# Patient Record
Sex: FEMALE | Race: WHITE | Employment: UNEMPLOYED | URBAN - METROPOLITAN AREA
[De-identification: names, ages, dates, MRNs, and addresses within clinical notes are randomized per-mention and may not be internally consistent; named-entity substitution may affect disease eponyms.]

---

## 2017-01-06 ENCOUNTER — OFFICE VISIT (OUTPATIENT)
Dept: FAMILY MEDICINE CLINIC | Age: 67
End: 2017-01-06

## 2017-01-06 VITALS
OXYGEN SATURATION: 97 % | SYSTOLIC BLOOD PRESSURE: 129 MMHG | HEART RATE: 88 BPM | RESPIRATION RATE: 16 BRPM | DIASTOLIC BLOOD PRESSURE: 84 MMHG | WEIGHT: 225 LBS | HEIGHT: 67 IN | TEMPERATURE: 99 F | BODY MASS INDEX: 35.31 KG/M2

## 2017-01-06 DIAGNOSIS — G44.219 EPISODIC TENSION-TYPE HEADACHE, NOT INTRACTABLE: Primary | ICD-10-CM

## 2017-01-06 NOTE — PROGRESS NOTES
1. Have you been to the ER, urgent care clinic since your last visit? Hospitalized since your last visit? No    2. Have you seen or consulted any other health care providers outside of the [address] [place] [address] since your last visit? Include any pap smears or colon screening.  No

## 2017-01-06 NOTE — PROGRESS NOTES
Subjective:     HPI:  Mariangel Han is a 77 y.o. female who presents to the office for follow up on headaches. Headaches: Patient was able to get Fioricet to take for headaches on 12/22/16. She reports relief of headaches with taking Fioricet. She reports taking the Fioricet at 10 pm works to decreased headaches and allows her to be functional the following day. Patient reports when she was recently in [place] her headaches were decreased during her trip; severe headaches returned after patient returned to [place]. She attributes allergies to her roommates dog and cats. Patient reports her roommate/landlord has a dog that sheds and two cats whose litter box is not changed frequently. She also has a room in the attic with areas of old, open insulation. Patient reports her headaches have started waking her from sleep. She uses cold packs on her head/face with some relief of headaches. Patient reports she has begun seeing objects fly by in her peripheral vision. She states she never saw a psychiatrist.     Current Outpatient Prescriptions   Medication Sig Dispense Refill    butalbital-acetaminophen-caffeine (FIORICET, ESGIC) -40 mg per tablet Take 1 Tab by mouth every four (4) hours as needed for Pain. Max Daily Amount: 6 Tabs. 30 Tab 0    cetirizine (ZYRTEC) 10 mg tablet TAKE 1 TABLET BY MOUTH EVERY DAY. GENERIC FOR ZYRTEC 90 Tab 2    metFORMIN (GLUCOPHAGE) 500 mg tablet TAKE 1 TABLET BY MOUTH DAILY WITH DINNER. GENERIC FOR GLUCOPHAGE 90 Tab 2    fluticasone (FLONASE) 50 mcg/actuation nasal spray INSTILL 1 SPRAY IN EACH NOSTRIL TWICE DAILY 3 Bottle 2    B.infantis-B.ani-B.long-B.bifi (PROBIOTIC 4X) 10-15 mg TbEC Take  by mouth daily.  docusate sodium (COLACE) 50 mg capsule Take 100 mg by mouth daily.       DULoxetine (CYMBALTA) 30 mg capsule TAKE 1 CAPSULE BY MOUTH TWICE DAILY 60 Cap 5    levothyroxine (SYNTHROID) 25 mcg tablet TAKE 1 TABLET BY MOUTH ONCE DAILY BEFORE BREAKFAST 90 Tab 1    meloxicam (MOBIC) 15 mg tablet Take 1 Tab by mouth daily. Indications: Pt informs she takes 1/2 dose in AM 1/2 dose in PM 30 Tab 5    atorvastatin (LIPITOR) 20 mg tablet TAKE 1 TABLET BY MOUTH ONCE DAILY 90 Tab 3    FLUoxetine (PROZAC) 10 mg capsule TAKE 1 CAPSULE BY MOUTH ONCE DAILY. GENERICFOR PROZAC. 90 Cap 3    traMADol (ULTRAM) 50 mg tablet Take 2 tabs by mouth twice daily prn pain (Patient taking differently: Take 50 mg by mouth four (4) times daily. Take 2 tabs by mouth twice daily prn pain) 120 Tab 2    cyclobenzaprine (FLEXERIL) 10 mg tablet TAKE 1 TABLET BY MOUTH TWICE DAILY AS NEEDED FOR MUSCLE SPASMS 60 Tab 0    ranitidine (ZANTAC) 150 mg tablet 150mg QAM and 75mg QPM      aspirin delayed-release 81 mg tablet Take 81 mg by mouth daily.  clobetasol (TEMOVATE) 0.05 % external solution APPLY TO SCALP AFTER SHAMPOO DAILY 50 mL 3    Cholecalciferol, Vitamin D3, (VITAMIN D3) 1,000 unit cap Take 1,000 Units by mouth daily.  glucose blood VI test strips (ACCU-CHEK MARCE PLUS TEST STRP) strip Please use one strip to test blood sugars once a day. 100 strip 20    Lancets (ACCU-CHEK SOFTCLIX LANCETS) misc Please use one lancet to test blood sugars twice a day. 1 Package 20    BLOOD-GLUCOSE METER (ACCU-CHEK MARCE MONITORING) by Does Not Apply route.  triamcinolone acetonide (KENALOG) 0.1 % topical cream Apply  to affected area two (2) times a day.  use thin layer          Allergies   Allergen Reactions    Latex, Natural Rubber Hives    Adhesive Rash     Some bandaids    Ciprofloxacin Rash    Codeine Nausea and Vomiting    Demerol [Meperidine] Nausea and Vomiting    Diclofenac Shortness of Breath and Palpitations    Erythromycin Rash    Levaquin [Levofloxacin] Rash    Loratadine Swelling    Morphine Nausea and Vomiting    Penicillin G Hives and Rash     Does fine with Keflex    Sulfa (Sulfonamide Antibiotics) Itching    Vicodin [Hydrocodone-Acetaminophen] Other (comments) Metallic taste in mouth       Past Medical History   Diagnosis Date    Aortic stenosis      Mean gradient  MG 34 (06/15), 50 (01/16)    Asthma     Chronic pain      back pain    Diabetes ([address]     Diverticulitis     Dry eyes, bilateral     Fibromyalgia     H/O aortic valve replacement 03/16     21 mm bovine pericardial bioprosthesis and epiaortic scanning      Hypertension     Hypothyroidism     Lumbar post-laminectomy syndrome     Menopause     Obesity     Osteoarthritis     Psychotic disorder     Sciatica     Skin cancer 2013     right forearm. Past Surgical History   Procedure Laterality Date    Colonoscopy      Hx lumbar laminectomy  2002    Hx lumbar fusion  2004     L3-L4-L5    Hx heent  1990's     sinus surgery     Hx cataract removal Right 1995     lens  replaced.  Pr colsc flx w/rmvl of tumor polyp lesion snare tq  07/09/2014 repeat in 3 years     Dr. Jeremy Parmar Hx mohs procedure Left 200    Pr chest surgery procedure unlisted  03/2016     Open Heart Surgery    Hx orthopaedic       Rotator cuff Bilateral    Vascular surgery procedure unlist       Aortic valve replacement       Family History   Problem Relation Age of Onset    Hypertension Father     Heart Disease Father     Alcohol abuse Father        Social History     Social History    Marital status:      Spouse name: N/A    Number of children: N/A    Years of education: N/A     Occupational History    Not on file. Social History Main Topics    Smoking status: Former Smoker     Years: 45.00     Types: Cigarettes     Quit date: 7/1/2011    Smokeless tobacco: Never Used    Alcohol use No    Drug use: No    Sexual activity: No     Other Topics Concern    Not on file     Social History Narrative       REVIEW OF SYSTEM:  Review of Systems   Constitutional: Negative for chills and fever. Eyes: Negative for blurred vision. Respiratory: Positive for cough.  Negative for shortness of breath. Cardiovascular: Negative for chest pain, palpitations and leg swelling. Gastrointestinal: Negative for constipation, diarrhea, nausea and vomiting. Musculoskeletal: Positive for back pain (chronic). Neurological: Positive for headaches. Objective:     Visit Vitals    /84 (BP 1 Location: Right arm, BP Patient Position: Sitting)    Pulse 88    Temp 99 °F (37.2 °C) (Oral)    Resp 16    Ht 5' 7\" (1.702 m)    Wt 225 lb (102.1 kg)    SpO2 97%    BMI 35.24 kg/m2       PHYSICAL EXAM:  Physical Exam   Constitutional: She is oriented to person, place, and time and well-developed, well-nourished, and in no distress. HENT:   Right Ear: Tympanic membrane, external ear and ear canal normal.   Left Ear: Tympanic membrane, external ear and ear canal normal.   Nose: Nose normal.   Mouth/Throat: Oropharynx is clear and moist.   Eyes: Pupils are equal, round, and reactive to light. Neck: Normal range of motion. Neck supple. No thyromegaly present. Cardiovascular: Normal rate, regular rhythm, normal heart sounds and intact distal pulses. No murmur heard. Pulmonary/Chest: Effort normal and breath sounds normal. She has no wheezes. Neurological: She is alert and oriented to person, place, and time. Reflex Scores:       Bicep reflexes are 2+ on the right side and 2+ on the left side. Patellar reflexes are 2+ on the right side and 2+ on the left side. Skin: Skin is warm and dry. Vitals reviewed. Assessment/Plan:       ICD-10-CM ICD-9-CM    1. Episodic tension-type headache, not intractable G44.219 339.11       Continue with Fioricet as needed. Patient advised she may need to change environment to reduce headaches. Patient given opportunity to ask questions. Questions answered. Patient understands plan of care. Follow-up Disposition:  Return in about 2 weeks (around 1/20/2017).         Written by Yas Ugalde, as dictated by Veronica Sloan DO.

## 2017-01-06 NOTE — MR AVS SNAPSHOT
Visit Information Date & Time Provider Department Dept. Phone Encounter #  
 1/6/2017  2:40 PM Daryn Christianson, [address] [facility] [phone] [id number] Follow-up Instructions Return in about 2 weeks (around 1/20/2017). Your Appointments 2/9/2017 10:50 AM  
Follow Up with Gabriel Haq MD  
[facility] ([facility]) Appt Note: 3 month back fu  no copay; PT R/S FROM 01/05/17  
 [address] [facility] [place] [address]  
[phone]  
  
   
 [address] [facility] [place] [address] Upcoming Health Maintenance Date Due  
 FOOT EXAM Q1 11/6/2016 Pneumococcal 65+ Low/Medium Risk (2 of 2 - PPSV23) 12/7/2016 MEDICARE YEARLY EXAM 12/7/2016 EYE EXAM RETINAL OR DILATED Q1 1/19/2017 HEMOGLOBIN A1C Q6M 3/2/2017 COLONOSCOPY 7/9/2017 MICROALBUMIN Q1 9/2/2017 LIPID PANEL Q1 9/2/2017 GLAUCOMA SCREENING Q2Y 1/19/2018 BREAST CANCER SCRN MAMMOGRAM 9/6/2018 DTaP/Tdap/Td series (2 - Td) 12/15/2025 Allergies as of 1/6/2017  Review Complete On: 1/6/2017 By: Maynor Garza LPN Severity Noted Reaction Type Reactions Latex, Natural Rubber  07/14/2016    Hives Adhesive  03/18/2016    Rash Some bandaids Ciprofloxacin  01/28/2014    Rash Codeine  01/28/2014    Nausea and Vomiting Demerol [Meperidine]  01/28/2014    Nausea and Vomiting Diclofenac  08/06/2015    Shortness of Breath, Palpitations Erythromycin  01/28/2014    Rash Levaquin [Levofloxacin]  01/28/2014    Rash Loratadine  01/28/2014    Swelling Morphine  01/28/2014    Nausea and Vomiting Penicillin G  01/28/2014    Hives, Rash Does fine with Keflex Sulfa (Sulfonamide Antibiotics)  01/28/2014    Itching Vicodin [Hydrocodone-acetaminophen]  02/12/2015    Other (comments) Metallic taste in mouth Current Immunizations  Reviewed on 10/3/2016 Name Date Influenza High Dose Vaccine PF 10/3/2016 Influenza Vaccine 10/10/2014 Influenza Vaccine (Quad) PF 10/28/2015 Pneumococcal Conjugate (PCV-13) 12/7/2015 Tdap 12/15/2015 Zoster Vaccine, Live 6/2/2014 Not reviewed this visit Vitals BP Pulse Temp Resp Height(growth percentile) Weight(growth percentile) 129/84 (BP 1 Location: Right arm, BP Patient Position: Sitting) 88 99 °F (37.2 °C) (Oral) 16 5' 7\" (1.702 m) 225 lb (102.1 kg) SpO2 BMI OB Status Smoking Status 97% 35.24 kg/m2 Menopause Former Smoker BMI and BSA Data Body Mass Index Body Surface Area  
 35.24 kg/m 2 2.2 m 2 Preferred Pharmacy Pharmacy Name Phone [facility] [address] [place] [address] Shasha [place]jolantas Your Updated Medication List  
  
   
This list is accurate as of: 1/6/17  3:50 PM.  Always use your most recent med list.  
  
  
  
  
 Edda Hilton  
by Does Not Apply route. aspirin delayed-release 81 mg tablet Take 81 mg by mouth daily. atorvastatin 20 mg tablet Commonly known as:  LIPITOR  
TAKE 1 TABLET BY MOUTH ONCE DAILY  
  
 butalbital-acetaminophen-caffeine -40 mg per tablet Commonly known as:  Carmell Bruce Take 1 Tab by mouth every four (4) hours as needed for Pain. Max Daily Amount: 6 Tabs. cetirizine 10 mg tablet Commonly known as:  ZYRTEC  
TAKE 1 TABLET BY MOUTH EVERY DAY. GENERIC FOR ZYRTEC  
  
 clobetasol 0.05 % external solution Commonly known as:  TEMOVATE  
APPLY TO SCALP AFTER SHAMPOO DAILY  
  
 cyclobenzaprine 10 mg tablet Commonly known as:  FLEXERIL  
TAKE 1 TABLET BY MOUTH TWICE DAILY AS NEEDED FOR MUSCLE SPASMS  
  
 docusate sodium 50 mg capsule Commonly known as:  Tameka Dilling Take 100 mg by mouth daily. DULoxetine 30 mg capsule Commonly known as:  CYMBALTA TAKE 1 CAPSULE BY MOUTH TWICE DAILY FLUoxetine 10 mg capsule Commonly known as:  PROzac TAKE 1 CAPSULE BY MOUTH ONCE DAILY. Chyrel Distance. fluticasone 50 mcg/actuation nasal spray Commonly known as:  Nidhi [place]a INSTILL 1 SPRAY IN EACH NOSTRIL TWICE DAILY  
  
 glucose blood VI test strips strip Commonly known as:  ACCU-CHEK MARCE PLUS TEST STRP Please use one strip to test blood sugars once a day. Lancets Misc Commonly known as:  ACCU-CHEK SOFTCLIX LANCETS Please use one lancet to test blood sugars twice a day. levothyroxine 25 mcg tablet Commonly known as:  SYNTHROID  
TAKE 1 TABLET BY MOUTH ONCE DAILY BEFORE BREAKFAST  
  
 meloxicam 15 mg tablet Commonly known as:  MOBIC Take 1 Tab by mouth daily. Indications: Pt informs she takes 1/2 dose in AM 1/2 dose in PM  
  
 metFORMIN 500 mg tablet Commonly known as:  GLUCOPHAGE  
TAKE 1 TABLET BY MOUTH DAILY WITH DINNER. GENERIC FOR GLUCOPHAGE  
  
 PROBIOTIC 4X 10-15 mg Tbec Generic drug:  B.infantis-B.ani-B.long-B.bifi Take  by mouth daily. raNITIdine 150 mg tablet Commonly known as:  ZANTAC  
150mg QAM and 75mg QPM  
  
 traMADol 50 mg tablet Commonly known as:  ULTRAM  
Take 2 tabs by mouth twice daily prn pain  
  
 triamcinolone acetonide 0.1 % topical cream  
Commonly known as:  KENALOG Apply  to affected area two (2) times a day. use thin layer VITAMIN D3 1,000 unit Cap Generic drug:  cholecalciferol Take 1,000 Units by mouth daily. Follow-up Instructions Return in about 2 weeks (around 1/20/2017). Introducing [facility]! Dear Cherry Parmar: Thank you for requesting a [employer] account. Our records indicate that you already have an active [employer] account. You can access your account anytime at [email or web address] Did you know that you can access your hospital and ER discharge instructions at any time in [employer]? You can also review all of your test results from your hospital stay or ER visit. Additional Information If you have questions, please visit the Frequently Asked Questions section of the [email or web address] website at [email or web address] Remember, [employer] is NOT to be used for urgent needs. For medical emergencies, dial 911. Now available from your iPhone and Android! Please provide this summary of care documentation to your next provider. Your primary care clinician is listed as Loy Guest. If you have any questions after today's visit, please call [phone].

## 2017-01-20 ENCOUNTER — OFFICE VISIT (OUTPATIENT)
Dept: FAMILY MEDICINE CLINIC | Age: 67
End: 2017-01-20

## 2017-01-20 VITALS
WEIGHT: 229 LBS | HEIGHT: 67 IN | BODY MASS INDEX: 35.94 KG/M2 | DIASTOLIC BLOOD PRESSURE: 77 MMHG | TEMPERATURE: 98.1 F | OXYGEN SATURATION: 97 % | HEART RATE: 95 BPM | SYSTOLIC BLOOD PRESSURE: 130 MMHG | RESPIRATION RATE: 16 BRPM

## 2017-01-20 DIAGNOSIS — R51.9 NEW ONSET HEADACHE: ICD-10-CM

## 2017-01-20 DIAGNOSIS — R30.9 PAIN PASSING URINE: Primary | ICD-10-CM

## 2017-01-20 DIAGNOSIS — G44.221 CHRONIC TENSION-TYPE HEADACHE, INTRACTABLE: ICD-10-CM

## 2017-01-20 DIAGNOSIS — N30.00 ACUTE CYSTITIS WITHOUT HEMATURIA: ICD-10-CM

## 2017-01-20 DIAGNOSIS — F33.9 RECURRENT MAJOR DEPRESSIVE DISORDER, REMISSION STATUS UNSPECIFIED (HCC): ICD-10-CM

## 2017-01-20 LAB
BILIRUB UR QL STRIP: NEGATIVE
GLUCOSE UR-MCNC: NEGATIVE MG/DL
KETONES P FAST UR STRIP-MCNC: NEGATIVE MG/DL
PH UR STRIP: 5.5 [PH] (ref 4.6–8)
PROT UR QL STRIP: NEGATIVE MG/DL
SP GR UR STRIP: 1.02 (ref 1–1.03)
UA UROBILINOGEN AMB POC: NORMAL (ref 0.2–1)
URINALYSIS CLARITY POC: CLEAR
URINALYSIS COLOR POC: YELLOW
URINE BLOOD POC: NEGATIVE
URINE LEUKOCYTES POC: NORMAL
URINE NITRITES POC: NEGATIVE

## 2017-01-20 RX ORDER — CEPHALEXIN 500 MG/1
500 CAPSULE ORAL 2 TIMES DAILY
Qty: 14 CAP | Refills: 0 | Status: SHIPPED | OUTPATIENT
Start: 2017-01-20 | End: 2017-01-27

## 2017-01-20 RX ORDER — BUTALBITAL, ACETAMINOPHEN AND CAFFEINE 50; 325; 40 MG/1; MG/1; MG/1
1 TABLET ORAL
Qty: 30 TAB | Refills: 0 | Status: SHIPPED | OUTPATIENT
Start: 2017-01-20 | End: 2019-10-08 | Stop reason: CLARIF

## 2017-01-20 RX ORDER — CEFTRIAXONE 1 G/1
1 INJECTION, POWDER, FOR SOLUTION INTRAMUSCULAR; INTRAVENOUS ONCE
Qty: 1 VIAL | Refills: 0
Start: 2017-01-20 | End: 2017-01-20

## 2017-01-20 NOTE — PROGRESS NOTES
Subjective:     HPI:  Mando Flower is a 77 y.o. female who presents to the office complaining of possible UTI and depression. Possible UTI: Patient reports burning with urination, urethral spasms, and urgency x 3 days. Patient reports she feels urgency when she needs to urinate but is only able to \"dribble\". She reports burning of her urethra and external to urethra with urination. She denies vaginal odor and discharge. Denies new sexual contacts. Patient states she can take Keflex and Rocephin. Results for orders placed or performed in visit on 01/20/17   AMB POC URINALYSIS DIP STICK AUTO W/O MICRO     Status: None   Result Value Ref Range Status    Color (UA POC) Yellow  Final    Clarity (UA POC) Clear  Final    Glucose (UA POC) Negative Negative Final    Bilirubin (UA POC) Negative Negative Final    Ketones (UA POC) Negative Negative Final    Specific gravity (UA POC) 1.025 1.001 - 1.035 Final    Blood (UA POC) Negative Negative Final    pH (UA POC) 5.5 4.6 - 8.0 Final    Protein (UA POC) Negative Negative mg/dL Final    Urobilinogen (UA POC) 0.2 mg/dL 0.2 - 1 Final    Nitrites (UA POC) Negative Negative Final    Leukocyte esterase (UA POC) 1+ Negative Final     Depression: Patient has started cardiac rehab. At her first visit she filled out a depression screen form that showed she is severely depressed. Patient reports she is depressed but did not see Psychiatrist because she thought it would resolve on its own. Patient states she is frequently depressed. She is agreeable to seeing a Psychiatrist at this time. [place] Lani Headaches: Patient reports her headaches are improved with medication changes. No medication side effects noted. Current Outpatient Prescriptions   Medication Sig Dispense Refill    cefTRIAXone (ROCEPHIN) 1 gram injection 1 g by IntraMUSCular route once for 1 dose. 1 Vial 0    cephALEXin (KEFLEX) 500 mg capsule Take 1 Cap by mouth two (2) times a day for 7 days.  14 Cap 0    butalbital-acetaminophen-caffeine (FIORICET, ESGIC) -40 mg per tablet Take 1 Tab by mouth every four (4) hours as needed for Pain. Max Daily Amount: 6 Tabs. 30 Tab 0    cetirizine (ZYRTEC) 10 mg tablet TAKE 1 TABLET BY MOUTH EVERY DAY. GENERIC FOR ZYRTEC 90 Tab 2    metFORMIN (GLUCOPHAGE) 500 mg tablet TAKE 1 TABLET BY MOUTH DAILY WITH DINNER. GENERIC FOR GLUCOPHAGE 90 Tab 2    fluticasone (FLONASE) 50 mcg/actuation nasal spray INSTILL 1 SPRAY IN EACH NOSTRIL TWICE DAILY 3 Bottle 2    B.infantis-B.ani-B.long-B.bifi (PROBIOTIC 4X) 10-15 mg TbEC Take  by mouth daily.  docusate sodium (COLACE) 50 mg capsule Take 100 mg by mouth daily.  DULoxetine (CYMBALTA) 30 mg capsule TAKE 1 CAPSULE BY MOUTH TWICE DAILY 60 Cap 5    levothyroxine (SYNTHROID) 25 mcg tablet TAKE 1 TABLET BY MOUTH ONCE DAILY BEFORE BREAKFAST 90 Tab 1    meloxicam (MOBIC) 15 mg tablet Take 1 Tab by mouth daily. Indications: Pt informs she takes 1/2 dose in AM 1/2 dose in PM 30 Tab 5    atorvastatin (LIPITOR) 20 mg tablet TAKE 1 TABLET BY MOUTH ONCE DAILY 90 Tab 3    FLUoxetine (PROZAC) 10 mg capsule TAKE 1 CAPSULE BY MOUTH ONCE DAILY. GENERICFOR PROZAC. 90 Cap 3    traMADol (ULTRAM) 50 mg tablet Take 2 tabs by mouth twice daily prn pain (Patient taking differently: Take 50 mg by mouth four (4) times daily. Take 2 tabs by mouth twice daily prn pain) 120 Tab 2    cyclobenzaprine (FLEXERIL) 10 mg tablet TAKE 1 TABLET BY MOUTH TWICE DAILY AS NEEDED FOR MUSCLE SPASMS 60 Tab 0    ranitidine (ZANTAC) 150 mg tablet 150mg QAM and 75mg QPM      aspirin delayed-release 81 mg tablet Take 81 mg by mouth daily.  clobetasol (TEMOVATE) 0.05 % external solution APPLY TO SCALP AFTER SHAMPOO DAILY 50 mL 3    Cholecalciferol, Vitamin D3, (VITAMIN D3) 1,000 unit cap Take 1,000 Units by mouth daily.  glucose blood VI test strips (ACCU-CHEK MARCE PLUS TEST STRP) strip Please use one strip to test blood sugars once a day.  100 strip 20    Lancets (ACCU-CHEK SOFTCLIX LANCETS) misc Please use one lancet to test blood sugars twice a day. 1 Package 20    BLOOD-GLUCOSE METER (ACCU-CHEK MARCE MONITORING) by Does Not Apply route.  triamcinolone acetonide (KENALOG) 0.1 % topical cream Apply  to affected area two (2) times a day. use thin layer          Allergies   Allergen Reactions    Latex, Natural Rubber Hives    Adhesive Rash     Some bandaids    Ciprofloxacin Rash    Codeine Nausea and Vomiting    Demerol [Meperidine] Nausea and Vomiting    Diclofenac Shortness of Breath and Palpitations    Erythromycin Rash    Levaquin [Levofloxacin] Rash    Loratadine Swelling    Morphine Nausea and Vomiting    Penicillin G Hives and Rash     Does fine with Keflex    Sulfa (Sulfonamide Antibiotics) Itching    Vicodin [Hydrocodone-Acetaminophen] Other (comments)     Metallic taste in mouth       Past Medical History   Diagnosis Date    Aortic stenosis      Mean gradient  MG 34 (06/15), 50 (01/16)    Asthma     Chronic pain      back pain    Diabetes (HCC)     Diverticulitis     Dry eyes, bilateral     Fibromyalgia     H/O aortic valve replacement 03/16     21 mm bovine pericardial bioprosthesis and epiaortic scanning      Hypertension     Hypothyroidism     Lumbar post-laminectomy syndrome     Menopause     Obesity     Osteoarthritis     Psychotic disorder     Sciatica     Skin cancer 2013     right forearm. Past Surgical History   Procedure Laterality Date    Colonoscopy      Hx lumbar laminectomy  2002    Hx lumbar fusion  2004     L3-L4-L5    Hx heent  1990's     sinus surgery     Hx cataract removal Right 1995     lens  replaced.      Pr colsc flx w/rmvl of tumor polyp lesion snare tq  07/09/2014 repeat in 3 years     Dr. Mark Webb Hx mohs procedure Left 1990    Pr chest surgery procedure unlisted  03/2016     Open Heart Surgery    Hx orthopaedic       Rotator cuff Bilateral    Vascular surgery procedure unlist       Aortic valve replacement       Family History   Problem Relation Age of Onset    Hypertension Father     Heart Disease Father     Alcohol abuse Father        Social History     Social History    Marital status:      Spouse name: N/A    Number of children: N/A    Years of education: N/A     Occupational History    Not on file. Social History Main Topics    Smoking status: Former Smoker     Years: 45.00     Types: Cigarettes     Quit date: 7/1/2011    Smokeless tobacco: Never Used    Alcohol use No    Drug use: No    Sexual activity: No     Other Topics Concern    Not on file     Social History Narrative       REVIEW OF SYSTEM:  Review of Systems   Constitutional: Negative for chills and fever. Eyes: Negative for blurred vision. Respiratory: Negative for shortness of breath. Cardiovascular: Negative for chest pain, palpitations and leg swelling. Gastrointestinal: Negative for constipation, diarrhea, nausea and vomiting. Genitourinary: Positive for dysuria and urgency. Musculoskeletal: Positive for back pain (chronic) and joint pain. Neurological: Positive for headaches. Psychiatric/Behavioral: Positive for depression. Negative for suicidal ideas. Objective:     Visit Vitals    /77 (BP 1 Location: Right arm, BP Patient Position: Sitting)    Pulse 95    Temp 98.1 °F (36.7 °C) (Oral)    Resp 16    Ht 5' 7\" (1.702 m)    Wt 229 lb (103.9 kg)    SpO2 97%    BMI 35.87 kg/m2       PHYSICAL EXAM:  Physical Exam   Constitutional: She is oriented to person, place, and time and well-developed, well-nourished, and in no distress. HENT:   Right Ear: Tympanic membrane, external ear and ear canal normal.   Left Ear: Tympanic membrane, external ear and ear canal normal.   Nose: Nose normal.   Mouth/Throat: Oropharynx is clear and moist.   Eyes: Pupils are equal, round, and reactive to light. Neck: Normal range of motion. Neck supple. No thyromegaly present. Cardiovascular: Normal rate, regular rhythm, normal heart sounds and intact distal pulses. No murmur heard. Pulmonary/Chest: Effort normal and breath sounds normal. She has no wheezes. Abdominal: There is tenderness in the suprapubic area. There is no CVA tenderness. Neurological: She is alert and oriented to person, place, and time. Skin: Skin is warm and dry. Vitals reviewed. Assessment/Plan:       ICD-10-CM ICD-9-CM    1. Pain passing urine R30.9 788.1 AMB POC URINALYSIS DIP STICK AUTO W/O MICRO   2. Acute cystitis without hematuria N30.00 595.0 cefTRIAXone (ROCEPHIN) 1 gram injection      CEFTRIAXONE SODIUM INJECTION  MG      NH THER/PROPH/DIAG INJECTION, SUBCUT/IM      cephALEXin (KEFLEX) 500 mg capsule   3. Recurrent major depressive disorder, remission status unspecified ([facility] F33.9 296.30 REFERRAL TO BEHAVIORAL HEALTH   4. Chronic tension-type headache, intractable G44.221 339.12 butalbital-acetaminophen-caffeine (FIORICET, ESGIC) -40 mg per tablet   5. New onset headache R51 784.0 butalbital-acetaminophen-caffeine (FIORICET, ESGIC) -40 mg per tablet     Patient given opportunity to ask questions. Questions answered. Patient understands plan of care. Follow-up Disposition:  Return if symptoms worsen or fail to improve.         Written by Yas Ugalde, as dictated by Veronica Sloan DO.

## 2017-01-20 NOTE — MR AVS SNAPSHOT
Visit Information Date & Time Provider Department Dept. Phone Encounter #  
 1/20/2017  3:20 PM Jake RitterArchana 6 [phone] [id number] Your Appointments 2/9/2017 10:50 AM  
Follow Up with Aryan Shrestha MD  
[facility] ([facility]) Appt Note: 3 month back fu  no copay; PT R/S FROM 01/05/17  
 [address] [facility] [address]  
[phone]  
  
   
 [address] [facility] [facility] [address] Upcoming Health Maintenance Date Due  
 FOOT EXAM Q1 11/6/2016 Pneumococcal 65+ Low/Medium Risk (2 of 2 - PPSV23) 12/7/2016 MEDICARE YEARLY EXAM 12/7/2016 EYE EXAM RETINAL OR DILATED Q1 1/19/2017 COLONOSCOPY 7/9/2017 HEMOGLOBIN A1C Q6M 3/2/2017 MICROALBUMIN Q1 9/2/2017 LIPID PANEL Q1 9/2/2017 GLAUCOMA SCREENING Q2Y 1/19/2018 BREAST CANCER SCRN MAMMOGRAM 9/6/2018 DTaP/Tdap/Td series (2 - Td) 12/15/2025 Allergies as of 1/20/2017  Review Complete On: 1/20/2017 By: Jake Ritter DO Severity Noted Reaction Type Reactions Latex, Natural Rubber  07/14/2016    Hives Adhesive  03/18/2016    Rash Some bandaids Ciprofloxacin  01/28/2014    Rash Codeine  01/28/2014    Nausea and Vomiting Demerol [Meperidine]  01/28/2014    Nausea and Vomiting Diclofenac  08/06/2015    Shortness of Breath, Palpitations Erythromycin  01/28/2014    Rash Levaquin [Levofloxacin]  01/28/2014    Rash Loratadine  01/28/2014    Swelling Morphine  01/28/2014    Nausea and Vomiting Penicillin G  01/28/2014    Hives, Rash Does fine with Keflex Sulfa (Sulfonamide Antibiotics)  01/28/2014    Itching Vicodin [Hydrocodone-acetaminophen]  02/12/2015    Other (comments) Metallic taste in mouth Current Immunizations  Reviewed on 10/3/2016 Name Date Influenza High Dose Vaccine PF 10/3/2016 Influenza Vaccine 10/10/2014 Influenza Vaccine (Quad) PF 10/28/2015 Pneumococcal Conjugate (PCV-13) 12/7/2015 Tdap 12/15/2015 Zoster Vaccine, Live 6/2/2014 Not reviewed this visit You Were Diagnosed With   
  
 Codes Comments Pain passing urine    -  Primary ICD-10-CM: R30.9 ICD-9-CM: 323. 1 Acute cystitis without hematuria     ICD-10-CM: N30.00 ICD-9-CM: 595.0 Recurrent major depressive disorder, remission status unspecified ([facility]     ICD-10-CM: F33.9 ICD-9-CM: 296.30 Chronic tension-type headache, intractable     ICD-10-CM: U11.705 ICD-9-CM: 339.12 New onset headache     ICD-10-CM: R51 ICD-9-CM: 878. 0 Vitals BP Pulse Temp Resp Height(growth percentile) Weight(growth percentile) 130/77 (BP 1 Location: Right arm, BP Patient Position: Sitting) 95 98.1 °F (36.7 °C) (Oral) 16 5' 7\" (1.702 m) 229 lb (103.9 kg) SpO2 BMI OB Status Smoking Status 97% 35.87 kg/m2 Menopause Former Smoker Vitals History BMI and BSA Data Body Mass Index Body Surface Area  
 35.87 kg/m 2 2.22 m 2 Preferred Pharmacy Pharmacy Name Phone [facility] [address] [place] [address] Shasha Culps Your Updated Medication List  
  
   
This list is accurate as of: 1/20/17  4:09 PM.  Always use your most recent med list.  
  
  
  
  
 Timothy Perez  
by Does Not Apply route. aspirin delayed-release 81 mg tablet Take 81 mg by mouth daily. atorvastatin 20 mg tablet Commonly known as:  LIPITOR  
TAKE 1 TABLET BY MOUTH ONCE DAILY  
  
 butalbital-acetaminophen-caffeine -40 mg per tablet Commonly known as:  Michael Gift Take 1 Tab by mouth every four (4) hours as needed for Pain. Max Daily Amount: 6 Tabs. cefTRIAXone 1 gram injection Commonly known as:  ROCEPHIN  
1 g by IntraMUSCular route once for 1 dose. cephALEXin 500 mg capsule Commonly known as:  Alexei Reeder Take 1 Cap by mouth two (2) times a day for 7 days. cetirizine 10 mg tablet Commonly known as:  ZYRTEC  
TAKE 1 TABLET BY MOUTH EVERY DAY. GENERIC FOR ZYRTEC  
  
 clobetasol 0.05 % external solution Commonly known as:  TEMOVATE  
APPLY TO SCALP AFTER SHAMPOO DAILY  
  
 cyclobenzaprine 10 mg tablet Commonly known as:  FLEXERIL  
TAKE 1 TABLET BY MOUTH TWICE DAILY AS NEEDED FOR MUSCLE SPASMS  
  
 docusate sodium 50 mg capsule Commonly known as:  Tameka Dilling Take 100 mg by mouth daily. DULoxetine 30 mg capsule Commonly known as:  CYMBALTA TAKE 1 CAPSULE BY MOUTH TWICE DAILY FLUoxetine 10 mg capsule Commonly known as:  PROzac TAKE 1 CAPSULE BY MOUTH ONCE DAILY. Virginie Tolentino. fluticasone 50 mcg/actuation nasal spray Commonly known as:  Wendi Fryer INSTILL 1 SPRAY IN EACH NOSTRIL TWICE DAILY  
  
 glucose blood VI test strips strip Commonly known as:  ACCU-CHEK MARCE PLUS TEST STRP Please use one strip to test blood sugars once a day. Lancets Misc Commonly known as:  ACCU-CHEK SOFTCLIX LANCETS Please use one lancet to test blood sugars twice a day. levothyroxine 25 mcg tablet Commonly known as:  SYNTHROID  
TAKE 1 TABLET BY MOUTH ONCE DAILY BEFORE BREAKFAST  
  
 meloxicam 15 mg tablet Commonly known as:  MOBIC Take 1 Tab by mouth daily. Indications: Pt informs she takes 1/2 dose in AM 1/2 dose in PM  
  
 metFORMIN 500 mg tablet Commonly known as:  GLUCOPHAGE  
TAKE 1 TABLET BY MOUTH DAILY WITH DINNER. GENERIC FOR GLUCOPHAGE  
  
 PROBIOTIC 4X 10-15 mg Tbec Generic drug:  B.infantis-B.ani-B.long-B.bifi Take  by mouth daily. raNITIdine 150 mg tablet Commonly known as:  ZANTAC  
150mg QAM and 75mg QPM  
  
 traMADol 50 mg tablet Commonly known as:  ULTRAM  
Take 2 tabs by mouth twice daily prn pain  
  
 triamcinolone acetonide 0.1 % topical cream  
Commonly known as:  KENALOG Apply  to affected area two (2) times a day. use thin layer VITAMIN D3 1,000 unit Cap Generic drug:  cholecalciferol Take 1,000 Units by mouth daily. Prescriptions Printed Refills  
 butalbital-acetaminophen-caffeine (FIORICET, ESGIC) -40 mg per tablet 0 Sig: Take 1 Tab by mouth every four (4) hours as needed for Pain. Max Daily Amount: 6 Tabs. Class: Print Route: Oral  
  
Prescriptions Sent to Pharmacy Refills  
 cephALEXin (KEFLEX) 500 mg capsule 0 Sig: Take 1 Cap by mouth two (2) times a day for 7 days. Class: Normal  
 Pharmacy: [facility] [address] [place] [address] #: [phone] Route: Oral  
  
We Performed the Following AMB POC URINALYSIS DIP STICK AUTO W/O MICRO [98796 CPT(R)] CEFTRIAXONE SODIUM INJECTION  MG [ [facility]] Comments:  
 Mix per protocol GA THER/PROPH/DIAG INJECTION, SUBCUT/IM [id number] CPT(R)] REFERRAL TO BEHAVIORAL HEALTH [REF8 Custom] Comments:  
 Please evaluate patient for depression, OCD, PTSD, anxiety Referral Information Referral ID Referred By Referred To  
  
 [id number] Gilbert GRANT Not Available Visits Status Start Date End Date 1 New Request 1/20/17 1/20/18 If your referral has a status of pending review or denied, additional information will be sent to support the outcome of this decision. [facility]! Dear Merlin Shelter: Thank you for requesting a Work [employer] account. Our records indicate that you already have an active [employer] account. You can access your account anytime at [email or web address] Did you know that you can access your hospital and ER discharge instructions at any time in Work For Pie? You can also review all of your test results from your hospital stay or ER visit. Additional Information If you have questions, please visit the Frequently Asked Questions section of the [employer] website at [email or web address] Five-Thirty. [email or web address]/[email or web address] Remember, [employer] is NOT to be used for urgent needs. For medical emergencies, dial 911. Now available from your iPhone and Android! Please provide this summary of care documentation to your next provider. Your primary care clinician is listed as Elaine Solis. If you have any questions after today's visit, please call [phone].

## 2017-01-20 NOTE — PROGRESS NOTES
HISTORY OF PRESENT ILLNESS  Philip Lopez is a 77 y.o. female. HPI    ROS    Physical Exam    ASSESSMENT and PLAN  1. Have you been to the ER, urgent care clinic since your last visit? Hospitalized since your last visit? No       2. Have you seen or consulted any other health care providers outside of the [address] [place] [address] since your last visit? Include any pap smears or colon screening.  No

## 2017-01-23 ENCOUNTER — TELEPHONE (OUTPATIENT)
Dept: FAMILY MEDICINE CLINIC | Age: 67
End: 2017-01-23

## 2017-01-31 ENCOUNTER — TELEPHONE (OUTPATIENT)
Dept: FAMILY MEDICINE CLINIC | Age: 67
End: 2017-01-31

## 2017-02-09 ENCOUNTER — OFFICE VISIT (OUTPATIENT)
Dept: ORTHOPEDIC SURGERY | Age: 67
End: 2017-02-09

## 2017-02-09 VITALS
SYSTOLIC BLOOD PRESSURE: 126 MMHG | BODY MASS INDEX: 35.63 KG/M2 | WEIGHT: 227 LBS | HEIGHT: 67 IN | DIASTOLIC BLOOD PRESSURE: 86 MMHG | HEART RATE: 90 BPM

## 2017-02-09 DIAGNOSIS — M54.16 RADICULOPATHY, LUMBAR REGION: ICD-10-CM

## 2017-02-09 DIAGNOSIS — M96.1 POSTLAMINECTOMY SYNDROME: Primary | ICD-10-CM

## 2017-02-09 RX ORDER — TRAMADOL HYDROCHLORIDE 50 MG/1
TABLET ORAL
Qty: 120 TAB | Refills: 2 | Status: SHIPPED | OUTPATIENT
Start: 2017-02-09 | End: 2017-04-24 | Stop reason: SDUPTHER

## 2017-02-09 NOTE — PROGRESS NOTES
[facility]  [address]  [address], [address]   Phone: [phone]  Fax: [phone]        PROGRESS NOTE      HISTORY OF PRESENT ILLNESS:  The patient is a 77 y.o. female and was seen today for follow up of low back pain into the left lower extremity extending in roughly an L4 distribution. Patient also endorses numbness in the LLE. She had a diagnosis of lumbar postlaminectomy syndrome with a previous history of L3 through S1 fusion. The patient had an additional diagnosis of fibromyalgia. Note dated 6/29/16 indicating patient was seen and underwent pig aortic valve replacement by Dr. Aditi Baum on 3/18/16. Of note, subsequently fell postsurgery and broke both ankles; she is no longer using left walking boot since 12/2016. She has been restarted on Cymbalta by her psychiatrist. She is currently taking 60 mg twice per day. Upon review of our records, it was noted she previously failed TOPAMAX, NEURONTIN, Nyoka Cotton, and CYMBALTA. Patient was switched from Ultram ER back to Ultram immediate release. She takes Ultram 2 tablets BID. CT scan per report revealed L3 through L5 laminectomies with L3 through S1 dorsal hardware fusion. There was no evidence for fractures. There was advanced degenerative disc disease at L2-L3 at the junctional level with marked central canal stenosis. At her last clinic appointment, she continued to have low back pain and now had complaints of numbness in her left lower extremity. She was not particularly interested in surgical intervention or blocks at that time. I refilled her Ultram.     The patient returns today with pain location and distribution remain unchanged. She rates pain 4/10, a slight decrease relative to her last visit (5/10). Patient began cardiac rehab in 1/2017. Despite the increase in activity, her low back and LLE pain are holding steady. She reports pending right shoulder surgery to be performed by Dr. David Echeverria.  Patient continues with Ultram 1 tab QID.  reviewed. Past Medical History   Diagnosis Date    Aortic stenosis      Mean gradient  MG 34 (06/15), 50 (01/16)    Asthma     Chronic pain      back pain    Diabetes ([address]     Diverticulitis     Dry eyes, bilateral     Fibromyalgia     H/O aortic valve replacement 03/16     21 mm bovine pericardial bioprosthesis and epiaortic scanning      Hypertension     Hypothyroidism     Lumbar post-laminectomy syndrome     Menopause     Obesity     Osteoarthritis     Psychotic disorder     Sciatica     Skin cancer 2013     right forearm. Social History     Social History    Marital status:      Spouse name: N/A    Number of children: N/A    Years of education: N/A     Occupational History    Not on file. Social History Main Topics    Smoking status: Former Smoker     Years: 45.00     Types: Cigarettes     Quit date: 7/1/2011    Smokeless tobacco: Never Used    Alcohol use No    Drug use: No    Sexual activity: No     Other Topics Concern    Not on file     Social History Narrative       Current Outpatient Prescriptions   Medication Sig Dispense Refill    traMADol (ULTRAM) 50 mg tablet TAKE 1 PO QID PRN PAIN  Indications: NEUROPATHIC PAIN, Pain 120 Tab 2    butalbital-acetaminophen-caffeine (FIORICET, ESGIC) -40 mg per tablet Take 1 Tab by mouth every four (4) hours as needed for Pain. Max Daily Amount: 6 Tabs. 30 Tab 0    cetirizine (ZYRTEC) 10 mg tablet TAKE 1 TABLET BY MOUTH EVERY DAY. GENERIC FOR ZYRTEC 90 Tab 2    metFORMIN (GLUCOPHAGE) 500 mg tablet TAKE 1 TABLET BY MOUTH DAILY WITH DINNER. GENERIC FOR GLUCOPHAGE 90 Tab 2    fluticasone (FLONASE) 50 mcg/actuation nasal spray INSTILL 1 SPRAY IN EACH NOSTRIL TWICE DAILY 3 Bottle 2    B.infantis-B.ani-B.long-B.bifi (PROBIOTIC 4X) 10-15 mg TbEC Take  by mouth daily.  docusate sodium (COLACE) 50 mg capsule Take 100 mg by mouth daily.       DULoxetine (CYMBALTA) 30 mg capsule TAKE 1 CAPSULE BY MOUTH TWICE DAILY 60 Cap 5    levothyroxine (SYNTHROID) 25 mcg tablet TAKE 1 TABLET BY MOUTH ONCE DAILY BEFORE BREAKFAST 90 Tab 1    meloxicam (MOBIC) 15 mg tablet Take 1 Tab by mouth daily. Indications: Pt informs she takes 1/2 dose in AM 1/2 dose in PM 30 Tab 5    atorvastatin (LIPITOR) 20 mg tablet TAKE 1 TABLET BY MOUTH ONCE DAILY 90 Tab 3    traMADol (ULTRAM) 50 mg tablet Take 2 tabs by mouth twice daily prn pain (Patient taking differently: Take 50 mg by mouth four (4) times daily. Take 2 tabs by mouth twice daily prn pain) 120 Tab 2    cyclobenzaprine (FLEXERIL) 10 mg tablet TAKE 1 TABLET BY MOUTH TWICE DAILY AS NEEDED FOR MUSCLE SPASMS 60 Tab 0    ranitidine (ZANTAC) 150 mg tablet 150mg QAM and 75mg QPM      aspirin delayed-release 81 mg tablet Take 81 mg by mouth daily.  clobetasol (TEMOVATE) 0.05 % external solution APPLY TO SCALP AFTER SHAMPOO DAILY 50 mL 3    Cholecalciferol, Vitamin D3, (VITAMIN D3) 1,000 unit cap Take 1,000 Units by mouth daily.  glucose blood VI test strips (ACCU-CHEK MARCE PLUS TEST STRP) strip Please use one strip to test blood sugars once a day. 100 strip 20    Lancets (ACCU-CHEK SOFTCLIX LANCETS) misc Please use one lancet to test blood sugars twice a day. 1 Package 20    BLOOD-GLUCOSE METER (ACCU-CHEK MARCE MONITORING) by Does Not Apply route.  triamcinolone acetonide (KENALOG) 0.1 % topical cream Apply  to affected area two (2) times a day. use thin layer      FLUoxetine (PROZAC) 10 mg capsule TAKE 1 CAPSULE BY MOUTH ONCE DAILY.  GENERICFOR PROZAC. 90 Cap 3       Allergies   Allergen Reactions    Latex, Natural Rubber Hives    Adhesive Rash     Some bandaids    Ciprofloxacin Rash    Codeine Nausea and Vomiting    Demerol [Meperidine] Nausea and Vomiting    Diclofenac Shortness of Breath and Palpitations    Erythromycin Rash    Levaquin [Levofloxacin] Rash    Loratadine Swelling    Morphine Nausea and Vomiting    Penicillin G Hives and Rash     Does fine with Keflex    Sulfa (Sulfonamide Antibiotics) Itching    Vicodin [Hydrocodone-Acetaminophen] Other (comments)     Metallic taste in mouth        Ambulates with a single point cane. PHYSICAL EXAMINATION    Visit Vitals    /86    Pulse 90    Ht 5' 7\" (1.702 m)    Wt 227 lb (103 kg)    BMI 35.55 kg/m2       CONSTITUTIONAL: NAD, A&O x 3  SENSATION: Decreased sensation to light touch at L4/L5 of the LLE. Sensation to light touch otherwise intact. RANGE OF MOTION: The patient has full passive range of motion in all four extremities. MOTOR:  Straight Leg Raise: Negative, bilateral               Hip Flex Knee Ext Knee Flex Ankle DF GTE Ankle PF Tone   Right +4/5 +4/5 +4/5 +4/5 +4/5 +4/5 +4/5   Left +4/5 +4/5 +4/5 +4/5 +4/5 +4/5 +4/5       ASSESSMENT   Jerryl Siemens was seen today for follow-up. Diagnoses and all orders for this visit:    Postlaminectomy syndrome  -     traMADol (ULTRAM) 50 mg tablet; TAKE 1 PO QID PRN PAIN  Indications: NEUROPATHIC PAIN, Pain    Radiculopathy, lumbar region  -     traMADol (ULTRAM) 50 mg tablet; TAKE 1 PO QID PRN PAIN  Indications: NEUROPATHIC PAIN, Pain          IMPRESSION AND PLAN:  Patient wished to continue her current treatment. She was provided with refills of Ultram 50 mg. I will see the patient back in 3 month's time or earlier if needed. Written by Christine Veliz, as dictated by Pamella Bosch MD  I examined the patient, reviewed and agree with the note.

## 2017-03-02 DIAGNOSIS — M15.9 OSTEOARTHRITIS OF MULTIPLE JOINTS, UNSPECIFIED OSTEOARTHRITIS TYPE: ICD-10-CM

## 2017-03-07 RX ORDER — MELOXICAM 15 MG/1
TABLET ORAL
Qty: 30 TAB | Refills: 3 | Status: SHIPPED | OUTPATIENT
Start: 2017-03-07 | End: 2017-04-27

## 2017-03-21 ENCOUNTER — OFFICE VISIT (OUTPATIENT)
Dept: CARDIOLOGY CLINIC | Age: 67
End: 2017-03-21

## 2017-03-21 ENCOUNTER — HOSPITAL ENCOUNTER (OUTPATIENT)
Dept: LAB | Age: 67
Discharge: HOME | End: 2017-03-21
Payer: MEDICARE

## 2017-03-21 ENCOUNTER — HOSPITAL ENCOUNTER (OUTPATIENT)
Dept: OTHER | Age: 67
Discharge: HOME | End: 2017-03-21
Payer: MEDICARE

## 2017-03-21 VITALS
BODY MASS INDEX: 34.4 KG/M2 | SYSTOLIC BLOOD PRESSURE: 118 MMHG | HEART RATE: 97 BPM | WEIGHT: 227 LBS | HEIGHT: 68 IN | DIASTOLIC BLOOD PRESSURE: 77 MMHG | OXYGEN SATURATION: 98 %

## 2017-03-21 DIAGNOSIS — Z96.611 S/P REVERSE TOTAL SHOULDER ARTHROPLASTY, RIGHT: ICD-10-CM

## 2017-03-21 DIAGNOSIS — I35.0 AORTIC VALVE STENOSIS, UNSPECIFIED ETIOLOGY: Primary | ICD-10-CM

## 2017-03-21 DIAGNOSIS — E66.01 MORBID OBESITY, UNSPECIFIED OBESITY TYPE (HCC): ICD-10-CM

## 2017-03-21 DIAGNOSIS — I10 ESSENTIAL HYPERTENSION WITH GOAL BLOOD PRESSURE LESS THAN 140/90: ICD-10-CM

## 2017-03-21 LAB
APPEARANCE UR: ABNORMAL
BACTERIA URNS QL MICRO: NEGATIVE /HPF
BASOPHILS # BLD AUTO: 0.1 K/UL (ref 0–0.06)
BASOPHILS # BLD: 1 % (ref 0–2)
BILIRUB UR QL: NEGATIVE
CAOX CRY URNS QL MICRO: ABNORMAL
COLOR UR: ABNORMAL
DIFFERENTIAL METHOD BLD: ABNORMAL
EOSINOPHIL # BLD: 0.1 K/UL (ref 0–0.4)
EOSINOPHIL NFR BLD: 2 % (ref 0–5)
EPITH CASTS URNS QL MICRO: ABNORMAL /LPF (ref 0–5)
ERYTHROCYTE [DISTWIDTH] IN BLOOD BY AUTOMATED COUNT: 14.4 % (ref 11.6–14.5)
GLUCOSE UR STRIP.AUTO-MCNC: NEGATIVE MG/DL
HCT VFR BLD AUTO: 42.7 % (ref 35–45)
HGB BLD-MCNC: 13.6 G/DL (ref 12–16)
HGB UR QL STRIP: NEGATIVE
KETONES UR QL STRIP.AUTO: ABNORMAL MG/DL
LEUKOCYTE ESTERASE UR QL STRIP.AUTO: ABNORMAL
LYMPHOCYTES # BLD AUTO: 27 % (ref 21–52)
LYMPHOCYTES # BLD: 1.7 K/UL (ref 0.9–3.6)
MCH RBC QN AUTO: 29.3 PG (ref 24–34)
MCHC RBC AUTO-ENTMCNC: 31.9 G/DL (ref 31–37)
MCV RBC AUTO: 92 FL (ref 74–97)
MONOCYTES # BLD: 0.8 K/UL (ref 0.05–1.2)
MONOCYTES NFR BLD AUTO: 13 % (ref 3–10)
NEUTS SEG # BLD: 3.6 K/UL (ref 1.8–8)
NEUTS SEG NFR BLD AUTO: 57 % (ref 40–73)
NITRITE UR QL STRIP.AUTO: NEGATIVE
PH UR STRIP: 5.5 [PH] (ref 5–8)
PLATELET # BLD AUTO: 239 K/UL (ref 135–420)
PMV BLD AUTO: 11.5 FL (ref 9.2–11.8)
PROT UR STRIP-MCNC: ABNORMAL MG/DL
RBC # BLD AUTO: 4.64 M/UL (ref 4.2–5.3)
RBC #/AREA URNS HPF: 0 /HPF (ref 0–5)
SP GR UR REFRACTOMETRY: 1.03 (ref 1–1.03)
UROBILINOGEN UR QL STRIP.AUTO: 1 EU/DL (ref 0.2–1)
WBC # BLD AUTO: 6.2 K/UL (ref 4.6–13.2)
WBC URNS QL MICRO: ABNORMAL /HPF (ref 0–4)

## 2017-03-21 PROCEDURE — 81001 URINALYSIS AUTO W/SCOPE: CPT | Performed by: ORTHOPAEDIC SURGERY

## 2017-03-21 PROCEDURE — 85025 COMPLETE CBC W/AUTO DIFF WBC: CPT | Performed by: ORTHOPAEDIC SURGERY

## 2017-03-21 PROCEDURE — 93005 ELECTROCARDIOGRAM TRACING: CPT

## 2017-03-21 PROCEDURE — 36415 COLL VENOUS BLD VENIPUNCTURE: CPT | Performed by: ORTHOPAEDIC SURGERY

## 2017-03-21 RX ORDER — ZOLPIDEM TARTRATE 5 MG/1
TABLET ORAL
Refills: 0 | COMMUNITY
Start: 2017-03-11 | End: 2017-08-07 | Stop reason: ALTCHOICE

## 2017-03-21 NOTE — MR AVS SNAPSHOT
Visit Information Date & Time Provider Department Dept. Phone Encounter #  
 3/21/2017  1:00 PM Joaquín Mccoy MD [address] [place] [address] Specialist at [facility] [phone] [id number] Follow-up Instructions Return in about 6 months (around 9/21/2017). Your Appointments 3/27/2017  2:40 PM  
ROUTINE CARE with Darío Garrett DO [address] [facility] [address] [place] [address]) Appt Note: Surgical Clearance for rt total shoulder 3/29/17 [facility] - [phone] Riya  
 [address] [facility] [address] [facility]  
  
   
 [address] [place] [address]  
  
    
 5/4/2017 10:30 AM  
Follow Up with Helga Ibrahim MD  
[facility] ([address] [place] [address]) Appt Note: 3 MO FU BACK  
 [address] [place] [address]  
[phone]  
  
   
 [address] [facility] [address] [place] [address]  
  
    
 9/21/2017  1:00 PM  
Follow Up with Darryle Dung, MD  
Cardio Specialist at [facility] [place] [address]) Appt Note: 6 m f/u  
 [employer] [address] Upcoming Health Maintenance Date Due  
 FOOT EXAM Q1 11/6/2016 Pneumococcal 65+ Low/Medium Risk (2 of 2 - PPSV23) 12/7/2016 MEDICARE YEARLY EXAM 12/7/2016 EYE EXAM RETINAL OR DILATED Q1 1/19/2017 HEMOGLOBIN A1C Q6M 3/2/2017 COLONOSCOPY 7/9/2017 MICROALBUMIN Q1 9/2/2017 LIPID PANEL Q1 9/2/2017 GLAUCOMA SCREENING Q2Y 1/19/2018 BREAST CANCER SCRN MAMMOGRAM 9/6/2018 DTaP/Tdap/Td series (2 - Td) 12/15/2025 Allergies as of 3/21/2017  Review Complete On: 3/21/2017 By: Stephani Kulkarni LPN Severity Noted Reaction Type Reactions Latex, Natural Rubber  07/14/2016    Hives Adhesive  03/18/2016    Rash Some bandaids Ciprofloxacin  01/28/2014    Rash Codeine  01/28/2014    Nausea and Vomiting Demerol [Meperidine]  01/28/2014    Nausea and Vomiting Diclofenac  08/06/2015    Shortness of Breath, Palpitations Erythromycin  01/28/2014    Rash Levaquin [Levofloxacin]  01/28/2014    Rash Loratadine  01/28/2014    Swelling Morphine  01/28/2014    Nausea and Vomiting Penicillin G  01/28/2014    Hives, Rash Does fine with Keflex Sulfa (Sulfonamide Antibiotics)  01/28/2014    Itching Vicodin [Hydrocodone-acetaminophen]  02/12/2015    Other (comments) Metallic taste in mouth Current Immunizations  Reviewed on 10/3/2016 Name Date Influenza High Dose Vaccine PF 10/3/2016 Influenza Vaccine 10/10/2014 Influenza Vaccine (Quad) PF 10/28/2015 Pneumococcal Conjugate (PCV-13) 12/7/2015 Tdap 12/15/2015 Zoster Vaccine, Live 6/2/2014 Not reviewed this visit Vitals BP Pulse Height(growth percentile) Weight(growth percentile) SpO2 BMI  
 118/77 97 5' 8\" (1.727 m) 227 lb (103 kg) 98% 34.52 kg/m2 OB Status Smoking Status Menopause Former Smoker BMI and BSA Data Body Mass Index Body Surface Area 34.52 kg/m 2 2.22 m 2 Preferred Pharmacy Pharmacy Name Phone [facility] [address] [place] [address] Shasha Culps Your Updated Medication List  
  
   
This list is accurate as of: 3/21/17  1:26 PM.  Always use your most recent med list.  
  
  
  
  
 Justin Sutherland  
by Does Not Apply route. aspirin delayed-release 81 mg tablet Take 81 mg by mouth daily. atorvastatin 20 mg tablet Commonly known as:  LIPITOR  
TAKE 1 TABLET BY MOUTH ONCE DAILY  
  
 butalbital-acetaminophen-caffeine -40 mg per tablet Commonly known as:  Olevia Selene Take 1 Tab by mouth every four (4) hours as needed for Pain. Max Daily Amount: 6 Tabs. cetirizine 10 mg tablet Commonly known as:  ZYRTEC  
 TAKE 1 TABLET BY MOUTH EVERY DAY. GENERIC FOR ZYRTEC  
  
 clobetasol 0.05 % external solution Commonly known as:  TEMOVATE  
APPLY TO SCALP AFTER SHAMPOO DAILY  
  
 cyclobenzaprine 10 mg tablet Commonly known as:  FLEXERIL  
TAKE 1 TABLET BY MOUTH TWICE DAILY AS NEEDED FOR MUSCLE SPASMS  
  
 docusate sodium 50 mg capsule Commonly known as:  Zachary Jonh Take 100 mg by mouth daily. DULoxetine 30 mg capsule Commonly known as:  CYMBALTA TAKE 1 CAPSULE BY MOUTH TWICE DAILY FLUoxetine 10 mg capsule Commonly known as:  PROzac TAKE 1 CAPSULE BY MOUTH ONCE DAILY. Odette Perera. fluticasone 50 mcg/actuation nasal spray Commonly known as:  Imagene Poot INSTILL 1 SPRAY IN EACH NOSTRIL TWICE DAILY  
  
 glucose blood VI test strips strip Commonly known as:  ACCU-CHEK MARCE PLUS TEST STRP Please use one strip to test blood sugars once a day. Lancets Misc Commonly known as:  ACCU-CHEK SOFTCLIX LANCETS Please use one lancet to test blood sugars twice a day. levothyroxine 25 mcg tablet Commonly known as:  SYNTHROID  
TAKE 1 TABLET BY MOUTH ONCE DAILY BEFORE BREAKFAST  
  
 meloxicam 15 mg tablet Commonly known as:  MOBIC  
TAKE 1 TABLET BY MOUTH DAILY AS DIRECTED  
  
 metFORMIN 500 mg tablet Commonly known as:  GLUCOPHAGE  
TAKE 1 TABLET BY MOUTH DAILY WITH DINNER. GENERIC FOR GLUCOPHAGE  
  
 PROBIOTIC 4X 10-15 mg Tbec Generic drug:  B.infantis-B.ani-B.long-B.bifi Take  by mouth daily. raNITIdine 150 mg tablet Commonly known as:  ZANTAC  
150mg QAM and 75mg QPM  
  
 traMADol 50 mg tablet Commonly known as:  ULTRAM  
TAKE 1 PO QID PRN PAIN  Indications: NEUROPATHIC PAIN, Pain  
  
 triamcinolone acetonide 0.1 % topical cream  
Commonly known as:  KENALOG Apply  to affected area two (2) times a day. use thin layer VITAMIN D3 1,000 unit Cap Generic drug:  cholecalciferol Take 1,000 Units by mouth daily. zolpidem 5 mg tablet Commonly known as:  AMBIEN  
TK 1 T PO  QHS PRF SLEEP Follow-up Instructions Return in about 6 months (around 9/21/2017). Introducing [facility]! Dear Jessee Lizama: Thank you for requesting a Purveyour account. Our records indicate that you already have an active Purveyour account. You can access your account anytime at [email or web address] Did you know that you can access your hospital and ER discharge instructions at any time in Purveyour? You can also review all of your test results from your hospital stay or ER visit. Additional Information If you have questions, please visit the Frequently Asked Questions section of the Pur[employer] website at [email or web address] Remember, Pur[employer]our is NOT to be used for urgent needs. For medical emergencies, dial 911. Now available from your iPhone and Android! Please provide this summary of care documentation to your next provider. Your primary care clinician is listed as Wing Hinojosa. If you have any questions after today's visit, please call [phone].

## 2017-03-21 NOTE — PROGRESS NOTES
[facility]    Ms. Edith Nelson is a 77 y.o. female with history of Aortic stenosis s/p Aortic valve replacement (03/16), fibromyalgia, diabetes, obesity, hypertension, hypothyroidism and COPD. Ms. Edith Nelson is here today for follow up appointment. Ms. Edith Nelson has been having worsening of her right shoulder pain. She is being considered for right shoulder orthopedic surgery. She is here to have a cardiology evaluation prior to surgery. Ms. Edith Nelson recently underwent left foot surgery under general anesthesia and she tolerated that procedure well without any problems. She denies any symptoms to suggest angina or heart failure. She denies any chest pain or chest tightness. She uses 1-2 pillows at night. She denies any lower extremity swelling. She does not appear to have any cardiac symptoms at this time. Past Medical History:   Diagnosis Date    Aortic stenosis     Mean gradient  MG 34 (06/15), 50 (01/16)    Asthma     Chronic pain     back pain    Diabetes ([address]     Diverticulitis     Dry eyes, bilateral     Fibromyalgia     H/O aortic valve replacement 03/16    21 mm bovine pericardial bioprosthesis and epiaortic scanning      Hypertension     Hypothyroidism     Lumbar post-laminectomy syndrome     Menopause     Obesity     Osteoarthritis     Psychotic disorder     Sciatica     Skin cancer 2013    right forearm. Past Surgical History:   Procedure Laterality Date    CHEST SURGERY PROCEDURE UNLISTED  03/2016    Open Heart Surgery    COLONOSCOPY      HX CATARACT REMOVAL Right 1995    lens  replaced.      HX HEENT  1990's    sinus surgery     HX LUMBAR FUSION  2004    L3-L4-L5    HX LUMBAR LAMINECTOMY  2002    HX MOHS PROCEDURES Left 1990    HX ORTHOPAEDIC      Rotator cuff Bilateral    TN COLSC FLX W/RMVL OF TUMOR POLYP LESION SNARE TQ  07/09/2014 repeat in 3 years    Dr. En Padilla UNLIST      Aortic valve replacement       Current Outpatient Prescriptions   Medication Sig    zolpidem (AMBIEN) 5 mg tablet TK 1 T PO  QHS PRF SLEEP    meloxicam (MOBIC) 15 mg tablet TAKE 1 TABLET BY MOUTH DAILY AS DIRECTED    traMADol (ULTRAM) 50 mg tablet TAKE 1 PO QID PRN PAIN  Indications: NEUROPATHIC PAIN, Pain    butalbital-acetaminophen-caffeine (FIORICET, ESGIC) -40 mg per tablet Take 1 Tab by mouth every four (4) hours as needed for Pain. Max Daily Amount: 6 Tabs.  cetirizine (ZYRTEC) 10 mg tablet TAKE 1 TABLET BY MOUTH EVERY DAY. GENERIC FOR ZYRTEC    metFORMIN (GLUCOPHAGE) 500 mg tablet TAKE 1 TABLET BY MOUTH DAILY WITH DINNER. GENERIC FOR GLUCOPHAGE    fluticasone (FLONASE) 50 mcg/actuation nasal spray INSTILL 1 SPRAY IN EACH NOSTRIL TWICE DAILY    B.infantis-B.ani-B.long-B.bifi (PROBIOTIC 4X) 10-15 mg TbEC Take  by mouth daily.  docusate sodium (COLACE) 50 mg capsule Take 100 mg by mouth daily.  DULoxetine (CYMBALTA) 30 mg capsule TAKE 1 CAPSULE BY MOUTH TWICE DAILY    levothyroxine (SYNTHROID) 25 mcg tablet TAKE 1 TABLET BY MOUTH ONCE DAILY BEFORE BREAKFAST    atorvastatin (LIPITOR) 20 mg tablet TAKE 1 TABLET BY MOUTH ONCE DAILY    FLUoxetine (PROZAC) 10 mg capsule TAKE 1 CAPSULE BY MOUTH ONCE DAILY. Adriana Mancia.  cyclobenzaprine (FLEXERIL) 10 mg tablet TAKE 1 TABLET BY MOUTH TWICE DAILY AS NEEDED FOR MUSCLE SPASMS    ranitidine (ZANTAC) 150 mg tablet 150mg QAM and 75mg QPM    aspirin delayed-release 81 mg tablet Take 81 mg by mouth daily.  clobetasol (TEMOVATE) 0.05 % external solution APPLY TO SCALP AFTER SHAMPOO DAILY    Cholecalciferol, Vitamin D3, (VITAMIN D3) 1,000 unit cap Take 1,000 Units by mouth daily.  glucose blood VI test strips (ACCU-CHEK MARCE PLUS TEST STRP) strip Please use one strip to test blood sugars once a day.  Lancets (ACCU-CHEK SOFTCLIX LANCETS) misc Please use one lancet to test blood sugars twice a day.     BLOOD-GLUCOSE METER (ACCU-CHEK MARCE MONITORING) by Does Not Apply route.  triamcinolone acetonide (KENALOG) 0.1 % topical cream Apply  to affected area two (2) times a day. use thin layer     No current facility-administered medications for this visit. Allergies and Sensitivities:  Allergies   Allergen Reactions    Latex, Natural Rubber Hives    Adhesive Rash     Some bandaids    Ciprofloxacin Rash    Codeine Nausea and Vomiting    Demerol [Meperidine] Nausea and Vomiting    Diclofenac Shortness of Breath and Palpitations    Erythromycin Rash    Levaquin [Levofloxacin] Rash    Loratadine Swelling    Morphine Nausea and Vomiting    Penicillin G Hives and Rash     Does fine with Keflex    Sulfa (Sulfonamide Antibiotics) Itching    Vicodin [Hydrocodone-Acetaminophen] Other (comments)     Metallic taste in mouth       Family History:  Family History   Problem Relation Age of Onset    Hypertension Father     Heart Disease Father     Alcohol abuse Father        Social History:  Social History   Substance Use Topics    Smoking status: Former Smoker     Years: 45.00     Types: Cigarettes     Quit date: 7/1/2011    Smokeless tobacco: Never Used    Alcohol use No     She  reports that she quit smoking about 5 years ago. Her smoking use included Cigarettes. She quit after 45.00 years of use. She has never used smokeless tobacco.  She  reports that she does not drink alcohol. Review of Systems:  Cardiac symptoms as noted above in HPI. All others negative. Denies skin rash,  photophobia, neck pain, hemoptysis, chronic cough, nausea, vomiting, hematuria, burning micturition, BRBPR, chronic headaches.     Physical Exam:  BP Readings from Last 3 Encounters:   03/21/17 118/77   02/09/17 126/86   01/20/17 130/77         Pulse Readings from Last 3 Encounters:   03/21/17 97   02/09/17 90   01/20/17 95          Wt Readings from Last 3 Encounters:   03/21/17 227 lb (103 kg)   02/09/17 227 lb (103 kg)   01/20/17 229 lb (103.9 kg)       Constitutional: Oriented to person, place, and time. HENT: Head: Normocephalic and atraumatic. Neck: No JVD present. Cardiovascular: Regular rhythm. No gallop or rubs appreciated. No significant murmur  Lung: Breath sounds normal. No respiratory distress. No ronchi or rales appreciated  Abdominal: No tenderness. No rebound and no guarding. Musculoskeletal: No LE swelling    Review of Data  LABS:   Lab Results   Component Value Date/Time    Sodium 144 10/14/2016 10:59 AM    Potassium 5.5 10/14/2016 10:59 AM    Chloride 109 10/14/2016 10:59 AM    CO2 28 10/14/2016 10:59 AM    Glucose 90 10/14/2016 10:59 AM    BUN 15 10/14/2016 10:59 AM    Creatinine 0.85 10/14/2016 10:59 AM     Lipids Latest Ref Rng & Units 9/2/2016 2/13/2015 2/25/2014   Chol, Total 100 - 199 mg/dL 141 113 119   HDL >39 mg/dL 77 57 62   LDL 0 - 99 mg/dL 49 38 43   Trig 0 - 149 mg/dL 77 90 70   Chol/HDL Ratio 0 - 5.0   - 2.0 -     Lab Results   Component Value Date/Time    ALT (SGPT) 27 10/14/2016 10:59 AM     Lab Results   Component Value Date/Time    Hemoglobin A1c 6.5 09/02/2016 03:55 AM     EKG  (04/16) Sinus rhythm at 96 bpm    STRESS TEST (2012)   No EKG or scintigraphic evidence of ischemia or infarction. Normal LVEF    ECHO (01/16)  SUMMARY:  Left ventricle: Systolic function was normal. Ejection fraction was estimated in the range of 55 % to 60 %. There were no regional wall motion  abnormalities. Doppler parameters were consistent with abnormal left ventricular relaxation (grade 1 diastolic dysfunction). Right ventricle: Systolic function was normal.  Aortic valve: The valve was trileaflet. Leaflets exhibited calcification and moderate- severely reduced cuspal separation. There was severe aortic  stenosis. There was no significant regurgitation. Valve peak gradient was 85 mmHg. Valve mean gradient was 50 mmHg. ELIS (02/16)  Left ventricle: Size was normal. Systolic function was normal.  Right ventricle:  The size was normal.  Left atrium: Size was normal. No thrombus was identified. There was no  spontaneous echo contrast (\"smoke\"). Left atrial appendage: No thrombus was identified. There was no  spontaneous echo contrast (\"smoke\") in the appendage. Right atrium: Size was normal.  Mitral valve: There was mild to moderate multi jet regurgitation. No obvious mass, vegetation or thrombus noted. Aortic valve: Aortic valve assessment was performed using 2D  transesophageal as well as transthoracic doppler data. The valve was  trileaflet. Leaflets were thickened, calcific and stenotic. A mean  gradient across the valve was measured at 49mmHg with a calculated valve  area of 0.70 - 0.75 cm based on an LVOT diameter of 2.1 cm, an LVOT VTI of  21 cm, and an AV VTI of 101 cm. There was no regurgitation. Tricuspid valve: There was no regurgitation. No obvious mass, vegetation or thrombus noted. HOLTER (05/16)  Interpretation:  1. Rhythm is sinus. 2. PACs (1.3% burden). 3. Rare PVCs. CARDIAC CATH: (01/16)  PRESSURE HEMODYNAMICS: Systemic aortic pressure was 131/68 mmHg. Left ventricular pressure was 160/2/12 mmHg. Based on the data, peak-to-peak gradient between LV and aortic was approximately 30 mmHg. Mean RA  7 mmHg,   RV  32/1/8 mmHg,  PA  27/9/16 mmHg,   PCWP 10 mm hg  Cardiac output by Ellis calculation was 4.3 L/min and cardiac index was 2.02 liters L/min/meter squared. Mixed venous saturation was 65%, PA saturation was 65%, and the radial artery saturation was 90%. No evidence of intracardiac shunting. CORONARY ANGIOGRAM  1. LM: No significant obstructive disease. 2. LAD: 10-20% luminal irregularities, otherwise normal. Three diagonal branches angiographically normal.  3. LCX: 20-30% luminal irregularities, otherwise no obstructive disease. 4. OM1: Large bifurcating vessel without any obstructive disease. 5. RCA: Proximal 20%, mid discrete 40%, otherwise angiographically normal. Dominant vessel.     IMPRESSION & PLAN:  Ms. Gilbert Aguirre is a 77 y.o. female with Aortic stenosis, COPD, obesity, diabetes, fibromyalgia. Aortic stenosis:  Ms. Gilbert Aguirre had a bioprosthetic [facility]. Aspirin will be continued lifelong. Diabetes:  Goal hemoglobin A1c less than 7 is recommended from a cardiovascular standpoint. Last hemoglobin A1c was 6.5. Continue same management per Dr. Ian Taylor. Hypertension:  BP at home always less than 212 mm hg systolic. BP usually 100-110 resting before starting rehab program. ontinue to observe. BP today 118/78 mm Hg. Use to be on BB in past but stopped because of low BP    Fibromyalgia and COPD:    She has chronic pain syndrome from her fibromyalgia. Ms. Gilbert Aguirre is supposed to undergo right rotator cuff surgery with replacement under general anesthesia. She tolerated left foot surgery under [place] recently weill without problem. Currently she does not have any symptoms to suggest unstable coronary syndrome or decompensated heart failure. There is no evidence of fluid overload on exam.  She does not have any significant aortic systolic murmur to suggest significant aortic valve stenosis. She will still be at low to intermediate risk for any cardiac surgery. No further cardiac workup is necessary. Importance of diet and exercise was discussed. Importance of diet and exercise was discussed with patient. This plan was discussed with patient who is in agreement. Thank you for allowing me to participate in patient care. Please feel free to call me if you have any question or concern.

## 2017-03-21 NOTE — PROGRESS NOTES
1. Have you been to the ER, urgent care clinic since your last visit? Hospitalized since your last visit? No    2. Have you seen or consulted any other health care providers outside of the [address] [place] [address] since your last visit? Include any pap smears or colon screening.  No

## 2017-03-22 LAB
ATRIAL RATE: 82 BPM
CALCULATED P AXIS, ECG09: 62 DEGREES
CALCULATED R AXIS, ECG10: 47 DEGREES
CALCULATED T AXIS, ECG11: 84 DEGREES
DIAGNOSIS, 93000: NORMAL
P-R INTERVAL, ECG05: 148 MS
Q-T INTERVAL, ECG07: 368 MS
QRS DURATION, ECG06: 74 MS
QTC CALCULATION (BEZET), ECG08: 429 MS
VENTRICULAR RATE, ECG03: 82 BPM

## 2017-03-27 ENCOUNTER — HOSPITAL ENCOUNTER (OUTPATIENT)
Dept: LAB | Age: 67
Discharge: HOME | End: 2017-03-27

## 2017-03-27 ENCOUNTER — OFFICE VISIT (OUTPATIENT)
Dept: FAMILY MEDICINE CLINIC | Age: 67
End: 2017-03-27

## 2017-03-27 ENCOUNTER — TELEPHONE (OUTPATIENT)
Dept: ORTHOPEDIC SURGERY | Age: 67
End: 2017-03-27

## 2017-03-27 VITALS
BODY MASS INDEX: 34.25 KG/M2 | TEMPERATURE: 97.3 F | RESPIRATION RATE: 16 BRPM | WEIGHT: 226 LBS | OXYGEN SATURATION: 96 % | DIASTOLIC BLOOD PRESSURE: 85 MMHG | HEART RATE: 86 BPM | SYSTOLIC BLOOD PRESSURE: 131 MMHG | HEIGHT: 68 IN

## 2017-03-27 DIAGNOSIS — M25.511 CHRONIC RIGHT SHOULDER PAIN: ICD-10-CM

## 2017-03-27 DIAGNOSIS — E11.9 TYPE 2 DIABETES MELLITUS WITHOUT COMPLICATION, WITHOUT LONG-TERM CURRENT USE OF INSULIN (HCC): ICD-10-CM

## 2017-03-27 DIAGNOSIS — M79.7 FIBROMYALGIA: ICD-10-CM

## 2017-03-27 DIAGNOSIS — E78.5 HYPERLIPIDEMIA LDL GOAL <100: ICD-10-CM

## 2017-03-27 DIAGNOSIS — E03.8 OTHER SPECIFIED HYPOTHYROIDISM: ICD-10-CM

## 2017-03-27 DIAGNOSIS — G89.29 CHRONIC RIGHT SHOULDER PAIN: ICD-10-CM

## 2017-03-27 DIAGNOSIS — E03.4 HYPOTHYROIDISM DUE TO ACQUIRED ATROPHY OF THYROID: ICD-10-CM

## 2017-03-27 DIAGNOSIS — Z01.818 PRE-OP EXAM: Primary | ICD-10-CM

## 2017-03-27 PROCEDURE — 99001 SPECIMEN HANDLING PT-LAB: CPT | Performed by: FAMILY MEDICINE

## 2017-03-27 RX ORDER — DULOXETIN HYDROCHLORIDE 30 MG/1
CAPSULE, DELAYED RELEASE ORAL
Qty: 180 CAP | Refills: 1 | Status: SHIPPED | OUTPATIENT
Start: 2017-03-27 | End: 2017-09-25 | Stop reason: SDUPTHER

## 2017-03-27 RX ORDER — ATORVASTATIN CALCIUM 20 MG/1
TABLET, FILM COATED ORAL
Qty: 90 TAB | Refills: 3 | Status: SHIPPED | OUTPATIENT
Start: 2017-03-27 | End: 2017-08-07 | Stop reason: SDUPTHER

## 2017-03-27 RX ORDER — DULOXETIN HYDROCHLORIDE 30 MG/1
CAPSULE, DELAYED RELEASE ORAL
Qty: 60 CAP | Refills: 5 | Status: SHIPPED | OUTPATIENT
Start: 2017-03-27 | End: 2017-03-27 | Stop reason: SDUPTHER

## 2017-03-27 RX ORDER — METFORMIN HYDROCHLORIDE 500 MG/1
TABLET ORAL
Qty: 90 TAB | Refills: 2 | Status: SHIPPED | OUTPATIENT
Start: 2017-03-27 | End: 2017-08-07 | Stop reason: SDUPTHER

## 2017-03-27 RX ORDER — LEVOTHYROXINE SODIUM 25 UG/1
TABLET ORAL
Qty: 90 TAB | Refills: 1 | Status: SHIPPED | OUTPATIENT
Start: 2017-03-27 | End: 2017-10-30 | Stop reason: SDUPTHER

## 2017-03-27 NOTE — PROGRESS NOTES
Subjective:     HPI:  Philip Lopez is a 77 y.o. female who presents to the office for follow up on joint pain, hypothyroidism, diabetes, hyperlipidemia, and fibromyalgia, complaining of right-sided jaw pain. Jaw Pain: Patient reports hearing a pop in her jaw while yawning about 1 week ago. She states she experienced pain in her right jaw and heard a noise in her right ear with yawning and chewing. Patient reports that the pain has now resolved. Joint Pain: Patient has stopped taking Meloxicam prior to surgery. She reports a noticeable return of pain in her shoulder, neck, and back without Meloxicam.     Hypothyroidism: Patient is taking medication as prescribed; no medication side effects noted. Diabetes Mellitus:  female has diabetes mellitus, and  hypertension and hyperlipidemia. Diabetic ROS - medication compliance: compliant all of the time, diabetic diet compliance: compliant all of the time, home glucose monitoring: is performed regularly, fasting values range , further diabetic ROS: no polyuria or polydipsia, no chest pain, dyspnea or TIA's, no unusual visual symptoms, no hypoglycemia, no medication side effects noted. Lab review: orders written for new lab studies as appropriate; see orders. Lab Results   Component Value Date/Time    Hemoglobin A1c 6.5 09/02/2016 03:55 AM     Hyperlipidemia: Patient is taking medication as prescribed; no medication side effects noted. Fibromyalgia: Patient is taking medication as prescribed; no medication side effects noted. Current Outpatient Prescriptions   Medication Sig Dispense Refill    metFORMIN (GLUCOPHAGE) 500 mg tablet TAKE 1 TABLET BY MOUTH DAILY WITH DINNER.  GENERIC FOR GLUCOPHAGE 90 Tab 2    DULoxetine (CYMBALTA) 30 mg capsule TAKE 1 CAPSULE BY MOUTH TWICE DAILY 60 Cap 5    levothyroxine (SYNTHROID) 25 mcg tablet TAKE 1 TABLET BY MOUTH ONCE DAILY BEFORE BREAKFAST 90 Tab 1    atorvastatin (LIPITOR) 20 mg tablet TAKE 1 TABLET BY MOUTH ONCE DAILY 90 Tab 3    zolpidem (AMBIEN) 5 mg tablet TK 1 T PO  QHS PRF SLEEP  0    meloxicam (MOBIC) 15 mg tablet TAKE 1 TABLET BY MOUTH DAILY AS DIRECTED 30 Tab 3    traMADol (ULTRAM) 50 mg tablet TAKE 1 PO QID PRN PAIN  Indications: NEUROPATHIC PAIN, Pain 120 Tab 2    butalbital-acetaminophen-caffeine (FIORICET, ESGIC) -40 mg per tablet Take 1 Tab by mouth every four (4) hours as needed for Pain. Max Daily Amount: 6 Tabs. 30 Tab 0    cetirizine (ZYRTEC) 10 mg tablet TAKE 1 TABLET BY MOUTH EVERY DAY. GENERIC FOR ZYRTEC 90 Tab 2    fluticasone (FLONASE) 50 mcg/actuation nasal spray INSTILL 1 SPRAY IN EACH NOSTRIL TWICE DAILY 3 Bottle 2    B.infantis-B.ani-B.long-B.bifi (PROBIOTIC 4X) 10-15 mg TbEC Take  by mouth daily.  docusate sodium (COLACE) 50 mg capsule Take 100 mg by mouth daily.  FLUoxetine (PROZAC) 10 mg capsule TAKE 1 CAPSULE BY MOUTH ONCE DAILY. GENERICFOR PROZAC. 90 Cap 3    cyclobenzaprine (FLEXERIL) 10 mg tablet TAKE 1 TABLET BY MOUTH TWICE DAILY AS NEEDED FOR MUSCLE SPASMS 60 Tab 0    ranitidine (ZANTAC) 150 mg tablet 150mg QAM and 75mg QPM      aspirin delayed-release 81 mg tablet Take 81 mg by mouth daily.  clobetasol (TEMOVATE) 0.05 % external solution APPLY TO SCALP AFTER SHAMPOO DAILY 50 mL 3    Cholecalciferol, Vitamin D3, (VITAMIN D3) 1,000 unit cap Take 1,000 Units by mouth daily.  glucose blood VI test strips (ACCU-CHEK MARCE PLUS TEST STRP) strip Please use one strip to test blood sugars once a day. 100 strip 20    Lancets (ACCU-CHEK SOFTCLIX LANCETS) misc Please use one lancet to test blood sugars twice a day. 1 Package 20    BLOOD-GLUCOSE METER (ACCU-CHEK MARCE MONITORING) by Does Not Apply route.  triamcinolone acetonide (KENALOG) 0.1 % topical cream Apply  to affected area two (2) times a day.  use thin layer          Allergies   Allergen Reactions    Latex, Natural Rubber Hives    Adhesive Rash     Some bandaids    Ciprofloxacin Rash    Codeine Nausea and Vomiting    Demerol [Meperidine] Nausea and Vomiting    Diclofenac Shortness of Breath and Palpitations    Erythromycin Rash    Levaquin [Levofloxacin] Rash    Loratadine Swelling    Morphine Nausea and Vomiting    Penicillin G Hives and Rash     Does fine with Keflex    Sulfa (Sulfonamide Antibiotics) Itching    Vicodin [Hydrocodone-Acetaminophen] Other (comments)     Metallic taste in mouth       Past Medical History:   Diagnosis Date    Aortic stenosis     Mean gradient  MG 34 (06/15), 50 (01/16)    Asthma     Chronic pain     back pain    Diabetes (HCC)     Diverticulitis     Dry eyes, bilateral     Fibromyalgia     H/O aortic valve replacement 03/16    21 mm bovine pericardial bioprosthesis and epiaortic scanning      Hypertension     Hypothyroidism     Lumbar post-laminectomy syndrome     Menopause     Obesity     Osteoarthritis     Psychotic disorder     Sciatica     Skin cancer 2013    right forearm. Past Surgical History:   Procedure Laterality Date    CHEST SURGERY PROCEDURE UNLISTED  03/2016    Open Heart Surgery    COLONOSCOPY      HX CATARACT REMOVAL Right 1995    lens  replaced.  HX HEENT  1990's    sinus surgery     HX LUMBAR FUSION  2004    L3-L4-L5    HX LUMBAR LAMINECTOMY  2002    HX MOHS PROCEDURES Left 1990    HX ORTHOPAEDIC      Rotator cuff Bilateral    [place] COLSC FLX W/RMVL OF TUMOR POLYP LESION SNARE TQ  07/09/2014 repeat in 3 years    Dr. Kiser Nicola      Aortic valve replacement       Family History   Problem Relation Age of Onset    Hypertension Father     Heart Disease Father     Alcohol abuse Father        Social History     Social History    Marital status:      Spouse name: N/A    Number of children: N/A    Years of education: N/A     Occupational History    Not on file.      Social History Main Topics    Smoking status: Former Smoker     Years: 45.00     Types: Cigarettes     Quit date: 7/1/2011    Smokeless tobacco: Never Used    Alcohol use No    Drug use: No    Sexual activity: No     Other Topics Concern    Not on file     Social History Narrative       REVIEW OF SYSTEM:  Review of Systems   Constitutional: Negative for chills and fever. Eyes: Negative for blurred vision. Respiratory: Negative for shortness of breath. Cardiovascular: Negative for chest pain, palpitations and leg swelling. Gastrointestinal: Negative for constipation, diarrhea, nausea and vomiting. Musculoskeletal: Positive for back pain, joint pain and neck pain. Neurological: Negative for headaches. Objective:     Visit Vitals    /85 (BP 1 Location: Left arm, BP Patient Position: Sitting)    Pulse 86    Temp 97.3 °F (36.3 °C) (Oral)    Resp 16    Ht 5' 8\" (1.727 m)    Wt 226 lb (102.5 kg)    SpO2 96%    BMI 34.36 kg/m2       PHYSICAL EXAM:  Physical Exam   Constitutional: She is oriented to person, place, and time and well-developed, well-nourished, and in no distress. HENT:   Right Ear: Tympanic membrane, external ear and ear canal normal.   Left Ear: Tympanic membrane, external ear and ear canal normal.   Nose: Nose normal.   Mouth/Throat: Oropharynx is clear and moist.   Eyes: Pupils are equal, round, and reactive to light. Neck: Normal range of motion. Neck supple. No thyromegaly present. Cardiovascular: Normal rate, regular rhythm, normal heart sounds and intact distal pulses. No murmur heard. Pulmonary/Chest: Effort normal and breath sounds normal. She has no wheezes. Musculoskeletal:        Right shoulder: She exhibits decreased range of motion, tenderness, pain and decreased strength. Neurological: She is alert and oriented to person, place, and time. Skin: Skin is warm and dry. Vitals reviewed. Assessment/Plan:       ICD-10-CM ICD-9-CM    1. Pre-op exam Z01.818 V72.84 HEMOGLOBIN A1C WITH EAG      TSH 3RD GENERATION   2.  Chronic right shoulder pain M25.511 719.41     G89.29 338.29    3. Type 2 diabetes mellitus without complication, without long-term current use of insulin (HCC) E11.9 250.00 HEMOGLOBIN A1C WITH EAG   4. Hypothyroidism due to acquired atrophy of thyroid E03.4 244.8 TSH 3RD GENERATION     246.8    5. Fibromyalgia M79.7 729.1 DULoxetine (CYMBALTA) 30 mg capsule   6. Other specified hypothyroidism E03.8 244.8 levothyroxine (SYNTHROID) 25 mcg tablet   7. Hyperlipidemia LDL goal <100 E78.5 272.4 atorvastatin (LIPITOR) 20 mg tablet     Patient will have non-fasting labs drawn in office today. Patient given opportunity to ask questions. Questions answered. Patient understands plan of care. Follow-up Disposition:  Return in about 6 weeks (around 5/8/2017).         Written by Kelsea Castillo, as dictated by Fadumo Serrano DO.

## 2017-03-27 NOTE — TELEPHONE ENCOUNTER
Prescription refilled. Patient reported during office visit that insurance would only cover 30 days. Resent prescription with 90 day supply.

## 2017-03-27 NOTE — PROGRESS NOTES
HISTORY OF PRESENT ILLNESS    Linda Walter is a 77 y.o.  female presents today for surgical clearance for right reverse shoulder replacement. Last seen in the office : 1/20/17  States there have been no changes since last visit. EKG: normal EKG, normal sinus rhythm. Performed on 3/21/17. XR CHEST PA LAT (3/21/17)  IMPRESSION:  No acute cardiopulmonary disease. Aortic valve prosthesis. Lab Results   Component Value Date/Time    WBC 6.2 03/21/2017 02:15 PM    Hemoglobin (POC) 8.2 03/18/2016 05:07 PM    HGB 13.6 03/21/2017 02:15 PM    Hematocrit (POC) 24 03/18/2016 05:07 PM    HCT 42.7 03/21/2017 02:15 PM    PLATELET 958 23/75/5456 02:15 PM    MCV 92.0 03/21/2017 02:15 PM     Results for orders placed or performed in visit on 01/20/17   AMB POC URINALYSIS DIP STICK AUTO W/O MICRO     Status: None   Result Value Ref Range Status    Color (UA POC) Yellow  Final    Clarity (UA POC) Clear  Final    Glucose (UA POC) Negative Negative Final    Bilirubin (UA POC) Negative Negative Final    Ketones (UA POC) Negative Negative Final    Specific gravity (UA POC) 1.025 1.001 - 1.035 Final    Blood (UA POC) Negative Negative Final    pH (UA POC) 5.5 4.6 - 8.0 Final    Protein (UA POC) Negative Negative mg/dL Final    Urobilinogen (UA POC) 0.2 mg/dL 0.2 - 1 Final    Nitrites (UA POC) Negative Negative Final    Leukocyte esterase (UA POC) 1+ Negative Final       Current Outpatient Prescriptions   Medication Sig Dispense Refill    zolpidem (AMBIEN) 5 mg tablet TK 1 T PO  QHS PRF SLEEP  0    meloxicam (MOBIC) 15 mg tablet TAKE 1 TABLET BY MOUTH DAILY AS DIRECTED 30 Tab 3    traMADol (ULTRAM) 50 mg tablet TAKE 1 PO QID PRN PAIN  Indications: NEUROPATHIC PAIN, Pain 120 Tab 2    butalbital-acetaminophen-caffeine (FIORICET, ESGIC) -40 mg per tablet Take 1 Tab by mouth every four (4) hours as needed for Pain. Max Daily Amount: 6 Tabs. 30 Tab 0    cetirizine (ZYRTEC) 10 mg tablet TAKE 1 TABLET BY MOUTH EVERY DAY. GENERIC FOR ZYRTEC 90 Tab 2    metFORMIN (GLUCOPHAGE) 500 mg tablet TAKE 1 TABLET BY MOUTH DAILY WITH DINNER. GENERIC FOR GLUCOPHAGE 90 Tab 2    fluticasone (FLONASE) 50 mcg/actuation nasal spray INSTILL 1 SPRAY IN EACH NOSTRIL TWICE DAILY 3 Bottle 2    B.infantis-B.ani-B.long-B.bifi (PROBIOTIC 4X) 10-15 mg TbEC Take  by mouth daily.  docusate sodium (COLACE) 50 mg capsule Take 100 mg by mouth daily.  DULoxetine (CYMBALTA) 30 mg capsule TAKE 1 CAPSULE BY MOUTH TWICE DAILY 60 Cap 5    levothyroxine (SYNTHROID) 25 mcg tablet TAKE 1 TABLET BY MOUTH ONCE DAILY BEFORE BREAKFAST 90 Tab 1    atorvastatin (LIPITOR) 20 mg tablet TAKE 1 TABLET BY MOUTH ONCE DAILY 90 Tab 3    FLUoxetine (PROZAC) 10 mg capsule TAKE 1 CAPSULE BY MOUTH ONCE DAILY. GENERICFOR PROZAC. 90 Cap 3    cyclobenzaprine (FLEXERIL) 10 mg tablet TAKE 1 TABLET BY MOUTH TWICE DAILY AS NEEDED FOR MUSCLE SPASMS 60 Tab 0    ranitidine (ZANTAC) 150 mg tablet 150mg QAM and 75mg QPM      aspirin delayed-release 81 mg tablet Take 81 mg by mouth daily.  clobetasol (TEMOVATE) 0.05 % external solution APPLY TO SCALP AFTER SHAMPOO DAILY 50 mL 3    Cholecalciferol, Vitamin D3, (VITAMIN D3) 1,000 unit cap Take 1,000 Units by mouth daily.  glucose blood VI test strips (ACCU-CHEK MARCE PLUS TEST STRP) strip Please use one strip to test blood sugars once a day. 100 strip 20    Lancets (ACCU-CHEK SOFTCLIX LANCETS) misc Please use one lancet to test blood sugars twice a day. 1 Package 20    BLOOD-GLUCOSE METER (ACCU-CHEK MRACE MONITORING) by Does Not Apply route.  triamcinolone acetonide (KENALOG) 0.1 % topical cream Apply  to affected area two (2) times a day.  use thin layer          Allergies   Allergen Reactions    Latex, Natural Rubber Hives    Adhesive Rash     Some bandaids    Ciprofloxacin Rash    Codeine Nausea and Vomiting    Demerol [Meperidine] Nausea and Vomiting    Diclofenac Shortness of Breath and Palpitations    Erythromycin Rash    Levaquin [Levofloxacin] Rash    Loratadine Swelling    Morphine Nausea and Vomiting    Penicillin G Hives and Rash     Does fine with Keflex    Sulfa (Sulfonamide Antibiotics) Itching    Vicodin [Hydrocodone-Acetaminophen] Other (comments)     Metallic taste in mouth       Past Medical History:   Diagnosis Date    Aortic stenosis     Mean gradient  MG 34 (06/15), 50 (01/16)    Asthma     Chronic pain     back pain    Diabetes ([address]     Diverticulitis     Dry eyes, bilateral     Fibromyalgia     H/O aortic valve replacement 03/16    21 mm bovine pericardial bioprosthesis and epiaortic scanning      Hypertension     Hypothyroidism     Lumbar post-laminectomy syndrome     Menopause     Obesity     Osteoarthritis     Psychotic disorder     Sciatica     Skin cancer 2013    right forearm. Past Surgical History:   Procedure Laterality Date    CHEST SURGERY PROCEDURE UNLISTED  03/2016    Open Heart Surgery    COLONOSCOPY      HX CATARACT REMOVAL Right 1995    lens  replaced.  HX HEENT  1990's    sinus surgery     HX LUMBAR FUSION  2004    L3-L4-L5    HX LUMBAR LAMINECTOMY  2002    HX MOHS PROCEDURES Left 1990    HX ORTHOPAEDIC      Rotator cuff Bilateral    MO COLSC FLX W/RMVL OF TUMOR POLYP LESION SNARE TQ  07/09/2014 repeat in 3 years    Dr. Caroline Woodard      Aortic valve replacement       Family History   Problem Relation Age of Onset    Hypertension Father     Heart Disease Father     Alcohol abuse Father        Social History     Social History    Marital status:      Spouse name: N/A    Number of children: N/A    Years of education: N/A     Occupational History    Not on file.      Social History Main Topics    Smoking status: Former Smoker     Years: 45.00     Types: Cigarettes     Quit date: 7/1/2011    Smokeless tobacco: Never Used    Alcohol use No    Drug use: No    Sexual activity: No     Other Topics Concern    Not on file     Social History Narrative       Review of Systems   Constitutional: Negative for chills and fever. Eyes: Negative for blurred vision. Respiratory: Negative for shortness of breath. Cardiovascular: Negative for chest pain, palpitations and leg swelling. Gastrointestinal: Negative for constipation, diarrhea, nausea and vomiting. Musculoskeletal: Positive for back pain, joint pain (right shoulder pain) and neck pain. Neurological: Negative for headaches. Visit Vitals    /85 (BP 1 Location: Left arm, BP Patient Position: Sitting)    Pulse 86    Temp 97.3 °F (36.3 °C) (Oral)    Resp 16    Ht 5' 8\" (1.727 m)    Wt 226 lb (102.5 kg)    SpO2 96%    BMI 34.36 kg/m2       Physical Exam   Constitutional: She is oriented to person, place, and time and well-developed, well-nourished, and in no distress. HENT:   Right Ear: Tympanic membrane, external ear and ear canal normal.   Left Ear: Tympanic membrane, external ear and ear canal normal.   Nose: Nose normal.   Mouth/Throat: Oropharynx is clear and moist.   Eyes: Pupils are equal, round, and reactive to light. Neck: Normal range of motion. Neck supple. No thyromegaly present. Cardiovascular: Normal rate, regular rhythm, normal heart sounds and intact distal pulses. No murmur heard. Pulmonary/Chest: Effort normal and breath sounds normal. She has no wheezes. Abdominal: Soft. Bowel sounds are normal. There is no tenderness. Musculoskeletal:        Right shoulder: She exhibits decreased range of motion (abduction to 90 degrees), tenderness, pain and decreased strength. Neurological: She is alert and oriented to person, place, and time. Skin: Skin is warm and dry. Vitals reviewed. Lab Results   Component Value Date/Time    Hemoglobin A1c 6.4 03/27/2017 03:32 AM     Lab Results   Component Value Date/Time    TSH 2.290 03/27/2017 03:32 AM         ASSESSMENT and PLAN    ICD-10-CM ICD-9-CM    1. Pre-op exam Z01.818 V72.84    2. Chronic right shoulder pain M25.511 719.41     G89.29 338.29    3. Type 2 diabetes mellitus without complication, without long-term current use of insulin (HCC) E11.9 250.00        Patient is low  surgical risk for the planned procedure: right reverse shoulder replacement.         Written by Anastasiia Bhakta, as dictated by Rm Vinson DO.

## 2017-03-27 NOTE — PROGRESS NOTES
1. Have you been to the ER, urgent care clinic since your last visit? Hospitalized since your last visit? No    2. Have you seen or consulted any other health care providers outside of the [address] [place] [address] since your last visit? Include any pap smears or colon screening.  No

## 2017-03-27 NOTE — PATIENT INSTRUCTIONS
Shoulder Replacement: Before Your Surgery  What is shoulder replacement surgery? In shoulder replacement surgery, a doctor removes the end of the upper arm bone. Often he or she also takes out the end of the shoulder bone. The ends are replaced with plastic or metal pieces. To do this surgery, the doctor makes a cut about 6 inches long on your shoulder. This cut is called an incision. The incision leaves a scar that usually fades with time. You will probably stay in the hospital for 2 or 3 days after your surgery. Your rehabilitation program (rehab) starts when you are in the hospital. You will do this rehab for about 3 months or longer. It takes at least 6 months to return to full activity. But if you can avoid certain arm movements like lifting, you may be able to go back to work in as soon as 2 to 3 weeks. After surgery and rehab, you probably will have much less pain than before. And you should be able to return to your usual activities. But your doctor may advise you not to do activities that put stress on that shoulder, such as weight lifting or tennis. In the future, make sure to let all health professionals know about your artificial shoulder. You may have to take antibiotics before you have dental work or a medical procedure. This helps reduce the chance that your new shoulder will get infected. Follow-up care is a key part of your treatment and safety. Be sure to make and go to all appointments, and call your doctor if you are having problems. It's also a good idea to know your test results and keep a list of the medicines you take. What happens before surgery? Surgery can be stressful. This information will help you understand what you can expect. And it will help you safely prepare for your surgery. Preparing for surgery  · Understand exactly what surgery is planned, along with the risks, benefits, and other options.   · Tell your doctors ALL the medicines, vitamins, supplements, and herbal remedies you take. Some of these can increase the risk of bleeding or interact with anesthesia. · If you take blood thinners, such as warfarin (Coumadin), clopidogrel (Plavix), or aspirin, be sure to talk to your doctor. He or she will tell you if you should stop taking these medicines before your surgery. Make sure that you understand exactly what your doctor wants you to do. · Your doctor will tell you which medicines to take or stop before your surgery. You may need to stop taking certain medicines a week or more before surgery. So talk to your doctor as soon as you can. · If you have an advance directive, let your doctor know. It may include a living will and a durable power of  for health care. Bring a copy to the hospital. If you don't have one, you may want to prepare one. It lets your doctor and loved ones know your health care wishes. Doctors advise that everyone prepare these papers before any type of surgery or procedure. What happens on the day of surgery? · Follow the instructions exactly about when to stop eating and drinking. If you don't, your surgery may be canceled. If your doctor told you to take your medicines on the day of surgery, take them with only a sip of water. · Take a bath or shower before you come in for your surgery. Do not apply lotions, perfumes, deodorants, or nail polish. · Do not shave the surgical site yourself. · Take off all jewelry and piercings. And take out contact lenses, if you wear them. At the hospital or surgery center  · Bring a picture ID. · The area for surgery is often marked to make sure there are no errors. · You will be kept comfortable and safe by your anesthesia provider. The anesthesia may make you sleep. Or it may just numb the area being worked on. Going home  · Be sure you have someone to drive you home. Anesthesia and pain medicine make it unsafe for you to drive.   · You will be given more specific instructions about recovering from your surgery. They will cover things like diet, wound care, follow-up care, driving, and getting back to your normal routine. · Get extra help at home. This is most important if you live alone or care for another person. · A physical therapist will show you how to use a pulley device. This will help you lift your arm. It will also keep your shoulder flexible. You won't be allowed to use your shoulder muscles. So you will need to use the pulley to move your shoulder. · You will wear a sling at night. It's also a good idea to put a small stack of folded sheets or towels under your upper arm when you are in bed. This can keep your arm from dropping too far back. Your arm should be next to your body or in front of it for several weeks. Try to do this at all times, even when you sleep. · Don't lift anything with the affected arm for 6 weeks. When should you call your doctor? · You have questions or concerns. · You don't understand how to prepare for your surgery. · You become ill before the surgery (such as fever, flu, or a cold). · You need to reschedule or have changed your mind about having the surgery. Where can you learn more? Go to http://gaby-francisco.info/. Enter U930 in the search box to learn more about \"Shoulder Replacement: Before Your Surgery. \"  Current as of: May 23, 2016  Content Version: 11.1  © 8208-7723 [employer]. Care instructions adapted under license by [employer] (which disclaims liability or warranty for this information). If you have questions about a medical condition or this instruction, always ask your healthcare professional. [address] any warranty or liability for your use of this information.

## 2017-03-27 NOTE — MR AVS SNAPSHOT
Visit Information Date & Time Provider Department Dept. Phone Encounter #  
 3/27/2017  2:40 PM Anna Moody, [address] [facility] [phone] [id number] Follow-up Instructions Return in about 6 weeks (around 5/8/2017). Your Appointments 5/4/2017 10:30 AM  
Follow Up with Marta Slaughter MD  
[facility] ([facility]) Appt Note: 3 MO FU BACK  
 [address] [facility] [address]  
[phone]  
  
   
 [address] [facility],[address] [facility]  
  
    
 9/21/2017  1:00 PM  
Follow Up with Mars Landis MD  
Cardio Specialist at [facility] [address] [facility]) Appt Note: 6 m f/u  
 [facility] [address] [facility] [address] [address] Upcoming Health Maintenance Date Due  
 FOOT EXAM Q1 11/6/2016 Pneumococcal 65+ Low/Medium Risk (2 of 2 - PPSV23) 12/7/2016 MEDICARE YEARLY EXAM 12/7/2016 EYE EXAM RETINAL OR DILATED Q1 1/19/2017 HEMOGLOBIN A1C Q6M 3/2/2017 COLONOSCOPY 7/9/2017 MICROALBUMIN Q1 9/2/2017 LIPID PANEL Q1 9/2/2017 GLAUCOMA SCREENING Q2Y 1/19/2018 BREAST CANCER SCRN MAMMOGRAM 9/6/2018 DTaP/Tdap/Td series (2 - Td) 12/15/2025 Allergies as of 3/27/2017  Review Complete On: 3/27/2017 By: Anna Moody,  Severity Noted Reaction Type Reactions Latex, Natural Rubber  07/14/2016    Hives Adhesive  03/18/2016    Rash Some bandaids Ciprofloxacin  01/28/2014    Rash Codeine  01/28/2014    Nausea and Vomiting Demerol [Meperidine]  01/28/2014    Nausea and Vomiting Diclofenac  08/06/2015    Shortness of Breath, Palpitations Erythromycin  01/28/2014    Rash Levaquin [Levofloxacin]  01/28/2014    Rash Loratadine  01/28/2014    Swelling Morphine  01/28/2014    Nausea and Vomiting Penicillin G  01/28/2014    Hives, Rash Does fine with Keflex Sulfa (Sulfonamide Antibiotics)  01/28/2014    Itching Vicodin [Hydrocodone-acetaminophen]  02/12/2015    Other (comments) Metallic taste in mouth Current Immunizations  Reviewed on 10/3/2016 Name Date Influenza High Dose Vaccine PF 10/3/2016 Influenza Vaccine 10/10/2014 Influenza Vaccine (Quad) PF 10/28/2015 Pneumococcal Conjugate (PCV-13) 12/7/2015 Tdap 12/15/2015 Zoster Vaccine, Live 6/2/2014 Not reviewed this visit You Were Diagnosed With   
  
 Codes Comments Pre-op exam    -  Primary ICD-10-CM: L60.842 ICD-9-CM: V72.84 Chronic right shoulder pain     ICD-10-CM: M25.511, G89.29 ICD-9-CM: 719.41, 338.29 Type 2 diabetes mellitus without complication, without long-term current use of insulin (HCC)     ICD-10-CM: E11.9 ICD-9-CM: 250.00 Hypothyroidism due to acquired atrophy of thyroid     ICD-10-CM: E03.4 ICD-9-CM: 244.8, 246.8 Fibromyalgia     ICD-10-CM: M79.7 ICD-9-CM: 729.1 Other specified hypothyroidism     ICD-10-CM: E03.8 ICD-9-CM: 244.8 Hyperlipidemia LDL goal <100     ICD-10-CM: E78.5 ICD-9-CM: 272.4 Vitals BP Pulse Temp Resp Height(growth percentile) Weight(growth percentile) 131/85 (BP 1 Location: Left arm, BP Patient Position: Sitting) 86 97.3 °F (36.3 °C) (Oral) 16 5' 8\" (1.727 m) 226 lb (102.5 kg) SpO2 BMI OB Status Smoking Status 96% 34.36 kg/m2 Menopause Former Smoker BMI and BSA Data Body Mass Index Body Surface Area  
 34.36 kg/m 2 2.22 m 2 Preferred Pharmacy Pharmacy Name Phone [facility] [address] [place] [address] Shasha Bhakta Your Updated Medication List  
  
   
This list is accurate as of: 3/27/17  3:23 PM.  Always use your most recent med list.  
  
  
  
  
 Brooke Youssef  
by Does Not Apply route. aspirin delayed-release 81 mg tablet Take 81 mg by mouth daily. atorvastatin 20 mg tablet Commonly known as:  LIPITOR  
TAKE 1 TABLET BY MOUTH ONCE DAILY  
  
 butalbital-acetaminophen-caffeine -40 mg per tablet Commonly known as:  Ivan Lasso Take 1 Tab by mouth every four (4) hours as needed for Pain. Max Daily Amount: 6 Tabs. cetirizine 10 mg tablet Commonly known as:  ZYRTEC  
TAKE 1 TABLET BY MOUTH EVERY DAY. GENERIC FOR ZYRTEC  
  
 clobetasol 0.05 % external solution Commonly known as:  TEMOVATE  
APPLY TO SCALP AFTER SHAMPOO DAILY  
  
 cyclobenzaprine 10 mg tablet Commonly known as:  FLEXERIL  
TAKE 1 TABLET BY MOUTH TWICE DAILY AS NEEDED FOR MUSCLE SPASMS  
  
 docusate sodium 50 mg capsule Commonly known as:  Orest Farm Take 100 mg by mouth daily. DULoxetine 30 mg capsule Commonly known as:  CYMBALTA TAKE 1 CAPSULE BY MOUTH TWICE DAILY FLUoxetine 10 mg capsule Commonly known as:  PROzac TAKE 1 CAPSULE BY MOUTH ONCE DAILY. Trinna Crystal. fluticasone 50 mcg/actuation nasal spray Commonly known as:  Maddison Serene INSTILL 1 SPRAY IN EACH NOSTRIL TWICE DAILY  
  
 glucose blood VI test strips strip Commonly known as:  ACCU-CHEK MARCE PLUS TEST STRP Please use one strip to test blood sugars once a day. Lancets Misc Commonly known as:  ACCU-CHEK SOFTCLIX LANCETS Please use one lancet to test blood sugars twice a day. levothyroxine 25 mcg tablet Commonly known as:  SYNTHROID  
TAKE 1 TABLET BY MOUTH ONCE DAILY BEFORE BREAKFAST  
  
 meloxicam 15 mg tablet Commonly known as:  MOBIC  
TAKE 1 TABLET BY MOUTH DAILY AS DIRECTED  
  
 metFORMIN 500 mg tablet Commonly known as:  GLUCOPHAGE  
TAKE 1 TABLET BY MOUTH DAILY WITH DINNER. GENERIC FOR GLUCOPHAGE  
  
 PROBIOTIC 4X 10-15 mg Tbec Generic drug:  B.infantis-B.ani-B.long-B.bifi Take  by mouth daily. raNITIdine 150 mg tablet Commonly known as:  ZANTAC  
150mg QAM and 75mg QPM  
  
 traMADol 50 mg tablet Commonly known as:  ULTRAM  
TAKE 1 PO QID PRN PAIN  Indications: NEUROPATHIC PAIN, Pain  
  
 triamcinolone acetonide 0.1 % topical cream  
Commonly known as:  KENALOG Apply  to affected area two (2) times a day. use thin layer VITAMIN D3 1,000 unit Cap Generic drug:  cholecalciferol Take 1,000 Units by mouth daily. zolpidem 5 mg tablet Commonly known as:  AMBIEN  
TK 1 T PO  QHS PRF SLEEP Prescriptions Sent to Pharmacy Refills  
 metFORMIN (GLUCOPHAGE) 500 mg tablet 2 Sig: TAKE 1 TABLET BY MOUTH DAILY WITH DINNER. GENERIC FOR GLUCOPHAGE Class: Normal  
 Pharmacy: [facility] [address] [place] [address] Ph #: [phone] DULoxetine (CYMBALTA) 30 mg capsule 5 Sig: TAKE 1 CAPSULE BY MOUTH TWICE DAILY Class: Normal  
 Pharmacy: [facility] [address] [place] [address] Ph #: [phone]  
 levothyroxine (SYNTHROID) 25 mcg tablet 1 Sig: TAKE 1 TABLET BY MOUTH ONCE DAILY BEFORE BREAKFAST Class: Normal  
 Pharmacy: [facility] [address] [place] [address] Ph #: [phone]  
 atorvastatin (LIPITOR) 20 mg tablet 3 Sig: TAKE 1 TABLET BY MOUTH ONCE DAILY Class: Normal  
 Pharmacy: [facility] [address] [place] [address] Ph #: [phone] Follow-up Instructions Return in about 6 weeks (around 5/8/2017). To-Do List   
 03/27/2017 Lab:  HEMOGLOBIN A1C WITH EAG   
  
 03/27/2017 Lab:  TSH 3RD GENERATION   
  
 04/05/2017 11:00 AM  
  Appointment with Fani Hassan at [facility] ([phone]) Patient Instructions Shoulder Replacement: Before Your Surgery What is shoulder replacement surgery?  
 
In shoulder replacement surgery, a doctor removes the end of the upper arm bone. Often he or she also takes out the end of the shoulder bone. The ends are replaced with plastic or metal pieces. To do this surgery, the doctor makes a cut about 6 inches long on your shoulder. This cut is called an incision. The incision leaves a scar that usually fades with time. You will probably stay in the hospital for 2 or 3 days after your surgery. Your rehabilitation program (rehab) starts when you are in the hospital. You will do this rehab for about 3 months or longer. It takes at least 6 months to return to full activity. But if you can avoid certain arm movements like lifting, you may be able to go back to work in as soon as 2 to 3 weeks. After surgery and rehab, you probably will have much less pain than before. And you should be able to return to your usual activities. But your doctor may advise you not to do activities that put stress on that shoulder, such as weight lifting or tennis. In the future, make sure to let all health professionals know about your artificial shoulder. You may have to take antibiotics before you have dental work or a medical procedure. This helps reduce the chance that your new shoulder will get infected. Follow-up care is a key part of your treatment and safety. Be sure to make and go to all appointments, and call your doctor if you are having problems. It's also a good idea to know your test results and keep a list of the medicines you take. What happens before surgery? Surgery can be stressful. This information will help you understand what you can expect. And it will help you safely prepare for your surgery. Preparing for surgery · Understand exactly what surgery is planned, along with the risks, benefits, and other options. · Tell your doctors ALL the medicines, vitamins, supplements, and herbal remedies you take. Some of these can increase the risk of bleeding or interact with anesthesia. · If you take blood thinners, such as warfarin (Coumadin), clopidogrel (Plavix), or aspirin, be sure to talk to your doctor. He or she will tell you if you should stop taking these medicines before your surgery. Make sure that you understand exactly what your doctor wants you to do. · Your doctor will tell you which medicines to take or stop before your surgery. You may need to stop taking certain medicines a week or more before surgery. So talk to your doctor as soon as you can. · If you have an advance directive, let your doctor know. It may include a living will and a durable power of  for health care. Bring a copy to the hospital. If you don't have one, you may want to prepare one. It lets your doctor and loved ones know your health care wishes. Doctors advise that everyone prepare these papers before any type of surgery or procedure. What happens on the day of surgery? · Follow the instructions exactly about when to stop eating and drinking. If you don't, your surgery may be canceled. If your doctor told you to take your medicines on the day of surgery, take them with only a sip of water. · Take a bath or shower before you come in for your surgery. Do not apply lotions, perfumes, deodorants, or nail polish. · Do not shave the surgical site yourself. · Take off all jewelry and piercings. And take out contact lenses, if you wear them. At the hospital or surgery center · Bring a picture ID. · The area for surgery is often marked to make sure there are no errors. · You will be kept comfortable and safe by your anesthesia provider. The anesthesia may make you sleep. Or it may just numb the area being worked on. Going home · Be sure you have someone to drive you home. Anesthesia and pain medicine make it unsafe for you to drive. · You will be given more specific instructions about recovering from your surgery.  They will cover things like diet, wound care, follow-up care, driving, and getting back to your normal routine. · Get extra help at home. This is most important if you live alone or care for another person. · A physical therapist will show you how to use a pulley device. This will help you lift your arm. It will also keep your shoulder flexible. You won't be allowed to use your shoulder muscles. So you will need to use the pulley to move your shoulder. · You will wear a sling at night. It's also a good idea to put a small stack of folded sheets or towels under your upper arm when you are in bed. This can keep your arm from dropping too far back. Your arm should be next to your body or in front of it for several weeks. Try to do this at all times, even when you sleep. · Don't lift anything with the affected arm for 6 weeks. When should you call your doctor? · You have questions or concerns. · You don't understand how to prepare for your surgery. · You become ill before the surgery (such as fever, flu, or a cold). · You need to reschedule or have changed your mind about having the surgery. Where can you learn more? Go to http://gaby-francisco.info/. Enter F489 in the search box to learn more about \"Shoulder Replacement: Before Your Surgery. \" Current as of: May 23, 2016 Content Version: 11.1 © 1526-4741 [employer]. Care instructions adapted under license by [employer] (which disclaims liability or warranty for this information). If you have questions about a medical condition or this instruction, always ask your healthcare professional. [place] [address] any warranty or liability for your use of this information. Introducing [facility]! Dear Jes Mohr: Thank you for requesting a Hello Agent account. Our records indicate that you already have an active Hello Agent account. You can access your account anytime at [email or web address]. [email or web address] Did you know that you can access your hospital and ER discharge instructions at any time in Shopetti? You can also review all of your test results from your hospital stay or ER visit. Additional Information If you have questions, please visit the Frequently Asked Questions section of the Shopetti website at [email or web address] Remember, Shopetti is NOT to be used for urgent needs. For medical emergencies, dial 911. Now available from your iPhone and Android! Please provide this summary of care documentation to your next provider. Your primary care clinician is listed as Kamala Anguiano. If you have any questions after today's visit, please call [phone].

## 2017-03-27 NOTE — TELEPHONE ENCOUNTER
Pt called states she's shoulder sx 3/29/17 and going back on the oxycodone. .. Pt just reporting her narcotic meds.   If necessary pt can be reached at,Pt Q#[phone]

## 2017-03-28 LAB
EST. AVERAGE GLUCOSE BLD GHB EST-MCNC: 137 MG/DL
HBA1C MFR BLD: 6.4 % (ref 4.8–5.6)
TSH SERPL DL<=0.005 MIU/L-ACNC: 2.29 UIU/ML (ref 0.45–4.5)

## 2017-04-24 DIAGNOSIS — M96.1 POSTLAMINECTOMY SYNDROME: ICD-10-CM

## 2017-04-24 DIAGNOSIS — M54.16 RADICULOPATHY, LUMBAR REGION: ICD-10-CM

## 2017-04-24 RX ORDER — TRAMADOL HYDROCHLORIDE 50 MG/1
TABLET ORAL
Qty: 120 TAB | Refills: 0 | Status: SHIPPED | OUTPATIENT
Start: 2017-04-24 | End: 2017-05-04 | Stop reason: SDUPTHER

## 2017-04-27 ENCOUNTER — TELEPHONE (OUTPATIENT)
Dept: CARDIOLOGY CLINIC | Age: 67
End: 2017-04-27

## 2017-04-27 ENCOUNTER — HOSPITAL ENCOUNTER (EMERGENCY)
Age: 67
Discharge: HOME | End: 2017-04-27
Attending: EMERGENCY MEDICINE
Payer: MEDICARE

## 2017-04-27 ENCOUNTER — CLINICAL SUPPORT (OUTPATIENT)
Dept: CARDIOLOGY CLINIC | Age: 67
End: 2017-04-27

## 2017-04-27 ENCOUNTER — APPOINTMENT (OUTPATIENT)
Dept: CT IMAGING | Age: 67
End: 2017-04-27
Attending: EMERGENCY MEDICINE
Payer: MEDICARE

## 2017-04-27 ENCOUNTER — APPOINTMENT (OUTPATIENT)
Dept: GENERAL RADIOLOGY | Age: 67
End: 2017-04-27
Attending: EMERGENCY MEDICINE
Payer: MEDICARE

## 2017-04-27 VITALS
SYSTOLIC BLOOD PRESSURE: 132 MMHG | HEIGHT: 68 IN | BODY MASS INDEX: 34.25 KG/M2 | DIASTOLIC BLOOD PRESSURE: 96 MMHG | HEART RATE: 107 BPM | WEIGHT: 226 LBS | OXYGEN SATURATION: 98 %

## 2017-04-27 VITALS
WEIGHT: 223 LBS | OXYGEN SATURATION: 100 % | RESPIRATION RATE: 16 BRPM | HEART RATE: 82 BPM | DIASTOLIC BLOOD PRESSURE: 63 MMHG | BODY MASS INDEX: 33.8 KG/M2 | TEMPERATURE: 98 F | HEIGHT: 68 IN | SYSTOLIC BLOOD PRESSURE: 112 MMHG

## 2017-04-27 DIAGNOSIS — R00.2 PALPITATIONS: Primary | ICD-10-CM

## 2017-04-27 DIAGNOSIS — R06.02 SOB (SHORTNESS OF BREATH): ICD-10-CM

## 2017-04-27 DIAGNOSIS — R07.1 CHEST PAIN VARYING WITH BREATHING: ICD-10-CM

## 2017-04-27 DIAGNOSIS — R06.09 DYSPNEA ON EXERTION: Primary | ICD-10-CM

## 2017-04-27 LAB
ALBUMIN SERPL BCP-MCNC: 3.6 G/DL (ref 3.4–5)
ALBUMIN/GLOB SERPL: 0.9 {RATIO} (ref 0.8–1.7)
ALP SERPL-CCNC: 123 U/L (ref 45–117)
ALT SERPL-CCNC: 25 U/L (ref 13–56)
ANION GAP BLD CALC-SCNC: 12 MMOL/L (ref 3–18)
APTT PPP: 26.6 SEC (ref 23–36.4)
AST SERPL W P-5'-P-CCNC: 19 U/L (ref 15–37)
BASOPHILS # BLD AUTO: 0 K/UL (ref 0–0.06)
BASOPHILS # BLD: 1 % (ref 0–2)
BILIRUB SERPL-MCNC: 0.6 MG/DL (ref 0.2–1)
BNP SERPL-MCNC: 56 PG/ML (ref 0–900)
BUN SERPL-MCNC: 10 MG/DL (ref 7–18)
BUN/CREAT SERPL: 10 (ref 12–20)
CALCIUM SERPL-MCNC: 9.5 MG/DL (ref 8.5–10.1)
CHLORIDE SERPL-SCNC: 107 MMOL/L (ref 100–108)
CK MB CFR SERPL CALC: NORMAL % (ref 0–4)
CK MB SERPL-MCNC: <1 NG/ML (ref 5–25)
CK SERPL-CCNC: 60 U/L (ref 26–192)
CO2 SERPL-SCNC: 21 MMOL/L (ref 21–32)
CREAT SERPL-MCNC: 1.03 MG/DL (ref 0.6–1.3)
D DIMER PPP FEU-MCNC: 2.45 UG/ML(FEU)
DIFFERENTIAL METHOD BLD: ABNORMAL
EOSINOPHIL # BLD: 0.1 K/UL (ref 0–0.4)
EOSINOPHIL NFR BLD: 2 % (ref 0–5)
ERYTHROCYTE [DISTWIDTH] IN BLOOD BY AUTOMATED COUNT: 14.5 % (ref 11.6–14.5)
GLOBULIN SER CALC-MCNC: 3.8 G/DL (ref 2–4)
GLUCOSE BLD STRIP.AUTO-MCNC: 97 MG/DL (ref 70–110)
GLUCOSE SERPL-MCNC: 94 MG/DL (ref 74–99)
HCT VFR BLD AUTO: 36.3 % (ref 35–45)
HGB BLD-MCNC: 11.7 G/DL (ref 12–16)
INR PPP: 1 (ref 0.8–1.2)
LYMPHOCYTES # BLD AUTO: 35 % (ref 21–52)
LYMPHOCYTES # BLD: 1.9 K/UL (ref 0.9–3.6)
MCH RBC QN AUTO: 28.8 PG (ref 24–34)
MCHC RBC AUTO-ENTMCNC: 32.2 G/DL (ref 31–37)
MCV RBC AUTO: 89.4 FL (ref 74–97)
MONOCYTES # BLD: 0.5 K/UL (ref 0.05–1.2)
MONOCYTES NFR BLD AUTO: 8 % (ref 3–10)
NEUTS SEG # BLD: 3 K/UL (ref 1.8–8)
NEUTS SEG NFR BLD AUTO: 54 % (ref 40–73)
PLATELET # BLD AUTO: 323 K/UL (ref 135–420)
PMV BLD AUTO: 10.8 FL (ref 9.2–11.8)
POTASSIUM SERPL-SCNC: 3.7 MMOL/L (ref 3.5–5.5)
PROT SERPL-MCNC: 7.4 G/DL (ref 6.4–8.2)
PROTHROMBIN TIME: 12.8 SEC (ref 11.5–15.2)
RBC # BLD AUTO: 4.06 M/UL (ref 4.2–5.3)
SODIUM SERPL-SCNC: 140 MMOL/L (ref 136–145)
TROPONIN I BLD-MCNC: <0.04 NG/ML (ref 0–0.08)
TROPONIN I SERPL-MCNC: 0.02 NG/ML (ref 0–0.04)
WBC # BLD AUTO: 5.6 K/UL (ref 4.6–13.2)

## 2017-04-27 PROCEDURE — 85610 PROTHROMBIN TIME: CPT | Performed by: EMERGENCY MEDICINE

## 2017-04-27 PROCEDURE — 82962 GLUCOSE BLOOD TEST: CPT

## 2017-04-27 PROCEDURE — 71010 XR CHEST PORT: CPT

## 2017-04-27 PROCEDURE — 99285 EMERGENCY DEPT VISIT HI MDM: CPT

## 2017-04-27 PROCEDURE — 84484 ASSAY OF TROPONIN QUANT: CPT | Performed by: EMERGENCY MEDICINE

## 2017-04-27 PROCEDURE — 74011000258 HC RX REV CODE- 258: Performed by: EMERGENCY MEDICINE

## 2017-04-27 PROCEDURE — 82550 ASSAY OF CK (CPK): CPT | Performed by: EMERGENCY MEDICINE

## 2017-04-27 PROCEDURE — 85379 FIBRIN DEGRADATION QUANT: CPT | Performed by: EMERGENCY MEDICINE

## 2017-04-27 PROCEDURE — 74011636320 HC RX REV CODE- 636/320: Performed by: EMERGENCY MEDICINE

## 2017-04-27 PROCEDURE — 93005 ELECTROCARDIOGRAM TRACING: CPT

## 2017-04-27 PROCEDURE — 71275 CT ANGIOGRAPHY CHEST: CPT

## 2017-04-27 PROCEDURE — 85025 COMPLETE CBC W/AUTO DIFF WBC: CPT | Performed by: EMERGENCY MEDICINE

## 2017-04-27 PROCEDURE — 80053 COMPREHEN METABOLIC PANEL: CPT | Performed by: EMERGENCY MEDICINE

## 2017-04-27 PROCEDURE — 85730 THROMBOPLASTIN TIME PARTIAL: CPT | Performed by: EMERGENCY MEDICINE

## 2017-04-27 PROCEDURE — 83880 ASSAY OF NATRIURETIC PEPTIDE: CPT | Performed by: EMERGENCY MEDICINE

## 2017-04-27 RX ORDER — SODIUM CHLORIDE 9 MG/ML
100 INJECTION, SOLUTION INTRAVENOUS ONCE
Status: COMPLETED | OUTPATIENT
Start: 2017-04-27 | End: 2017-04-27

## 2017-04-27 RX ADMIN — SODIUM CHLORIDE 100 ML: 900 INJECTION, SOLUTION INTRAVENOUS at 17:56

## 2017-04-27 RX ADMIN — IOPAMIDOL 65 ML: 755 INJECTION, SOLUTION INTRAVENOUS at 17:55

## 2017-04-27 NOTE — ED PROVIDER NOTES
HPI Comments: 3:58 PM Shauna Walls is a 79 y.o. female with noted PMHx who presents to the ED c/o SOB that began 1 week ago. Pt had an aortic valve replacement in March 2016. Pt states that 4-5 days ago pt started feeling SOB while walking. Pt talked to Dr. Tavo Borja (cardiologist) and was evaluated and sent to the ED for a stat chest x ray, she started feeling numbness and tingling and was sent to the ED. Pt states while in the waiting room, she started feeling. Pt also c/o CP with exertion. Pt states her chest has not fully healed from her operation a year ago. Pt is not complaining of any other symptoms currently. Pt was taken off her blood thinning medication before surgery and then restarted only Aspirin afterwards. There are no other concerns at this time. Patient is a 79 y.o. female presenting with shortness of breath. Shortness of Breath   Associated symptoms include chest pain. Pertinent negatives include no fever, no headaches, no rhinorrhea, no ear pain, no neck pain, no cough, no vomiting, no abdominal pain, no rash and no leg swelling. Past Medical History:   Diagnosis Date    Aortic stenosis     Mean gradient  MG 34 (06/15), 50 (01/16)    Asthma     Chronic pain     back pain    Diabetes ([address]     Diverticulitis     Dry eyes, bilateral     Fibromyalgia     H/O aortic valve replacement 03/16    21 mm bovine pericardial bioprosthesis and epiaortic scanning      Hypertension     Hypothyroidism     Lumbar post-laminectomy syndrome     Menopause     Obesity     Osteoarthritis     Psychotic disorder     Sciatica     Skin cancer 2013    right forearm. Past Surgical History:   Procedure Laterality Date    CHEST SURGERY PROCEDURE UNLISTED  03/2016    Open Heart Surgery    COLONOSCOPY      HX CATARACT REMOVAL Right 1995    lens  replaced.      HX HEENT  1990's    sinus surgery     HX LUMBAR FUSION  2004    L3-L4-L5    HX LUMBAR LAMINECTOMY  2002    HX MOHS PROCEDURES Left 1990    HX ORTHOPAEDIC      Rotator cuff Bilateral    TN COLSC FLX W/RMVL OF TUMOR POLYP LESION SNARE TQ  07/09/2014 repeat in 3 years    Dr. Sai Garcia      Aortic valve replacement         Family History:   Problem Relation Age of Onset    Hypertension Father     Heart Disease Father     Alcohol abuse Father        Social History     Social History    Marital status:      Spouse name: N/A    Number of children: N/A    Years of education: N/A     Occupational History    Not on file. Social History Main Topics    Smoking status: Former Smoker     Years: 45.00     Types: Cigarettes     Quit date: 7/1/2011    Smokeless tobacco: Never Used    Alcohol use No    Drug use: No    Sexual activity: No     Other Topics Concern    Not on file     Social History Narrative         ALLERGIES: Latex, natural rubber; Adhesive; Ciprofloxacin; Codeine; Demerol [meperidine]; Diclofenac; Erythromycin; Levaquin [levofloxacin]; Loratadine; Morphine; Penicillin g; Sulfa (sulfonamide antibiotics); and Vicodin [hydrocodone-acetaminophen]    Review of Systems   Constitutional: Negative for diaphoresis and fever. HENT: Negative for ear pain, rhinorrhea and trouble swallowing. Eyes: Negative for visual disturbance. Respiratory: Positive for shortness of breath. Negative for cough. Cardiovascular: Positive for chest pain. Negative for leg swelling. Gastrointestinal: Negative for abdominal pain, blood in stool, diarrhea, nausea and vomiting. Genitourinary: Negative for difficulty urinating, flank pain and hematuria. Musculoskeletal: Negative for back pain and neck pain. Skin: Negative for rash. Neurological: Negative for dizziness, weakness, numbness and headaches. Hematological: Negative. Psychiatric/Behavioral: Negative. All other systems reviewed and are negative.       Vitals:    04/27/17 1630 04/27/17 1700 04/27/17 1800 04/27/17 1830   BP: 100/80 116/72 112/63   Pulse: 84 81  82   Resp: 19 22  16   Temp:       SpO2: 100% 100%  100%   Weight:       Height:                Physical Exam   Constitutional: She is oriented to person, place, and time. She appears well-developed and well-nourished. No distress. HENT:   Head: Normocephalic and atraumatic. Mouth/Throat: Oropharynx is clear and moist.   Eyes: Conjunctivae and EOM are normal. Pupils are equal, round, and reactive to light. No scleral icterus. Neck: Normal range of motion. Neck supple. Cardiovascular: Normal rate, regular rhythm and normal heart sounds. No murmur heard. Pulmonary/Chest: Effort normal and breath sounds normal. No respiratory distress. Median sternotomy scar  Becomes mildly dyspneic with minimal movement. Abdominal: Soft. Bowel sounds are normal. She exhibits no distension. There is no tenderness. Musculoskeletal: She exhibits no edema. Lymphadenopathy:     She has no cervical adenopathy. Neurological: She is alert and oriented to person, place, and time. Coordination normal.   Skin: Skin is warm and dry. No rash noted. Psychiatric: She has a normal mood and affect. Her behavior is normal.   Nursing note and vitals reviewed.        MDM  Number of Diagnoses or Management Options  Dyspnea on exertion:   Diagnosis management comments: H/o aortic valve repair and recent R shoulder replacement with increased dyspnea and dyspnea on exertion     Ekg: nsr rate 90 no acute st changes    cxr neg in ED     Noted elevated d dimer will cta chest        Amount and/or Complexity of Data Reviewed  Clinical lab tests: ordered and reviewed  Tests in the radiology section of CPT®: ordered and reviewed  Independent visualization of images, tracings, or specimens: yes    Risk of Complications, Morbidity, and/or Mortality  Presenting problems: high  Diagnostic procedures: moderate  Management options: moderate      ED Course       Procedures    Vitals:  Patient Vitals for the past 12 hrs:   Temp Pulse Resp BP SpO2   04/27/17 1830 - 82 16 112/63 100 %   04/27/17 1800 - - - 116/72 -   04/27/17 1700 - 81 22 - 100 %   04/27/17 1630 - 84 19 100/80 100 %   04/27/17 1600 - 92 20 123/72 100 %   04/27/17 1530 - 91 25 111/70 100 %   04/27/17 1524 98 °F (36.7 °C) 92 20 124/76 100 %       Medications ordered:   Medications   iopamidol (ISOVUE-370) 76 % injection 75 mL (65 mL IntraVENous Given 4/27/17 1755)   0.9% sodium chloride infusion 100 mL (0 mL IntraVENous IV Completed 4/27/17 1800)         Lab findings:  Recent Results (from the past 12 hour(s))   CBC WITH AUTOMATED DIFF    Collection Time: 04/27/17  3:20 PM   Result Value Ref Range    WBC 5.6 4.6 - 13.2 K/uL    RBC 4.06 (L) 4.20 - 5.30 M/uL    HGB 11.7 (L) 12.0 - 16.0 g/dL    HCT 36.3 35.0 - 45.0 %    MCV 89.4 74.0 - 97.0 FL    MCH 28.8 24.0 - 34.0 PG    MCHC 32.2 31.0 - 37.0 g/dL    RDW 14.5 11.6 - 14.5 %    PLATELET 965 323 - 259 K/uL    MPV 10.8 9.2 - 11.8 FL    NEUTROPHILS 54 40 - 73 %    LYMPHOCYTES 35 21 - 52 %    MONOCYTES 8 3 - 10 %    EOSINOPHILS 2 0 - 5 %    BASOPHILS 1 0 - 2 %    ABS. NEUTROPHILS 3.0 1.8 - 8.0 K/UL    ABS. LYMPHOCYTES 1.9 0.9 - 3.6 K/UL    ABS. MONOCYTES 0.5 0.05 - 1.2 K/UL    ABS. EOSINOPHILS 0.1 0.0 - 0.4 K/UL    ABS. BASOPHILS 0.0 0.0 - 0.06 K/UL    DF AUTOMATED     METABOLIC PANEL, COMPREHENSIVE    Collection Time: 04/27/17  3:20 PM   Result Value Ref Range    Sodium 140 136 - 145 mmol/L    Potassium 3.7 3.5 - 5.5 mmol/L    Chloride 107 100 - 108 mmol/L    CO2 21 21 - 32 mmol/L    Anion gap 12 3.0 - 18 mmol/L    Glucose 94 74 - 99 mg/dL    BUN 10 7.0 - 18 MG/DL    Creatinine 1.03 0.6 - 1.3 MG/DL    BUN/Creatinine ratio 10 (L) 12 - 20      GFR est AA >60 >60 ml/min/1.73m2    GFR est non-AA 53 (L) >60 ml/min/1.73m2    Calcium 9.5 8.5 - 10.1 MG/DL    Bilirubin, total 0.6 0.2 - 1.0 MG/DL    ALT (SGPT) 25 13 - 56 U/L    AST (SGOT) 19 15 - 37 U/L    Alk.  phosphatase 123 (H) 45 - 117 U/L    Protein, total 7.4 6.4 - 8.2 g/dL Albumin 3.6 3.4 - 5.0 g/dL    Globulin 3.8 2.0 - 4.0 g/dL    A-G Ratio 0.9 0.8 - 1.7     CARDIAC PANEL,(CK, CKMB & TROPONIN)    Collection Time: 04/27/17  3:20 PM   Result Value Ref Range    CK 60 26 - 192 U/L    CK - MB <1.0 <3.6 ng/ml    CK-MB Index Cannot be calulated 0.0 - 4.0 %    Troponin-I, Qt. 0.02 0.0 - 0.045 NG/ML   D DIMER    Collection Time: 04/27/17  3:20 PM   Result Value Ref Range    D DIMER 2.45 (H) <0.46 ug/ml(FEU)   PROTHROMBIN TIME + INR    Collection Time: 04/27/17  3:20 PM   Result Value Ref Range    Prothrombin time 12.8 11.5 - 15.2 sec    INR 1.0 0.8 - 1.2     PTT    Collection Time: 04/27/17  3:20 PM   Result Value Ref Range    aPTT 26.6 23.0 - 36.4 SEC   PRO-BNP    Collection Time: 04/27/17  3:20 PM   Result Value Ref Range    NT pro-BNP 56 0 - 900 PG/ML   EKG, 12 LEAD, INITIAL    Collection Time: 04/27/17  3:20 PM   Result Value Ref Range    Ventricular Rate 90 BPM    Atrial Rate 90 BPM    P-R Interval 138 ms    QRS Duration 74 ms    Q-T Interval 362 ms    QTC Calculation (Bezet) 442 ms    Calculated P Axis 51 degrees    Calculated R Axis 33 degrees    Calculated T Axis 69 degrees    Diagnosis       Normal sinus rhythm  Nonspecific T wave abnormality  Abnormal ECG  When compared with ECG of 21-MAR-2017 14:52,  Nonspecific T wave abnormality, worse in Lateral leads     GLUCOSE, POC    Collection Time: 04/27/17  4:31 PM   Result Value Ref Range    Glucose (POC) 97 70 - 110 mg/dL       EKG interpretation by ED Physician:      X-Ray, CT or other radiology findings or impressions:  CTA CHEST W OR W WO CONT   Final Result   IMPRESSION:      No CT evidence of pulmonary thromboembolism. No evidence of an acute cardiopulmonary process seen.             XR CHEST PORT    (Results Pending)       Progress notes, Consult notes or additional Procedure notes:   5:00 PM Consult:  Discussed care with Dr. Refugio Collins (Cardiologist) Standard discussion; including history of patients chief complaint, available diagnostic results, and treatment course. Dr. Marjorie London states that if the CTA Chest is negative he will do cardiac echo as an outpatient. Reevaluation of patient:       Disposition:  Diagnosis:   1. Dyspnea on exertion        Disposition: home     Follow-up Information     Follow up With Details Comments Contact Info    [facility] EMERGENCY DEPT  As needed, If symptoms worsen [address] [address]    Kvng Tam DO Schedule an appointment as soon as possible for a visit for ED follow up  [id number] [facility]  [address] [facility] [address] Kami BALLESTEROS MD Schedule an appointment as soon as possible for a visit for ED follow up appointment  [address] [facility] [address]  [address]  [facility]  [address] [place] [address] [address]  [phone]             Patient's Medications   Start Taking    No medications on file   Continue Taking    ASPIRIN DELAYED-RELEASE 81 MG TABLET    Take 81 mg by mouth daily. ATORVASTATIN (LIPITOR) 20 MG TABLET    TAKE 1 TABLET BY MOUTH ONCE DAILY    B.INFANTIS-B. ANI-B. LONG-B.BIFI (PROBIOTIC 4X) 10-15 MG TBEC    Take  by mouth daily. BLOOD-GLUCOSE METER (ACCU-CHEK MARCE MONITORING)    by Does Not Apply route. BUTALBITAL-ACETAMINOPHEN-CAFFEINE (FIORICET, ESGIC) -40 MG PER TABLET    Take 1 Tab by mouth every four (4) hours as needed for Pain. Max Daily Amount: 6 Tabs. CETIRIZINE (ZYRTEC) 10 MG TABLET    TAKE 1 TABLET BY MOUTH EVERY DAY. GENERIC FOR ZYRTEC    CHOLECALCIFEROL, VITAMIN D3, (VITAMIN D3) 1,000 UNIT CAP    Take 1,000 Units by mouth daily. CLOBETASOL (TEMOVATE) 0.05 % EXTERNAL SOLUTION    APPLY TO SCALP AFTER SHAMPOO DAILY    CYCLOBENZAPRINE (FLEXERIL) 10 MG TABLET    TAKE 1 TABLET BY MOUTH TWICE DAILY AS NEEDED FOR MUSCLE SPASMS    DOCUSATE SODIUM (COLACE) 50 MG CAPSULE    Take 100 mg by mouth daily.     DULOXETINE (CYMBALTA) 30 MG CAPSULE    TAKE 1 CAPSULE BY MOUTH TWICE DAILY    FLUOXETINE (PROZAC) 10 MG CAPSULE    TAKE 1 CAPSULE BY MOUTH ONCE DAILY. Angie Hilton. FLUTICASONE (FLONASE) 50 MCG/ACTUATION NASAL SPRAY    INSTILL 1 SPRAY IN EACH NOSTRIL TWICE DAILY    GLUCOSE BLOOD VI TEST STRIPS (ACCU-CHEK MARCE PLUS TEST STRP) STRIP    Please use one strip to test blood sugars once a day. LANCETS (ACCU-CHEK SOFTCLIX LANCETS) MISC    Please use one lancet to test blood sugars twice a day. LEVOTHYROXINE (SYNTHROID) 25 MCG TABLET    TAKE 1 TABLET BY MOUTH ONCE DAILY BEFORE BREAKFAST    METFORMIN (GLUCOPHAGE) 500 MG TABLET    TAKE 1 TABLET BY MOUTH DAILY WITH DINNER. GENERIC FOR GLUCOPHAGE    RANITIDINE (ZANTAC) 150 MG TABLET    150mg QAM and 75mg QPM    TRAMADOL (ULTRAM) 50 MG TABLET    TAKE 1 TABLET BY MOUTH FOUR TIMES DAILY AS NEEDED FOR PAIN    TRIAMCINOLONE ACETONIDE (KENALOG) 0.1 % TOPICAL CREAM    Apply  to affected area two (2) times a day. use thin layer    ZOLPIDEM (AMBIEN) 5 MG TABLET    TK 1 T PO  QHS PRF SLEEP   These Medications have changed    No medications on file   Stop Taking    MELOXICAM (MOBIC) 15 MG TABLET    TAKE 1 TABLET BY MOUTH DAILY AS DIRECTED       SCRIBE ATTESTATION STATEMENT  Documented by: Leena Brown scribing for, and in the presence of, Mili King MD 4:01 PM     Signed by: Lesvia Dang. 04/27/17, 4:01 PM.      PROVIDER ATTESTATION STATEMENT  I personally performed the services described in the documentation, reviewed the documentation, as recorded by the scribe in my presence, and it accurately and completely records my words and actions.   Mili King MD

## 2017-04-27 NOTE — TELEPHONE ENCOUNTER
Pt was sent down to hospital for chest xray from office. Registration called office to advise pt is having SOB and feeling worse. They will transport her to ER at this time.

## 2017-04-27 NOTE — DISCHARGE INSTRUCTIONS

## 2017-04-27 NOTE — PROGRESS NOTES
Jorge Jasso is a 79 y.o. female that is here for a blood pressure check. Her current medications are listed below. Current Outpatient Prescriptions   Medication Sig    traMADol (ULTRAM) 50 mg tablet TAKE 1 TABLET BY MOUTH FOUR TIMES DAILY AS NEEDED FOR PAIN    metFORMIN (GLUCOPHAGE) 500 mg tablet TAKE 1 TABLET BY MOUTH DAILY WITH DINNER. GENERIC FOR GLUCOPHAGE    levothyroxine (SYNTHROID) 25 mcg tablet TAKE 1 TABLET BY MOUTH ONCE DAILY BEFORE BREAKFAST    atorvastatin (LIPITOR) 20 mg tablet TAKE 1 TABLET BY MOUTH ONCE DAILY    DULoxetine (CYMBALTA) 30 mg capsule TAKE 1 CAPSULE BY MOUTH TWICE DAILY    zolpidem (AMBIEN) 5 mg tablet TK 1 T PO  QHS PRF SLEEP    meloxicam (MOBIC) 15 mg tablet TAKE 1 TABLET BY MOUTH DAILY AS DIRECTED    butalbital-acetaminophen-caffeine (FIORICET, ESGIC) -40 mg per tablet Take 1 Tab by mouth every four (4) hours as needed for Pain. Max Daily Amount: 6 Tabs.  cetirizine (ZYRTEC) 10 mg tablet TAKE 1 TABLET BY MOUTH EVERY DAY. GENERIC FOR ZYRTEC    fluticasone (FLONASE) 50 mcg/actuation nasal spray INSTILL 1 SPRAY IN EACH NOSTRIL TWICE DAILY    B.infantis-B.ani-B.long-B.bifi (PROBIOTIC 4X) 10-15 mg TbEC Take  by mouth daily.  docusate sodium (COLACE) 50 mg capsule Take 100 mg by mouth daily.  FLUoxetine (PROZAC) 10 mg capsule TAKE 1 CAPSULE BY MOUTH ONCE DAILY. Joseph Borrero.  cyclobenzaprine (FLEXERIL) 10 mg tablet TAKE 1 TABLET BY MOUTH TWICE DAILY AS NEEDED FOR MUSCLE SPASMS    ranitidine (ZANTAC) 150 mg tablet 150mg QAM and 75mg QPM    aspirin delayed-release 81 mg tablet Take 81 mg by mouth daily.  clobetasol (TEMOVATE) 0.05 % external solution APPLY TO SCALP AFTER SHAMPOO DAILY    Cholecalciferol, Vitamin D3, (VITAMIN D3) 1,000 unit cap Take 1,000 Units by mouth daily.  glucose blood VI test strips (ACCU-CHEK MARCE PLUS TEST STRP) strip Please use one strip to test blood sugars once a day.     Lancets (ACCU-CHEK SOFTCLIX LANCETS) misc Please use one lancet to test blood sugars twice a day.  BLOOD-GLUCOSE METER (ACCU-CHEK MARCE MONITORING) by Does Not Apply route.  triamcinolone acetonide (KENALOG) 0.1 % topical cream Apply  to affected area two (2) times a day. use thin layer     No current facility-administered medications for this visit. Her   Visit Vitals    BP (!) 132/96    Pulse (!) 107    Ht 5' 8\" (1.727 m)    Wt 226 lb (102.5 kg)    SpO2 98%    BMI 34.36 kg/m2             She is here because she is complaining of severe shortness of breath and heart pounding. She had shoulder surgery about one month ago and had been fine until recently. She had an EKG done here today. It was reviewed by Dr. Tomas Diego. Dr. Tomas Diego says EKG ok and BP ok, she needs to have STAT CXR. Patient verbalized understanding and is going to have it done now.

## 2017-04-27 NOTE — ED TRIAGE NOTES
Pt was at Dr. Geovanna Yung office for a scheduled appointment, pt was very SOB and sent to ER for further evaluation

## 2017-04-28 ENCOUNTER — TELEPHONE (OUTPATIENT)
Dept: CARDIOLOGY CLINIC | Age: 67
End: 2017-04-28

## 2017-04-28 LAB
ATRIAL RATE: 90 BPM
CALCULATED P AXIS, ECG09: 51 DEGREES
CALCULATED R AXIS, ECG10: 33 DEGREES
CALCULATED T AXIS, ECG11: 69 DEGREES
DIAGNOSIS, 93000: NORMAL
P-R INTERVAL, ECG05: 138 MS
Q-T INTERVAL, ECG07: 362 MS
QRS DURATION, ECG06: 74 MS
QTC CALCULATION (BEZET), ECG08: 442 MS
VENTRICULAR RATE, ECG03: 90 BPM

## 2017-04-28 NOTE — TELEPHONE ENCOUNTER
Pt called office to make follow up appointment after ER visit. She has follow up scheduled for May 18, 2017 and wants to know if follow up needs to be sooner? Advised will ask Dr. Rupali Davis and call back next week.

## 2017-05-02 ENCOUNTER — TELEPHONE (OUTPATIENT)
Dept: CARDIOLOGY CLINIC | Age: 67
End: 2017-05-02

## 2017-05-02 DIAGNOSIS — I35.0 NONRHEUMATIC AORTIC VALVE STENOSIS: Primary | ICD-10-CM

## 2017-05-02 DIAGNOSIS — R06.00 DYSPNEA, UNSPECIFIED TYPE: ICD-10-CM

## 2017-05-02 NOTE — TELEPHONE ENCOUNTER
Patient was in ER and reviewed with Dr. Tesha Joe, she does not need to move up May 18, 2017 appt but she needs to have an echo done prior.      Verbal order and read back per Ivonne Wolf MD  Echo - CAD, dyspnea

## 2017-05-04 ENCOUNTER — OFFICE VISIT (OUTPATIENT)
Dept: ORTHOPEDIC SURGERY | Age: 67
End: 2017-05-04

## 2017-05-04 VITALS
DIASTOLIC BLOOD PRESSURE: 82 MMHG | HEIGHT: 68 IN | SYSTOLIC BLOOD PRESSURE: 102 MMHG | WEIGHT: 224 LBS | BODY MASS INDEX: 33.95 KG/M2 | HEART RATE: 77 BPM

## 2017-05-04 DIAGNOSIS — M54.16 RADICULOPATHY, LUMBAR REGION: ICD-10-CM

## 2017-05-04 DIAGNOSIS — M96.1 POSTLAMINECTOMY SYNDROME: ICD-10-CM

## 2017-05-04 DIAGNOSIS — M48.061 LUMBAR SPINAL STENOSIS: Primary | ICD-10-CM

## 2017-05-04 RX ORDER — MELOXICAM 15 MG/1
TABLET ORAL
COMMUNITY
Start: 2017-05-02 | End: 2017-08-02 | Stop reason: SDUPTHER

## 2017-05-04 RX ORDER — TRAMADOL HYDROCHLORIDE 50 MG/1
TABLET ORAL
Qty: 120 TAB | Refills: 2 | Status: SHIPPED | OUTPATIENT
Start: 2017-05-04 | End: 2017-11-09 | Stop reason: SDUPTHER

## 2017-05-04 RX ORDER — BUPROPION HYDROCHLORIDE 150 MG/1
TABLET, EXTENDED RELEASE ORAL
Refills: 0 | COMMUNITY
Start: 2017-02-28 | End: 2017-08-07 | Stop reason: ALTCHOICE

## 2017-05-04 RX ORDER — TRAMADOL HYDROCHLORIDE AND ACETAMINOPHEN 37.5; 325 MG/1; MG/1
TABLET ORAL
Refills: 0 | COMMUNITY
Start: 2017-04-20 | End: 2017-05-08 | Stop reason: ALTCHOICE

## 2017-05-04 RX ORDER — OXYCODONE AND ACETAMINOPHEN 5; 325 MG/1; MG/1
TABLET ORAL
Refills: 0 | COMMUNITY
Start: 2017-04-12 | End: 2017-05-08 | Stop reason: ALTCHOICE

## 2017-05-04 NOTE — PROGRESS NOTES
[facility]  [address], [place]   Phone: [phone]  Fax: [phone]        PROGRESS NOTE      HISTORY OF PRESENT ILLNESS:  The patient is a 79 y.o. female and was seen today for follow up of low back pain into the LLE extending in roughly an L4 distribution. Pt also endorses numbness in the LLE. She had a diagnosis of lumbar postlaminectomy syndrome with a previous history of L3 through S1 fusion in 2002 and 2004 by a physician in [place]. The patient had an additional diagnosis of fibromyalgia. Note dated 6/29/16 indicating patient was seen and underwent pig aortic valve replacement by Dr. Anam Oneil on 3/18/16. Pt began cardiac rehab in 1/2017. Upon review of our records, it was noted she previously failed TOPAMAX, NEURONTIN, and LYRICA. Pt was switched from Ultram ER back to Ultram immediate release. Pt continues with Ultram 1 tab QID. Lumbar spine CT w/o contrast dated 7/20/14  per report revealed L3 through L5 laminectomies with L3 through S1 dorsal hardware fusion. There was no evidence for fractures. There was advanced degenerative disc disease at L2-L3 at the junctional level with marked central canal stenosis. At her last clinic appointment, patient wished to continue her current treatment. She was provided with refills of Ultram 50 mg. The patient returns today with increased low back pain into the LLE extending in roughly an L4 distribution. She rates pain 6/10, elevated since her last visit (4/10). Pt underwent right shoulder surgery on 3/29/17 by Dr. Liliana Vigil. She states she has been restricted to take her Meloxicam since her right shoulder surgery, which has caused her increase in low back pain. Pt continues to take Ultram 1 tab QID. She is currently taking Cymbalta 30 mg BID as prescribed by her PCP. Pt states her SOB has progressed and will be seeing her cardiologist, Dr. Anam Oneil on 5/18/17.   reviewed and revealed she has received Edward Joshi postoperatively for her right shoulder. Past Medical History:   Diagnosis Date    Aortic stenosis     Mean gradient  MG 34 (06/15), 50 (01/16)    Asthma     Chronic pain     back pain    Diabetes ([address]     Diverticulitis     Dry eyes, bilateral     Fibromyalgia     H/O aortic valve replacement 03/16    21 mm bovine pericardial bioprosthesis and epiaortic scanning      Hypertension     Hypothyroidism     Lumbar post-laminectomy syndrome     Menopause     Obesity     Osteoarthritis     Psychotic disorder     Sciatica     Skin cancer 2013    right forearm. Social History     Social History    Marital status:      Spouse name: N/A    Number of children: N/A    Years of education: N/A     Occupational History    Not on file. Social History Main Topics    Smoking status: Former Smoker     Years: 45.00     Types: Cigarettes     Quit date: 7/1/2011    Smokeless tobacco: Never Used    Alcohol use No    Drug use: No    Sexual activity: No     Other Topics Concern    Not on file     Social History Narrative       Current Outpatient Prescriptions   Medication Sig Dispense Refill    traMADol (ULTRAM) 50 mg tablet TAKE 1 PO Q 4 TIMES A DAY PRN PAIN  Indications: NEUROPATHIC PAIN, Pain 120 Tab 2    metFORMIN (GLUCOPHAGE) 500 mg tablet TAKE 1 TABLET BY MOUTH DAILY WITH DINNER. GENERIC FOR GLUCOPHAGE 90 Tab 2    levothyroxine (SYNTHROID) 25 mcg tablet TAKE 1 TABLET BY MOUTH ONCE DAILY BEFORE BREAKFAST 90 Tab 1    atorvastatin (LIPITOR) 20 mg tablet TAKE 1 TABLET BY MOUTH ONCE DAILY 90 Tab 3    DULoxetine (CYMBALTA) 30 mg capsule TAKE 1 CAPSULE BY MOUTH TWICE DAILY 180 Cap 1    zolpidem (AMBIEN) 5 mg tablet TK 1 T PO  QHS PRF SLEEP  0    cetirizine (ZYRTEC) 10 mg tablet TAKE 1 TABLET BY MOUTH EVERY DAY.  GENERIC FOR ZYRTEC 90 Tab 2    fluticasone (FLONASE) 50 mcg/actuation nasal spray INSTILL 1 SPRAY IN EACH NOSTRIL TWICE DAILY 3 Bottle 2    B.infantis-B.ani-B.long-B.bifi (PROBIOTIC 4X) 10-15 mg TbEC Take  by mouth daily.  docusate sodium (COLACE) 50 mg capsule Take 100 mg by mouth daily.  FLUoxetine (PROZAC) 10 mg capsule TAKE 1 CAPSULE BY MOUTH ONCE DAILY. GENERICFOR PROZAC. 90 Cap 3    cyclobenzaprine (FLEXERIL) 10 mg tablet TAKE 1 TABLET BY MOUTH TWICE DAILY AS NEEDED FOR MUSCLE SPASMS 60 Tab 0    ranitidine (ZANTAC) 150 mg tablet 150mg QAM and 75mg QPM      aspirin delayed-release 81 mg tablet Take 81 mg by mouth daily.  clobetasol (TEMOVATE) 0.05 % external solution APPLY TO SCALP AFTER SHAMPOO DAILY 50 mL 3    Cholecalciferol, Vitamin D3, (VITAMIN D3) 1,000 unit cap Take 1,000 Units by mouth daily.  glucose blood VI test strips (ACCU-CHEK MARCE PLUS TEST STRP) strip Please use one strip to test blood sugars once a day. 100 strip 20    Lancets (ACCU-CHEK SOFTCLIX LANCETS) misc Please use one lancet to test blood sugars twice a day. 1 Package 20    BLOOD-GLUCOSE METER (ACCU-CHEK MARCE MONITORING) by Does Not Apply route.  triamcinolone acetonide (KENALOG) 0.1 % topical cream Apply  to affected area two (2) times a day. use thin layer      traMADol-acetaminophen (ULTRACET) 37.5-325 mg per tablet TAKE 1 TABLET PO Q 6 HOURS PRN FOR PAIN  0    oxyCODONE-acetaminophen (PERCOCET) 5-325 mg per tablet TK 1 TO 2 TS PO Q 6 TO 8 H PRN P  0    meloxicam (MOBIC) 15 mg tablet       buPROPion SR (WELLBUTRIN SR) 150 mg SR tablet TK 1 T PO QAM  0    butalbital-acetaminophen-caffeine (FIORICET, ESGIC) -40 mg per tablet Take 1 Tab by mouth every four (4) hours as needed for Pain. Max Daily Amount: 6 Tabs.  30 Tab 0       Allergies   Allergen Reactions    Latex, Natural Rubber Hives    Adhesive Rash     Some bandaids    Ciprofloxacin Rash    Codeine Nausea and Vomiting    Demerol [Meperidine] Nausea and Vomiting    Diclofenac Shortness of Breath and Palpitations    Erythromycin Rash    Levaquin [Levofloxacin] Rash    Loratadine Swelling    Morphine Nausea and Vomiting    Penicillin G Hives and Rash     Does fine with Keflex    Sulfa (Sulfonamide Antibiotics) Itching    Vicodin [Hydrocodone-Acetaminophen] Other (comments)     Metallic taste in mouth        Ambulates with a single point cane. PHYSICAL EXAMINATION    Visit Vitals    /82    Pulse 77    Ht 5' 8\" (1.727 m)    Wt 224 lb (101.6 kg)    BMI 34.06 kg/m2       CONSTITUTIONAL: NAD, A&O x 3  SENSATION: Decreased sensation to light touch at L4 of the LLE. Sensation to light touch otherwise intact. RANGE OF MOTION: The patient has full passive range of motion in all four extremities. MOTOR:  Straight Leg Raise: Negative, bilateral               Hip Flex Knee Ext Knee Flex Ankle DF GTE Ankle PF Tone   Right +4/5 +4/5 +4/5 +4/5 +4/5 +4/5 +4/5   Left +4/5 +4/5 +4/5 +4/5 +4/5 +4/5 +4/5       ASSESSMENT   Sonia Dimas was seen today for back pain. Diagnoses and all orders for this visit:    Lumbar spinal stenosis  -     traMADol (ULTRAM) 50 mg tablet; TAKE 1 PO Q 4 TIMES A DAY PRN PAIN  Indications: NEUROPATHIC PAIN, Pain    Postlaminectomy syndrome  -     traMADol (ULTRAM) 50 mg tablet; TAKE 1 PO Q 4 TIMES A DAY PRN PAIN  Indications: NEUROPATHIC PAIN, Pain    Radiculopathy, lumbar region  -     traMADol (ULTRAM) 50 mg tablet; TAKE 1 PO Q 4 TIMES A DAY PRN PAIN  Indications: NEUROPATHIC PAIN, Pain          IMPRESSION AND PLAN:  Her treatment options are limited due to her cardiac status. She will be following up with her cardiologist on 5/18/17. I offered to try her on Gabitril which she wishes to defer. Patient wished to continue her current treatment regimen and was given refills of her Ultram. I will see the patient back in 1 month's time or earlier if needed. Written by Arthjustine , as dictated by Tomy Hanson MD  I examined the patient, reviewed and agree with the note.

## 2017-05-08 ENCOUNTER — OFFICE VISIT (OUTPATIENT)
Dept: FAMILY MEDICINE CLINIC | Age: 67
End: 2017-05-08

## 2017-05-08 VITALS
RESPIRATION RATE: 21 BRPM | HEART RATE: 93 BPM | DIASTOLIC BLOOD PRESSURE: 73 MMHG | WEIGHT: 225.4 LBS | OXYGEN SATURATION: 100 % | HEIGHT: 68 IN | BODY MASS INDEX: 34.16 KG/M2 | SYSTOLIC BLOOD PRESSURE: 107 MMHG | TEMPERATURE: 96.8 F

## 2017-05-08 DIAGNOSIS — G89.29 CHRONIC RIGHT SHOULDER PAIN: Primary | ICD-10-CM

## 2017-05-08 DIAGNOSIS — R06.02 SHORTNESS OF BREATH: ICD-10-CM

## 2017-05-08 DIAGNOSIS — M25.511 CHRONIC RIGHT SHOULDER PAIN: Primary | ICD-10-CM

## 2017-05-08 NOTE — PROGRESS NOTES
Penny Qiu is a 79 y.o. female who presents today for/with c/o for surgical follow up. Patient c/o SOB    1. Have you been to the ER, urgent care clinic since your last visit? Hospitalized since your last visit? YES    2. Have you seen or consulted any other health care providers outside of the [facility] since your last visit? Include any pap smears or colon screening.  YES [facility]    Health Maintenance reviewed -Yes Patient will decide after consulting with PCP     Health Maintenance Due   Topic Date Due    FOOT EXAM Q1  11/06/2016    Pneumococcal 65+ Low/Medium Risk (2 of 2 - PPSV23) 12/07/2016    MEDICARE YEARLY EXAM  12/07/2016    EYE EXAM RETINAL OR DILATED Q1  01/19/2017    COLONOSCOPY  07/09/2017

## 2017-05-08 NOTE — PROGRESS NOTES
Subjective:     HPI:  Rasta Montero is a 79 y.o. female who presents to the office for follow up on surgery and ED visit, c/o shortness of breath. Shortness of Breath: Patient reports her shortness of breath has returned and worsened since shoulder surgery. She was seen in [address] [place] [address] ED on 4/27/17 for shortness of breath. She reports mid-chest pain with deep inspiration. Patient is having increased shortness of breath on exertion. She is short of breath when seated at rest. Patient is scheduled for an ECHO on 5/2/17. EKG and lab results on 4/27/17 were normal.      XR CHEST (4/27/17)   IMPRESSION:   No acute cardiopulmonary disease. CT CHEST (4/27/17)   IMPRESSION:   No CT evidence of pulmonary thromboembolism. No evidence of an acute cardiopulmonary process seen. Shoulder Surgery: Patient had right reverse shoulder replacement surgery since last office visit. She is scheduled to follow up with surgeon on Wednesday, 5/10/17. Current Outpatient Prescriptions   Medication Sig Dispense Refill    meloxicam (MOBIC) 15 mg tablet       traMADol (ULTRAM) 50 mg tablet TAKE 1 PO Q 4 TIMES A DAY PRN PAIN  Indications: NEUROPATHIC PAIN, Pain 120 Tab 2    metFORMIN (GLUCOPHAGE) 500 mg tablet TAKE 1 TABLET BY MOUTH DAILY WITH DINNER. GENERIC FOR GLUCOPHAGE 90 Tab 2    levothyroxine (SYNTHROID) 25 mcg tablet TAKE 1 TABLET BY MOUTH ONCE DAILY BEFORE BREAKFAST 90 Tab 1    atorvastatin (LIPITOR) 20 mg tablet TAKE 1 TABLET BY MOUTH ONCE DAILY 90 Tab 3    DULoxetine (CYMBALTA) 30 mg capsule TAKE 1 CAPSULE BY MOUTH TWICE DAILY 180 Cap 1    zolpidem (AMBIEN) 5 mg tablet TK 1 T PO  QHS PRF SLEEP  0    butalbital-acetaminophen-caffeine (FIORICET, ESGIC) -40 mg per tablet Take 1 Tab by mouth every four (4) hours as needed for Pain. Max Daily Amount: 6 Tabs. 30 Tab 0    cetirizine (ZYRTEC) 10 mg tablet TAKE 1 TABLET BY MOUTH EVERY DAY.  GENERIC FOR ZYRTEC 90 Tab 2    fluticasone (FLONASE) 50 mcg/actuation nasal spray INSTILL 1 SPRAY IN EACH NOSTRIL TWICE DAILY 3 Bottle 2    B.infantis-B.ani-B.long-B.bifi (PROBIOTIC 4X) 10-15 mg TbEC Take  by mouth daily.  docusate sodium (COLACE) 50 mg capsule Take 100 mg by mouth daily.  FLUoxetine (PROZAC) 10 mg capsule TAKE 1 CAPSULE BY MOUTH ONCE DAILY. GENERICFOR PROZAC. 90 Cap 3    cyclobenzaprine (FLEXERIL) 10 mg tablet TAKE 1 TABLET BY MOUTH TWICE DAILY AS NEEDED FOR MUSCLE SPASMS 60 Tab 0    aspirin delayed-release 81 mg tablet Take 81 mg by mouth daily.  clobetasol (TEMOVATE) 0.05 % external solution APPLY TO SCALP AFTER SHAMPOO DAILY 50 mL 3    Cholecalciferol, Vitamin D3, (VITAMIN D3) 1,000 unit cap Take 1,000 Units by mouth daily.  glucose blood VI test strips (ACCU-CHEK MARCE PLUS TEST STRP) strip Please use one strip to test blood sugars once a day. 100 strip 20    Lancets (ACCU-CHEK SOFTCLIX LANCETS) misc Please use one lancet to test blood sugars twice a day. 1 Package 20    BLOOD-GLUCOSE METER (ACCU-CHEK MARCE MONITORING) by Does Not Apply route.  triamcinolone acetonide (KENALOG) 0.1 % topical cream Apply  to affected area two (2) times a day.  use thin layer      buPROPion SR (WELLBUTRIN SR) 150 mg SR tablet TK 1 T PO QAM  0    ranitidine (ZANTAC) 150 mg tablet 150mg QAM and 75mg QPM          Allergies   Allergen Reactions    Latex, Natural Rubber Hives    Adhesive Rash     Some bandaids    Ciprofloxacin Rash    Codeine Nausea and Vomiting    Demerol [Meperidine] Nausea and Vomiting    Diclofenac Shortness of Breath and Palpitations    Erythromycin Rash    Levaquin [Levofloxacin] Rash    Loratadine Swelling    Morphine Nausea and Vomiting    Penicillin G Hives and Rash     Does fine with Keflex    Sulfa (Sulfonamide Antibiotics) Itching    Vicodin [Hydrocodone-Acetaminophen] Other (comments)     Metallic taste in mouth       Past Medical History:   Diagnosis Date    Aortic stenosis     Mean gradient  MG 34 (06/15), 50 (01/16)    Asthma     Chronic pain     back pain    Diabetes ([address]     Diverticulitis     Dry eyes, bilateral     Fibromyalgia     H/O aortic valve replacement 03/16    21 mm bovine pericardial bioprosthesis and epiaortic scanning      Hypertension     Hypothyroidism     Lumbar post-laminectomy syndrome     Menopause     Obesity     Osteoarthritis     Psychotic disorder     Sciatica     Skin cancer 2013    right forearm. Past Surgical History:   Procedure Laterality Date    CHEST SURGERY PROCEDURE UNLISTED  03/2016    Open Heart Surgery    COLONOSCOPY      HX CATARACT REMOVAL Right 1995    lens  replaced.  HX HEENT  1990's    sinus surgery     HX LUMBAR FUSION  2004    L3-L4-L5    HX LUMBAR LAMINECTOMY  2002    HX MOHS PROCEDURES Left 1990    HX ORTHOPAEDIC      Rotator cuff Bilateral    HX SHOULDER REPLACEMENT Right 03/29/2017    MT COLSC FLX W/RMVL OF TUMOR POLYP LESION SNARE TQ  07/09/2014 repeat in 3 years    Dr. Марина Mitchell      Aortic valve replacement       Family History   Problem Relation Age of Onset    Hypertension Father     Heart Disease Father     Alcohol abuse Father        Social History     Social History    Marital status:      Spouse name: N/A    Number of children: N/A    Years of education: N/A     Occupational History    Not on file. Social History Main Topics    Smoking status: Former Smoker     Years: 45.00     Types: Cigarettes     Quit date: 7/1/2011    Smokeless tobacco: Never Used    Alcohol use No    Drug use: No    Sexual activity: No     Other Topics Concern    Not on file     Social History Narrative       REVIEW OF SYSTEM:  Review of Systems   Constitutional: Negative for chills and fever. Eyes: Negative for blurred vision. Respiratory: Positive for shortness of breath. Cardiovascular: Positive for chest pain. Negative for palpitations and leg swelling.    Gastrointestinal: Negative for constipation, diarrhea, nausea and vomiting. Musculoskeletal: Positive for back pain and joint pain. Neurological: Negative for headaches. Psychiatric/Behavioral: Negative for suicidal ideas. Objective:     Visit Vitals    /73 (BP 1 Location: Left arm, BP Patient Position: Sitting)    Pulse 93    Temp 96.8 °F (36 °C) (Oral)    Resp 21    Ht 5' 8\" (1.727 m)    Wt 225 lb 6.4 oz (102.2 kg)    LMP  (LMP Unknown)    SpO2 100%    BMI 34.27 kg/m2       PHYSICAL EXAM:  Physical Exam   Constitutional: She is oriented to person, place, and time and well-developed, well-nourished, and in no distress. HENT:   Right Ear: Tympanic membrane, external ear and ear canal normal.   Left Ear: Tympanic membrane, external ear and ear canal normal.   Nose: Nose normal.   Mouth/Throat: Oropharynx is clear and moist.   Eyes: Pupils are equal, round, and reactive to light. Neck: Normal range of motion. Neck supple. No thyromegaly present. Cardiovascular: Normal rate, regular rhythm, normal heart sounds and intact distal pulses. No murmur heard. Pulmonary/Chest: Effort normal and breath sounds normal. She has no wheezes. Patient unable to speak in complete sentences without stopping to take a breath. Neurological: She is alert and oriented to person, place, and time. Skin: Skin is warm and dry. Vitals reviewed. Assessment/Plan:       ICD-10-CM ICD-9-CM    1. Chronic right shoulder pain M25.511 719.41     G89.29 338.29    2. Shortness of breath R06.02 786.05      Will refer patient to podiatry for nail care. Patient unable to cut toenails because of physical limitations. Patient given opportunity to ask questions. Questions answered. Patient understands plan of care. Follow-up Disposition:  Return in about 3 months (around 8/8/2017).         Written by Carissa Quinteros, as dictated by Karley Mcmanus DO.    I, Dr. Karley Mcmanus, confirm that all documentation is accurate.

## 2017-05-08 NOTE — MR AVS SNAPSHOT
Visit Information Date & Time Provider Department Dept. Phone Encounter #  
 5/8/2017  2:00 PM Thomas Hubbard, [address] [id number] Follow-up Instructions Return in about 3 months (around 8/8/2017). Your Appointments 5/18/2017 11:15 AM  
Follow Up with Joaquín Jimenez MD  
Cardio Specialist at [facility] Appt Note: follow up ED visit SOB [facility] [address] 8/10/2017 10:50 AM  
Follow Up with Tanvir Brito MD  
[facility] ([facility]) Appt Note: fu for lower back [address] [facility] [address]  
[phone]  
  
   
 [address] [facility],[address] [place] [address]  
  
    
 9/21/2017  1:00 PM  
Follow Up with Jose Eduardo Clemons MD  
Cardio Specialist at [facility] Appt Note: 6 m f/u  
 [facility] [address] [id number]  
[phone] Upcoming Health Maintenance Date Due  
 FOOT EXAM Q1 11/6/2016 Pneumococcal 65+ Low/Medium Risk (2 of 2 - PPSV23) 12/7/2016 MEDICARE YEARLY EXAM 12/7/2016 EYE EXAM RETINAL OR DILATED Q1 1/19/2017 COLONOSCOPY 7/9/2017 INFLUENZA AGE 9 TO ADULT 8/1/2017 MICROALBUMIN Q1 9/2/2017 LIPID PANEL Q1 9/2/2017 HEMOGLOBIN A1C Q6M 9/27/2017 GLAUCOMA SCREENING Q2Y 1/19/2018 BREAST CANCER SCRN MAMMOGRAM 9/6/2018 DTaP/Tdap/Td series (2 - Td) 12/15/2025 Allergies as of 5/8/2017  Review Complete On: 5/8/2017 By: Thomas Hubbard, DO Severity Noted Reaction Type Reactions Latex, Natural Rubber  07/14/2016    Hives Adhesive  03/18/2016    Rash Some bandaids Ciprofloxacin  01/28/2014    Rash Codeine  01/28/2014    Nausea and Vomiting Demerol [Meperidine]  01/28/2014    Nausea and Vomiting Diclofenac  08/06/2015    Shortness of Breath, Palpitations Erythromycin  01/28/2014    Rash Levaquin [Levofloxacin]  01/28/2014    Rash Loratadine  01/28/2014    Swelling Morphine  01/28/2014    Nausea and Vomiting Penicillin G  01/28/2014    Hives, Rash Does fine with Keflex Sulfa (Sulfonamide Antibiotics)  01/28/2014    Itching Vicodin [Hydrocodone-acetaminophen]  02/12/2015    Other (comments) Metallic taste in mouth Current Immunizations  Reviewed on 10/3/2016 Name Date Influenza High Dose Vaccine PF 10/3/2016 Influenza Vaccine 10/10/2014 Influenza Vaccine (Quad) PF 10/28/2015 Pneumococcal Conjugate (PCV-13) 12/7/2015 Tdap 12/15/2015 Zoster Vaccine, Live 6/2/2014 Not reviewed this visit You Were Diagnosed With   
  
 Codes Comments Chronic right shoulder pain    -  Primary ICD-10-CM: M25.511, G89.29 ICD-9-CM: 719.41, 338.29 Shortness of breath     ICD-10-CM: R06.02 
ICD-9-CM: 786.05 Vitals BP Pulse Temp Resp Height(growth percentile) Weight(growth percentile) 107/73 (BP 1 Location: Left arm, BP Patient Position: Sitting) 93 96.8 °F (36 °C) (Oral) 21 5' 8\" (1.727 m) 225 lb 6.4 oz (102.2 kg) LMP SpO2 BMI OB Status Smoking Status (LMP Unknown) 100% 34.27 kg/m2 Menopause Former Smoker BMI and BSA Data Body Mass Index Body Surface Area  
 34.27 kg/m 2 2.21 m 2 Preferred Pharmacy Pharmacy Name Phone [facility] [address] [place] [address] Gloriahalina Jaselvin Sunis Your Updated Medication List  
  
   
This list is accurate as of: 5/8/17  2:55 PM.  Always use your most recent med list.  
  
  
  
  
 Dmitri Rubio  
by Does Not Apply route. aspirin delayed-release 81 mg tablet Take 81 mg by mouth daily. atorvastatin 20 mg tablet Commonly known as:  LIPITOR  
TAKE 1 TABLET BY MOUTH ONCE DAILY  
  
 buPROPion  mg SR tablet Commonly known as:  WELLBUTRIN SR  
TK 1 T PO QAM  
  
 butalbital-acetaminophen-caffeine -40 mg per tablet Commonly known as:  Lisandro Castellano Take 1 Tab by mouth every four (4) hours as needed for Pain. Max Daily Amount: 6 Tabs. cetirizine 10 mg tablet Commonly known as:  ZYRTEC  
TAKE 1 TABLET BY MOUTH EVERY DAY. GENERIC FOR ZYRTEC  
  
 clobetasol 0.05 % external solution Commonly known as:  TEMOVATE  
APPLY TO SCALP AFTER SHAMPOO DAILY  
  
 cyclobenzaprine 10 mg tablet Commonly known as:  FLEXERIL  
TAKE 1 TABLET BY MOUTH TWICE DAILY AS NEEDED FOR MUSCLE SPASMS  
  
 docusate sodium 50 mg capsule Commonly known as:  [place] Given Take 100 mg by mouth daily. DULoxetine 30 mg capsule Commonly known as:  CYMBALTA TAKE 1 CAPSULE BY MOUTH TWICE DAILY FLUoxetine 10 mg capsule Commonly known as:  PROzac TAKE 1 CAPSULE BY MOUTH ONCE DAILY. Canda Roselle. fluticasone 50 mcg/actuation nasal spray Commonly known as:  Rosemary Costa INSTILL 1 SPRAY IN EACH NOSTRIL TWICE DAILY  
  
 glucose blood VI test strips strip Commonly known as:  ACCU-CHEK MARCE PLUS TEST STRP Please use one strip to test blood sugars once a day. Lancets Misc Commonly known as:  ACCU-CHEK SOFTCLIX LANCETS Please use one lancet to test blood sugars twice a day. levothyroxine 25 mcg tablet Commonly known as:  SYNTHROID  
TAKE 1 TABLET BY MOUTH ONCE DAILY BEFORE BREAKFAST  
  
 meloxicam 15 mg tablet Commonly known as:  MOBIC  
  
 metFORMIN 500 mg tablet Commonly known as:  GLUCOPHAGE  
TAKE 1 TABLET BY MOUTH DAILY WITH DINNER. GENERIC FOR GLUCOPHAGE  
  
 PROBIOTIC 4X 10-15 mg Tbec Generic drug:  B.infantis-B.ani-B.long-B.bifi Take  by mouth daily. raNITIdine 150 mg tablet Commonly known as:  ZANTAC  
150mg QAM and 75mg QPM  
  
 traMADol 50 mg tablet Commonly known as:  ULTRAM  
TAKE 1 PO Q 4 TIMES A DAY PRN PAIN  Indications: NEUROPATHIC PAIN, Pain triamcinolone acetonide 0.1 % topical cream  
Commonly known as:  KENALOG Apply  to affected area two (2) times a day. use thin layer VITAMIN D3 1,000 unit Cap Generic drug:  cholecalciferol Take 1,000 Units by mouth daily. zolpidem 5 mg tablet Commonly known as:  AMBIEN  
TK 1 T PO  QHS PRF SLEEP Follow-up Instructions Return in about 3 months (around 8/8/2017). To-Do List   
 05/15/2017 8:30 AM  
  Appointment with [facility] [address] [facility] CV SERV at [facility] NON-INVASIVE [address] [facility] ([phone]) Patient needs to bring a current list of all medications  No preparation is required for this study  This study requires patient to bring a written physicians order or the MD office may fax the order to Central Scheduling at [phone]. This exam is performed in the [facility] [place] [address] at [facility] in the echo department. Please have the patient arrive 15 minutes prior to their schedule appointment time and report to Patient Registration at the main entrance of the hospital.     AGE LIMIT for this procedure at [facility] is 25years old. All patients under 25years of age should be referred to [facility]. Introducing [facility]! Dear Rosalind Swanson: Thank you for requesting a [employer] account. Our records indicate that you already have an active [employer] account. You can access your account anytime at [email or web address] Did you know that you can access your hospital and ER discharge instructions at any time in [employer]? You can also review all of your test results from your hospital stay or ER visit. Additional Information If you have questions, please visit the Frequently Asked Questions section of the [employer] website at [email or web address] Remember, [employer] is NOT to be used for urgent needs. For medical emergencies, dial 911. Now available from your iPhone and Android! Please provide this summary of care documentation to your next provider. Your primary care clinician is listed as Emani Peter. If you have any questions after today's visit, please call [phone].

## 2017-05-12 RX ORDER — LEVOTHYROXINE SODIUM 25 UG/1
TABLET ORAL
Qty: 90 TAB | Refills: 0 | Status: SHIPPED | OUTPATIENT
Start: 2017-05-12 | End: 2017-05-18 | Stop reason: SDUPTHER

## 2017-05-18 ENCOUNTER — OFFICE VISIT (OUTPATIENT)
Dept: CARDIOLOGY CLINIC | Age: 67
End: 2017-05-18

## 2017-05-18 VITALS
WEIGHT: 227 LBS | OXYGEN SATURATION: 98 % | HEART RATE: 97 BPM | SYSTOLIC BLOOD PRESSURE: 123 MMHG | DIASTOLIC BLOOD PRESSURE: 73 MMHG | HEIGHT: 68 IN | BODY MASS INDEX: 34.4 KG/M2

## 2017-05-18 DIAGNOSIS — I35.0 NONRHEUMATIC AORTIC VALVE STENOSIS: Primary | ICD-10-CM

## 2017-05-18 DIAGNOSIS — R06.00 DYSPNEA, UNSPECIFIED TYPE: ICD-10-CM

## 2017-05-18 NOTE — PROGRESS NOTES
Cardiovascular Specialists    Ms. Silke Wu is a 79 y.o. female with history of Aortic stenosis s/p Aortic valve replacement (03/16), fibromyalgia, diabetes, obesity, hypertension, hypothyroidism and COPD. Ms. Silke Wu is here today for follow up appointment. Since last visit unfortunately she's started to have episodes of moderate dyspnea with minimal exertion. She says she has one flight of stairs at home and after climbing one flight of stairs she cannot go more because of shortness of breath. She does not have any PND. She does not have any lower extremity swelling. She also had a recent ER visit where CT scan was performed and she did not have any PE. She denies any chest pain or chest tightness. Past Medical History:   Diagnosis Date    Aortic stenosis     Mean gradient  MG 34 (06/15), 50 (01/16)    Asthma     Chronic pain     back pain    Diabetes ([address]     Diverticulitis     Dry eyes, bilateral     Fibromyalgia     H/O aortic valve replacement 03/16    21 mm bovine pericardial bioprosthesis and epiaortic scanning      Hypertension     Hypothyroidism     Lumbar post-laminectomy syndrome     Menopause     Obesity     Osteoarthritis     Psychotic disorder     Sciatica     Skin cancer 2013    right forearm. Past Surgical History:   Procedure Laterality Date    CHEST SURGERY PROCEDURE UNLISTED  03/2016    Open Heart Surgery    COLONOSCOPY      HX CATARACT REMOVAL Right 1995    lens  replaced.      HX HEENT  1990's    sinus surgery     HX LUMBAR FUSION  2004    L3-L4-L5    HX LUMBAR LAMINECTOMY  2002    HX MOHS PROCEDURES Left 1990    HX ORTHOPAEDIC      Rotator cuff Bilateral    HX SHOULDER REPLACEMENT Right 03/29/2017    [place] COLSC FLX W/RMVL OF TUMOR POLYP LESION SNARE TQ  07/09/2014 repeat in 3 years    Dr. Mesfin Wells      Aortic valve replacement       Current Outpatient Prescriptions   Medication Sig    meloxicam (MOBIC) 15 mg tablet     buPROPion SR (WELLBUTRIN SR) 150 mg SR tablet TK 1 T PO QAM    traMADol (ULTRAM) 50 mg tablet TAKE 1 PO Q 4 TIMES A DAY PRN PAIN  Indications: NEUROPATHIC PAIN, Pain    metFORMIN (GLUCOPHAGE) 500 mg tablet TAKE 1 TABLET BY MOUTH DAILY WITH DINNER. GENERIC FOR GLUCOPHAGE    levothyroxine (SYNTHROID) 25 mcg tablet TAKE 1 TABLET BY MOUTH ONCE DAILY BEFORE BREAKFAST    atorvastatin (LIPITOR) 20 mg tablet TAKE 1 TABLET BY MOUTH ONCE DAILY    DULoxetine (CYMBALTA) 30 mg capsule TAKE 1 CAPSULE BY MOUTH TWICE DAILY    zolpidem (AMBIEN) 5 mg tablet TK 1 T PO  QHS PRF SLEEP    cetirizine (ZYRTEC) 10 mg tablet TAKE 1 TABLET BY MOUTH EVERY DAY. GENERIC FOR ZYRTEC    fluticasone (FLONASE) 50 mcg/actuation nasal spray INSTILL 1 SPRAY IN EACH NOSTRIL TWICE DAILY    B.infantis-B.ani-B.long-B.bifi (PROBIOTIC 4X) 10-15 mg TbEC Take  by mouth daily.  docusate sodium (COLACE) 50 mg capsule Take 100 mg by mouth daily.  FLUoxetine (PROZAC) 10 mg capsule TAKE 1 CAPSULE BY MOUTH ONCE DAILY. Elinda Crespo.  cyclobenzaprine (FLEXERIL) 10 mg tablet TAKE 1 TABLET BY MOUTH TWICE DAILY AS NEEDED FOR MUSCLE SPASMS    ranitidine (ZANTAC) 150 mg tablet 150mg QAM and 75mg QPM    aspirin delayed-release 81 mg tablet Take 81 mg by mouth daily.  clobetasol (TEMOVATE) 0.05 % external solution APPLY TO SCALP AFTER SHAMPOO DAILY    Cholecalciferol, Vitamin D3, (VITAMIN D3) 1,000 unit cap Take 1,000 Units by mouth daily.  glucose blood VI test strips (ACCU-CHEK MARCE PLUS TEST STRP) strip Please use one strip to test blood sugars once a day.  Lancets (ACCU-CHEK SOFTCLIX LANCETS) misc Please use one lancet to test blood sugars twice a day.  BLOOD-GLUCOSE METER (ACCU-CHEK MARCE MONITORING) by Does Not Apply route.  triamcinolone acetonide (KENALOG) 0.1 % topical cream Apply  to affected area two (2) times a day.  use thin layer    butalbital-acetaminophen-caffeine (FIORICET, ESGIC) -40 mg per tablet Take 1 Tab by mouth every four (4) hours as needed for Pain. Max Daily Amount: 6 Tabs. No current facility-administered medications for this visit. Allergies and Sensitivities:  Allergies   Allergen Reactions    Latex, Natural Rubber Hives    Adhesive Rash     Some bandaids    Ciprofloxacin Rash    Codeine Nausea and Vomiting    Demerol [Meperidine] Nausea and Vomiting    Diclofenac Shortness of Breath and Palpitations    Erythromycin Rash    Levaquin [Levofloxacin] Rash    Loratadine Swelling    Morphine Nausea and Vomiting    Penicillin G Hives and Rash     Does fine with Keflex    Sulfa (Sulfonamide Antibiotics) Itching    Vicodin [Hydrocodone-Acetaminophen] Other (comments)     Metallic taste in mouth       Family History:  Family History   Problem Relation Age of Onset    Hypertension Father     Heart Disease Father     Alcohol abuse Father        Social History:  Social History   Substance Use Topics    Smoking status: Former Smoker     Years: 45.00     Types: Cigarettes     Quit date: 7/1/2011    Smokeless tobacco: Never Used    Alcohol use No     She  reports that she quit smoking about 5 years ago. Her smoking use included Cigarettes. She quit after 45.00 years of use. She has never used smokeless tobacco.  She  reports that she does not drink alcohol. Review of Systems:  Cardiac symptoms as noted above in HPI. All others negative. Denies skin rash,  photophobia, neck pain, hemoptysis, chronic cough, nausea, vomiting, hematuria, burning micturition, BRBPR, chronic headaches.     Physical Exam:  BP Readings from Last 3 Encounters:   05/18/17 123/73   05/08/17 107/73   05/04/17 102/82         Pulse Readings from Last 3 Encounters:   05/18/17 97   05/08/17 93   05/04/17 77          Wt Readings from Last 3 Encounters:   05/18/17 227 lb (103 kg)   05/08/17 225 lb 6.4 oz (102.2 kg)   05/04/17 224 lb (101.6 kg)       Constitutional: Oriented to person, place, and time. HENT: Head: Normocephalic and atraumatic. Neck: No JVD present. Cardiovascular: Regular rhythm. No gallop or rubs appreciated. No significant murmur  Lung: Breath sounds normal. No respiratory distress. No ronchi or rales appreciated  Abdominal: No tenderness. No rebound and no guarding. Musculoskeletal: No LE swelling    Review of Data  LABS:   Lab Results   Component Value Date/Time    Sodium 140 04/27/2017 03:20 PM    Potassium 3.7 04/27/2017 03:20 PM    Chloride 107 04/27/2017 03:20 PM    CO2 21 04/27/2017 03:20 PM    Glucose 94 04/27/2017 03:20 PM    BUN 10 04/27/2017 03:20 PM    Creatinine 1.03 04/27/2017 03:20 PM     Lipids Latest Ref Rng & Units 9/2/2016 2/13/2015 2/25/2014   Chol, Total 100 - 199 mg/dL 141 113 119   HDL >39 mg/dL 77 57 62   LDL 0 - 99 mg/dL 49 38 43   Trig 0 - 149 mg/dL 77 90 70   Chol/HDL Ratio 0 - 5.0   - 2.0 -     Lab Results   Component Value Date/Time    ALT (SGPT) 25 04/27/2017 03:20 PM     Lab Results   Component Value Date/Time    Hemoglobin A1c 6.4 03/27/2017 03:32 AM     EKG  (04/16) Sinus rhythm at 96 bpm    STRESS TEST (2012)   No EKG or scintigraphic evidence of ischemia or infarction. Normal LVEF    ECHO (01/16)  SUMMARY:  Left ventricle: Systolic function was normal. Ejection fraction was estimated in the range of 55 % to 60 %. There were no regional wall motion  abnormalities. Doppler parameters were consistent with abnormal left ventricular relaxation (grade 1 diastolic dysfunction). Right ventricle: Systolic function was normal.  Aortic valve: The valve was trileaflet. Leaflets exhibited calcification and moderate- severely reduced cuspal separation. There was severe aortic  stenosis. There was no significant regurgitation. Valve peak gradient was 85 mmHg. Valve mean gradient was 50 mmHg. ELIS (02/16)  Left ventricle: Size was normal. Systolic function was normal.  Right ventricle:  The size was normal.  Left atrium: Size was normal. No thrombus was identified. There was no  spontaneous echo contrast (\"smoke\"). Left atrial appendage: No thrombus was identified. There was no  spontaneous echo contrast (\"smoke\") in the appendage. Right atrium: Size was normal.  Mitral valve: There was mild to moderate multi jet regurgitation. No obvious mass, vegetation or thrombus noted. Aortic valve: Aortic valve assessment was performed using 2D  transesophageal as well as transthoracic doppler data. The valve was  trileaflet. Leaflets were thickened, calcific and stenotic. A mean  gradient across the valve was measured at 49mmHg with a calculated valve  area of 0.70 - 0.75 cm based on an LVOT diameter of 2.1 cm, an LVOT VTI of  21 cm, and an AV VTI of 101 cm. There was no regurgitation. Tricuspid valve: There was no regurgitation. No obvious mass, vegetation or thrombus noted. HOLTER (05/16)  Interpretation:  1. Rhythm is sinus. 2. PACs (1.3% burden). 3. Rare PVCs. CARDIAC CATH: (01/16)  PRESSURE HEMODYNAMICS: Systemic aortic pressure was 131/68 mmHg. Left ventricular pressure was 160/2/12 mmHg. Based on the data, peak-to-peak gradient between LV and aortic was approximately 30 mmHg. Mean RA  7 mmHg,   RV  32/1/8 mmHg,  PA  27/9/16 mmHg,   PCWP 10 mm hg  Cardiac output by Ellis calculation was 4.3 L/min and cardiac index was 2.02 liters L/min/meter squared. Mixed venous saturation was 65%, PA saturation was 65%, and the radial artery saturation was 90%. No evidence of intracardiac shunting. CORONARY ANGIOGRAM  1. LM: No significant obstructive disease. 2. LAD: 10-20% luminal irregularities, otherwise normal. Three diagonal branches angiographically normal.  3. LCX: 20-30% luminal irregularities, otherwise no obstructive disease. 4. OM1: Large bifurcating vessel without any obstructive disease. 5. RCA: Proximal 20%, mid discrete 40%, otherwise angiographically normal. Dominant vessel.     IMPRESSION & PLAN:  Ms. Temo Suh is a 79 y.o. female with Aortic stenosis, COPD, obesity, diabetes, fibromyalgia. Aortic stenosis:  Ms. Temo Suh had [facility]rost[facility]. Aspirin will be continued lifelong. Diabetes:  Goal hemoglobin A1c less than 7 is recommended from a cardiovascular standpoint. Last hemoglobin A1c was 6.4. Continue same management per Dr. Nadine Ho. Hypertension:  BP at home always less than 740 mm hg systolic. BP usually 100-110 resting before starting rehab program. ontinue to observe. BP today 118/78 mm Hg. Use to be on BB in past but stopped because of low BP    Fibromyalgia and COPD:    She has chronic pain syndrome from her fibromyalgia. Dyspnea: Recent onset. No obvious fluid overload on exam. BP stable. NO LE edema. ECHO to evaluate function of prosthetic aortic valve. Recent CT without any PE or any pulmonary process. .      Importance of diet and exercise was discussed. Importance of diet and exercise was discussed with patient. This plan was discussed with patient who is in agreement. Thank you for allowing me to participate in patient care. Please feel free to call me if you have any question or concern.

## 2017-05-18 NOTE — MR AVS SNAPSHOT
Visit Information Date & Time Provider Department Dept. Phone Encounter #  
 5/18/2017 11:15 AM Joaquín Sanchez MD Cardio Specialist at [facility] [phone] [id number] Follow-up Instructions Return in about 4 weeks (around 6/15/2017). Your Appointments 6/15/2017  1:45 PM  
Follow Up with Joaquín Sanchez MD  
Cardio Specialist at [facility] Appt Note: 4 wk f/u after testing [facility] [address]  
  
    
 8/7/2017  2:00 PM  
[place] DO [address] [address] Appt Note: Return in about 3 months (around 8/8/2017). [address] [facility] [address] [place] [address]  
  
   
 [address] [facility] 8/10/2017 10:50 AM  
Follow Up with Gloria Parks MD  
[facility] ([facility]) Appt Note: fu for lower back [address] [facility] [address]  
[phone]  
  
   
 [address] [facility],[address] [place]  
  
    
 9/21/2017  1:00 PM  
Follow Up with Umu De La Cruz MD  
Cardio Specialist at [facility]) Appt Note: 6 m f/u  
 [facility] [address] [address]  
[phone] Upcoming Health Maintenance Date Due  
 FOOT EXAM Q1 11/6/2016 Pneumococcal 65+ Low/Medium Risk (2 of 2 - PPSV23) 12/7/2016 MEDICARE YEARLY EXAM 12/7/2016 EYE EXAM RETINAL OR DILATED Q1 1/19/2017 COLONOSCOPY 7/9/2017 INFLUENZA AGE 9 TO ADULT 8/1/2017 MICROALBUMIN Q1 9/2/2017 LIPID PANEL Q1 9/2/2017 HEMOGLOBIN A1C Q6M 9/27/2017 GLAUCOMA SCREENING Q2Y 1/19/2018 BREAST CANCER SCRN MAMMOGRAM 9/6/2018 DTaP/Tdap/Td series (2 - Td) 12/15/2025 Allergies as of 5/18/2017  Review Complete On: 5/18/2017 By: [address]ks[address] Mock Severity Noted Reaction Type Reactions Latex, Natural Rubber  07/14/2016    Hives Adhesive  03/18/2016    Rash Some bandaids Ciprofloxacin  01/28/2014    Rash Codeine  01/28/2014    Nausea and Vomiting Demerol [Meperidine]  01/28/2014    Nausea and Vomiting Diclofenac  08/06/2015    Shortness of Breath, Palpitations Erythromycin  01/28/2014    Rash Levaquin [Levofloxacin]  01/28/2014    Rash Loratadine  01/28/2014    Swelling Morphine  01/28/2014    Nausea and Vomiting Penicillin G  01/28/2014    Hives, Rash Does fine with Keflex Sulfa (Sulfonamide Antibiotics)  01/28/2014    Itching Vicodin [Hydrocodone-acetaminophen]  02/12/2015    Other (comments) Metallic taste in mouth Current Immunizations  Reviewed on 10/3/2016 Name Date Influenza High Dose Vaccine PF 10/3/2016 Influenza Vaccine 10/10/2014 Influenza Vaccine (Quad) PF 10/28/2015 Pneumococcal Conjugate (PCV-13) 12/7/2015 Tdap 12/15/2015 Zoster Vaccine, Live 6/2/2014 Not reviewed this visit You Were Diagnosed With   
  
 Codes Comments Nonrheumatic aortic valve stenosis    -  Primary ICD-10-CM: I35.0 ICD-9-CM: 424.1 Dyspnea, unspecified type     ICD-10-CM: R06.00 
ICD-9-CM: 786.09 Vitals BP Pulse Height(growth percentile) Weight(growth percentile) LMP SpO2  
 123/73 97 5' 8\" (1.727 m) 227 lb (103 kg) (LMP Unknown) 98% BMI OB Status Smoking Status 34.52 kg/m2 Menopause Former Smoker BMI and BSA Data Body Mass Index Body Surface Area 34.52 kg/m 2 2.22 m 2 Preferred Pharmacy Pharmacy Name Phone [facility] [address] [place] [address] Gloriahalina Leeann Bhakta Your Updated Medication List  
  
   
This list is accurate as of: 5/18/17 11:50 AM.  Always use your most recent med list.  
  
  
  
  
 Maryclare Mcburney  
by Does Not Apply route. aspirin delayed-release 81 mg tablet Take 81 mg by mouth daily. atorvastatin 20 mg tablet Commonly known as:  LIPITOR  
TAKE 1 TABLET BY MOUTH ONCE DAILY  
  
 buPROPion  mg SR tablet Commonly known as:  WELLBUTRIN SR  
TK 1 T PO QAM  
  
 butalbital-acetaminophen-caffeine -40 mg per tablet Commonly known as:  Yudi Pena Take 1 Tab by mouth every four (4) hours as needed for Pain. Max Daily Amount: 6 Tabs. cetirizine 10 mg tablet Commonly known as:  ZYRTEC  
TAKE 1 TABLET BY MOUTH EVERY DAY. GENERIC FOR ZYRTEC  
  
 clobetasol 0.05 % external solution Commonly known as:  TEMOVATE  
APPLY TO SCALP AFTER SHAMPOO DAILY  
  
 cyclobenzaprine 10 mg tablet Commonly known as:  FLEXERIL  
TAKE 1 TABLET BY MOUTH TWICE DAILY AS NEEDED FOR MUSCLE SPASMS  
  
 docusate sodium 50 mg capsule Commonly known as:  Cammy Oak Take 100 mg by mouth daily. DULoxetine 30 mg capsule Commonly known as:  CYMBALTA TAKE 1 CAPSULE BY MOUTH TWICE DAILY FLUoxetine 10 mg capsule Commonly known as:  PROzac TAKE 1 CAPSULE BY MOUTH ONCE DAILY. Lupillo Herbert. fluticasone 50 mcg/actuation nasal spray Commonly known as:  Molly Mad INSTILL 1 SPRAY IN EACH NOSTRIL TWICE DAILY  
  
 glucose blood VI test strips strip Commonly known as:  ACCU-CHEK MARCE PLUS TEST STRP Please use one strip to test blood sugars once a day. Lancets Misc Commonly known as:  ACCU-CHEK SOFTCLIX LANCETS Please use one lancet to test blood sugars twice a day. levothyroxine 25 mcg tablet Commonly known as:  SYNTHROID  
TAKE 1 TABLET BY MOUTH ONCE DAILY BEFORE BREAKFAST  
  
 meloxicam 15 mg tablet Commonly known as:  MOBIC  
  
 metFORMIN 500 mg tablet Commonly known as:  GLUCOPHAGE  
TAKE 1 TABLET BY MOUTH DAILY WITH DINNER. GENERIC FOR GLUCOPHAGE  
  
 PROBIOTIC 4X 10-15 mg Tbec Generic drug:  B.infantis-B.ani-B.long-B.bifi Take  by mouth daily. raNITIdine 150 mg tablet Commonly known as:  ZANTAC  
150mg QAM and 75mg QPM  
  
 traMADol 50 mg tablet Commonly known as:  ULTRAM  
TAKE 1 PO Q 4 TIMES A DAY PRN PAIN  Indications: NEUROPATHIC PAIN, Pain  
  
 triamcinolone acetonide 0.1 % topical cream  
Commonly known as:  KENALOG Apply  to affected area two (2) times a day. use thin layer VITAMIN D3 1,000 unit Cap Generic drug:  cholecalciferol Take 1,000 Units by mouth daily. zolpidem 5 mg tablet Commonly known as:  AMBIEN  
TK 1 T PO  QHS PRF SLEEP Follow-up Instructions Return in about 4 weeks (around 6/15/2017). To-Do List   
 05/18/2017 ECHO:  2D ECHO COMPLETE ADULT (TTE) W OR WO CONTR   
  
 05/24/2017 2:00 PM  
  Appointment with Celia Givens at [facility] ([phone]) Introducing [facility]! Dear Bernie Vanegas: Thank you for requesting a [employer] account. Our records indicate that you already have an active [employer] account. You can access your account anytime at [email or web address]. [email or web address] Did you know that you can access your hospital and ER discharge instructions at any time in [employer]? You can also review all of your test results from your hospital stay or ER visit. Additional Information If you have questions, please visit the Frequently Asked Questions section of the [employer] website at [email or web address]/. Remember, [employer] is NOT to be used for urgent needs. For medical emergencies, dial 911. Now available from your iPhone and Android! Please provide this summary of care documentation to your next provider. Your primary care clinician is listed as Zaire Jha. If you have any questions after today's visit, please call [phone].

## 2017-05-24 ENCOUNTER — HOSPITAL ENCOUNTER (OUTPATIENT)
Dept: PHYSICAL THERAPY | Age: 67
Discharge: HOME | End: 2017-05-24
Payer: MEDICARE

## 2017-05-24 PROCEDURE — 97110 THERAPEUTIC EXERCISES: CPT

## 2017-05-24 PROCEDURE — G8985 CARRY GOAL STATUS: HCPCS

## 2017-05-24 PROCEDURE — 97530 THERAPEUTIC ACTIVITIES: CPT

## 2017-05-24 PROCEDURE — G8984 CARRY CURRENT STATUS: HCPCS

## 2017-05-24 PROCEDURE — 97161 PT EVAL LOW COMPLEX 20 MIN: CPT

## 2017-05-24 NOTE — PROGRESS NOTES
[facility] PHYSICAL THERAPY  [address] [place] [address] Arturo Lazo, [address] - Phone: [phone]  Fax: [phone] / [facility]  Patient Name: Jorge Jasso : 1950   Medical   Diagnosis: Right shoulder pain [M25.511] Treatment Diagnosis: S/P R Reverse TSA    Onset Date: 3/29/2017 AGE: 79 y.o. Referral Source: Fang Mendenhall MD [facility]): 2017   Prior Hospitalization: See medical history Provider #: [id number]   Prior Level of Function: Chronic R shoulder pain with impaired mobility   Comorbidities: SOB, Diabetes type II, COPD, R shoulder surgery x 3, L RTC repair, Fibromyalgia, Aortic valve replacement , Depression, BMI   Medications: Verified on Patient Summary List   The Plan of Care and following information is based on the information from the initial evaluation.   =======================================================================================  Assessment / key information:  Patient is a 79 y.o. female who presents with chief complaint of R shoulder pain s/p reverse R TSA 3/29/17. Patient demonstrates R shoulder AROM/PROM deficits, dec R shoulder strength and impaired overall use of RUE. Patient's FOTO functional score is 58% today indicating 42% disability. Patient would benefit from skilled PT services to address these issues and improve R shoulder ROM and strength per protocol.   Thank you for this referral       Objective Data:    R shoulder ROM       AROM         PROM    Left Right   Left Right   Flexion 156 105 Flexion 172 140   Extension 65 58 Extension NT NT   Scaption/ 80 Scaption/ 112   ER @ 0 Degrees 75 47 ER @ 0 Degrees 85 55   ER @ 90 Degrees 66 38 ER @ 90 Degrees 76 42   IR @ 90 Degrees 85 19 IR @ 90 Degrees 95 25   IR HBB T8 L5         Functional ER [facility]            Strength:  [] Unable to assess at this time     L (1-5) R (1-5) Pain   Shoulder shrug 5 5 [] Yes [] No   Abduction 5 4 [] Yes [] No   External Rotators 3 5 [x] Yes [] No   Internal Rotators 3 5 [x] Yes [] No   Elbow Flexion 4 5 [x] Yes [] No   Elbow Extension 4 5 [] Yes [] No      ======================================================================================  Eval Complexity: History: HIGH Complexity :3+ comorbidities / personal factors will impact the outcome/ POC Exam:MEDIUM Complexity : 3 Standardized tests and measures addressing body structure, function, activity limitation and / or participation in recreation  Presentation: LOW Complexity : Stable, uncomplicated  Clinical Decision Making:MEDIUM Complexity : FOTO score of 26-74Overall Complexity:LOW   Problem List: pain affecting function, decrease ROM, decrease strength, edema affecting function, decrease ADL/ functional abilitiies, decrease activity tolerance, decrease flexibility/ joint mobility and decrease transfer abilities   Treatment Plan may include any combination of the following: Therapeutic exercise, Therapeutic activities, Neuromuscular re-education, Physical agent/modality, Gait/balance training, Manual therapy, Patient education, Self Care training, Functional mobility training and Home safety training  Patient / Family readiness to learn indicated by: asking questions, trying to perform skills and interest  Persons(s) to be included in education: patient (P)  Barriers to Learning/Limitations: None  Measures taken:    Patient Goal (s): \"strengthen muscles and ROM\"   Patient self reported health status: fair  Rehabilitation Potential: good   Short Term Goals: To be accomplished in  2-3  Weeks:  1. Patient will be compliant with HEP in order to facilitate progression of therapeutic exercise and improve mobility  2. Patient will improve R shoulder flexion PROM to >/= 150 degrees in order to improve ease with OH duties  3.  Patient will improve R shoulder abduction PROM >/= 125 degrees in order to improve ease with [place] duties  4. Patient will improve R shoulder flexion AROM >/= 115 degrees in order to improve ability to perform self care duties and ADLs. 5. Patient will improve R shoulder ER PROM >/= 40 degress at 45 deg GH abduction in order to improve donning/doffing     Long Term Goals: To be accomplished in  4-6  Weeks:  1. Patient will be independent with HEP in order to demonstrate ability to perform therapeutic exercises and continue progressing functional mobility upon D/C  2. Patient will improve R shoulder flexion/scaption AROM >/= 130 degrees without pain in order to improve ADLs and self care duties  3. Patient will demonstrate R shoulder ER AROM >/= 40 degrees in order to improve ease with donning/doffing clothing  3. Patient will improve FOTO score to 65/100 to demonstrate a meaningful improvement in functional mobility and increased quality of life    Frequency / Duration:   Patient to be seen  2  times per week for 4-6  weeks:  Patient / Caregiver education and instruction: self care, activity modification and exercises  G-Codes (GP): Carry [id number] Current  CK= 40-59%    Goal  CK= 40-59%. The severity rating is based on the FOTO Score     Therapist Signature: Von Chamberlain DPT Date: 1/27/5751   Certification Period: 5/24/17 - 8/23/17 Time: 1:08 PM   ===========================================================================================  I certify that the above Physical Therapy Services are being furnished while the patient is under my care. I agree with the treatment plan and certify that this therapy is necessary. Physician Signature:        Date:       Time:     Please sign and return to [employer] or you may fax the signed copy to [phone]. Thank you.

## 2017-05-24 NOTE — PROGRESS NOTES
PHYSICAL THERAPY - DAILY TREATMENT NOTE    Patient Name: Lorene Rizzo        Date: 2017  : 1950   YES Patient  Verified  Visit #:   1     Insurance: Payor: BLUE CROSS MEDICARE / [facility] / Product Type: Managed Care Medicare /      In time: 2:10 Out time: 3:05   Total Treatment Time: 55 min     Medicare Time Tracking (below)   Total Timed Codes (min):  23 1:1 Treatment Time:  23     TREATMENT AREA =  R shoulder pain    SUBJECTIVE    Pain Level (on 0 to 10 scale):  0-4  / 10   Medication Changes/New allergies or changes in medical history, any new surgeries or procedures? NO    If yes, update Summary List   Subjective Functional Status/Changes:  []  No changes reported     Patient's main complaint: R shoulder pain s/p R TSA on 3/29/17. Patient was cleared from sling on 17    Chief limitations:   Reaching, lifting, carrying    Occupation: Retired    Goals: Get ROM back        OBJECTIVE    Physical Therapy Evaluation - Shoulder    A. Cervical Spine  Dermatomes WNL [x]   C/S ROM WNL [x]    B. Observation:    Posture: [] Poor    [x] Fair    [] Good    Describe: Atrophy of muscle tissue? [] Yes  [x] No  Location:    Visible Defects?  [] Yes  [x] No (ecchymosis, edema, inflammation, deformities)     ROM:  [] Unable to assess at this time     Painful Arc: [] Yes  [] No   Approximate location in ROM where pain begins:                                            AROM                                                              PROM   Left Right  Left Right   Flexion 156 105 Flexion 172 140   Extension 65 58 Extension NT NT   Scaption/ 80 Scaption/ 112   ER @ 0 Degrees 75 47 ER @ 0 Degrees 85 55   ER @ 90 Degrees 66 38 ER @ 90 Degrees 76 42   IR @ 90 Degrees 85 19 IR @ 90 Degrees 95 25   IR HBB T8 L5      Functional ER WFL WFL        Strength:   [] Unable to assess at this time L (1-5) R (1-5) Pain   Shoulder shrug 5 5 [] Yes   [] No   Abduction 5 4 [] Yes   [] No   External Rotators 3 5 [x] Yes   [] No   Internal Rotators 3 5 [x] Yes   [] No   Elbow Flexion 4 5 [x] Yes   [] No   Elbow Extension 4 5 [] Yes   [] No     Scapulohumoral Control / Rhythm:  Able to eccentrically lower with good control? Left: [x] Yes   [] No     Right: [] Yes   [x] No     Other Tests / Comments:     Modalities Rationale:   min [] Estim, type/location:                                      []  att     []  unatt     []  w/US     []  w/ice    []  w/heat    min []  Mechanical Traction: type/lbs                   []  pro   []  sup   []  int   []  cont    []  before manual    []  after manual    min []  Ultrasound, settings/location:      min []  Iontophoresis w/ dexamethasone, location:                                               []  take home patch       []  in clinic   10 min [x]  Ice     []  Heat    location/position: To R shoulder supine BLE on wedge     min []  Vasopneumatic Device, press/temp:     min []  Other:    [x] Skin assessment post-treatment (if applicable):    [x]  intact    []  redness- no adverse reaction     []redness - adverse reaction:      15 (15) min Therapeutic Exercise:  [x]  See flow sheet   Rationale:      increase ROM, increase strength, improve coordination and increase proprioception to improve the patients ability to perform OH duties with inc ease and efficiency     8 (8) min Therapeutic Activity: FOTO   Rationale: To assess current functional limitations and review POC    throughout min Patient Education:  YES  Reviewed HEP   []  Progressed/Changed HEP based on: Other Objective/Functional Measures:    See objective above     Post Treatment Pain Level (on 0 to 10) scale:   0  / 10     ASSESSMENT    Assessment/Changes in Function:     Patient History: High (Asthma, COPD, SOB, R shoulder surgery x 3, Arthritis)  Examination (low 1-2, mod 3+, high 4+):  Moderate per objective above  Clinical Presentation (low stable or uncomplicated, mod evolving or changing, high unstable or unpredictable): Low stable  Clinical Decision Making (low , mod 26-74, high 1-25): FOTO Moderate     []  See Progress Note/Recertification   Patient will continue to benefit from skilled PT services to modify and progress therapeutic interventions, address functional mobility deficits, address ROM deficits, address strength deficits, analyze and address soft tissue restrictions, analyze and cue movement patterns, analyze and modify body mechanics/ergonomics, assess and modify postural abnormalities and instruct in home and community integration to attain remaining goals. to attain remaining goals.    Progress toward goals / Updated goals:    See POC     PLAN    [x]  Upgrade activities as tolerated YES Continue plan of care   []  Discharge due to :    []  Other:      Therapist: Doris Cavanaugh DPT    Date: 5/24/2017 Time: 1:08 PM

## 2017-06-01 ENCOUNTER — APPOINTMENT (OUTPATIENT)
Dept: PHYSICAL THERAPY | Age: 67
End: 2017-06-01
Payer: MEDICARE

## 2017-06-02 ENCOUNTER — HOSPITAL ENCOUNTER (OUTPATIENT)
Dept: NON INVASIVE DIAGNOSTICS | Age: 67
Discharge: HOME | End: 2017-06-02
Attending: INTERNAL MEDICINE
Payer: MEDICARE

## 2017-06-02 DIAGNOSIS — R06.00 DYSPNEA, UNSPECIFIED TYPE: ICD-10-CM

## 2017-06-02 DIAGNOSIS — I35.0 NONRHEUMATIC AORTIC VALVE STENOSIS: ICD-10-CM

## 2017-06-02 PROCEDURE — 93306 TTE W/DOPPLER COMPLETE: CPT

## 2017-06-06 ENCOUNTER — HOSPITAL ENCOUNTER (OUTPATIENT)
Dept: PHYSICAL THERAPY | Age: 67
Discharge: HOME | End: 2017-06-06
Payer: MEDICARE

## 2017-06-06 PROCEDURE — 97140 MANUAL THERAPY 1/> REGIONS: CPT

## 2017-06-06 PROCEDURE — 97110 THERAPEUTIC EXERCISES: CPT

## 2017-06-06 NOTE — PROGRESS NOTES
PHYSICAL THERAPY - DAILY TREATMENT NOTE    Patient Name: Christos Hathaway        Date: 2017  : 1950   YES Patient  Verified  Visit #:   2   of     Insurance: Payor: BLUE CROSS MEDICARE / Plan: [facility] / Product Type: Managed Care Medicare /      In time: 1:50 Out time: 2:57   Total Treatment Time: 67     Medicare Time Tracking (below)   Total Timed Codes (min):  57 1:1 Treatment Time:  45     TREATMENT AREA =  Right shoulder pain [M25.511]    SUBJECTIVE    Pain Level (on 0 to 10 scale):  4  / 10   Medication Changes/New allergies or changes in medical history, any new surgeries or procedures? NO     If yes, update Summary List   Subjective Functional Status/Changes:  []  No changes reported     \"I got my echo done and go to the cardiologist next week. My shoulder has been pretty good. \"          OBJECTIVE    Modalities Rationale:  palliative      min [] Estim, type/location:                                      []  att     []  unatt     []  w/US     []  w/ice    []  w/heat    min []  Mechanical Traction: type/lbs                   []  pro   []  sup   []  int   []  cont    []  before manual    []  after manual    min []  Ultrasound, settings/location:      min []  Iontophoresis w/ dexamethasone, location:                                               []  take home patch       []  in clinic   10 min [x]  Ice     []  Heat    location/position: Fowlers with LE wedge    min []  Vasopneumatic Device, press/temp:     min []  Other:    [x] Skin assessment post-treatment (if applicable):   [x]  intact    []  redness- no adverse reaction     []redness - adverse reaction:      37 min Therapeutic Exercise:  [x]  See flow sheet   Rationale:      increase ROM, increase strength and improve coordination to improve the patients ability to perform ADL's and OH reach with decreased pain and improved ease     8 min Manual Therapy: PROM R shoulder in all planes, STM to UT/LS/deltoid Rationale:      decrease pain, increase ROM, increase tissue extensibility and decrease trigger points to improve patient's ability to perform ADL's and OH reach with decreased pain and improved ease     min Patient Education:  YES  Reviewed HEP   []  Progressed/Changed HEP based on:   Patient reports compliance     Other Objective/Functional Measures:    Min cueing to avoid shoulder hike  Performed several ex's in sitting due to LBP and SOB     Post Treatment Pain Level (on 0 to 10) scale:   1.5  / 10     ASSESSMENT    Assessment/Changes in Function:     Patient with good tolerance to first full therapy session     []  See Progress Note/Recertification   Patient will continue to benefit from skilled PT services to modify and progress therapeutic interventions, address functional mobility deficits, address ROM deficits, address strength deficits, analyze and address soft tissue restrictions, analyze and cue movement patterns, analyze and modify body mechanics/ergonomics and assess and modify postural abnormalities to attain remaining goals. Progress toward goals / Updated goals: · Short Term Goals: To be accomplished in 2-3 Weeks:  1. Patient will be compliant with HEP in order to facilitate progression of therapeutic exercise and improve mobility  2. Patient will improve R shoulder flexion PROM to >/= 150 degrees in order to improve ease with [place] duties  3. Patient will improve R shoulder abduction PROM >/= 125 degrees in order to improve ease with [place] duties  4. Patient will improve R shoulder flexion AROM >/= 115 degrees in order to improve ability to perform self care duties and ADLs.   5. Patient will improve R shoulder ER PROM >/= 40 degress at 45 deg GH abduction in order to improve donning/doffing     PLAN    [x]  Upgrade activities as tolerated YES Continue plan of care   []  Discharge due to :    []  Other:      Therapist: Federico Goodpasture, PT    Date: 6/6/2017 Time: 1:59 PM     Future Appointments  Date Time Provider Ada Aaliyah   6/6/2017 2:00 PM Miguel Angel Milesor, [facility]   6/9/2017 3:00 PM [facility]   6/12/2017 4:30 PM Rafkendell Bachelor, [facility]   6/14/2017 4:30 PM Rafkendell Bachelor, PT [facility]   6/15/2017 1:45 PM Joaquín Lagos MD [address] [place] [address]   6/20/2017 2:00 PM Miguel Angel Boleselor, [facility]   6/22/2017 1:30 PM [facility]   6/27/2017 2:30 PM [facility]   6/29/2017 1:30 PM [facility]   8/7/2017 2:00 PM Pablo Garza DO [facility]   8/10/2017 10:50 AM Kalpesh Lozada  [address]   9/21/2017 1:00 PM Joaquín Lagos MD [address] [place] [address]

## 2017-06-09 ENCOUNTER — HOSPITAL ENCOUNTER (OUTPATIENT)
Dept: PHYSICAL THERAPY | Age: 67
Discharge: HOME | End: 2017-06-09
Payer: MEDICARE

## 2017-06-09 PROCEDURE — 97110 THERAPEUTIC EXERCISES: CPT

## 2017-06-09 PROCEDURE — 97140 MANUAL THERAPY 1/> REGIONS: CPT

## 2017-06-09 NOTE — PROGRESS NOTES
PHYSICAL THERAPY - DAILY TREATMENT NOTE      Patient Name: Shashi Taylor        Date: 2017  : 1950   YES Patient  Verified  Visit #:   3   of     Insurance: Payor: BLUE CROSS MEDICARE / [facility] Product Type: Managed Care Medicare /      In time: 1:50 Out time: 2:35   Total Treatment Time: 45     Medicare Time Tracking (below)   Total Timed Codes (min):  35 1:1 Treatment Time:  25     TREATMENT AREA =  Right shoulder pain [M25.511]    SUBJECTIVE    Pain Level (on 0 to 10 scale):  0  / 10   Medication Changes/New allergies or changes in medical history, any new surgeries or procedures? NO    If yes, update Summary List   Subjective Functional Status/Changes:  []  No changes reported     \"I think what gave me a head start was doing that exercise at the beginning. \"        OBJECTIVE    Modalities Rationale:        min [] Estim, type/location:                                      []  att     []  unatt     []  w/US     []  w/ice    []  w/heat    min []  Mechanical Traction: type/lbs                   []  pro   []  sup   []  int   []  cont    []  before manual    []  after manual    min []  Ultrasound, settings/location:      min []  Iontophoresis w/ dexamethasone, location:                                               []  take home patch       []  in clinic   10 min [x]  Ice     []  Heat    location/position: To R shoulder supine BLEs on wedge    min []  Vasopneumatic Device, press/temp:     min []  Other:    [x] Skin assessment post-treatment (if applicable):    [x]  intact    []  redness- no adverse reaction     []redness - adverse reaction:      27 (17) min Therapeutic Exercise:  [x]  See flow sheet   Rationale:      increase ROM, increase strength, improve coordination and increase proprioception to improve the patients ability to perform ADLs and overhead activities with increased ease      8 (8) min Manual Therapy: PROM all planes, Grade 2 GH joint mobs, STM to deltoid/UT/LS   Rationale:      decrease pain, increase ROM, increase tissue extensibility and increase postural awareness to improve patient's ability to perform ADLs and overhead activities with increased ease     1 min Patient Education:  YES  Reviewed HEP   []  Progressed/Changed HEP based on: Other Objective/Functional Measures:    R shoulder flexion PROM 150 degrees with no capsular end feel  Tolerated therex progression including AAROM and gentle strengthening very well today     Post Treatment Pain Level (on 0 to 10) scale:   0  / 10     ASSESSMENT    Assessment/Changes in Function:     PROM improving since IE.     []  See Progress Note/Recertification   Patient will continue to benefit from skilled PT services to modify and progress therapeutic interventions, address functional mobility deficits, address ROM deficits, address strength deficits, analyze and address soft tissue restrictions, analyze and cue movement patterns, analyze and modify body mechanics/ergonomics, assess and modify postural abnormalities, address imbalance/dizziness and instruct in home and community integration to attain remaining goals. Progress toward goals / Updated goals: · Short Term Goals: To be accomplished in 2-3 Weeks:  1. Patient will be compliant with HEP in order to facilitate progression of therapeutic exercise and improve mobility. Demo compliance   2. Patient will improve R shoulder flexion PROM to >/= 150 degrees in order to improve ease with OH duties. MET  3. Patient will improve R shoulder abduction PROM >/= 125 degrees in order to improve ease with OH duties. 4. Patient will improve R shoulder flexion AROM >/= 115 degrees in order to improve ability to perform self care duties and ADLs.   5. Patient will improve R shoulder ER PROM >/= 40 degress at 45 deg GH abduction in order to improve donning/doffing       PLAN    [x]  Upgrade activities as tolerated YES Continue plan of care   [] Discharge due to :    []  Other:      Therapist: Mathieu Young DPT    Date: 6/9/2017 Time: 2:09 PM     Future Appointments  Date Time Provider Ada Fischer   6/12/2017 4:30 PM Federico Goodpasture, [facility]   6/14/2017 4:30 PM Federico Goodpasture, [facility]   6/15/2017 1:45 PM Joaquín Kelly MD [address] [place] [address]   6/20/2017 2:00 PM Federico Goodpasture, [facility]   6/22/2017 1:30 PM [facility]   6/27/2017 2:30 PM [facility]   6/29/2017 1:30 PM [facility]   8/7/2017 2:00 PM Pablo Candelario DO [facility]   8/10/2017 10:50 AM Alverto Mendoza  [address]   9/21/2017 1:00 PM Joaquín Kelly MD [address] [place] [address]

## 2017-06-12 ENCOUNTER — HOSPITAL ENCOUNTER (OUTPATIENT)
Dept: PHYSICAL THERAPY | Age: 67
Discharge: HOME | End: 2017-06-12
Payer: MEDICARE

## 2017-06-12 PROCEDURE — 97110 THERAPEUTIC EXERCISES: CPT

## 2017-06-12 NOTE — PROGRESS NOTES
PHYSICAL THERAPY - DAILY TREATMENT NOTE    Patient Name: Daryl Pretty        Date: 2017  : 1950   YES Patient  Verified  Visit #:   4     Insurance: Payor: BLUE CROSS MEDICARE / [facility] / Product Type: Managed Care Medicare /      In time: 4:30 Out time: 4:42   Total Treatment Time: 12     Medicare Time Tracking (below)   Total Timed Codes (min):  12 1:1 Treatment Time:  8     TREATMENT AREA =  Right shoulder pain [M25.511]    SUBJECTIVE    Pain Level (on 0 to 10 scale):  3  / 10   Medication Changes/New allergies or changes in medical history, any new surgeries or procedures? NO     If yes, update Summary List   Subjective Functional Status/Changes:  []  No changes reported     \"I was a little dizzy earlier, I think from the heat. I had some water and a butterscotch candy in the car. I get my echo results Thursday. This is the first time I could get my arm this high since . \"          OBJECTIVE      12 (8) min Therapeutic Exercise:  [x]  See flow sheet   Rationale:      increase ROM, increase strength and improve coordination to improve the patients ability to perform ADL\"s and amb with decreased pain and improved ease      min Patient Education:  YES  Reviewed HEP   []  Progressed/Changed HEP based on:   Patient reports compliance     Other Objective/Functional Measures:    Patient performed UBE, pulleys, and wall wash. During wall wash, patient reported reoccurrence of dizziness, and demonstrated pallor. Vitals 108/86, 101 bpm, 99% O2 Sats. Held remaining exercises this visit, given hard candy and water and allowed to rest. Patient reports feeling better.      Post Treatment Pain Level (on 0 to 10) scale:   NR  / 10     ASSESSMENT    Assessment/Changes in Function:     Held exercise this visit     []  See Progress Note/Recertification   Patient will continue to benefit from skilled PT services to modify and progress therapeutic interventions, address functional mobility deficits, address ROM deficits, address strength deficits, analyze and address soft tissue restrictions, analyze and cue movement patterns, analyze and modify body mechanics/ergonomics and assess and modify postural abnormalities to attain remaining goals.    Progress toward goals / Updated goals:    No progress this session, see above     PLAN    [x]  Upgrade activities as tolerated YES Continue plan of care   []  Discharge due to :    []  Other:      Therapist: Shazia Mata PT    Date: 6/12/2017 Time: 4:21 PM     Future Appointments  Date Time Provider Ada Aaliyah   6/12/2017 4:30 PM Shazia Mata PT [address]   6/14/2017 4:30 PM Shaiza Mata [address]   6/15/2017 1:45 PM Joaquín Navarrete MD [address] [place] [address]   6/20/2017 2:00 PM Shazia Mata [address]   6/22/2017 1:30 PM [address]   6/27/2017 2:30 PM [address]   6/29/2017 1:30 PM [address]   8/7/2017 2:00 PM Pablo Marcos DO [facility]   8/10/2017 10:50 AM Rai Gómez MD [facility]   9/21/2017 1:00 PM Joaquín Navarrete MD [address] [place] [address]

## 2017-06-14 ENCOUNTER — HOSPITAL ENCOUNTER (OUTPATIENT)
Dept: PHYSICAL THERAPY | Age: 67
End: 2017-06-14
Payer: MEDICARE

## 2017-06-15 ENCOUNTER — OFFICE VISIT (OUTPATIENT)
Dept: CARDIOLOGY CLINIC | Age: 67
End: 2017-06-15

## 2017-06-15 VITALS
HEART RATE: 96 BPM | DIASTOLIC BLOOD PRESSURE: 76 MMHG | WEIGHT: 224 LBS | OXYGEN SATURATION: 99 % | BODY MASS INDEX: 33.95 KG/M2 | SYSTOLIC BLOOD PRESSURE: 110 MMHG | HEIGHT: 68 IN

## 2017-06-15 DIAGNOSIS — I35.0 NONRHEUMATIC AORTIC VALVE STENOSIS: ICD-10-CM

## 2017-06-15 DIAGNOSIS — E66.01 MORBID OBESITY, UNSPECIFIED OBESITY TYPE (HCC): ICD-10-CM

## 2017-06-15 DIAGNOSIS — R06.00 DYSPNEA, UNSPECIFIED TYPE: Primary | ICD-10-CM

## 2017-06-15 RX ORDER — FUROSEMIDE 20 MG/1
TABLET ORAL
Qty: 20 TAB | Refills: 6 | Status: SHIPPED | OUTPATIENT
Start: 2017-06-15 | End: 2017-06-15 | Stop reason: SDUPTHER

## 2017-06-15 NOTE — PROGRESS NOTES
[facility]    Ms. Lucy Madrigal is a 79 y.o. female with history of Aortic stenosis s/p Aortic valve replacement (03/16), fibromyalgia, diabetes, obesity, hypertension, hypothyroidism and COPD. Ms. Lucy Madrigal is here today for a follow up appointment. She said she continues to have shortness of breath. She got an echocardiogram done. This shortness of breath is mostly exertional.  She denies any PND or lower extremity swelling. She denies any chest pain or chest tightness. Past Medical History:   Diagnosis Date    Aortic stenosis     S/P AVR in 2016    Asthma     Chronic pain     back pain    Diabetes ([facility] [place] [address]     Diverticulitis     Fibromyalgia     H/O aortic valve replacement 03/16    21 mm bovine pericardial bioprosthesis and epiaortic scanning      Hypertension     Hypothyroidism     Lumbar post-laminectomy syndrome     Menopause     Obesity     Osteoarthritis     Psychotic disorder     Sciatica     Skin cancer 2013    right forearm. Past Surgical History:   Procedure Laterality Date    CHEST SURGERY PROCEDURE UNLISTED  03/2016    Open Heart Surgery    COLONOSCOPY      HX CATARACT REMOVAL Right 1995    lens  replaced.      HX HEENT  1990's    sinus surgery     HX LUMBAR FUSION  2004    L3-L4-L5    HX LUMBAR LAMINECTOMY  2002    HX MOHS PROCEDURES Left 1990    HX ORTHOPAEDIC      Rotator cuff Bilateral    HX SHOULDER REPLACEMENT Right 03/29/2017    NE COLSC FLX W/RMVL OF TUMOR POLYP LESION SNARE TQ  07/09/2014 repeat in 3 years    Dr. Re Reeves      Aortic valve replacement       Current Outpatient Prescriptions   Medication Sig    meloxicam (MOBIC) 15 mg tablet     buPROPion SR (WELLBUTRIN SR) 150 mg SR tablet TK 1 T PO QAM    traMADol (ULTRAM) 50 mg tablet TAKE 1 PO Q 4 TIMES A DAY PRN PAIN  Indications: NEUROPATHIC PAIN, Pain    metFORMIN (GLUCOPHAGE) 500 mg tablet TAKE 1 TABLET BY MOUTH DAILY WITH DINNER. GENERIC FOR GLUCOPHAGE    levothyroxine (SYNTHROID) 25 mcg tablet TAKE 1 TABLET BY MOUTH ONCE DAILY BEFORE BREAKFAST    atorvastatin (LIPITOR) 20 mg tablet TAKE 1 TABLET BY MOUTH ONCE DAILY    DULoxetine (CYMBALTA) 30 mg capsule TAKE 1 CAPSULE BY MOUTH TWICE DAILY    zolpidem (AMBIEN) 5 mg tablet TK 1 T PO  QHS PRF SLEEP    butalbital-acetaminophen-caffeine (FIORICET, ESGIC) -40 mg per tablet Take 1 Tab by mouth every four (4) hours as needed for Pain. Max Daily Amount: 6 Tabs.  cetirizine (ZYRTEC) 10 mg tablet TAKE 1 TABLET BY MOUTH EVERY DAY. GENERIC FOR ZYRTEC    fluticasone (FLONASE) 50 mcg/actuation nasal spray INSTILL 1 SPRAY IN EACH NOSTRIL TWICE DAILY    B.infantis-B.ani-B.long-B.bifi (PROBIOTIC 4X) 10-15 mg TbEC Take  by mouth daily.  docusate sodium (COLACE) 50 mg capsule Take 100 mg by mouth daily.  FLUoxetine (PROZAC) 10 mg capsule TAKE 1 CAPSULE BY MOUTH ONCE DAILY. Rupinder Coleman.  cyclobenzaprine (FLEXERIL) 10 mg tablet TAKE 1 TABLET BY MOUTH TWICE DAILY AS NEEDED FOR MUSCLE SPASMS    ranitidine (ZANTAC) 150 mg tablet 150mg QAM and 75mg QPM    aspirin delayed-release 81 mg tablet Take 81 mg by mouth daily.  clobetasol (TEMOVATE) 0.05 % external solution APPLY TO SCALP AFTER SHAMPOO DAILY    Cholecalciferol, Vitamin D3, (VITAMIN D3) 1,000 unit cap Take 1,000 Units by mouth daily.  glucose blood VI test strips (ACCU-CHEK MARCE PLUS TEST STRP) strip Please use one strip to test blood sugars once a day.  Lancets (ACCU-CHEK SOFTCLIX LANCETS) misc Please use one lancet to test blood sugars twice a day.  BLOOD-GLUCOSE METER (ACCU-CHEK MARCE MONITORING) by Does Not Apply route.  triamcinolone acetonide (KENALOG) 0.1 % topical cream Apply  to affected area two (2) times a day. use thin layer     No current facility-administered medications for this visit.         Allergies and Sensitivities:  Allergies   Allergen Reactions    Latex, Natural Rubber Hives    Adhesive Rash     Some bandaids    Ciprofloxacin Rash    Codeine Nausea and Vomiting    Demerol [Meperidine] Nausea and Vomiting    Diclofenac Shortness of Breath and Palpitations    Erythromycin Rash    Levaquin [Levofloxacin] Rash    Loratadine Swelling    Morphine Nausea and Vomiting    Penicillin G Hives and Rash     Does fine with Keflex    Sulfa (Sulfonamide Antibiotics) Itching    Vicodin [Hydrocodone-Acetaminophen] Other (comments)     Metallic taste in mouth       Family History:  Family History   Problem Relation Age of Onset    Hypertension Father     Heart Disease Father     Alcohol abuse Father        Social History:  Social History   Substance Use Topics    Smoking status: Former Smoker     Years: 45.00     Types: Cigarettes     Quit date: 7/1/2011    Smokeless tobacco: Never Used    Alcohol use No     She  reports that she quit smoking about 5 years ago. Her smoking use included Cigarettes. She quit after 45.00 years of use. She has never used smokeless tobacco.  She  reports that she does not drink alcohol. Review of Systems:  Cardiac symptoms as noted above in HPI. All others negative. Denies skin rash,  photophobia, neck pain, hemoptysis, chronic cough, nausea, vomiting, hematuria, burning micturition, BRBPR, chronic headaches. Physical Exam:  BP Readings from Last 3 Encounters:   06/15/17 110/76   05/18/17 123/73   05/08/17 107/73         Pulse Readings from Last 3 Encounters:   06/15/17 96   05/18/17 97   05/08/17 93          Wt Readings from Last 3 Encounters:   06/15/17 224 lb (101.6 kg)   05/18/17 227 lb (103 kg)   05/08/17 225 lb 6.4 oz (102.2 kg)       Constitutional: Oriented to person, place, and time. HENT: Head: Normocephalic and atraumatic. Neck: No JVD present. Cardiovascular: Regular rhythm. No gallop or rubs appreciated. No significant murmur  Lung: Breath sounds normal. No respiratory distress.  No ronchi or rales appreciated  Abdominal: No tenderness. No rebound and no guarding. Musculoskeletal: No LE swelling    Review of Data  LABS:   Lab Results   Component Value Date/Time    Sodium 140 04/27/2017 03:20 PM    Potassium 3.7 04/27/2017 03:20 PM    Chloride 107 04/27/2017 03:20 PM    CO2 21 04/27/2017 03:20 PM    Glucose 94 04/27/2017 03:20 PM    BUN 10 04/27/2017 03:20 PM    Creatinine 1.03 04/27/2017 03:20 PM     Lipids Latest Ref Rng & Units 9/2/2016 2/13/2015 2/25/2014   Chol, Total 100 - 199 mg/dL 141 113 119   HDL >39 mg/dL 77 57 62   LDL 0 - 99 mg/dL 49 38 43   Trig 0 - 149 mg/dL 77 90 70   Chol/HDL Ratio 0 - 5.0   - 2.0 -     Lab Results   Component Value Date/Time    ALT (SGPT) 25 04/27/2017 03:20 PM     Lab Results   Component Value Date/Time    Hemoglobin A1c 6.4 03/27/2017 03:32 AM     EKG  (04/16) Sinus rhythm at 96 bpm    STRESS TEST (2012)   No EKG or scintigraphic evidence of ischemia or infarction. Normal LVEF    ECHO (01/16)  SUMMARY:  Left ventricle: Systolic function was normal. Ejection fraction was estimated in the range of 55 % to 60 %. There were no regional wall motion  abnormalities. Doppler parameters were consistent with abnormal left ventricular relaxation (grade 1 diastolic dysfunction). Right ventricle: Systolic function was normal.  Aortic valve: The valve was trileaflet. Leaflets exhibited calcification and moderate- severely reduced cuspal separation. There was severe aortic  stenosis. There was no significant regurgitation. Valve peak gradient was 85 mmHg. Valve mean gradient was 50 mmHg. ELIS (02/16)  Left ventricle: Size was normal. Systolic function was normal.  Right ventricle: The size was normal.  Left atrium: Size was normal. No thrombus was identified. There was no  spontaneous echo contrast (\"smoke\"). Left atrial appendage: No thrombus was identified. There was no  spontaneous echo contrast (\"smoke\") in the appendage. Right atrium: Size was normal.  Mitral valve:  There was mild to moderate multi jet regurgitation. No obvious mass, vegetation or thrombus noted. Aortic valve: Aortic valve assessment was performed using 2D  transesophageal as well as transthoracic doppler data. The valve was  trileaflet. Leaflets were thickened, calcific and stenotic. A mean  gradient across the valve was measured at 49mmHg with a calculated valve  area of 0.70 - 0.75 cm based on an LVOT diameter of 2.1 cm, an LVOT VTI of  21 cm, and an AV VTI of 101 cm. There was no regurgitation. Tricuspid valve: There was no regurgitation. No obvious mass, vegetation or thrombus noted. HOLTER (05/16)  Interpretation:  1. Rhythm is sinus. 2. PACs (1.3% burden). 3. Rare PVCs. ECHO (06/17)  SUMMARY:  Left ventricle: Systolic function was mildly reduced. Ejection fraction  was estimated in the range of 45 % to 50 %. There was mild diffuse  hypokinesis. Wall thickness was mildly increased. There was mild  concentric hypertrophy. Left ventricular diastolic function parameters  were normal for the patient's age. Right ventricle: Systolic function was normal.    Mitral valve: There was mild regurgitation. Aortic valve: A bioprosthesis was present. Not well visualized. Peak velocity 2.2 m/second  Peak gradient ~20 mm hg  Mean gradient ~ 10 mm hg. There was no significant regurgitation. Tricuspid valve: There was mild regurgitation. CARDIAC CATH: (01/16)  PRESSURE HEMODYNAMICS: Systemic aortic pressure was 131/68 mmHg. Left ventricular pressure was 160/2/12 mmHg. Based on the data, peak-to-peak gradient between LV and aortic was approximately 30 mmHg. Mean RA  7 mmHg,   RV  32/1/8 mmHg,  PA  27/9/16 mmHg,   PCWP 10 mm hg  Cardiac output by Ellis calculation was 4.3 L/min and cardiac index was 2.02 liters L/min/meter squared. Mixed venous saturation was 65%, PA saturation was 65%, and the radial artery saturation was 90%. No evidence of intracardiac shunting. CORONARY ANGIOGRAM  1.  LM: No significant obstructive disease. 2. LAD: 10-20% luminal irregularities, otherwise normal. Three diagonal branches angiographically normal.  3. LCX: 20-30% luminal irregularities, otherwise no obstructive disease. 4. OM1: Large bifurcating vessel without any obstructive disease. 5. RCA: Proximal 20%, mid discrete 40%, otherwise angiographically normal. Dominant vessel. IMPRESSION & PLAN:  Ms. Temo Suh is a 79 y.o. female with Aortic stenosis, COPD, obesity, diabetes, fibromyalgia. Aortic stenosis:    Ms. Temo Suh [facility]. Aspirin will be continued lifelong. ECHO in 06/17 with mean gradient across valve ~ 10 mm Hg. Physiologic for prosthetic valve. Diabetes:  Goal hemoglobin A1c less than 7 is recommended from a cardiovascular standpoint. Last hemoglobin A1c was 6.4. Continue same management per Dr. Nadine Ho. Hypertension:  BP today 110/76 mm Hg. Not on any meds. Continue same    Fibromyalgia and COPD:    She has chronic pain syndrome from her fibromyalgia. Dyspnea:   Recent onset. No obvious fluid overload on exam. BP stable. NO LE edema. ECHO 06/17 with EF ~ 50%  Mean gradient across valve 10 mm Hg, physiologic for prosthetic valve. Recent CT without any PE or any pulmonary process. Advised patient to follow up with Lisa Winslow. Try lasix 20 mg every other day as needed. Empirically. Call us back with response. Side effect discussed. Importance of diet and exercise was discussed. Importance of diet and exercise was discussed with patient. This plan was discussed with patient who is in agreement. Thank you for allowing me to participate in patient care. Please feel free to call me if you have any question or concern.

## 2017-06-15 NOTE — MR AVS SNAPSHOT
Visit Information Date & Time Provider Department Dept. Phone Encounter #  
 6/15/2017  1:45 PM Joaquín Bass MD [address] [place] [address] Specialist at [facility]/\[facility]\"" [phone] [id number] Your Appointments 8/7/2017  2:00 PM  
Follow Up with Mena Rome DO [address] [facility]) Appt Note: Return in about 3 months (around 8/8/2017). [address] [facility] 83 [address] [place]  
  
   
 [address] [facility] 8/10/2017 10:50 AM  
Follow Up with Tonya Soliz MD  
[facility] ([facility]) Appt Note: fu for lower back [address] [place] [id number]  
[phone]  
  
   
 [address] [facility],[address] [facility]  
  
    
 9/21/2017  1:00 PM  
Follow Up with Laure Lee MD  
Cardio Specialist at [facility]) Appt Note: 6 m f/u  
 [address] [facility] 83 [address]  
  
   
 [address] Upcoming Health Maintenance Date Due  
 FOOT EXAM Q1 11/6/2016 Pneumococcal 65+ Low/Medium Risk (2 of 2 - PPSV23) 12/7/2016 MEDICARE YEARLY EXAM 12/7/2016 EYE EXAM RETINAL OR DILATED Q1 1/19/2017 COLONOSCOPY 7/9/2017 INFLUENZA AGE 9 TO ADULT 8/1/2017 MICROALBUMIN Q1 9/2/2017 LIPID PANEL Q1 9/2/2017 HEMOGLOBIN A1C Q6M 9/27/2017 GLAUCOMA SCREENING Q2Y 1/19/2018 BREAST CANCER SCRN MAMMOGRAM 9/6/2018 DTaP/Tdap/Td series (2 - Td) 12/15/2025 Allergies as of 6/15/2017  Review Complete On: 6/15/2017 By: Pop Isaacs RN Severity Noted Reaction Type Reactions Latex, Natural Rubber  07/14/2016    Hives Adhesive  03/18/2016    Rash Some bandaids Ciprofloxacin  01/28/2014    Rash Codeine  01/28/2014    Nausea and Vomiting Demerol [Meperidine]  01/28/2014    Nausea and Vomiting Diclofenac  08/06/2015    Shortness of Breath, Palpitations Erythromycin  01/28/2014    Rash Levaquin [Levofloxacin]  01/28/2014    Rash Loratadine  01/28/2014    Swelling Morphine  01/28/2014    Nausea and Vomiting Penicillin G  01/28/2014    Hives, Rash Does fine with Keflex Sulfa (Sulfonamide Antibiotics)  01/28/2014    Itching Vicodin [Hydrocodone-acetaminophen]  02/12/2015    Other (comments) Metallic taste in mouth Current Immunizations  Reviewed on 10/3/2016 Name Date Influenza High Dose Vaccine PF 10/3/2016 Influenza Vaccine 10/10/2014 Influenza Vaccine (Quad) PF 10/28/2015 Pneumococcal Conjugate (PCV-13) 12/7/2015 Tdap 12/15/2015 Zoster Vaccine, Live 6/2/2014 Not reviewed this visit Vitals BP Pulse Height(growth percentile) Weight(growth percentile) SpO2 BMI  
 110/76 96 5' 8\" (1.727 m) 224 lb (101.6 kg) 99% 34.06 kg/m2 OB Status Smoking Status Menopause Former Smoker BMI and BSA Data Body Mass Index Body Surface Area 34.06 kg/m 2 2.21 m 2 Preferred Pharmacy Pharmacy Name Phone [facility] [address] [place] [address] Gloriahalina Leeann Bhakta Your Updated Medication List  
  
   
This list is accurate as of: 6/15/17  2:02 PM.  Always use your most recent med list.  
  
  
  
  
 Vida Alonzo  
by Does Not Apply route. aspirin delayed-release 81 mg tablet Take 81 mg by mouth daily. atorvastatin 20 mg tablet Commonly known as:  LIPITOR  
TAKE 1 TABLET BY MOUTH ONCE DAILY  
  
 buPROPion  mg SR tablet Commonly known as:  WELLBUTRIN SR  
TK 1 T PO QAM  
  
 butalbital-acetaminophen-caffeine -40 mg per tablet Commonly known as:  Aaron Drummer Take 1 Tab by mouth every four (4) hours as needed for Pain. Max Daily Amount: 6 Tabs. cetirizine 10 mg tablet Commonly known as:  ZYRTEC  
 TAKE 1 TABLET BY MOUTH EVERY DAY. GENERIC FOR ZYRTEC  
  
 clobetasol 0.05 % external solution Commonly known as:  TEMOVATE  
APPLY TO SCALP AFTER SHAMPOO DAILY  
  
 cyclobenzaprine 10 mg tablet Commonly known as:  FLEXERIL  
TAKE 1 TABLET BY MOUTH TWICE DAILY AS NEEDED FOR MUSCLE SPASMS  
  
 docusate sodium 50 mg capsule Commonly known as:  Major Rivers Take 100 mg by mouth daily. DULoxetine 30 mg capsule Commonly known as:  CYMBALTA TAKE 1 CAPSULE BY MOUTH TWICE DAILY FLUoxetine 10 mg capsule Commonly known as:  PROzac TAKE 1 CAPSULE BY MOUTH ONCE DAILY. [place]. fluticasone 50 mcg/actuation nasal spray Commonly known as:  Creasie Davey INSTILL 1 SPRAY IN EACH NOSTRIL TWICE DAILY  
  
 glucose blood VI test strips strip Commonly known as:  ACCU-CHEK MARCE PLUS TEST STRP Please use one strip to test blood sugars once a day. Lancets Misc Commonly known as:  ACCU-CHEK SOFTCLIX LANCETS Please use one lancet to test blood sugars twice a day. levothyroxine 25 mcg tablet Commonly known as:  SYNTHROID  
TAKE 1 TABLET BY MOUTH ONCE DAILY BEFORE BREAKFAST  
  
 meloxicam 15 mg tablet Commonly known as:  MOBIC  
  
 metFORMIN 500 mg tablet Commonly known as:  GLUCOPHAGE  
TAKE 1 TABLET BY MOUTH DAILY WITH DINNER. GENERIC FOR GLUCOPHAGE  
  
 PROBIOTIC 4X 10-15 mg Tbec Generic drug:  B.infantis-B.ani-B.long-B.bifi Take  by mouth daily. raNITIdine 150 mg tablet Commonly known as:  ZANTAC  
150mg QAM and 75mg QPM  
  
 traMADol 50 mg tablet Commonly known as:  ULTRAM  
TAKE 1 PO Q 4 TIMES A DAY PRN PAIN  Indications: NEUROPATHIC PAIN, Pain  
  
 triamcinolone acetonide 0.1 % topical cream  
Commonly known as:  KENALOG Apply  to affected area two (2) times a day. use thin layer VITAMIN D3 1,000 unit Cap Generic drug:  cholecalciferol Take 1,000 Units by mouth daily. zolpidem 5 mg tablet Commonly known as:  AMBIEN  
 TK 1 T PO  [facility] PRF SLEEP To-Do List   
 06/20/2017 2:00 PM  
  Appointment with Lorin Cooper PT at [facility] PT [address] IM ([phone])  
  
 06/22/2017 1:30 PM  
  Appointment with Artemus Kocher at [facility] PT [address] IM ([phone])  
  
 06/27/2017 2:30 PM  
  Appointment with Artemus Kocher at [facility] PT [address] IM ([phone])  
  
 06/29/2017 1:30 PM  
  Appointment with Artemus Kocher at [facility] IM ([phone]) [facility] SERVICES! Dear Sanam Jolly: Thank you for requesting a [employer] account. Our records indicate that you already have an active [employer] account. You can access your account anytime at [email or web address] Did you know that you can access your hospital and ER discharge instructions at any time in [employer]? You can also review all of your test results from your hospital stay or ER visit. Additional Information If you have questions, please visit the Frequently Asked Questions section of the [employer] website at [email or web address] Remember, Nov[employer] is NOT to be used for urgent needs. For medical emergencies, dial 911. Now available from your iPhone and Android! Please provide this summary of care documentation to your next provider. Your primary care clinician is listed as Moses Solomon. If you have any questions after today's visit, please call [phone].

## 2017-06-16 RX ORDER — FUROSEMIDE 20 MG/1
TABLET ORAL
Qty: 45 TAB | Refills: 6 | Status: SHIPPED | OUTPATIENT
Start: 2017-06-16 | End: 2017-08-07 | Stop reason: ALTCHOICE

## 2017-06-20 ENCOUNTER — HOSPITAL ENCOUNTER (OUTPATIENT)
Dept: PHYSICAL THERAPY | Age: 67
Discharge: HOME | End: 2017-06-20
Payer: MEDICARE

## 2017-06-20 PROCEDURE — 97110 THERAPEUTIC EXERCISES: CPT

## 2017-06-20 NOTE — PROGRESS NOTES
PHYSICAL THERAPY - DAILY TREATMENT NOTE    Patient Name: Austin Bragg        Date: 2017  : 1950   YES Patient  Verified  Visit #:   5     Insurance: Payor: BLUE CROSS MEDICARE / Plan: [facility] / Product Type: Managed Care Medicare /      In time: 1:50 Out time: 2:42   Total Treatment Time: 52     Medicare Time Tracking (below)   Total Timed Codes (min):  42 1:1 Treatment Time:  38     TREATMENT AREA =  Right shoulder pain [M25.511]    SUBJECTIVE    Pain Level (on 0 to 10 scale):  4  / 10   Medication Changes/New allergies or changes in medical history, any new surgeries or procedures? YES     If yes, update Summary List   Subjective Functional Status/Changes:  []  No changes reported     \"I went to the cardiologist Thursday. My echo was fine, but my ejection fraction is below 50. The doctor doesn't know what is going on; he put me on lasix 20 mg every other day. He also told me to do what I can but rest when I need to. I am pretty sure I had a virus last week because I had diarrhea for 2 days after my appt. \"          OBJECTIVE    Modalities Rationale:  palliative      min [] Estim, type/location:                                      []  att     []  unatt     []  w/US     []  w/ice    []  w/heat    min []  Mechanical Traction: type/lbs                   []  pro   []  sup   []  int   []  cont    []  before manual    []  after manual    min []  Ultrasound, settings/location:      min []  Iontophoresis w/ dexamethasone, location:                                               []  take home patch       []  in clinic   10 min [x]  Ice     []  Heat    location/position: Supine with LE wedge    min []  Vasopneumatic Device, press/temp:     min []  Other:    [x] Skin assessment post-treatment (if applicable):   [x]  intact    []  redness- no adverse reaction     []redness - adverse reaction:      42 (38) min Therapeutic Exercise:  [x]  See flow sheet   Rationale: increase ROM, increase strength and improve coordination to improve the patients ability to perform ADL\"s and OH reach with decreased pain and improved ease      min Patient Education:  YES  Reviewed HEP   []  Progressed/Changed HEP based on:   Patient reports compliance     Other Objective/Functional Measures:    Patient with min cueing for form with ex's  Progressed strengthening ex's per flowsheet with good tolerance  Updated AROM as follows: flex 138, abd 132, ER 40, IR HBB to L5     Post Treatment Pain Level (on 0 to 10) scale:   4  / 10     ASSESSMENT    Assessment/Changes in Function:     Patient is progressing very well with ROM, plan to progress strengthening ex's as tolerated     []  See Progress Note/Recertification   Patient will continue to benefit from skilled PT services to modify and progress therapeutic interventions, address functional mobility deficits, address ROM deficits, address strength deficits, analyze and address soft tissue restrictions, analyze and cue movement patterns, analyze and modify body mechanics/ergonomics and assess and modify postural abnormalities to attain remaining goals. Progress toward goals / Updated goals: · Short Term Goals: To be accomplished in 2-3 Weeks:  1. Patient will be compliant with HEP in order to facilitate progression of therapeutic exercise and improve mobility. Demo compliance   2. Patient will improve R shoulder flexion PROM to >/= 150 degrees in order to improve ease with OH duties. MET  3. Patient will improve R shoulder abduction PROM >/= 125 degrees in order to improve ease with OH duties. AROM 132 deg, MET  4. Patient will improve R shoulder flexion AROM >/= 115 degrees in order to improve ability to perform self care duties and ADLs. 138 deg, MET  5. Patient will improve R shoulder ER PROM >/= 40 degress at 45 deg GH abduction in order to improve donning/doffing AROM 40 deg, MET    · Long Term Goals: To be accomplished in  4-6  Weeks:  1. Patient will be independent with HEP in order to demonstrate ability to perform therapeutic exercises and continue progressing functional mobility upon D/C  2. Patient will improve R shoulder flexion/scaption AROM >/= 130 degrees without pain in order to improve ADLs and self care duties abd 132 deg, MET  3. Patient will demonstrate R shoulder ER AROM >/= 40 degrees in order to improve ease with donning/doffing clothing AROM 40 deg, MET  3.  Patient will improve FOTO score to 65/100 to demonstrate a meaningful improvement in functional mobility and increased quality of life     PLAN    [x]  Upgrade activities as tolerated YES Continue plan of care   []  Discharge due to :    []  Other:      Therapist: Shahla Clark PT    Date: 6/20/2017 Time: 2:48 PM     Future Appointments  Date Time Provider Ada Fischer   6/22/2017 1:30 PM [facility]   6/27/2017 2:30 PM [facility]   6/29/2017 1:30 PM [facility]   7/5/2017 1:30 PM Shahla Clark, [facility]   7/7/2017 2:00 PM Shahla Clark, [facility]   7/12/2017 1:30 PM Shahla Clark, [facility]   7/14/2017 2:00 PM Shahla Clark, [facility]   7/19/2017 1:30 PM Shahla Clark, [facility]   7/21/2017 1:00 PM Shahla Clark, PT [facility]   7/26/2017 1:30 PM Shahla Clark, [facility]   7/28/2017 1:30 PM Shahla Clark, [facility]   8/7/2017 2:00 PM Pablo Garza DO [facility] KASH SCHED   8/10/2017 10:50 AM Greta Borja  [address]   9/21/2017 1:00 PM Juli Marin MD [address] [place] [address]

## 2017-06-22 ENCOUNTER — HOSPITAL ENCOUNTER (OUTPATIENT)
Dept: PHYSICAL THERAPY | Age: 67
Discharge: HOME | End: 2017-06-22
Payer: MEDICARE

## 2017-06-22 PROCEDURE — G8984 CARRY CURRENT STATUS: HCPCS

## 2017-06-22 PROCEDURE — 97530 THERAPEUTIC ACTIVITIES: CPT

## 2017-06-22 PROCEDURE — 97110 THERAPEUTIC EXERCISES: CPT

## 2017-06-22 PROCEDURE — G8985 CARRY GOAL STATUS: HCPCS

## 2017-06-22 NOTE — PROGRESS NOTES
[facility] PHYSICAL THERAPY  [address] [place] [address] Attila Lazo, [address] - Phone: [phone]  Fax: [phone]  PROGRESS NOTE  Patient Name: Chloé Moran : 1950   Treatment/Medical Diagnosis: Right shoulder pain [M25.511]   Referral Source: Shelly Medley MD     Date of Initial Visit: 17 Attended Visits: 6 Missed Visits: 2     SUMMARY OF TREATMENT  Patient's POC has consisted of active warm-up on UBE, C/S, elbow and wrist AROM, pendulums, progressed to AAROM/AROM R shoulder, scapulothoracic and RTC strengthening, manual therapy PRN to address soft tissue restrictions and PROM, icing for pain/edema reduction. Key Functional Changes/Progress  Patient demonstrates significant improvement in R shoulder A/P ROM since initial evaluation. Pt reports decreased reliance on pain medication and reports pain level 0-4/10 described as \"muscle soreness. \" Pt is compliant thus far with HEP and reports minimal difficulty at this time with ADLs and self care duties. See below for objective measures    Shoulder ROM                                                        AROM                                                       PROM                           Right    Right   Flexion 125 Flexion 160   Extension 57 Extension NT   Scaption/ Scaption/   ER @ 0 Degrees 52 ER @ 0 Degrees 70   ER @ 90 Degrees 58 ER @ 90 Degrees 62   IR @ 90 Degrees 60 IR @ 90 Degrees 65   IR HBB L2         Functional ER WFL             Goal/Measure of Progress Goal Met? 1. Patient will be compliant with HEP in order to facilitate progression of therapeutic exercise and improve mobility  2. Patient will improve R shoulder flexion PROM to >/= 150 degrees in order to improve ease with OH duties  3. Patient will improve R shoulder abduction PROM >/= 125 degrees in order to improve ease with OH duties  4.  Patient will improve R shoulder flexion AROM >/= 115 degrees in order to improve ability to perform self care duties and ADLs. 5. Patient will improve R shoulder ER PROM >/= 40 degress at 45 deg GH abduction in order to improve donning/doffing               All goals exceeded     New Goals to be achieved in __4__  weeks:  1. Patient will be compliant with HEP in order to facilitate progression of therapeutic exercise and improve mobility. 2. Patient will improve R shoulder flexion AROM >/= 140 degrees in order to improve ease with overhead activities   3. Patient will improve R shoulder flexion/abduction strength to >/= 4/5 in order to improve efficiency with ADLs and self care duties  4. Patient will improve FOTO functional score to >/= 65% to demonstrate meaningful improvement in functional activity tolerance. Frequency / Duration:   Patient to be seen  2  times per week for 4  weeks:    G-Codes: Carry  Current  CK= 40-59%    Goal  CJ= 20-39%. The severity rating is based on the FOTO Score     RECOMMENDATIONS  Continue and progress functional therex/therapeutic activity as able, utilizing manual therapy and modalities prn. Progress patient towards independent HEP to facilitate self-management of symptoms and progress gains after PT. If you have any questions/comments please contact us directly at [phone]. Thank you for allowing us to assist in the care of your patient. Therapist Signature: Maurice Diaz DPT Date: 8/50/4328   Certification Period: 5/24/17 - 8/23/17     Reporting Period 5/24/17 - 6/22/17   Time: 1:51 PM   NOTE TO PHYSICIAN:  PLEASE COMPLETE THE ORDERS BELOW AND FAX TO   [facility] Physical Therapy: [phone].   If you are unable to process this request in 24 hours please contact our office: [phone].    ___ I have read the above report and request that my patient continue as recommended.   ___ I have read the above report and request that my patient continue therapy with the following changes/special instructions:_________________________________________________________   ___ I have read the above report and request that my patient be discharged from therapy.      Physician Signature:        Date:       Time:

## 2017-06-22 NOTE — PROGRESS NOTES
PHYSICAL THERAPY - DAILY TREATMENT NOTE      Patient Name: Lucinda Baer        Date: 2017  : 1950   YES Patient  Verified  Visit #:   6   of     Insurance: Payor: BLUE CROSS MEDICARE / [facility] / Product Type: Managed Care Medicare /      In time: 1:30 Out time: 2:25   Total Treatment Time: 55 min     Medicare Time Tracking (below)   Total Timed Codes (min):  45 1:1 Treatment Time:  38     TREATMENT AREA =  Right shoulder pain [M25.511]    SUBJECTIVE    Pain Level (on 0 to 10 scale):  4  / 10   Medication Changes/New allergies or changes in medical history, any new surgeries or procedures? NO    If yes, update Summary List   Subjective Functional Status/Changes:  []  No changes reported     \"It's right here that hurts in the muscle (pointing to R biceps). \"        OBJECTIVE    Modalities Rationale:        min [] Estim, type/location:                                      []  att     []  unatt     []  w/US     []  w/ice    []  w/heat    min []  Mechanical Traction: type/lbs                   []  pro   []  sup   []  int   []  cont    []  before manual    []  after manual    min []  Ultrasound, settings/location:      min []  Iontophoresis w/ dexamethasone, location:                                               []  take home patch       []  in clinic   10 min [x]  Ice     []  Heat    location/position: Supine to R shoulder w/ BLEs on wedge    min []  Vasopneumatic Device, press/temp:     min []  Other:    [x] Skin assessment post-treatment (if applicable):    [x]  intact    []  redness- no adverse reaction     []redness - adverse reaction:      37 (30) min Therapeutic Exercise:  [x]  See flow sheet   Rationale:      increase ROM, increase strength, improve coordination, improve balance and increase proprioception to improve the patients ability to perform ADLs and overhead activities with increased ease      8 (8) min Therapeutic Activity: FOTO, Re-assessment   Rationale: To assess current functional limitations and review POC    1 min Patient Education:  YES  Reviewed HEP   []  Progressed/Changed HEP based on: Other Objective/Functional Measures:    See PN     Post Treatment Pain Level (on 0 to 10) scale:   3  / 10     ASSESSMENT    Assessment/Changes in Function:     See PN     []  See Progress Note/Recertification   Patient will continue to benefit from skilled PT services to modify and progress therapeutic interventions, address functional mobility deficits, address ROM deficits, address strength deficits, analyze and address soft tissue restrictions, analyze and cue movement patterns, analyze and modify body mechanics/ergonomics, assess and modify postural abnormalities and instruct in home and community integration to attain remaining goals.      Progress toward goals / Updated goals:    See PN     PLAN    [x]  Upgrade activities as tolerated YES Continue plan of care   []  Discharge due to :    []  Other:      Therapist: Cabrera Garrido DPT    Date: 6/22/2017 Time: 1:48 PM     Future Appointments  Date Time Provider Ada Fischer   6/27/2017 2:30 PM [facility]   6/29/2017 1:30 PM [facility]   7/5/2017 1:30 PM Shahla Eduardo, [facility]   7/7/2017 2:00 PM Shahla Eduardo, [facility]   7/12/2017 1:30 PM Shahla [facility], [facility]   7/14/2017 2:00 PM Shahla Eduardo, [facility]   7/19/2017 1:30 PM Shahla Eduardo, [facility]   7/21/2017 1:00 PM Shahla Eduardo, [facility]   7/26/2017 1:30 PM Shahla Eduardo, [facility]   7/28/2017 1:30 PM Shahla Eduardo, [facility]   8/7/2017 2:00 PM [facility]   8/10/2017 10:50 AM Greta Borja  [address]   9/21/2017 1:00 PM Joaquín Hinton MD [address] [place] [address]

## 2017-06-27 ENCOUNTER — HOSPITAL ENCOUNTER (OUTPATIENT)
Dept: PHYSICAL THERAPY | Age: 67
End: 2017-06-27
Payer: MEDICARE

## 2017-06-29 ENCOUNTER — HOSPITAL ENCOUNTER (OUTPATIENT)
Dept: PHYSICAL THERAPY | Age: 67
Discharge: HOME | End: 2017-06-29
Payer: MEDICARE

## 2017-06-29 PROCEDURE — 97110 THERAPEUTIC EXERCISES: CPT

## 2017-06-29 NOTE — PROGRESS NOTES
PHYSICAL THERAPY - DAILY TREATMENT NOTE      Patient Name: Denver Maus        Date: 2017  : 1950   YES Patient  Verified  Visit #:   7   of  15  Insurance: Payor: BLUE CROSS MEDICARE / Plan: [place] [facility] / Product Type: Managed Care Medicare /      In time: 1:25 Out time: 2:20   Total Treatment Time: 55 min     Medicare Time Tracking (below)   Total Timed Codes (min):  45 1:1 Treatment Time:  45     TREATMENT AREA =  Right shoulder pain [M25.511]    SUBJECTIVE    Pain Level (on 0 to 10 scale):  0  / 10   Medication Changes/New allergies or changes in medical history, any new surgeries or procedures? NO    If yes, update Summary List   Subjective Functional Status/Changes:  []  No changes reported     \" I was folding clothes and stuff last night. I'm going up and down stairs to my bedroom now. My knees and hips are sore. \"        OBJECTIVE    Modalities Rationale:        min [] Estim, type/location:                                      []  att     []  unatt     []  w/US     []  w/ice    []  w/heat    min []  Mechanical Traction: type/lbs                   []  pro   []  sup   []  int   []  cont    []  before manual    []  after manual    min []  Ultrasound, settings/location:      min []  Iontophoresis w/ dexamethasone, location:                                               []  take home patch       []  in clinic   10 min [x]  Ice     []  Heat    location/position: R shoulder supine BLEs on wedge    min []  Vasopneumatic Device, press/temp:     min []  Other:    [x] Skin assessment post-treatment (if applicable):    [x]  intact    []  redness- no adverse reaction     []redness - adverse reaction:      45 (45) min Therapeutic Exercise:  [x]  See flow sheet   Rationale:      increase ROM, increase strength, improve coordination, improve balance and increase proprioception to improve the patients ability to perform ADLs and overhead activities with increased ease 1 min Patient Education:  YES  Reviewed HEP   []  Progressed/Changed HEP based on: Added scap stab #1 and #2      Other Objective/Functional Measures: Added pulleys abduction  Added scap stab #1 and #2 seated in chair. Inc ant shoulder pain reported with scap stab #2. Able to finish in modified ROM. Progressed reps of scap clocks    Progressed to concentric TB ER    Pt required several seated rest/water breaks secondary to inc fatigue  Progressed to 1# shoulder 3 way. Decreased excursion with scaption     Post Treatment Pain Level (on 0 to 10) scale:   3  / 10     ASSESSMENT    Assessment/Changes in Function:     Pt tolerated inc reps/resistance of scapulothoracic and RTC exercises today. []  See Progress Note/Recertification   Patient will continue to benefit from skilled PT services to modify and progress therapeutic interventions, address functional mobility deficits, address ROM deficits, address strength deficits, analyze and address soft tissue restrictions, analyze and cue movement patterns, analyze and modify body mechanics/ergonomics, assess and modify postural abnormalities, address imbalance/dizziness and instruct in home and community integration to attain remaining goals. Progress toward goals / Updated goals:    1. Patient will be compliant with HEP in order to facilitate progression of therapeutic exercise and improve mobility. Demo compliance  2. Patient will improve R shoulder flexion AROM >/= 140 degrees in order to improve ease with overhead activities. Progressed reps of scap clocks, flexion/abduction pulleys, finger ladder  3. Patient will improve R shoulder flexion/abduction strength to >/= 4/5 in order to improve efficiency with ADLs and self care duties scap clocks, added scap stab #1,2  4. Patient will improve FOTO functional score to >/= 65% to demonstrate meaningful improvement in functional activity tolerance.        PLAN    [x]  Upgrade activities as tolerated YES Continue plan of care   []  Discharge due to :    []  Other:      Therapist: Toño Albright DPT    Date: 6/29/2017 Time: 8:57 AM     Future Appointments  Date Time Provider Ada Fischer   6[facility]29/2017 1:30 [facility]   7/5/2017 1:30 PM Sherren Karthikeyan, PT [facility]   7/7/2017 2:00 PM Sherren Karthikeyan, PT [facility]   7/12/2017 1:30 PM Sherren Karthikeyan, [facility]   7/14/2017 2:00 PM Sherren Karthikeyan, [facility]   7/19/2017 1:30 PM Sherren Karthikeyan, PT [facility]   7/21/2017 1:00 PM Sherren Karthikeyan, [facility]   7/26/2017 1:30 PM Sherren Karthikeyan, PT [facility]   7/28/2017 1:30 PM Sherren Karthikeyan, [facility]   8/7/2017 2:00 PM Pablo Garza DO [facility] KASH SCHED   8/10/2017 10:50 AM Wes Toure  [facility]   9/21/2017 1:00 PM Joaquín Dodson MD [address] [place] [address]

## 2017-07-05 ENCOUNTER — HOSPITAL ENCOUNTER (OUTPATIENT)
Dept: PHYSICAL THERAPY | Age: 67
End: 2017-07-05
Payer: MEDICARE

## 2017-07-07 ENCOUNTER — APPOINTMENT (OUTPATIENT)
Dept: PHYSICAL THERAPY | Age: 67
End: 2017-07-07
Payer: MEDICARE

## 2017-07-12 ENCOUNTER — HOSPITAL ENCOUNTER (OUTPATIENT)
Dept: PHYSICAL THERAPY | Age: 67
Discharge: HOME | End: 2017-07-12
Payer: MEDICARE

## 2017-07-12 PROCEDURE — 97110 THERAPEUTIC EXERCISES: CPT

## 2017-07-12 NOTE — PROGRESS NOTES
PHYSICAL THERAPY - DAILY TREATMENT NOTE    Patient Name: Isabelle Goldmann        Date: 2017  : 1950   YES Patient  Verified  Visit #:     Insurance: Payor: BLUE CROSS MEDICARE / Plan: [place] DUAL [facility] / Product Type: Managed Care Medicare /      In time: 1:25 Out time: 2:35   Total Treatment Time: 70     Medicare Time Tracking (below)   Total Timed Codes (min):  60 1:1 Treatment Time:  40     TREATMENT AREA =  Right shoulder pain [M25.511]    SUBJECTIVE    Pain Level (on 0 to 10 scale):  3  / 10   Medication Changes/New allergies or changes in medical history, any new surgeries or procedures? NO     If yes, update Summary List   Subjective Functional Status/Changes:  []  No changes reported     \"I go to the pulmonologist on Friday. This heat has been kicking my butt. My shoulder feels ok today. I may have been overdoing my exercises with 2# at home. \"          OBJECTIVE    Modalities Rationale:  palliative      min [] Estim, type/location:                                      []  att     []  unatt     []  w/US     []  w/ice    []  w/heat    min []  Mechanical Traction: type/lbs                   []  pro   []  sup   []  int   []  cont    []  before manual    []  after manual    min []  Ultrasound, settings/location:      min []  Iontophoresis w/ dexamethasone, location:                                               []  take home patch       []  in clinic   10 min [x]  Ice     []  Heat    location/position: Supine with LE wedge to R shoulder    min []  Vasopneumatic Device, press/temp:     min []  Other:    [x] Skin assessment post-treatment (if applicable):   [x]  intact    []  redness- no adverse reaction     []redness - adverse reaction:      60 (40) min Therapeutic Exercise:  [x]  See flow sheet   Rationale:      increase ROM, increase strength and improve coordination to improve the patients ability to perform ADL's and amb with decreased pain and improved ease min Patient Education:  YES  Reviewed HEP   []  Progressed/Changed HEP based on:   Patient reports compliance     Other Objective/Functional Measures:    Required several rest breaks due to feeling overheated  Performed several ex's in sitting due to LE pain  Fair tolerance to therex     Post Treatment Pain Level (on 0 to 10) scale:   4  / 10     ASSESSMENT    Assessment/Changes in Function:     Fair tolerance to therex, required several rest breaks due to comorbidities     []  See Progress Note/Recertification   Patient will continue to benefit from skilled PT services to modify and progress therapeutic interventions, address functional mobility deficits, address ROM deficits, address strength deficits, analyze and address soft tissue restrictions, analyze and cue movement patterns, analyze and modify body mechanics/ergonomics and assess and modify postural abnormalities to attain remaining goals. Progress toward goals / Updated goals:    1. Patient will be compliant with HEP in order to facilitate progression of therapeutic exercise and improve mobility. Demo compliance  2. Patient will improve R shoulder flexion AROM >/= 140 degrees in order to improve ease with overhead activities. Progressed reps of scap clocks, flexion/abduction pulleys, finger ladder  3. Patient will improve R shoulder flexion/abduction strength to >/= 4/5 in order to improve efficiency with ADLs and self care duties scap clocks, added scap stab #1,2  4. Patient will improve FOTO functional score to >/= 65% to demonstrate meaningful improvement in functional activity tolerance.      PLAN    [x]  Upgrade activities as tolerated YES Continue plan of care   []  Discharge due to :    []  Other:      Therapist: Dulce Barrow PT    Date: 7/12/2017 Time: 1:27 PM     Future Appointments  Date Time Provider Ada Fischer   7/12/2017 1:30 PM Dulce Barrow PT [facility]   7/14/2017 2:00 PM uDlce Barrow PT [facility]   7/19/2017 1:30 PM Kb Roca PT [facility]   7/21/2017 1:00 PM Kb Roca PT [facility]   7/26/2017 1:30 PM Kb Roca PT [facility]   7/28/2017 11:30 AM Akira Bogdanernesto [facility]   8/7/2017 2:00  Th [facility], [facility]   8/10/2017 10:50 AM Katie Ellis  [address]   9/21/2017 1:00 PM Joaquín Tang MD [address] [place] [address]

## 2017-07-14 ENCOUNTER — HOSPITAL ENCOUNTER (OUTPATIENT)
Dept: PHYSICAL THERAPY | Age: 67
Discharge: HOME | End: 2017-07-14
Payer: MEDICARE

## 2017-07-14 PROCEDURE — 97110 THERAPEUTIC EXERCISES: CPT

## 2017-07-14 PROCEDURE — G8986 CARRY D/C STATUS: HCPCS

## 2017-07-14 PROCEDURE — G8985 CARRY GOAL STATUS: HCPCS

## 2017-07-14 NOTE — PROGRESS NOTES
[facility] PHYSICAL THERAPY  [address] [place] [address] [address], [place], [address] - Phone: [phone]  Fax: [phone] SUMMARY FOR PHYSICAL THERAPY          Patient Name: Milad Red : 1950   Treatment/Medical Diagnosis: Right shoulder pain [M25.511]   Onset Date: 3/29/2017    Referral Source: Linus Villalobos MD [facility]): 17   Prior Hospitalization: See Medical History Provider #: [id number]   Prior Level of Function: Chronic R shoulder pain with impaired mobility   Comorbidities: SOB, Diabetes type II, COPD, R shoulder surgery x 3, L RTC repair, Fibromyalgia, Aortic valve replacement 2016, Depression, BMI   Medications: Verified on Patient Summary List   Visits from [facility]: 9 Missed Visits: 4 due to illness     Goal/Measure of Progress Goal Met? 1. Patient will be compliant with HEP in order to facilitate progression of therapeutic exercise and improve mobility. Status at last Eval: Compliant with HEP Current Status: I with HEP yes   2. Patient will improve R shoulder flexion AROM >/= 140 degrees in order to improve ease with overhead activities    Status at last Eval: 125 deg Current Status: 141 deg yes   3. Patient will improve R shoulder flexion/abduction strength to >/= 4/5 in order to improve efficiency with ADLs and self care duties   Status at last Eval: 4/5 Current Status: 4+/5 yes   4. Patient will improve FOTO functional score to >/= 65% to demonstrate meaningful improvement in functional activity tolerance. Status at last Eval: 64 Current Status: 69 yes     Key Functional Changes/Progress: Patient has made good progress with therapy, demonstrating improved R shoulder AROM and strength as follows: flex 141/4+, abd 109/4+, ER 30/4, IR 4-. Patient c/o muscle soreness, and has been somewhat limited due to comorbidities, and difficulty breathing due to heat. FOTO score 69 indicating 31% limitation in functional ability.     G-Codes (GP): Carry  [id number] Goal  CK= 40-59%   D/C  CJ= 20-39%. The severity rating is based on the FOTO Score    Assessments/Recommendations: Discontinue therapy. Progressing towards or have reached established goals. If you have any questions/comments please contact us directly at [phone]. Thank you for allowing us to assist in the care of your patient. Therapist Signature: Yordan Lanier PT Date: 7/14/2017    Reporting Period: 5/24/17-7/14/17 Time: 2:11 PM     ===========================================================================================  NOTE TO PHYSICIAN:  PLEASE COMPLETE THE ORDERS BELOW AND FAX TO   [facility]: [phone]. If you are unable to process this request in 24 hours please contact our office: [phone].    ___ I have read the above report and request that my patient continue as recommended.   ___ I have read the above report and request that my patient continue therapy with the following changes/special instructions: ________________________________________________   ___ I have read the above report and request that my patient be discharged from therapy.      Physician Signature:        Date:       Time:

## 2017-07-14 NOTE — PROGRESS NOTES
PHYSICAL THERAPY - DAILY TREATMENT NOTE    Patient Name: Lennox Jain        Date: 2017  : 1950   YES Patient  Verified  Visit #:     Insurance: Payor: BLUE CROSS MEDICARE / Plan: [facility] / Product Type: Managed Care Medicare /      In time: 1:57 Out time: 2:13   Total Treatment Time: 16     Medicare Time Tracking (below)   Total Timed Codes (min):  16 1:1 Treatment Time:  16     TREATMENT AREA =  Right shoulder pain [M25.511]    SUBJECTIVE    Pain Level (on 0 to 10 scale):  0  / 10   Medication Changes/New allergies or changes in medical history, any new surgeries or procedures? NO     If yes, update Summary List   Subjective Functional Status/Changes:  []  No changes reported     \"I am so sore from Wed. It is hard to breathe because of how hot it is. \"          OBJECTIVE    16 min Therapeutic Exercise:  [x]  See flow sheet   Rationale:      increase ROM, increase strength and improve coordination to improve the patients ability to perform ADL's and OH reach with decreased pain and improved ease      min Patient Education:  YES  Reviewed HEP   []  Progressed/Changed HEP based on:   Patient reports compliance     Other Objective/Functional Measures:    See DC note    Patient presented out of breath due to heat, offered water and seated in chair. Reassessment performed in sitting     Post Treatment Pain Level (on 0 to 10) scale:   NR  / 10     ASSESSMENT    Assessment/Changes in Function:     See DC note     []  See Progress Note/Recertification   Patient will continue to benefit from skilled PT services to NA to attain remaining goals.    Progress toward goals / Updated goals:    See DC note     PLAN    []  Upgrade activities as tolerated NO Continue plan of care   [x]  Discharge due to : Goals met, program complete   []  Other:      Therapist: Juliette Gitelman, PT    Date: 2017 Time: 1:59 PM     Future Appointments  Date Time Provider Department [place]   7/14/2017 2:00 PM Zedanielle Dire, PT [facility]   7/19/2017 1:30 PM Zettie Dire, [facility]   7/21/2017 1:00 PM Zedanielle Dire, [facility]   7/26/2017 1:30 PM Zedanielle Dire, [facility]   7/28/2017 11:30 AM [facility]   8/7/2017 2:00  [address] [place] [address] KASH SCHED   8/10/2017 10:50 AM Sourav Hardy  [address]   9/21/2017 1:00 PM Joaquín Cartwright MD [address] [place] [address]

## 2017-07-19 ENCOUNTER — APPOINTMENT (OUTPATIENT)
Dept: PHYSICAL THERAPY | Age: 67
End: 2017-07-19
Payer: MEDICARE

## 2017-07-21 ENCOUNTER — APPOINTMENT (OUTPATIENT)
Dept: PHYSICAL THERAPY | Age: 67
End: 2017-07-21
Payer: MEDICARE

## 2017-07-26 ENCOUNTER — APPOINTMENT (OUTPATIENT)
Dept: PHYSICAL THERAPY | Age: 67
End: 2017-07-26
Payer: MEDICARE

## 2017-07-28 ENCOUNTER — APPOINTMENT (OUTPATIENT)
Dept: PHYSICAL THERAPY | Age: 67
End: 2017-07-28
Payer: MEDICARE

## 2017-07-31 DIAGNOSIS — E78.5 HYPERLIPIDEMIA LDL GOAL <100: ICD-10-CM

## 2017-08-01 DIAGNOSIS — M15.9 OSTEOARTHRITIS OF MULTIPLE JOINTS, UNSPECIFIED OSTEOARTHRITIS TYPE: ICD-10-CM

## 2017-08-02 RX ORDER — ATORVASTATIN CALCIUM 20 MG/1
TABLET, FILM COATED ORAL
Qty: 90 TAB | Refills: 0 | Status: SHIPPED | OUTPATIENT
Start: 2017-08-02 | End: 2017-11-07 | Stop reason: SDUPTHER

## 2017-08-02 RX ORDER — METFORMIN HYDROCHLORIDE 500 MG/1
TABLET ORAL
Qty: 90 TAB | Refills: 0 | Status: SHIPPED | OUTPATIENT
Start: 2017-08-02 | End: 2018-04-30 | Stop reason: SDUPTHER

## 2017-08-02 RX ORDER — MELOXICAM 15 MG/1
TABLET ORAL
Qty: 30 TAB | Refills: 0 | OUTPATIENT
Start: 2017-08-02

## 2017-08-02 RX ORDER — MELOXICAM 15 MG/1
15 TABLET ORAL DAILY
Qty: 30 TAB | Refills: 3 | Status: SHIPPED | OUTPATIENT
Start: 2017-08-02 | End: 2017-08-03 | Stop reason: SDUPTHER

## 2017-08-03 ENCOUNTER — HOSPITAL ENCOUNTER (OUTPATIENT)
Dept: LAB | Age: 67
Discharge: HOME | End: 2017-08-03

## 2017-08-03 ENCOUNTER — OFFICE VISIT (OUTPATIENT)
Dept: SURGERY | Age: 67
End: 2017-08-03

## 2017-08-03 VITALS
RESPIRATION RATE: 20 BRPM | BODY MASS INDEX: 34.56 KG/M2 | WEIGHT: 228 LBS | HEIGHT: 68 IN | SYSTOLIC BLOOD PRESSURE: 109 MMHG | DIASTOLIC BLOOD PRESSURE: 64 MMHG | TEMPERATURE: 97.1 F | OXYGEN SATURATION: 98 % | HEART RATE: 83 BPM

## 2017-08-03 DIAGNOSIS — Z12.11 ENCOUNTER FOR SCREENING COLONOSCOPY: Primary | ICD-10-CM

## 2017-08-03 PROCEDURE — 99001 SPECIMEN HANDLING PT-LAB: CPT | Performed by: INTERNAL MEDICINE

## 2017-08-03 RX ORDER — POLYETHYLENE GLYCOL 3350, SODIUM SULFATE ANHYDROUS, SODIUM BICARBONATE, SODIUM CHLORIDE, POTASSIUM CHLORIDE 236; 22.74; 6.74; 5.86; 2.97 G/4L; G/4L; G/4L; G/4L; G/4L
POWDER, FOR SOLUTION ORAL
Qty: 1 BOTTLE | Refills: 0 | Status: SHIPPED | OUTPATIENT
Start: 2017-08-03 | End: 2017-08-07 | Stop reason: ALTCHOICE

## 2017-08-03 RX ORDER — BUDESONIDE AND FORMOTEROL FUMARATE DIHYDRATE 160; 4.5 UG/1; UG/1
2 AEROSOL RESPIRATORY (INHALATION) 2 TIMES DAILY
COMMUNITY
End: 2017-10-05 | Stop reason: ALTCHOICE

## 2017-08-03 NOTE — PATIENT INSTRUCTIONS
If you have any questions or concerns about today's appointment, the verbal and/or written instructions you were given for follow up care, please call our office at [phone].     [facility] - [address]    [phone] office  [phone]

## 2017-08-03 NOTE — Clinical Note
Hi Dr. Amie Roche,  This patient states that she needs antibiotics before every procedure (due to a new Aortic Valve and a recent right shoulder replacement). She would like to take Keflex. Thanks!

## 2017-08-03 NOTE — MR AVS SNAPSHOT
Visit Information Date & Time Provider Department Dept. Phone Encounter #  
 8/3/2017 10:00 AM DEENA Acevedo. [id number] Follow-up Instructions Routing History Your Appointments 8/7/2017  2:00 PM  
Follow Up with Sherrell Virgie  [address] [facility] Appt Note: Return in about 3 months (around 8/8/2017). [address] [place]  
  
   
 [address] [facility] [address] 8/10/2017 10:50 AM  
Follow Up with Sarah Rader MD  
[facility] Appt Note: fu for lower back [address] [facility] [address]  
[phone]  
  
   
 [address] [facility],[address] [facility]  
  
    
 9/21/2017  1:00 PM  
Follow Up with Joesph Rubi MD  
Card[facility] Specialist at [facility] Appt Note: 6 m f/u  
 [address]  
  
   
 [address] Upcoming Health Maintenance Date Due  
 FOOT EXAM Q1 11/6/2016 Pneumococcal 65+ Low/Medium Risk (2 of 2 - PPSV23) 12/7/2016 MEDICARE YEARLY EXAM 12/7/2016 EYE EXAM RETINAL OR DILATED Q1 1/19/2017 COLONOSCOPY 7/9/2017 INFLUENZA AGE 9 TO ADULT 8/1/2017 MICROALBUMIN Q1 9/2/2017 LIPID PANEL Q1 9/2/2017 HEMOGLOBIN A1C Q6M 9/27/2017 GLAUCOMA SCREENING Q2Y 1/19/2018 BREAST CANCER SCRN MAMMOGRAM 9/6/2018 DTaP/Tdap/Td series (2 - Td) 12/15/2025 Allergies as of 8/3/2017  Review Complete On: 8/3/2017 By: Bernardo Mitchell LPN Severity Noted Reaction Type Reactions Latex, Natural Rubber  07/14/2016    Hives Adhesive  03/18/2016    Rash Some bandaids Ciprofloxacin  01/28/2014    Rash Codeine  01/28/2014    Nausea and Vomiting Demerol [Meperidine]  01/28/2014    Nausea and Vomiting Diclofenac  08/06/2015    Shortness of Breath, Palpitations Erythromycin  01/28/2014    Rash Levaquin [Levofloxacin]  01/28/2014    Rash Loratadine  01/28/2014    Swelling Morphine  01/28/2014    Nausea and Vomiting Penicillin G  01/28/2014    Hives, Rash Does fine with Keflex Sulfa (Sulfonamide Antibiotics)  01/28/2014    Itching Vicodin [Hydrocodone-acetaminophen]  02/12/2015    Other (comments) Metallic taste in mouth Current Immunizations  Reviewed on 10/3/2016 Name Date Influenza High Dose Vaccine PF 10/3/2016 Influenza Vaccine 10/10/2014 Influenza Vaccine (Quad) PF 10/28/2015 Pneumococcal Conjugate (PCV-13) 12/7/2015 Tdap 12/15/2015 Zoster Vaccine, Live 6/2/2014 Not reviewed this visit You Were Diagnosed With   
  
 Codes Comments Encounter for screening colonoscopy    -  Primary ICD-10-CM: Z12.11 ICD-9-CM: V76.51 Vitals BP Pulse Temp Resp Height(growth percentile) Weight(growth percentile) 109/64 83 97.1 °F (36.2 °C) (Oral) 20 5' 8\" (1.727 m) 228 lb (103.4 kg) SpO2 BMI OB Status Smoking Status 98% 34.67 kg/m2 Menopause Former Smoker Vitals History BMI and BSA Data Body Mass Index Body Surface Area  
 34.67 kg/m 2 2.23 m 2 Preferred Pharmacy Pharmacy Name Phone [facility] [address] [place] [address] Shasha Bhakta Your Updated Medication List  
  
   
This list is accurate as of: 8/3/17 10:50 AM.  Always use your most recent med list.  
  
  
  
  
 ACCU-CHEK MARCE MONITORING  
by Does Not Apply route. aspirin delayed-release 81 mg tablet Take 81 mg by mouth daily. * atorvastatin 20 mg tablet Commonly known as:  LIPITOR  
TAKE 1 TABLET BY MOUTH ONCE DAILY  
  
 * atorvastatin 20 mg tablet Commonly known as:  LIPITOR TAKE 1 TABLET BY MOUTH ONCE EVERY DAY. GENERIC FOR LIPITOR. buPROPion  mg SR tablet Commonly known as:  WELLBUTRIN SR  
TK 1 T PO QAM  
  
 butalbital-acetaminophen-caffeine -40 mg per tablet Commonly known as:  Lawanda Shea Take 1 Tab by mouth every four (4) hours as needed for Pain. Max Daily Amount: 6 Tabs. cetirizine 10 mg tablet Commonly known as:  ZYRTEC  
TAKE 1 TABLET BY MOUTH EVERY DAY. GENERIC FOR ZYRTEC  
  
 clobetasol 0.05 % external solution Commonly known as:  TEMOVATE  
APPLY TO SCALP AFTER SHAMPOO DAILY  
  
 cyclobenzaprine 10 mg tablet Commonly known as:  FLEXERIL  
TAKE 1 TABLET BY MOUTH TWICE DAILY AS NEEDED FOR MUSCLE SPASMS  
  
 docusate sodium 50 mg capsule Commonly known as:  Lylia Sella Take 100 mg by mouth daily. DULoxetine 30 mg capsule Commonly known as:  CYMBALTA TAKE 1 CAPSULE BY MOUTH TWICE DAILY FLUoxetine 10 mg capsule Commonly known as:  PROzac TAKE 1 CAPSULE BY MOUTH ONCE DAILY. Arslan Chairez. fluticasone 50 mcg/actuation nasal spray Commonly known as:  Wenda Maze INSTILL 1 SPRAY IN EACH NOSTRIL TWICE DAILY  
  
 furosemide 20 mg tablet Commonly known as:  LASIX TAKE 1 TABLET BY MOUTH EVERY OTHER DAY AS NEEDED  
  
 glucose blood VI test strips strip Commonly known as:  ACCU-CHEK MARCE PLUS TEST STRP Please use one strip to test blood sugars once a day. Lancets Misc Commonly known as:  ACCU-CHEK SOFTCLIX LANCETS Please use one lancet to test blood sugars twice a day. levothyroxine 25 mcg tablet Commonly known as:  SYNTHROID  
TAKE 1 TABLET BY MOUTH ONCE DAILY BEFORE BREAKFAST  
  
 meloxicam 15 mg tablet Commonly known as:  MOBIC Take 1 Tab by mouth daily. * metFORMIN 500 mg tablet Commonly known as:  GLUCOPHAGE  
TAKE 1 TABLET BY MOUTH DAILY WITH DINNER. GENERIC FOR GLUCOPHAGE  
  
 * metFORMIN 500 mg tablet Commonly known as:  GLUCOPHAGE  
 TAKE 1 TABLET BY MOUTH DAILY WITH DINNER. GENERIC FOR GLUCOPHAGE  
  
 PEG 3350-Electrolytes 236-22.74-6.74 -5.86 gram suspension Commonly known as:  GO-LYTELY Take as directed for bowel prep. Indications: BOWEL EVACUATION  
  
 PROBIOTIC 4X 10-15 mg Tbec Generic drug:  B.infantis-B.ani-B.long-B.bifi Take  by mouth daily. raNITIdine 150 mg tablet Commonly known as:  ZANTAC  
150mg QAM and 75mg QPM  
  
 SPIRIVA WITH HANDIHALER 18 mcg inhalation capsule Generic drug:  tiotropium Take 1 Cap by inhalation daily. SYMBICORT 160-4.5 mcg/actuation HFA inhaler Generic drug:  budesonide-formoterol Take 2 Puffs by inhalation two (2) times a day. traMADol 50 mg tablet Commonly known as:  ULTRAM  
TAKE 1 PO Q 4 TIMES A DAY PRN PAIN  Indications: NEUROPATHIC PAIN, Pain  
  
 triamcinolone acetonide 0.1 % topical cream  
Commonly known as:  KENALOG Apply  to affected area two (2) times a day. use thin layer VITAMIN D3 1,000 unit Cap Generic drug:  cholecalciferol Take 1,000 Units by mouth daily. zolpidem 5 mg tablet Commonly known as:  AMBIEN  
TK 1 T PO  QHS PRF SLEEP * Notice: This list has 4 medication(s) that are the same as other medications prescribed for you. Read the directions carefully, and ask your doctor or other care provider to review them with you. Prescriptions Sent to Pharmacy Refills PEG 3350-Electrolytes (GO-LYTELY) 236-22.74-6.74 -5.86 gram suspension 0 Sig: Take as directed for bowel prep. Indications: BOWEL EVACUATION Class: Normal  
 [facility] [address] [place] [address] Ph #: [phone] Patient Instructions If you have any questions or concerns about today's appointment, the verbal and/or written instructions you were given for follow up care, please call our office at [phone]. [facility] [place] [address] [facility], [address] 
 
[phone] office [phone] Introducing \[facility]\"" & HEALTH SERVICES! [facility]: Thank you for requesting a [employer] account. Our records indicate that you already have an active [employer] account. You can access your account anytime at [email or web address] Did you know that you can access your hospital and ER discharge instructions at any time in [employer]? You can also review all of your test results from your hospital stay or ER visit. Additional Information If you have questions, please visit the Frequently Asked Questions section of the [employer] website at [email or web address] Remember, [employer] is NOT to be used for urgent needs. For medical emergencies, dial 911. Now available from your iPhone and Android! Please provide this summary of care documentation to your next provider. Your primary care clinician is listed as Tasia Blanks. If you have any questions after today's visit, please call [phone].

## 2017-08-03 NOTE — PROGRESS NOTES
Colon Screen    Patient: Sulphur Cover MRN: [id number]  SSN: [id number]    YOB: 1950  Age: 79 y.o. Sex: female        Subjective:   Sulphur Cover presents for colon screening. PCP is Mary Kate Gallegos DO. Patient reports occaional rectal pain or bleeding if she has a hard stool. Abdominal surgeries as described below, specifically none. Patient has a bowel movement 3-4 per week. She has had a hx of constipation, but is using probiotics with success. She denies recent weight loss. Patient has a known family history of colon cancer in her paternal grandmother. Last colonoscopy was 3 years ago by Dr. Meghan Moe, who wanted to repeat the colonoscopy in 3 years. Allergies   Allergen Reactions    Latex, Natural Rubber Hives    Adhesive Rash     Some bandaids    Ciprofloxacin Rash    Codeine Nausea and Vomiting    Demerol [Meperidine] Nausea and Vomiting    Diclofenac Shortness of Breath and Palpitations    Erythromycin Rash    Levaquin [Levofloxacin] Rash    Loratadine Swelling    Morphine Nausea and Vomiting    Penicillin G Hives and Rash     Does fine with Keflex    Sulfa (Sulfonamide Antibiotics) Itching    Vicodin [Hydrocodone-Acetaminophen] Other (comments)     Metallic taste in mouth       Past Medical History:   Diagnosis Date    Aortic stenosis     S/P AVR in 2016    Asthma     Chronic pain     back pain    Diabetes (HCC)     Diverticulitis     Fibromyalgia     H/O aortic valve replacement 03/16    21 mm bovine pericardial bioprosthesis and epiaortic scanning      Hypertension     Hypothyroidism     Lumbar post-laminectomy syndrome     Menopause     Obesity     Osteoarthritis     Psychotic disorder     Sciatica     Skin cancer 2013    right forearm. Past Surgical History:   Procedure Laterality Date    CHEST SURGERY PROCEDURE UNLISTED  03/2016    Open Heart Surgery    COLONOSCOPY      HX CATARACT REMOVAL Right 1995    lens  replaced.      HX HEENT 1990's    sinus surgery     HX LUMBAR FUSION  2004    L3-L4-L5    HX LUMBAR LAMINECTOMY  2002    HX MOHS PROCEDURES Left 1990    HX ORTHOPAEDIC      Rotator cuff Bilateral    HX SHOULDER REPLACEMENT Right 03/29/2017    CT COLSC FLX W/RMVL OF TUMOR POLYP LESION SNARE TQ  07/09/2014 repeat in 3 years    Dr. Peraza Felt      Aortic valve replacement      Family History   Problem Relation Age of Onset    Hypertension Father     Heart Disease Father     Alcohol abuse Father      Social History   Substance Use Topics    Smoking status: Former Smoker     Years: 45.00     Types: Cigarettes     Quit date: 7/1/2011    Smokeless tobacco: Never Used    Alcohol use No      Prior to Admission medications    Medication Sig Start Date End Date Taking? Authorizing Provider   budesonide-formoterol (SYMBICORT) 160-4.5 mcg/actuation HFA inhaler Take 2 Puffs by inhalation two (2) times a day. Yes Historical Provider   tiotropium (SPIRIVA WITH HANDIHALER) 18 mcg inhalation capsule Take 1 Cap by inhalation daily. Yes Historical Provider   traMADol (ULTRAM) 50 mg tablet TAKE 1 PO Q 4 TIMES A DAY PRN PAIN  Indications: NEUROPATHIC PAIN, Pain 5/4/17  Yes Wes Toure MD   metFORMIN (GLUCOPHAGE) 500 mg tablet TAKE 1 TABLET BY MOUTH DAILY WITH DINNER. GENERIC FOR GLUCOPHAGE 3/27/17  Yes Pablo Garza DO   levothyroxine (SYNTHROID) 25 mcg tablet TAKE 1 TABLET BY MOUTH ONCE DAILY BEFORE BREAKFAST 3/27/17  Yes Pablo Garza DO   atorvastatin (LIPITOR) 20 mg tablet TAKE 1 TABLET BY MOUTH ONCE DAILY 3/27/17  Yes Pablo Garza DO   DULoxetine (CYMBALTA) 30 mg capsule TAKE 1 CAPSULE BY MOUTH TWICE DAILY 3/27/17  Yes Pablo Garza DO   cetirizine (ZYRTEC) 10 mg tablet TAKE 1 TABLET BY MOUTH EVERY DAY. GENERIC FOR ZYRTEC 11/2/16  Yes Pablo Garza, DO   B.infantis-B.ani-B.long-B.bifi (PROBIOTIC 4X) 10-15 mg TbEC Take  by mouth daily.    Yes Historical Provider   cyclobenzaprine (FLEXERIL) 10 mg tablet TAKE 1 TABLET BY MOUTH TWICE DAILY AS NEEDED FOR MUSCLE SPASMS 5/6/16  Yes Pablo Garza DO   ranitidine (ZANTAC) 150 mg tablet 150mg QAM and 75mg QPM   Yes Historical Provider   aspirin delayed-release 81 mg tablet Take 81 mg by mouth daily. Yes Historical Provider   clobetasol (TEMOVATE) 0.05 % external solution APPLY TO SCALP AFTER SHAMPOO DAILY 10/7/15  Yes Pablo Garza DO   Cholecalciferol, Vitamin D3, (VITAMIN D3) 1,000 unit cap Take 1,000 Units by mouth daily. Yes Historical Provider   metFORMIN (GLUCOPHAGE) 500 mg tablet TAKE 1 TABLET BY MOUTH DAILY WITH DINNER. GENERIC FOR GLUCOPHAGE 8/2/17   Pablo Garza DO   atorvastatin (LIPITOR) 20 mg tablet TAKE 1 TABLET BY MOUTH ONCE EVERY DAY. GENERIC FOR LIPITOR. 8/2/17   Pablo Garza DO   meloxicam (MOBIC) 15 mg tablet Take 1 Tab by mouth daily. 8/2/17   Pablo Garza DO   furosemide (LASIX) 20 mg tablet TAKE 1 TABLET BY MOUTH EVERY OTHER DAY AS NEEDED 6/16/17   Luis Miguel Cohen MD   buPROPion [facility] SR) 150 mg SR tablet TK 1 T PO QAM 2/28/17   Historical Provider   zolpidem (AMBIEN) 5 mg tablet TK 1 T PO  QHS PRF SLEEP 3/11/17   Historical Provider   butalbital-acetaminophen-caffeine (FIORICET, ESGIC) -40 mg per tablet Take 1 Tab by mouth every four (4) hours as needed for Pain. Max Daily Amount: 6 Tabs. 1/20/17   Pablo Garza DO   fluticasone (FLONASE) 50 mcg/actuation nasal spray INSTILL 1 SPRAY IN EACH NOSTRIL TWICE DAILY 10/26/16   Pablo Garza DO   docusate sodium (COLACE) 50 mg capsule Take 100 mg by mouth daily. Historical Provider   FLUoxetine (PROZAC) 10 mg capsule TAKE 1 CAPSULE BY MOUTH ONCE DAILY. GENERICFOR PROZAC. 9/2/16   Pablo Garza DO   glucose blood VI test strips (ACCU-CHEK MARCE PLUS TEST STRP) strip Please use one strip to test blood sugars once a day.  1/12/15   Pablo Garza, DO   Lancets (ACCU-CHEK SOFTCLIX LANCETS) misc Please use one lancet to test blood sugars twice a day. 9/4/14   Pablo Garza, DO   BLOOD-GLUCOSE METER (ACCU-CHEK MARCE MONITORING) by Does Not Apply route. Historical Provider   triamcinolone acetonide (KENALOG) 0.1 % topical cream Apply  to affected area two (2) times a day. use thin layer    Historical Provider          Review of Systems:  Gastrointestinal: negative      Risks colonoscopy described- colon injury, missed lesion, anesthesia problems, bleeding. Colonoscopy preparation reviewed in detail with patient and a copy of the instructions was provided to the patient.        Jona Lucero LPN  August 3, 4434  10:23 AM

## 2017-08-07 ENCOUNTER — OFFICE VISIT (OUTPATIENT)
Dept: FAMILY MEDICINE CLINIC | Age: 67
End: 2017-08-07

## 2017-08-07 VITALS
DIASTOLIC BLOOD PRESSURE: 69 MMHG | HEIGHT: 68 IN | TEMPERATURE: 96 F | BODY MASS INDEX: 34.56 KG/M2 | WEIGHT: 228 LBS | RESPIRATION RATE: 16 BRPM | HEART RATE: 90 BPM | SYSTOLIC BLOOD PRESSURE: 112 MMHG | OXYGEN SATURATION: 99 %

## 2017-08-07 DIAGNOSIS — E11.69 TYPE 2 DIABETES MELLITUS WITH OTHER SPECIFIED COMPLICATION, WITHOUT LONG-TERM CURRENT USE OF INSULIN (HCC): Primary | ICD-10-CM

## 2017-08-07 NOTE — MR AVS SNAPSHOT
Visit Information Date & Time Provider Department Dept. Phone Encounter #  
 8/7/2017  2:00 PM Clover Crawford[address] [id number] Follow-up Instructions Return in about 3 months (around 11/7/2017) for medicare wellness physical. .  
  
Your Appointments 8/10/2017 10:50 AM  
Follow Up with Wes Toure MD  
[facility] ([address]) Appt Note: fu for lower back [address] [facility] [address]  
[phone]  
  
   
 [address] [facility],[address] [place]  
  
    
 9/21/2017  1:00 PM  
Follow Up with Jacqueline Olivas MD  
[facility]ist at [facility]/[address]) Appt Note: 6 m f/u  
 [facility] [address] Upcoming Health Maintenance Date Due  
 FOOT EXAM Q1 11/6/2016 Pneumococcal 65+ Low/Medium Risk (2 of 2 - PPSV23) 12/7/2016 MEDICARE YEARLY EXAM 12/7/2016 EYE EXAM RETINAL OR DILATED Q1 1/19/2017 COLONOSCOPY 7/9/2017 INFLUENZA AGE 9 TO ADULT 8/1/2017 MICROALBUMIN Q1 9/2/2017 LIPID PANEL Q1 9/2/2017 HEMOGLOBIN A1C Q6M 9/27/2017 GLAUCOMA SCREENING Q2Y 1/19/2018 BREAST CANCER SCRN MAMMOGRAM 9/6/2018 DTaP/Tdap/Td series (2 - Td) 12/15/2025 Allergies as of 8/7/2017  Review Complete On: 8/7/2017 By: Ursula Posada LPN Severity Noted Reaction Type Reactions Latex, Natural Rubber  07/14/2016    Hives Adhesive  03/18/2016    Rash Some bandaids Ciprofloxacin  01/28/2014    Rash Codeine  01/28/2014    Nausea and Vomiting Demerol [Meperidine]  01/28/2014    Nausea and Vomiting Diclofenac  08/06/2015    Shortness of Breath, Palpitations Erythromycin  01/28/2014    Rash Levaquin [Levofloxacin]  01/28/2014    Rash Loratadine  01/28/2014    Swelling Morphine  01/28/2014    Nausea and Vomiting Penicillin G  01/28/2014    Hives, Rash Does fine with Keflex Sulfa (Sulfonamide Antibiotics)  01/28/2014    Itching Vicodin [Hydrocodone-acetaminophen]  02/12/2015    Other (comments) Metallic taste in mouth Current Immunizations  Reviewed on 8/7/2017 Name Date Influenza High Dose Vaccine PF 10/3/2016 Influenza Vaccine 10/10/2014 Influenza Vaccine (Quad) PF 10/28/2015 Pneumococcal Conjugate (PCV-13) 12/7/2015 Tdap 12/15/2015 Zoster Vaccine, Live 6/2/2014 Reviewed by Ariela Galarza DO on 8/7/2017 at  3:06 PM  
You Were Diagnosed With   
  
 Codes Comments Type 2 diabetes mellitus with other specified complication, without long-term current use of insulin (HCC)    -  Primary ICD-10-CM: E11.69 ICD-9-CM: 250.80 Vitals BP Pulse Temp Resp Height(growth percentile) Weight(growth percentile) 112/69 (BP 1 Location: Right arm, BP Patient Position: Sitting) 90 96 °F (35.6 °C) (Oral) 16 5' 8\" (1.727 m) 228 lb (103.4 kg) SpO2 BMI OB Status Smoking Status 99% 34.67 kg/m2 Menopause Former Smoker BMI and BSA Data Body Mass Index Body Surface Area  
 34.67 kg/m 2 2.23 m 2 Preferred Pharmacy Pharmacy Name Phone [facility] [address] [place] [address] Shasha Bhakta Your Updated Medication List  
  
   
This list is accurate as of: 8/7/17  3:07 PM.  Always use your most recent med list.  
  
  
  
  
 Elise Mcfadden  
by Does Not Apply route. aspirin delayed-release 81 mg tablet Take 81 mg by mouth daily. atorvastatin 20 mg tablet Commonly known as:  LIPITOR  
TAKE 1 TABLET BY MOUTH ONCE EVERY DAY. GENERIC FOR LIPITOR. butalbital-acetaminophen-caffeine -40 mg per tablet Commonly known as:  Jarrett Mo Take 1 Tab by mouth every four (4) hours as needed for Pain.  Max Daily Amount: 6 Tabs. cetirizine 10 mg tablet Commonly known as:  ZYRTEC  
TAKE 1 TABLET BY MOUTH EVERY DAY. GENERIC FOR ZYRTEC  
  
 clobetasol 0.05 % external solution Commonly known as:  TEMOVATE  
APPLY TO SCALP AFTER SHAMPOO DAILY  
  
 cyclobenzaprine 10 mg tablet Commonly known as:  FLEXERIL  
TAKE 1 TABLET BY MOUTH TWICE DAILY AS NEEDED FOR MUSCLE SPASMS DULoxetine 30 mg capsule Commonly known as:  CYMBALTA TAKE 1 CAPSULE BY MOUTH TWICE DAILY FLUoxetine 10 mg capsule Commonly known as:  PROzac TAKE 1 CAPSULE BY MOUTH ONCE DAILY. GENERICFOR PROZAC.  
  
 glucose blood VI test strips strip Commonly known as:  ACCU-CHEK MARCE PLUS TEST STRP Please use one strip to test blood sugars once a day. Lancets Misc Commonly known as:  ACCU-CHEK SOFTCLIX LANCETS Please use one lancet to test blood sugars twice a day. levothyroxine 25 mcg tablet Commonly known as:  SYNTHROID  
TAKE 1 TABLET BY MOUTH ONCE DAILY BEFORE BREAKFAST  
  
 meloxicam 15 mg tablet Commonly known as:  MOBIC Take 1 Tab by mouth daily. metFORMIN 500 mg tablet Commonly known as:  GLUCOPHAGE  
TAKE 1 TABLET BY MOUTH DAILY WITH DINNER. GENERIC FOR GLUCOPHAGE  
  
 PROBIOTIC 4X 10-15 mg Tbec Generic drug:  B.infantis-B.ani-B.long-B.bifi Take  by mouth daily. raNITIdine 150 mg tablet Commonly known as:  ZANTAC  
150mg QAM and 75mg QPM  
  
 SPIRIVA WITH HANDIHALER 18 mcg inhalation capsule Generic drug:  tiotropium Take 1 Cap by inhalation daily. SYMBICORT 160-4.5 mcg/actuation HFA inhaler Generic drug:  budesonide-formoterol Take 2 Puffs by inhalation two (2) times a day. traMADol 50 mg tablet Commonly known as:  ULTRAM  
TAKE 1 PO Q 4 TIMES A DAY PRN PAIN  Indications: NEUROPATHIC PAIN, Pain  
  
 triamcinolone acetonide 0.1 % topical cream  
Commonly known as:  KENALOG Apply  to affected area two (2) times a day. use thin layer VITAMIN D3 1,000 unit Cap Generic drug:  cholecalciferol Take 1,000 Units by mouth daily. We Performed the Following  DIABETES FOOT EXAM [7 Custom] Follow-up Instructions Return in about 3 months (around 11/7/2017) for medicare wellness physical. .  
  
  
Introducing [facility]! Dear Kwan Bell: Thank you for requesting a [employer] account. Our records indicate that you already have an active [employer] account. You can access your account anytime at [email or web address] Did you know that you can access your hospital and ER discharge instructions at any time in [employer]? You can also review all of your test results from your hospital stay or ER visit. Additional Information If you have questions, please visit the Frequently Asked Questions section of the [employer] website at [email or web address] Remember, [employer] is NOT to be used for urgent needs. For medical emergencies, dial 911. Now available from your iPhone and Android! Please provide this summary of care documentation to your next provider. Your primary care clinician is listed as Asad Chen. If you have any questions after today's visit, please call [phone].

## 2017-08-07 NOTE — PROGRESS NOTES
Subjective:     HPI:  Shiraz Blake is a 79 y.o. female who presents to the office c/o shortness of breath, and to follow up on diabetes and shoulder replacement surgery. Shortness of breath: Pt reports that she is still having shortness of breath with exertion. Pt is scheduled for an ECHO with stress. Pt is following up with her pulmonologist next week. Pt c/o hoarseness from using her inhaler. Diabetes Mellitus:  female has diabetes mellitus. Diabetic ROS - medication compliance: compliant all of the time, diabetic diet compliance: compliant most of the time, home glucose monitoring: is performed regularly, minimum reading is 90, maximum reading is 143, and average reading is  Below 130, further diabetic ROS: no polyuria or polydipsia, no chest pain, dyspnea or TIA's, no numbness, tingling or pain in extremities, no medication side effects noted. Lab review: labs are reviewed, up to date and normal.     Shoulder Surgery: Patient had right reverse shoulder replacement surgery on 03/29/2017. Pt finished physical therapy 1 and half week ago. Pt reports that she is experiencing burning sensation in bicep with excessive use but overall recovering well from surgery. Of note,   Patient is taking Tramadol for back pain. Patient is taking Cymbalta twice a day.      Current Outpatient Prescriptions   Medication Sig Dispense Refill    budesonide-formoterol (SYMBICORT) 160-4.5 mcg/actuation HFA inhaler Take 2 Puffs by inhalation two (2) times a day.  tiotropium (SPIRIVA WITH HANDIHALER) 18 mcg inhalation capsule Take 1 Cap by inhalation daily.  PEG 3350-Electrolytes (GO-LYTELY) 236-22.74-6.74 -5.86 gram suspension Take as directed for bowel prep. Indications: BOWEL EVACUATION 1 Bottle 0    metFORMIN (GLUCOPHAGE) 500 mg tablet TAKE 1 TABLET BY MOUTH DAILY WITH DINNER. GENERIC FOR GLUCOPHAGE 90 Tab 0    atorvastatin (LIPITOR) 20 mg tablet TAKE 1 TABLET BY MOUTH ONCE EVERY DAY.  GENERIC FOR LIPITOR. 90 Tab 0    meloxicam (MOBIC) 15 mg tablet Take 1 Tab by mouth daily. 30 Tab 3    furosemide (LASIX) 20 mg tablet TAKE 1 TABLET BY MOUTH EVERY OTHER DAY AS NEEDED 45 Tab 6    buPROPion SR (WELLBUTRIN SR) 150 mg SR tablet TK 1 T PO QAM  0    traMADol (ULTRAM) 50 mg tablet TAKE 1 PO Q 4 TIMES A DAY PRN PAIN  Indications: NEUROPATHIC PAIN, Pain 120 Tab 2    metFORMIN (GLUCOPHAGE) 500 mg tablet TAKE 1 TABLET BY MOUTH DAILY WITH DINNER. GENERIC FOR GLUCOPHAGE 90 Tab 2    levothyroxine (SYNTHROID) 25 mcg tablet TAKE 1 TABLET BY MOUTH ONCE DAILY BEFORE BREAKFAST 90 Tab 1    atorvastatin (LIPITOR) 20 mg tablet TAKE 1 TABLET BY MOUTH ONCE DAILY 90 Tab 3    DULoxetine (CYMBALTA) 30 mg capsule TAKE 1 CAPSULE BY MOUTH TWICE DAILY 180 Cap 1    zolpidem (AMBIEN) 5 mg tablet TK 1 T PO  QHS PRF SLEEP  0    butalbital-acetaminophen-caffeine (FIORICET, ESGIC) -40 mg per tablet Take 1 Tab by mouth every four (4) hours as needed for Pain. Max Daily Amount: 6 Tabs. 30 Tab 0    cetirizine (ZYRTEC) 10 mg tablet TAKE 1 TABLET BY MOUTH EVERY DAY. GENERIC FOR ZYRTEC 90 Tab 2    fluticasone (FLONASE) 50 mcg/actuation nasal spray INSTILL 1 SPRAY IN EACH NOSTRIL TWICE DAILY 3 Bottle 2    B.infantis-B.ani-B.long-B.bifi (PROBIOTIC 4X) 10-15 mg TbEC Take  by mouth daily.  docusate sodium (COLACE) 50 mg capsule Take 100 mg by mouth daily.  FLUoxetine (PROZAC) 10 mg capsule TAKE 1 CAPSULE BY MOUTH ONCE DAILY. GENERICFOR PROZAC. 90 Cap 3    cyclobenzaprine (FLEXERIL) 10 mg tablet TAKE 1 TABLET BY MOUTH TWICE DAILY AS NEEDED FOR MUSCLE SPASMS 60 Tab 0    ranitidine (ZANTAC) 150 mg tablet 150mg QAM and 75mg QPM      aspirin delayed-release 81 mg tablet Take 81 mg by mouth daily.  clobetasol (TEMOVATE) 0.05 % external solution APPLY TO SCALP AFTER SHAMPOO DAILY 50 mL 3    Cholecalciferol, Vitamin D3, (VITAMIN D3) 1,000 unit cap Take 1,000 Units by mouth daily.       glucose blood VI test strips (ACCU-CHEK MARCE PLUS TEST STRP) strip Please use one strip to test blood sugars once a day. 100 strip 20    Lancets (ACCU-CHEK SOFTCLIX LANCETS) misc Please use one lancet to test blood sugars twice a day. 1 Package 20    BLOOD-GLUCOSE METER (ACCU-CHEK MARCE MONITORING) by Does Not Apply route.  triamcinolone acetonide (KENALOG) 0.1 % topical cream Apply  to affected area two (2) times a day. use thin layer          Allergies   Allergen Reactions    Latex, Natural Rubber Hives    Adhesive Rash     Some bandaids    Ciprofloxacin Rash    Codeine Nausea and Vomiting    Demerol [Meperidine] Nausea and Vomiting    Diclofenac Shortness of Breath and Palpitations    Erythromycin Rash    Levaquin [Levofloxacin] Rash    Loratadine Swelling    Morphine Nausea and Vomiting    Penicillin G Hives and Rash     Does fine with Keflex    Sulfa (Sulfonamide Antibiotics) Itching    Vicodin [Hydrocodone-Acetaminophen] Other (comments)     Metallic taste in mouth       Past Medical History:   Diagnosis Date    Aortic stenosis     S/P AVR in 2016    Asthma     Chronic pain     back pain    Diabetes ([facility] [address]     Diverticulitis     Fibromyalgia     H/O aortic valve replacement 03/16    21 mm bovine pericardial bioprosthesis and epiaortic scanning      Hypertension     Hypothyroidism     Lumbar post-laminectomy syndrome     Menopause     Obesity     Osteoarthritis     Psychotic disorder     Sciatica     Skin cancer 2013    right forearm. Past Surgical History:   Procedure Laterality Date    CHEST SURGERY PROCEDURE UNLISTED  03/2016    Open Heart Surgery    COLONOSCOPY      HX CATARACT REMOVAL Right 1995    lens  replaced.      HX HEENT  1990's    sinus surgery     HX LUMBAR FUSION  2004    L3-L4-L5    HX LUMBAR LAMINECTOMY  2002    HX MOHS PROCEDURES Left 1990    HX ORTHOPAEDIC      Rotator cuff Bilateral    HX SHOULDER REPLACEMENT Right 03/29/2017    SC COLSC FLX W/RMVL OF TUMOR POLYP LESION SNARE TQ 07/09/2014 repeat in 3 years    Dr. Clayton Roque      Aortic valve replacement       Family History   Problem Relation Age of Onset    Hypertension Father     Heart Disease Father     Alcohol abuse Father        Social History     Social History    Marital status:      Spouse name: N/A    Number of children: N/A    Years of education: N/A     Occupational History    Not on file. Social History Main Topics    Smoking status: Former Smoker     Years: 45.00     Types: Cigarettes     Quit date: 7/1/2011    Smokeless tobacco: Never Used    Alcohol use No    Drug use: No    Sexual activity: No     Other Topics Concern    Not on file     Social History Narrative       REVIEW OF SYSTEM:  Review of Systems   Constitutional: Negative for chills and fever. Eyes: Negative for blurred vision. Respiratory: Positive for shortness of breath. Cardiovascular: Negative for chest pain, palpitations and leg swelling. Gastrointestinal: Negative for constipation, diarrhea, nausea and vomiting. Musculoskeletal: Positive for back pain. Negative for joint pain. Neurological: Negative for headaches. Objective:     Visit Vitals    /69 (BP 1 Location: Right arm, BP Patient Position: Sitting)    Pulse 90    Temp 96 °F (35.6 °C) (Oral)    Resp 16    Ht 5' 8\" (1.727 m)    Wt 228 lb (103.4 kg)    SpO2 99%    BMI 34.67 kg/m2       PHYSICAL EXAM:  Physical Exam   Constitutional: She is oriented to person, place, and time and well-developed, well-nourished, and in no distress. HENT:   Right Ear: Tympanic membrane, external ear and ear canal normal.   Left Ear: Tympanic membrane, external ear and ear canal normal.   Ears:    Nose: Nose normal.   Mouth/Throat: Oropharynx is clear and moist.   Eyes: Pupils are equal, round, and reactive to light. Neck: Normal range of motion. Neck supple. No thyromegaly present.    Cardiovascular: Normal rate, regular rhythm and intact distal pulses. Murmur heard. Pulmonary/Chest: Effort normal and breath sounds normal. She has no wheezes. Neurological: She is alert and oriented to person, place, and time. GCS score is 15. Skin: Skin is warm and dry. Vitals reviewed. Exam: feet: warm, good capillary refill, nail exam normal nails without lesions, no trophic changes or ulcerative lesions. Callus on left great toe. Normal monofilament exam and normal sensory exam.    Assessment/Plan:       ICD-10-CM ICD-9-CM    1. Type 2 diabetes mellitus with other specified complication, without long-term current use of insulin ([facility]) E11.69 250.80  DIABETES FOOT EXAM     Patient given opportunity to ask questions. Questions answered. Patient understands plan of care. Follow-up Disposition:  Return in about 3 months (around 11/7/2017) for medicare wellness physical. .    Written by Paul Villarreal, as dictated by DO. ISAIAS Gagnon, Dr. Garrett Naylor, confirm that all documentation is accurate.

## 2017-08-08 RX ORDER — MELOXICAM 15 MG/1
TABLET ORAL
Qty: 90 TAB | Refills: 3 | Status: SHIPPED | OUTPATIENT
Start: 2017-08-08 | End: 2017-11-07 | Stop reason: SDUPTHER

## 2017-08-10 ENCOUNTER — OFFICE VISIT (OUTPATIENT)
Dept: ORTHOPEDIC SURGERY | Age: 67
End: 2017-08-10

## 2017-08-10 VITALS
RESPIRATION RATE: 14 BRPM | WEIGHT: 225 LBS | OXYGEN SATURATION: 98 % | BODY MASS INDEX: 34.1 KG/M2 | DIASTOLIC BLOOD PRESSURE: 71 MMHG | HEART RATE: 89 BPM | SYSTOLIC BLOOD PRESSURE: 121 MMHG | HEIGHT: 68 IN | TEMPERATURE: 97.9 F

## 2017-08-10 DIAGNOSIS — M96.1 LUMBAR POSTLAMINECTOMY SYNDROME: ICD-10-CM

## 2017-08-10 DIAGNOSIS — M54.16 RADICULOPATHY, LUMBAR REGION: Primary | ICD-10-CM

## 2017-08-10 DIAGNOSIS — M48.061 LUMBAR SPINAL STENOSIS: ICD-10-CM

## 2017-08-10 RX ORDER — TRAMADOL HYDROCHLORIDE 50 MG/1
TABLET ORAL
Qty: 120 TAB | Refills: 2 | Status: SHIPPED | OUTPATIENT
Start: 2017-08-30 | End: 2017-10-05 | Stop reason: SDUPTHER

## 2017-08-10 NOTE — PROGRESS NOTES
[facility]  [address], [place]   Phone: [phone]  Fax: [phone]        PROGRESS NOTE      HISTORY OF PRESENT ILLNESS:  The patient is a 79 y.o. female and was seen today for follow up of low back pain into the BLE extending in roughly an L4 distribution in the LLE and in an L5 distribution to the foot in the RLE. Pt also endorses numbness in the LLE. She had a diagnosis of lumbar postlaminectomy syndrome with a previous history of L3 through S1 fusion in 2002 and 2004 by a physician in [place]. The patient had an additional diagnosis of fibromyalgia. Note dated 6/29/16 indicating patient was seen and underwent porcine aortic valve replacement by Dr. Charlie Nguyen on 3/18/16. Pt began cardiac rehab in 1/2017. Pt underwent right shoulder surgery on 3/29/17 by Dr. Rob Kidd. She states she has been restricted to take her Meloxicam since her right shoulder surgery, which has caused her increase in low back pain. Upon review of our records, it was noted she previously failed TOPAMAX, NEURONTIN, and LYRICA. Pt was switched from Ultram ER back to Ultram immediate release. Pt continues with Ultram 1 tab QID. Lumbar spine CT w/o contrast dated 7/20/14  per report revealed L3 through L5 laminectomies with L3 through S1 dorsal hardware fusion. There was no evidence for fractures. There was advanced degenerative disc disease at L2-L3 at the junctional level with marked central canal stenosis. At her last clinic appointment, her treatment options were limited due to her cardiac status. She would be following up with her cardiologist on 5/18/17. I offered to try her on Gabitril which she wished to defer. Patient wished to continue her current treatment regimen and was given refills of her Ultram.     The patient returns today with pain location and distribution remain unchanged. She rates pain 4/10, a slight decrease since her last visit (6/10).  Note from Dr. Andreina Buchanan Kenneth Anderson, cardiologist dated 5/18/17 indicating patient was seen with h/o aortic stenosis, s/p aortic valve replacement 3/16 and was seen with moderate dysphemia with minimal exertion. CT revealed no evidence of pulmonary embolism, no obvious fluid overload. They were ordering further workup to look for functioning bioprosthetic aortic valve. Per patient, a cause for her SOB has still not been found. She has been scheduled to see a pulmonologist. Pt is compliant with Cymbalta 30 mg per day as prescribed by her PCP and Ultram 1 po QID as prescribed by me.  reviewed. Past Medical History:   Diagnosis Date    Aortic stenosis     S/P AVR in 2016    Asthma     Chronic pain     back pain    Diabetes ([facility] [address]     Diverticulitis     Fibromyalgia     H/O aortic valve replacement 03/16    21 mm bovine pericardial bioprosthesis and epiaortic scanning      Hypertension     Hypothyroidism     Lumbar post-laminectomy syndrome     Menopause     Obesity     Osteoarthritis     Psychotic disorder     Sciatica     Skin cancer 2013    right forearm. Social History     Social History    Marital status:      Spouse name: N/A    Number of children: N/A    Years of education: N/A     Occupational History    Not on file. Social History Main Topics    Smoking status: Former Smoker     Years: 45.00     Types: Cigarettes     Quit date: 7/1/2011    Smokeless tobacco: Never Used    Alcohol use No    Drug use: No    Sexual activity: No     Other Topics Concern    Not on file     Social History Narrative       Current Outpatient Prescriptions   Medication Sig Dispense Refill    [START ON 8/30/2017] traMADol (ULTRAM) 50 mg tablet TID-QID prn pain 120 Tab 2    meloxicam (MOBIC) 15 mg tablet TAKE 1 TABLET BY MOUTH DAILY 90 Tab 3    budesonide-formoterol (SYMBICORT) 160-4.5 mcg/actuation HFA inhaler Take 2 Puffs by inhalation two (2) times a day.       tiotropium (SPIRIVA WITH HANDIHALER) 18 mcg inhalation capsule Take 1 Cap by inhalation daily.  metFORMIN (GLUCOPHAGE) 500 mg tablet TAKE 1 TABLET BY MOUTH DAILY WITH DINNER. GENERIC FOR GLUCOPHAGE 90 Tab 0    atorvastatin (LIPITOR) 20 mg tablet TAKE 1 TABLET BY MOUTH ONCE EVERY DAY. GENERIC FOR LIPITOR. 90 Tab 0    traMADol (ULTRAM) 50 mg tablet TAKE 1 PO Q 4 TIMES A DAY PRN PAIN  Indications: NEUROPATHIC PAIN, Pain 120 Tab 2    levothyroxine (SYNTHROID) 25 mcg tablet TAKE 1 TABLET BY MOUTH ONCE DAILY BEFORE BREAKFAST 90 Tab 1    DULoxetine (CYMBALTA) 30 mg capsule TAKE 1 CAPSULE BY MOUTH TWICE DAILY 180 Cap 1    butalbital-acetaminophen-caffeine (FIORICET, ESGIC) -40 mg per tablet Take 1 Tab by mouth every four (4) hours as needed for Pain. Max Daily Amount: 6 Tabs. 30 Tab 0    cetirizine (ZYRTEC) 10 mg tablet TAKE 1 TABLET BY MOUTH EVERY DAY. GENERIC FOR ZYRTEC 90 Tab 2    B.infantis-B.ani-B.long-B.bifi (PROBIOTIC 4X) 10-15 mg TbEC Take  by mouth daily.  FLUoxetine (PROZAC) 10 mg capsule TAKE 1 CAPSULE BY MOUTH ONCE DAILY. GENERICFOR PROZAC. 90 Cap 3    cyclobenzaprine (FLEXERIL) 10 mg tablet TAKE 1 TABLET BY MOUTH TWICE DAILY AS NEEDED FOR MUSCLE SPASMS 60 Tab 0    ranitidine (ZANTAC) 150 mg tablet 150mg QAM and 75mg QPM      aspirin delayed-release 81 mg tablet Take 81 mg by mouth daily.  clobetasol (TEMOVATE) 0.05 % external solution APPLY TO SCALP AFTER SHAMPOO DAILY 50 mL 3    Cholecalciferol, Vitamin D3, (VITAMIN D3) 1,000 unit cap Take 1,000 Units by mouth daily.  glucose blood VI test strips (ACCU-CHEK MARCE PLUS TEST STRP) strip Please use one strip to test blood sugars once a day. 100 strip 20    Lancets (ACCU-CHEK SOFTCLIX LANCETS) misc Please use one lancet to test blood sugars twice a day. 1 Package 20    BLOOD-GLUCOSE METER (ACCU-CHEK MARCE MONITORING) by Does Not Apply route.  triamcinolone acetonide (KENALOG) 0.1 % topical cream Apply  to affected area two (2) times a day.  use thin layer Allergies   Allergen Reactions    Latex, Natural Rubber Hives    Adhesive Rash     Some bandaids    Ciprofloxacin Rash    Codeine Nausea and Vomiting    Demerol [Meperidine] Nausea and Vomiting    Diclofenac Shortness of Breath and Palpitations    Erythromycin Rash    Levaquin [Levofloxacin] Rash    Loratadine Swelling    Morphine Nausea and Vomiting    Penicillin G Hives and Rash     Does fine with Keflex    Sulfa (Sulfonamide Antibiotics) Itching    Vicodin [Hydrocodone-Acetaminophen] Other (comments)     Metallic taste in mouth        Ambulates with a single point cane. PHYSICAL EXAMINATION    Visit Vitals    /71 (BP 1 Location: Left arm, BP Patient Position: Sitting)    Pulse 89    Temp 97.9 °F (36.6 °C) (Oral)    Resp 14    Ht 5' 8\" (1.727 m)    Wt 225 lb (102.1 kg)    SpO2 98%    BMI 34.21 kg/m2       CONSTITUTIONAL: NAD, A&O x 3  SENSATION: Decreased sensation to light touch at L4 of the LLE. Sensation to light touch otherwise intact. RANGE OF MOTION: The patient has full passive range of motion in all four extremities. MOTOR:  Straight Leg Raise: Negative, bilateral               Hip Flex Knee Ext Knee Flex Ankle DF GTE Ankle PF Tone   Right +4/5 +4/5 +4/5 +4/5 +4/5 +4/5 +4/5   Left +4/5 +4/5 +4/5 +4/5 +4/5 +4/5 +4/5       ASSESSMENT   Diagnoses and all orders for this visit:    1. Radiculopathy, lumbar region    2. Lumbar spinal stenosis    3. Lumbar postlaminectomy syndrome    Other orders  -     traMADol (ULTRAM) 50 mg tablet; TID-QID prn pain          IMPRESSION AND PLAN:  Patient wished to continue her current treatment. She was provided with refills of Ultram 50 mg  I will see the patient back in 3 month's time or earlier if needed. Written by Lion Long, as dictated by Kathryn Nguyễn MD  I examined the patient, reviewed and agree with the note.

## 2017-08-29 ENCOUNTER — TELEPHONE (OUTPATIENT)
Dept: SURGERY | Age: 67
End: 2017-08-29

## 2017-08-29 NOTE — TELEPHONE ENCOUNTER
Unique Del Rosario with Dr Arslan Rodriges office called today inquiring if patient needed antibiotic prophylaxis prior to her colonoscopy. I spoke with Dr Radha Galvez in the office today. Per Dr Radha Galvez, he states that he would like her to have antibiotics to be on the safe side. Per Dr Radha Galvez, he would let us know the dose before her procedure. I called Unique Del Rosario back to confirm date of procedure, and I am awaiting a call back. Per Tati Louis, it is supposed to be September sometime.       Verbal order and read back per Quincy Ashford MD

## 2017-08-29 NOTE — TELEPHONE ENCOUNTER
Spoke with Dr. Brian Sidhu staff regarding patient's request for pre colonoscopy antibiotics. Patient states that she needs premedication prior to any procedure. Returned uMna Banks from Dr. Brian Sidhu office who states that Dr. Fadumo Osman will order antibiotics for her. They are aware of patient's numerous allergies. Called patient to notify her that Dr. Fadumo Osman and staff are working to place appropriate orders for her and that she should be receiving a notification from her pharmacy once medication is ready for her. Instructed patient to take antibiotic as directed. Patient verbalized understanding.

## 2017-09-08 NOTE — TELEPHONE ENCOUNTER
Patient called the office again to ask about antibiotics prior to procedure next Friday. Patient states that the ONLY medication that she can take is Keflex, and she states that her dentist gives her 500mg tablets totaling 2000mg one hour prior to any procedures. Patient states that she is seeing one of the pulmonologist with Dr Sherryle Balint group and they say that they think patient may have interstitial lung disease and they are doing a CT Scan on 9/16/17. She states that they also told her she is anemic. Advised her that I would forward a message to Dr Christine Reagan for review and further instructions. I advised patient that I would call her back by Tuesday. Patient verbalized understanding.

## 2017-09-11 NOTE — TELEPHONE ENCOUNTER
Patient called me back. She states that she has never taken Clindamycin before so she is unsure of allergy. Made Dr Sangeetha Dejesus aware and sent to him for signature.         Verbal order and read back per Mercy Nguyen MD

## 2017-09-12 RX ORDER — CLINDAMYCIN HYDROCHLORIDE 300 MG/1
CAPSULE ORAL
Qty: 2 CAP | Refills: 1 | Status: SHIPPED | OUTPATIENT
Start: 2017-09-12 | End: 2017-11-07 | Stop reason: ALTCHOICE

## 2017-09-13 ENCOUNTER — HOSPITAL ENCOUNTER (OUTPATIENT)
Dept: LAB | Age: 67
Discharge: HOME | End: 2017-09-13
Payer: MEDICARE

## 2017-09-13 LAB
ANION GAP SERPL CALC-SCNC: 8 MMOL/L (ref 3–18)
BASOPHILS # BLD: 0.1 K/UL (ref 0–0.06)
BASOPHILS NFR BLD: 1 % (ref 0–2)
BUN SERPL-MCNC: 12 MG/DL (ref 7–18)
BUN/CREAT SERPL: 13 (ref 12–20)
CALCIUM SERPL-MCNC: 9.1 MG/DL (ref 8.5–10.1)
CHLORIDE SERPL-SCNC: 105 MMOL/L (ref 100–108)
CO2 SERPL-SCNC: 27 MMOL/L (ref 21–32)
CREAT SERPL-MCNC: 0.94 MG/DL (ref 0.6–1.3)
DIFFERENTIAL METHOD BLD: ABNORMAL
EOSINOPHIL # BLD: 0.3 K/UL (ref 0–0.4)
EOSINOPHIL NFR BLD: 5 % (ref 0–5)
ERYTHROCYTE [DISTWIDTH] IN BLOOD BY AUTOMATED COUNT: 16.3 % (ref 11.6–14.5)
GLUCOSE SERPL-MCNC: 116 MG/DL (ref 74–99)
HCT VFR BLD AUTO: 38 % (ref 35–45)
HGB BLD-MCNC: 11.7 G/DL (ref 12–16)
LYMPHOCYTES # BLD: 1.7 K/UL (ref 0.9–3.6)
LYMPHOCYTES NFR BLD: 32 % (ref 21–52)
MCH RBC QN AUTO: 27.4 PG (ref 24–34)
MCHC RBC AUTO-ENTMCNC: 30.8 G/DL (ref 31–37)
MCV RBC AUTO: 89 FL (ref 74–97)
MONOCYTES # BLD: 0.4 K/UL (ref 0.05–1.2)
MONOCYTES NFR BLD: 8 % (ref 3–10)
NEUTS SEG # BLD: 2.9 K/UL (ref 1.8–8)
NEUTS SEG NFR BLD: 54 % (ref 40–73)
PLATELET # BLD AUTO: 249 K/UL (ref 135–420)
PMV BLD AUTO: 11.4 FL (ref 9.2–11.8)
POTASSIUM SERPL-SCNC: 4.2 MMOL/L (ref 3.5–5.5)
RBC # BLD AUTO: 4.27 M/UL (ref 4.2–5.3)
SODIUM SERPL-SCNC: 140 MMOL/L (ref 136–145)
WBC # BLD AUTO: 5.3 K/UL (ref 4.6–13.2)

## 2017-09-13 PROCEDURE — 80048 BASIC METABOLIC PNL TOTAL CA: CPT | Performed by: INTERNAL MEDICINE

## 2017-09-13 PROCEDURE — 36415 COLL VENOUS BLD VENIPUNCTURE: CPT | Performed by: INTERNAL MEDICINE

## 2017-09-13 PROCEDURE — 85025 COMPLETE CBC W/AUTO DIFF WBC: CPT | Performed by: INTERNAL MEDICINE

## 2017-09-15 ENCOUNTER — HOSPITAL ENCOUNTER (OUTPATIENT)
Age: 67
Setting detail: OUTPATIENT SURGERY
Discharge: HOME | End: 2017-09-15
Attending: COLON & RECTAL SURGERY | Admitting: COLON & RECTAL SURGERY
Payer: MEDICARE

## 2017-09-15 VITALS
TEMPERATURE: 97.1 F | DIASTOLIC BLOOD PRESSURE: 69 MMHG | SYSTOLIC BLOOD PRESSURE: 127 MMHG | BODY MASS INDEX: 34.71 KG/M2 | HEIGHT: 68 IN | WEIGHT: 229 LBS | OXYGEN SATURATION: 98 % | HEART RATE: 69 BPM | RESPIRATION RATE: 16 BRPM

## 2017-09-15 LAB — GLUCOSE BLD STRIP.AUTO-MCNC: 111 MG/DL (ref 70–110)

## 2017-09-15 PROCEDURE — 76040000007: Performed by: COLON & RECTAL SURGERY

## 2017-09-15 PROCEDURE — 82962 GLUCOSE BLOOD TEST: CPT

## 2017-09-15 PROCEDURE — 74011250636 HC RX REV CODE- 250/636: Performed by: COLON & RECTAL SURGERY

## 2017-09-15 PROCEDURE — G0500 MOD SEDAT ENDO SERVICE >5YRS: HCPCS | Performed by: COLON & RECTAL SURGERY

## 2017-09-15 PROCEDURE — 77030013991 HC SNR POLYP ENDOSC BSC -A: Performed by: COLON & RECTAL SURGERY

## 2017-09-15 PROCEDURE — 77030031670 HC DEV INFL ENDOTEK BIG60 MRTM -B: Performed by: COLON & RECTAL SURGERY

## 2017-09-15 PROCEDURE — 88305 TISSUE EXAM BY PATHOLOGIST: CPT | Performed by: COLON & RECTAL SURGERY

## 2017-09-15 PROCEDURE — 77030011640 HC PAD GRND REM COVD -A: Performed by: COLON & RECTAL SURGERY

## 2017-09-15 RX ORDER — DEXTROMETHORPHAN/PSEUDOEPHED 2.5-7.5/.8
1.2 DROPS ORAL
Status: DISCONTINUED | OUTPATIENT
Start: 2017-09-15 | End: 2017-09-15 | Stop reason: HOSPADM

## 2017-09-15 RX ORDER — SODIUM CHLORIDE 9 MG/ML
50 INJECTION, SOLUTION INTRAVENOUS CONTINUOUS
Status: DISCONTINUED | OUTPATIENT
Start: 2017-09-15 | End: 2017-09-15 | Stop reason: HOSPADM

## 2017-09-15 RX ORDER — NALOXONE HYDROCHLORIDE 0.4 MG/ML
0.4 INJECTION, SOLUTION INTRAMUSCULAR; INTRAVENOUS; SUBCUTANEOUS
Status: DISCONTINUED | OUTPATIENT
Start: 2017-09-15 | End: 2017-09-15 | Stop reason: HOSPADM

## 2017-09-15 RX ORDER — SODIUM CHLORIDE 0.9 % (FLUSH) 0.9 %
5-10 SYRINGE (ML) INJECTION AS NEEDED
Status: DISCONTINUED | OUTPATIENT
Start: 2017-09-15 | End: 2017-09-15 | Stop reason: HOSPADM

## 2017-09-15 RX ORDER — MIDAZOLAM HYDROCHLORIDE 1 MG/ML
.25-1 INJECTION, SOLUTION INTRAMUSCULAR; INTRAVENOUS
Status: DISCONTINUED | OUTPATIENT
Start: 2017-09-15 | End: 2017-09-15 | Stop reason: HOSPADM

## 2017-09-15 RX ORDER — ATROPINE SULFATE 0.1 MG/ML
0.5 INJECTION INTRAVENOUS
Status: DISCONTINUED | OUTPATIENT
Start: 2017-09-15 | End: 2017-09-15 | Stop reason: HOSPADM

## 2017-09-15 RX ORDER — FENTANYL CITRATE 50 UG/ML
25-200 INJECTION, SOLUTION INTRAMUSCULAR; INTRAVENOUS
Status: DISCONTINUED | OUTPATIENT
Start: 2017-09-15 | End: 2017-09-15 | Stop reason: HOSPADM

## 2017-09-15 RX ORDER — EPINEPHRINE 0.1 MG/ML
1 INJECTION INTRACARDIAC; INTRAVENOUS
Status: DISCONTINUED | OUTPATIENT
Start: 2017-09-15 | End: 2017-09-15 | Stop reason: HOSPADM

## 2017-09-15 RX ORDER — FLUMAZENIL 0.1 MG/ML
0.2 INJECTION INTRAVENOUS
Status: DISCONTINUED | OUTPATIENT
Start: 2017-09-15 | End: 2017-09-15 | Stop reason: HOSPADM

## 2017-09-15 RX ORDER — METRONIDAZOLE 500 MG/1
1 TABLET ORAL DAILY
Refills: 0 | COMMUNITY
Start: 2017-09-08 | End: 2017-11-07 | Stop reason: ALTCHOICE

## 2017-09-15 RX ORDER — ALBUTEROL SULFATE 90 UG/1
2 AEROSOL, METERED RESPIRATORY (INHALATION)
Refills: 0 | COMMUNITY
Start: 2017-09-05

## 2017-09-15 NOTE — H&P
[facility]  [address] [address], [address]        Colon and Rectal Surgery History and Physical    Subjective:      Nanette Farias is a 79 y.o. female who presents for colonoscopy screening. PCP is Armando Ford DO. Patient reports occaional rectal pain or bleeding if she has a hard stool. Abdominal surgeries as described below, specifically none. Patient has a bowel movement 3-4 per week. She has had a hx of constipation, but is using probiotics with success. She denies recent weight loss. Patient has a known family history of colon cancer in her paternal grandmother.      Last colonoscopy was 3 years ago by Dr. Louie Díaz, who wanted to repeat the colonoscopy in 3 years.         Patient Active Problem List    Diagnosis Date Noted    Lumbar postlaminectomy syndrome 08/10/2017    Lumbar spinal stenosis 05/04/2017    Radiculopathy, lumbar region 02/09/2017    Ankle impingement syndrome 10/25/2016    Long term (current) use of anticoagulants 03/24/2016    AS (aortic stenosis) 03/18/2016    Postlaminectomy syndrome 01/28/2016    Lumbar neuritis 01/28/2016    Hyperlipidemia LDL goal <100 09/02/2015    Aortic stenosis 06/09/2015    Tachycardia 01/28/2014    Diabetes ([address] 01/28/2014    Pulmonary hypertension ([address] 01/28/2014    Osteoarthritis 01/28/2014    Fibromyalgia 01/28/2014    PTSD (post-traumatic stress disorder) 01/28/2014    OCD (obsessive compulsive disorder) 01/28/2014    Panic disorder with agoraphobia 01/28/2014    Recurrent major depression ([address] 01/28/2014     Past Medical History:   Diagnosis Date    Ankle fracture     Aortic stenosis     S/P AVR in 2016    Asthma     Chronic pain     back pain    Diabetes ([address]     Diverticulitis     Fibromyalgia     H/O aortic valve replacement 03/16    21 mm bovine pericardial bioprosthesis and epiaortic scanning      Hypertension     Hypothyroidism     Lumbar post-laminectomy syndrome     Menopause     Obesity     Osteoarthritis     Psychotic disorder     Sciatica     Skin cancer 2013    right forearm. Past Surgical History:   Procedure Laterality Date    CHEST SURGERY PROCEDURE UNLISTED  03/2016    Open Heart Surgery    COLONOSCOPY      HX CATARACT REMOVAL Right 1995    lens  replaced.  HX HEENT  1990's    sinus surgery     HX LUMBAR FUSION  2004    L3-L4-L5    HX LUMBAR LAMINECTOMY  2002    HX MOHS PROCEDURES Left 1990    HX ORTHOPAEDIC      Rotator cuff Bilateral    HX SHOULDER REPLACEMENT Right 03/29/2017    reverse.  OK COLSC FLX W/RMVL OF TUMOR POLYP LESION SNARE TQ  07/09/2014 repeat in 3 years    Dr. Zulma Jimenez      Aortic valve replacement      Social History   Substance Use Topics    Smoking status: Former Smoker     Years: 45.00     Types: Cigarettes     Quit date: 7/1/2011    Smokeless tobacco: Never Used    Alcohol use No      Family History   Problem Relation Age of Onset    Hypertension Father     Heart Disease Father     Alcohol abuse Father       Prior to Admission medications    Medication Sig Start Date End Date Taking? Authorizing Provider   meloxicam (MOBIC) 15 mg tablet TAKE 1 TABLET BY MOUTH DAILY 8/8/17  Yes Pablo Garza DO   budesonide-formoterol (SYMBICORT) 160-4.5 mcg/actuation HFA inhaler Take 2 Puffs by inhalation two (2) times a day. Yes Historical Provider   metFORMIN (GLUCOPHAGE) 500 mg tablet TAKE 1 TABLET BY MOUTH DAILY WITH DINNER. GENERIC FOR GLUCOPHAGE 8/2/17  Yes Pablo Garza DO   atorvastatin (LIPITOR) 20 mg tablet TAKE 1 TABLET BY MOUTH ONCE EVERY DAY.  GENERIC FOR LIPITOR. 8/2/17  Yes Pablo Garza DO   traMADol (ULTRAM) 50 mg tablet TAKE 1 PO Q 4 TIMES A DAY PRN PAIN  Indications: NEUROPATHIC PAIN, Pain 5/4/17  Yes Kylie Stallings MD   levothyroxine (SYNTHROID) 25 mcg tablet TAKE 1 TABLET BY MOUTH ONCE DAILY BEFORE BREAKFAST 3/27/17  Yes Pablo Garza DO   DULoxetine (CYMBALTA) 30 mg capsule TAKE 1 CAPSULE BY MOUTH TWICE DAILY 3/27/17  Yes Pablo Garza DO   cetirizine (ZYRTEC) 10 mg tablet TAKE 1 TABLET BY MOUTH EVERY DAY. GENERIC FOR ZYRTEC 11/2/16  Yes Pablo Garza DO   B.infantis-B.ani-B.long-B.bifi (PROBIOTIC 4X) 10-15 mg TbEC Take  by mouth daily. Yes Historical Provider   FLUoxetine (PROZAC) 10 mg capsule TAKE 1 CAPSULE BY MOUTH ONCE DAILY. GENERICFOR PROZAC. 9/2/16  Yes Pablo Garza DO   cyclobenzaprine (FLEXERIL) 10 mg tablet TAKE 1 TABLET BY MOUTH TWICE DAILY AS NEEDED FOR MUSCLE SPASMS 5/6/16  Yes Pablo Garza DO   ranitidine (ZANTAC) 150 mg tablet 150mg QAM and 75mg QPM   Yes Historical Provider   aspirin delayed-release 81 mg tablet Take 81 mg by mouth daily. Yes Historical Provider   Cholecalciferol, Vitamin D3, (VITAMIN D3) 1,000 unit cap Take 1,000 Units by mouth daily. Yes Historical Provider   VENTOLIN HFA 90 mcg/actuation inhaler 2 Puffs by Aerosolization route every four (4) hours as needed. 9/5/17   Historical Provider   metroNIDAZOLE (FLAGYL) 500 mg tablet Take 1 Tab by mouth daily. 9/8/17   Historical Provider   clindamycin (CLEOCIN) 300 mg capsule Take 600mg by mouth one hour prior to procedure. 9/12/17   Elijah Snyder MD   traMADol Lachelle Sharp) 50 mg tablet TID-QID prn pain 8/30/17   Jen Dixon MD   butalbital-acetaminophen-caffeine (FIORICET, ESGIC) -40 mg per tablet Take 1 Tab by mouth every four (4) hours as needed for Pain. Max Daily Amount: 6 Tabs. 1/20/17   Pablo Garza DO   clobetasol (TEMOVATE) 0.05 % external solution APPLY TO SCALP AFTER SHAMPOO DAILY 10/7/15   Pablo Garza DO   glucose blood VI test strips (ACCU-CHEK MARCE PLUS TEST STRP) strip Please use one strip to test blood sugars once a day. 1/12/15   Pablo R Garza, DO   Lancets (ACCU-CHEK SOFTCLIX LANCETS) misc Please use one lancet to test blood sugars twice a day.  9/4/14   Pablo Garza, DO   BLOOD-GLUCOSE METER (ACCU-CHEK MARCE MONITORING) by Does Not Apply route. Historical Provider   triamcinolone acetonide (KENALOG) 0.1 % topical cream Apply  to affected area two (2) times a day. use thin layer    Historical Provider     Current Facility-Administered Medications   Medication Dose Route Frequency    naloxone (NARCAN) injection 0.4 mg  0.4 mg IntraVENous Multiple    flumazenil (ROMAZICON) 0.1 mg/mL injection 0.2 mg  0.2 mg IntraVENous Multiple    simethicone (MYLICON) 40RI/7.8AO oral drops 80 mg  1.2 mL Oral Multiple    atropine injection 0.5 mg  0.5 mg IntraVENous ONCE PRN    EPINEPHrine (ADRENALIN) 0.1 mg/mL syringe 1 mg  1 mg Endoscopically ONCE PRN    0.9% sodium chloride infusion  50 mL/hr IntraVENous CONTINUOUS    sodium chloride (NS) flush 5-10 mL  5-10 mL IntraVENous PRN    midazolam (VERSED) injection 0.25-10 mg  0.25-10 mg IntraVENous Multiple    fentaNYL citrate (PF) injection  mcg   mcg IntraVENous Multiple     Allergies   Allergen Reactions    Latex, Natural Rubber Hives    Adhesive Rash     Some bandaids    Ciprofloxacin Rash    Codeine Nausea and Vomiting    Demerol [Meperidine] Nausea and Vomiting    Diclofenac Shortness of Breath and Palpitations    Erythromycin Rash    Levaquin [Levofloxacin] Rash    Loratadine Swelling    Morphine Nausea and Vomiting    Penicillin G Hives and Rash     Does fine with Keflex    Sulfa (Sulfonamide Antibiotics) Itching    Vicodin [Hydrocodone-Acetaminophen] Other (comments)     Metallic taste in mouth            Objective:     Visit Vitals    /82    Pulse 71    Temp 97.1 °F (36.2 °C)    Resp 18    Ht 5' 8\" (1.727 m)    Wt 103.9 kg (229 lb)    SpO2 99%    BMI 34.82 kg/m2        Physical Exam:   GENERAL: alert, cooperative, no distress, appears stated age  LUNG: clear to auscultation bilaterally  HEART: regular rate and rhythm, S1, S2 normal, no murmur, click, rub or gallop  ABDOMEN: soft, non-tender.  Bowel sounds normal. No masses,  no organomegaly       Assessment:     Lizy Tovar is a 79 y.o. female who requires colonoscopy for   Family history of coloretal cancer (screening only). Plan:     1. I recommend proceeding with colonoscopy. The patient was in full agreement and was eager to proceed. 2. I discussed the details of the colonoscopy procedure. The risks of colonoscopy were discussed including colon injury/perforation, anesthesia issues, bleeding, and the possibility of incomplete examination. The patient was willing to accept these risks and proceed with the examination. All questions were answered to the patient's satisfaction.          Beatris Negron MD, FACS, FASCRS  Colon and Rectal Surgery  [facility]  Office [phone]  Fax     [phone]  9/15/2017  12:13 PM

## 2017-09-15 NOTE — PROCEDURES
Colonoscopy Procedure Note      Danya Bryson  1950  [id number]                Date of Procedure: 9/15/2017    Indications:    Family history of coloretal cancer (screening only), Personal history of colon polyps (screening only)     Preoperative diagnosis: colon caner screening  z12.11      Postoperative diagnosis: diverticulosis, polyp    Title of Procedure:  Colonoscopy with polypectomy    :  Yuko Castro MD    Assistant(s): Endoscopy Technician-1: Shemar Osorio  Endoscopy RN-1: Jesus Maddox RN    Referring Source:  Ayleenenrikejaziel Jewel Garza DO    Sedation:  Fentanyl 100 mcg IV,  Versed 8 mg IV      ASA Class: ASA 3 - Severe systemic disease but compensated        Procedure Details:    Prior to the procedure, a history and physical were performed. The patients medications, allergies and sensitivities were reviewed and all questions were answered. After informed consent was obtained for the procedure, with all risks and benefits of procedure explained. The patient was taken to the endoscopy suite and placed in the left lateral decubitus position. Patient identification and proposed procedure were verified prior to the procedure by the nurse and I. Following the  satisfactory administration of sedation,  the anus was inspected and appeared within normal limits. Digital rectal examination revealed Normal sphincter tone and squeeze pressure. Palpation revealed No Masses. Next the Olympus video colonoscope was introduced through the anus and advanced to cecum, which was identified by the ileocecal valve and appendiceal orifice, terminal ileum. The quality of preparation was fair. The terminal ileum was visualized. The colonoscope was then slowly withdrawn and the mucosa carefully examined for any abnormalities. Cecal withdrawl time was greater than six minutes. The patient tolerated the procedure well.       Findings:   Rectum: normal  Sigmoid: moderate diverticulosis; Descending Colon: moderate diverticulosis;  Transverse Colon:  Sessile polyp(s), the largest 4 mm in size;  Mild diverticulosis  Ascending Colon: mild diverticulosis; Cecum: normal  Terminal Ileum: normal      Interventions:  1 complete polypectomy were performed using hot snare  and the polyps were  retrieved    Specimen Removed:   ID Type Source Tests Collected by Time Destination   1 : (hot snare) polyp  Preservative Colon, Transverse  Justo Salcedo MD 9/15/2017 1251 Pathology        Complications: None. EBL:  None. Impression:    Diverticulosis, polyp    Recommendations: -Await pathology. Resume normal medication(s). Discharge Disposition:  Home in the company of a  when able to ambulate.         Justo Salcedo MD, FACS, FASCRS  Colon and Rectal Surgery  [facility]  Office [phone]  Fax     [phone]  9/15/2017  1:13 PM       [facility]  [facility] [address]

## 2017-09-15 NOTE — IP AVS SNAPSHOT
Sera Victor 
 
 
 [address] 
[phone] Patient: Dameon Baker MRN: [id number] [id number] You are allergic to the following Allergen Reactions Latex, Natural Rubber Hives Adhesive Rash Some bandaids Ciprofloxacin Rash Codeine Nausea and Vomiting Demerol (Meperidine) Nausea and Vomiting Diclofenac Shortness of Breath Palpitations Erythromycin Rash Levaquin (Levofloxacin) Rash Loratadine Swelling Morphine Nausea and Vomiting Penicillin G Hives Rash Does fine with Keflex Sulfa (Sulfonamide Antibiotics) Itching Vicodin (Hydrocodone-Acetaminophen) Other (comments) Metallic taste in mouth Recent Documentation Height Weight BMI OB Status Smoking Status 1.727 m 103.9 kg 34.82 kg/m2 Menopause Former Smoker Emergency Contacts Name Discharge Info Relation Home Work Mobile Alonzo Dodge  Son [22]   [phone] [address] CAREGIVER [3] Friend [5]   [phone] About your hospitalization You were admitted on:  September 15, 2017 [facility] You were discharged on:  September 15, 2017 Unit phone number:  [phone] Why you were hospitalized Your primary diagnosis was:  Not on File Providers Seen During Your Hospitalizations Provider Role Specialty Primary office phone Dylon Powell MD Attending Provider Colon and Rectal Surgery [phone] Your Primary Care Physician (PCP) Primary Care Physician Office Phone Office Fax Kta Lopez [phone] Follow-up Information Follow up With Details Comments Contact Info Shae Wayne DO   [address] [place] [address] [id number] [place] [address] 83 [phone] MD Dr Elijah Mullins will contact you with results [id number] [place] [address] [address] [phone] Your Appointments Saturday September 16, 2017  2:30 PM EDT  
CT CHEST WO CONT with [facility] CT RM 2  
[facility] RAD  [facility] [address] GENERAL INSTRUCTIONS  This study does not require you to drink contrast prior to your study. RELATED STUDY INFORMATION  Bring any films, CDs, and reports related with you on the day of your exam.  This only includes studies done outside of [address] [place] [address], [facility], [facility], and [facility]. QUESTIONS  Notify the CT Department if you have any questions concerning your study. [facility] - [phone] Marta Gama Syed Donaldjarrett - [phone] CHECK IN INSTRUCTIONS  Check in to Patient Access at the [facility] [address] 30 minutes prior to your appointment. For appointments after 8pm weekdays and after 3pm on Saturdays, check in to Emergency Room Registration. [facility] [address] [facility] Tuesday September 26, 2017  2:00 PM EDT Follow Up with Kaitlin Colindres MD  
Cardio Specialist at [address]) [address] [place] [address] [address] [phone]  
[phone] Current Discharge Medication List  
  
CONTINUE these medications which have NOT CHANGED Dose & Instructions Dispensing Information Comments Morning Noon Evening Bedtime ACCU-CHEK MARCE MONITORING Your last dose was: Your next dose is:    
   
   
 by Does Not Apply route. Refills:  0  
     
   
   
   
  
 aspirin delayed-release 81 mg tablet Your last dose was: Your next dose is:    
   
   
 Dose:  81 mg Take 81 mg by mouth daily. Refills:  0  
     
   
   
   
  
 atorvastatin 20 mg tablet Commonly known as:  LIPITOR Your last dose was: Your next dose is: TAKE 1 TABLET BY MOUTH ONCE EVERY DAY. GENERIC FOR LIPITOR. Quantity:  90 Tab Refills:  0  
     
   
   
   
  
 butalbital-acetaminophen-caffeine -40 mg per tablet Commonly known as:  Lise Fee Your last dose was: Your next dose is:    
   
   
 Dose:  1 Tab Take 1 Tab by mouth every four (4) hours as needed for Pain. Max Daily Amount: 6 Tabs. Quantity:  30 Tab Refills:  0  
     
   
   
   
  
 cetirizine 10 mg tablet Commonly known as:  ZYRTEC Your last dose was: Your next dose is: TAKE 1 TABLET BY MOUTH EVERY DAY. GENERIC FOR ZYRTEC Quantity:  90 Tab Refills:  2  
     
   
   
   
  
 clindamycin 300 mg capsule Commonly known as:  CLEOCIN Your last dose was: Your next dose is: Take 600mg by mouth one hour prior to procedure. Quantity:  2 Cap Refills:  1  
     
   
   
   
  
 clobetasol 0.05 % external solution Commonly known as:  Lisabeth Puls Your last dose was: Your next dose is:    
   
   
 APPLY TO SCALP AFTER SHAMPOO DAILY Quantity:  50 mL Refills:  3  
     
   
   
   
  
 cyclobenzaprine 10 mg tablet Commonly known as:  FLEXERIL Your last dose was: Your next dose is: TAKE 1 TABLET BY MOUTH TWICE DAILY AS NEEDED FOR MUSCLE SPASMS Quantity:  60 Tab Refills:  0 DULoxetine 30 mg capsule Commonly known as:  CYMBALTA Your last dose was: Your next dose is: TAKE 1 CAPSULE BY MOUTH TWICE DAILY Quantity:  180 Cap Refills:  1 **Patient requests 90 days supply** FLUoxetine 10 mg capsule Commonly known as:  PROzac Your last dose was: Your next dose is: TAKE 1 CAPSULE BY MOUTH ONCE DAILY. Elisa Mead. Quantity:  90 Cap Refills:  3 glucose blood VI test strips strip Commonly known as:  ACCU-CHEK MARCE PLUS TEST STRP Your last dose was: Your next dose is: Please use one strip to test blood sugars once a day. Quantity:  100 strip Refills:  20 Lancets Misc Commonly known as:  ACCU-CHEK SOFTCLIX LANCETS Your last dose was: Your next dose is: Please use one lancet to test blood sugars twice a day. Quantity:  1 Package Refills:  20  
     
   
   
   
  
 levothyroxine 25 mcg tablet Commonly known as:  SYNTHROID Your last dose was: Your next dose is: TAKE 1 TABLET BY MOUTH ONCE DAILY BEFORE BREAKFAST Quantity:  90 Tab Refills:  1  
     
   
   
   
  
 meloxicam 15 mg tablet Commonly known as:  MOBIC Your last dose was: Your next dose is: TAKE 1 TABLET BY MOUTH DAILY Quantity:  90 Tab Refills:  3  
 **Patient requests 90 days supply**  
    
   
   
   
  
 metFORMIN 500 mg tablet Commonly known as:  GLUCOPHAGE Your last dose was: Your next dose is: TAKE 1 TABLET BY MOUTH DAILY WITH DINNER. GENERIC FOR GLUCOPHAGE Quantity:  90 Tab Refills:  0  
     
   
   
   
  
 metroNIDAZOLE 500 mg tablet Commonly known as:  FLAGYL Your last dose was: Your next dose is:    
   
   
 Dose:  1 Tab Take 1 Tab by mouth daily. Refills:  0 PROBIOTIC 4X 10-15 mg Tbec Generic drug:  B.infantis-B.ani-B.long-B.bifi Your last dose was: Your next dose is: Take  by mouth daily. Refills:  0  
     
   
   
   
  
 raNITIdine 150 mg tablet Commonly known as:  ZANTAC Your last dose was: Your next dose is:    
   
   
 150mg QAM and 75mg QPM  
 Refills:  0  
     
   
   
   
  
 SYMBICORT 160-4.5 mcg/actuation HFA inhaler Generic drug:  budesonide-formoterol Your last dose was: Your next dose is:    
   
   
 Dose:  2 Puff Take 2 Puffs by inhalation two (2) times a day. Refills:  0  
     
   
   
   
  
 * traMADol 50 mg tablet Commonly known as:  ULTRAM  
   
Your last dose was: Your next dose is: TAKE 1 PO Q 4 TIMES A DAY PRN PAIN  Indications: NEUROPATHIC PAIN, Pain Quantity:  120 Tab Refills:  2  
     
   
   
   
  
 * traMADol 50 mg tablet Commonly known as:  ULTRAM  
   
Your last dose was: Your next dose is: TID-QID prn pain Quantity:  120 Tab Refills:  2  
     
   
   
   
  
 triamcinolone acetonide 0.1 % topical cream  
Commonly known as:  KENALOG Your last dose was: Your next dose is:    
   
   
 Apply  to affected area two (2) times a day. use thin layer Refills:  0 VENTOLIN HFA 90 mcg/actuation inhaler Generic drug:  albuterol Your last dose was: Your next dose is:    
   
   
 Dose:  2 Puff 2 Puffs by Aerosolization route every four (4) hours as needed. Refills:  0  
     
   
   
   
  
 VITAMIN D3 1,000 unit Cap Generic drug:  cholecalciferol Your last dose was: Your next dose is:    
   
   
 Dose:  1000 Units Take 1,000 Units by mouth daily. Refills:  0  
     
   
   
   
  
 * Notice: This list has 2 medication(s) that are the same as other medications prescribed for you. Read the directions carefully, and ask your doctor or other care provider to review them with you. Discharge Instructions Colonoscopy Discharge Instructions Jill Katz [id number] 
1950 COLONOSCOPY FINDINGS: 
Your colonoscopy showed: 1. One colon polyp which was completely removed. 2. Moderate diverticulosis of the colon. FOLLOW UP RECOMMENDATIONS: 
 Dr. Gloriann Heimlich will contact you with your results.  
             Dr. Gloriann Heimlich will recommend your next colonoscopy after the Pathology results are available. DISCOMFORT: 
If you have redness at your IV site- apply warm compress to area; if redness or soreness persist- contact your physician There may be a slight amount of blood passed from the rectum, more than a teaspoon of bright red blood is not expected - contact your physician Gaseous discomfort is common- walking, belching will help relieve any gas pains. If discomfort persist- contact your physician DIET: 
 High fiber diet. ACTIVITY: 
You may resume your normal daily activities, however, it is recommended that you spend the remainder of the day resting - avoiding any strenuous activities. You may not operate a vehicle for 24 hours You may not engage in an occupation involving machinery or appliances for rest of today You may not drink alcoholic beverages for at least 24 hours Avoid making any critical decisions for at least 24 hour CALL M.D. ANY SIGN OF: Increasing pain, nausea, vomiting Abdominal distension (swelling) New increased bleeding Fever or chills Pain in chest area or shortness of breath Lyndsay Robles MD, FACS, FASCRS Colon and Rectal Surgery [facility] Office [phone] Fax     [phone] DISCHARGE SUMMARY from Nurse The following personal items are in your possession at time of discharge: 
 
Dental Appliances: None Visual Aid: Glasses PATIENT INSTRUCTIONS: 
 
After general anesthesia or intravenous sedation, for 24 hours or while taking prescription Narcotics: · Limit your activities · Do not drive and operate hazardous machinery · Do not make important personal or business decisions · Do  not drink alcoholic beverages · If you have not urinated within 8 hours after discharge, please contact your surgeon on call. Report the following to your surgeon: 
· Excessive pain, swelling, redness or odor of or around the surgical area · Temperature over 100.5 · Nausea and vomiting lasting longer than 4 hours or if unable to take medications · Any signs of decreased circulation or nerve impairment to extremity: change in color, persistent  numbness, tingling, coldness or increase pain · Any questions What to do at Home: These are general instructions for a healthy lifestyle: No smoking/ No tobacco products/ Avoid exposure to second hand smoke Surgeon General's Warning:  Quitting smoking now greatly reduces serious risk to your health. Obesity, smoking, and sedentary lifestyle greatly increases your risk for illness A healthy diet, regular physical exercise & weight monitoring are important for maintaining a healthy lifestyle You may be retaining fluid if you have a history of heart failure or if you experience any of the following symptoms:  Weight gain of 3 pounds or more overnight or 5 pounds in a week, increased swelling in our hands or feet or shortness of breath while lying flat in bed. Please call your doctor as soon as you notice any of these symptoms; do not wait until your next office visit. Recognize signs and symptoms of STROKE: 
 
F-face looks uneven A-arms unable to move or move unevenly S-speech slurred or non-existent T-time-call 911 as soon as signs and symptoms begin-DO NOT go Back to bed or wait to see if you get better-TIME IS BRAIN. Warning Signs of HEART ATTACK Call 911 if you have these symptoms: 
? Chest discomfort. Most heart attacks involve discomfort in the center of the chest that lasts more than a few minutes, or that goes away and comes back. It can feel like uncomfortable pressure, squeezing, fullness, or pain. ? Discomfort in other areas of the upper body. Symptoms can include pain or discomfort in one or both arms, the back, neck, jaw, or stomach. ? Shortness of breath with or without chest discomfort. ? Other signs may include breaking out in a cold sweat, nausea, or lightheadedness. Don't wait more than five minutes to call [address]! Fast action can save your life. Calling 911 is almost always the fastest way to get lifesaving treatment. Emergency Medical Services staff can begin treatment when they arrive  up to an hour sooner than if someone gets to the hospital by car. The discharge information has been reviewed with the patient. The patient verbalized understanding. Discharge medications reviewed with the patient and appropriate educational materials and side effects teaching were provided. Patient armband removed and given to patient to take home. Patient was informed of the privacy risks if armband lost or stolen Discharge Orders None Introducing [facility]! Dear Carlos Cobos: Thank you for requesting a [employer] account. Our records indicate that you already have an active [employer] account. You can access your account anytime at [email or web address] Did you know that you can access your hospital and ER discharge instructions at any time in [employer]? You can also review all of your test results from your hospital stay or ER visit. Additional Information If you have questions, please visit the Frequently Asked Questions section of the [email or web address] website at [email or web address] Remember, [employer] is NOT to be used for urgent needs. For medical emergencies, dial 911. Now available from your iPhone and Android! General Information Please provide this summary of care documentation to your next provider. Patient Signature:  ____________________________________________________________ Date:  ____________________________________________________________  
  
Gonzalo Finger Provider Signature:  ____________________________________________________________ Date:  ____________________________________________________________

## 2017-09-16 ENCOUNTER — HOSPITAL ENCOUNTER (OUTPATIENT)
Dept: CT IMAGING | Age: 67
Discharge: HOME | End: 2017-09-16
Attending: INTERNAL MEDICINE
Payer: MEDICARE

## 2017-09-16 DIAGNOSIS — J98.4 DISEASE OF LUNG: ICD-10-CM

## 2017-09-16 PROCEDURE — 71250 CT THORAX DX C-: CPT

## 2017-09-20 ENCOUNTER — TELEPHONE (OUTPATIENT)
Dept: PULMONOLOGY | Age: 67
End: 2017-09-20

## 2017-09-20 NOTE — TELEPHONE ENCOUNTER
Pulmonary Rehab called patient and she is interested in the program and fully understands her insurance benefits. Will follow up to schedule.     Thank you,  Janey Miranda

## 2017-09-20 NOTE — TELEPHONE ENCOUNTER
Pulmonary Rehab called patient and she is interested in the program. Will follow up with insurance benefits.     Thank you,  Naye Goff

## 2017-09-25 DIAGNOSIS — J30.89 NON-SEASONAL ALLERGIC RHINITIS DUE TO OTHER ALLERGIC TRIGGER: Primary | ICD-10-CM

## 2017-09-25 DIAGNOSIS — M79.7 FIBROMYALGIA: ICD-10-CM

## 2017-09-25 RX ORDER — DULOXETIN HYDROCHLORIDE 30 MG/1
CAPSULE, DELAYED RELEASE ORAL
Qty: 180 CAP | Refills: 0 | Status: SHIPPED | OUTPATIENT
Start: 2017-09-25 | End: 2017-12-26 | Stop reason: SDUPTHER

## 2017-09-25 RX ORDER — FLUTICASONE PROPIONATE 50 MCG
SPRAY, SUSPENSION (ML) NASAL
Qty: 3 BOTTLE | Refills: 1 | Status: SHIPPED | OUTPATIENT
Start: 2017-09-25 | End: 2018-10-09 | Stop reason: ALTCHOICE

## 2017-10-05 ENCOUNTER — OFFICE VISIT (OUTPATIENT)
Dept: CARDIOLOGY CLINIC | Age: 67
End: 2017-10-05

## 2017-10-05 VITALS
WEIGHT: 232 LBS | OXYGEN SATURATION: 99 % | BODY MASS INDEX: 35.16 KG/M2 | HEART RATE: 105 BPM | DIASTOLIC BLOOD PRESSURE: 66 MMHG | SYSTOLIC BLOOD PRESSURE: 105 MMHG | HEIGHT: 68 IN

## 2017-10-05 DIAGNOSIS — I10 ESSENTIAL HYPERTENSION WITH GOAL BLOOD PRESSURE LESS THAN 140/90: Primary | ICD-10-CM

## 2017-10-05 DIAGNOSIS — R06.00 DYSPNEA, UNSPECIFIED TYPE: ICD-10-CM

## 2017-10-05 DIAGNOSIS — E66.01 MORBID OBESITY, UNSPECIFIED OBESITY TYPE (HCC): ICD-10-CM

## 2017-10-05 DIAGNOSIS — I35.0 NONRHEUMATIC AORTIC VALVE STENOSIS: ICD-10-CM

## 2017-10-05 NOTE — PROGRESS NOTES
Cardiovascular Specialists    Ms. Kelby Matute is a 79 y.o. female with history of Aortic stenosis s/p Aortic valve replacement (03/16), fibromyalgia, diabetes, obesity, hypertension, hypothyroidism and COPD. Ms. Kelby Matute is here today for a follow up appointment. She said she continues to have shortness of breath especially with exertion. Saw Pulmonary team Eros Lopez as well and had CT scan  She got an echocardiogram done. She denies any PND or lower extremity swelling. She denies any chest pain or chest tightness. Past Medical History:   Diagnosis Date    Ankle fracture     Aortic stenosis     S/P AVR in 2016    Asthma     Chronic pain     back pain    Diabetes ([address]     Diverticulitis     Fibromyalgia     H/O aortic valve replacement 03/16    21 mm bovine pericardial bioprosthesis and epiaortic scanning      Hypertension     Hypothyroidism     Lumbar post-laminectomy syndrome     Menopause     Obesity     Osteoarthritis     Psychotic disorder     Sciatica     Skin cancer 2013    right forearm. Past Surgical History:   Procedure Laterality Date    CHEST SURGERY PROCEDURE UNLISTED  03/2016    Open Heart Surgery    COLONOSCOPY      COLONOSCOPY N/A 9/15/2017    COLONOSCOPY performed by [facility]eda [facility] at [facility] ENDOSCOPY    HX CATARACT REMOVAL Right 1995    lens  replaced.  HX HEENT  1990's    sinus surgery     HX LUMBAR FUSION  2004    L3-L4-L5    HX LUMBAR LAMINECTOMY  2002    HX MOHS PROCEDURES Left 1990    HX ORTHOPAEDIC      Rotator cuff Bilateral    HX SHOULDER REPLACEMENT Right 03/29/2017    reverse.      [place] COLSC FLX W/RMVL OF TUMOR POLYP LESION SNARE TQ  07/09/2014 repeat in 3 years    Dr. Cullen Corral      Aortic valve replacement       Current Outpatient Prescriptions   Medication Sig    fluticasone (FLONASE) 50 mcg/actuation nasal spray SHAKE LIQUID AND USE 1 SPRAY IN [facility] NOSTRIL TWICE DAILY    DULoxetine (CYMBALTA) 30 mg capsule TAKE 1 CAPSULE BY MOUTH TWICE DAILY    VENTOLIN HFA 90 mcg/actuation inhaler 2 Puffs by Aerosolization route every four (4) hours as needed.  metroNIDAZOLE (FLAGYL) 500 mg tablet Take 1 Tab by mouth daily.  clindamycin (CLEOCIN) 300 mg capsule Take 600mg by mouth one hour prior to procedure.  meloxicam (MOBIC) 15 mg tablet TAKE 1 TABLET BY MOUTH DAILY    metFORMIN (GLUCOPHAGE) 500 mg tablet TAKE 1 TABLET BY MOUTH DAILY WITH DINNER. GENERIC FOR GLUCOPHAGE    atorvastatin (LIPITOR) 20 mg tablet TAKE 1 TABLET BY MOUTH ONCE EVERY DAY. GENERIC FOR LIPITOR.  traMADol (ULTRAM) 50 mg tablet TAKE 1 PO Q 4 TIMES A DAY PRN PAIN  Indications: NEUROPATHIC PAIN, Pain    levothyroxine (SYNTHROID) 25 mcg tablet TAKE 1 TABLET BY MOUTH ONCE DAILY BEFORE BREAKFAST    butalbital-acetaminophen-caffeine (FIORICET, ESGIC) -40 mg per tablet Take 1 Tab by mouth every four (4) hours as needed for Pain. Max Daily Amount: 6 Tabs.  cetirizine (ZYRTEC) 10 mg tablet TAKE 1 TABLET BY MOUTH EVERY DAY. GENERIC FOR ZYRTEC    B.infantis-B.ani-B.long-B.bifi (PROBIOTIC 4X) 10-15 mg TbEC Take  by mouth daily.  FLUoxetine (PROZAC) 10 mg capsule TAKE 1 CAPSULE BY MOUTH ONCE DAILY. Kartik Ramos.  cyclobenzaprine (FLEXERIL) 10 mg tablet TAKE 1 TABLET BY MOUTH TWICE DAILY AS NEEDED FOR MUSCLE SPASMS    ranitidine (ZANTAC) 150 mg tablet 150mg QAM and 75mg QPM    aspirin delayed-release 81 mg tablet Take 81 mg by mouth daily.  clobetasol (TEMOVATE) 0.05 % external solution APPLY TO SCALP AFTER SHAMPOO DAILY    Cholecalciferol, Vitamin D3, (VITAMIN D3) 1,000 unit cap Take 1,000 Units by mouth daily.  glucose blood VI test strips (ACCU-CHEK MARCE PLUS TEST STRP) strip Please use one strip to test blood sugars once a day.  Lancets (ACCU-CHEK SOFTCLIX LANCETS) misc Please use one lancet to test blood sugars twice a day.     BLOOD-GLUCOSE METER (ACCU-CHEK MARCE MONITORING) by Does Not Apply route.  triamcinolone acetonide (KENALOG) 0.1 % topical cream Apply  to affected area two (2) times a day. use thin layer     No current facility-administered medications for this visit. Allergies and Sensitivities:  Allergies   Allergen Reactions    Latex, Natural Rubber Hives    Adhesive Rash     Some bandaids    Ciprofloxacin Rash    Codeine Nausea and Vomiting    Demerol [Meperidine] Nausea and Vomiting    Diclofenac Shortness of Breath and Palpitations    Erythromycin Rash    Levaquin [Levofloxacin] Rash    Loratadine Swelling    Morphine Nausea and Vomiting    Penicillin G Hives and Rash     Does fine with Keflex    Sulfa (Sulfonamide Antibiotics) Itching    Vicodin [Hydrocodone-Acetaminophen] Other (comments)     Metallic taste in mouth       Family History:  Family History   Problem Relation Age of Onset    Hypertension Father     Heart Disease Father     Alcohol abuse Father        Social History:  Social History   Substance Use Topics    Smoking status: Former Smoker     Years: 45.00     Types: Cigarettes     Quit date: 7/1/2011    Smokeless tobacco: Never Used    Alcohol use No     She  reports that she quit smoking about 6 years ago. Her smoking use included Cigarettes. She quit after 45.00 years of use. She has never used smokeless tobacco.  She  reports that she does not drink alcohol. Review of Systems:  Cardiac symptoms as noted above in HPI. All others negative. Denies skin rash,  photophobia, neck pain, hemoptysis, chronic cough, nausea, vomiting, hematuria, burning micturition, BRBPR, chronic headaches.     Physical Exam:  BP Readings from Last 3 Encounters:   10/05/17 105/66   09/15/17 127/69   08/10/17 121/71         Pulse Readings from Last 3 Encounters:   10/05/17 (!) 105   09/15/17 69   08/10/17 89          Wt Readings from Last 3 Encounters:   10/05/17 232 lb (105.2 kg)   09/15/17 229 lb (103.9 kg)   08/10/17 225 lb (102.1 kg)       Constitutional: Oriented to person, place, and time. HENT: Head: Normocephalic and atraumatic. Neck: No JVD present. Cardiovascular: Regular rhythm. No gallop or rubs appreciated. No significant murmur  Lung: Breath sounds normal. No respiratory distress. No ronchi or rales appreciated  Abdominal: No tenderness. No rebound and no guarding. Musculoskeletal: No LE swelling    Review of Data  LABS:   Lab Results   Component Value Date/Time    Sodium 140 09/13/2017 02:11 PM    Potassium 4.2 09/13/2017 02:11 PM    Chloride 105 09/13/2017 02:11 PM    CO2 27 09/13/2017 02:11 PM    Glucose 116 09/13/2017 02:11 PM    BUN 12 09/13/2017 02:11 PM    Creatinine 0.94 09/13/2017 02:11 PM     Lipids Latest Ref Rng & Units 9/2/2016 2/13/2015   Chol, Total 100 - 199 mg/dL 141 113   HDL >39 mg/dL 77 57   LDL 0 - 99 mg/dL 49 38   Trig 0 - 149 mg/dL 77 90   Chol/HDL Ratio 0 - 5.0   - 2.0   Some recent data might be hidden     Lab Results   Component Value Date/Time    ALT (SGPT) 25 04/27/2017 03:20 PM     Lab Results   Component Value Date/Time    Hemoglobin A1c 6.4 03/27/2017 03:32 AM     EKG  (04/16) Sinus rhythm at 96 bpm    STRESS TEST (2012)   No EKG or scintigraphic evidence of ischemia or infarction. Normal LVEF    ECHO (01/16)  SUMMARY:  Left ventricle: Systolic function was normal. Ejection fraction was estimated in the range of 55 % to 60 %. There were no regional wall motion  abnormalities. Doppler parameters were consistent with abnormal left ventricular relaxation (grade 1 diastolic dysfunction). Right ventricle: Systolic function was normal.  Aortic valve: The valve was trileaflet. Leaflets exhibited calcification and moderate- severely reduced cuspal separation. There was severe aortic  stenosis. There was no significant regurgitation. Valve peak gradient was 85 mmHg. Valve mean gradient was 50 mmHg. ELIS (02/16)  Left ventricle: Size was normal. Systolic function was normal.  Right ventricle:  The size was normal.  Left atrium: Size was normal. No thrombus was identified. There was no  spontaneous echo contrast (\"smoke\"). Left atrial appendage: No thrombus was identified. There was no  spontaneous echo contrast (\"smoke\") in the appendage. Right atrium: Size was normal.  Mitral valve: There was mild to moderate multi jet regurgitation. No obvious mass, vegetation or thrombus noted. Aortic valve: Aortic valve assessment was performed using 2D  transesophageal as well as transthoracic doppler data. The valve was  trileaflet. Leaflets were thickened, calcific and stenotic. A mean  gradient across the valve was measured at 49mmHg with a calculated valve  area of 0.70 - 0.75 cm based on an LVOT diameter of 2.1 cm, an LVOT VTI of  21 cm, and an AV VTI of 101 cm. There was no regurgitation. Tricuspid valve: There was no regurgitation. No obvious mass, vegetation or thrombus noted. HOLTER (05/16)  Interpretation:  1. Rhythm is sinus. 2. PACs (1.3% burden). 3. Rare PVCs. ECHO (06/17)  SUMMARY:  Left ventricle: Systolic function was mildly reduced. Ejection fraction  was estimated in the range of 45 % to 50 %. There was mild diffuse  hypokinesis. Wall thickness was mildly increased. There was mild  concentric hypertrophy. Left ventricular diastolic function parameters  were normal for the patient's age. Right ventricle: Systolic function was normal.    Mitral valve: There was mild regurgitation. Aortic valve: A bioprosthesis was present. Not well visualized. Peak velocity 2.2 m/second  Peak gradient ~20 mm hg  Mean gradient ~ 10 mm hg. There was no significant regurgitation. Tricuspid valve: There was mild regurgitation. CARDIAC CATH: (01/16)  PRESSURE HEMODYNAMICS: Systemic aortic pressure was 131/68 mmHg. Left ventricular pressure was 160/2/12 mmHg. Based on the data, peak-to-peak gradient between LV and aortic was approximately 30 mmHg.     Mean RA  7 mmHg,   RV  32/1/8 mmHg,  PA  27/9/16 mmHg,   PCWP 10 mm hg  Cardiac output by Ellis calculation was 4.3 L/min and cardiac index was 2.02 liters L/min/meter squared. Mixed venous saturation was 65%, PA saturation was 65%, and the radial artery saturation was 90%. No evidence of intracardiac shunting. CORONARY ANGIOGRAM  1. LM: No significant obstructive disease. 2. LAD: 10-20% luminal irregularities, otherwise normal. Three diagonal branches angiographically normal.  3. LCX: 20-30% luminal irregularities, otherwise no obstructive disease. 4. OM1: Large bifurcating vessel without any obstructive disease. 5. RCA: Proximal 20%, mid discrete 40%, otherwise angiographically normal. Dominant vessel. IMPRESSION & PLAN:  Ms. Evgeny Booker is a 79 y.o. female with Aortic stenosis, COPD, obesity, diabetes, fibromyalgia. Aortic stenosis:    Ms. Evgeny Booker [facility]. Aspirin will be continued lifelong. ECHO in 06/17 with mean gradient across valve ~ 10 mm Hg. Physiologic for prosthetic valve. Would continue with observation. Diabetes:  Goal hemoglobin A1c less than 7 is recommended from a cardiovascular standpoint. Last hemoglobin A1c was 6.4. Continue same management per Dr. René Figueroa. Hypertension:  BP today 110/76 mm Hg. Not on any meds. Continue same    Fibromyalgia and COPD:    She has chronic pain syndrome from her fibromyalgia. Dyspnea:    No obvious fluid overload on exam. BP stable. NO LE edema. ECHO 06/17 with EF ~ 50%  Mean gradient across valve 10 mm Hg, physiologic for prosthetic valve in 07/17  Recent CT without any PE or any pulmonary process. Advised patient to follow up with Daisy London. Awaiting Pulmonary rehab  Try lasix 20 mg every other day as needed. Empirically. But did not make any difference despite good diuresis.    Will wait for pulmonary rehab and see if that helps with symptoms as it could be decondition after CABG because of multiple surgeries and fractures. Importance of diet and exercise was discussed. Importance of diet and exercise was discussed with patient. This plan was discussed with patient who is in agreement. Thank you for allowing me to participate in patient care. Please feel free to call me if you have any question or concern.

## 2017-10-05 NOTE — MR AVS SNAPSHOT
Visit Information Date & Time Provider Department Dept. Phone Encounter #  
 10/5/2017  9:00 AM Scooter Walker  [facility] [phone] Your Appointments 11/7/2017  2:00 PM  
Medicare Physical with Tarabetsy Hassan DO [address] [facility]: Retunr in 3 months for MWE  
 [address]  
  
   
 [address]  
  
    
 11/9/2017 10:40 AM  
Follow Up with Chloé Saucedo MD  
[facility] Appt Note: lower back 3 mo fu  
 [address] [place] [address]  
[phone]  
  
   
 [address] [facility] [address] 4/17/2018  2:00 PM  
Follow Up with Scooter Walker MD  
Cardio Specialist at [facility]) Appt Note: 6 months [facility] [id number] Upcoming Health Maintenance Date Due Pneumococcal 65+ Low/Medium Risk (2 of 2 - PPSV23) 12/7/2016 MEDICARE YEARLY EXAM 12/7/2016 EYE EXAM RETINAL OR DILATED Q1 1/19/2017 INFLUENZA AGE 9 TO ADULT 8/1/2017 MICROALBUMIN Q1 9/2/2017 LIPID PANEL Q1 9/2/2017 HEMOGLOBIN A1C Q6M 9/27/2017 GLAUCOMA SCREENING Q2Y 1/19/2018 FOOT EXAM Q1 8/7/2018 BREAST CANCER SCRN MAMMOGRAM 9/6/2018 COLONOSCOPY 9/15/2022 DTaP/Tdap/Td series (2 - Td) 12/15/2025 Allergies as of 10/5/2017  Review Complete On: 10/5/2017 By: Randolph Fletcher RN Severity Noted Reaction Type Reactions Latex, Natural Rubber  07/14/2016    Hives Adhesive  03/18/2016    Rash Some bandaids Ciprofloxacin  01/28/2014    Rash Codeine  01/28/2014    Nausea and Vomiting Demerol [Meperidine]  01/28/2014    Nausea and Vomiting Diclofenac  08/06/2015    Shortness of Breath, Palpitations Erythromycin  01/28/2014    Rash Levaquin [Levofloxacin]  01/28/2014    Rash Loratadine  01/28/2014    Swelling Morphine  01/28/2014    Nausea and Vomiting Penicillin G  01/28/2014    Hives, Rash Does fine with Keflex Sulfa (Sulfonamide Antibiotics)  01/28/2014    Itching Vicodin [Hydrocodone-acetaminophen]  02/12/2015    Other (comments) Metallic taste in mouth Current Immunizations  Reviewed on 8/7/2017 Name Date Influenza High Dose Vaccine PF 10/3/2016 Influenza Vaccine 10/10/2014 Influenza Vaccine (Quad) PF 10/28/2015 Pneumococcal Conjugate (PCV-13) 12/7/2015 Tdap 12/15/2015 Zoster Vaccine, Live 6/2/2014 Not reviewed this visit Vitals BP Pulse Height(growth percentile) Weight(growth percentile) SpO2 BMI  
 105/66 (!) 105 5' 8\" (1.727 m) 232 lb (105.2 kg) 99% 35.28 kg/m2 OB Status Smoking Status Menopause Former Smoker BMI and BSA Data Body Mass Index Body Surface Area  
 35.28 kg/m 2 2.25 m 2 Preferred Pharmacy Pharmacy Name Phone [facility] [address] [place] [address] Shasha Bhakta Your Updated Medication List  
  
   
This list is accurate as of: 10/5/17  9:10 AM.  Always use your most recent med list.  
  
  
  
  
 Candy Carrow  
by Does Not Apply route. aspirin delayed-release 81 mg tablet Take 81 mg by mouth daily. atorvastatin 20 mg tablet Commonly known as:  LIPITOR  
TAKE 1 TABLET BY MOUTH ONCE EVERY DAY. GENERIC FOR LIPITOR. butalbital-acetaminophen-caffeine -40 mg per tablet Commonly known as:  Naomi Reasoner Take 1 Tab by mouth every four (4) hours as needed for Pain. Max Daily Amount: 6 Tabs. cetirizine 10 mg tablet Commonly known as:  ZYRTEC  
TAKE 1 TABLET BY MOUTH EVERY DAY.  GENERIC FOR ZYRTEC  
  
 clindamycin 300 mg capsule Commonly known as:  CLEOCIN Take 600mg by mouth one hour prior to procedure. clobetasol 0.05 % external solution Commonly known as:  TEMOVATE  
APPLY TO SCALP AFTER SHAMPOO DAILY  
  
 cyclobenzaprine 10 mg tablet Commonly known as:  FLEXERIL  
TAKE 1 TABLET BY MOUTH TWICE DAILY AS NEEDED FOR MUSCLE SPASMS DULoxetine 30 mg capsule Commonly known as:  CYMBALTA TAKE 1 CAPSULE BY MOUTH TWICE DAILY FLUoxetine 10 mg capsule Commonly known as:  PROzac TAKE 1 CAPSULE BY MOUTH ONCE DAILY. Svitlana Manuel. fluticasone 50 mcg/actuation nasal spray Commonly known as:  Cathlyn Solitario SHAKE LIQUID AND USE 1 SPRAY IN EACH NOSTRIL TWICE DAILY  
  
 glucose blood VI test strips strip Commonly known as:  ACCU-CHEK MARCE PLUS TEST STRP Please use one strip to test blood sugars once a day. Lancets Misc Commonly known as:  ACCU-CHEK SOFTCLIX LANCETS Please use one lancet to test blood sugars twice a day. levothyroxine 25 mcg tablet Commonly known as:  SYNTHROID  
TAKE 1 TABLET BY MOUTH ONCE DAILY BEFORE BREAKFAST  
  
 meloxicam 15 mg tablet Commonly known as:  MOBIC  
TAKE 1 TABLET BY MOUTH DAILY  
  
 metFORMIN 500 mg tablet Commonly known as:  GLUCOPHAGE  
TAKE 1 TABLET BY MOUTH DAILY WITH DINNER. GENERIC FOR GLUCOPHAGE  
  
 metroNIDAZOLE 500 mg tablet Commonly known as:  FLAGYL Take 1 Tab by mouth daily. PROBIOTIC 4X 10-15 mg Tbec Generic drug:  B.infantis-B.ani-B.long-B.bifi Take  by mouth daily. raNITIdine 150 mg tablet Commonly known as:  ZANTAC  
150mg QAM and 75mg QPM  
  
 traMADol 50 mg tablet Commonly known as:  ULTRAM  
TAKE 1 PO Q 4 TIMES A DAY PRN PAIN  Indications: NEUROPATHIC PAIN, Pain  
  
 triamcinolone acetonide 0.1 % topical cream  
Commonly known as:  KENALOG Apply  to affected area two (2) times a day. use thin layer VENTOLIN HFA 90 mcg/actuation inhaler Generic drug:  albuterol 2 Puffs by Aerosolization route every four (4) hours as needed. VITAMIN D3 1,000 unit Cap Generic drug:  cholecalciferol Take 1,000 Units by mouth daily. Introducing hospitals & HEALTH SERVICES! Dear Fermin Becerra: Thank you for requesting a [employer] account. Our records indicate that you already have an active [employer] account. You can access your account anytime at [email or web address] Did you know that you can access your hospital and ER discharge instructions at any time in [employer]? You can also review all of your test results from your hospital stay or ER visit. Additional Information If you have questions, please visit the Frequently Asked Questions section of the [employer] website at [email or web address] Remember, [employer] is NOT to be used for urgent needs. For medical emergencies, dial 911. Now available from your iPhone and Android! Please provide this summary of care documentation to your next provider. Your primary care clinician is listed as Lendon Banana. If you have any questions after today's visit, please call [phone].

## 2017-10-13 ENCOUNTER — TELEPHONE (OUTPATIENT)
Dept: PULMONOLOGY | Age: 67
End: 2017-10-13

## 2017-10-13 NOTE — TELEPHONE ENCOUNTER
Pulmonary Rehab scheduled patient for an evaluation on 10/16/17 at 12:30.     Thank you,  Edwin Dasilva

## 2017-10-16 ENCOUNTER — HOSPITAL ENCOUNTER (OUTPATIENT)
Dept: PULMONOLOGY | Age: 67
Discharge: HOME | End: 2017-10-16
Payer: MEDICARE

## 2017-10-16 PROCEDURE — 94620 HC PULMONARY STRESS TEST SIMPLE: CPT

## 2017-10-16 NOTE — PROGRESS NOTES
[facility]  Respiratory Therapy  Patient Assessment and Treatment Plan    TIME IN: 12:30  PM    TIME OUT:  2:30 PM       DEMOGRAPHIC & RELATED DATA Patient Name: Ramsey MORENO  1950 Age  79 y.o. Social Security #  /Medical Record #  [id number]  [id number]   Sex  F Marital Status     Emergency Contact Name  Extended Emergency Contact Information  Primary Emergency Contact: Alonzo Dodge  Mobile Phone: [phone]  Relation: Son  Secondary Emergency Contact: [address] [facility]  Mobile Phone: [phone]  Relation: Friend Phone (Area Code) Number     Emergency Contact Address   same   [address] Name:  Marjorie Nevarez MD  [address] Phone           Number   Referring Diagnosis  COPD.  Asthma   Chief Complaint  SOB, fatigue   PHYSICIANS PCP Name:    Naye Ellis DO Phone      (Area Code)      Number       Address:   [facility]         (Area Code)      Number       Pulmonologist Name:  Kelsey Meza Phone      (Area Code)      Number       Address: FAX         (Area Code)      Number       HOME HEALTH If Yes, Agency Name  No  Phone      (Area Code)      Number       []- Yes    [x]- No Address FAX         (Area Code)      Number       PATIENT REPORTING OF MEDICAL HISTORY Check All that Apply   Sleep Symptoms Daily Symptoms Additional Medical Disorders     [] Within normal limits   [] Nocturnal                  Hypoxemia     [] Obstructive Sleep           Apnea     [x] Increased sleep   [] Decreased sleep   [] Night Sweats   [] Insomnia   []Frequent Urination   [x] Muscle Spasms    [x] Daytime Sleepiness   [x] Shortness of                    breath   [x] Nightmares   [] Leg Jerking   [] Snoring   [x] Sleeps in chair/sofa       due to shortness of      breath     [x] Coughing   [] Heart Palpitations   [] Feather Pillows               used    [x] Excessive                     worry   [] Nervousness   [] Panic episodes   [x] Depressed   [x] Cough   [] Cough with                    Sputum     [] Shortness of          breath at rest     [] Wheezing     [] Chest Pain  [x] Shortness of        breath with mild exertion     [x] Shortness       of breath with moderate        exertion     [x] Weakness       /Fatigue   [x] Lacking       endurance   [] Presence of         barrel chest   [] Utilizes                    shoulders,      chest & neck to aid in breathing     []  Dizziness     [] Ankle swelling     [] Hoarseness    [] Hypoxemia   [x] Diabetes   []Neuropathy   []Hypertension   [x] Osteoarthritis   [] Osteoporosis   [] Rheumatoid Arthritis     [x] Fibromyalgia   [] Scoliosis  []Coronary Artery Disease   [] Atrial Fibrillation []Abnormal Posture   [x] Debility   [] CHF   [] Family history of COPD     [x] Other:obesity, chronic back pain, COPD, allergic rhinitis, laminectomy L5-S1, fibromyalgia, B roatator cuff repairs, 3/17 replaced R shoulder, Aortic valve replacement 2016. Broke both ankles 4/2016. Had cardiac rehab 1/17 - 9/17 on and off. RESPIRATORY HOME CARE EQUIPMENT   Use of the following:      []- Peak Flow Meter     []- Aerosol machine         []- Suction machine     []- Ventilator    []- Mechanical chest percussor    []- PEP valve   []- Oxygen:   Flow Rate:    [x]- None                                                                                       System:        See Summary List for additional Medical and Surgical History, Medications, and Allergies     PULMONARY HISTORY   Cough present: []- Yes     [x]- No    []- AM     []- PM    []- Nighttime     []- Around the clock       Mucus:  Color and amount:           []- Thick    []- Thin   []- Moderate        When:  []- AM    []- PM   []- Around the clock    Mucus raised and/or alleviated by:     How often do you see your pulmonologist:    []- Every month    [x]- Every quarter  []- Every six months  []- Other:     Typically how often do you have lung infections: never. Number of times youve been hospitalized due to pulmonary condition (in last year): 0    Number of times youve visited the E.D. due to pulmonary conditions (in last year):  1  Shortness of breath is alleviated by:    VACCINATIONS [x]   Flu     [x]  Pneumonia      [x] Tetanus        IF NOT, Staff recommendation:     Immunization History   Administered Date(s) Administered    Influenza High Dose Vaccine PF 10/03/2016    Influenza Vaccine 10/10/2014    Influenza Vaccine (Quad) PF 10/28/2015    Pneumococcal Conjugate (PCV-13) 12/07/2015    Tdap 12/15/2015    Zoster Vaccine, Live 06/02/2014        OCCUPATIONAL / ENVIRONMENTAL HISTORY Retired?  [x] - YES   disability        [] No      Type of work:   Medical asst.   Occupational exposure: [x] None [] Welding            [] Asbestos      [] Mines/foundry         [] Pottery      [] Other   Residence  Type [x]     House                   []     Apartment                   []     [place] [address]                []     Assisted Living       []     Residential Living                  []     Other: #2 Occupants  Relationship with Residents:   roomate        # Levels: 3    # Stairs between levels:  14  # Stairs to enter:  5            Household pets:    [x]   Yes   []   No    If so, type:2 dogs, 2 cats   Comments:    EDUCATIONAL HISTORY Highest Grade Achieved: Grade:  GED     College Degree(s) Obtained / Major: 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 []- Yes     [x]- No     If Yes, Describe:     HEARING/VISION/MOBILITY/ADLS Prosthesis:   []- None   Describe:ankle braces if shopping.    []- Hearing Impaired   ð Hearing Aids:   []- None     []-Bilaterally     []- Right     []- Left   [x]- Visually Impaired  ð  []- Legally Blind       [x]- Glasses       []- Contact Lenses   Ambulation:   []- By Self     []- Ambulates with Assistance ð  [x]- Cane  For balance []- Crutches     []- Walker                             []- Wheelchair        []- Unable to Ambulate   Adaptive Equipment:     []- None      [x]- Shower Seat       [x]- Grab Bars       []- Reachers       [x]-Non-Slip Floor Mat   Comments:    PAIN ASSESSMENT  Numeric Pain Scale 1-10 Is pain present or has it been present within the past 6 months? yes     Chest Pain:  [x]- Yes     []- No    If yes, ratin   Level at which pain is unacceptable: NA   Freq/Duration: periodically  Aggravated by: exertion  Alleviated by: rest    Other Pain Location and Rating:  []- Fingers ____    []- Hand ____   [x]- Wrist _5__  []- Elbow __   []- Shoulder ____ [x]- Neck _7___ [x]- Back __4__[]- Hip ____ []- Leg ____   [x]- Knee __6__   [x]- Ankle ____ []- Foot ____ []- Toes ____  []- Other _____    Level at which pain is unacceptable:   5      Frequency/Duration: at all times    Aggravated by:   nothing        Alleviated by: Tramadol      INSPIRATORY MUSCLE ASSESSMENT   Negative Inspiratory Force Test Results:(< -60 cwp indicates IMT need)    [x]- Not applicable                []- Not available       FALL RISK ASSESSMENT If 3 or more are checked, patient requires Fall Precautions   [] -  History of Fall(s) within last 3 months   [] -  Altered Mental Status - altered level of consciousness, confused/disoriented, unable to understand/remember, cognitive impairment, or alcohol use   [x] -  Altered Mobility - unsteady gait, requires use of assistive device (cane, wheelchair, walker), or requires staff assistance   [x] -  Predisposing Diseases/Diagnosis - sensory impairments, neurological disorder, hypotension, orthostatic hypotension, vertigo, seizures, arthritis/osteoporosis, or age less than 3 years or greater than 72 years   [] -  Environment - IV, ECG leads, O2, [place]   [x] -  Medications - does patient take 4 or more home medications     FUNCTIONAL ACTIVITIES OF DAILY LIVING  Check All Deficits related to shortness of breath, fatigue, pain and/or emotional or psychological elements   Functional Mobility Bathing/Grooming/Dressing Household activities Meal/yard maintenance   [x] Bed transfers  [x] Chair transfers  [x] Toilet transfers  [x] Automobile transfers  [x] Tub/shower transfers  [] Ambulation on level ground  [x] Ambulation on slight incline  [x] Stairs  [x]  Carrying objects  []  Grocery shopping  []  Walking to mailbox [x]  Bathing / Showering  []  Washing hair  []  Washing / Drying upper body  [x]  Washing / Drying lower body  []  Combing hair  []  Brushing teeth  [] Make-up application  []  Shaving  [x]  Upper body dressing  [x]  Lower body dressing  [x]  Donning / [place] socks and/or shoes [x] Vacuuming  [x] Mopping  [x] Sweeping  [x] Garbage removal  [x] Making bed  [x] Changing sheets  [x] Placing / Retrieving clothes in /       out washer    [x] Placing / Retrieving clothes in /       out dryer    [] Ironing  [] Sorting / Folding clothes  [] Hanging clothes [] Light meal preparation  [] Cooking  [] Eating  [] Clean-up  [x] Washing dishes  [] Loading / unloading   UNABLE TO PERFORM -  [] Pulling weeds  [] Planting flowers  [] Gardening  [] Raking leaves  [] Mowing grass   [] Painting   PATIENT REPORT OF PRIOR FUNCTIONAL LEVEL  In patients words, what could he/she do before breathing problems began? Limited by back, arm pain   PATIENT REPORT OF CHANGE(S) IN FUNCTIONAL LEVEL In patients words, what has changed since breathing problems began?   Slowed down TRANSPORTATION CAPABILITY   []- Able to provide own transportation. [x]- Patient drives    []- Lack of reliable transportation. []- Medically unable to drive. [x]- Has a handicapped sticker    []- Patient has someone to drive them if needed    []- Financial constraints prohibit ability to provide own  transport. Determination of Need for Transportation (Check all that apply)       TOBACCO USE Current   Use? Age Began Route Usual Daily Amount / Frequency Date Last Used    no 15 cigarettes History   Smoking Status    Former Smoker    Years: 45.00    Types: Cigarettes    Quit date: 7/1/2011   Smokeless Tobacco    Never Used    2011   Are you willing to participate in a Smoking Cessation Program? Not applicable NOTE:  Patients who currently smoke & are unwilling to stop tobacco use and/or participate in a smoking cessation program could be ineligible for services. Comments:      EDUCATIONAL NEEDS Check All that Apply   COPD & LUNG DISEASE Lacks understanding of components of disease yes    Lacks understanding of disease and disease progression yes    Lacks understanding of environmental changes affecting lung function (temperature, humidity, wind, allergies, etc.) no   BREATHING RE-TRAINING Lacks independent Pursed Lip Breathing (PLB)? yes    Lacks independent Diaphragmatic Breathing (DB)? yes    [place] Rate of Perceived Exertion (RPE) Scale of > ten (10) while resting? no    Exhibits abnormal respiratory rate? no    Rests arms on table to recruit accessory muscles? no   COLLABORATIVE SELF-MANAGEMENT   (Signs & symptoms of Infections & Peak Flow Meters) Exhibits > one (1) lung infections(s) in the past year? no    Lacks evidence of self-monitoring signs for infections? yes   MEDICATIONS Non-compliant with medications? no    Exhibits knowledge deficit related to side effects of medication? no    Exhibits knowledge deficit related to use and purpose of medications?  yes    Prescribed supplemental Oxygen                            []- N/A       No    Exhibits knowledge deficit related to oxygen use? []- N/A     Not applicable    Unable to demonstrate correct usage of Metered Dose Inhaler (if indicated)? []- N/A   No    Unable to demonstrate correct usage of spacer (if indicated)? []- N/A   No   TRAVEL & ENVIRONMENT Exhibits allergic reaction to allergens (pets, plants, dust, mold, etc.)? yes    Travel restricted due to shortness of breath? Yes   BRONCHOPULMONARY HYGIENE /   SECRETION CLEARANCE & MANAGEMENT Complains of sputum/mucus production? no    Diagnosed as having chronic bronchitis? no    Diagnosed as having bronchiectasis? no    Demonstrates poor cough/kothari techniques? yes    Complains of loose non-productive cough? no    Patient uses chest physiotherapy (CPT)? no    Patient uses high frequency chest wall oscillation (HFCWO)? no    Exhibits knowledge deficit related to proper fluid intake? yes   SEXUALITY &   LUNG DISEASE Patient desires information on sexuality with lung disease? no   STRESS MANAGEMENT/  EMOTIONAL HEALTH Patient exhibits or reports increased levels of stress?  yes    Patient does not practice relaxation techniques? no     PSYCHOSOCIAL ASSESSMENT Mental and Emotional functioning impacted by deficits, limitations, and/or symptoms associated with the patients pulmonary disease          Attitude                   [x] - Cooperative          [] - Guarded          [] - Uncooperative          [] - Hostile        [] - Apathetic       [] - Defensive            Self-concept             [x] - Intact         [] - Helpless          [] - Hopeless          [] - Grandiose        [] - Self-deprecating       [] - Depersonalized            Consciousness         [x] - Alert          [] - Hyper-alert      [] - Lethargic          [] - Clouded           [] - Unresponsive       [] - Lacks focus/attention            Mood       [x] - Stable               [x] - Depressed            [] - Anxious              [] - Irritable              [] - Angry                             [] - Calm                 [] - Euphoric               [] - Guilty                  [] - Grieving                   Areas of function acutely / negatively affected by patients pulmonary disease:      [] - Marital/Intimacy/Family         [] - Financial        [] - Safety     [] - Spiritual              [] - Sexual      [x] - Activities of Daily Living            [] - Vocational   [x] - Social             [] - Recreation/Leisure        Aspects of family and home situation that may affect treatment:       Negatively  [] - Lacks support system      [] - Financial      [] - Legal problems    [] - Experiences Isolation                           [] - Dependent care    [] - / spouse/significant other    [] - Other health issues     Positively     [] - Spiritual  [x] - [place], caring, support system   [] - Living spouse or significant other   [] - Socially included                 Evidence of abuse/neglect:     [] - Verbal       [] -Physical     [] - Emotional    [] - Sexual      [] - Substance       [] - Addictive      [] - Financial        Does the patient report any of the following? [x] - Depression           [] - Anxiety     [] - Panic         [] - Poor Coping       How much stress does the patient report? [] - None                    [x] - Low         [] - Moderate            [] - High         Please see attached [facility] COOP (Quality of Life) and PHQ-9 (Depression Tool).     Exercise (REQUIRED)    Initial Assessment    ANMOL Li/L  10/16/2017,1:02 PM Initial Plan    Goals/Interventions/Education  (Exercise Prescription) 30-Day Re-Assessment/POC    Date/Time: 17 11:00AM    Initials:   Acacia Edgar  60-Day Re-Assessment/POC    Date/Time: 17 12:00 PM  Initials:   Acacia Edgar   Tool - Six-minute walk test    Initial Walk: 682 ft  Distance    Pre- Vitals:  HR: 77       O2 Sat:  99  BP:  103/67     L of 02:0  RPE: 6      RPD:  1      RPP:5    During Vitals:  HR: 104       O2 Sat:  97  BP:112/72       L of 02:0  RPE:  15     RPD: 2       RPP:8 back and chest    Post Vitals:  HR:108        O2 Sat: 98   BP:  110/72     L of 02:0  RPE:  14     RPD:   3     RPP:5  Speed:  1.3 mph  Met Level:  2.0    Assistive Device:    []  Yes   [x]  No            Rest Breaks:  [x]  Yes  []  No  If yes, amount of rest time: 20 seconds           Pain:     [x]  Yes   []  No     Comments: in hips,  and chest area where incision from surgery was performed for aortic valve replacement in 2016. [x]  Appropriate  BP / HR / O2 response to exercise    Discharge Walk:  Distance: 458  Pre- Vitals:  HR: 101       O2 Sat:  98  BP:   124/78    L of 02:0  RPE: 8      RPD:  2      RPP:5    During Vitals:  HR: 104       O2 Sat:  98  BP:   128/78    L of 02:  RPE: 10    RPD: 2       RPP[de-identified]  5    Post Vitals:  HR:  94      O2 Sat:  98  BP:  126/77     L of 02:0  RPE: 12      RPD: 3       RPP:5  Speed: .9  Met Level:    Assistive Device:   [x]  Yes []  No            Rest Breaks:   [x]  Yes  [] No  If yes, amount of rest time:  2.5 minutes        Pain:   [x] Yes   []  No          Comments: patient indicated she had severe pain in her legs on day of discharge walk and did not think she could walk very far. She stopped walk after 4 minutes.     Date/Time: 3/7/18 3:30 PM  Initials: MK Short-term goals (to be achieved in 4 weeks):  [x] Compliant with Exercise Prescription > 80% of time   [x] Participation in identified educational sessions  Other:        Exercise Pres./Interventions  Aerobic Mode:   [x] Treadmill  [x] Bike  [x] NuStep  [x] Arm Ergometer  []  Other:     Frequency: 2-3 days/week  Duration: Up to 40 min per mode  Intensity:  RPE 6   to  15    RPD: < 2      Strength training Mode:  [x]  Free Weights/Thera-bands  Frequency: 2-3 days/week  Duration: Up to 40   minutes   Intensity: 1-10 sets; 12-15 reps;  RPE: 5 to 30    RPD less than or equal to 2. Breathing Exercise Mode:  [x] IMT/PEP  [x] Weight Diaphragmatic Breath  [x] Incentive Spirometer  Frequency: 2-3 days/week  Duration: Up to 35 min per mode  Intensity:RPE: 6 to 15. RPD less than or equal to 2. Educational Topics (see education log):  [x]  RPD/RPE/Pain Scale  [x]  Exercise: Safety & Monitoring  [x]  Home Exercise Plan  []  Collaborative Self-Management  []  Other:     LTG (to be achieved by D/C):  [x] Exercise > 115___ min/week  with O2 Sat >  89     , RPD < 2       ,  [x] Increase 6MW test by 50    Feet. *Please see recent flow sheets  STG Status:  Compliant with Exercise Prescription. [x] Yes   [] No      If no, describe:      Participation in educational sessions. [x] Yes   [] No          If no, describe:       Updated Exercise Prescription   Aerobic Mode:   [x]  Treadmill  [x] Bike  [x] NuStep  [x]  Arm Ergometer  []  Other:   Frequency: 2-3 days/week  Duration: Up to 40   min per mode  Intensity:  RPE6    to  15. RPD: <  2      Strength training Mode:  [x]  Free Weights/Thera-bands  Frequency: 2-3 days/week  Duration: Up to 40 minutes/mode   Intensity: 1-2 sets; 12-15 reps;  RPE:   6  to 15. RPD less than or equal to 2. Breathing Exercise Mode:  [x] IMT/PEP  [x] Weight Diaphragmatic Breath  [x] Incentive Spirometer  Frequency: 2-3 days/week  Duration: Up to 40    min per mode  Intensity:RPE:6 to 15. RPD less than or equal to 2. LTG (to be achieved by D/C):  [x] Exercise > 115___ min/week  with O2 Sat >89       , RPD < 2       ,  [x] Increase 6MW test by 50    Feet. *Please see recent flow sheets  STG Status:  Compliant with Exercise Prescription.    [x] Yes   []No          If no, describe:     Participation in educational sessions   [x] Yes   [] No          If no, describe:      Updated Exercise Prescription  Aerobic Mode:   [x] Treadmill  [x]  Bike  [x]  NuStep  [x]  Arm Ergometer  [x]  Other:   Frequency: 2-3 days/week  Duration: Up to 40   min per mode  Intensity:  RPE 6   to  15. RPD: <  2            Strength training Mode:  [x]  Free Weights/Thera-bands  Frequency:2-3 days/week  Duration: Up to 40   minutes   Intensity: 1-5 sets; 10-20 reps;  RPE:  6   to 15. RPD less than or equal to 2. Breathing Exercise Mode:  [x] IMT/PEP  [x] Weight Diaphragmatic Breath  [x] Incentive Spirometer  Frequency: 2-3 days/week  Duration: Up to 40   min per mode  Intensity:RPE:6 to 15.  y.  RPD less than or equal to 2. LTG (to be achieved by D/C):  [x] Exercise > 115___ min/week with O2 Sat >89       , RPD <  2      ,  [x] Increase 6MW test by  50   Feet. Nutrition (REQUIRED)           Initial Assessment   Quenten Regulus, OTR/L  10/16/2017,1:02 PM Initial Plan    Goals/Intervention/Education  30 Day Re-assessment/POC    Date/Time: 11/13/17 11:00 AM  Initial: [address]   60 Day Re-assessment/POC    Date/Time: 12/4/17 12:00 PM   Initial:  [address]       Tool - Rate Your Plate    Pre -RYP Score: 56  (Healthy Diet >57)    Special Diet? []  Yes  [x]  No          If yes, describe:       Caffeine Intake? [x]  Yes   []  No          If yes, amount: 2 cups coffee/day. Alcohol Intake? []  Yes   [x] No          If yes, amount:      Current Weight: 228  Current Height: 5 ft 8  Current BMI: 35   To calculate BMI:          Weight (pounds)           Height (inches)2 x 703 =               <18.5 = Underweight      18.5-24.9 = Normal       25-29.9 = Overweight     >30 = Obese   Short-Term Goals (to be achieved in 4 weeks):  []  Compliant with            prescribed, specialized diet.      [x]  Participate in nutritional       health classes    []  Improve RYP score to:  (if checked, RYP must be reassessed at 30 days)    Other:     Interventions:  [x]  Review Eating Habits  [x]  Review Nutritional Screening  [] Encourage Healthy Diet  []  Recommended or requested    1:1 Dietary Consult    Educational Topics (see educational log):  [x]  Nutritional Health Benefits    LTG (to be achieved by D/C):     [x] Patient will verbalize an understanding of a life-long adherence to a healthy diet and the impact on health condition(s). *Please see recent flow sheets  STG Status:  Compliant with prescribed, specialized diet      % of the time per patient report. Participate in nutritional health classes   [x]  Yes   []  No          If no, describe:      Current RYP score: Other: Describe:    Dietician Consult Completed       []  Yes   []  No   []  N/A    UPOC:  Pt will continue to:   []  Be compliant with prescribed, specialized diet    [x] Participate in nutritional     health classes. [] Improve RYP score to:  (RYP needs to be reassessed if still active STG)    LTG (to be achieved by D/C):   [x] Patient will verbalize an understanding of a life-long adherence to a healthy diet and the impact on health condition(s). *Please see recent flow sheets  STG Status:  Compliant with prescribed, specialized diet    % of the time per patient report. Participate in nutritional health classes. [x] Yes [] No          If no, describe:      Current RYP score: Other:      Dietician Consult Completed        [] Yes   [] No    []N/A    UPOC:  Pt will continue to:   []  Be compliant with prescribed, specialized diet. [x]  Participate in nutritional health classes. [] Improve RYP score to:  (RYP needs to be reassessed if still active STG)    LTG (to be achieved by D/C):     [x] Patient will verbalize an understanding of a life-long adherence to a healthy diet and the impact on health condition(s).          Psychosocial (REQUIRED)  Initial Assessment    JOSE Crews  10/16/2017,1:02 PM  Initial Plan    Goals/Intervention/Education 30-Day Re-Assessment/POC    Date/Time:11/13/17 11:00 AM   Initials: [address] 60-Day Re-Assessment/POC    Date/Time:12/4/17 12:00 PM  Initials: [address]   Tool (Quality of Life) - [facility] COOP*     *Please see attached. Tool (Depression) PHQ 9  Score: 14  *If score is > 5, PHQ 9 should be reassessed every 30 days. []  Poor Coping skills    [x]  Currently on psychotropic medication  Prozac for OCD., Cymbalta fir pain. Average Sleep Hours:4-10 hours varies. []  Abnormal Sleep Patterns (Sleep Apnea; Snoring)    []  Currently Seeing Counselor/Physician    Positive Support System? [x]  Yes   []  No           [x]  Recommend additional behavioral health assessment    Currently smoking? []  Yes   [x]  No                 Short Term Goals (to be achieved in 4 weeks):  []  Manage and reduce stress per patient report. [x]  Improve PHQ 9 score to:  10    [x]  Increase average sleep hours by: average of 7/night    [x]  Participate in emotional health class. []  Decrease smoking by 50%. [x]  Other:Recommended patientsee talk therapist she has seen in the past and psychiatrist if recommended by talk therapist.    Interventions:  [x] Re-assess PHQ-9 every 30-Day: Review if  > 5    [x]  Provide simple relaxation techniques practiced daily    [x]  Refer behavorial health psychotherapy needs to Physician/licensed psychotherapist   See Other box above. Educational Topics (see education log):    [x]  Emotional Health   [x]  Importance of adequate sleep  []  Smoking cessation  LTG (to be achieved by D/C):  [x]  Use relaxation techniques when stressors are identified and verbalize coping skills and decrease vulnerability to stress. []  Maintain decreased smoking behaviors/smoking cessation. [x]  Improve PHQ-9 scores   [x] Improve [facility] scores and maintain a health psychosocial well-being. *Please see recent flow sheets    STG Status:  Manage and reduce stress per patient report.    [x]  Yes   []  No          If no, describe:      PHQ 9 current score is: 6     Average sleep hours: not recorded     Participate in emotional health class. [x]  Yes   []  No          Patient has        Decreased smoking by:     %    UPOC:  Pt will continue to:   [x]  Manage and reduce stress per patient report. [x]  Improve PHQ 9 score to: 5  [x]  Manage sleep as evidenced by average sleep hours of: 7-10       [x] Participate in emotional health class. []  Decrease smoking by    %    []  Other:      LTG (to be achieved by D/C):  [x]  Use relaxation techniques when stressors are identified and verbalize coping skills and decrease vulnerability to stress. []  Maintain decreased smoking behaviors/smoking cessation. [x]  Improve PHQ-9 scores   [x] Improve [employer] scores and maintain a health psychosocial well-being. *Please see recent flow sheets    STG Status:  Manage and reduce stress per patient report. [x]Yes    [] No          If no, describe:     PHQ 9 current score is: 7     Average sleep hours:  Not recorded. Participate in emotional health class. [x]  Yes []  No          If no, describe:       Decreased smoking by:  %    UPOC:  Pt will continue to:   [x]  Manage and reduce stress per patient report. [x] Improve PHQ 9 score to: 5  :   [x]  Manage sleep as evidenced by average sleep hours of:  7-8    [x]  Participate in emotional health class. []  Decrease smoking by  %    Other:          LTG (to be achieved by D/C):  [x]  Use relaxation techniques when stressors are identified and verbalize coping skills and decrease vulnerability to stress. []  Maintain decreased smoking behaviors/smoking cessation. [x]  Improve PHQ-9 scores   [x] Improve [employer] scores and maintain a health psychosocial well-being.            Oxygen (REQUIRED)    Initial Assessment  ANMOL Stephens/L  10/16/2017,1:02 PM Initial Plan    Goals/Interventions/Education 30-Day Re-Assessment/POC    Date/Time: 11/13/17 11:00 AM  Initials:[facility]  60-Day Re-Assessment/POC    Date/Time:12/4/17 1:00 PM  Initials: [address] Evidence of pulmonary/respiratory diagnosis and/or associated symptoms? [x]  Yes   []  No   Patient is prescribed Oxygen? []  Yes   [x]  No          If yes, current prescription? Compliant with current prescription? []  Yes   [] No   [x]  N/A            Understands specifics of Oxygen prescription? []  Yes  []  No   [x]  N/A            Understands all safety protocols regarding Oxygen usage? []  Yes  []  No    [x] N/A              Evidence of de-saturation with activity (six-minute walk test)       [x]  Yes   []  No              Short-Term Goals (to be achieved in 4 weeks):  []  Compliant with Oxygen prescription. [x]  Maintain Oxygen saturations >89   at rest.    [x]   Maintain Oxygen saturations >89  with exertion. [x]   Utilize  PLB   25 % of the time without prompting. [x]   Utilize DB 25 % of the time without prompting. []   Other:      Interventions:    [x]   Discuss and review pursed-lip breathing       [x]   Discuss and review diaphragmatic breathing    [x]   Monitor oxygen saturations throughout prescribed treatment    [x]   Maintain patient's oxygen saturation        To   90 % during exercise and titrate 1-2 L/M until consistently at  90   %    Education Provided (see educational log):    []   Oxygen importance/compliance   []  Oxygen safety   []  Oxygen and Travel  []  Other:    LTG (to be achieved by D/C):    [x] Patient will independently perform pursed-lip breathing  [x] Patient will independently perform diaphragmatic breathing  [] Patient will follow Oxygen prescription *Please see recent flow sheets  Changes in Prescription:   []  Yes  []  No    [x] N/A    If yes, describe:    STG Status:  []  Compliant with Oxygen prescription. [x]  Maintain oxygen saturations >89     at rest.     [x]   Maintain Oxygen saturations >89      with exertion. [x]  Utilize  PLB    25 % of the time without prompting. [x]  Utilize DB   20  % of the time without prompting.     [] Other: Describe:        UPOC:  Pt will continue to:   [] Be compliant with Oxygen prescription. [x]  Maintain Oxygen saturations >89       at rest.  [x]  Maintain Oxygen saturations > 89     with exertion. [x] Utilize PLB  35   % of the time without prompting. [x]  Utilize DB  25  % of the time without prompting. []  Other:     LTG (to be achieved by D/C):    [x] Patient will independently perform pursed-lip breathing  [x] Patient will independently perform diaphragmatic breathing  [] Patient will follow Oxygen prescription *Please see recent flow sheets  Changes in Prescription:   []  Yes  []  No    [x] N/A    If yes, describe:    STG Status:  []  Compliant with Oxygen prescription. [x]  Maintain Oxygen saturations >89   at rest.     [x]  Maintain Oxygen saturations >89  with exertion. [x]  Utilize PLB   35     % of the time without prompting. [x] Utilize DB     30 % of the time without prompting. UPOC:  Pt will continue to:   [] Be compliant with Oxygen prescription. [x]  Maintain Oxygen saturations >   89    at rest.  [x]  Maintain Oxygen saturations >   89   with exertion. [x] Utilize PLB   40  % of the time without prompting. [x]  Utilize DB 35    % of the time without prompting. []  Other:    LTG (to be achieved by D/C):    [x] Patient will independently perform pursed-lip breathing  [x] Patient will independently perform diaphragmatic breathing  [] Patient will follow Oxygen prescription       Core Measures Congestive Heart Failure    Initial Assessment    ANMOL Rice/L  10/16/2017,1:02 PM Initial Plan    Interventions/Education/Goals 30-Day Re-Assessment/POC    Date/Time:11/13/17 11:00 AM  Initials: [address]  60-Day Re-Assessment/POC    Date/Time: 12/4/17 1:00 PM  Initials: [address]     [x]  Not a Risk Factor at This Time. No plan of care required.      []  Risk Factor:        []  Current Dx of CHF        []  Hx of CHF    []  EF:    Date:   Source:   NYHA Symptom Class:          [] II          []   III          []  IV    Prescribed CHF Medications? []  Yes  []  No            Compliant with CHF Meds? []  Yes []   No  []  N/A     Has Scale at Home?        []  Yes   []  No     Weighs Daily? []  Yes   []  No                Knows Baselines (normal weight, B/P, HR, amount of activity tolerated before getting tired, etc.)? []  Yes   []  No      Knows signs/symptoms of worsening CHF (wt gain, SOB, swelling, fatigue, etc.)? []  Yes  []  No      Sleeps with propped up pillows? []  Yes  []  No     If yes, how many? Short Term Goals (to be achieved in 4 weeks):  []  Identification of individual Baselines  (see left)  []  Understanding of & able to act upon signs/symptoms of worsening CHF   []  Monitors weight daily at home  []  Monitors BP at home  []  Compliant with medications  []  Compliant w/ diet and sodium intake  []  Monitors home fluid intake  []  Other:      Interventions    []  Self-Management Care    []  Provides CHF educational class(es)    [] Other:    Education Provided (see educational log)    []  Living with CHF     LTG (to be achieved by D/C):  [] Able to verbalize individual baselines, signs and symptoms of worsening garvey failure and appropriate self-management   [] Compliant with sodium intake and fluid intake  [] Compliant with medications  [] Weighs self daily  [] Monitors blood pressure at home *Please see recent flow sheets   Newly diagnosed:   []  Yes  []  No    [x] N/A       STG Status/UPOC:  Per patient report:   Identifies own individual Baselines  (see left):  []  Yes  []  No, continue      Understands and able to act upon symptoms of worsening CHF:   []  Yes   []  No, continue      Weighs self daily:   [] Yes   []  No, continue      Monitors BP at home:   [] Yes   []  No, continue      Compliant with medications:   [] Yes   []  No, continue      Compliant w/ diet and sodium intake:   [] Yes   []  No, continue      Monitors home fluid intake:   [] Yes   []  No, continue      Exercises daily:  [] Yes   []  No, continue      [] Other:   [] Yes   []  No, continue                                         LTG (to be achieved by D/C):  [] Able to verbalize individual baselines, signs and symptoms of worsening garvey failure and appropriate self-management   [] Compliant with sodium intake and fluid intake  [] Compliant with medications  [] Weighs self daily  [] Monitors blood pressure at home   *Please see recent flow sheets  Newly diagnosed:   []  Yes  []  No    [x] N/A      STG Status/UPOC:  Per patient report: Identifies own individual Baselines  (see left):  []  Yes  []  No, continue      Understands and able to act upon symptoms of worsening CHF:   []  Yes   []  No, continue      Weighs self daily:   [] Yes   []  No, continue      Monitors BP at home:   [] Yes   []  No, continue      Compliant with medications:   [] Yes   []  No, continue      Compliant w/ diet and sodium intake:   [] Yes   []  No, continue      Monitors home fluid intake:   [] Yes   []  No, continue      Exercises daily:  [] Yes   []  No, continue      [] Other:   [] Yes   []  No, continue     LTG (to be achieved by D/C):  [] Able to verbalize individual baselines, signs and symptoms of worsening garvey failure and appropriate self-management   [] Compliant with sodium intake and fluid intake  [] Compliant with medications  [] Weighs self daily  [] Monitors blood pressure at home         Core Measures Hypertention  Initial Assessment    ANMOL Navarro/L  10/16/2017,1:02 PM Initial Plan     Goals/Interventions/Education 30-Day Re-Assessment/POC    Date/Time:11/13/17 11:00 AM  Initials: [address] 60-Day Re-Assessment/POC    Date/Time: 12/4/17 1:00 PM  Initials: [address]     [x]  Not a Risk Factor at This Time. No plan of care required. Took BP meds HCT for 2 weeks in 2017 but then stopped.  Was prescribed for SOB by cardiologist.    []  Hypertension Risk Factor    []  On Blood Pressure Meds  []  Medication Regimen Compliance    []  Yes  []  No   []  Monitors BP at Home                    []  Yes   []  No  []  No B/P monitoring system at home  []  If No B/P monitoring system at Home, Encouraged to Purchase  Target BP: (if different from standing order)            * Resting blood pressure obtained prior to six-minute walk test. Short Term Goals (to be achieved in 4 weeks):  []  BP Medication compliance  []  Low Na+ diet (DASH)  []  Monitors BP at home  Resting BP :   Other:   Interventions  []  Monitor BP before and after exercise  Education Topics (see education log):  []  Living with Hypertension     LTG (to be achieved by D/C):  [] Consistent resting blood pressure <  [] Monitors BP at home  [] BP medication compliance     *Please see recent flow sheets  Newly diagnosed:   []  Yes  []  No    [] N/A     If yes, describe date of onset and medication changes:    STG Status/UPOC:  Per patient report    BP Medication compliance:  [] Yes   []  No, continue    Low Na+ diet:  [] Yes   []  No, continue    Monitors BP at home:  [] Yes   []  No, continue    Resting BP (obtained by clinician):  [] Yes   []  No, continue    Other:  *Please see recent flow sheets  Newly diagnosed:   []  Yes  []  No    [] N/A     If yes, describe date of onset and medication changes:    STG Status/UPOC:  Per patient report    BP Medication compliance:   [] Yes   []  No, continue    Low Na+ diet:  [] Yes   []  No, continue    Monitors BP at home:  [] Yes   []  No, continue    Resting BP (obtained by clinician):  [] Yes   []  No, continue    Other:          LTG (to be achieved by D/C):  [] Consistent resting blood pressure <  [] Monitors BP at home  [] BP medication compliance   LTG (to be achieved by D/C):  [] Consistent resting blood pressure <  [] Monitors BP at home  [] BP medication compliance       Core Measures Diabetes    Initial Assessment    JOSE Jean  10/16/2017,1:02 PM Initial Plan Goals/Interventions/Education 30-Day Re-Assessment/POC    Date/Time:11/13/17 11:00 AM  Initials: [address] 60-Day Re-Assessment/POC    Date/Time  124/17 1:00 PM  Initials: [address]   []  Not a Risk Factor at This Time. No plan of care required. [x]  Diabetes Risk Factor    []  Type I  [x]  Type II      []  Insulin Dependent  []  Insulin Pump? [x]  Oral Hyperglycemic            Medications  Metformin  []  Diet Controlled  []  Newly Diagnosed      Patient Owns a Glucometer             [x] Yes   []  No      [x] Patient monitors BS @ home          If yes, frequency/day 1-2 x per day. BS Range at home:     How Long a Diabetic? 2008    Pt compliant With Meds                   [x]  Yes     []  No      Pt Compliant With Diabetic Diet                 []  Yes       []  No      HbA1c    (Desired <7%)    Date:    [] HbA1c Unavailable Short Term Goals (to be achieved in 4 weeks):  [] Compliant with diabetic medication  [] Compliant with diabetic diet  [] Monitors blood sugar at home  [x] Takes appropriate corrective actions when blood glucose is out of range   [] Other:   Interventions  []  Review patient's reported blood sugar pre and post exercise, as needed based on signs/symptoms.   Education Topics(see education log):  [x] Living with Diabetes     LTG (to be achieved by D/C):    [] Blood sugar <     And >    For exercise  [] Patient is medication compliant  [] Patient controls blood sugar via diet  [] Blood sugar <    And >    For fasting blood sugar     *Please see recent flow sheets  Newly diagnosed:   []  Yes  []  No    [] N/A     If yes, describe date of onset and medication changes:    STG Status/UPOC:  Per patient report    Compliant with diabetic medication:   [] Yes   []  No, continue    Compliant with diabetic diet:  [] Yes   []  No, continue    Monitors blood sugar at home:  [] Yes   []  No, continue    Takes appropriate corrective actions when blood glucose is out of range:  [] Yes   []  No, continue    Other: *Please see recent flow sheets  Newly diagnosed:   []  Yes  []  No    [] N/A     If yes, describe date of onset and medication changes:    STG Status/UPOC:  Per patient report    Compliant with diabetic medication:  [] Yes   []  No, continue      Compliant with diabetic diet:  [] Yes   []  No, continue    Monitors blood sugar at home:  [] Yes   []  No, continue    Takes appropriate corrective actions when blood glucose is out of range:  [] Yes   []  No, continue    Other:          LTG (to be achieved by D/C):    [] Blood sugar <     And >    For exercise  [] Patient is medication compliant  [] Patient controls blood sugar via diet  [] Blood sugar <    And >    For fasting blood sugar       LTG (to be achieved by D/C):    [] Blood sugar <     And >    For exercise  [] Patient is medication compliant  [] Patient controls blood sugar via diet  [] Blood sugar <    And >    For fasting blood sugar                     Initial Assessment Clinician Notes:   Patient Goal(s): Less SOB and fatigue. Huyen Vinson was an active participant in the development of this ITP and is agreeable to treatment within an open gym environment. Supervising Physician: Dr. Mily Jackson, OTR/L  10/16/2017,1:02 PM    Medical Director ITP and Exercise Prescription Approval  Respiratory Therapy    Initial Evaluation and Exercise Prescription:  The Medical Directors electronic co-signature validates that provider has reviewed the above findings and concur with these findings with any changes and/or additional comments noted below. I certify the need for Respiratory therapy furnished under this plan of treatment and while under my care. I agree with the plan and certify that this therapy is necessary.     Certification Period: 10/16/17 to 11/14/17    30-Day Reassessment:  Patient's functional level; progress towards goals; carry-over learning within the home; problems, concerns and/or deficits; treatment plan (treatment modalities); and patient goals were reviewed with the patient. Additional Physician findings and/or comments (if applicable). The Medical Directors electronic co-signature validates that provider has reviewed the above findings and concur with these findings with any changes and/or additional comments noted below. I certify the need for Respiratory therapy furnished under this plan of treatment and while under my care. I agree with the plan and certify that this therapy is necessary. Certification Period: 11/15/17 to 12/14/17      60- Day Reassessment:  Patient's functional level; progress towards goals; carry-over learning within the home; problems, concerns and/or deficits; treatment plan (treatment modalities); and patient goals were reviewed with the patient. Additional Physician findings and/or comments (if applicable). The Medical Directors electronic co-signature validates that provider has reviewed the above findings and concur with these findings with any changes and/or additional comments noted below. I certify the need for Respiratory therapy furnished under this plan of treatment and while under my care. I agree with the plan and certify that this therapy is necessary. Certification Period: 12/15/17 to 1/13/18        Respiratory Therapy Discharge Assessment and Long-Term Goals    Exercise (Required):  [x] LTG. 1:  Exercise >115     Minutes of exercise weekly, with O2 saturations >89      , RPD < 2         - LTG 1. Status: [x]-Met  []-Not Met Comments:    [x] LTG 2: Increase 6MW test by   50   Feet (Initial 6-MW Test feet:  682      ,Discharge 6-MW Test Distance:  458       )     - LTG 2. Status: []-Met  []-Not Met Comments: patient complained of high levels of pain in legs at discharge walk. Indicated she didn't think she could walk very far.     Discharge Plan:    []-Maintenance Program    [x]-HEP     []-Other:     Nutrition (Required):  [x] LTG. 1:  Patient able to verbalize an understanding of life-long adherence to a healthy diet and the impact on health conditions. Comments:    -LTG Status: [x]-Met  []-Not Met Comments:       Initial RYP score:  56  Discharge RYP score: 56  Discharge Plan: [x]-Maintain dietary adherence     []-Other:    Psychosocial (Required):  [x] LTG. 1:  Use relaxation techniques when stressors are identified and verbalize coping skills and decrease vulnerability to stress.    - LTG 1. Status: [x]-Met  []-Not Met Comments:  [] LTG. 2: Maintain decreased smoking behaviors/smoking cessation      - LTG 2. Status: []-Met  []-Not Met Comments:  [x] LTG. 3: Improve PHQ-9 score (Initial PHQ-9 score: 14     , Discharge PHQ-9 score   5    )      - LTG 3. Status: [x]-Met  []-Not Met Comments:   [x] LTG. 4: Improve [employer] scores and maintain a healthy psychosocial well-being     - LTG 4. Status: [x]-Met  []-Not Met Comments: at discharge had less anxiety/derpression, more socially active, improved overall health, better support system. Discharge Plan: [x]-Maintain healthy psychosocial well-being     []-Other:    *See [facility] Quality of Life tool for pre and post-treatment screenings. Oxygen (Required):  [x] LTG. 1: Patient will independently preform pursed-lip breathing (initial 6MW test (during) Sats:  97         DC 6MW test (during) Sats: 98     - LTG 1. Status: [x]-Met  []-Not Met Comments:       [x] LTG. 2: Patient will independently perform diaphragmatic breathing      - LTG 2. Status: [x]-Met  []-Not Met Comments:    [] LTG. 3: Patient will follow Oxygen prescription     - LTG 3. Status: []-Met  []-Not Met Comments:    Discharge Plan: [x]-Maintain healthy oxygen saturations     []-Other:       CHF (Prevention of Exacerbation): []-Risk Factor [x]-Not a Risk Factor at This Time  [] LTG. 1: Able to verbalize individual baselines, signs and symptoms of worsening heart failure and appropriate self-mangement.      -LTG Status1: []-Met  []-Not Met Comments:  [] LTG. 2: Compliant with medications     -LTG Status 2: []-Met  []-Not Met Comments:  [] LTG. 3: Compliant with Sodium intake and fluid intake     -LTG Status 3: []-Met  []-Not Met Comments:  [] LTG. 4: Weighs self daily     -LTG Status 4: []-Met []-Not Met Comments:    [] LTG. 5: Monitors blood pressure at home     -LTG Status 5: []-Met []-Not Met Comments:       Discharge Plan:  []-Maintain CHF exacerbation prevention     []-Other:      Hypertension: []-Risk Factor [x]-Not a Risk Factor at This Time  [] LTG. 1: Consistent resting blood pressure <           Initial Resting BP:         DC Resting BP:     -LTG Status 1: []-Met []-Not Met Comments:    [] LTG. 2: Monitors Blood Pressure at home     -LTG Status 2: []-Met []-Not Met Comments:     [] LTG. 3: Blood Pressure medication compliance        -LTG Status 3: []-Met []-Not Met Comments:    Discharge Plan: []-Maintain healthy blood pressure   []-Other:     Diabetes:    [x]- Risk Factor      []-- Not a Risk Factor at This Time  [] LTG. 1: Blood Sugar <       And >         For exercise     -LTG Status 1: []-Met  []-Not Met Comments:    [] LTG. 2: Patient is medication compliant      -LTG Status 2: []-Met  []-Not Met Comments:    [] LTG. 3: Patient controls blood sugar via diet     -LTG Status 3: []-Met  []-Not Met Comments:     [] LTG. 4: Blood Sugar <        And >       For fasting blood sugar     -LTG Status 4: []-Met  []-Not Met Comments:    Initial Fasting Blood Glucose:          Discharge Fasting Blood Glucose:     Discharge Plan: []-Maintain healthy glucose levels     []-Other:      Clinicians Name:Hallie Sanderson, OTR/L   Date/Time 3/12/18  12:00 PM        I have reviewed the above findings and concur with these findings with any changes and/or additional comments noted below.   I certify I have conducted the outcome assessment (above) based on patient-centered outcomes (including the [facility]) which includes objective clinical measurements of the effectiveness of the respiratory therapy program for this individual.

## 2017-10-18 ENCOUNTER — HOSPITAL ENCOUNTER (OUTPATIENT)
Dept: PULMONOLOGY | Age: 67
Discharge: HOME | End: 2017-10-18
Payer: MEDICARE

## 2017-10-18 PROCEDURE — G0237 THERAPEUTIC PROCD STRG ENDUR: HCPCS

## 2017-10-18 NOTE — PROGRESS NOTES
[facility]  Respiratory Therapy Flowsheet      Brenna Corea  1950       Patient's carry-over of treatment delivered by: 1st treatment session. Objective Daily Findings    Comments:    Respiratory Muscle Functioning/Exercise Conditioning/Strengthening Session:    Time In: 2:30  Time Out::4:25  Session Number: 1  O2 with Therapy: 0 L/min    Pre SPO2 98  Pre HR:97  Pre BP: 121/71    RR: 18    Wt: not tested by patient   Temp: not tested by patient   Post SPO2: 99 Post HR: 83  Post BP: 127/75    Self Care Home Management Instruction/Education    Patient Self Monitored Activity Time In:NA Time Out:NA     Self Care Home Management Instruction Education: Breathing Retaining. Patient's Response to Education: Patient actively participated in education. Comments: Individual Therapy         Goal    Actual                      During Therapy                 Post-Therapy     % SpO2 HR RPD 1 - 4 RPE 6-20 Pain  1 - 10 % SpO2 HR RPD 1 - 4 RPE 6-20 Pain 0 - 10   Treadmill                 Bike               Stepper 30 min x L6 30 min x L3 97 99 1 8 1 97 105 1 10 1   Arm Ergometer 30 min x 25 hawkins 30 min x 10 hawkins 98 101 2 8 3 99 104 2 10 3   Rower               Weight Training               Therabands               Weighted DB 30 min x 4 lb 25 min x 0 lb 98 86 1 8 1 98 79 1 8 1   IMT               IS 30 min x 2500 30 min x 1750 98 84 1 6 1 98 85 1 6 1                    Patient's response to therapy: Good effort and Good motivation  Comments  :    Assessment    Patient's response: Patient progressing towardsd LTGs evident by: Increased Pacing. Plan: Continue as written    VWUU:    Billin Minutes of Individualized Treatment ()        Physician supervision provided this date of service by: Dr. Gregorio Gonzalez       Therapist Signature: JOSE Carvalho    Therapist PRINTED Name and Credential: JOSE Carvalho    10/18/2017  3:43 PM

## 2017-10-20 ENCOUNTER — HOSPITAL ENCOUNTER (OUTPATIENT)
Dept: PULMONOLOGY | Age: 67
Discharge: HOME | End: 2017-10-20
Payer: MEDICARE

## 2017-10-20 PROCEDURE — G0237 THERAPEUTIC PROCD STRG ENDUR: HCPCS

## 2017-10-20 NOTE — PROGRESS NOTES
[facility]  Respiratory Therapy Flowsheet      Freada Bottom  1950       Patient's carry-over of treatment delivered by: none noted today. Objective Daily Findings    Comments:    Respiratory Muscle Functioning/Exercise Conditioning/Strengthening Session:    Time In: 2:30  Time Out::4:25  Session Number: 2  O2 with Therapy: 0 L/min    Pre SPO2 94 Pre HR:98  Pre BP: 108/73    RR: 18    Wt: not tested by patient   Temp: not tested by patient   Post SPO2: 98 Post HR: 90  Post BP: 114/72    Self Care Home Management Instruction/Education    Patient Self Monitored Activity Time In:NA Time Out:NA     Self Care Home Management Instruction Education: Breathing Retaining. Patient's Response to Education: Patient actively participated in education. Comments: Individual Therapy         Goal    Actual                      During Therapy                 Post-Therapy     % SpO2 HR RPD 1 - 4 RPE 6-20 Pain  1 - 10 % SpO2 HR RPD 1 - 4 RPE 6-20 Pain 0 - 10   Treadmill                 Bike               Stepper 30 min x L6 30 min x L4 98 93 1 8 4 97 95 1 10 4   Arm Ergometer 30 min x 25 hawkins 30 min x 15 hawkins 97 92 1 10 4 98 104 1 12 4   Rower               Weight Training 15 min x 2 lb 10 min x 2 lb 98 74 1 7 1 98 70 1 8 1   Therabands               Weighted DB 30 min x 4 lb 20 min x 0 lb 97 81 1 8 1 98 76 1 8 1   IMT               IS 30 min x 2500 25 min x 1750 99 78 1 6 1 99 72 1 6 1                    Patient's response to therapy: Good motivation  Comments  :c/o pain in shoulders on arm bike and knees on stepper. Assessment    Patient's response: Patient progressing towardsd LTGs evident by: Increased PLB and Increased Pacing. Plan: Continue as written    YVPD:    Billin Minutes of Individualized Treatment ()        Physician supervision provided this date of service by: Dr. Vargas Russell       Therapist Signature: ANMOL Martin/SCOTTY    Therapist PRINTED Name and Credential: Lai Leon Mu Kelly, OTR/L    10/20/2017  4:24 PM

## 2017-10-23 ENCOUNTER — HOSPITAL ENCOUNTER (OUTPATIENT)
Dept: PULMONOLOGY | Age: 67
Discharge: HOME | End: 2017-10-23
Payer: MEDICARE

## 2017-10-23 PROCEDURE — G0237 THERAPEUTIC PROCD STRG ENDUR: HCPCS

## 2017-10-23 NOTE — PROGRESS NOTES
[facility]  Respiratory Therapy Flowsheet      Mina Marsh  1950       Patient's carry-over of treatment delivered by: is starting to climb steps at home more easily. Objective Daily Findings    Comments:    Respiratory Muscle Functioning/Exercise Conditioning/Strengthening Session:    Time In: 2;30  Time Out::4:25  Session Number: 3  O2 with Therapy: 0 L/min    Pre SPO2 97  Pre HR:102  Pre BP: 94/70    RR: 18    Wt: 232   Temp: not tested by patient   Post SPO2: 97 Post HR: 90  Post BP: 109/67    Self Care Home Management Instruction/Education    Patient Self Monitored Activity Time In:NA Time Out:NA     Self Care Home Management Instruction Education: Breathing Retaining and Exercise Concepts. Patient's Response to Education: Patient actively participated in education. Comments: Individual Therapy         Goal    Actual                      During Therapy                 Post-Therapy     % SpO2 HR RPD 1 - 4 RPE 6-20 Pain  1 - 10 % SpO2 HR RPD 1 - 4 RPE 6-20 Pain 0 - 10   Treadmill                 Bike               Stepper 30 min x L4 30 min x L5 97 98 2 8 1 97 105 2 10 1   Arm Ergometer 30 min x 25 hawkins 30 min x 20 hawkins 97 96 1 8 1 98 105 1 10 1   Rower               Weight Training 15 min UB ther ex  x 2 lb 10 min UB ther ex  x 2 lb 97 75 1 8 1 98 86 1 8 1   Therabands               Weighted DB 30 min x 4 lb 20 min x 4 lb 98 82 1 8 1 97 77 1 8 1   IMT               IS 30 min x 2500 25 min x 2000 98 75 1 6 1 98 77 1 7 1                    Patient's response to therapy: Good effort and Good motivation  Comments  :    Assessment    Patient's response: Patient progressing towardsd LTGs evident by: Increased PLB and Increased Pacing. Plan: Continue as written    RJTD:    Billin Minutes of Individualized Treatment ()        Physician supervision provided this date of service by: Dr. Jesse Clay       Therapist Signature: Cadence Norman OTR/L    Therapist PRINTED Name and Credential: ANMOL Marroquin/SCOTTY    10/23/2017  5:10 PM

## 2017-10-25 ENCOUNTER — HOSPITAL ENCOUNTER (OUTPATIENT)
Dept: PULMONOLOGY | Age: 67
Discharge: HOME | End: 2017-10-25
Payer: MEDICARE

## 2017-10-25 PROCEDURE — G0237 THERAPEUTIC PROCD STRG ENDUR: HCPCS

## 2017-10-25 NOTE — PROGRESS NOTES
[facility]  Respiratory Therapy Flowsheet      Lizeth Shepherd  1950       Patient's carry-over of treatment delivered by: has more energy overall. Objective Daily Findings    Comments:    Respiratory Muscle Functioning/Exercise Conditioning/Strengthening Session:    Time In: 2:30  Time Out::4:25  Session Number: 4  O2 with Therapy: 0 L/min    Pre SPO2 96  Pre HR:97  Pre BP: 119/64    RR: 18    Wt: 231   Temp: 98.4   Post SPO2: 97 Post HR: 94  Post BP: 115/71    Self Care Home Management Instruction/Education    Patient Self Monitored Activity Time In:NA Time Out:NA     Self Care Home Management Instruction Education: Breathing Retaining and Exercise Concepts. Patient's Response to Education: Patient actively participated in education. Individual Therapy         Goal    Actual                      During Therapy                 Post-Therapy     % SpO2 HR RPD 1 - 4 RPE 6-20 Pain  1 - 10 % SpO2 HR RPD 1 - 4 RPE 6-20 Pain 0 - 10   Treadmill                 Bike 30 min x L1 25 min x L1 99 100 1 10 1 98 103 2 12 1   Stepper 30 min x L5 30 min x L4 96 89 2 10 1 97 85 2 12 1   Arm Ergometer 30 min x 25 hawkins 30 min x 15 hawkins 97 92 1 10 1 96 94 1 12 4   Rower               Weight Training               Therabands               Weighted DB               IMT               IS 30 min x 2500 30 min x 2000 97 89 1 6 1 96 90 1 7 1                    Patient's response to therapy: Good effort and Good motivation. C/o pain in right arm on arm bike. Assessment    Patient's response: Patient progressing towardsd LTGs evident by: Increased PLB and Increased Pacing. Plan: Continue as written    MEII:    Billin Minutes of Individualized Treatment ()        Physician supervision provided this date of service by: Dr. Tori Allen       Therapist Signature: JOSE Matthews    Therapist PRINTED Name and Credential: JOSE Matthews    10/25/2017  3:14 PM

## 2017-10-27 ENCOUNTER — HOSPITAL ENCOUNTER (OUTPATIENT)
Dept: PULMONOLOGY | Age: 67
Discharge: HOME | End: 2017-10-27
Payer: MEDICARE

## 2017-10-30 ENCOUNTER — HOSPITAL ENCOUNTER (OUTPATIENT)
Dept: PULMONOLOGY | Age: 67
Discharge: HOME | End: 2017-10-30
Payer: MEDICARE

## 2017-10-30 DIAGNOSIS — E03.8 OTHER SPECIFIED HYPOTHYROIDISM: ICD-10-CM

## 2017-10-30 PROCEDURE — G0237 THERAPEUTIC PROCD STRG ENDUR: HCPCS

## 2017-10-30 NOTE — PROGRESS NOTES
[facility]  Respiratory Therapy Flowsheet      Janey Condon  1950       Patient's carry-over of treatment delivered by: feels less depressed. Objective Daily Findings    Comments:    Respiratory Muscle Functioning/Exercise Conditioning/Strengthening Session:    Time In: 2:00  Time Out::3:55  Session Number: 5  O2 with Therapy: 0 L/min    Pre SPO2 98  Pre HR:82  Pre BP: 122/72    RR: 18    Wt: 229   Temp: 97.6   Post SPO2: 98 Post HR: 92  Post BP: 124/72    Self Care Home Management Instruction/Education    Patient Self Monitored Activity Time In:NA Time Out:NA     Self Care Home Management Instruction Education: Breathing Retaining and Exercise Concepts. Patient's Response to Education: Patient actively participated in education. Comments: Individual Therapy         Goal    Actual                      During Therapy                 Post-Therapy     % SpO2 HR RPD 1 - 4 RPE 6-20 Pain  1 - 10 % SpO2 HR RPD 1 - 4 RPE 6-20 Pain 0 - 10   Treadmill                 Bike               Stepper 30 min x L5 40 min x L5 98 106 2 12 6 96 107 2 13 8   Arm Ergometer 30 min x 25 hawkins 30 min x 15 hawkins 97 89 1 10 1 96 92 1 12 1   Rower               Weight Training               Therabands               Weighted DB 30 min x 4 lb 25 min x 4 lb 98 87 1 8 1 97 89 1 8 1   IMT               IS 30 min x 2500  20 min x 2000 96 87 1 7 1 97 90 1 7 1                    Patient's response to therapy: Good effort and Good motivation  Comments  :c/o pain in knees and quads on stepper. Assessment    Patient's response: Patient progressing towardsd LTGs evident by:  Decreased Dyspnea on exertion, Increased PLB and Increased DB. Plan: Continue as written    CDIE:    B[facility] Minutes of Individualized Treatment ()        Physician supervision provided this date of service by: Dr. Pita Garcia       Therapist Signature: ANMOL Rice/L    Therapist PRINTED Name and Credential: Hope Ventura OTRUDY/L    10/30/2017  4:21 PM

## 2017-10-31 RX ORDER — LEVOTHYROXINE SODIUM 25 UG/1
TABLET ORAL
Qty: 90 TAB | Refills: 2 | Status: SHIPPED | OUTPATIENT
Start: 2017-10-31 | End: 2017-11-07 | Stop reason: SDUPTHER

## 2017-11-01 ENCOUNTER — HOSPITAL ENCOUNTER (OUTPATIENT)
Dept: PULMONOLOGY | Age: 67
Discharge: HOME | End: 2017-11-01
Payer: MEDICARE

## 2017-11-01 PROCEDURE — G0237 THERAPEUTIC PROCD STRG ENDUR: HCPCS

## 2017-11-01 NOTE — PROGRESS NOTES
[facility]  Respiratory Therapy Flowsheet      Nahum Cohenvictoria  1950       Patient's carry-over of treatment delivered by: can wash dishes and vacuum with less exertion and fatigue. Objective Daily Findings    Comments:    Respiratory Muscle Functioning/Exercise Conditioning/Strengthening Session:    Time In: 2:30  Time Out::3:55  Session Number: 6  O2 with Therapy: 0 L/min    Pre SPO2 97  Pre HR:70  Pre BP: 122/81    RR: 18    Wt: 228   Temp: 98.8   Post SPO2: 97 Post HR: 95  Post BP: 116/77    Self Care Home Management Instruction/Education    Patient Self Monitored Activity Time In:NA Time Out:NA     Self Care Home Management Instruction Education: Breathing Retaining and Exercise Concepts. Patient's Response to Education: Patient actively participated in education. Comments: Individual Therapy         Goal    Actual                      During Therapy                 Post-Therapy     % SpO2 HR RPD 1 - 4 RPE 6-20 Pain  1 - 10 % SpO2 HR RPD 1 - 4 RPE 6-20 Pain 0 - 10   Treadmill                 Bike               Stepper 30 min x L5 25 min x L5 97 105 2 12 6 98 106 3 12 6   Arm Ergometer 30 min x 25 hawkins 20 min x 20 hawkins 98 99 3 11 1 97 96 3 12 1   Rower               Weight Training               Therabands               Weighted DB 20 min x 4 olb 15 min x 7 lb 97 82 1 8 1 98 87 1 8 1   IMT               IS 25 min x 3000 25 min x 2750 96 88 1 7 1 97 89 1 7 1                    Patient's response to therapy: Good effort and Good motivation  Comments  : c/o pain in left knee. Assessment    Patient's response: Patient progressing towardsd LTGs evident by: Increased PLB and Increased Pacing. Plan: Continue as written    HEAD:    Billin Minutes of Individualized Treatment ()        Physician supervision provided this date of service by: Dr. Miguel Kelsey       Therapist Signature: ANMOL Reyes/SCOTTY    Therapist PRINTED Name and Credential: Dede Flores OTR/L    11/1/2017  3:48 PM

## 2017-11-03 ENCOUNTER — HOSPITAL ENCOUNTER (OUTPATIENT)
Dept: PULMONOLOGY | Age: 67
Discharge: HOME | End: 2017-11-03
Payer: MEDICARE

## 2017-11-06 ENCOUNTER — HOSPITAL ENCOUNTER (OUTPATIENT)
Dept: PULMONOLOGY | Age: 67
Discharge: HOME | End: 2017-11-06
Payer: MEDICARE

## 2017-11-06 PROCEDURE — G0237 THERAPEUTIC PROCD STRG ENDUR: HCPCS

## 2017-11-06 NOTE — PROGRESS NOTES
[facility]  Respiratory Therapy Flowsheet      Nasir Otoole  1950       Patient's carry-over of treatment delivered by: Has more energy overall. Objective Daily Findings    Comments:    Respiratory Muscle Functioning/Exercise Conditioning/Strengthening Session:    Time In: 2:30  Time Out::4:25  Session Number: 7  O2 with Therapy: 0 L/min    Pre SPO2 98  Pre HR:98  Pre BP: 106/64    RR: 18    Wt: 229   Temp: 97.8   Post SPO2: 98 Post HR: 92  Post BP: 101/68    Self Care Home Management Instruction/Education    Patient Self Monitored Activity Time In:NA Time Out:NA     Self Care Home Management Instruction Education: Breathing Retaining and Exercise Concepts. Patient's Response to Education: Patient actively participated in education. Comments: Individual Therapy         Goal    Actual                      During Therapy                 Post-Therapy     % SpO2 HR RPD 1 - 4 RPE 6-20 Pain  1 - 10 % SpO2 HR RPD 1 - 4 RPE 6-20 Pain 0 - 10   Treadmill                 Bike               Stepper 30 min x L6 30 min x L6 96 83 2 12 4 96 89 2 12 5   Arm Ergometer 30 min x 25 hawkins 30 min x 20 hawkins 98 98 1 10 1 97 95 2 10 1   Rower               Weight Training 20 min x 3 lb UB 15 min x 2 lb UB 97 80 1 8 1 98 88 1 10 1   Therabands               Weighted DB 20 min x 7 lb 15 min x 7 lb 97 93 1 8 1 97 87 1 8 1   IMT               IS 30 min x 3000 25 min x 2500 96 85 1 8 1 97 81 1 8 1                    Patient's response to therapy: Good motivation  Comments  : c/o pain in knees on stepper. Assessment    Patient's response: Patient progressing towardsd LTGs evident by: Increased PLB and Increased Pacing. Plan: Continue as written    CVTA:    B[facility] Minutes of Individualized Treatment ()        Physician supervision provided this date of service by: Dr. Shauna Morgan       Therapist Signature: Filomena Sethi OTR/L    Therapist PRINTED Name and Credential: Filomena Sethi OTRUDY/L    11/6/2017  4:48 PM

## 2017-11-07 ENCOUNTER — HOSPITAL ENCOUNTER (OUTPATIENT)
Dept: LAB | Age: 67
Discharge: HOME | End: 2017-11-07

## 2017-11-07 ENCOUNTER — OFFICE VISIT (OUTPATIENT)
Dept: FAMILY MEDICINE CLINIC | Age: 67
End: 2017-11-07

## 2017-11-07 VITALS
HEART RATE: 100 BPM | DIASTOLIC BLOOD PRESSURE: 61 MMHG | OXYGEN SATURATION: 100 % | RESPIRATION RATE: 18 BRPM | BODY MASS INDEX: 35.34 KG/M2 | SYSTOLIC BLOOD PRESSURE: 107 MMHG | TEMPERATURE: 96.6 F | HEIGHT: 68 IN | WEIGHT: 233.2 LBS

## 2017-11-07 DIAGNOSIS — F42.2 MIXED OBSESSIONAL THOUGHTS AND ACTS: ICD-10-CM

## 2017-11-07 DIAGNOSIS — E11.69 TYPE 2 DIABETES MELLITUS WITH OTHER SPECIFIED COMPLICATION, WITHOUT LONG-TERM CURRENT USE OF INSULIN (HCC): ICD-10-CM

## 2017-11-07 DIAGNOSIS — Z23 ENCOUNTER FOR IMMUNIZATION: ICD-10-CM

## 2017-11-07 DIAGNOSIS — E78.5 HYPERLIPIDEMIA LDL GOAL <100: ICD-10-CM

## 2017-11-07 DIAGNOSIS — F40.01 PANIC DISORDER WITH AGORAPHOBIA: ICD-10-CM

## 2017-11-07 DIAGNOSIS — E03.8 OTHER SPECIFIED HYPOTHYROIDISM: ICD-10-CM

## 2017-11-07 DIAGNOSIS — Z00.00 MEDICARE ANNUAL WELLNESS VISIT, SUBSEQUENT: Primary | ICD-10-CM

## 2017-11-07 DIAGNOSIS — F33.41 RECURRENT MAJOR DEPRESSIVE DISORDER, IN PARTIAL REMISSION (HCC): ICD-10-CM

## 2017-11-07 PROCEDURE — 99001 SPECIMEN HANDLING PT-LAB: CPT | Performed by: FAMILY MEDICINE

## 2017-11-07 RX ORDER — MELOXICAM 15 MG/1
TABLET ORAL
Qty: 90 TAB | Refills: 3 | Status: SHIPPED | OUTPATIENT
Start: 2017-11-07 | End: 2018-03-13 | Stop reason: SDUPTHER

## 2017-11-07 RX ORDER — LEVOTHYROXINE SODIUM 25 UG/1
TABLET ORAL
Qty: 90 TAB | Refills: 2 | Status: SHIPPED | OUTPATIENT
Start: 2017-11-07 | End: 2018-08-17 | Stop reason: SDUPTHER

## 2017-11-07 RX ORDER — ATORVASTATIN CALCIUM 20 MG/1
TABLET, FILM COATED ORAL
Qty: 90 TAB | Refills: 1 | Status: SHIPPED | OUTPATIENT
Start: 2017-11-07 | End: 2018-02-08 | Stop reason: SDUPTHER

## 2017-11-07 RX ORDER — FLUOXETINE 10 MG/1
CAPSULE ORAL
Qty: 90 CAP | Refills: 3 | Status: SHIPPED | OUTPATIENT
Start: 2017-11-07 | End: 2018-08-17 | Stop reason: SDUPTHER

## 2017-11-07 NOTE — PATIENT INSTRUCTIONS
Medicare Part B Preventive Services Limitations Recommendation Scheduled   Bone Mass Measurement  (age 72 & older, biennial) Requires diagnosis related to osteoporosis or estrogen deficiency. Biennial benefit unless patient has history of long-term glucocorticoid tx or baseline is needed because initial test was by other method     Cardiovascular Screening Blood Tests (every 5 years)  Total cholesterol, HDL, Triglycerides Order as a panel if possible     Colorectal Cancer Screening  -Fecal occult blood test (annual)  -Flexible sigmoidoscopy (5y)  -Screening colonoscopy (10y)  -Barium Enema      Counseling to Prevent Tobacco Use (up to 8 sessions per year)  - Counseling greater than 3 and up to 10 minutes  - Counseling greater than 10 minutes Patients must be asymptomatic of tobacco-related conditions to receive as preventive service     Diabetes Screening Tests (at least every 3 years, Medicare covers annually or at 6-month intervals for prediabetic patients)    Fasting blood sugar (FBS) or glucose tolerance test (GTT) Patient must be diagnosed with one of the following:  -Hypertension, Dyslipidemia, obesity, previous impaired FBS or GTT  Or any two of the following: overweight, FH of diabetes, age ? 72, history of gestational diabetes, birth of baby weighing more than 9 pounds     Diabetes Self-Management Training (DSMT) (no USPSTF recommendation) Requires referral by treating physician for patient with diabetes or renal disease. 10 hours of initial DSMT session of no less than 30 minutes each in a continuous 12-month period. 2 hours of follow-up DSMT in subsequent years.      Glaucoma Screening (no USPSTF recommendation) Diabetes mellitus, family history, , age 48 or over,  American, age 72 or over     Human Immunodeficiency Virus (HIV) Screening (annually for increased risk patients)  HIV-1 and HIV-2 by EIA, ABHILASH, rapid antibody test, or oral mucosa transudate Patient must be at increased risk for HIV infection per USPSTF guidelines or pregnant. Tests covered annually for patients at increased risk. Pregnant patients may receive up to 3 test during pregnancy. Medical Nutrition Therapy (MNT) (for diabetes or renal disease not recommended schedule) Requires referral by treating physician for patient with diabetes or renal disease. Can be provided in same year as diabetes self-management training (DSMT), and CMS recommends medical nutrition therapy take place after DSMT. Up to 3 hours for initial year and 2 hours in subsequent years. Shingles Vaccination A shingles vaccine is also recommended once in a lifetime after age 61     Seasonal Influenza Vaccination (annually)      Pneumococcal Vaccination (once after 72)      Hepatitis B Vaccinations (if medium/high risk) Medium/high risk factors:  End-stage renal disease,  Hemophiliacs who received Factor VIII or IX concentrates, Clients of institutions for the mentally retarded, Persons who live in the same house as a HepB virus carrier, Homosexual men, Illicit injectable drug abusers. Screening Mammography (biennial age 54-69) Annually (age 36 or over)     Screening Pap Tests and Pelvic Examination (up to age 79 and after 79 if unknown history or abnormal study last 10 years) Every 25 months except high risk     Ultrasound Screening for Abdominal Aortic Aneurysm (AAA) (once) Patient must be referred through [facility] and not have had a screening for abdominal aortic aneurysm before under Medicare.   Limited to patients who meet one of the following criteria:  - Men who are 73-68 years old and have smoked more than 100 cigarettes in their lifetime.  -Anyone with a FH of AAA  -Anyone recommended for screening by USPSTF

## 2017-11-07 NOTE — PROGRESS NOTES
This is a Subsequent Medicare Annual Wellness Exam (AWV) (Performed 12 months after IPPE or effective date of Medicare Part B enrollment)    I have reviewed the patient's medical history in detail and updated the computerized patient record. History     Past Medical History:   Diagnosis Date    Ankle fracture     Aortic stenosis     S/P AVR in 2016    Asthma     Chronic pain     back pain    Diabetes ([facility] [address]     Diverticulitis     Fibromyalgia     H/O aortic valve replacement 03/16    21 mm bovine pericardial bioprosthesis and epiaortic scanning      Hypertension     Hypothyroidism     Lumbar post-laminectomy syndrome     Menopause     Obesity     Osteoarthritis     Psychotic disorder     Sciatica     Skin cancer 2013    right forearm. Past Surgical History:   Procedure Laterality Date    CHEST SURGERY PROCEDURE UNLISTED  03/2016    Open Heart Surgery    COLONOSCOPY      COLONOSCOPY N/A 9/15/2017    COLONOSCOPY performed by Benito Price MD at [facility] ENDOSCOPY    HX CATARACT REMOVAL Right 1995    lens  replaced.  HX COLONOSCOPY  09/15/2017    dr. Ojeda Has  f/u 5 years.  HX HEENT  1990's    sinus surgery     HX HEENT Right 11/02/2017    laser for right eye cataracts.  HX LUMBAR FUSION  2004    L3-L4-L5    HX LUMBAR LAMINECTOMY  2002    HX MOHS PROCEDURES Left 1990    HX ORTHOPAEDIC      Rotator cuff Bilateral    HX SHOULDER REPLACEMENT Right 03/29/2017    reverse.  SD COLSC FLX W/RMVL OF TUMOR POLYP LESION SNARE TQ  07/09/2014 repeat in 3 years    Dr. Pedro Luis Dietrich      Aortic valve replacement     Current Outpatient Prescriptions   Medication Sig Dispense Refill    KETOROLAC TROMETHAMINE/PF (KETOROLAC, PF, OP) Apply  to eye.  atorvastatin (LIPITOR) 20 mg tablet TAKE 1 TABLET BY MOUTH ONCE EVERY DAY. GENERIC FOR LIPITOR.  90 Tab 1    levothyroxine (SYNTHROID) 25 mcg tablet TAKE 1 TABLET BY MOUTH EVERY DAY BEFORE BREAKFAST 90 Tab 2    meloxicam (MOBIC) 15 mg tablet TAKE 1 TABLET BY MOUTH DAILY 90 Tab 3    FLUoxetine (PROZAC) 10 mg capsule TAKE 1 CAPSULE BY MOUTH ONCE DAILY. GENERICFOR PROZAC. 90 Cap 3    fluticasone (FLONASE) 50 mcg/actuation nasal spray SHAKE LIQUID AND USE 1 SPRAY IN EACH NOSTRIL TWICE DAILY 3 Bottle 1    DULoxetine (CYMBALTA) 30 mg capsule TAKE 1 CAPSULE BY MOUTH TWICE DAILY 180 Cap 0    VENTOLIN HFA 90 mcg/actuation inhaler 2 Puffs by Aerosolization route every four (4) hours as needed. 0    metFORMIN (GLUCOPHAGE) 500 mg tablet TAKE 1 TABLET BY MOUTH DAILY WITH DINNER. GENERIC FOR GLUCOPHAGE 90 Tab 0    traMADol (ULTRAM) 50 mg tablet TAKE 1 PO Q 4 TIMES A DAY PRN PAIN  Indications: NEUROPATHIC PAIN, Pain 120 Tab 2    butalbital-acetaminophen-caffeine (FIORICET, ESGIC) -40 mg per tablet Take 1 Tab by mouth every four (4) hours as needed for Pain. Max Daily Amount: 6 Tabs. 30 Tab 0    cetirizine (ZYRTEC) 10 mg tablet TAKE 1 TABLET BY MOUTH EVERY DAY. GENERIC FOR ZYRTEC 90 Tab 2    B.infantis-B.ani-B.long-B.bifi (PROBIOTIC 4X) 10-15 mg TbEC Take  by mouth daily.  ranitidine (ZANTAC) 150 mg tablet 150mg QAM and 75mg QPM      aspirin delayed-release 81 mg tablet Take 81 mg by mouth daily.  clobetasol (TEMOVATE) 0.05 % external solution APPLY TO SCALP AFTER SHAMPOO DAILY 50 mL 3    Cholecalciferol, Vitamin D3, (VITAMIN D3) 1,000 unit cap Take 1,000 Units by mouth daily.  glucose blood VI test strips (ACCU-CHEK MARCE PLUS TEST STRP) strip Please use one strip to test blood sugars once a day. 100 strip 20    Lancets (ACCU-CHEK SOFTCLIX LANCETS) misc Please use one lancet to test blood sugars twice a day. 1 Package 20    BLOOD-GLUCOSE METER (ACCU-CHEK MARCE MONITORING) by Does Not Apply route.  triamcinolone acetonide (KENALOG) 0.1 % topical cream Apply  to affected area two (2) times a day.  use thin layer       Allergies   Allergen Reactions    Latex, Natural Rubber Hives    Adhesive Rash     Some bandaids    Ciprofloxacin Rash    Codeine Nausea and Vomiting    Demerol [Meperidine] Nausea and Vomiting    Diclofenac Shortness of Breath and Palpitations    Erythromycin Rash    Levaquin [Levofloxacin] Rash    Loratadine Swelling    Morphine Nausea and Vomiting    Penicillin G Hives and Rash     Does fine with Keflex    Sulfa (Sulfonamide Antibiotics) Itching    Vicodin [Hydrocodone-Acetaminophen] Other (comments)     Metallic taste in mouth     Family History   Problem Relation Age of Onset    Hypertension Father     Heart Disease Father     Alcohol abuse Father      Social History   Substance Use Topics    Smoking status: Former Smoker     Years: 45.00     Types: Cigarettes     Quit date: 7/1/2011    Smokeless tobacco: Never Used    Alcohol use No     Patient Active Problem List   Diagnosis Code    Tachycardia R00.0    Diabetes (HCC) E11.9    Pulmonary hypertension I27.20    Osteoarthritis M19.90    Fibromyalgia M79.7    PTSD (post-traumatic stress disorder) F43.10    OCD (obsessive compulsive disorder) F42.9    Panic disorder with agoraphobia F40.01    Recurrent major depression ([address] F33.9    Aortic stenosis I35.0    Hyperlipidemia LDL goal <100 E78.5    Postlaminectomy syndrome M96.1    Lumbar neuritis M54.16    AS (aortic stenosis) I35.0    Long term (current) use of anticoagulants Z79.01    Ankle impingement syndrome M77.50    Radiculopathy, lumbar region M54.16    Lumbar spinal stenosis M48.061    Lumbar postlaminectomy syndrome M96.1       Depression Risk Factor Screening:     PHQ over the last two weeks 1/20/2017   Little interest or pleasure in doing things More than half the days   Feeling down, depressed or hopeless -   Total Score PHQ 2 -     Alcohol Risk Factor Screening: You do not drink alcohol or very rarely. Functional Ability and Level of Safety:   Hearing Loss  Hearing is good.      Hearing Screening    125Hz 250Hz 500Hz 1000Hz 2000Hz 3000Hz 4000Hz 6000Hz 8000Hz   Right ear:   Pass Pass Pass  Pass     Left ear:   Pass Pass Fail  Fail        Visual Acuity Screening    Right eye Left eye Both eyes   Without correction: f     With correction: 20/25 20/25 20/25       Activities of Daily Living  The home contains: no safety equipment. Patient does total self care    Fall Risk  Fall Risk Assessment, last 12 mths 11/7/2017   Able to walk? Yes   Fall in past 12 months? No     Mood - good    Abuse Screen  Patient is not abused    Cognitive Screening   Evaluation of Cognitive Function:  Has your family/caregiver stated any concerns about your memory: no  Normal    Patient Care Team   Patient Care Team:  Naye Ellis DO as PCP - General (Family Practice)  Kimber Stephenson MD (Colon and Rectal Surgery)  Nubia Shay MD (Cardiology)  Dru Hernández MD (Cardiothoracic Surgery)  Carlin Thompson MD (Pulmonary Disease)    Assessment/Plan   Education and counseling provided ( 15+ minutes) :  End of Life Counseling (with patient's permission) - Advanced directives and code status discussed with patient. Pt given advanced directive paperwork to review and complete at home. Pneumococcal Vaccine - given today   Influenza Vaccine - given today   Screening Mammography - 9/6/2016, was normal   Screening Pap and pelvic (covered once every 2 years) - PAP in [facility]. Negative HPV this year. Colorectal cancer screening tests - 9/15/2017; Impression: Diverticulosis, polyp  Cardiovascular screening blood test - up to date   Bone mass measurement (DEXA) - 1/22/2016, was normal   Screening for glaucoma - 1/9/2016. Following up with ophthalmologist.     Diagnoses and all orders for this visit:    1. Medicare annual wellness visit, subsequent    2. Recurrent major depressive disorder, in partial remission ([facility] [address]    3.  Encounter for immunization  -     Influenza virus vaccine ([address]) 72 years and older ([address])  -     Pneumococcal Polysaccharide vaccine, 23-Valent, Adult or Immunocompromised  -     ADMIN PNEUMOCOCCAL VACCINE  Medicare Injection Admin Charge  -     Administration fee () for Medicare insured patients    4. Hyperlipidemia LDL goal <100  -     LIPID PANEL; Future  -     atorvastatin (LIPITOR) 20 mg tablet; TAKE 1 TABLET BY MOUTH ONCE EVERY DAY. GENERIC FOR LIPITOR. 5. Other specified hypothyroidism  -     levothyroxine (SYNTHROID) 25 mcg tablet; TAKE 1 TABLET BY MOUTH EVERY DAY BEFORE BREAKFAST    6. Panic disorder with agoraphobia  -     FLUoxetine (PROZAC) 10 mg capsule; TAKE 1 CAPSULE BY MOUTH ONCE DAILY. GENERICFOR PROZAC.    7. Mixed obsessional thoughts and acts  -     FLUoxetine (PROZAC) 10 mg capsule; TAKE 1 CAPSULE BY MOUTH ONCE DAILY. Virginie Tolentino. 8. Type 2 diabetes mellitus with other specified complication, without long-term current use of insulin (HCC)  -     MICROALBUMIN, UR, RAND W/ MICROALBUMIN/CREA RATIO; Future  -     HEMOGLOBIN A1C WITH EAG; Future    Other orders  -     meloxicam (MOBIC) 15 mg tablet; TAKE 1 TABLET BY MOUTH DAILY      Health Maintenance Due   Topic Date Due    Pneumococcal 65+ Low/Medium Risk (2 of 2 - PPSV23) 12/07/2016    MEDICARE YEARLY EXAM  12/07/2016    EYE EXAM RETINAL OR DILATED Q1  01/19/2017    Influenza Age 5 to Adult  08/01/2017    MICROALBUMIN Q1  09/02/2017    LIPID PANEL Q1  09/02/2017    HEMOGLOBIN A1C Q6M  09/27/2017     Patient given opportunity to ask questions. Questions answered. Patient understands plan of care.    Follow-up Disposition: Not on File

## 2017-11-07 NOTE — PROGRESS NOTES
1. Have you been to the ER, urgent care clinic since your last visit? Hospitalized since your last visit? No    2. Have you seen or consulted any other health care providers outside of the [address] [place] [address] since your last visit? Include any pap smears or colon screening.  No

## 2017-11-07 NOTE — ACP (ADVANCE CARE PLANNING)
Advance Care Planning (ACP) Provider Conversation Snapshot    Date of ACP Conversation: 11/07/17  Persons included in Conversation:  patient  Length of ACP Conversation in minutes:  16 minutes    Authorized Decision Maker (if patient is incapable of making informed decisions): This person is:   Ms Kerry Berger - son          For Patients with Decision Making Capacity:   Values/Goals: Exploration of values, goals, and preferences if recovery is not expected, even with continued medical treatment in the event of:  Imminent death  Severe, permanent brain injury  patient is organ donor. \"In these circumstances, what matters most to you? \"  Care focused more on comfort or quality of life. reviewed current advanced directive.   No changes    Conversation Outcomes / Follow-Up Plan:   Reviewed existing Advance Directive

## 2017-11-07 NOTE — MR AVS SNAPSHOT
Visit Information Date & Time Provider Department Dept. Phone Encounter #  
 11/7/2017  2:00 PM Keerthi Paez, [address] [facility] [phone] [id number] Your Appointments 11/9/2017 10:40 AM  
Follow Up with Della Chairez MD  
[facility] ([address]) Appt Note: lower back 3 mo fu  
 [address] [facility] [id number] [phone]  
  
   
 [address] [facility],[address] Cira Mcclellan  
  
    
 11/16/2017  9:30 AM  
SURGERY CLEARANCE with Pierre Kwon MD  
Cardio Specialist at [facility]/[address]) Appt Note: Clearance for cataract surgery Dr. Yayo Gloria. Surgery 1/24/18; r/s with Dr. Yayo Gloria office, surgery will be in Dec.  
 [facility] [address] 4/17/2018  2:00 PM  
Follow Up with Pierre Kwon MD  
Cardio Specialist at [facility]/[address]) Appt Note: 6 months [facility] [address] Upcoming Health Maintenance Date Due Pneumococcal 65+ Low/Medium Risk (2 of 2 - PPSV23) 12/7/2016 MEDICARE YEARLY EXAM 12/7/2016 EYE EXAM RETINAL OR DILATED Q1 1/19/2017 Influenza Age 5 to Adult 8/1/2017 MICROALBUMIN Q1 9/2/2017 LIPID PANEL Q1 9/2/2017 HEMOGLOBIN A1C Q6M 9/27/2017 GLAUCOMA SCREENING Q2Y 1/19/2018 FOOT EXAM Q1 8/7/2018 BREAST CANCER SCRN MAMMOGRAM 9/6/2018 COLONOSCOPY 9/15/2022 DTaP/Tdap/Td series (2 - Td) 12/15/2025 Allergies as of 11/7/2017  Review Complete On: 11/7/2017 By: Edinson Reed LPN Severity Noted Reaction Type Reactions Latex, Natural Rubber  07/14/2016    Hives Adhesive  03/18/2016    Rash Some bandaids Ciprofloxacin  01/28/2014    Rash Codeine  01/28/2014    Nausea and Vomiting Demerol [Meperidine]  01/28/2014    Nausea and Vomiting Diclofenac  08/06/2015    Shortness of Breath, Palpitations Erythromycin  01/28/2014    Rash Levaquin [Levofloxacin]  01/28/2014    Rash Loratadine  01/28/2014    Swelling Morphine  01/28/2014    Nausea and Vomiting Penicillin G  01/28/2014    Hives, Rash Does fine with Keflex Sulfa (Sulfonamide Antibiotics)  01/28/2014    Itching Vicodin [Hydrocodone-acetaminophen]  02/12/2015    Other (comments) Metallic taste in mouth Current Immunizations  Reviewed on 8/7/2017 Name Date Influenza High Dose Vaccine PF  Incomplete, 10/3/2016 Influenza Vaccine 10/10/2014 Influenza Vaccine (Quad) PF 10/28/2015 Pneumococcal Conjugate (PCV-13) 12/7/2015 Pneumococcal Polysaccharide (PPSV-23)  Incomplete Tdap 12/15/2015 Zoster Vaccine, Live 6/2/2014 Not reviewed this visit You Were Diagnosed With   
  
 Codes Comments Medicare annual wellness visit, subsequent    -  Primary ICD-10-CM: Z00.00 ICD-9-CM: V70.0 Recurrent major depressive disorder, in partial remission ([facility] [address]     ICD-10-CM: F33.41 
ICD-9-CM: 296.35 Encounter for immunization     ICD-10-CM: X39 ICD-9-CM: V03.89 Hyperlipidemia LDL goal <100     ICD-10-CM: E78.5 ICD-9-CM: 272.4 Other specified hypothyroidism     ICD-10-CM: E03.8 ICD-9-CM: 244.8 Panic disorder with agoraphobia     ICD-10-CM: F40.01 
ICD-9-CM: 300.21 Mixed obsessional thoughts and acts     ICD-10-CM: F42.2 ICD-9-CM: 300.3 Type 2 diabetes mellitus with other specified complication, without long-term current use of insulin (HCC)     ICD-10-CM: E11.69 ICD-9-CM: 250.80 Vitals BP Pulse Temp Resp Height(growth percentile) Weight(growth percentile) 107/61 (BP 1 Location: Right arm, BP Patient Position: Sitting) 100 96.6 °F (35.9 °C) 18 5' 8\" (1.727 m) 233 lb 3.2 oz (105.8 kg) SpO2 BMI OB Status Smoking Status 100% 35.46 kg/m2 Menopause Former Smoker BMI and BSA Data Body Mass Index Body Surface Area  
 35.46 kg/m 2 2.25 m 2 Preferred Pharmacy Pharmacy Name Phone [facility] [address] [place] [address] Shasha Bhakta Your Updated Medication List  
  
   
This list is accurate as of: 11/7/17  3:10 PM.  Always use your most recent med list.  
  
  
  
  
 Antione Allan  
by Does Not Apply route. aspirin delayed-release 81 mg tablet Take 81 mg by mouth daily. atorvastatin 20 mg tablet Commonly known as:  LIPITOR  
TAKE 1 TABLET BY MOUTH ONCE EVERY DAY. GENERIC FOR LIPITOR. butalbital-acetaminophen-caffeine -40 mg per tablet Commonly known as:  Alicja Monterrosoky Take 1 Tab by mouth every four (4) hours as needed for Pain. Max Daily Amount: 6 Tabs. cetirizine 10 mg tablet Commonly known as:  ZYRTEC  
TAKE 1 TABLET BY MOUTH EVERY DAY. GENERIC FOR ZYRTEC  
  
 clobetasol 0.05 % external solution Commonly known as:  TEMOVATE  
APPLY TO SCALP AFTER SHAMPOO DAILY DULoxetine 30 mg capsule Commonly known as:  CYMBALTA TAKE 1 CAPSULE BY MOUTH TWICE DAILY FLUoxetine 10 mg capsule Commonly known as:  PROzac TAKE 1 CAPSULE BY MOUTH ONCE DAILY. Jadon Ellis. fluticasone 50 mcg/actuation nasal spray Commonly known as:  Lella Solum SHAKE LIQUID AND USE 1 SPRAY IN EACH NOSTRIL TWICE DAILY  
  
 glucose blood VI test strips strip Commonly known as:  ACCU-CHEK MARCE PLUS TEST STRP Please use one strip to test blood sugars once a day. KETOROLAC (PF) OP Apply  to eye. Lancets Misc Commonly known as:  ACCU-CHEK SOFTCLIX LANCETS Please use one lancet to test blood sugars twice a day. levothyroxine 25 mcg tablet Commonly known as:  SYNTHROID  
TAKE 1 TABLET BY MOUTH EVERY DAY BEFORE BREAKFAST meloxicam 15 mg tablet Commonly known as:  MOBIC  
TAKE 1 TABLET BY MOUTH DAILY  
  
 metFORMIN 500 mg tablet Commonly known as:  GLUCOPHAGE  
TAKE 1 TABLET BY MOUTH DAILY WITH DINNER. GENERIC FOR GLUCOPHAGE  
  
 PROBIOTIC 4X 10-15 mg Tbec Generic drug:  B.infantis-B.ani-B.long-B.bifi Take  by mouth daily. raNITIdine 150 mg tablet Commonly known as:  ZANTAC  
150mg QAM and 75mg QPM  
  
 traMADol 50 mg tablet Commonly known as:  ULTRAM  
TAKE 1 PO Q 4 TIMES A DAY PRN PAIN  Indications: NEUROPATHIC PAIN, Pain  
  
 triamcinolone acetonide 0.1 % topical cream  
Commonly known as:  KENALOG Apply  to affected area two (2) times a day. use thin layer VENTOLIN HFA 90 mcg/actuation inhaler Generic drug:  albuterol 2 Puffs by Aerosolization route every four (4) hours as needed. VITAMIN D3 1,000 unit Cap Generic drug:  cholecalciferol Take 1,000 Units by mouth daily. Prescriptions Sent to Pharmacy Refills  
 atorvastatin (LIPITOR) 20 mg tablet 1 Sig: TAKE 1 TABLET BY MOUTH ONCE EVERY DAY. GENERIC FOR LIPITOR. Class: Normal  
 Pharmacy: [facility] [address] Ph #: [phone]  
 levothyroxine (SYNTHROID) 25 mcg tablet 2 Sig: TAKE 1 TABLET BY MOUTH EVERY DAY BEFORE BREAKFAST Class: Normal  
 Pharmacy: [facility] [address] [place] [address] Ph #: [phone]  
 meloxicam (MOBIC) 15 mg tablet 3 Sig: TAKE 1 TABLET BY MOUTH DAILY Class: Normal  
 Pharmacy: [facility] [address] [place] [address] Ph #: [phone] FLUoxetine (PROZAC) 10 mg capsule 3 Sig: TAKE 1 CAPSULE BY MOUTH ONCE DAILY. Hemalatha Miller. Class: Normal  
 Pharmacy: [facility] [address] Ph #: [phone] We Performed the Following ADMIN INFLUENZA VIRUS VAC [ HCPCS] ADMIN PNEUMOCOCCAL VACCINE [ HCPCS] INFLUENZA VIRUS VACCINE, HIGH DOSE SEASONAL, PRESERVATIVE FREE [36529 CPT(R)] PNEUMOCOCCAL POLYSACCHARIDE VACCINE, 23-VALENT, ADULT OR IMMUNOSUPPRESSED PT DOSE, [08927 CPT(R)] To-Do List   
 11/07/2017 Lab:  HEMOGLOBIN A1C WITH EAG   
  
 11/07/2017 Lab:  LIPID PANEL   
  
 11/07/2017 Lab:  MICROALBUMIN, UR, RAND W/ MICROALBUMIN/CREA RATIO   
  
 11/08/2017 2:30 PM  
  Appointment with Dread Flowers at [place] [address] ([phone])  
  
 11/10/2017 2:30 PM  
  Appointment with Dread Flowers at [place] [address] ([phone])  
  
 11/13/2017 2:30 PM  
  Appointment with Dread Flowers at [place] [address] ([phone])  
  
 11/15/2017 2:30 PM  
  Appointment with Dread Flowers at [place] [address] ([phone])  
  
 11/17/2017 2:30 PM  
  Appointment with Dread Flowers at [place] [address] ([phone])  
  
 11/20/2017 2:30 PM  
  Appointment with Dread Flowers at [place] [address] ([phone])  
  
 11/22/2017 2:30 PM  
  Appointment with Dread Flowers at [place] [address] ([phone])  
  
 11/24/2017 2:30 PM  
  Appointment with Dread Flowers at [place] [address] ([phone])  
  
 11/27/2017 2:30 PM  
  Appointment with Dread Flowers at [place] [address] ([phone])  
  
 11/29/2017 2:30 PM  
  Appointment with Dread Flowers at [place] [address] ([phone]) 12/01/2017 2:30 PM  
  Appointment with Dread Flowers at [place] [address] ([phone]) 12/04/2017 2:30 PM  
  Appointment with Dread Flowers at [place] [address] ([phone]) 12/06/2017 2:30 PM  
  Appointment with Dread Flowers at [place] [address] ([phone])  12/08/2017 2:30 PM  
  Appointment with Dread Flowers at [place] [address] ([phone])  
  
 12/11/2017 2:30 PM  
 Appointment with Dread Flowers at [place] [address] ([phone])  
  
 12/13/2017 2:30 PM  
  Appointment with Dread Flowers at [place] [address] ([phone])  
  
 12/15/2017 2:30 PM  
  Appointment with Dread Flowers at [place] [address] ([phone])  
  
 12/18/2017 2:30 PM  
  Appointment with Dread Flowers at [place] [address] ([phone])  
  
 12/20/2017 2:30 PM  
  Appointment with Dread Flowers at [place] [address] ([phone])  
  
 12/22/2017 2:30 PM  
  Appointment with Dread Flowers at [place] [address] ([phone])  
  
 12/25/2017 2:30 PM  
  Appointment with Dread Flowers at [place] [address] ([phone]) Patient Instructions Medicare Part B Preventive Services Limitations Recommendation Scheduled Bone Mass Measurement 
(age 72 & older, biennial) Requires diagnosis related to osteoporosis or estrogen deficiency. Biennial benefit unless patient has history of long-term glucocorticoid tx or baseline is needed because initial test was by other method Cardiovascular Screening Blood Tests (every 5 years) Total cholesterol, HDL, Triglycerides Order as a panel if possible Colorectal Cancer Screening 
-Fecal occult blood test (annual) -Flexible sigmoidoscopy (5y) 
-Screening colonoscopy (10y) -Barium Enema Counseling to Prevent Tobacco Use (up to 8 sessions per year) - Counseling greater than 3 and up to 10 minutes - Counseling greater than 10 minutes Patients must be asymptomatic of tobacco-related conditions to receive as preventive service Diabetes Screening Tests (at least every 3 years, Medicare covers annually or at 6-month intervals for prediabetic patients) Fasting blood sugar (FBS) or glucose tolerance test (GTT) Patient must be diagnosed with one of the following: 
-Hypertension, Dyslipidemia, obesity, previous impaired FBS or GTT 
Or any two of the following: overweight, FH of diabetes, age ? 65, history of gestational diabetes, birth of baby weighing more than 9 pounds Diabetes Self-Management Training (DSMT) (no USPSTF recommendation) Requires referral by treating physician for patient with diabetes or renal disease. 10 hours of initial DSMT session of no less than 30 minutes each in a continuous 12-month period. 2 hours of follow-up DSMT in subsequent years. Glaucoma Screening (no USPSTF recommendation) Diabetes mellitus, family history, , age 48 or over,  American, age 72 or over Human Immunodeficiency Virus (HIV) Screening (annually for increased risk patients) HIV-1 and HIV-2 by EIA, ABHILASH, rapid antibody test, or oral mucosa transudate Patient must be at increased risk for HIV infection per USPSTF guidelines or pregnant. Tests covered annually for patients at increased risk. Pregnant patients may receive up to 3 test during pregnancy. Medical Nutrition Therapy (MNT) (for diabetes or renal disease not recommended schedule) Requires referral by treating physician for patient with diabetes or renal disease. Can be provided in same year as diabetes self-management training (DSMT), and CMS recommends medical nutrition therapy take place after DSMT. Up to 3 hours for initial year and 2 hours in subsequent years. Shingles Vaccination A shingles vaccine is also recommended once in a lifetime after age 61 Seasonal Influenza Vaccination (annually) Pneumococcal Vaccination (once after 65) Hepatitis B Vaccinations (if medium/high risk) Medium/high risk factors:  End-stage renal disease, Hemophiliacs who received Factor VIII or IX concentrates, Clients of institutions for the mentally retarded, Persons who live in the same house as a HepB virus carrier, Homosexual men, Illicit injectable drug abusers. Screening Mammography (biennial age 54-69) Annually (age 36 or over) Screening Pap Tests and Pelvic Examination (up to age 79 and after 79 if unknown history or abnormal study last 10 years) Every 24 months except high risk Ultrasound Screening for Abdominal Aortic Aneurysm (AAA) (once) Patient must be referred through IPPE and not have had a screening for abdominal aortic aneurysm before under Medicare. Limited to patients who meet one of the following criteria: 
- Men who are 73-68 years old and have smoked more than 100 cigarettes in their lifetime. 
-Anyone with a FH of AAA 
-Anyone recommended for screening by USPSTF Introducing [facility]! Dear Jerryl Siemens: Thank you for requesting a [employer] account. Our records indicate that you already have an active [employer] account. You can access your account anytime at [email or web address] Did you know that you can access your hospital and ER discharge instructions at any time in [employer]? You can also review all of your test results from your hospital stay or ER visit. Additional Information If you have questions, please visit the Frequently Asked Questions section of the [employer] website at [email or web address] Remember, [employer] is NOT to be used for urgent needs. For medical emergencies, dial 911. Now available from your iPhone and Android! Please provide this summary of care documentation to your next provider. Your primary care clinician is listed as Keyshawn Bone. If you have any questions after today's visit, please call [phone].

## 2017-11-07 NOTE — PROGRESS NOTES
Subjective:     HPI:  Edson Reynolds is a 79 y.o. female who presents to the office to follow up on asthma, diabetes, and recent imaging results. COPD:  She was evaluated by Pulmonary rehabilitation on 10/16/2017. She goes to rehab three times per week for two hours. She follows up with Dr. Rajani Vides regularly. Pt states that she was having symptoms of stomach cramps and diarrhea when using Symbicort. Pt is lactose intolerant and Symbicort has milk protein products. She is currently using Spiriva inhaler for asthma. She also has a rescue inhaler. Diabetes Mellitus:  female has diabetes mellitus  Diabetic ROS - medication compliance: compliant all of the time, diabetic diet compliance: compliant most of the time, home glucose monitoring: is performed regularly, Blood sugar has been consistently less than 130, further diabetic ROS: no polyuria or polydipsia, no chest pain, dyspnea or TIA's, no numbness, tingling or pain in extremities, no medication side effects noted. Lab review: labs are reviewed, up to date and normal.   Lab Results   Component Value Date/Time    Hemoglobin A1c 6.4 03/27/2017 03:32 AM    Hemoglobin A1c 6.5 09/02/2016 03:55 AM    Hemoglobin A1c 6.7 08/06/2015 12:44 PM       Splenic aneurysm: Patient had CT of chest completed on 9/16/2017. Imaging studies indicates upper abdomen remarkable for splenic artery aneurysm measuring 1.3 cm. Cardiomyopathy: Patient follows up with Dr. Richie Aguirre, Cardiology regularly. Her last appointment was on 10/5/2017     Psych: Patient is taking Prozac daily for panic disorder. She takes it 12 hours apart and breaks the pill in half some days. She denies any side effects. Pt states that She is following up with her Psychiatrist for PTSD. Her depression is better after being more social and having dinner with her friends at least once in 2 weeks.      Current Outpatient Prescriptions   Medication Sig Dispense Refill    KETOROLAC TROMETHAMINE/PF (KETOROLAC, PF, OP) Apply  to eye.  atorvastatin (LIPITOR) 20 mg tablet TAKE 1 TABLET BY MOUTH ONCE EVERY DAY. GENERIC FOR LIPITOR. 90 Tab 1    levothyroxine (SYNTHROID) 25 mcg tablet TAKE 1 TABLET BY MOUTH EVERY DAY BEFORE BREAKFAST 90 Tab 2    meloxicam (MOBIC) 15 mg tablet TAKE 1 TABLET BY MOUTH DAILY 90 Tab 3    FLUoxetine (PROZAC) 10 mg capsule TAKE 1 CAPSULE BY MOUTH ONCE DAILY. GENERICFOR PROZAC. 90 Cap 3    fluticasone (FLONASE) 50 mcg/actuation nasal spray SHAKE LIQUID AND USE 1 SPRAY IN EACH NOSTRIL TWICE DAILY 3 Bottle 1    DULoxetine (CYMBALTA) 30 mg capsule TAKE 1 CAPSULE BY MOUTH TWICE DAILY 180 Cap 0    VENTOLIN HFA 90 mcg/actuation inhaler 2 Puffs by Aerosolization route every four (4) hours as needed. 0    metFORMIN (GLUCOPHAGE) 500 mg tablet TAKE 1 TABLET BY MOUTH DAILY WITH DINNER. GENERIC FOR GLUCOPHAGE 90 Tab 0    traMADol (ULTRAM) 50 mg tablet TAKE 1 PO Q 4 TIMES A DAY PRN PAIN  Indications: NEUROPATHIC PAIN, Pain 120 Tab 2    butalbital-acetaminophen-caffeine (FIORICET, ESGIC) -40 mg per tablet Take 1 Tab by mouth every four (4) hours as needed for Pain. Max Daily Amount: 6 Tabs. 30 Tab 0    cetirizine (ZYRTEC) 10 mg tablet TAKE 1 TABLET BY MOUTH EVERY DAY. GENERIC FOR ZYRTEC 90 Tab 2    B.infantis-B.ani-B.long-B.bifi (PROBIOTIC 4X) 10-15 mg TbEC Take  by mouth daily.  ranitidine (ZANTAC) 150 mg tablet 150mg QAM and 75mg QPM      aspirin delayed-release 81 mg tablet Take 81 mg by mouth daily.  clobetasol (TEMOVATE) 0.05 % external solution APPLY TO SCALP AFTER SHAMPOO DAILY 50 mL 3    Cholecalciferol, Vitamin D3, (VITAMIN D3) 1,000 unit cap Take 1,000 Units by mouth daily.  glucose blood VI test strips (ACCU-CHEK MARCE PLUS TEST STRP) strip Please use one strip to test blood sugars once a day. 100 strip 20    Lancets (ACCU-CHEK SOFTCLIX LANCETS) misc Please use one lancet to test blood sugars twice a day.  1 Package 20    BLOOD-GLUCOSE METER (ACCU-CHEK MARCE MONITORING) by Does Not Apply route.  triamcinolone acetonide (KENALOG) 0.1 % topical cream Apply  to affected area two (2) times a day. use thin layer          Allergies   Allergen Reactions    Latex, Natural Rubber Hives    Adhesive Rash     Some bandaids    Ciprofloxacin Rash    Codeine Nausea and Vomiting    Demerol [Meperidine] Nausea and Vomiting    Diclofenac Shortness of Breath and Palpitations    Erythromycin Rash    Levaquin [Levofloxacin] Rash    Loratadine Swelling    Morphine Nausea and Vomiting    Penicillin G Hives and Rash     Does fine with Keflex    Sulfa (Sulfonamide Antibiotics) Itching    Vicodin [Hydrocodone-Acetaminophen] Other (comments)     Metallic taste in mouth       Past Medical History:   Diagnosis Date    Ankle fracture     Aortic stenosis     S/P AVR in 2016    Asthma     Chronic pain     back pain    Diabetes (HCC)     Diverticulitis     Fibromyalgia     H/O aortic valve replacement 03/16    21 mm bovine pericardial bioprosthesis and epiaortic scanning      Hypertension     Hypothyroidism     Lumbar post-laminectomy syndrome     Menopause     Obesity     Osteoarthritis     Psychotic disorder     Sciatica     Skin cancer 2013    right forearm. Past Surgical History:   Procedure Laterality Date    CHEST SURGERY PROCEDURE UNLISTED  03/2016    Open Heart Surgery    COLONOSCOPY      COLONOSCOPY N/A 9/15/2017    COL[facility]OSCOPY [facility] at [facility] ENDOSCOPY    HX CATARACT REMOVAL Right 1995    lens  replaced.  HX COLONOSCOPY  09/15/2017    dr. Elvis Arroyo  f/u 5 years.  HX HEENT  1990's    sinus surgery     HX HEENT Right 11/02/2017    laser for right eye cataracts.  HX LUMBAR FUSION  2004    L3-L4-L5    HX LUMBAR LAMINECTOMY  2002    HX MOHS PROCEDURES Left 1990    HX ORTHOPAEDIC      Rotator cuff Bilateral    HX SHOULDER REPLACEMENT Right 03/29/2017    reverse.      MD COLSC FLX W/RMVL OF TUMOR POLYP LESION SNARE TQ  07/09/2014 repeat in 3 years    Dr. Jd Vinson      Aortic valve replacement       Family History   Problem Relation Age of Onset    Hypertension Father     Heart Disease Father     Alcohol abuse Father        Social History     Social History    Marital status:      Spouse name: N/A    Number of children: N/A    Years of education: N/A     Occupational History    Not on file. Social History Main Topics    Smoking status: Former Smoker     Years: 45.00     Types: Cigarettes     Quit date: 7/1/2011    Smokeless tobacco: Never Used    Alcohol use No    Drug use: No    Sexual activity: No     Other Topics Concern    Not on file     Social History Narrative       REVIEW OF SYSTEM:  Review of Systems   Constitutional: Negative for chills and fever. Eyes: Negative for blurred vision. Respiratory: Positive for shortness of breath. Cardiovascular: Negative for chest pain, palpitations and leg swelling. Gastrointestinal: Negative for constipation, diarrhea, nausea and vomiting. Musculoskeletal: Negative for joint pain. Neurological: Negative for headaches. Objective:     Visit Vitals    /61 (BP 1 Location: Right arm, BP Patient Position: Sitting)    Pulse 100    Temp 96.6 °F (35.9 °C)    Resp 18    Ht 5' 8\" (1.727 m)    Wt 233 lb 3.2 oz (105.8 kg)    SpO2 100%    BMI 35.46 kg/m2       PHYSICAL EXAM:  Physical Exam   Constitutional: She is oriented to person, place, and time and well-developed, well-nourished, and in no distress. HENT:   Right Ear: Tympanic membrane, external ear and ear canal normal.   Left Ear: Tympanic membrane, external ear and ear canal normal.   Nose: Nose normal.   Mouth/Throat: Oropharynx is clear and moist.   Eyes: Pupils are equal, round, and reactive to light. Neck: Normal range of motion. Neck supple. No thyromegaly present.    Cardiovascular: Normal rate, regular rhythm, normal heart sounds and intact distal pulses. No murmur heard. Pulmonary/Chest: Effort normal and breath sounds normal. She has no wheezes. Neurological: She is alert and oriented to person, place, and time. GCS score is 15. Skin: Skin is warm and dry. Vitals reviewed. Assessment/Plan:       ICD-10-CM ICD-9-CM    1. Medicare annual wellness visit, subsequent Z00.00 V70.0    2. Recurrent major depressive disorder, [facility] F33.41 296.35    3. Encounter for immunization Z23 V03.89 INFLUENZA VIRUS VACCINE, HIGH DOSE SEASONAL, PRESERVATIVE FREE      PNEUMOCOCCAL POLYSACCHARIDE VACCINE, 23-VALENT, ADULT OR IMMUNOSUPPRESSED PT DOSE,      ADMIN PNEUMOCOCCAL VACCINE      ADMIN INFLUENZA VIRUS VAC   4. Hyperlipidemia LDL goal <100 E78.5 272.4 LIPID PANEL      atorvastatin (LIPITOR) 20 mg tablet   5. Other specified hypothyroidism E03.8 244.8 levothyroxine (SYNTHROID) 25 mcg tablet   6. Panic disorder with agoraphobia F40.01 300.21 FLUoxetine (PROZAC) 10 mg capsule   7. Mixed obsessional thoughts and acts F42.2 300.3 FLUoxetine (PROZAC) 10 mg capsule   8. Type 2 diabetes mellitus with other specified complication, without long-term current use of insulin (HCC) E11.69 250.80 MICROALBUMIN, UR, RAND W/ MICROALBUMIN/CREA RATIO      HEMOGLOBIN A1C WITH EAG     Patient given opportunity to ask questions. Questions answered. Patient understands plan of care. Follow-up Disposition: Not on File      Written by Chaya Torres, as dictated by Silvia Gonzales DO.    I, Dr. Silvia Gonzales, confirm that all documentation is accurate.

## 2017-11-07 NOTE — ASSESSMENT & PLAN NOTE
Stable, based on history, physical exam and review of pertinent labs, studies and medications; meds reconciled; continue current treatment plan. Key Psychotherapeutic Meds             DULoxetine (CYMBALTA) 30 mg capsule TAKE 1 CAPSULE BY MOUTH TWICE DAILY    FLUoxetine (PROZAC) 10 mg capsule TAKE 1 CAPSULE BY MOUTH ONCE DAILY. Jared Yepez [address] [facility] Meds             traMADol (ULTRAM) 50 mg tablet TAKE 1 PO Q 4 TIMES A DAY PRN PAIN  Indications: NEUROPATHIC PAIN, Pain    butalbital-acetaminophen-caffeine (FIORICET, ESGIC) -40 mg per tablet Take 1 Tab by mouth every four (4) hours as needed for Pain. Max Daily Amount: 6 Tabs.         Lab Results   Component Value Date/Time    Sodium 140 09/13/2017 02:11 PM    Creatinine 0.94 09/13/2017 02:11 PM    TSH 2.290 03/27/2017 03:32 AM    WBC 5.3 09/13/2017 02:11 PM    ALT (SGPT) 25 04/27/2017 03:20 PM    AST (SGOT) 19 04/27/2017 03:20 PM

## 2017-11-08 ENCOUNTER — HOSPITAL ENCOUNTER (OUTPATIENT)
Dept: PULMONOLOGY | Age: 67
Discharge: HOME | End: 2017-11-08
Payer: MEDICARE

## 2017-11-08 LAB
ALBUMIN/CREAT UR: 5.2 MG/G CREAT (ref 0–30)
CHOLEST SERPL-MCNC: 119 MG/DL (ref 100–199)
CREAT UR-MCNC: 183 MG/DL
EST. AVERAGE GLUCOSE BLD GHB EST-MCNC: 134 MG/DL
HBA1C MFR BLD: 6.3 % (ref 4.8–5.6)
HDLC SERPL-MCNC: 56 MG/DL
INTERPRETATION, 910389: NORMAL
LDLC SERPL CALC-MCNC: 45 MG/DL (ref 0–99)
Lab: NORMAL
MICROALBUMIN UR-MCNC: 9.5 UG/ML
TRIGL SERPL-MCNC: 88 MG/DL (ref 0–149)
VLDLC SERPL CALC-MCNC: 18 MG/DL (ref 5–40)

## 2017-11-09 ENCOUNTER — OFFICE VISIT (OUTPATIENT)
Dept: ORTHOPEDIC SURGERY | Age: 67
End: 2017-11-09

## 2017-11-09 VITALS
HEART RATE: 92 BPM | DIASTOLIC BLOOD PRESSURE: 68 MMHG | WEIGHT: 234 LBS | SYSTOLIC BLOOD PRESSURE: 116 MMHG | HEIGHT: 68 IN | BODY MASS INDEX: 35.46 KG/M2

## 2017-11-09 DIAGNOSIS — M96.1 LUMBAR POSTLAMINECTOMY SYNDROME: Primary | ICD-10-CM

## 2017-11-09 DIAGNOSIS — M79.7 FIBROMYALGIA: ICD-10-CM

## 2017-11-09 DIAGNOSIS — M48.062 SPINAL STENOSIS OF LUMBAR REGION WITH NEUROGENIC CLAUDICATION: ICD-10-CM

## 2017-11-09 DIAGNOSIS — M96.1 POSTLAMINECTOMY SYNDROME: ICD-10-CM

## 2017-11-09 DIAGNOSIS — Z79.891 LONG-TERM CURRENT USE OF OPIATE ANALGESIC: ICD-10-CM

## 2017-11-09 DIAGNOSIS — M54.16 RADICULOPATHY, LUMBAR REGION: ICD-10-CM

## 2017-11-09 DIAGNOSIS — M96.1 LUMBAR POSTLAMINECTOMY SYNDROME: ICD-10-CM

## 2017-11-09 RX ORDER — TRAMADOL HYDROCHLORIDE 50 MG/1
TABLET ORAL
Qty: 120 TAB | Refills: 0 | Status: SHIPPED | OUTPATIENT
Start: 2017-11-09 | End: 2017-11-27 | Stop reason: SDUPTHER

## 2017-11-09 NOTE — MR AVS SNAPSHOT
Visit Information Date & Time Provider Department Dept. Phone Encounter #  
 11/9/2017 10:40 AM Dipak Duncan MD [facility] [phone] [id number] Follow-up Instructions Return in about 3 months (around 2/9/2018). Your Appointments 11/16/2017  9:30 AM  
SURGERY CLEARANCE with Joaquín Wells MD  
Cardio Specialist at [address]) Appt Note: Clearance for cataract surgery Dr. Hernandez [facility]. Surgery 1/24/18; r/s with Dr. Hernandez [facility] office, surgery will be in Dec.  
 [facility] [address] [place]  
  
    
 2/8/2018  2:00 PM  
Follow Up with [address] [address] [address]) Appt Note: 3 month f/u  
 [address]  
  
   
 [address] [facility] [address] 4/17/2018  2:00 PM  
Follow Up with Scooter Walker MD  
Cardio Specialist at [address]) Appt Note: 6 months [facility] [address] [place] [address] Upcoming Health Maintenance Date Due  
 EYE EXAM RETINAL OR DILATED Q1 1/19/2017 GLAUCOMA SCREENING Q2Y 1/19/2018 HEMOGLOBIN A1C Q6M 5/7/2018 FOOT EXAM Q1 8/7/2018 BREAST CANCER SCRN MAMMOGRAM 9/6/2018 MICROALBUMIN Q1 11/7/2018 LIPID PANEL Q1 11/7/2018 MEDICARE YEARLY EXAM 11/8/2018 COLONOSCOPY 9/15/2022 DTaP/Tdap/Td series (2 - Td) 12/15/2025 Allergies as of 11/9/2017  Review Complete On: 11/9/2017 By: Dipak Duncan MD  
  
 Severity Noted Reaction Type Reactions Latex, Natural Rubber  07/14/2016    Hives Adhesive  03/18/2016    Rash Some bandaids Ciprofloxacin  01/28/2014    Rash Codeine  01/28/2014    Nausea and Vomiting Demerol [Meperidine]  01/28/2014    Nausea and Vomiting Diclofenac  08/06/2015    Shortness of Breath, Palpitations Erythromycin  01/28/2014    Rash Levaquin [Levofloxacin]  01/28/2014    Rash Loratadine  01/28/2014    Swelling Morphine  01/28/2014    Nausea and Vomiting Penicillin G  01/28/2014    Hives, Rash Does fine with Keflex Sulfa (Sulfonamide Antibiotics)  01/28/2014    Itching Vicodin [Hydrocodone-acetaminophen]  02/12/2015    Other (comments) Metallic taste in mouth Current Immunizations  Reviewed on 11/7/2017 Name Date Influenza High Dose Vaccine PF 11/7/2017, 10/3/2016 Influenza Vaccine 10/10/2014 Influenza Vaccine (Quad) PF 10/28/2015 Pneumococcal Conjugate (PCV-13) 12/7/2015 Pneumococcal Polysaccharide (PPSV-23) 11/7/2017 Tdap 12/15/2015 Zoster Vaccine, Live 6/2/2014 Not reviewed this visit You Were Diagnosed With   
  
 Codes Comments Lumbar postlaminectomy syndrome    -  Primary ICD-10-CM: M96.1 ICD-9-CM: 722.83 Spinal stenosis of lumbar region with neurogenic claudication     ICD-10-CM: M48.062 
ICD-9-CM: 724.03 Radiculopathy, lumbar region     ICD-10-CM: M54.16 
ICD-9-CM: 724.4 Fibromyalgia     ICD-10-CM: M79.7 ICD-9-CM: 729.1 Postlaminectomy syndrome     ICD-10-CM: M96.1 ICD-9-CM: 722.80 Long-term current use of opiate analgesic     ICD-10-CM: Z79.891 ICD-9-CM: V58.69 Vitals BP Pulse Height(growth percentile) Weight(growth percentile) BMI OB Status 116/68 92 5' 8\" (1.727 m) 234 lb (106.1 kg) 35.58 kg/m2 Menopause Smoking Status Former Smoker Vitals History BMI and BSA Data Body Mass Index Body Surface Area 35.58 kg/m 2 2.26 m 2 Preferred Pharmacy Pharmacy Name Phone [facility] [address] [place] [address] Shasha Bhakta Your Updated Medication List  
  
   
 This list is accurate as of: 11/9/17 11:54 AM.  Always use your most recent med list.  
  
  
  
  
 ACCU-CHEK MARCE MONITORING  
by Does Not Apply route. aspirin delayed-release 81 mg tablet Take 81 mg by mouth daily. atorvastatin 20 mg tablet Commonly known as:  LIPITOR  
TAKE 1 TABLET BY MOUTH ONCE EVERY DAY. GENERIC FOR LIPITOR. butalbital-acetaminophen-caffeine -40 mg per tablet Commonly known as:  Devyn [place] Take 1 Tab by mouth every four (4) hours as needed for Pain. Max Daily Amount: 6 Tabs. cetirizine 10 mg tablet Commonly known as:  ZYRTEC  
TAKE 1 TABLET BY MOUTH EVERY DAY. GENERIC FOR ZYRTEC  
  
 clobetasol 0.05 % external solution Commonly known as:  TEMOVATE  
APPLY TO SCALP AFTER SHAMPOO DAILY DULoxetine 30 mg capsule Commonly known as:  CYMBALTA TAKE 1 CAPSULE BY MOUTH TWICE DAILY FLUoxetine 10 mg capsule Commonly known as:  PROzac TAKE 1 CAPSULE BY MOUTH ONCE DAILY. Dayne Mendez. fluticasone 50 mcg/actuation nasal spray Commonly known as:  Guilherme Foreman SHAKE LIQUID AND USE 1 SPRAY IN EACH NOSTRIL TWICE DAILY  
  
 glucose blood VI test strips strip Commonly known as:  ACCU-CHEK MARCE PLUS TEST STRP Please use one strip to test blood sugars once a day. KETOROLAC (PF) OP Apply  to eye. Lancets Misc Commonly known as:  ACCU-CHEK SOFTCLIX LANCETS Please use one lancet to test blood sugars twice a day. levothyroxine 25 mcg tablet Commonly known as:  SYNTHROID  
TAKE 1 TABLET BY MOUTH EVERY DAY BEFORE BREAKFAST  
  
 meloxicam 15 mg tablet Commonly known as:  MOBIC  
TAKE 1 TABLET BY MOUTH DAILY  
  
 metFORMIN 500 mg tablet Commonly known as:  GLUCOPHAGE  
TAKE 1 TABLET BY MOUTH DAILY WITH DINNER. GENERIC FOR GLUCOPHAGE  
  
 PROBIOTIC 4X 10-15 mg Tbec Generic drug:  B.infantis-B.ani-B.long-B.bifi Take  by mouth daily. raNITIdine 150 mg tablet Commonly known as:  ZANTAC 150mg QAM and 75mg QPM  
  
 SPIRIVA WITH HANDIHALER 18 mcg inhalation capsule Generic drug:  tiotropium Take 1 Cap by inhalation daily. traMADol 50 mg tablet Commonly known as:  ULTRAM  
TAKE 1 PO Q 4 TIMES A DAY PRN PAIN  Indications: NEUROPATHIC PAIN, Pain  
  
 triamcinolone acetonide 0.1 % topical cream  
Commonly known as:  KENALOG Apply  to affected area two (2) times a day. use thin layer VENTOLIN HFA 90 mcg/actuation inhaler Generic drug:  albuterol 2 Puffs by Aerosolization route every four (4) hours as needed. VITAMIN D3 1,000 unit Cap Generic drug:  cholecalciferol Take 1,000 Units by mouth daily. Prescriptions Printed Refills  
 traMADol (ULTRAM) 50 mg tablet 0 Sig: TAKE 1 PO Q 4 TIMES A DAY PRN PAIN  Indications: NEUROPATHIC PAIN, Pain Class: Print Follow-up Instructions Return in about 3 months (around 2/9/2018). To-Do List   
 11/09/2017 Lab:  DRUG SCREEN UR - W/ CONFIRM   
  
 11/10/2017 2:30 PM  
  Appointment with Dread Flowers at [place] [address] ([phone])  
  
 11/13/2017 2:30 PM  
  Appointment with Dread Flowers at [place] [address] ([phone])  
  
 11/15/2017 2:30 PM  
  Appointment with Dread Flowers at [place] [address] ([phone])  
  
 11/17/2017 2:30 PM  
  Appointment with Dread Flowers at [place] [address] ([phone])  
  
 11/20/2017 2:30 PM  
  Appointment with Dread Flowers at [place] [address] ([phone])  
  
 11/22/2017 2:30 PM  
  Appointment with Dread Flowers at [place] [address] ([phone])  
  
 11/24/2017 2:30 PM  
  Appointment with Dread Flowers at [place] [address] ([phone])  
  
 11/27/2017 2:30 PM  
  Appointment with Dread Flowers at [place] [address] ([phone])  
  
 11/29/2017 2:30 PM  
  Appointment with Dread [address] at [facility] Marcie Cervantes [facility] ([phone])  12/01/2017 2:30 PM  
 Appointment with Dread Flowers at [place] [address] ([phone]) 12/04/2017 2:30 PM  
  Appointment with Dread [address] at [place] [address] ([phone]) 12/06/2017 2:30 PM  
  Appointment with Dread [address] at [place] [address] ([phone]) 12/08/2017 2:30 PM  
  Appointment with Dread [address] at [place] [address] ([phone])  
  
 12/11/2017 2:30 PM  
  Appointment with Dread Flowers at [place] [address] ([phone])  
  
 12/13/2017 2:30 PM  
  Appointment with Dread Flowers at [place] [address] ([phone])  
  
 12/15/2017 2:30 PM  
  Appointment with Dread Flowers at [place] [address] ([phone])  
  
 12/18/2017 2:30 PM  
  Appointment with Dread Flowers at [place] [address] ([phone])  
  
 12/20/2017 2:30 PM  
  Appointment with Dread Flowers at [place] [address] ([phone])  
  
 12/22/2017 2:30 PM  
  Appointment with Dread Flowers at [place] [address] ([phone])  
  
 12/25/2017 2:30 PM  
  Appointment with Dread Flowers at [place] [address] ([phone]) [facility]! Dear Leopold Donna: Thank you for requesting a Privatext account. Our records indicate that you already have an active Privatext account. You can access your account anytime at [email or web address] Did you know that you can access your hospital and ER discharge instructions at any time in Privatext? You can also review all of your test results from your hospital stay or ER visit. Additional Information If you have questions, please visit the Frequently Asked Questions section of the Privatext website at [email or web address] Remember, Privatext is NOT to be used for urgent needs. For medical emergencies, dial 911. Now available from your iPhone and Android! Please provide this summary of care documentation to your next provider. Your primary care clinician is listed as Alison Lepe. If you have any questions after today's visit, please call [phone].

## 2017-11-09 NOTE — PROGRESS NOTES
[facility]  [address]  Phone: [phone]  Fax: [phone]        PROGRESS NOTE      HISTORY OF PRESENT ILLNESS:  The patient is a 79 y.o. female and was seen today for follow up of low back pain into the BLE extending in roughly an L4 distribution in the LLE and in an L5 distribution to the foot in the RLE. Pt also endorses numbness in the LLE. She had a diagnosis of lumbar postlaminectomy syndrome with a previous history of L3 through S1 fusion in 2002 and 2004 by a physician in [place]. The patient had an additional diagnosis of fibromyalgia. Note dated 6/29/16 indicating patient was seen and underwent porcine aortic valve replacement by Dr. Damon Souza on 3/18/16. Pt began cardiac rehab in 1/2017. Pt underwent right shoulder surgery on 3/29/17 by Dr. Yash Mcclellan. Note from Dr. Joel Hummel, cardiologist dated 5/18/17 indicating patient was seen with h/o aortic stenosis, s/p aortic valve replacement 3/16 and was seen with moderate dysphemia with minimal exertion. CT revealed no evidence of pulmonary embolism, no obvious fluid overload. They were ordering further workup to look for functioning bioprosthetic aortic valve. Per patient, a cause for her SOB has still not been found. She has been scheduled to see a pulmonologist. She states she has been restricted to take her Meloxicam since her right shoulder surgery, which has caused her increase in low back pain. Upon review of our records, it was noted she previously failed TOPAMAX, NEURONTIN, and LYRICA. Pt was switched from Ultram ER back to Ultram immediate release. Pt continues with Ultram 1 tab QID. Lumbar spine CT w/o contrast dated 7/20/14  per report revealed L3 through L5 laminectomies with L3 through S1 dorsal hardware fusion. There was no evidence for fractures. There was advanced degenerative disc disease at L2-L3 at the junctional level with marked central canal stenosis.  At her last clinic appointment, patient wished to continue her current treatment. She was provided with refills of Ultram 50 mg      The patient returns today with pain location and distribution remain unchanged. She rates pain 6/10, elevated since her last visit (4/10). Pt reports she is currently in pulmonary rehab. She is compliant with Cymbalta 30 mg BID as prescribed by her PCP and Ultram 1 po QID as prescribed by me.  reviewed. Past Medical History:   Diagnosis Date    Ankle fracture     Aortic stenosis     S/P AVR in 2016    Asthma     Chronic pain     back pain    Diabetes ([facility] [address]     Diverticulitis     Fibromyalgia     H/O aortic valve replacement 03/16    21 mm bovine pericardial bioprosthesis and epiaortic scanning      Hypertension     Hypothyroidism     Lumbar post-laminectomy syndrome     Menopause     Obesity     Osteoarthritis     Psychotic disorder     Sciatica     Skin cancer 2013    right forearm. Social History     Social History    Marital status:      Spouse name: N/A    Number of children: N/A    Years of education: N/A     Occupational History    Not on file. Social History Main Topics    Smoking status: Former Smoker     Years: 45.00     Types: Cigarettes     Quit date: 7/1/2011    Smokeless tobacco: Never Used    Alcohol use No    Drug use: No    Sexual activity: No     Other Topics Concern    Not on file     Social History Narrative       Current Outpatient Prescriptions   Medication Sig Dispense Refill    tiotropium (SPIRIVA WITH HANDIHALER) 18 mcg inhalation capsule Take 1 Cap by inhalation daily.  traMADol (ULTRAM) 50 mg tablet TAKE 1 PO Q 4 TIMES A DAY PRN PAIN  Indications: NEUROPATHIC PAIN, Pain 120 Tab 0    KETOROLAC TROMETHAMINE/PF (KETOROLAC, PF, OP) Apply  to eye.  atorvastatin (LIPITOR) 20 mg tablet TAKE 1 TABLET BY MOUTH ONCE EVERY DAY. GENERIC FOR LIPITOR.  90 Tab 1    levothyroxine (SYNTHROID) 25 mcg tablet TAKE 1 TABLET BY MOUTH EVERY DAY BEFORE BREAKFAST 90 Tab 2    meloxicam (MOBIC) 15 mg tablet TAKE 1 TABLET BY MOUTH DAILY 90 Tab 3    FLUoxetine (PROZAC) 10 mg capsule TAKE 1 CAPSULE BY MOUTH ONCE DAILY. GENERICFOR PROZAC. 90 Cap 3    fluticasone (FLONASE) 50 mcg/actuation nasal spray SHAKE LIQUID AND USE 1 SPRAY IN EACH NOSTRIL TWICE DAILY 3 Bottle 1    DULoxetine (CYMBALTA) 30 mg capsule TAKE 1 CAPSULE BY MOUTH TWICE DAILY 180 Cap 0    VENTOLIN HFA 90 mcg/actuation inhaler 2 Puffs by Aerosolization route every four (4) hours as needed. 0    metFORMIN (GLUCOPHAGE) 500 mg tablet TAKE 1 TABLET BY MOUTH DAILY WITH DINNER. GENERIC FOR GLUCOPHAGE 90 Tab 0    butalbital-acetaminophen-caffeine (FIORICET, ESGIC) -40 mg per tablet Take 1 Tab by mouth every four (4) hours as needed for Pain. Max Daily Amount: 6 Tabs. 30 Tab 0    cetirizine (ZYRTEC) 10 mg tablet TAKE 1 TABLET BY MOUTH EVERY DAY. GENERIC FOR ZYRTEC 90 Tab 2    B.infantis-B.ani-B.long-B.bifi (PROBIOTIC 4X) 10-15 mg TbEC Take  by mouth daily.  ranitidine (ZANTAC) 150 mg tablet 150mg QAM and 75mg QPM      aspirin delayed-release 81 mg tablet Take 81 mg by mouth daily.  clobetasol (TEMOVATE) 0.05 % external solution APPLY TO SCALP AFTER SHAMPOO DAILY 50 mL 3    Cholecalciferol, Vitamin D3, (VITAMIN D3) 1,000 unit cap Take 1,000 Units by mouth daily.  glucose blood VI test strips (ACCU-CHEK MARCE PLUS TEST STRP) strip Please use one strip to test blood sugars once a day. 100 strip 20    Lancets (ACCU-CHEK SOFTCLIX LANCETS) misc Please use one lancet to test blood sugars twice a day. 1 Package 20    BLOOD-GLUCOSE METER (ACCU-CHEK MARCE MONITORING) by Does Not Apply route.  triamcinolone acetonide (KENALOG) 0.1 % topical cream Apply  to affected area two (2) times a day.  use thin layer         Allergies   Allergen Reactions    Latex, Natural Rubber Hives    Adhesive Rash     Some bandaids    Ciprofloxacin Rash    Codeine Nausea and Vomiting    Demerol [Meperidine] Nausea and Vomiting    Diclofenac Shortness of Breath and Palpitations    Erythromycin Rash    Levaquin [Levofloxacin] Rash    Loratadine Swelling    Morphine Nausea and Vomiting    Penicillin G Hives and Rash     Does fine with Keflex    Sulfa (Sulfonamide Antibiotics) Itching    Vicodin [Hydrocodone-Acetaminophen] Other (comments)     Metallic taste in mouth        Ambulates with a single point cane. PHYSICAL EXAMINATION    Visit Vitals    /68    Pulse 92    Ht 5' 8\" (1.727 m)    Wt 234 lb (106.1 kg)    BMI 35.58 kg/m2       CONSTITUTIONAL: NAD, A&O x 3  SENSATION: Decreased sensation to light touch at L4 of the LLE and at L5 of the RLE. Sensation to light touch otherwise intact. RANGE OF MOTION: The patient has full passive range of motion in all four extremities. MOTOR:  Straight Leg Raise: Negative, bilateral               Hip Flex Knee Ext Knee Flex Ankle DF GTE Ankle PF Tone   Right +4/5 +4/5 +4/5 +4/5 +4/5 +4/5 +4/5   Left +4/5 +4/5 +4/5 +4/5 +4/5 +4/5 +4/5       ASSESSMENT   Diagnoses and all orders for this visit:    1. Lumbar postlaminectomy syndrome  -     traMADol (ULTRAM) 50 mg tablet; TAKE 1 PO Q 4 TIMES A DAY PRN PAIN  Indications: NEUROPATHIC PAIN, Pain  -     DRUG SCREEN UR - W/ CONFIRM; Future    2. Spinal stenosis of lumbar region with neurogenic claudication  -     traMADol (ULTRAM) 50 mg tablet; TAKE 1 PO Q 4 TIMES A DAY PRN PAIN  Indications: NEUROPATHIC PAIN, Pain  -     DRUG SCREEN UR - W/ CONFIRM; Future    3. Radiculopathy, lumbar region  -     traMADol (ULTRAM) 50 mg tablet; TAKE 1 PO Q 4 TIMES A DAY PRN PAIN  Indications: NEUROPATHIC PAIN, Pain  -     DRUG SCREEN UR - W/ CONFIRM; Future    4. Fibromyalgia  -     traMADol (ULTRAM) 50 mg tablet; TAKE 1 PO Q 4 TIMES A DAY PRN PAIN  Indications: NEUROPATHIC PAIN, Pain  -     DRUG SCREEN UR - W/ CONFIRM; Future    5.  Postlaminectomy syndrome  -     traMADol (ULTRAM) 50 mg tablet; TAKE 1 PO Q 4 TIMES A DAY PRN PAIN  Indications: NEUROPATHIC PAIN, Pain  -     DRUG SCREEN UR - W/ CONFIRM; Future    6. Long-term current use of opiate analgesic  -     DRUG SCREEN UR - W/ CONFIRM; Future          IMPRESSION AND PLAN:  Patient wished to continue her current treatment. She was provided with refills of her Ultram 50 mg. UDS obtained today. I will see the patient back in 3 month's time or earlier if needed. Written by Baljeet García, as dictated by Wilberto Breaux MD  I examined the patient, reviewed and agree with the note.

## 2017-11-10 ENCOUNTER — HOSPITAL ENCOUNTER (OUTPATIENT)
Dept: PULMONOLOGY | Age: 67
Discharge: HOME | End: 2017-11-10
Payer: MEDICARE

## 2017-11-13 ENCOUNTER — HOSPITAL ENCOUNTER (OUTPATIENT)
Dept: PULMONOLOGY | Age: 67
Discharge: HOME | End: 2017-11-13
Payer: MEDICARE

## 2017-11-13 PROCEDURE — G0237 THERAPEUTIC PROCD STRG ENDUR: HCPCS

## 2017-11-13 NOTE — PROGRESS NOTES
[facility]  Respiratory Therapy Flowsheet      Alexei Marlow  1950       Patient's carry-over of treatment delivered by: less symptoms of depression. Objective Daily Findings    Comments:    Respiratory Muscle Functioning/Exercise Conditioning/Strengthening Session:    Time In: 2:30  Time Out::4:25  Session Number: 8  O2 with Therapy: 0 L/min    Pre SPO2 98  Pre HR:89  Pre BP: 119/73    RR: 18    Wt: 230   Temp: 97.6   Post SPO2: 97 Post HR: 90  Post BP: 120/74    Self Care Home Management Instruction/Education    Patient Self Monitored Activity Time In:NA Time Out:NA     Self Care Home Management Instruction Education: Breathing Retaining. Patient's Response to Education: Patient actively participated in education. Comments: Individual Therapy         Goal    Actual                      During Therapy                 Post-Therapy     % SpO2 HR RPD 1 - 4 RPE 6-20 Pain  1 - 10 % SpO2 HR RPD 1 - 4 RPE 6-20 Pain 0 - 10   Treadmill                 Bike 30 min x L2 15 min x L1 98 93 1 10 3 97 95 1 10 5   Stepper 30 min x L6 25 min x L5 96 102 2 8 4 96 106 2 10 4   Arm Ergometer 30 min x 25 hawkins 20 min x 20 hawkins 97 105 2 10 1 96 104 2 10 3   Rower               Weight Training 30 min x 3 lb UB wts 20 min x 2 lb UB wts 97 85 2 7 3 96 88 2 8 3   Therabands               Weighted DB 30 min x 7 lb 20 min x 4 lb 97 86 1 8 1 97 80 1 8 1   IMT               IS 30 min x 3000 20 min x 2500 97 84 1 8 1 96 80 1 8 1                    Patient's response to therapy: Good effort and Good motivation  Comments  :    Assessment    Patient's response: Patient progressing towardsd LTGs evident by: Increased PLB and Increased Pacing. Plan: Continue as written    RJLD:    [place] Minutes of Individualized Treatment ()        Physician supervision provided this date of service by: Dr. Tiffany Davis       Therapist Signature: ANMOL Emanuel/L    Therapist PRINTED Name and Credential: Sylvester Silva OTR/L    11/13/2017  5:03 PM

## 2017-11-15 ENCOUNTER — HOSPITAL ENCOUNTER (OUTPATIENT)
Dept: PULMONOLOGY | Age: 67
Discharge: HOME | End: 2017-11-15
Payer: MEDICARE

## 2017-11-15 PROCEDURE — G0237 THERAPEUTIC PROCD STRG ENDUR: HCPCS

## 2017-11-15 NOTE — PROGRESS NOTES
[facility]  Respiratory Therapy Flowsheet      Luis Lovett  1950       Patient's carry-over of treatment delivered by: has less SOB. Objective Daily Findings    Comments:    Respiratory Muscle Functioning/Exercise Conditioning/Strengthening Session:    Time In: 2:30  Time Out::4:25  Session Number: 9  O2 with Therapy: 0 L/min    Pre SPO2 98  Pre HR:87  Pre BP: 109/72    RR: 18    Wt: 230   Temp: 97.7   Post SPO2: 98 Post HR: 97  Post BP: 128/85    Self Care Home Management Instruction/Education    Patient Self Monitored Activity Time In:NA Time Out:NA     Self Care Home Management Instruction Education: Breathing Retaining and Exercise Concepts. Patient's Response to Education: Patient actively participated in education. Comments: Individual Therapy         Goal    Actual                      During Therapy                 Post-Therapy     % SpO2 HR RPD 1 - 4 RPE 6-20 Pain  1 - 10 % SpO2 HR RPD 1 - 4 RPE 6-20 Pain 0 - 10   Treadmill                 Bike               Stepper 30 min x L6 40 min x L5 97 98 1 8 4 96 102 2 10 4   Arm Ergometer 30 min x 25 hawkins 30 min x 20 hawkins 98 95 2 10 1 97 99 2 12 4   Rower               Weight Training 20 min x 3 lb Hand wts 25 min x 3 lb hand wts 97 97 1 12 3 97 93 1 12 5   Therabands               Weighted DB               IMT               IS 30 min x 3000 20 min x 2500 96 84 2 8 1 98 84 3 10 1                    Patient's response to therapy: Good effort and Good motivation  Comments  :c/o pain in arms on arm bike and hand wts. And pain in left knee and lower back on stepper. Assessment    Patient's response: Patient progressing towardsd LTGs evident by: Increased PLB and Increased Pacing. Plan: Continue as written    NDVG:    [place] Minutes of Individualized Treatment ()        Physician supervision provided this date of service by: Dr. Anika Hunt       Therapist Signature: ANMOL [place]/L    Therapist PRINTED Name and Credential: Olivia Balderrama OTR/L    11/15/2017  5:54 PM

## 2017-11-16 ENCOUNTER — OFFICE VISIT (OUTPATIENT)
Dept: CARDIOLOGY CLINIC | Age: 67
End: 2017-11-16

## 2017-11-16 VITALS
WEIGHT: 233 LBS | HEIGHT: 68 IN | OXYGEN SATURATION: 97 % | SYSTOLIC BLOOD PRESSURE: 131 MMHG | HEART RATE: 88 BPM | DIASTOLIC BLOOD PRESSURE: 82 MMHG | BODY MASS INDEX: 35.31 KG/M2

## 2017-11-16 DIAGNOSIS — R00.0 TACHYCARDIA: Primary | ICD-10-CM

## 2017-11-16 RX ORDER — TIOTROPIUM BROMIDE INHALATION SPRAY 3.12 UG/1
SPRAY, METERED RESPIRATORY (INHALATION)
Refills: 3 | COMMUNITY
Start: 2017-10-30 | End: 2019-10-08 | Stop reason: CLARIF

## 2017-11-16 NOTE — PROGRESS NOTES
Cardiovascular Specialists    Ms. Kirit Saldaña is a 79 y.o. female with history of Aortic stenosis s/p Aortic valve replacement (03/16), fibromyalgia, diabetes, obesity, hypertension, hypothyroidism and COPD. Mr. Kirit Saldaña is here today for follow up appointment. She denies any new symptoms since last visit. She said she is undergoing rehab from pulmonary department. She feels like her shortness of breath is getting better. She denies any chest pain or chest tightness. She denies any bleeding problems. She denies any specific cardiac symptoms. She is supposed to undergo cataract surgery on the left side. She denies any PND or lower extremity swelling. Past Medical History:   Diagnosis Date    Ankle fracture     Aortic stenosis     S/P AVR in 2016    Asthma     Chronic pain     back pain    Diabetes ([address]     Diverticulitis     Fibromyalgia     H/O aortic valve replacement 03/16    21 mm bovine pericardial bioprosthesis and epiaortic scanning      Hypertension     Hypothyroidism     Lumbar post-laminectomy syndrome     Menopause     Obesity     Osteoarthritis     Psychotic disorder     Sciatica     Skin cancer 2013    right forearm. Past Surgical History:   Procedure Laterality Date    CHEST SURGERY PROCEDURE UNLISTED  03/2016    Open Heart Surgery    COLONOSCOPY      COLONOSCOPY N/A 9/15/2017    COLONOSCOPY [facility] by Deangelo Reyes MD at [facility] ENDOSCOPY    HX CATARACT REMOVAL Right 1995    lens  replaced.  HX COLONOSCOPY  09/15/2017    dr. Cira Cantrell  f/u 5 years.  HX HEENT  1990's    sinus surgery     HX HEENT Right 11/02/2017    laser for right eye cataracts.  HX LUMBAR FUSION  2004    L3-L4-L5    HX LUMBAR LAMINECTOMY  2002    HX MOHS PROCEDURES Left 1990    HX ORTHOPAEDIC      Rotator cuff Bilateral    HX SHOULDER REPLACEMENT Right 03/29/2017    reverse.      [place] COLSC FLX W/RMVL OF TUMOR POLYP LESION SNARE TQ 07/09/2014 repeat in 3 years    Dr. Liz Corea      Aortic valve replacement       Current Outpatient Prescriptions   Medication Sig    SPIRIVA RESPIMAT 2.5 mcg/actuation inhaler INL 2 PFS PO D    traMADol (ULTRAM) 50 mg tablet TAKE 1 PO Q 4 TIMES A DAY PRN PAIN  Indications: NEUROPATHIC PAIN, Pain    atorvastatin (LIPITOR) 20 mg tablet TAKE 1 TABLET BY MOUTH ONCE EVERY DAY. GENERIC FOR LIPITOR.  levothyroxine (SYNTHROID) 25 mcg tablet TAKE 1 TABLET BY MOUTH EVERY DAY BEFORE BREAKFAST    meloxicam (MOBIC) 15 mg tablet TAKE 1 TABLET BY MOUTH DAILY    FLUoxetine (PROZAC) 10 mg capsule TAKE 1 CAPSULE BY MOUTH ONCE DAILY. Sherwin Miles.  fluticasone (FLONASE) 50 mcg/actuation nasal spray SHAKE LIQUID AND USE 1 SPRAY IN EACH NOSTRIL TWICE DAILY    DULoxetine (CYMBALTA) 30 mg capsule TAKE 1 CAPSULE BY MOUTH TWICE DAILY    VENTOLIN HFA 90 mcg/actuation inhaler 2 Puffs by Aerosolization route every four (4) hours as needed.  metFORMIN (GLUCOPHAGE) 500 mg tablet TAKE 1 TABLET BY MOUTH DAILY WITH DINNER. GENERIC FOR GLUCOPHAGE    butalbital-acetaminophen-caffeine (FIORICET, ESGIC) -40 mg per tablet Take 1 Tab by mouth every four (4) hours as needed for Pain. Max Daily Amount: 6 Tabs.  cetirizine (ZYRTEC) 10 mg tablet TAKE 1 TABLET BY MOUTH EVERY DAY. GENERIC FOR ZYRTEC    B.infantis-B.ani-B.long-B.bifi (PROBIOTIC 4X) 10-15 mg TbEC Take  by mouth daily.  ranitidine (ZANTAC) 150 mg tablet 150mg QAM and 75mg QPM    aspirin delayed-release 81 mg tablet Take 81 mg by mouth daily.  clobetasol (TEMOVATE) 0.05 % external solution APPLY TO SCALP AFTER SHAMPOO DAILY    Cholecalciferol, Vitamin D3, (VITAMIN D3) 1,000 unit cap Take 1,000 Units by mouth daily.  glucose blood VI test strips (ACCU-CHEK MARCE PLUS TEST STRP) strip Please use one strip to test blood sugars once a day.     Lancets (ACCU-CHEK SOFTCLIX LANCETS) misc Please use one lancet to test blood sugars twice a day.  BLOOD-GLUCOSE METER (ACCU-CHEK MARCE MONITORING) by Does Not Apply route.  triamcinolone acetonide (KENALOG) 0.1 % topical cream Apply  to affected area two (2) times a day. use thin layer     No current facility-administered medications for this visit. Allergies and Sensitivities:  Allergies   Allergen Reactions    Latex, Natural Rubber Hives    Adhesive Rash     Some bandaids    Ciprofloxacin Rash    Codeine Nausea and Vomiting    Demerol [Meperidine] Nausea and Vomiting    Diclofenac Shortness of Breath and Palpitations    Erythromycin Rash    Levaquin [Levofloxacin] Rash    Loratadine Swelling    Morphine Nausea and Vomiting    Penicillin G Hives and Rash     Does fine with Keflex    Sulfa (Sulfonamide Antibiotics) Itching    Vicodin [Hydrocodone-Acetaminophen] Other (comments)     Metallic taste in mouth       Family History:  Family History   Problem Relation Age of Onset    Hypertension Father     Heart Disease Father     Alcohol abuse Father        Social History:  Social History   Substance Use Topics    Smoking status: Former Smoker     Years: 45.00     Types: Cigarettes     Quit date: 7/1/2011    Smokeless tobacco: Never Used    Alcohol use No     She  reports that she quit smoking about 6 years ago. Her smoking use included Cigarettes. She quit after 45.00 years of use. She has never used smokeless tobacco.  She  reports that she does not drink alcohol. Review of Systems:  Cardiac symptoms as noted above in HPI. All others negative. Denies skin rash,  photophobia, neck pain, hemoptysis, chronic cough, nausea, vomiting, hematuria, burning micturition, BRBPR, chronic headaches.     Physical Exam:  BP Readings from Last 3 Encounters:   11/16/17 131/82   11/09/17 116/68   11/07/17 107/61         Pulse Readings from Last 3 Encounters:   11/16/17 88   11/09/17 92   11/07/17 100          Wt Readings from Last 3 Encounters:   11/16/17 233 lb (105.7 kg)   11/09/17 234 lb (106.1 kg)   11/07/17 233 lb 3.2 oz (105.8 kg)       Constitutional: Oriented to person, place, and time. HENT: Head: Normocephalic and atraumatic. Neck: No JVD present. Cardiovascular: Regular rhythm. No gallop or rubs appreciated. No significant murmur  Lung: Breath sounds normal. No respiratory distress. No ronchi or rales appreciated  Abdominal: No tenderness. No rebound and no guarding. Musculoskeletal: No LE swelling    Review of Data  LABS:   Lab Results   Component Value Date/Time    Sodium 140 09/13/2017 02:11 PM    Potassium 4.2 09/13/2017 02:11 PM    Chloride 105 09/13/2017 02:11 PM    CO2 27 09/13/2017 02:11 PM    Glucose 116 09/13/2017 02:11 PM    BUN 12 09/13/2017 02:11 PM    Creatinine 0.94 09/13/2017 02:11 PM     Lipids Latest Ref Rng & Units 11/7/2017 9/2/2016 2/13/2015   Chol, Total 100 - 199 mg/dL 119 141 113   HDL >39 mg/dL 56 77 57   LDL 0 - 99 mg/dL 45 49 38   Trig 0 - 149 mg/dL 88 77 90   Chol/HDL Ratio 0 - 5.0   - - 2.0   Some recent data might be hidden     Lab Results   Component Value Date/Time    ALT (SGPT) 25 04/27/2017 03:20 PM     Lab Results   Component Value Date/Time    Hemoglobin A1c 6.3 11/07/2017 03:22 AM     EKG  (04/16) Sinus rhythm at 96 bpm    STRESS TEST (2012)   No EKG or scintigraphic evidence of ischemia or infarction. Normal LVEF    ECHO (01/16)  SUMMARY:  Left ventricle: Systolic function was normal. Ejection fraction was estimated in the range of 55 % to 60 %. There were no regional wall motion  abnormalities. Doppler parameters were consistent with abnormal left ventricular relaxation (grade 1 diastolic dysfunction). Right ventricle: Systolic function was normal.  Aortic valve: The valve was trileaflet. Leaflets exhibited calcification and moderate- severely reduced cuspal separation. There was severe aortic  stenosis. There was no significant regurgitation. Valve peak gradient was 85 mmHg. Valve mean gradient was 50 mmHg.     ELIS (02/16)  Left ventricle: Size was normal. Systolic function was normal.  Right ventricle: The size was normal.  Left atrium: Size was normal. No thrombus was identified. There was no  spontaneous echo contrast (\"smoke\"). Left atrial appendage: No thrombus was identified. There was no  spontaneous echo contrast (\"smoke\") in the appendage. Right atrium: Size was normal.  Mitral valve: There was mild to moderate multi jet regurgitation. No obvious mass, vegetation or thrombus noted. Aortic valve: Aortic valve assessment was performed using 2D  transesophageal as well as transthoracic doppler data. The valve was  trileaflet. Leaflets were thickened, calcific and stenotic. A mean  gradient across the valve was measured at 49mmHg with a calculated valve  area of 0.70 - 0.75 cm based on an LVOT diameter of 2.1 cm, an LVOT VTI of  21 cm, and an AV VTI of 101 cm. There was no regurgitation. Tricuspid valve: There was no regurgitation. No obvious mass, vegetation or thrombus noted. HOLTER (05/16)  Interpretation:  1. Rhythm is sinus. 2. PACs (1.3% burden). 3. Rare PVCs. ECHO (06/17)  SUMMARY:  Left ventricle: Systolic function was mildly reduced. Ejection fraction  was estimated in the range of 45 % to 50 %. There was mild diffuse  hypokinesis. Wall thickness was mildly increased. There was mild  concentric hypertrophy. Left ventricular diastolic function parameters  were normal for the patient's age. Right ventricle: Systolic function was normal.    Mitral valve: There was mild regurgitation. Aortic valve: A bioprosthesis was present. Not well visualized. Peak velocity 2.2 m/second  Peak gradient ~20 mm hg  Mean gradient ~ 10 mm hg. There was no significant regurgitation. Tricuspid valve: There was mild regurgitation. CARDIAC CATH: (01/16)  PRESSURE HEMODYNAMICS: Systemic aortic pressure was 131/68 mmHg. Left ventricular pressure was 160/2/12 mmHg.  Based on the data, peak-to-peak gradient between LV and aortic was approximately 30 mmHg. Mean RA  7 mmHg,   RV  32/1/8 mmHg,  PA  27/9/16 mmHg,   PCWP 10 mm hg  Cardiac output by Ellis calculation was 4.3 L/min and cardiac index was 2.02 liters L/min/meter squared. Mixed venous saturation was 65%, PA saturation was 65%, and the radial artery saturation was 90%. No evidence of intracardiac shunting. CORONARY ANGIOGRAM  1. LM: No significant obstructive disease. 2. LAD: 10-20% luminal irregularities, otherwise normal. Three diagonal branches angiographically normal.  3. LCX: 20-30% luminal irregularities, otherwise no obstructive disease. 4. OM1: Large bifurcating vessel without any obstructive disease. 5. RCA: Proximal 20%, mid discrete 40%, otherwise angiographically normal. Dominant vessel. IMPRESSION & PLAN:  Ms. Marnie Nowak is a 79 y.o. female with Aortic stenosis, COPD, obesity, diabetes, fibromyalgia. Aortic stenosis:    Ms. Marnie Nowak [facility]. Aspirin will be continued lifelong. ECHO in 06/17 with mean gradient across valve ~ 10 mm Hg. Physiologic for prosthetic valve. Continue with clinical observation. Diabetes:  Goal hemoglobin A1c less than 7 is recommended from a cardiovascular standpoint. Last hemoglobin A1c was 6.3. Continue same management per Dr. Akin Worley. Hypertension:  BP today 110/76 mm Hg. Not on any meds. Continue same. Off BB because of low BP in past.     Fibromyalgia and COPD:    She has chronic pain syndrome from her fibromyalgia. Dyspnea: Improving since last time with pulmonary rehab. No obvious fluid overload on exam. NO LE edema. ECHO 06/17 with EF ~ 50%  Mean gradient across valve 10 mm Hg, physiologic for prosthetic valve in 07/17  Recent CT without any PE or any pulmonary process. Continue with rehab. Ms. Marnie Nowak was supposed to undergo left eye cataract surgery. Usually this procedure is being done under local anesthesia.   This is a low risk procedure from a cardiovascular standpoint. In the absence of any fluid overload or any unstable coronary syndrome, no further cardiac workup is necessary at this time. She would be at low, at the most intermediate risk for any cardiac complication. This was communicated with the patient. Importance of diet and exercise was discussed with patient. This plan was discussed with patient who is in agreement. Thank you for allowing me to participate in patient care. Please feel free to call me if you have any question or concern.

## 2017-11-16 NOTE — PROGRESS NOTES
1. Have you been to the ER, urgent care clinic since your last visit? Hospitalized since your last visit? No    2. Have you seen or consulted any other health care providers outside of the [address] [place] [address] since your last visit? Include any pap smears or colon screening.  No

## 2017-11-16 NOTE — MR AVS SNAPSHOT
Visit Information Date & Time Provider Department Dept. Phone Encounter #  
 11/16/2017  9:30 AM Joaquín Andino MD Cardio Specialist at [facility] [phone] [id number] Follow-up Instructions Return in about 9 months (around 8/16/2018). Your Appointments 2/8/2018 10:50 AM  
Follow Up with Gabriel Haq MD  
[facility] ([facility]) Appt Note: 3 MO F/U  
 [address] [facility] [address]  
[phone]  
  
   
 [address] [facility],[address] [facility]  
  
    
 2/8/2018  2:00 PM  
Follow Up with Anabelarupert ChristiansonDO [id number] [place] [address] Appt Note: 3 month f/u  
 [address]  
  
   
 [address] [place] [address] [address] 8/16/2018  1:45 PM  
Follow Up with Joaquín Andino MD  
Cardio Specialist at [facility] Appt Note: 9 months follow up  
 [address]  
  
   
 [address] Upcoming Health Maintenance Date Due  
 EYE EXAM RETINAL OR DILATED Q1 1/19/2017 GLAUCOMA SCREENING Q2Y 1/19/2018 HEMOGLOBIN A1C Q6M 5/7/2018 FOOT EXAM Q1 8/7/2018 BREAST CANCER SCRN MAMMOGRAM 9/6/2018 MICROALBUMIN Q1 11/7/2018 LIPID PANEL Q1 11/7/2018 MEDICARE YEARLY EXAM 11/8/2018 COLONOSCOPY 9/15/2022 DTaP/Tdap/Td series (2 - Td) 12/15/2025 Allergies as of 11/16/2017  Review Complete On: 11/16/2017 By: Jose Alfredo Roberson LPN Severity Noted Reaction Type Reactions Latex, Natural Rubber  07/14/2016    Hives Adhesive  03/18/2016    Rash Some bandaids Ciprofloxacin  01/28/2014    Rash Codeine  01/28/2014    Nausea and Vomiting Demerol [Meperidine]  01/28/2014    Nausea and Vomiting Diclofenac  08/06/2015    Shortness of Breath, Palpitations Erythromycin  01/28/2014    Rash Levaquin [Levofloxacin]  01/28/2014    Rash Loratadine  01/28/2014    Swelling Morphine  01/28/2014    Nausea and Vomiting Penicillin G  01/28/2014    Hives, Rash Does fine with Keflex Sulfa (Sulfonamide Antibiotics)  01/28/2014    Itching Vicodin [Hydrocodone-acetaminophen]  02/12/2015    Other (comments) Metallic taste in mouth Current Immunizations  Reviewed on 11/7/2017 Name Date Influenza High Dose Vaccine PF 11/7/2017, 10/3/2016 Influenza Vaccine 10/10/2014 Influenza Vaccine (Quad) PF 10/28/2015 Pneumococcal Conjugate (PCV-13) 12/7/2015 Pneumococcal Polysaccharide (PPSV-23) 11/7/2017 Tdap 12/15/2015 Zoster Vaccine, Live 6/2/2014 Not reviewed this visit You Were Diagnosed With   
  
 Codes Comments Tachycardia    -  Primary ICD-10-CM: R00.0 ICD-9-CM: 197. 0 Vitals BP Pulse Height(growth percentile) Weight(growth percentile) SpO2 BMI  
 131/82 88 5' 8\" (1.727 m) 233 lb (105.7 kg) 97% 35.43 kg/m2 OB Status Smoking Status Menopause Former Smoker BMI and BSA Data Body Mass Index Body Surface Area  
 35.43 kg/m 2 2.25 m 2 Preferred Pharmacy Pharmacy Name Phone [facility] [address] [place] [address] Shasha Bhakta Your Updated Medication List  
  
   
This list is accurate as of: 11/16/17  9:58 AM.  Always use your most recent med list.  
  
  
  
  
 ACCU-CHEK MARCE MONITORING  
by Does Not Apply route. aspirin delayed-release 81 mg tablet Take 81 mg by mouth daily. atorvastatin 20 mg tablet Commonly known as:  LIPITOR  
TAKE 1 TABLET BY MOUTH ONCE EVERY DAY. GENERIC FOR LIPITOR. butalbital-acetaminophen-caffeine -40 mg per tablet Commonly known as:  Khushboo Aleman  
 Take 1 Tab by mouth every four (4) hours as needed for Pain. Max Daily Amount: 6 Tabs. cetirizine 10 mg tablet Commonly known as:  ZYRTEC  
TAKE 1 TABLET BY MOUTH EVERY DAY. GENERIC FOR ZYRTEC  
  
 clobetasol 0.05 % external solution Commonly known as:  TEMOVATE  
APPLY TO SCALP AFTER SHAMPOO DAILY DULoxetine 30 mg capsule Commonly known as:  CYMBALTA TAKE 1 CAPSULE BY MOUTH TWICE DAILY FLUoxetine 10 mg capsule Commonly known as:  PROzac TAKE 1 CAPSULE BY MOUTH ONCE DAILY. Luc Alyce. fluticasone 50 mcg/actuation nasal spray Commonly known as:  Gi Carmonaks SHAKE LIQUID AND USE 1 SPRAY IN EACH NOSTRIL TWICE DAILY  
  
 glucose blood VI test strips strip Commonly known as:  ACCU-CHEK MARCE PLUS TEST STRP Please use one strip to test blood sugars once a day. Lancets Misc Commonly known as:  ACCU-CHEK SOFTCLIX LANCETS Please use one lancet to test blood sugars twice a day. levothyroxine 25 mcg tablet Commonly known as:  SYNTHROID  
TAKE 1 TABLET BY MOUTH EVERY DAY BEFORE BREAKFAST  
  
 meloxicam 15 mg tablet Commonly known as:  MOBIC  
TAKE 1 TABLET BY MOUTH DAILY  
  
 metFORMIN 500 mg tablet Commonly known as:  GLUCOPHAGE  
TAKE 1 TABLET BY MOUTH DAILY WITH DINNER. GENERIC FOR GLUCOPHAGE  
  
 PROBIOTIC 4X 10-15 mg Tbec Generic drug:  B.infantis-B.ani-B.long-B.bifi Take  by mouth daily. raNITIdine 150 mg tablet Commonly known as:  ZANTAC  
150mg QAM and 75mg QPM  
  
 SPIRIVA RESPIMAT 2.5 mcg/actuation inhaler Generic drug:  tiotropium bromide INL 2 PFS PO D  
  
 traMADol 50 mg tablet Commonly known as:  ULTRAM  
TAKE 1 PO Q 4 TIMES A DAY PRN PAIN  Indications: NEUROPATHIC PAIN, Pain  
  
 triamcinolone acetonide 0.1 % topical cream  
Commonly known as:  KENALOG Apply  to affected area two (2) times a day. use thin layer VENTOLIN HFA 90 mcg/actuation inhaler Generic drug:  albuterol 2 Puffs by Aerosolization route every four (4) hours as needed. VITAMIN D3 1,000 unit Cap Generic drug:  cholecalciferol Take 1,000 Units by mouth daily. We Performed the Following AMB POC EKG ROUTINE W/ 12 LEADS, INTER & REP [48828 CPT(R)] Follow-up Instructions Return in about 9 months (around 8/16/2018). To-Do List   
 11/17/2017 2:30 PM  
  Appointment with Dread Flowers at [place] [address] ([phone])  
  
 11/20/2017 2:30 PM  
  Appointment with Dread Flowers at [place] [address] ([phone])  
  
 11/22/2017 2:30 PM  
  Appointment with Dread Flowers at [place] [address] ([phone])  
  
 11/24/2017 2:30 PM  
  Appointment with Dread Flowers at [place] [address] ([phone])  
  
 11/27/2017 2:30 PM  
  Appointment with Dread Flowers at [place] [address] ([phone])  
  
 11/29/2017 2:30 PM  
  Appointment with Dread Flowers at [place] [address] ([phone]) 12/01/2017 2:30 PM  
  Appointment with Dread Flowers at [place] [address] ([phone]) 12/04/2017 2:30 PM  
  Appointment with Dread Flowers at [place] [address] ([phone]) 12/06/2017 2:30 PM  
  Appointment with Dread Flowers at [place] [address] ([phone])  12/08/2017 2:30 PM  
  Appointment with Dread Flowers at [place] [address] ([phone])  
  
 12/11/2017 2:30 PM  
  Appointment with Dread Flowers at [place] [address] ([phone])  
  
 12/13/2017 2:30 PM  
  Appointment with Dread Flowers at [place] [address] ([phone])  
  
 12/15/2017 2:30 PM  
  Appointment with Dread Flowers at [place] [address] ([phone])  
  
 12/18/2017 2:30 PM  
  Appointment with Dread Flowers at [place] [address] ([phone])  
  
 12/20/2017 2:30 PM  
  Appointment with Dread Flowers at [place] [address] ([phone])  
  
 12/22/2017 2:30 PM  
 Appointment with Dread Flowers at [place] [address] ([phone])  
  
 12/25/2017 2:30 PM  
  Appointment with Dread Flowers at [place] [address] ([phone]) [facility] SERVICES! Dear Yogesh Cortes: Thank you for requesting a [employer] account. Our records indicate that you already have an active [employer] account. You can access your account anytime at [email or web address] Did you know that you can access your hospital and ER discharge instructions at any time in [employer]? You can also review all of your test results from your hospital stay or ER visit. Additional Information If you have questions, please visit the Frequently Asked Questions section of the [employer] website at [email or web address] Remember, [employer] is NOT to be used for urgent needs. For medical emergencies, dial 911. Now available from your iPhone and Android! Please provide this summary of care documentation to your next provider. Your primary care clinician is listed as Carole Schmitz. If you have any questions after today's visit, please call [phone].

## 2017-11-17 ENCOUNTER — HOSPITAL ENCOUNTER (OUTPATIENT)
Dept: PULMONOLOGY | Age: 67
Discharge: HOME | End: 2017-11-17
Payer: MEDICARE

## 2017-11-17 PROCEDURE — G0237 THERAPEUTIC PROCD STRG ENDUR: HCPCS

## 2017-11-17 NOTE — PROGRESS NOTES
[facility]  Respiratory Therapy Flowsheet      Flavio Certain  1950       Patient's carry-over of treatment delivered by: sleeps better    Objective Daily Findings    Comments:    Respiratory Muscle Functioning/Exercise Conditioning/Strengthening Session:    Time In: 12:30  Time Out::1:30  Session Number: 10  O2 with Therapy: 0 L/min    Pre SPO2 97  Pre HR:99  Pre BP: 104/69    RR: 18    Wt: 229   Temp: 98.0   Post SPO2: 97 Post HR: 102  Post BP: 109/80    Self Care Home Management Instruction/Education    Patient Self Monitored Activity Time In:1:30 Time Out:2:25     Self Care Home Management Instruction Education: Breathing Retaining and Exercise Concepts. Patient's Response to Education: Patient actively participated in education. Comments: Individual Therapy         Goal    Actual                      During Therapy                 Post-Therapy     % SpO2 HR RPD 1 - 4 RPE 6-20 Pain  1 - 10 % SpO2 HR RPD 1 - 4 RPE 6-20 Pain 0 - 10   Treadmill                 Bike               Stepper 30 min x L6 30 min x L5 98 104 2 12 3 97 106 2 13 3   Arm Ergometer 30 min x 25 hawkins 30 min x 20 hawkins 96 92 2 12 4 97 96 2 13 4   Rower               Weight Training               Therabands               Weighted DB               IMT               IS                                Patient's response to therapy: Good effort and Good motivation  Comments  :c/o pain in shoulders on arm bike and left knee on stepper    Assessment    Patient's response: Patient progressing towardsd LTGs evident by:  Decreased Fatique, Increased PLB and Increased Pacing. Plan: Continue as written    KHTR:    Billin Minutes of Individualized Treatment ()        Physician supervision provided this date of service by: Dr. Ramos Crow       Therapist Signature: JOSE Mccray    Therapist PRINTED Name and Credential: JOSE Mccray    2017  2:09 PM

## 2017-11-20 ENCOUNTER — HOSPITAL ENCOUNTER (OUTPATIENT)
Dept: PULMONOLOGY | Age: 67
Discharge: HOME | End: 2017-11-20
Payer: MEDICARE

## 2017-11-20 PROCEDURE — G0237 THERAPEUTIC PROCD STRG ENDUR: HCPCS

## 2017-11-20 NOTE — PROGRESS NOTES
[facility]  Respiratory Therapy Flowsheet      [facility]  1950       Patient's carry-over of treatment delivered by: has more energy to get up and perform tasks. Objective Daily Findings    Comments:    Respiratory Muscle Functioning/Exercise Conditioning/Strengthening Session:    Time In: 2:30  Time Out::3:30  Session Number: 11  O2 with Therapy: 0 L/min    Pre SPO2 97  Pre HR:99  Pre BP: 115/75    RR: 18    Wt: 230   Temp: 98.0   Post SPO2: 98 Post HR: 92  Post BP: 123/73    Self Care Home Management Instruction/Education    Patient Self Monitored Activity Time In:3:30 Time Out:4:25     Self Care Home Management Instruction Education: Breathing Retaining and Exercise Concepts. Patient's Response to Education: Patient actively participated in education. Comments: Individual Therapy         Goal    Actual                      During Therapy                 Post-Therapy     % SpO2 HR RPD 1 - 4 RPE 6-20 Pain  1 - 10 % SpO2 HR RPD 1 - 4 RPE 6-20 Pain 0 - 10   Treadmill                 Bike               Stepper 30 min x L6 30 min x L5 97 102 2 10 4 98 104 2 12 4   Arm Ergometer 30 min x 25 hawkins 30 min x 20 hawkins 98 90 2 12 4 97 89 2 13 4   Rower               Weight Training               Therabands               Weighted DB               IMT               IS                                Patient's response to therapy: Good effort and Good motivation  Comments  :c/o pain in knees on stepper and arms on arm bike. Assessment    Patient's response: Patient progressing towardsd LTGs evident by: Increased PLB and Increased Pacing. Plan: Continue as written    HPRK:    [facility] Minutes of Individualized Treatment ()        Physician supervision provided this date of service by: Dr. Sanket Gonzalez       Therapist Signature: JOSE Buckley    Therapist PRINTED Name and Credential: JOSE Buckley    2017  3:36 PM

## 2017-11-22 ENCOUNTER — APPOINTMENT (OUTPATIENT)
Dept: PULMONOLOGY | Age: 67
End: 2017-11-22
Payer: MEDICARE

## 2017-11-24 ENCOUNTER — APPOINTMENT (OUTPATIENT)
Dept: PULMONOLOGY | Age: 67
End: 2017-11-24
Payer: MEDICARE

## 2017-11-27 ENCOUNTER — HOSPITAL ENCOUNTER (OUTPATIENT)
Dept: PULMONOLOGY | Age: 67
Discharge: HOME | End: 2017-11-27
Payer: MEDICARE

## 2017-11-27 DIAGNOSIS — M48.062 SPINAL STENOSIS OF LUMBAR REGION WITH NEUROGENIC CLAUDICATION: ICD-10-CM

## 2017-11-27 DIAGNOSIS — M54.16 RADICULOPATHY, LUMBAR REGION: ICD-10-CM

## 2017-11-27 DIAGNOSIS — M96.1 LUMBAR POSTLAMINECTOMY SYNDROME: ICD-10-CM

## 2017-11-27 DIAGNOSIS — M79.7 FIBROMYALGIA: ICD-10-CM

## 2017-11-27 DIAGNOSIS — M96.1 POSTLAMINECTOMY SYNDROME: ICD-10-CM

## 2017-11-27 NOTE — TELEPHONE ENCOUNTER
PHONE IN RX    Last Visit: 11/09/2017 with MD Tawny Naik    Next Appointment: 02/08/2017 with MD Tawny Naik   Previous Refill Encounters: 11/09/2017 per MD Tawny Naik #[id number]     Requested Prescriptions     Pending Prescriptions Disp Refills    traMADol (ULTRAM) 50 mg tablet 120 Tab 1     Sig: TAKE 1 PO Q 4 TIMES A DAY PRN PAIN  Indications: NEUROPATHIC PAIN, Pain

## 2017-11-28 RX ORDER — TRAMADOL HYDROCHLORIDE 50 MG/1
TABLET ORAL
Qty: 120 TAB | Refills: 1 | OUTPATIENT
Start: 2017-11-28 | End: 2018-02-21 | Stop reason: SDUPTHER

## 2017-11-29 ENCOUNTER — HOSPITAL ENCOUNTER (OUTPATIENT)
Dept: PULMONOLOGY | Age: 67
Discharge: HOME | End: 2017-11-29
Payer: MEDICARE

## 2017-11-29 PROCEDURE — G0237 THERAPEUTIC PROCD STRG ENDUR: HCPCS

## 2017-11-29 NOTE — TELEPHONE ENCOUNTER
Called Tramadol 50 mg #120 take 1 po q 4 times a day prn pain with 1 refill into patients local pharmacy. Called and left voice mail informing patient that we have called in her 2 additional refills for her Tramadol. No further action needed.

## 2017-11-29 NOTE — PROGRESS NOTES
[facility]  Respiratory Therapy Flowsheet      [employer]  1950       Patient's carry-over of treatment delivered by: has more energy. Objective Daily Findings    Comments:    Respiratory Muscle Functioning/Exercise Conditioning/Strengthening Session:    Time In: 2:30  Time Out::4:25  Session Number: 12  O2 with Therapy: 0 L/min    Pre SPO2 96  Pre HR:97  Pre BP: 117/73    RR: 18    Wt: 229   Temp: not tested by patient   Post SPO2: 98 Post HR: 94  Post BP: 130/78    Self Care Home Management Instruction/Education    Patient Self Monitored Activity Time In:NA Time Out:NA     Self Care Home Management Instruction Education: Breathing Retaining, Secretion Clearance and Home Managment, Relaxation Techniques and Stress Management, COPD, collab self mgmt. , bronchopulmonary hygiene. Patient's Response to Education: Patient actively participated in education. Comments: Individual Therapy         Goal    Actual                      During Therapy                 Post-Therapy     % SpO2 HR RPD 1 - 4 RPE 6-20 Pain  1 - 10 % SpO2 HR RPD 1 - 4 RPE 6-20 Pain 0 - 10   Treadmill                 Bike               Stepper 30 min x L5 30 min x L5 98 105 2 8 4 97 107 2 10 4   Arm Ergometer 30 min x 25 hawkins 25 min x 15 hawkins 98 93 2 8 3 97 95 2 10 3   Rower               Weight Training 30 min x 4 lb hand wts. 20 min x 3 lb hand wts. 97 89 2 8 3 96 90 2 10 3   Therabands               Weighted DB 20 min x 7 lb 15 min x 7 lb 97 75 1 8 1 98 74 1 8 1   IMT               IS 25 min x 3000  25 min x 2500 97 78 1 8 1 97 82 1 8 1                    Patient's response to therapy: Good effort and Good motivation  Comments  :    Assessment    Patient's response: Patient progressing towardsd LTGs evident by: Increased PLB and Increased Pacing.     Plan: Continue as written    BTIP:    [facility] Minutes of Individualized Treatment ()        Physician supervision provided this date of service by: Dr. Zoey Boateng       Therapist Signature: JOSE Lindo    Therapist PRINTED Name and Credential: JOSE Lindo    11/29/2017  4:41 PM

## 2017-12-01 ENCOUNTER — HOSPITAL ENCOUNTER (OUTPATIENT)
Dept: PULMONOLOGY | Age: 67
Discharge: HOME | End: 2017-12-01

## 2017-12-04 ENCOUNTER — HOSPITAL ENCOUNTER (OUTPATIENT)
Dept: PULMONOLOGY | Age: 67
End: 2017-12-04

## 2017-12-06 ENCOUNTER — APPOINTMENT (OUTPATIENT)
Dept: PULMONOLOGY | Age: 67
End: 2017-12-06

## 2017-12-08 ENCOUNTER — APPOINTMENT (OUTPATIENT)
Dept: PULMONOLOGY | Age: 67
End: 2017-12-08

## 2017-12-11 ENCOUNTER — HOSPITAL ENCOUNTER (OUTPATIENT)
Dept: PULMONOLOGY | Age: 67
Discharge: HOME | End: 2017-12-11

## 2017-12-13 ENCOUNTER — HOSPITAL ENCOUNTER (OUTPATIENT)
Dept: PULMONOLOGY | Age: 67
Discharge: HOME | End: 2017-12-13

## 2017-12-15 ENCOUNTER — HOSPITAL ENCOUNTER (OUTPATIENT)
Dept: PULMONOLOGY | Age: 67
Discharge: HOME | End: 2017-12-15

## 2017-12-18 ENCOUNTER — HOSPITAL ENCOUNTER (OUTPATIENT)
Dept: PULMONOLOGY | Age: 67
Discharge: HOME | End: 2017-12-18

## 2017-12-20 ENCOUNTER — HOSPITAL ENCOUNTER (OUTPATIENT)
Dept: PULMONOLOGY | Age: 67
Discharge: HOME | End: 2017-12-20

## 2017-12-22 ENCOUNTER — APPOINTMENT (OUTPATIENT)
Dept: PULMONOLOGY | Age: 67
End: 2017-12-22

## 2017-12-25 ENCOUNTER — APPOINTMENT (OUTPATIENT)
Dept: PULMONOLOGY | Age: 67
End: 2017-12-25

## 2017-12-26 DIAGNOSIS — M79.7 FIBROMYALGIA: ICD-10-CM

## 2017-12-27 ENCOUNTER — HOSPITAL ENCOUNTER (OUTPATIENT)
Dept: PULMONOLOGY | Age: 67
Discharge: HOME | End: 2017-12-27

## 2017-12-28 RX ORDER — DULOXETIN HYDROCHLORIDE 30 MG/1
CAPSULE, DELAYED RELEASE ORAL
Qty: 180 CAP | Refills: 1 | Status: SHIPPED | OUTPATIENT
Start: 2017-12-28 | End: 2018-10-01 | Stop reason: SDUPTHER

## 2017-12-29 ENCOUNTER — APPOINTMENT (OUTPATIENT)
Dept: PULMONOLOGY | Age: 67
End: 2017-12-29

## 2018-01-01 ENCOUNTER — APPOINTMENT (OUTPATIENT)
Dept: PULMONOLOGY | Age: 68
End: 2018-01-01
Payer: MEDICARE

## 2018-01-03 ENCOUNTER — APPOINTMENT (OUTPATIENT)
Dept: PULMONOLOGY | Age: 68
End: 2018-01-03
Payer: MEDICARE

## 2018-01-04 ENCOUNTER — TELEPHONE (OUTPATIENT)
Dept: PULMONOLOGY | Age: 68
End: 2018-01-04
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
## 2018-01-08 ENCOUNTER — APPOINTMENT (OUTPATIENT)
Dept: PULMONOLOGY | Age: 68
End: 2018-01-08
Payer: MEDICARE

## 2018-01-08 ENCOUNTER — TELEPHONE (OUTPATIENT)
Dept: PULMONOLOGY | Age: 68
End: 2018-01-08
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
## 2018-01-09 NOTE — PROGRESS NOTES
[facility] - [facility]  Respiratory Therapy  Patient Re-certifications     NOTE -- Date of service for 90 day recert was 7/6/40. 47/72/52 date was listed in error because 11/29/17 daily note was being reviewed at time recert was started on 9/0/49. Note was started and completed on 1/9/18. [address] Exercise (required)     90-Day Re-Assessment/POC    Date/Time: 1/9/18 3:00 PM    Initials:  [address]   120-Day Re-Assessment/POC    Date/Time: 2/6/18 8:00 AM    Initials:  [address]  150-Day Re-Assessment/POC    Date/Time:  Initials:        *Please see recent flow sheets  STG Status:  Compliant with Exercise Prescription. [] Yes   [x] No        If no, describe: Has missed many sessions. Participation in educational sessions. [] Yes   [x] No          If no, describe: has been absent. Other:     Updated Exercise Prescription   Aerobic Mode:   [x]  Treadmill  [x] Bike  [x] NuStep  []  Arm Ergometer  []  Other:   Frequency: 2-3 days/week  Duration: Up to 40   min per mode  Intensity:  RPE  5  to 15. RPD: <  2      Strength training Mode:  [x]  Free Weights/Thera-bands  Frequency: 2-3 days/week  Duration: Up to 40  minutes   Intensity: 1-10 sets; 5-30 reps;  RPE: 6    to 15. RPD less than or equal to 2. Breathing Exercise Mode:  [x] IMT/PEP  [x] Weight Diaphragmatic Breath  [x] Incentive Spirometer  Frequency: 2-3 days/week  Duration: Up to  40   min per mode  Intensity:RPE:6 to 15. RPD less than or equal to 2. LTG (to be achieved by D/C):  [x] Exercise > 115__ min/week. with O2 Sat >89       , RPD <   2     ,  [x] Increase 6MW test by 50    Feet. *Please see recent flow sheets  STG Status:  Compliant with Exercise Prescription. [x] Yes   [] No        If no, describe:      Participation in educational sessions. [x] Yes   [] No          If no, describe:     Other:       Updated Exercise Prescription   Aerobic Mode:   [x]  Treadmill  [x] Bike  [x] NuStep  [x]  Arm Ergometer  []  Other:   Frequency: 2-3 days/week  Duration: Up to 40   min per mode  Intensity:  RPE 6   to 15. RPD: < 2. Strength training Mode:  [x]  Free Weights/Thera-bands  Frequency: 2-3 days/week  Duration: Up to  minutes   Intensity: 1-10 sets; 5-30 reps;  RPE: 6    to 15. RPD less than or equal to 2. Breathing Exercise Mode:  [x] IMT/PEP  [x] Weight Diaphragmatic Breath  [x] Incentive Spirometer  Frequency: 2-3 days/week  Duration: Up to   40 min per mode  Intensity:RPE: 6 - 15. LTG (to be achieved by D/C):  [x] Exercise > _115__ min with O2 Sat >  89     , RPD <   2     ,  [x] Increase 6MW test by  50   Feet. *Please see recent flow sheets  STG Status:  Compliant with Exercise Prescription. [] Yes   []No          If no, describe:     Participation in educational sessions   [] Yes   [] No          If no, describe: Other:    Updated Exercise Prescription  Aerobic Mode:   []  Treadmill  []  Bike  []  NuStep  []  Arm Ergometer  []  Other:   Frequency: 2-3 days/week  Duration: Up to    min per mode  Intensity:  RPE    to      RPD: <              Strength training Mode:  []  Free Weights/Thera-bands  Frequency:2-3 days/week  Duration: Up to    minutes   Intensity: 1-2 sets; 12-15 reps;  RPE:     to                   Breathing Exercise Mode:  [] IMT/PEP  [] Weight Diaphragmatic Breath  [] Incentive Spirometer  Frequency: 2-3 days/week  Duration: Up to    min per mode  Intensity:RPE:              LTG (to be achieved by D/C):  [] Exercise > ___ min with O2 Sat >       , RPD <        ,  [] Increase 6MW test by     Feet. Nutrition (required)           90 Day Re-assessment/POC    Date/Time: 1/9/18 3:00 PM  Initial: Day Re-assessment/POC    Date/Time: 2/6/18 8:00 AM  Initial: [address]   150 Day Re-assessment/POC    Date/Time:    Initial:          *Please see recent flow sheets  STG Status:  Compliant with prescribed, specialized diet      % of the time per patient report.       Participate in nutritional health classes []  Yes   [x]  No          If no, describe:  Patient absent  Current RYP score:  56 at eval.           Other: Describe:     Dietician Consult Completed       []  Yes   []  No   []  N/A    UPOC:  Pt will continue to:   []  Be compliant with prescribed, specialized diet    [x] Participate in nutritional     health classes. [] Improve RYP score to:    *RYP needs to be reassessed if still active as a STG        LTG (to be achieved by D/C):     [x] Patient will verbalize an understanding of a life-long adherence to a healthy diet and the impact on health condition(s). *Please see recent flow sheets  STG Status:  Compliant with prescribed, specialized diet      % of the time per patient report. Participate in nutritional health classes   [x]  Yes   []  No          If no, describe:      Current RYP score: Other:  Describe:    Dietician Consult Completed     []  Yes   []  No   []  N/A    UPOC:  Pt will continue to:   []  Be compliant with prescribed, specialized diet    [x] Participate in nutritional     health classes. [] Improve RYP score to:  *RYP needs to be reassessed if still active as a STG    LTG (to be achieved by D/C):     [x] Patient will verbalize an understanding of a life-long adherence to a healthy diet and the impact on health condition(s). *Please see recent flow sheets  STG Status:  Compliant with prescribed, specialized diet    % of the time per patient report. Participate in nutritional health classes. [] Yes [] No          If no, describe:      Current RYP score: Other:  Describe:    Dietician Consult Completed        [] Yes   [] No    []N/A      UPOC:  Pt will continue to:   []  Be compliant with prescribed, specialized diet. []  Participate in nutritional health classes.   [] Improve RYP score to:   *RYP needs to be reassessed if still active as a STG          LTG (to be achieved by D/C):     [] Patient will verbalize an understanding of a life-long adherence to a healthy diet and the impact on health condition(s). Psychosocial (required)        90 - Day Re-Assessment/POC    Date/Time: 1/9/18 3:00 PM  Initials: [address] 120-Day Re-Assessment/POC      Date/Time: 2/6/18 8:00 AM  Initials: [address] 150-Day Re-Assessment/POC    Date/Time:  Initials:                  *Please see recent flow sheets  STG Status:    Manage and reduce stress per patient report.   []  Yes   []  No          If no, describe:  Unable to assess. PHQ 9 current score is: patient not available to test     Average sleep hours: unable to test     Participate in emotional health class.   []  Yes   [x]  No          If no, describe:  Not present for cert period      Decreased smoking by:     %    UPOC:  Pt will continue to:   [x]  Manage and reduce stress per patient report. [x]  Manage depression as evidenced by PHQ 9 score of: 5    [x]  Manage sleep as evidenced by average sleep hours of:    7-8    [x] Participate in emotional health class. []  Decrease smoking by    %    Other:        LTG (to be achieved by D/C):  [x]  Use relaxation techniques when stressors are identified and verbalize coping skills and decrease vulnerability to stress. []  Maintain decreased smoking behaviors/smoking cessation. [x]  Improve PHQ-9 score  [x]  Improve [employer] scores and maintain a healthy psychosocial well-being. *Please see recent flow sheets  STG Status:    Manage and reduce stress per patient report. [x]  Yes   []  No          If no, describe:      PHQ 9 current score is: 7     Average sleep hours: 6     Participate in emotional health class. [x]  Yes   []  No          If no, describe:        Decreased smoking by:     %  UPOC:  Pt will continue to:   []  Manage and reduce stress per patient report. [x]  Manage depression as evidenced by PHQ 9 score of: 4  [x]  Manage sleep as evidenced by average sleep hours of:    7-8    [x] Participate in emotional health class.      []  Decrease smoking by %    Other:        LTG (to be achieved by D/C):  [x]  Use relaxation techniques when stressors are identified and verbalize coping skills and decrease vulnerability to stress. []  Maintain decreased smoking behaviors/smoking cessation. [x]  Improve PHQ-9 score  [x]  Improve [employer] scores and maintain a healthy psychosocial well-being. *Please see recent flow sheets  STG Status:    Manage and reduce stress per patient report. []Yes    [] No          If no, describe:     PHQ 9 current score is:      Average sleep hours:      Participate in emotional health class.   []  Yes []  No          If no, describe:       Decreased smoking by:  %    UPOC:  Pt will continue to:   []  Manage and reduce stress per patient report. []  Manage depression as evidenced by PHQ 9 score of  :   []  Manage sleep as evidenced by average sleep hours of:      []  Participate in emotional health class. []  Decrease smoking by  %    Other:      LTG (to be achieved by D/C):  []  Use relaxation techniques when stressors are identified and verbalize coping skills and decrease vulnerability to stress. []  Maintain decreased smoking behaviors/smoking cessation. []  Improve PHQ-9 score  []  Improve [employer] scores and maintain a healthy psychosocial well-being. Oxygen (required)     90-Day Re-Assessment/POC    Date/Time: 1/9/18 3:00 PM  Initials: [address] 120-Day Re-Assessment/POC    Date/Time: 2/6/18 8:00 AM  Initials: [address]  150-Day Re-Assessment/POC    Date/Time:    Initials:                    *Please see recent flow sheets  Changes in Prescription:   []  Yes  []  No    [x] N/A    If yes, describe:    STG Status:  []  Compliant with Oxygen prescription. [x]  Maintain oxygen saturations >89     at rest.     [x]   Maintain Oxygen saturations >89      with exertion. [x]  Utilize  PLB     % of the time without prompting. Unable to assess      [x]  Utilize DB     % of the time without prompting. Unable to assess.   [] Other: Describe:    UPOC:  Pt will continue to:   [] Be compliant with Oxygen prescription. [x]  Maintain Oxygen saturations > 89      at rest.  [x]  Maintain Oxygen saturations > 89     with exertion. [x] Utilize PLB   50  % of the time without prompting. [x]  Utilize DB   50 % of the time without prompting. []  Other:     LTG (to be achieved by D/C):    [x] Patient will independently perform pursed-lip breathing  [x] Patient will independently perform diaphragmatic breathing  [] Patient will follow Oxygen prescription *Please see recent flow sheets  Changes in Prescription:   []  Yes  []  No    [x] N/A    If yes, describe:    STG Status:  []  Compliant with Oxygen prescription. [x]  Maintain oxygen saturations > 89    at rest.     [x]   Maintain Oxygen saturations >89      with exertion. [x]  Utilize  PLB   60  % of the time without prompting. [x]  Utilize DB   60  % of the time without prompting. [] Other: Describe:      UPOC:  Pt will continue to:   [] Be compliant with Oxygen prescription. [x]  Maintain Oxygen saturations > 89      at rest.  [x]  Maintain Oxygen saturations > 89     with exertion. [x] Utilize PLB   75  % of the time without prompting. [x]  Utilize DB  75  % of the time without prompting. []  Other:     LTG (to be achieved by D/C):    [x] Patient will independently perform pursed-lip breathing  [x] Patient will independently perform diaphragmatic breathing  [] Patient will follow Oxygen prescription *Please see recent flow sheets  Changes in Prescription:   []  Yes  []  No    [] N/A    If yes, describe:      STG Status:  []  Compliant with Oxygen prescription. []  Maintain Oxygen saturations >   at rest.     []  Maintain Oxygen saturations >  with exertion. []  Utilize PLB        % of the time without prompting. [] Utilize DB      % of the time without prompting. UPOC:  Pt will continue to:   [] Be compliant with Oxygen prescription.   []  Maintain Oxygen saturations >       at rest.  []  Maintain Oxygen saturations >      with exertion. [] Utilize PLB     % of the time without prompting. []  Utilize DB    % of the time without prompting. []  Other:      LTG (to be achieved by D/C):    [] Patient will independently perform pursed-lip breathing  [] Patient will independently perform diaphragmatic breathing  [] Patient will follow Oxygen prescription       Core Measures Congestive Heart Failure     90-Day Re-Assessment/POC    Date/Time:1/9/18 3:00 PM Initials: [address] 120-Day Re-Assessment/POC    Date/Time: 2/6/18 8:00 [address]  Initials: [address]  150-Day Re-Assessment/POC    Date/Time:   Initials:         [x]  Not a Risk Factor at This Time. No plan of care required. *Please see recent flow sheets   Newly diagnosed:   []  Yes  []  No    [] N/A  If yes, describe date on onset, class and medication changes:       STG Status/UPOC:  Per patient report: Identifies own individual Baselines  (see left):  []  Yes  []  No, continue      Understands and able to act upon symptoms of worsening CHF:   []  Yes   []  No, continue    Weighs self daily:   [] Yes   []  No, continue      Monitors BP at home:   [] Yes   []  No, continue      Compliant with medications:   [] Yes   []  No, continue      Compliant w/ diet and sodium intake:   [] Yes   []  No, continue      Monitors home fluidintake:   [] Yes   []  No, continue      Exercises daily:  [] Yes   []  No, continue      [] Other:          LTG (to be achieved by D/C):  [] Able to verbalize individual baselines, signs and symptoms of worsening garvey failure and appropriate self-management   [] Compliant with sodium intake and fluid intake  [] Compliant with medications  [] Weighs self daily  [] Monitors blood pressure at home      *Please see recent flow sheets   Newly diagnosed:   []  Yes  []  No    [] N/A  If yes, describe date on onset, class and medication changes:        STG Status/UPOC:  Per patient report:   Identifies own individual Baselines  (see left):  []  Yes  []  No, continue      Understands and able to act upon symptoms of worsening CHF:   []  Yes   []  No, continue    Weighs self daily:   [] Yes   []  No, continue      Monitors BP at home:   [] Yes   []  No, continue      Compliant with medications:   [] Yes   []  No, continue      Compliant w/ diet and sodium intake:   [] Yes   []  No, continue      Monitors home fluid intake:   [] Yes   []  No, continue      Exercises daily:  [] Yes   []  No, continue      [] Other:                                            LTG (to be achieved by D/C):  [] Able to verbalize individual baselines, signs and symptoms of worsening garvey failure and appropriate self-management   [] Compliant with sodium intake and fluid intake  [] Compliant with medications  [] Weighs self daily  [] Monitors blood pressure at home   *Please see recent flow sheets  Newly diagnosed:   []  Yes  []  No    [] N/A    If yes, describe date on onset, class and medication changes:       STG Status/UPOC:  Per patient report:   Identifies own individual Baselines  (see left):  []  Yes  []  No, continue      Understands and able to act upon symptoms of worsening CHF:   []  Yes   []  No, continue      Weighs self daily:   [] Yes   []  No, continue      Monitors BP at home:   [] Yes   []  No, continue      Compliant with medications:   [] Yes   []  No, continue      Compliant w/ diet and sodium intake:   [] Yes   []  No, continue      Monitors home fluid intake:   [] Yes   []  No, continue      Exercises daily:  [] Yes   []  No, continue      [] Other:          LTG (to be achieved by D/C):  [] Able to verbalize individual baselines, signs and symptoms of worsening garvey failure and appropriate self-management   [] Compliant with sodium intake and fluid intake  [] Compliant with medications  [] Weighs self daily  [] Monitors blood pressure at home         Core Measures Hypertention   90-Day Re-Assessment/POC    Date/Time:1/9/18 3:00 PMInitials:[facility] 120-Day Re-Assessment/POC    Date/Time: 2/6/18 8:00 AM  Initials: Floresita Slaughter 150-Day Re-Assessment/POC    Date/Time:    Initials:     [x]  Not a Risk Factor at This Time. No plan of care required.   *Please see recent flow sheets  Newly diagnosed:   []  Yes  []  No    [] N/A     If yes, describe date of onset and medication changes:        STG Status/UPOC:  Per patient report    BP Medication compliance:  [] Yes   []  No, continue    Low Na+ diet:  [] Yes   []  No, continue    Monitors BP at home:  [] Yes   []  No, continue    Resting BP (obtained by clinician):  [] Yes   []  No, continue    Other:         LTG (to be achieved by D/C):  [] Consistent resting blood pressure <  [] Monitors BP at home  [] BP medication compliance *Please see recent flow sheets  Newly diagnosed:   []  Yes  []  No    [] N/A     If yes, describe date of onset and medication changes:    STG Status/UPOC:  Per patient report    BP Medication compliance:  [] Yes   []  No, continue    Low Na+ diet:  [] Yes   []  No, continue    Monitors BP at home:  [] Yes   []  No, continue    Resting BP (obtained by clinician):  [] Yes   []  No, continue    Other:  *Please see recent flow sheets  Newly diagnosed:   []  Yes  []  No    [] N/A     If yes, describe date of onset and medication changes:        STG Status/UPOC:  Per patient report    BP Medication compliance:   [] Yes   []  No, continue    Low Na+ diet:  [] Yes   []  No, continue    Monitors BP at home:  [] Yes   []  No, continue    Resting BP (obtained by clinician):  [] Yes   []  No, continue    Other:          LTG (to be achieved by D/C):  [] Consistent resting blood pressure <  [] Monitors BP at home  [] BP medication compliance   LTG (to be achieved by D/C):  [] Consistent resting blood pressure <  [] Monitors BP at home  [] BP medication compliance       Core Measures Diabetes     90-Day Re-Assessment/POC    Date/Time:1/9/18 3:00 PM  Initials:[facility] 120-Day Re-Assessment/POC    Date/Time: 2/6/18 8:00 AM  Initials: Darvin Monroe 150-Day Re-Assessment/POC    Date/Time    Initials:        []  Not a Risk Factor at This Time. No plan of care required.   *Please see recent flow sheets  Newly diagnosed:   []  Yes  [x]  No    [] N/A     If yes, describe date of onset and medication changes:    STG Status/UPOC:  Per patient report    Compliant with diabetic medication:   [] Yes   []  No, continue    Compliant with diabetic diet:  [] Yes   []  No, continue    Monitors blood sugar at home:  [] Yes   []  No, continue    Takes appropriate corrective actions when blood glucose is out of range:  [] Yes   []  No, continue    Other:           LTG (to be achieved by D/C):    [] Blood sugar <     And >    For exercise  [] Patient is medication compliant  [] Patient controls blood sugar via diet  [] Blood sugar <    And >    For fasting blood sugar     *Please see recent flow sheets  Newly diagnosed:   []  Yes  []  No    [] N/A     If yes, describe date of onset and medication changes:    STG Status/UPOC:  Per patient report    Compliant with diabetic medication:   [] Yes   []  No, continue    Compliant with diabetic diet:  [] Yes   []  No, continue    Monitors blood sugar at home:  [] Yes   []  No, continue    Takes appropriate corrective actions when blood glucose is out of range:  [] Yes   []  No, continue    Other:        *Please see recent flow sheets  Newly diagnosed:   []  Yes  []  No    [] N/A     If yes, describe date of onset and medication changes:      STG Status/UPOC:  Per patient report    Compliant with diabetic medication:  [] Yes   []  No, continue      Compliant with diabetic diet:  [] Yes   []  No, continue    Monitors blood sugar at home:  [] Yes   []  No, continue    Takes appropriate corrective actions when blood glucose is out of range:  [] Yes   []  No, continue    Other:          LTG (to be achieved by D/C):    [] Blood sugar <     And >    For exercise  [] Patient is medication compliant  [] Patient controls blood sugar via diet  [] Blood sugar <    And >    For fasting blood sugar       LTG (to be achieved by D/C):    [] Blood sugar <     And >    For exercise  [] Patient is medication compliant  [] Patient controls blood sugar via diet  [] Blood sugar <    And >    For fasting blood sugar                       Patient Goal(s):   Alexei Marlow was an active participant in the development of this ITP and is agreeable to treatment within an open gym environment. Supervising Physician: Dr. Jimi Cevallos, OTR/L  1/9/2018,3:46 PM    Medical Director ITP and Exercise Prescription Approval  Respiratory Therapy    I    90-Day Reassessment:  Patient's functional level; progress towards goals; carry-over learning within the home; problems, concerns and/or deficits; treatment plan (treatment modalities); and patient goals were reviewed with the patient. Additional Physician findings and/or comments (if applicable). The Medical Directors electronic co-signature validates that provider has reviewed the above findings and concur with these findings with any changes and/or additional comments noted below. I certify the need for Respiratory therapy furnished under this plan of treatment and while under my care. I agree with the plan and certify that this therapy is necessary. [facility] 2/12[facility]18      120- Day Reassessment:  Patient's functional level; progress towards goals; carry-over learning within the home; problems, concerns and/or deficits; treatment plan (treatment modalities); and patient goals were reviewed with the patient. Additional Physician findings and/or comments (if applicable). The Medical Directors electronic co-signature validates that provider has reviewed the above findings and concur with these findings with any changes and/or additional comments noted below.  I certify the need for Respiratory therapy furnished under this plan of treatment and while under my care. I agree with the plan and certify that this therapy is necessary. Certification Period: 2/13/18 to 3/14/18    150-Day Reassessment:  Patient's functional level; progress towards goals; carry-over learning within the home; problems, concerns and/or deficits; treatment plan (treatment modalities); and patient goals were reviewed with the patient. Additional Physician findings and/or comments (if applicable). The Medical Directors electronic co-signature validates that provider has reviewed the above findings and concur with these findings with any changes and/or additional comments noted below. I certify the need for Respiratory therapy furnished under this plan of treatment and while under my care. I agree with the plan and certify that this therapy is necessary.     Certification Period:

## 2018-01-10 ENCOUNTER — HOSPITAL ENCOUNTER (OUTPATIENT)
Dept: PULMONOLOGY | Age: 68
Discharge: HOME | End: 2018-01-10
Payer: MEDICARE

## 2018-01-10 NOTE — CARDIO/PULMONARY
Patient 90 day recertification and date of service were actually performed  on 1/9/18 but date of service is listed as 11/29/18 in error.

## 2018-01-12 ENCOUNTER — APPOINTMENT (OUTPATIENT)
Dept: PULMONOLOGY | Age: 68
End: 2018-01-12
Payer: MEDICARE

## 2018-01-15 ENCOUNTER — HOSPITAL ENCOUNTER (OUTPATIENT)
Dept: PULMONOLOGY | Age: 68
Discharge: HOME | End: 2018-01-15
Payer: MEDICARE

## 2018-01-15 PROCEDURE — G0237 THERAPEUTIC PROCD STRG ENDUR: HCPCS

## 2018-01-15 NOTE — PROGRESS NOTES
[facility] - [facility]  Respiratory Therapy Flowsheet      Wil Mojica  1950       Patient's carry-over of treatment delivered by: performs DB with mod cues. Objective Daily Findings    Comments: 1/9/18 PHQ 9 score for 90 day recert was 8. Respiratory Muscle Functioning/Exercise Conditioning/Strengthening Session:    Time In: 3:30  Time Out::4:25  Session Number: 13  O2 with Therapy: 0 L/min    Pre SPO2 99  Pre HR:87  Pre BP: 131/81    RR: 18    Wt: 225   Temp: 98.0   Post SPO2: 98 Post HR: 90  Post BP: 125/84    Self Care Home Management Instruction/Education    Patient Self Monitored Activity Time In:2:30 Time Out:3:30     [x] - Breathing Retraining   [] - Smoking Cessation   [x] - Exercise Concepts   [] - Nutrition    [] - Collaborative Self-Management  [] - Secretion Clearance and Home Management    [] - Body Mechanics & Energy Conservation/Work Simplification (EC/WS) Techniques    [] - COPD & Lung Disease   [] - Travel & Environment  [] - Relaxation Techniques   [] - Medication & Side Effects   [] - Medication Adverse Interactions   [] - Oxygen Training   [] - Metered Dose Inhaler (MDI)   [] - Stress Management   [] - Sexuality & Lung Disease  [] - Fall Risk Precautions      Patients response to Education [x] - Patient actively participated in education  [] - Patient disengaged during education     [] - Able to recite main objectives  [] - Unable to recite main objectives [] - Able to demonstrate    [] - NA  [] - Unable to demonstrate       Comments:                Individual Therapy         Goal    Actual                      During Therapy                 Post-Therapy     % SpO2 HR PDS  1-10 PES  1-10 Pain  1 - 10 % SpO2 HR PDS  1-10 PES  1-10 Pain 0 - 10   Treadmill                 Bike               Stepper 30 min x L5 30 min x L6 96 87 1 10 1 97 89 1 10 1   Arm Ergometer 30 min x 25 hawkins 25 min x 15 hawkins 97 91 1 8 1 96 93 1 10 1   Rower               Weight Training               Therabands Weighted DB               IMT               IS                                Patient's response to therapy:     [] - ? Dyspnea    [] - ? Fatigue    [] - ? Heart rate    [] - ? Wheeze    [] - ? Cough     [] - ? Pain; location: _____________________________  [] - ? SpO2     [] - ? Pacing     [] - ? Pursed lip breathing   [] - ? Diaphragmatic breathing     [] - Experienced no problems; no cuing required. [x] - Good effort     [] - Fair effort     [] - Poor effort    [x] - Good motivation      [] - Fair motivation     [] - Poor motivation    Comments  :    Assessment  Patient's response:   [] Patient unable to progress towards LTGs due to:  [] - Illness     [] - Weakness / debility [] - Poor effort     [] - Poor Motivation     [] - Poor Attendance        [x] Patient progressing towards LTGs: evident by:     [] Decreased Dyspnea on exertion   [] Decreased Fatigue   [x] Increased PLB        [x] Increased DB    [] Decreased pain    [] Increased Pacing     [] Reduced work of breathing, increased SpO2, and decreased PDS, PES, and PPS  [] Improved ventilatory muscle (diaphragm) strength and endurance    [] Improved Aerobic capacity and endurance     [] Cigarette reduction / Smoking cessation participation      [] Other:              Plan:       [x] - Continue as written   OR    [] - Adjust treatment plan as follows:       Billing:    # _4___     # _____ Marychuy    # _____ Other       55_____ Minutes of Individualized Treatment ()        _____ Minutes of Group Treatment (Marychuy)        _____ Minutes of Other (________________________________)             Physician supervision provided this date of service by: Dr. Jody Grey       Therapist Signature: JOSE Wilson    Therapist PRINTED Name and Credential: JOSE Wilson    1/15/2018  4:13 PM

## 2018-01-17 ENCOUNTER — HOSPITAL ENCOUNTER (OUTPATIENT)
Dept: PULMONOLOGY | Age: 68
Discharge: HOME | End: 2018-01-17
Payer: MEDICARE

## 2018-01-17 PROCEDURE — G0237 THERAPEUTIC PROCD STRG ENDUR: HCPCS

## 2018-01-17 NOTE — PROGRESS NOTES
[facility]  Respiratory Therapy Flowsheet      Josh Villafana  1950       Patient's carry-over of treatment delivered by: none noted today. Objective Daily Findings    Comments:    Respiratory Muscle Functioning/Exercise Conditioning/Strengthening Session:    Time In: 8:00  Time Out::9:55  Session Number: 14  O2 with Therapy: 0 L/min    Pre SPO2 99  Pre HR:95  Pre BP: 119/76    RR: 18    Wt: 226   Temp: 97.7   Post SPO2: 97 Post HR: 89  Post BP: 108/70    Self Care Home Management Instruction/Education    Patient Self Monitored Activity Time In:NA Time Out:NA     Self Care Home Management Instruction Education: Breathing Retaining and Secretion Clearance and Home Managment. Patient's Response to Education: Patient actively participated in education. Comments: Individual Therapy         Goal    Actual                      During Therapy                 Post-Therapy     % SpO2 HR RPD 1 - 4 RPE 6-20 Pain  1 - 10 % SpO2 HR RPD 1 - 4 RPE 6-20 Pain 0 - 10   Treadmill                 Bike               Stepper 30 min x L5 30 min x L5 99 107 1 8 1 97 105 2 10 5   Arm Ergometer 30 min x 25 hawkins 30 min x 20 hawkins 96 94 2 8 3 96 89 2 10 4   Rower               Weight Training               Therabands               Weighted DB 30 min x 7 lb 25 min x 9 lb 98 81 1 8 1 97 86 1 8 1   IMT               IS 30 min x 2500 30 min x 2250 98 87 1 8 1 99 85 1 8 1                    Patient's response to therapy: Good effort and Good motivation  Comments  :c/o pain in left knee on stepper and in right shoulder on arm bike. Assessment    Patient's response: Patient progressing towardsd LTGs evident by: Increased PLB, Increased DB and Increased Pacing. Plan: Continue as written    AGNX:    Billin Minutes of Individualized Treatment ()        Physician supervision provided this date of service by: Dr. Wilfred Fuentes       Therapist Signature: Santiago Ponce OTR/L    Therapist PRINTED Name and Credential: ANMOL Handy/L    1/17/2018  11:28 AM

## 2018-01-19 ENCOUNTER — HOSPITAL ENCOUNTER (OUTPATIENT)
Dept: PULMONOLOGY | Age: 68
Discharge: HOME | End: 2018-01-19
Payer: MEDICARE

## 2018-01-19 PROCEDURE — G0237 THERAPEUTIC PROCD STRG ENDUR: HCPCS

## 2018-01-22 ENCOUNTER — HOSPITAL ENCOUNTER (OUTPATIENT)
Dept: PULMONOLOGY | Age: 68
Discharge: HOME | End: 2018-01-22
Payer: MEDICARE

## 2018-01-22 PROCEDURE — G0237 THERAPEUTIC PROCD STRG ENDUR: HCPCS

## 2018-01-22 NOTE — PROGRESS NOTES
[facility]  Respiratory Therapy Flowsheet      Darryle Sickle  1950       Patient's carry-over of treatment delivered by: wyatt walking. Objective Daily Findings    Comments:    Respiratory Muscle Functioning/Exercise Conditioning/Strengthening Session:    Time In: 2:30  Time Out::4:10  Session Number: 16  O2 with Therapy: 0 L/min    Pre SPO2 98  Pre HR:84  Pre BP: 108/69    RR: 18    Wt: 227   Temp: 98.0   Post SPO2: 98 Post HR: 64  Post BP: 112/69    Self Care Home Management Instruction/Education    Patient Self Monitored Activity Time In:NA Time Out:NA     Self Care Home Management Instruction Education: Breathing Retaining and Exercise Concepts. Patient's Response to Education: Patient actively participated in education. Comments: Individual Therapy         Goal    Actual                      During Therapy                 Post-Therapy     % SpO2 HR RPD 1 - 4 RPE 6-20 Pain  1 - 10 % SpO2 HR RPD 1 - 4 RPE 6-20 Pain 0 - 10   Treadmill                 Bike               Stepper 30 min x L5 30 min x L5 96 96 2 8 4 96 99 2 10 5   Arm Ergometer 30 min x 25 hawkins 30 min x 20 hawkins 97 89 2 8 1 98 92 2 10 1   Rower               Weight Training               Therabands               Weighted DB 25 min x 7 lb 15 min x 4 lb 97 88 1 8 1 97 86 1 8 1   IMT               IS 25 min x 2000 25 min x 1750 97 81 1 8 1 98 82 1 8 1                    Patient's response to therapy: Increase dyspnea, Increase fatique and Good effort  Comments  : Patient left early was very fatigued. Assessment    Patient's response: Patient progressing towardsd LTGs evident by: Increased PLB and Increased Pacing. Plan: Continue as written    WDUA:    Billin Minutes of Individualized Treatment ()        Physician supervision provided this date of service by: Dr. Janella Leventhal       Therapist Signature: JOSE Sotelo    Therapist PRINTED Name and Credential: JOSE Sotelo    2018  4:43 PM

## 2018-01-24 ENCOUNTER — HOSPITAL ENCOUNTER (OUTPATIENT)
Dept: PULMONOLOGY | Age: 68
Discharge: HOME | End: 2018-01-24
Payer: MEDICARE

## 2018-01-26 ENCOUNTER — HOSPITAL ENCOUNTER (OUTPATIENT)
Dept: PULMONOLOGY | Age: 68
Discharge: HOME | End: 2018-01-26
Payer: MEDICARE

## 2018-01-26 PROCEDURE — G0237 THERAPEUTIC PROCD STRG ENDUR: HCPCS

## 2018-01-26 NOTE — PROGRESS NOTES
[facility] - [facility]  Respiratory Therapy Flowsheet      Jazmin Flannery  1950       Patient's carry-over of treatment delivered by: feels better emotionally. .    Objective Daily Findings    Comments:    Respiratory Muscle Functioning/Exercise Conditioning/Strengthening Session:    Time In: 3:25  Time Out::4:25  Session Number: 17  O2 with Therapy: 0 L/min    Pre SPO2 98  Pre HR:94  Pre BP: 107/64    RR: 18    Wt: 226   Temp: 98.2   Post SPO2: 98 Post HR: 82  Post BP: 112/77    Self Care Home Management Instruction/Education    Patient Self Monitored Activity Time In:2:30 Time Out:3:25     Self Care Home Management Instruction Education: Exercise Concepts and Collaborative Self Managment. Patient's Response to Education: Patient actively participated in education. Comments: Individual Therapy         Goal    Actual                      During Therapy                 Post-Therapy     % SpO2 HR RPD 1 - 4 RPE 6-20 Pain  1 - 10 % SpO2 HR RPD 1 - 4 RPE 6-20 Pain 0 - 10   Treadmill                 Bike               Stepper 30 min x L5 30 min x L4 97 85 2 7 5 96 87 2 8 6   Arm Ergometer 30 min x 20 hawkins 30 min x 20 hawkins 97 89 2 6 1 98 90 2 8 1   Rower               Weight Training               Therabands               Weighted DB               IMT               IS                                Patient's response to therapy: Good effort and Good motivation  Comments  : c/o pain in legs on stepper. Assessment    Patient's response: Patient progressing towardsd LTGs evident by: Increased PLB and Increased Pacing. Plan: Continue as written    QMHN:    Billin Minutes of Individualized Treatment ()        Physician supervision provided this date of service by: Dr. Ron Benz       Therapist Signature: JOSE Arnold    Therapist PRINTED Name and Credential: JOSE Arnold    2018  4:55 PM

## 2018-01-29 ENCOUNTER — HOSPITAL ENCOUNTER (OUTPATIENT)
Dept: PULMONOLOGY | Age: 68
Discharge: HOME | End: 2018-01-29
Payer: MEDICARE

## 2018-01-31 ENCOUNTER — HOSPITAL ENCOUNTER (OUTPATIENT)
Dept: PULMONOLOGY | Age: 68
Discharge: HOME | End: 2018-01-31
Payer: MEDICARE

## 2018-01-31 PROCEDURE — G0237 THERAPEUTIC PROCD STRG ENDUR: HCPCS

## 2018-01-31 NOTE — PROGRESS NOTES
[facility]  Respiratory Therapy Flowsheet      Darryle Sickle  1950       Patient's carry-over of treatment delivered by: Nettie Dominguez activities at home. Objective Daily Findings    Comments:    Respiratory Muscle Functioning/Exercise Conditioning/Strengthening Session:    Time In: 2:30  Time Out::3:30  Session Number: 18  O2 with Therapy: 0 L/min    Pre SPO2 97  Pre HR:94  Pre BP: 109/74    RR: 18    Wt: 226   Temp: 98.4   Post SPO2: 97 Post HR: 96  Post BP: 113/71    Self Care Home Management Instruction/Education    Patient Self Monitored Activity Time In:3:30 Time Out:4:25     Self Care Home Management Instruction Education: Breathing Retaining and Exercise Concepts. Patient's Response to Education: Patient actively participated in education. Comments: Individual Therapy         Goal    Actual                      During Therapy                 Post-Therapy     % SpO2 HR RPD 1 - 4 RPE 6-20 Pain  1 - 10 % SpO2 HR RPD 1 - 4 RPE 6-20 Pain 0 - 10   Treadmill                 Bike               Arm Bike 30 min x 25 hawkins 30 min x 20 hawkins 96 84 2 8 6 97 88 2 9 7   Stepper 30 min x L5 30 min x L5 95 89 2 8 4 96 94 2 8 6   Rower               Weight Training               Therabands               Weighted DB               IMT               IS                                Patient's response to therapy: Good effort and Good motivation  Comments  : c/o pain in left hip on stepper and back and hip on arm bike. Assessment    Patient's response: Patient progressing towardsd LTGs evident by: Increased PLB and Increased Pacing. Plan: Continue as written    DWHZ:    Billin Minutes of Individualized Treatment ()        Physician supervision provided this date of service by: Dr. Claudia Jones       Therapist Signature: JOSE Sotelo    Therapist PRINTED Name and Credential: JOSE Sotelo    2018  4:00 PM

## 2018-02-02 ENCOUNTER — HOSPITAL ENCOUNTER (OUTPATIENT)
Dept: PULMONOLOGY | Age: 68
Discharge: HOME | End: 2018-02-02
Payer: MEDICARE

## 2018-02-02 PROCEDURE — G0237 THERAPEUTIC PROCD STRG ENDUR: HCPCS

## 2018-02-05 ENCOUNTER — HOSPITAL ENCOUNTER (OUTPATIENT)
Dept: PULMONOLOGY | Age: 68
Discharge: HOME | End: 2018-02-05
Payer: MEDICARE

## 2018-02-05 PROCEDURE — G0237 THERAPEUTIC PROCD STRG ENDUR: HCPCS

## 2018-02-05 NOTE — PROGRESS NOTES
[facility]  Respiratory Therapy Flowsheet      Guinevere Dopp  1950       Patient's carry-over of treatment delivered by: none noted today. Objective Daily Findings    Comments:    Respiratory Muscle Functioning/Exercise Conditioning/Strengthening Session:    Time In: 2:30  Time Out::4:25  Session Number: 20  O2 with Therapy: 0 L/min    Pre SPO2 98  Pre HR:86  Pre BP: 127/74    RR: 18    Wt: 228   Temp: 98.2   Post SPO2: 98 Post HR: 89  Post BP: 119/76    Self Care Home Management Instruction/Education    Patient Self Monitored Activity Time In:NA Time Out:NA     Self Care Home Management Instruction Education: Breathing Retaining and Exercise Concepts. Patient's Response to Education: Patient actively participated in education. Comments: Individual Therapy         Goal    Actual                      During Therapy                 Post-Therapy     % SpO2 HR RPD 1 - 4 RPE 6-20 Pain  1 - 10 % SpO2 HR RPD 1 - 4 RPE 6-20 Pain 0 - 10   Treadmill                 Bike               Stepper 30 min x L5 30 min x L4 97 87 2 6 5 98 91 2 8 5   Arm Ergometer 30 min x 25 hawkins 30 min x 20 hawkins 97 84 2 8 5 98 89 2 10 5   Rower               Weight Training               Therabands               Weighted DB 30 min x 7 lb 25 min x 4 lb 97 86 1 7 1 96 84 1 7 1   IMT               IS 30 min x 2500 30 min x 2000 98 82 1 6 5 97 85 1 6 5                    Patient's response to therapy: Good effort and Good motivation  Comments  :c/o headache due to sinus congestion. Assessment    Patient's response: Patient progressing towardsd LTGs evident by: Increased PLB and Increased Pacing. Plan: Continue as written    NMZV:    Billin Minutes of Individualized Treatment ()        Physician supervision provided this date of service by: Dr. Dina Regalado       Therapist Signature: JOSE Alejo    Therapist PRINTED Name and Credential: JOSE Alejo    2018  3:39 PM

## 2018-02-07 ENCOUNTER — HOSPITAL ENCOUNTER (OUTPATIENT)
Dept: PULMONOLOGY | Age: 68
Discharge: HOME | End: 2018-02-07
Payer: MEDICARE

## 2018-02-07 PROCEDURE — G0237 THERAPEUTIC PROCD STRG ENDUR: HCPCS

## 2018-02-07 NOTE — PROGRESS NOTES
[facility]  Respiratory Therapy Flowsheet      Eligha Seip  1950       Patient's carry-over of treatment delivered by: is less SOB doing tasks at home. Objective Daily Findings    Comments:    Respiratory Muscle Functioning/Exercise Conditioning/Strengthening Session:    Time In: 2:30  Time Out::4:25  Session Number: 21  O2 with Therapy: 0 L/min    Pre SPO2 98  Pre HR:73  Pre BP: 130/80    RR: 18    Wt: 227   Temp: 98.4   Post SPO2: 97 Post HR: 90  Post BP: 124/82    Self Care Home Management Instruction/Education    Patient Self Monitored Activity Time In:NA Time Out:NA     Self Care Home Management Instruction Education: Breathing Retaining and Exercise Concepts. Patient's Response to Education: Patient actively participated in education. Comments: Individual Therapy         Goal    Actual                      During Therapy                 Post-Therapy     % SpO2 HR RPD 1 - 4 RPE 6-20 Pain  1 - 10 % SpO2 HR RPD 1 - 4 RPE 6-20 Pain 0 - 10   Treadmill                 Bike               Stepper 30 min x L5 30 min x L4 96 85 2 9 4 98 97 2 10 4   Arm Ergometer 30 min x 25 hawkins 30 min x 20 hawkins 96 94 2 8 1 97 89 2 10 1   Rower               Weight Training               Therabands               Weighted DB 15 min x 7 lb 10 min x 4 lb 96 90 1 6 1 97 83 1 6 1   IMT               IS 30 min x 2500 30 min x 2500 97 84 1 8 1 96 87 1 7 1                    Patient's response to therapy: Good effort and Good motivation  Comments  :    Assessment    Patient's response: Patient progressing towardsd LTGs evident by: Increased PLB and Increased Pacing. Plan: Continue as written    FIVO:    Billin minutes individual treatment [phone]      Physician supervision provided this date of service by: Dr. Author Smith       Therapist Signature: JOSE Combs    Therapist PRINTED Name and Credential: JOSE Combs    2018  4:03 PM

## 2018-02-08 ENCOUNTER — OFFICE VISIT (OUTPATIENT)
Dept: ORTHOPEDIC SURGERY | Age: 68
End: 2018-02-08

## 2018-02-08 ENCOUNTER — OFFICE VISIT (OUTPATIENT)
Dept: FAMILY MEDICINE CLINIC | Age: 68
End: 2018-02-08

## 2018-02-08 VITALS
DIASTOLIC BLOOD PRESSURE: 66 MMHG | HEART RATE: 90 BPM | RESPIRATION RATE: 16 BRPM | SYSTOLIC BLOOD PRESSURE: 102 MMHG | WEIGHT: 227 LBS | TEMPERATURE: 96.2 F | HEIGHT: 68 IN | OXYGEN SATURATION: 98 % | BODY MASS INDEX: 34.4 KG/M2

## 2018-02-08 VITALS
HEIGHT: 68 IN | WEIGHT: 226 LBS | BODY MASS INDEX: 34.25 KG/M2 | RESPIRATION RATE: 18 BRPM | TEMPERATURE: 97.4 F | SYSTOLIC BLOOD PRESSURE: 141 MMHG | OXYGEN SATURATION: 99 % | DIASTOLIC BLOOD PRESSURE: 83 MMHG | HEART RATE: 95 BPM

## 2018-02-08 DIAGNOSIS — M79.7 FIBROMYALGIA: ICD-10-CM

## 2018-02-08 DIAGNOSIS — M96.1 LUMBAR POSTLAMINECTOMY SYNDROME: Primary | ICD-10-CM

## 2018-02-08 DIAGNOSIS — Z51.81 THERAPEUTIC DRUG MONITORING: ICD-10-CM

## 2018-02-08 DIAGNOSIS — J30.89 CHRONIC NON-SEASONAL ALLERGIC RHINITIS, UNSPECIFIED TRIGGER: ICD-10-CM

## 2018-02-08 DIAGNOSIS — E11.69 TYPE 2 DIABETES MELLITUS WITH OTHER SPECIFIED COMPLICATION, WITHOUT LONG-TERM CURRENT USE OF INSULIN (HCC): ICD-10-CM

## 2018-02-08 DIAGNOSIS — E78.5 HYPERLIPIDEMIA LDL GOAL <100: ICD-10-CM

## 2018-02-08 DIAGNOSIS — Z79.891 LONG TERM CURRENT USE OF OPIATE ANALGESIC: ICD-10-CM

## 2018-02-08 DIAGNOSIS — M96.1 LUMBAR POSTLAMINECTOMY SYNDROME: ICD-10-CM

## 2018-02-08 DIAGNOSIS — L30.9 DERMATITIS: Primary | ICD-10-CM

## 2018-02-08 DIAGNOSIS — Z12.39 SCREENING FOR BREAST CANCER: ICD-10-CM

## 2018-02-08 DIAGNOSIS — M48.062 SPINAL STENOSIS OF LUMBAR REGION WITH NEUROGENIC CLAUDICATION: ICD-10-CM

## 2018-02-08 DIAGNOSIS — F33.41 RECURRENT MAJOR DEPRESSIVE DISORDER, IN PARTIAL REMISSION (HCC): ICD-10-CM

## 2018-02-08 DIAGNOSIS — J01.40 ACUTE NON-RECURRENT PANSINUSITIS: ICD-10-CM

## 2018-02-08 DIAGNOSIS — M54.16 RADICULOPATHY, LUMBAR REGION: ICD-10-CM

## 2018-02-08 RX ORDER — CLOBETASOL PROPIONATE 0.46 MG/ML
SOLUTION TOPICAL
Qty: 50 ML | Refills: 3 | Status: SHIPPED | OUTPATIENT
Start: 2018-02-08 | End: 2020-07-13 | Stop reason: SDUPTHER

## 2018-02-08 RX ORDER — CETIRIZINE HCL 10 MG
TABLET ORAL
Qty: 90 TAB | Refills: 2 | Status: SHIPPED | OUTPATIENT
Start: 2018-02-08

## 2018-02-08 RX ORDER — CEPHALEXIN 500 MG/1
500 CAPSULE ORAL 3 TIMES DAILY
Qty: 30 CAP | Refills: 0 | Status: SHIPPED | OUTPATIENT
Start: 2018-02-08 | End: 2018-02-18

## 2018-02-08 RX ORDER — ATORVASTATIN CALCIUM 20 MG/1
TABLET, FILM COATED ORAL
Qty: 90 TAB | Refills: 1 | Status: SHIPPED | OUTPATIENT
Start: 2018-02-08 | End: 2018-08-17 | Stop reason: SDUPTHER

## 2018-02-08 NOTE — PROGRESS NOTES
Subjective:     HPI:  Emmy Berg is a 79 y.o. female who presents to the office complaining of sinus infection, and to follow up on diabetes, hyperlipidemia, COPD, and hyperlipidemia. Sinus infection: Pt complains of sinus pain for 4-5 days. Associated symptoms include headaches, photosensitivity, and congestion. She has not tried any OTC medications. She is allergic to most antibiotics but can tolerate Keflex. Psych: Patient is taking Prozac daily for Depression. Pt is following up with her Psychiatrist for PTSD. Her depression is better after being more social and having dinner with her friends at least once in 2 weeks. Pt is stable with current therapy. OCD well-controlled with current medication. COPD:  Pt goes to rehab three times per week for two hours. She will finish therapy at the end of March. She follows up with Dr. Melisa Willingham regularly. Pt states that she was having symptoms of stomach cramps and diarrhea when using Symbicort. Pt is lactose intolerant and Symbicort has milk protein products. She is currently using Spiriva inhaler for asthma. She also has a rescue inhaler. Diabetes Mellitus:  female has diabetes mellitus  Diabetic ROS - medication compliance: compliant all of the time, diabetic diet compliance: compliant most of the time, home glucose monitoring: is performed regularly, Blood sugar has been consistently less than 140, further diabetic ROS: no polyuria or polydipsia, no chest pain, dyspnea or TIA's, no numbness, tingling or pain in extremities, no medication side effects noted. Lab review: labs are reviewed, up to date and normal.     Hyperlipidemia: Pt is taking Lipitor for hyperlipidemia. No medication side effects noted. Most recent lipid panel completed on 11/07/2017 was normal.      Of note,   Pt had cataract removal surgery.        Current Outpatient Prescriptions   Medication Sig Dispense Refill    atorvastatin (LIPITOR) 20 mg tablet TAKE 1 TABLET BY MOUTH ONCE EVERY DAY. GENERIC FOR LIPITOR. 90 Tab 1    clobetasol (TEMOVATE) 0.05 % external solution APPLY TO SCALP AFTER SHAMPOO DAILY 50 mL 3    cetirizine (ZYRTEC) 10 mg tablet TAKE 1 TABLET BY MOUTH EVERY DAY. GENERIC FOR ZYRTEC 90 Tab 2    cephALEXin (KEFLEX) 500 mg capsule Take 1 Cap by mouth three (3) times daily for 10 days. 30 Cap 0    DULoxetine (CYMBALTA) 30 mg capsule TAKE 1 CAPSULE BY MOUTH TWICE DAILY 180 Cap 1    traMADol (ULTRAM) 50 mg tablet TAKE 1 PO Q 4 TIMES A DAY PRN PAIN  Indications: NEUROPATHIC PAIN, Pain 120 Tab 1    SPIRIVA RESPIMAT 2.5 mcg/actuation inhaler INL 2 PFS PO D  3    levothyroxine (SYNTHROID) 25 mcg tablet TAKE 1 TABLET BY MOUTH EVERY DAY BEFORE BREAKFAST 90 Tab 2    meloxicam (MOBIC) 15 mg tablet TAKE 1 TABLET BY MOUTH DAILY 90 Tab 3    FLUoxetine (PROZAC) 10 mg capsule TAKE 1 CAPSULE BY MOUTH ONCE DAILY. GENERICFOR PROZAC. 90 Cap 3    fluticasone (FLONASE) 50 mcg/actuation nasal spray SHAKE LIQUID AND USE 1 SPRAY IN EACH NOSTRIL TWICE DAILY 3 Bottle 1    VENTOLIN HFA 90 mcg/actuation inhaler 2 Puffs by Aerosolization route every four (4) hours as needed. 0    metFORMIN (GLUCOPHAGE) 500 mg tablet TAKE 1 TABLET BY MOUTH DAILY WITH DINNER. GENERIC FOR GLUCOPHAGE 90 Tab 0    butalbital-acetaminophen-caffeine (FIORICET, ESGIC) -40 mg per tablet Take 1 Tab by mouth every four (4) hours as needed for Pain. Max Daily Amount: 6 Tabs. 30 Tab 0    B.infantis-B.ani-B.long-B.bifi (PROBIOTIC 4X) 10-15 mg TbEC Take  by mouth daily.  ranitidine (ZANTAC) 150 mg tablet 150mg QAM and 75mg QPM      aspirin delayed-release 81 mg tablet Take 81 mg by mouth daily.  Cholecalciferol, Vitamin D3, (VITAMIN D3) 1,000 unit cap Take 1,000 Units by mouth daily.  glucose blood VI test strips (ACCU-CHEK MARCE PLUS TEST STRP) strip Please use one strip to test blood sugars once a day.  100 strip 20    Lancets (ACCU-CHEK SOFTCLIX LANCETS) misc Please use one lancet to test blood sugars twice a day. 1 Package 20    BLOOD-GLUCOSE METER (ACCU-CHEK MARCE MONITORING) by Does Not Apply route.  triamcinolone acetonide (KENALOG) 0.1 % topical cream Apply  to affected area two (2) times a day. use thin layer          Allergies   Allergen Reactions    Latex, Natural Rubber Hives    Adhesive Rash     Some bandaids    Ciprofloxacin Rash    Codeine Nausea and Vomiting    Demerol [Meperidine] Nausea and Vomiting    Diclofenac Shortness of Breath and Palpitations    Erythromycin Rash    Levaquin [Levofloxacin] Rash    Loratadine Swelling    Morphine Nausea and Vomiting    Penicillin G Hives and Rash     Does fine with Keflex    Sulfa (Sulfonamide Antibiotics) Itching    Vicodin [Hydrocodone-Acetaminophen] Other (comments)     Metallic taste in mouth       Past Medical History:   Diagnosis Date    Ankle fracture     Aortic stenosis     S/P AVR in 2016    Asthma     Chronic pain     back pain    Diabetes (HCC)     Diverticulitis     Fibromyalgia     H/O aortic valve replacement 03/16    21 mm bovine pericardial bioprosthesis and epiaortic scanning      Hypertension     Hypothyroidism     Lumbar post-laminectomy syndrome     Menopause     Obesity     Osteoarthritis     Psychotic disorder     Sciatica     Skin cancer 2013    right forearm. Past Surgical History:   Procedure Laterality Date    CHEST SURGERY PROCEDURE UNLISTED  03/2016    Open Heart Surgery    COLONOSCOPY      COLONOSCOPY N/A 9/15/2017    [facility] at [facility] ENDOSCOPY    HX CATARACT REMOVAL Right 1995    lens  replaced.  HX COLONOSCOPY  09/15/2017    dr. Tj Patel  f/u 5 years.  HX HEENT  1990's    sinus surgery     HX HEENT Right 11/02/2017    laser for right eye cataracts.      HX LUMBAR FUSION  2004    L3-L4-L5    HX LUMBAR LAMINECTOMY  2002    HX MOHS PROCEDURES Left 1990    HX ORTHOPAEDIC      Rotator cuff Bilateral    HX SHOULDER REPLACEMENT Right 03/29/2017 reverse.  UT COLSC FLX W/RMVL OF TUMOR POLYP LESION SNARE TQ  07/09/2014 repeat in 3 years    Dr. Natacha Malik      Aortic valve replacement       Family History   Problem Relation Age of Onset    Hypertension Father     Heart Disease Father     Alcohol abuse Father        Social History     Social History    Marital status:      Spouse name: N/A    Number of children: N/A    Years of education: N/A     Occupational History    Not on file. Social History Main Topics    Smoking status: Former Smoker     Years: 45.00     Types: Cigarettes     Quit date: 7/1/2011    Smokeless tobacco: Never Used    Alcohol use No    Drug use: No    Sexual activity: No     Other Topics Concern    Not on file     Social History Narrative       REVIEW OF SYSTEM:  Review of Systems   Constitutional: Negative for chills and fever. HENT: Positive for congestion and sinus pain. Eyes: Positive for photophobia. Negative for blurred vision. Respiratory: Positive for shortness of breath. Cardiovascular: Negative for chest pain, palpitations and leg swelling. Gastrointestinal: Negative for constipation, diarrhea, nausea and vomiting. Musculoskeletal: Negative for joint pain. Neurological: Positive for headaches. Objective:     Visit Vitals    /66 (BP 1 Location: Right arm, BP Patient Position: Sitting)    Pulse 90    Temp 96.2 °F (35.7 °C) (Oral)    Resp 16    Ht 5' 8\" (1.727 m)    Wt 227 lb (103 kg)    SpO2 98%    BMI 34.52 kg/m2       PHYSICAL EXAM:  Physical Exam   Constitutional: She is oriented to person, place, and time and well-developed, well-nourished, and in no distress. HENT:   Right Ear: Tympanic membrane, external ear and ear canal normal.   Left Ear: Tympanic membrane, external ear and ear canal normal.   Nose: Right sinus exhibits maxillary sinus tenderness (R > L). Left sinus exhibits maxillary sinus tenderness. Mouth/Throat: Oropharynx is clear and moist.   Eyes: Pupils are equal, round, and reactive to light. Neck: Normal range of motion. Neck supple. No thyromegaly present. Cardiovascular: Normal rate, regular rhythm, normal heart sounds and intact distal pulses. No murmur heard. Pulmonary/Chest: Effort normal and breath sounds normal. She has no wheezes. Neurological: She is alert and oriented to person, place, and time. GCS score is 15. Skin: Skin is warm and dry. Vitals reviewed. Assessment/Plan:       ICD-10-CM ICD-9-CM    1. Dermatitis L30.9 692.9 clobetasol (TEMOVATE) 0.05 % external solution   2. Hyperlipidemia LDL goal <100 E78.5 272.4 atorvastatin (LIPITOR) 20 mg tablet   3. Chronic non-seasonal allergic rhinitis, unspecified trigger J30.89 477.9 cetirizine (ZYRTEC) 10 mg tablet   4. Type 2 diabetes mellitus with other specified complication, without long-term current use of insulin ([facility]) E11.69 250.80    5. Recurrent major depressive disorder, in partial remission ([facility] F33.41 296.35    6. Acute non-recurrent pansinusitis J01.40 461.8 cephALEXin (KEFLEX) 500 mg capsule   7. Screening for breast cancer Z12.31 V76.10 [facility] MAMMO BI SCREENING INCL CAD     Patient given opportunity to ask questions. Questions answered. Will order labs during next office visit. Patient understands plan of care. Follow-up Disposition:  Return in about 3 months (around 5/8/2018). Written by Pari Lundy, as dictated by Sera Dowd DO.    I, Dr. Sera Dowd, confirm that all documentation is accurate.

## 2018-02-08 NOTE — MR AVS SNAPSHOT
[address] [facility] [address] 
 
 
 [address] [place] [address] [id number] 
[phone] Patient: Daniel Black MRN: [id number] MYB:[birth year] Visit Information Date & Time Provider Department Dept. Phone Encounter #  
 2/8/2018  2:00 PM Ariella Cunha, [address] [facility] [phone] [id number] Follow-up Instructions Return in about 3 months (around 5/8/2018). Your Appointments 4/26/2018 10:40 AM  
Follow Up with Constance Srinivasan MD  
[facility] ([address]) Appt Note: 3 MO FU  
 [address] [facility] [address]  
[phone]  
  
   
 [address] [facility] [address] 8/16/2018  1:45 PM  
Follow Up with Joaquín Jacobs MD  
Card[facility] Specialist at [facility]/[address]) Appt Note: 9 months follow up  
 [address] [place] [address]  
  
   
 [address] Upcoming Health Maintenance Date Due  
 EYE EXAM RETINAL OR DILATED Q1 1/19/2017 GLAUCOMA SCREENING Q2Y 1/19/2018 HEMOGLOBIN A1C Q6M 5/7/2018 FOOT EXAM Q1 8/7/2018 BREAST CANCER SCRN MAMMOGRAM 9/6/2018 MICROALBUMIN Q1 11/7/2018 LIPID PANEL Q1 11/7/2018 MEDICARE YEARLY EXAM 11/8/2018 COLONOSCOPY 9/15/2022 DTaP/Tdap/Td series (2 - Td) 12/15/2025 Allergies as of 2/8/2018  Review Complete On: 2/8/2018 By: Ariella Cunha DO Severity Noted Reaction Type Reactions Latex, Natural Rubber  07/14/2016    Hives Adhesive  03/18/2016    Rash Some bandaids Ciprofloxacin  01/28/2014    Rash Codeine  01/28/2014    Nausea and Vomiting Demerol [Meperidine]  01/28/2014    Nausea and Vomiting Diclofenac  08/06/2015    Shortness of Breath, Palpitations Erythromycin  01/28/2014    Rash Levaquin [Levofloxacin]  01/28/2014    Rash Loratadine  01/28/2014    Swelling Morphine  01/28/2014    Nausea and Vomiting Penicillin G  01/28/2014    Hives, Rash Does fine with Keflex Sulfa (Sulfonamide Antibiotics)  01/28/2014    Itching Vicodin [Hydrocodone-acetaminophen]  02/12/2015    Other (comments) Metallic taste in mouth Current Immunizations  Reviewed on 11/7/2017 Name Date Influenza High Dose Vaccine PF 11/7/2017, 10/3/2016 Influenza Vaccine 10/10/2014 Influenza Vaccine (Quad) PF 10/28/2015 Pneumococcal Conjugate (PCV-13) 12/7/2015 Pneumococcal Polysaccharide (PPSV-23) 11/7/2017 Tdap 12/15/2015 Zoster Vaccine, Live 6/2/2014 Not reviewed this visit You Were Diagnosed With   
  
 Codes Comments Dermatitis    -  Primary ICD-10-CM: L30.9 ICD-9-CM: 692.9 Hyperlipidemia LDL goal <100     ICD-10-CM: E78.5 ICD-9-CM: 272.4 Chronic non-seasonal allergic rhinitis, unspecified trigger     ICD-10-CM: J30.89 ICD-9-CM: 477.9 Type 2 diabetes mellitus with other specified complication, without long-term current use of insulin (HCC)     ICD-10-CM: E11.69 ICD-9-CM: 250.80 Recurrent major depressive disorder, in partial remission ([facility]     ICD-10-CM: F33.41 
ICD-9-CM: 296.35 Acute non-recurrent pansinusitis     ICD-10-CM: J01.40 ICD-9-CM: 461.8 Screening for breast cancer     ICD-10-CM: Z12.31 
ICD-9-CM: V76.10 Vitals BP Pulse Temp Resp Height(growth percentile) Weight(growth percentile) 102/66 (BP 1 Location: Right arm, BP Patient Position: Sitting) 90 96.2 °F (35.7 °C) (Oral) 16 5' 8\" (1.727 m) 227 lb (103 kg) SpO2 BMI OB Status Smoking Status 98% 34.52 kg/m2 Menopause Former Smoker BMI and BSA Data Body Mass Index Body Surface Area 34.52 kg/m 2 2.22 m 2 Preferred Pharmacy Pharmacy Name Phone [address] [place] [address], [address] [place] [address] [phone] Your Updated Medication List  
  
   
 This list is accurate as of: 2/8/18  2:57 PM.  Always use your most recent med list.  
  
  
  
  
 Kim Price  
by Does Not Apply route. aspirin delayed-release 81 mg tablet Take 81 mg by mouth daily. atorvastatin 20 mg tablet Commonly known as:  LIPITOR  
TAKE 1 TABLET BY MOUTH ONCE EVERY DAY. GENERIC FOR LIPITOR. butalbital-acetaminophen-caffeine -40 mg per tablet Commonly known as:  Leeland Re Take 1 Tab by mouth every four (4) hours as needed for Pain. Max Daily Amount: 6 Tabs. cephALEXin 500 mg capsule Commonly known as:  Lisa Sewell Take 1 Cap by mouth three (3) times daily for 10 days. cetirizine 10 mg tablet Commonly known as:  ZYRTEC  
TAKE 1 TABLET BY MOUTH EVERY DAY. GENERIC FOR ZYRTEC  
  
 clobetasol 0.05 % external solution Commonly known as:  TEMOVATE  
APPLY TO SCALP AFTER SHAMPOO DAILY DULoxetine 30 mg capsule Commonly known as:  CYMBALTA TAKE 1 CAPSULE BY MOUTH TWICE DAILY FLUoxetine 10 mg capsule Commonly known as:  PROzac TAKE 1 CAPSULE BY MOUTH ONCE DAILY. Fernando Pina. fluticasone 50 mcg/actuation nasal spray Commonly known as:  Hector Finders SHAKE LIQUID AND USE 1 SPRAY IN EACH NOSTRIL TWICE DAILY  
  
 glucose blood VI test strips strip Commonly known as:  ACCU-CHEK MARCE PLUS TEST STRP Please use one strip to test blood sugars once a day. Lancets Misc Commonly known as:  ACCU-CHEK SOFTCLIX LANCETS Please use one lancet to test blood sugars twice a day. levothyroxine 25 mcg tablet Commonly known as:  SYNTHROID  
TAKE 1 TABLET BY MOUTH EVERY DAY BEFORE BREAKFAST  
  
 meloxicam 15 mg tablet Commonly known as:  MOBIC  
TAKE 1 TABLET BY MOUTH DAILY  
  
 metFORMIN 500 mg tablet Commonly known as:  GLUCOPHAGE  
TAKE 1 TABLET BY MOUTH DAILY WITH DINNER. GENERIC FOR GLUCOPHAGE  
  
 PROBIOTIC 4X 10-15 mg Tbec Generic drug:  B.infantis-B.ani-B.long-B.bifi Take  by mouth daily. raNITIdine 150 mg tablet Commonly known as:  ZANTAC  
150mg QAM and 75mg QPM  
  
 SPIRIVA RESPIMAT 2.5 mcg/actuation inhaler Generic drug:  tiotropium bromide INL 2 PFS PO D  
  
 traMADol 50 mg tablet Commonly known as:  ULTRAM  
TAKE 1 PO Q 4 TIMES A DAY PRN PAIN  Indications: NEUROPATHIC PAIN, Pain  
  
 triamcinolone acetonide 0.1 % topical cream  
Commonly known as:  KENALOG Apply  to affected area two (2) times a day. use thin layer VENTOLIN HFA 90 mcg/actuation inhaler Generic drug:  albuterol 2 Puffs by Aerosolization route every four (4) hours as needed. VITAMIN D3 1,000 unit Cap Generic drug:  cholecalciferol Take 1,000 Units by mouth daily. Prescriptions Sent to Pharmacy Refills  
 atorvastatin (LIPITOR) 20 mg tablet 1 Sig: TAKE 1 TABLET BY MOUTH ONCE EVERY DAY. GENERIC FOR LIPITOR. Class: Normal  
 [facility] Ph #: [phone]  
 clobetasol (TEMOVATE) 0.05 % external solution 3 Sig: APPLY TO SCALP AFTER SHAMPOO DAILY Class: Normal  
 [facility] Ph #: [phone]  
 cetirizine (ZYRTEC) 10 mg tablet 2 Sig: TAKE 1 TABLET BY MOUTH EVERY DAY. GENERIC FOR ZYRTEC Class: Normal  
 [facility] Ph #: [phone]  
 cephALEXin (KEFLEX) 500 mg capsule 0 Sig: Take 1 Cap by mouth three (3) times daily for 10 days. Class: Normal  
 Pharmacy: [address] [place] [address], [address] [place] [address] Ph #: [phone] Route: Oral  
  
Follow-up Instructions Return in about 3 months (around 5/8/2018). To-Do List   
 02/08/2018   Imaging:  SHAE MAMMO BI SCREENING INCL CAD   
  
 02/09/2018 2:30 PM  
 Appointment with [facility] at [place] [address] ([phone]) Introducing [facility] SERVICES! Dear Magnolia Amaya: Thank you for requesting a [employer] account. Our records indicate that you already have an active [employer] account. You can access your account anytime at [email or web address] Did you know that you can access your hospital and ER discharge instructions at any time in [employer]? You can also review all of your test results from your hospital stay or ER visit. Additional Information If you have questions, please visit the Frequently Asked Questions section of the [employer] website at [email or web address] Remember, [employer] is NOT to be used for urgent needs. For medical emergencies, dial 911. Now available from your iPhone and Android! Please provide this summary of care documentation to your next provider. Your primary care clinician is listed as Edda Dubon. If you have any questions after today's visit, please call [phone].

## 2018-02-08 NOTE — PROGRESS NOTES
1. Have you been to the ER, urgent care clinic since your last visit? Hospitalized since your last visit? No    2. Have you seen or consulted any other health care providers outside of the [address] [place] [address] since your last visit? Include any pap smears or colon screening.  No

## 2018-02-08 NOTE — MR AVS SNAPSHOT
[address] [facility] [address] [facility] [address] 
[phone] Patient: Hiwot Saucedo MRN: [id number] ESY:[birth year]28/[birth year]44 Visit Information Date & Time Provider Department Dept. Phone Encounter #  
 2/8/2018 10:50 AM Lucas Hoyos MD [facility] [phone] [id number] Follow-up Instructions Return in about 3 months (around 5/8/2018). Your Appointments 2/8/2018  2:00 PM  
Follow Up [place] Chatterjee DO [address] [address]) Appt Note: 3 month f/u  
 [address] [facility] [address] [place] [facility] [address] [facility] [address]  
  
   
 [address] [facility] [address] [facility] [address] 8/16/2018  1:45 PM  
Follow Up with Joaquín Bartholomew MD  
Cardio Specialist at [address]) Appt Note: 9 months follow up  
 [address] [facility] [address] [place]  
  
   
 [address] [facility] [address] [facility] [address] Upcoming Health Maintenance Date Due  
 EYE EXAM RETINAL OR DILATED Q1 1/19/2017 GLAUCOMA SCREENING Q2Y 1/19/2018 HEMOGLOBIN A1C Q6M 5/7/2018 FOOT EXAM Q1 8/7/2018 BREAST CANCER SCRN MAMMOGRAM 9/6/2018 MICROALBUMIN Q1 11/7/2018 LIPID PANEL Q1 11/7/2018 MEDICARE YEARLY EXAM 11/8/2018 COLONOSCOPY 9/15/2022 DTaP/Tdap/Td series (2 - Td) 12/15/2025 Allergies as of 2/8/2018  Review Complete On: 2/8/2018 By: Lucas Hoyos MD  
  
 Severity Noted Reaction Type Reactions Latex, Natural Rubber  07/14/2016    Hives Adhesive  03/18/2016    Rash Some bandaids Ciprofloxacin  01/28/2014    Rash Codeine  01/28/2014    Nausea and Vomiting Demerol [Meperidine]  01/28/2014    Nausea and Vomiting Diclofenac  08/06/2015    Shortness of Breath, Palpitations Erythromycin  01/28/2014    Rash Levaquin [Levofloxacin]  01/28/2014    Rash Loratadine  01/28/2014    Swelling Morphine  01/28/2014    Nausea and Vomiting Penicillin G  01/28/2014    Hives, Rash Does fine with Keflex Sulfa (Sulfonamide Antibiotics)  01/28/2014    Itching Vicodin [Hydrocodone-acetaminophen]  02/12/2015    Other (comments) Metallic taste in mouth Current Immunizations  Reviewed on 11/7/2017 Name Date Influenza High Dose Vaccine PF 11/7/2017, 10/3/2016 Influenza Vaccine 10/10/2014 Influenza Vaccine (Quad) PF 10/28/2015 Pneumococcal Conjugate (PCV-13) 12/7/2015 Pneumococcal Polysaccharide (PPSV-23) 11/7/2017 Tdap 12/15/2015 Zoster Vaccine, Live 6/2/2014 Not reviewed this visit You Were Diagnosed With   
  
 Codes Comments Lumbar postlaminectomy syndrome    -  Primary ICD-10-CM: M96.1 ICD-9-CM: 722.83 Spinal stenosis of lumbar region with neurogenic claudication     ICD-10-CM: M48.062 
ICD-9-CM: 724.03 Radiculopathy, lumbar region     ICD-10-CM: M54.16 
ICD-9-CM: 724.4 Fibromyalgia     ICD-10-CM: M79.7 ICD-9-CM: 729.1 Long term current use of opiate analgesic     ICD-10-CM: J71.899 ICD-9-CM: V58.69 Therapeutic drug monitoring     ICD-10-CM: Z51.81 
ICD-9-CM: V58.83 Vitals BP Pulse Temp Resp Height(growth percentile) Weight(growth percentile) 141/83 95 97.4 °F (36.3 °C) (Oral) 18 5' 8\" (1.727 m) 226 lb (102.5 kg) SpO2 BMI OB Status Smoking Status 99% 34.36 kg/m2 Menopause Former Smoker BMI and BSA Data Body Mass Index Body Surface Area  
 34.36 kg/m 2 2.22 m 2 Preferred Pharmacy Pharmacy Name Phone [address] [place] [address] Drive, [address] [place] [address] [phone] Your Updated Medication List  
  
   
This list is accurate as of: 2/8/18 11:55 AM.  Always use your most recent med list.  
  
  
  
  
 ACCU-CHEK MARCE MONITORING  
by Does Not Apply route. aspirin delayed-release 81 mg tablet Take 81 mg by mouth daily. atorvastatin 20 mg tablet Commonly known as:  LIPITOR  
TAKE 1 TABLET BY MOUTH ONCE EVERY DAY. GENERIC FOR LIPITOR. butalbital-acetaminophen-caffeine -40 mg per tablet Commonly known as:  Curlee Fleischmanns Take 1 Tab by mouth every four (4) hours as needed for Pain. Max Daily Amount: 6 Tabs. cetirizine 10 mg tablet Commonly known as:  ZYRTEC  
TAKE 1 TABLET BY MOUTH EVERY DAY. GENERIC FOR ZYRTEC  
  
 clobetasol 0.05 % external solution Commonly known as:  TEMOVATE  
APPLY TO SCALP AFTER SHAMPOO DAILY DULoxetine 30 mg capsule Commonly known as:  CYMBALTA TAKE 1 CAPSULE BY MOUTH TWICE DAILY FLUoxetine 10 mg capsule Commonly known as:  PROzac TAKE 1 CAPSULE BY MOUTH ONCE DAILY. Marino Soila. fluticasone 50 mcg/actuation nasal spray Commonly known as:  Emily New SHAKE LIQUID AND USE 1 SPRAY IN EACH NOSTRIL TWICE DAILY  
  
 glucose blood VI test strips strip Commonly known as:  ACCU-CHEK MARCE PLUS TEST STRP Please use one strip to test blood sugars once a day. Lancets Misc Commonly known as:  ACCU-CHEK SOFTCLIX LANCETS Please use one lancet to test blood sugars twice a day. levothyroxine 25 mcg tablet Commonly known as:  SYNTHROID  
TAKE 1 TABLET BY MOUTH EVERY DAY BEFORE BREAKFAST  
  
 meloxicam 15 mg tablet Commonly known as:  MOBIC  
TAKE 1 TABLET BY MOUTH DAILY  
  
 metFORMIN 500 mg tablet Commonly known as:  GLUCOPHAGE  
TAKE 1 TABLET BY MOUTH DAILY WITH DINNER. GENERIC FOR GLUCOPHAGE  
  
 PROBIOTIC 4X 10-15 mg Tbec Generic drug:  B.infantis-B.ani-B.long-B.bifi Take  by mouth daily. raNITIdine 150 mg tablet Commonly known as:  ZANTAC  
150mg QAM and 75mg QPM  
  
 SPIRIVA RESPIMAT 2.5 mcg/actuation inhaler Generic drug:  tiotropium bromide INL 2 PFS PO D  
  
 traMADol 50 mg tablet Commonly known as:  ULTRAM  
TAKE 1 PO Q 4 TIMES A DAY PRN PAIN  Indications: NEUROPATHIC PAIN, Pain  
  
 triamcinolone acetonide 0.1 % topical cream  
 Commonly known as:  KENALOG Apply  to affected area two (2) times a day. use thin layer VENTOLIN HFA 90 mcg/actuation inhaler Generic drug:  albuterol 2 Puffs by Aerosolization route every four (4) hours as needed. VITAMIN D3 1,000 unit Cap Generic drug:  cholecalciferol Take 1,000 Units by mouth daily. Follow-up Instructions Return in about 3 months (around 5/8/2018). To-Do List   
 02/08/2018 Lab:  DRUG SCREEN UR - W/ CONFIRM   
  
 02/09/2018 2:30 PM  
  Appointment with Dread Flowers at [place] [address] ([phone]) Introducing \[facility]\"" & HEALTH SERVICES! Dear Virginia Gil: Thank you for requesting a [employer] account. Our records indicate that you already have an active [employer] account. You can access your account anytime at [email or web address] Did you know that you can access your hospital and ER discharge instructions at any time in [employer]? You can also review all of your test results from your hospital stay or ER visit. Additional Information If you have questions, please visit the Frequently Asked Questions section of the [employer] website at [email or web address] Remember, [employer] is NOT to be used for urgent needs. For medical emergencies, dial 911. Now available from your iPhone and Android! Please provide this summary of care documentation to your next provider. Your primary care clinician is listed as Unruly Benavides. If you have any questions after today's visit, please call [phone].

## 2018-02-08 NOTE — PROGRESS NOTES
[facility]  [address]  [address], [address]   Phone: [phone]  Fax: [phone]        PROGRESS NOTE      HISTORY OF PRESENT ILLNESS:  The patient is a 79 y.o. female and was seen today for follow up of low back pain into the BLE extending in roughly an L4 distribution in the LLE and in an L5 distribution to the foot in the RLE. Pt also endorses numbness in the LLE. She had a diagnosis of lumbar postlaminectomy syndrome with a previous history of L3 through S1 fusion in 2002 and 2004 by a physician in [place]. The patient had an additional diagnosis of fibromyalgia. Note dated 6/29/16 indicating patient was seen and underwent porcine aortic valve replacement by Dr. Tucker Li on 3/18/16. Pt began cardiac rehab in 1/2017. Pt underwent right shoulder surgery on 3/29/17 by Dr. Redd Regalado. Note from Dr. Loreto Castanon, cardiologist dated 5/18/17 indicating patient was seen with h/o aortic stenosis, s/p aortic valve replacement 3/16 and was seen with moderate dysphemia with minimal exertion. CT revealed no evidence of pulmonary embolism, no obvious fluid overload. They were ordering further workup to look for functioning bioprosthetic aortic valve. Per patient, a cause for her SOB has still not been found. She has been scheduled to see a pulmonologist. She states she has been restricted to take her Meloxicam since her right shoulder surgery, which has caused her increase in low back pain. Upon review of our records, it was noted she previously failed TOPAMAX, NEURONTIN, and LYRICA. Pt was switched from Ultram ER back to Ultram immediate release. Pt continues with Ultram 1 tab QID. Lumbar spine CT w/o contrast dated 7/20/14  per report revealed L3 through L5 laminectomies with L3 through S1 dorsal hardware fusion. There was no evidence for fractures. There was advanced degenerative disc disease at L2-L3 at the junctional level with marked central canal stenosis.  At her last clinic appointment, patient wished to continue her current treatment. She was provided with refills of her Ultram 50 mg. UDS obtained today. The patient returns today with pain location and distribution remain unchanged. Patient reports she has been doing well until last week as she injured her self while getting her mail. She continues to rate pain 6/10. Patient completed pulmonary rehab with improvement in symptoms. She is compliant with Cymbalta 30 mg BID as prescribed by her PCP and Ultram 1 po QID as prescribed by me. UDS reviewed, it was consistent.  reviewed. Body mass index is 34.36 kg/(m^2). Past Medical History:   Diagnosis Date    Ankle fracture     Aortic stenosis     S/P AVR in 2016    Asthma     Chronic pain     back pain    Diabetes ([facility] [address]     Diverticulitis     Fibromyalgia     H/O aortic valve replacement 03/16    21 mm bovine pericardial bioprosthesis and epiaortic scanning      Hypertension     Hypothyroidism     Lumbar post-laminectomy syndrome     Menopause     Obesity     Osteoarthritis     Psychotic disorder     Sciatica     Skin cancer 2013    right forearm. Social History     Social History    Marital status:      Spouse name: N/A    Number of children: N/A    Years of education: N/A     Occupational History    Not on file.      Social History Main Topics    Smoking status: Former Smoker     Years: 45.00     Types: Cigarettes     Quit date: 7/1/2011    Smokeless tobacco: Never Used    Alcohol use No    Drug use: No    Sexual activity: No     Other Topics Concern    Not on file     Social History Narrative       Current Outpatient Prescriptions   Medication Sig Dispense Refill    DULoxetine (CYMBALTA) 30 mg capsule TAKE 1 CAPSULE BY MOUTH TWICE DAILY 180 Cap 1    traMADol (ULTRAM) 50 mg tablet TAKE 1 PO Q 4 TIMES A DAY PRN PAIN  Indications: NEUROPATHIC PAIN, Pain 120 Tab 1    SPIRIVA RESPIMAT 2.5 mcg/actuation inhaler INL 2 PFS PO D  3    atorvastatin (LIPITOR) 20 mg tablet TAKE 1 TABLET BY MOUTH ONCE EVERY DAY. GENERIC FOR LIPITOR. 90 Tab 1    levothyroxine (SYNTHROID) 25 mcg tablet TAKE 1 TABLET BY MOUTH EVERY DAY BEFORE BREAKFAST 90 Tab 2    meloxicam (MOBIC) 15 mg tablet TAKE 1 TABLET BY MOUTH DAILY 90 Tab 3    FLUoxetine (PROZAC) 10 mg capsule TAKE 1 CAPSULE BY MOUTH ONCE DAILY. GENERICFOR PROZAC. 90 Cap 3    fluticasone (FLONASE) 50 mcg/actuation nasal spray SHAKE LIQUID AND USE 1 SPRAY IN EACH NOSTRIL TWICE DAILY 3 Bottle 1    VENTOLIN HFA 90 mcg/actuation inhaler 2 Puffs by Aerosolization route every four (4) hours as needed. 0    metFORMIN (GLUCOPHAGE) 500 mg tablet TAKE 1 TABLET BY MOUTH DAILY WITH DINNER. GENERIC FOR GLUCOPHAGE 90 Tab 0    butalbital-acetaminophen-caffeine (FIORICET, ESGIC) -40 mg per tablet Take 1 Tab by mouth every four (4) hours as needed for Pain. Max Daily Amount: 6 Tabs. 30 Tab 0    cetirizine (ZYRTEC) 10 mg tablet TAKE 1 TABLET BY MOUTH EVERY DAY. GENERIC FOR ZYRTEC 90 Tab 2    B.infantis-B.ani-B.long-B.bifi (PROBIOTIC 4X) 10-15 mg TbEC Take  by mouth daily.  ranitidine (ZANTAC) 150 mg tablet 150mg QAM and 75mg QPM      aspirin delayed-release 81 mg tablet Take 81 mg by mouth daily.  clobetasol (TEMOVATE) 0.05 % external solution APPLY TO SCALP AFTER SHAMPOO DAILY 50 mL 3    Cholecalciferol, Vitamin D3, (VITAMIN D3) 1,000 unit cap Take 1,000 Units by mouth daily.  glucose blood VI test strips (ACCU-CHEK MARCE PLUS TEST STRP) strip Please use one strip to test blood sugars once a day. 100 strip 20    Lancets (ACCU-CHEK SOFTCLIX LANCETS) misc Please use one lancet to test blood sugars twice a day. 1 Package 20    BLOOD-GLUCOSE METER (ACCU-CHEK MARCE MONITORING) by Does Not Apply route.  triamcinolone acetonide (KENALOG) 0.1 % topical cream Apply  to affected area two (2) times a day.  use thin layer         Allergies   Allergen Reactions    Latex, Natural Rubber Hives    Adhesive Rash     Some bandaids    Ciprofloxacin Rash    Codeine Nausea and Vomiting    Demerol [Meperidine] Nausea and Vomiting    Diclofenac Shortness of Breath and Palpitations    Erythromycin Rash    Levaquin [Levofloxacin] Rash    Loratadine Swelling    Morphine Nausea and Vomiting    Penicillin G Hives and Rash     Does fine with Keflex    Sulfa (Sulfonamide Antibiotics) Itching    Vicodin [Hydrocodone-Acetaminophen] Other (comments)     Metallic taste in mouth          PHYSICAL EXAMINATION    Visit Vitals    /83    Pulse 95    Temp 97.4 °F (36.3 °C) (Oral)    Resp 18    Ht 5' 8\" (1.727 m)    Wt 226 lb (102.5 kg)    SpO2 99%    BMI 34.36 kg/m2       CONSTITUTIONAL: NAD, A&O x 3  SENSATION: Decreased sensation to light touch of the LLE at L4. RANGE OF MOTION: The patient has full passive range of motion in all four extremities. MOTOR:  Straight Leg Raise: Negative, bilateral               Hip Flex Knee Ext Knee Flex Ankle DF GTE Ankle PF Tone   Right +4/5 +4/5 +4/5 +4/5 +4/5 +4/5 +4/5   Left +4/5 +4/5 +4/5 +4/5 +4/5 +4/5 +4/5       ASSESSMENT   Diagnoses and all orders for this visit:    1. Lumbar postlaminectomy syndrome  -     DRUG SCREEN UR - W/ CONFIRM; Future    2. Spinal stenosis of lumbar region with neurogenic claudication  -     DRUG SCREEN UR - W/ CONFIRM; Future    3. Radiculopathy, lumbar region  -     DRUG SCREEN UR - W/ CONFIRM; Future    4. Fibromyalgia  -     DRUG SCREEN UR - W/ CONFIRM; Future    5. Long term current use of opiate analgesic  -     DRUG SCREEN UR - W/ CONFIRM; Future    6. Therapeutic drug monitoring  -     DRUG SCREEN UR - W/ CONFIRM; Future          IMPRESSION AND PLAN:  Patient wished to continue her current treatment. She did not need refills today. UDS obtained in office today. I will see the patient back in 3 month's time or earlier if needed.       Written by Anette Schumacher, as dictated by Maya Bar MD  I examined the patient, reviewed and agree with the note.

## 2018-02-12 ENCOUNTER — HOSPITAL ENCOUNTER (OUTPATIENT)
Dept: PULMONOLOGY | Age: 68
Discharge: HOME | End: 2018-02-12
Payer: MEDICARE

## 2018-02-14 ENCOUNTER — APPOINTMENT (OUTPATIENT)
Dept: PULMONOLOGY | Age: 68
End: 2018-02-14
Payer: MEDICARE

## 2018-02-15 ENCOUNTER — APPOINTMENT (OUTPATIENT)
Dept: PULMONOLOGY | Age: 68
End: 2018-02-15
Payer: MEDICARE

## 2018-02-16 ENCOUNTER — HOSPITAL ENCOUNTER (OUTPATIENT)
Dept: PULMONOLOGY | Age: 68
Discharge: HOME | End: 2018-02-16
Payer: MEDICARE

## 2018-02-16 PROCEDURE — G0237 THERAPEUTIC PROCD STRG ENDUR: HCPCS

## 2018-02-16 NOTE — PROGRESS NOTES
[facility]  Respiratory Therapy Flowsheet      Leno Kwok  1950       Patient's carry-over of treatment delivered by: performs PLB at home with activities. Objective Daily Findings    Comments:    Respiratory Muscle Functioning/Exercise Conditioning/Strengthening Session:    Time In: 2:50  Time Out::4:30  Session Number: 22  O2 with Therapy: 0 L/min    Pre SPO2 94  Pre HR:85  Pre BP: 131/80    RR: 18    Wt: 228   Temp: 97.6   Post SPO2: 94 Post HR: 85  Post BP: 131/80    Self Care Home Management Instruction/Education    Patient Self Monitored Activity Time In:NA Time Out:NA     Self Care Home Management Instruction Education: Breathing Retaining and Exercise Concepts. Patient's Response to Education: Patient actively participated in education. Comments: Individual Therapy         Goal    Actual                      During Therapy                 Post-Therapy     % SpO2 HR RPD 1 - 4 RPE 6-20 Pain  1 - 10 % SpO2 HR RPD 1 - 4 RPE 6-20 Pain 0 - 10   Treadmill                 Bike               Stepper 30 min x L5 30 min x L4 97 90 2 7 4 98 92 2 8 4   Arm Ergometer 30 min x 25 hawkins 30 min x 20 hawkins 96 82 2 8 1 97 84 2 8 1   Rower               Weight Training               Therabands               Weighted DB 20 min x 7 lb 20 min x 4 lb 96 88 1 6 1 97 86 1 6 1   IMT               IS 20 min x 2500 20 min x 2500 97 80 1 6 1 96 85 1 6 1                    Patient's response to therapy: Good effort and Good motivation  Comments  :    Assessment    Patient's response: Patient progressing towardsd LTGs evident by:  Decreased Fatique and Increased Pacing. Plan: Continue as written    CCLP:    Billin Minutes of Individualized Treatment ()        Physician supervision provided this date of service by: Dr. Thais Evans       Therapist Signature: JOSE Rincon    Therapist PRINTED Name and Credential: JOSE Rincon    2018  3:15 PM

## 2018-02-19 ENCOUNTER — HOSPITAL ENCOUNTER (OUTPATIENT)
Dept: PULMONOLOGY | Age: 68
Discharge: HOME | End: 2018-02-19
Payer: MEDICARE

## 2018-02-19 PROCEDURE — G0237 THERAPEUTIC PROCD STRG ENDUR: HCPCS

## 2018-02-19 NOTE — PROGRESS NOTES
[facility]  Respiratory Therapy Flowsheet      Lamar Tate  1950       Patient's carry-over of treatment delivered by: Able to walk further with her cane. Objective Daily Findings    Comments:    Respiratory Muscle Functioning/Exercise Conditioning/Strengthening Session:    Time In: 2:30  Time Out::4:25  Session Number: 23  O2 with Therapy: 0 L/min    Pre SPO2 98  Pre HR:84  Pre BP: 115/76    RR: 18    Wt: 227   Temp: 98.6   Post SPO2: 97 Post HR: 85  Post BP: 119/74    Self Care Home Management Instruction/Education    Patient Self Monitored Activity Time In:NA Time Out:NA     Self Care Home Management Instruction Education: Breathing Retaining and Exercise Concepts. Patient's Response to Education: Patient actively participated in education. Comments: Individual Therapy         Goal    Actual                      During Therapy                 Post-Therapy     % SpO2 HR RPD 1 - 4 RPE 6-20 Pain  1 - 10 % SpO2 HR RPD 1 - 4 RPE 6-20 Pain 0 - 10   Treadmill                 Bike               Stepper 30 min x L5 30 min x L4 97 88 2 7 5 96 91 2 8 4   Arm Ergometer 30 min x 25 hawkins 30 min x 20 hawkins 98 85 2 9 1 97 86 2 10 1   Rower               Weight Training               Therabands               Weighted DB 30 min x 7 lb 25 min x 7 lb 97 86 1 6 1 96 89 1 8 1   IMT               IS 30 min x 2500 30 min x 2500 96 69 1 6 1 97 79 1 6 1                    Patient's response to therapy: Good effort and Good motivation  Comments  : c/o pain in back on stepper. Assessment    Patient's response: Patient progressing towardsd LTGs evident by: Increased PLB and Increased Pacing. Plan: Continue as written    HTRY:    Billin Minutes of Individualized Treatment ()        Physician supervision provided this date of service by: Dr. Bernardo Turner       Therapist Signature: JOSE Walker    Therapist PRINTED Name and Credential: JOSE Walker    2018  3:51 PM

## 2018-02-20 ENCOUNTER — HOSPITAL ENCOUNTER (OUTPATIENT)
Dept: MAMMOGRAPHY | Age: 68
Discharge: HOME | End: 2018-02-20
Attending: FAMILY MEDICINE
Payer: MEDICARE

## 2018-02-20 DIAGNOSIS — Z12.39 SCREENING FOR BREAST CANCER: ICD-10-CM

## 2018-02-20 PROCEDURE — 77067 SCR MAMMO BI INCL CAD: CPT

## 2018-02-21 ENCOUNTER — HOSPITAL ENCOUNTER (OUTPATIENT)
Dept: PULMONOLOGY | Age: 68
Discharge: HOME | End: 2018-02-21
Payer: MEDICARE

## 2018-02-21 DIAGNOSIS — M48.062 SPINAL STENOSIS OF LUMBAR REGION WITH NEUROGENIC CLAUDICATION: ICD-10-CM

## 2018-02-21 DIAGNOSIS — M96.1 POSTLAMINECTOMY SYNDROME: ICD-10-CM

## 2018-02-21 DIAGNOSIS — M96.1 LUMBAR POSTLAMINECTOMY SYNDROME: ICD-10-CM

## 2018-02-21 DIAGNOSIS — M79.7 FIBROMYALGIA: ICD-10-CM

## 2018-02-21 DIAGNOSIS — M54.16 RADICULOPATHY, LUMBAR REGION: ICD-10-CM

## 2018-02-21 RX ORDER — TRAMADOL HYDROCHLORIDE 50 MG/1
50 TABLET ORAL
Qty: 120 TAB | Refills: 1 | OUTPATIENT
Start: 2018-03-01 | End: 2018-12-03 | Stop reason: SDUPTHER

## 2018-02-21 NOTE — TELEPHONE ENCOUNTER
PHONE IN RX    Last Visit: 02/08/2018 with MD Clement Warren    Next Appointment: 05/24/2018 with MD Clement Warren; Pt canceled 04/26; noted to f/u in 3 months   Previous Refill Encounters: 11/28/2017 per MD Clement Warren #120 with 1 refill     Requested Prescriptions     Pending Prescriptions Disp Refills    traMADol (ULTRAM) 50 mg tablet 120 Tab 1     Sig: Take 1 Tab by mouth four (4) times daily as needed for Pain. Max Daily Amount: 200 mg.  Indications: NEUROPATHIC PAIN, Pain

## 2018-02-21 NOTE — TELEPHONE ENCOUNTER
Called and informed patient that her prescription was called into her pharmacy. Patient questioned if we could do that because her [facility] DR JUDY GARCÍA told her that she needed to hand carry a prescription in to them for the Tramadol. I informed her that I had left a voice mail for the pharmacy and to be on the safe side that she could call the pharmacy tomorrow to see verify if the script had been called in. Patient also inquired if she had received 2 refills. I informed her it was #120 with 1 refill. Patient states she moved her appointment back to get on track with the medication. patient stated she will call back when she needs a refill.

## 2018-02-23 ENCOUNTER — HOSPITAL ENCOUNTER (OUTPATIENT)
Dept: PULMONOLOGY | Age: 68
Discharge: HOME | End: 2018-02-23
Payer: MEDICARE

## 2018-02-23 PROCEDURE — G0237 THERAPEUTIC PROCD STRG ENDUR: HCPCS

## 2018-02-23 NOTE — PROGRESS NOTES
[facility]  Respiratory Therapy Flowsheet      Mar Vicente  1950       Patient's carry-over of treatment delivered by: performs PLB when SOB. Objective Daily Findings    Comments:    Respiratory Muscle Functioning/Exercise Conditioning/Strengthening Session:    Time In: 2:30  Time Out::4:25  Session Number: 24  O2 with Therapy: 0 L/min    Pre SPO2 99  Pre HR:98  Pre BP: 108/72    RR: 18    Wt: 227   Temp: 97.6  Post SPO2: 98 Post HR: 96  Post BP: 107/74    Self Care Home Management Instruction/Education    Patient Self Monitored Activity Time In:NA Time Out:NA     Self Care Home Management Instruction Education: Breathing Retaining and Exercise Concepts. Patient's Response to Education: Patient actively participated in education. Comments: Individual Therapy         Goal    Actual                      During Therapy                 Post-Therapy     % SpO2 HR RPD 1 - 4 RPE 6-20 Pain  1 - 10 % SpO2 HR RPD 1 - 4 RPE 6-20 Pain 0 - 10   Treadmill                 Bike               Stepper 30 min x L5 30 min x L4 98 89 2 8 1 97 96 2 8 1   Arm Ergometer 30 min x 25 hawkins 30 min x 20 hawkins 97 90 2 7 1 96 91 2 8 1   Rower               Weight Training               Therabands               Weighted DB 30 min x 7 lb 25 min x 7 lb 98 84 1 6 1 97 82 1 6 1   IMT               IS 30 min x 2500  30 min x 2250 97 88 1 6 1 97 83 1 6 1                    Patient's response to therapy: Good effort and Good motivation  Comments  :    Assessment    Patient's response: Patient progressing towardsd LTGs evident by: Increased PLB and Increased Pacing. Plan: Continue as written    [facility]:    Billin Minutes of Individualized Treatment ()        Physician supervision provided this date of service by: Dr. Evelina Wheeler       Therapist Signature: JOSE Hutson    Therapist PRINTED Name and Credential: JOSE Hutson    2018  4:22 PM

## 2018-02-26 ENCOUNTER — HOSPITAL ENCOUNTER (OUTPATIENT)
Dept: PULMONOLOGY | Age: 68
Discharge: HOME | End: 2018-02-26
Payer: MEDICARE

## 2018-02-26 PROCEDURE — G0237 THERAPEUTIC PROCD STRG ENDUR: HCPCS

## 2018-02-26 NOTE — PROGRESS NOTES
[facility]  Respiratory Therapy Flowsheet      Martir Waddell  1950       Patient's carry-over of treatment delivered by: walks dog more. Objective Daily Findings    Comments:    Respiratory Muscle Functioning/Exercise Conditioning/Strengthening Session:    Time In: 2:30  Time Out::4:10  Session Number: 25  O2 with Therapy: 0 L/min    Pre SPO2 98  Pre HR:99  Pre BP: 106/76    RR: 18    Wt: 226   Temp: 98.0   Post SPO2: 98 Post HR: 96  Post BP: 114/81    Self Care Home Management Instruction/Education    Patient Self Monitored Activity Time In:NA Time Out:NA     Self Care Home Management Instruction Education: Breathing Retaining and Exercise Concepts. Patient's Response to Education: Patient actively participated in education. Comments: Individual Therapy         Goal    Actual                      During Therapy                 Post-Therapy     % SpO2 HR RPD 1 - 4 RPE 6-20 Pain  1 - 10 % SpO2 HR RPD 1 - 4 RPE 6-20 Pain 0 - 10   Treadmill                 Bike               Stepper 30 min x L5 30 min x L4 98 88 2 8 4 97 89 2 8 5   Arm Ergometer 30 min x 25 hawkins 25 min x 20 hawkins 97 86 2 7 6 98 99 2 8 6   Rower               Weight Training               Therabands               Weighted DB 30 min x 7 lb 15 min x 4 lb 97 82 1 6 1 98 81 1 6 1   IMT               IS 30 min x 2500 30 min x 2250 96 83 1 6 1 97 88 1 6 1                    Patient's response to therapy: Good effort. Comments  :c/o pain in arms on arm bike and legs from stepper. Assessment    Patient's response: Patient progressing towardsd LTGs evident by: Increased PLB and Increased Pacing. Plan: Continue as written    TYUB:    Billin minutes of individual treatment [phone]        Physician supervision provided this date of service by: Dr. Olivia Owens       Therapist Signature: JOSE Lang    Therapist PRINTED Name and Credential: JOSE Lang    2018  3:40 PM

## 2018-02-28 ENCOUNTER — HOSPITAL ENCOUNTER (OUTPATIENT)
Dept: PULMONOLOGY | Age: 68
End: 2018-02-28
Payer: MEDICARE

## 2018-03-02 ENCOUNTER — HOSPITAL ENCOUNTER (OUTPATIENT)
Dept: PULMONOLOGY | Age: 68
Discharge: HOME | End: 2018-03-02
Payer: MEDICARE

## 2018-03-05 ENCOUNTER — HOSPITAL ENCOUNTER (OUTPATIENT)
Dept: PULMONOLOGY | Age: 68
Discharge: HOME | End: 2018-03-05
Payer: MEDICARE

## 2018-03-06 ENCOUNTER — OFFICE VISIT (OUTPATIENT)
Dept: ORTHOPEDIC SURGERY | Age: 68
End: 2018-03-06

## 2018-03-06 VITALS
HEIGHT: 68 IN | HEART RATE: 91 BPM | DIASTOLIC BLOOD PRESSURE: 78 MMHG | BODY MASS INDEX: 34.4 KG/M2 | WEIGHT: 227 LBS | SYSTOLIC BLOOD PRESSURE: 114 MMHG

## 2018-03-06 DIAGNOSIS — M46.1 SACROILIITIS (HCC): ICD-10-CM

## 2018-03-06 DIAGNOSIS — M48.062 SPINAL STENOSIS OF LUMBAR REGION WITH NEUROGENIC CLAUDICATION: ICD-10-CM

## 2018-03-06 DIAGNOSIS — M79.7 FIBROMYALGIA: ICD-10-CM

## 2018-03-06 DIAGNOSIS — M54.16 RADICULOPATHY, LUMBAR REGION: ICD-10-CM

## 2018-03-06 DIAGNOSIS — M96.1 LUMBAR POSTLAMINECTOMY SYNDROME: Primary | ICD-10-CM

## 2018-03-06 PROBLEM — E11.21 TYPE 2 DIABETES WITH NEPHROPATHY (HCC): Status: ACTIVE | Noted: 2018-03-06

## 2018-03-06 NOTE — PROGRESS NOTES
[facility]  [address], [address]  Phone: [phone]  Fax: [phone]        PROGRESS NOTE      HISTORY OF PRESENT ILLNESS:  The patient is a 79 y.o. female and was seen today for follow up of low back pain into the BLE extending in roughly an L4 distribution in the LLE and in an L5 distribution to the foot in the RLE. Pt also endorses numbness in the LLE. She had a diagnosis of lumbar postlaminectomy syndrome with a previous history of L3 through S1 fusion in 2002 and 2004 by a physician in [place]. The patient had an additional diagnosis of fibromyalgia. Note dated 6/29/16 indicating patient was seen and underwent porcine aortic valve replacement by Dr. Doreen Sarmiento on 3/18/16. Pt began cardiac rehab in 1/2017. Pt underwent right shoulder surgery on 3/29/17 by Dr. Tk Easton. Note from Dr. Jenelle Brown, cardiologist dated 5/18/17 indicating patient was seen with h/o aortic stenosis, s/p aortic valve replacement 3/16 and was seen with moderate dysphemia with minimal exertion. CT revealed no evidence of pulmonary embolism, no obvious fluid overload. They were ordering further workup to look for functioning bioprosthetic aortic valve. Per patient, a cause for her SOB has still not been found. She has been scheduled to see a pulmonologist. She states she has been restricted to take her Meloxicam since her right shoulder surgery, which has caused her increase in low back pain. Upon review of our records, it was noted she previously failed TOPAMAX, NEURONTIN, and LYRICA. Pt was switched from Ultram ER back to Ultram immediate release. Pt continues with Ultram 1 tab QID. Lumbar spine CT w/o contrast dated 7/20/14  per report revealed L3 through L5 laminectomies with L3 through S1 dorsal hardware fusion. There was no evidence for fractures. There was advanced degenerative disc disease at L2-L3 at the junctional level with marked central canal stenosis.  At her last clinic appointment, patient wished to continue her current treatment. She did not need refills today. UDS obtained in office. The patient returns today earlier than scheduled as a recent increase in pain in low back and left hip pain extending into the LLE in a S1 distribution to an are below the knee. She continues to rate pain 6/10. Pt continues with pulmonary rehab.  reviewed. Body mass index is 34.52 kg/(m^2). Past Medical History:   Diagnosis Date    Ankle fracture     Aortic stenosis     S/P AVR in 2016    Asthma     Chronic pain     back pain    Diabetes ([facility] [address]     Diverticulitis     Fibromyalgia     H/O aortic valve replacement 03/16    21 mm bovine pericardial bioprosthesis and epiaortic scanning      Hypertension     Hypothyroidism     Lumbar post-laminectomy syndrome     Menopause     Obesity     Osteoarthritis     Psychotic disorder     Sciatica     Skin cancer 2013    right forearm. Social History     Social History    Marital status:      Spouse name: N/A    Number of children: N/A    Years of education: N/A     Occupational History    Not on file. Social History Main Topics    Smoking status: Former Smoker     Years: 45.00     Types: Cigarettes     Quit date: 7/1/2011    Smokeless tobacco: Never Used    Alcohol use No    Drug use: No    Sexual activity: No     Other Topics Concern    Not on file     Social History Narrative       Current Outpatient Prescriptions   Medication Sig Dispense Refill    traMADol (ULTRAM) 50 mg tablet Take 1 Tab by mouth four (4) times daily as needed for Pain. Max Daily Amount: 200 mg. Indications: NEUROPATHIC PAIN, Pain 120 Tab 1    atorvastatin (LIPITOR) 20 mg tablet TAKE 1 TABLET BY MOUTH ONCE EVERY DAY. GENERIC FOR LIPITOR. 90 Tab 1    clobetasol (TEMOVATE) 0.05 % external solution APPLY TO SCALP AFTER SHAMPOO DAILY 50 mL 3    cetirizine (ZYRTEC) 10 mg tablet TAKE 1 TABLET BY MOUTH EVERY DAY.  GENERIC FOR ZYRTEC 90 Tab 2    DULoxetine (CYMBALTA) 30 mg capsule TAKE 1 CAPSULE BY MOUTH TWICE DAILY 180 Cap 1    SPIRIVA RESPIMAT 2.5 mcg/actuation inhaler INL 2 PFS PO D  3    levothyroxine (SYNTHROID) 25 mcg tablet TAKE 1 TABLET BY MOUTH EVERY DAY BEFORE BREAKFAST 90 Tab 2    meloxicam (MOBIC) 15 mg tablet TAKE 1 TABLET BY MOUTH DAILY 90 Tab 3    FLUoxetine (PROZAC) 10 mg capsule TAKE 1 CAPSULE BY MOUTH ONCE DAILY. GENERICFOR PROZAC. 90 Cap 3    fluticasone (FLONASE) 50 mcg/actuation nasal spray SHAKE LIQUID AND USE 1 SPRAY IN EACH NOSTRIL TWICE DAILY 3 Bottle 1    VENTOLIN HFA 90 mcg/actuation inhaler 2 Puffs by Aerosolization route every four (4) hours as needed. 0    metFORMIN (GLUCOPHAGE) 500 mg tablet TAKE 1 TABLET BY MOUTH DAILY WITH DINNER. GENERIC FOR GLUCOPHAGE 90 Tab 0    butalbital-acetaminophen-caffeine (FIORICET, ESGIC) -40 mg per tablet Take 1 Tab by mouth every four (4) hours as needed for Pain. Max Daily Amount: 6 Tabs. 30 Tab 0    B.infantis-B.ani-B.long-B.bifi (PROBIOTIC 4X) 10-15 mg TbEC Take  by mouth daily.  ranitidine (ZANTAC) 150 mg tablet 150mg QAM and 75mg QPM      aspirin delayed-release 81 mg tablet Take 81 mg by mouth daily.  Cholecalciferol, Vitamin D3, (VITAMIN D3) 1,000 unit cap Take 1,000 Units by mouth daily.  glucose blood VI test strips (ACCU-CHEK MARCE PLUS TEST STRP) strip Please use one strip to test blood sugars once a day. 100 strip 20    Lancets (ACCU-CHEK SOFTCLIX LANCETS) misc Please use one lancet to test blood sugars twice a day. 1 Package 20    BLOOD-GLUCOSE METER (ACCU-CHEK MARCE MONITORING) by Does Not Apply route.  triamcinolone acetonide (KENALOG) 0.1 % topical cream Apply  to affected area two (2) times a day.  use thin layer         Allergies   Allergen Reactions    Latex, Natural Rubber Hives    Adhesive Rash     Some bandaids    Ciprofloxacin Rash    Codeine Nausea and Vomiting    Demerol [Meperidine] Nausea and Vomiting    Diclofenac Shortness of Breath and Palpitations    Erythromycin Rash    Levaquin [Levofloxacin] Rash    Loratadine Swelling    Morphine Nausea and Vomiting    Penicillin G Hives and Rash     Does fine with Keflex    Sulfa (Sulfonamide Antibiotics) Itching    Vicodin [Hydrocodone-Acetaminophen] Other (comments)     Metallic taste in mouth        Ambulates with a single point cane. PHYSICAL EXAMINATION    Visit Vitals    /78    Pulse 91    Ht 5' 8\" (1.727 m)    Wt 227 lb (103 kg)    BMI 34.52 kg/m2       CONSTITUTIONAL: NAD, A&O x 3  SENSATION: Decreased sensation to light touch at L4 of the LLE. Sensation to light touch otherwise intact. RANGE OF MOTION: The patient has full passive range of motion in all four extremities. MOTOR:  Straight Leg Raise: Negative, bilateral   VIVIAN SIGN: Positive, left                Hip Flex Knee Ext Knee Flex Ankle DF GTE Ankle PF Tone   Right +4/5 +4/5 +4/5 +4/5 +4/5 +4/5 +4/5   Left +4/5 +4/5 +4/5 +4/5 +4/5 +4/5 +4/5       ASSESSMENT   Diagnoses and all orders for this visit:    1. Lumbar postlaminectomy syndrome  -     SCHEDULE SURGERY    2. Spinal stenosis of lumbar region with neurogenic claudication  -     SCHEDULE SURGERY    3. Radiculopathy, lumbar region  -     SCHEDULE SURGERY    4. Fibromyalgia  -     SCHEDULE SURGERY    5. Sacroiliitis ([facility] [address]  -     SCHEDULE SURGERY          IMPRESSION AND PLAN:  Patient presented earlier than schedule to a recent increase in low back and left hip pain extending into LLE to an area below the knee. Clinically, patient demonstrated left SI joint dysfunction. I will order a left SI joint injection. I refilled her medication. I will see the patient back following injection. Written by Liya Lynch, as dictated by Heidi Brown MD  I examined the patient, reviewed and agree with the note.

## 2018-03-06 NOTE — MR AVS SNAPSHOT
[facility] 
 
 
 [address] [facility] 
[phone] Patient: Leonor Gray MRN: [id number] [facility]:[birth year] Visit Information Date & Time Provider Department Dept. Phone Encounter #  
 3/6/2018 10:50 AM Kamala Thakur  [facility], [address] [facility] [place] [phone] Follow-up Instructions Return for following injection. Your Appointments 5/8/2018  2:00 PM  
Follow Up with [address] [place] [address] [address]) Appt Note: Return in about 3 months (around 5/8/2018). [address] [facility]  
  
   
 [address] [facility] [address]  
  
    
 5/24/2018 10:50 AM  
Follow Up with Kamala Thakur MD  
[facility] Appt Note: 3 MO FU; PT R/S FROM 4/26 PT NEEDED A 3 MO FU  
 [address] [facility] [address]  
[phone]  
  
   
 [address] [facility] [address] 8/16/2018  1:45 PM  
Follow Up with Saumil Reed Ganser, MD  
Cardio Specialist at [facility] Appt Note: 9 months follow up  
 [facility] [address] Upcoming Health Maintenance Date Due  
 EYE EXAM RETINAL OR DILATED Q1 1/19/2017 GLAUCOMA SCREENING Q2Y 1/19/2018 HEMOGLOBIN A1C Q6M 5/7/2018 FOOT EXAM Q1 8/7/2018 MICROALBUMIN Q1 11/7/2018 LIPID PANEL Q1 11/7/2018 MEDICARE YEARLY EXAM 11/8/2018 BREAST CANCER SCRN MAMMOGRAM 2/20/2020 COLONOSCOPY 9/15/2022 DTaP/Tdap/Td series (2 - Td) 12/15/2025 Allergies as of 3/6/2018  Review Complete On: 3/6/2018 By: Kamala Thakur MD  
  
 Severity Noted Reaction Type Reactions Latex, Natural Rubber  07/14/2016    Hives Adhesive  03/18/2016    Rash Some bandaids Ciprofloxacin  01/28/2014    Rash Codeine  01/28/2014    Nausea and Vomiting Demerol [Meperidine]  01/28/2014    Nausea and Vomiting Diclofenac  08/06/2015    Shortness of Breath, Palpitations Erythromycin  01/28/2014    Rash Levaquin [Levofloxacin]  01/28/2014    Rash Loratadine  01/28/2014    Swelling Morphine  01/28/2014    Nausea and Vomiting Penicillin G  01/28/2014    Hives, Rash Does fine with Keflex Sulfa (Sulfonamide Antibiotics)  01/28/2014    Itching Vicodin [Hydrocodone-acetaminophen]  02/12/2015    Other (comments) Metallic taste in mouth Current Immunizations  Reviewed on 11/7/2017 Name Date Influenza High Dose Vaccine PF 11/7/2017, 10/3/2016 Influenza Vaccine 10/10/2014 Influenza Vaccine (Quad) PF 10/28/2015 Pneumococcal Conjugate (PCV-13) 12/7/2015 Pneumococcal Polysaccharide (PPSV-23) 11/7/2017 Tdap 12/15/2015 Zoster Vaccine, Live 6/2/2014 Not reviewed this visit You Were Diagnosed With   
  
 Codes Comments Lumbar postlaminectomy syndrome    -  Primary ICD-10-CM: M96.1 ICD-9-CM: 722.83 Spinal stenosis of lumbar region with neurogenic claudication     ICD-10-CM: M48.062 
ICD-9-CM: 724.03 Radiculopathy, lumbar region     ICD-10-CM: M54.16 
ICD-9-CM: 724.4 Fibromyalgia     ICD-10-CM: M79.7 ICD-9-CM: 729.1 Sacroiliitis ([facility] [address]     ICD-10-CM: M46.1 ICD-9-CM: 720.2 Vitals BP Pulse Height(growth percentile) Weight(growth percentile) BMI OB Status 114/78 91 5' 8\" (1.727 m) 227 lb (103 kg) 34.52 kg/m2 Menopause Smoking Status Former Smoker Vitals History BMI and BSA Data Body Mass Index Body Surface Area 34.52 kg/m 2 2.22 m 2 Preferred Pharmacy Pharmacy Name Phone [address] [facility], [address] [place] [address] [phone] Your Updated Medication List  
  
   
 This list is accurate as of 3/6/18 12:17 PM.  Always use your most recent med list.  
  
  
  
  
 Starla Tidwell  
by Does Not Apply route. aspirin delayed-release 81 mg tablet Take 81 mg by mouth daily. atorvastatin 20 mg tablet Commonly known as:  LIPITOR  
TAKE 1 TABLET BY MOUTH ONCE EVERY DAY. GENERIC FOR LIPITOR. butalbital-acetaminophen-caffeine -40 mg per tablet Commonly known as:  Anny Novel Take 1 Tab by mouth every four (4) hours as needed for Pain. Max Daily Amount: 6 Tabs. cetirizine 10 mg tablet Commonly known as:  ZYRTEC  
TAKE 1 TABLET BY MOUTH EVERY DAY. GENERIC FOR ZYRTEC  
  
 clobetasol 0.05 % external solution Commonly known as:  TEMOVATE  
APPLY TO SCALP AFTER SHAMPOO DAILY DULoxetine 30 mg capsule Commonly known as:  CYMBALTA TAKE 1 CAPSULE BY MOUTH TWICE DAILY FLUoxetine 10 mg capsule Commonly known as:  PROzac TAKE 1 CAPSULE BY MOUTH ONCE DAILY. Vickii Canes. fluticasone 50 mcg/actuation nasal spray Commonly known as:  Mary Nicholas SHAKE LIQUID AND USE 1 SPRAY IN EACH NOSTRIL TWICE DAILY  
  
 glucose blood VI test strips strip Commonly known as:  ACCU-CHEK MARCE PLUS TEST STRP Please use one strip to test blood sugars once a day. Lancets Misc Commonly known as:  ACCU-CHEK SOFTCLIX LANCETS Please use one lancet to test blood sugars twice a day. levothyroxine 25 mcg tablet Commonly known as:  SYNTHROID  
TAKE 1 TABLET BY MOUTH EVERY DAY BEFORE BREAKFAST  
  
 meloxicam 15 mg tablet Commonly known as:  MOBIC  
TAKE 1 TABLET BY MOUTH DAILY  
  
 metFORMIN 500 mg tablet Commonly known as:  GLUCOPHAGE  
TAKE 1 TABLET BY MOUTH DAILY WITH DINNER. GENERIC FOR GLUCOPHAGE  
  
 PROBIOTIC 4X 10-15 mg Tbec Generic drug:  B.infantis-B.ani-B.long-B.bifi Take  by mouth daily. raNITIdine 150 mg tablet Commonly known as:  ZANTAC  
150mg QAM and 75mg QPM  
  
 SPIRIVA RESPIMAT 2.5 mcg/actuation inhaler Generic drug:  tiotropium bromide INL 2 PFS PO D  
  
 traMADol 50 mg tablet Commonly known as:  ULTRAM  
Take 1 Tab by mouth four (4) times daily as needed for Pain. Max Daily Amount: 200 mg. Indications: NEUROPATHIC PAIN, Pain  
  
 triamcinolone acetonide 0.1 % topical cream  
Commonly known as:  KENALOG Apply  to affected area two (2) times a day. use thin layer VENTOLIN HFA 90 mcg/actuation inhaler Generic drug:  albuterol 2 Puffs by Aerosolization route every four (4) hours as needed. VITAMIN D3 1,000 unit Cap Generic drug:  cholecalciferol Take 1,000 Units by mouth daily. Follow-up Instructions Return for following injection. To-Do List   
 03/07/2018 2:30 PM  
  Appointment with [facility] at [place] [address] ([phone]) 03/09/2018 2:30 PM  
  Appointment with Dread Flowers at [place] [address] ([phone])  
  
 03/12/2018 2:30 PM  
  Appointment with Dread Flowers at [place] [address] ([phone])  
  
 03/14/2018 2:30 PM  
  Appointment with Dread Flowers at [place] [address] ([phone]) [facility]! Dear Sreekanth Renee: Thank you for requesting a [employer] account. Our records indicate that you already have an active [employer] account. You can access your account anytime at [email or web address] Did you know that you can access your hospital and ER discharge instructions at any time in [employer]? You can also review all of your test results from your hospital stay or ER visit. Additional Information If you have questions, please visit the Frequently Asked Questions section of the [employer] website at [email or web address] Remember, [employer] is NOT to be used for urgent needs. For medical emergencies, dial 911. Now available from your iPhone and Android! Please provide this summary of care documentation to your next provider. Your primary care clinician is listed as Unruly Benavides. If you have any questions after today's visit, please call [phone].

## 2018-03-07 ENCOUNTER — HOSPITAL ENCOUNTER (OUTPATIENT)
Dept: PULMONOLOGY | Age: 68
Discharge: HOME | End: 2018-03-07
Payer: MEDICARE

## 2018-03-07 ENCOUNTER — TELEPHONE (OUTPATIENT)
Dept: FAMILY MEDICINE CLINIC | Age: 68
End: 2018-03-07

## 2018-03-07 PROCEDURE — G0237 THERAPEUTIC PROCD STRG ENDUR: HCPCS

## 2018-03-07 NOTE — PROGRESS NOTES
[facility]  Respiratory Therapy Flowsheet      Emmy Shown  1950       Patient's carry-over of treatment delivered by: Feels better overall. Able to do more with less SOB. Objective Daily Findings    Comments: Patient unable to tolerate normal exercise time due to pain in back, legs and right shoulder. Respiratory Muscle Functioning/Exercise Conditioning/Strengthening Session:    Time In: 3:00  Time Out::4:10  Session Number: 26  O2 with Therapy: 0 L/min    Pre SPO2 98  Pre HR:101  Pre BP: 124/78    RR: 18    Wt: 222   Temp: 98   Post SPO2: 97 Post HR: 89  Post BP: 121/79    Self Care Home Management Instruction/Education    Patient Self Monitored Activity Time In:NA Time Out:NA     Self Care Home Management Instruction Education: Breathing Retaining, Exercise Concepts, Nutrition, COPD & Lung Disease, Medication & Side Effects, Metered Dose Inhaler (MDI) and Stress Management, DM, sleep importance. Patient's Response to Education: Patient actively participated in education. Comments: Individual Therapy         Goal    Actual                      During Therapy                 Post-Therapy     % SpO2 HR RPD 1 - 4 RPE 6-20 Pain  1 - 10 % SpO2 HR RPD 1 - 4 RPE 6-20 Pain 0 - 10   Treadmill                 Timed ambulation 10 min 10 min 98 104 2 10 5 98 94 3 12 5   Stepper               Hand wts. 30 min UB x 2 lb  30 min x UB 2 lb 97 89 2 8 3 96 88 2 9 4   Rower               Weight Training               Therabands               Weighted DB               IMT               IS 30 min x 2500 30 min x 2500 97 78 1 6 1 98 82 1 6 1                    Patient's response to therapy: Good effort and Good motivation  Comments  :c/o pain in back and legs with ambulation and in right shoulder with hand wts. Assessment    Patient's response: Patient progressing towardsd LTGs evident by:  Decreased Dyspnea on exertion, Increased PLB, Increased DB and Increased Pacing.     Plan: Adjust treatment as follows discharge after treatment today. DBEX:    Billin Minutes of Individualized Treatment ()        Physician supervision provided this date of service by: Dr. Carlos Sánchez       Therapist Signature: JOSE Lopez    Therapist PRINTED Name and Credential: JOSE Lopez    3/7/2018  4:20 PM

## 2018-03-09 ENCOUNTER — TELEPHONE (OUTPATIENT)
Dept: FAMILY MEDICINE CLINIC | Age: 68
End: 2018-03-09

## 2018-03-09 ENCOUNTER — APPOINTMENT (OUTPATIENT)
Dept: PULMONOLOGY | Age: 68
End: 2018-03-09
Payer: MEDICARE

## 2018-03-09 NOTE — TELEPHONE ENCOUNTER
Ms. Cecil Candelario from [facility] is asking if Dr. Kathy David can do an override for the patient's Meloxicam 15mg. Ms. Cecil Candelario stated that patient lost her medication. Asking for  to request an authorization for the patient to get refill to [facility] [phone].

## 2018-03-12 ENCOUNTER — APPOINTMENT (OUTPATIENT)
Dept: PULMONOLOGY | Age: 68
End: 2018-03-12
Payer: MEDICARE

## 2018-03-13 ENCOUNTER — TELEPHONE (OUTPATIENT)
Dept: FAMILY MEDICINE CLINIC | Age: 68
End: 2018-03-13

## 2018-03-14 ENCOUNTER — APPOINTMENT (OUTPATIENT)
Dept: PULMONOLOGY | Age: 68
End: 2018-03-14
Payer: MEDICARE

## 2018-03-15 ENCOUNTER — HOSPITAL ENCOUNTER (OUTPATIENT)
Age: 68
Setting detail: OUTPATIENT SURGERY
Discharge: HOME | End: 2018-03-15
Attending: PHYSICAL MEDICINE & REHABILITATION | Admitting: PHYSICAL MEDICINE & REHABILITATION
Payer: MEDICARE

## 2018-03-15 ENCOUNTER — APPOINTMENT (OUTPATIENT)
Dept: GENERAL RADIOLOGY | Age: 68
End: 2018-03-15
Attending: PHYSICAL MEDICINE & REHABILITATION
Payer: MEDICARE

## 2018-03-15 VITALS
DIASTOLIC BLOOD PRESSURE: 101 MMHG | TEMPERATURE: 98.3 F | OXYGEN SATURATION: 95 % | BODY MASS INDEX: 34.4 KG/M2 | RESPIRATION RATE: 16 BRPM | HEIGHT: 68 IN | HEART RATE: 97 BPM | SYSTOLIC BLOOD PRESSURE: 148 MMHG | WEIGHT: 227 LBS

## 2018-03-15 LAB — GLUCOSE BLD STRIP.AUTO-MCNC: 102 MG/DL (ref 70–110)

## 2018-03-15 PROCEDURE — 74011000250 HC RX REV CODE- 250

## 2018-03-15 PROCEDURE — 74011250636 HC RX REV CODE- 250/636

## 2018-03-15 PROCEDURE — 82962 GLUCOSE BLOOD TEST: CPT

## 2018-03-15 PROCEDURE — 74011636320 HC RX REV CODE- 636/320

## 2018-03-15 PROCEDURE — 74011250637 HC RX REV CODE- 250/637: Performed by: PHYSICAL MEDICINE & REHABILITATION

## 2018-03-15 PROCEDURE — 76010000009 HC PAIN MGT 0 TO 30 MIN PROC: Performed by: PHYSICAL MEDICINE & REHABILITATION

## 2018-03-15 PROCEDURE — 77030003676 HC NDL SPN MPRI -A: Performed by: PHYSICAL MEDICINE & REHABILITATION

## 2018-03-15 PROCEDURE — 74011250636 HC RX REV CODE- 250/636: Performed by: PHYSICAL MEDICINE & REHABILITATION

## 2018-03-15 RX ORDER — DEXAMETHASONE SODIUM PHOSPHATE 100 MG/10ML
INJECTION INTRAMUSCULAR; INTRAVENOUS AS NEEDED
Status: DISCONTINUED | OUTPATIENT
Start: 2018-03-15 | End: 2018-03-15 | Stop reason: HOSPADM

## 2018-03-15 RX ORDER — LIDOCAINE HYDROCHLORIDE 10 MG/ML
INJECTION, SOLUTION EPIDURAL; INFILTRATION; INTRACAUDAL; PERINEURAL AS NEEDED
Status: DISCONTINUED | OUTPATIENT
Start: 2018-03-15 | End: 2018-03-15 | Stop reason: HOSPADM

## 2018-03-15 RX ORDER — DIAZEPAM 5 MG/1
5-20 TABLET ORAL ONCE
Status: COMPLETED | OUTPATIENT
Start: 2018-03-15 | End: 2018-03-15

## 2018-03-15 RX ADMIN — DIAZEPAM 10 MG: 5 TABLET ORAL at 12:20

## 2018-03-15 NOTE — PROCEDURES
Procedure Note    Patient Name: Darryle Sickle    Date of Procedure: March 15, 2018    Preoperative Diagnosis: Sacrilitis    Post Operative Diagnosis: same    Procedure: SI Joint Injection left     Consent: Informed consent was obtained prior to the procedure. The patient was given the opportunity to ask questions regarding the procedure and its associated risks. In addition to the potential risks associated with the procedure itself, the patient was informed both verbally and in writing of potential side effects of the use of glucocorticoids. The patient appeared to comprehend the informed consent and desired to have the procedure perfored. Procedure: The patient was placed in the prone position on the flouroscopy table and the back was prepped and draped in the usual sterile manner. A #22 gauge spinal needle was then advanced to lie within the SI joint after local Lidocaine 1% injection. A total of 30 mg of preservative free Dexamethasone and 4 cc of Lidocaine was introduced into the SI joint. The injection area was cleaned and bandaids applied. No excessive bleeding was noted. Patient dressed and was discharged to home with instructions. Discussion: The patient tolerated the procedure well.      Lester Ji MD  March 15, 2018

## 2018-03-15 NOTE — DISCHARGE INSTRUCTIONS
[facility]      Post Procedures Instructions    *Resume Diet and Activity as tolerated. Rest for the remainder of the day. *You may fell worse before you feel better as the numbing medications wear off before the steroids take effect if used for your procedures. *Do not use affected extremity until numbness or loss of sensation has completely resolved without assistance. *DO NOT DRIVE, operate machinery/heavey equipment for 24 hours. *DO NOT DRINK ALCOHOL for 24 hours as it may interact with the sedation if you received it and also thins your blood and may cause you to bleed. *WAIT 24 hours before starting back ANY Blood thinning medications:   (Heparin, Coumadin, Warfarin, Lovenox, Plavix, Aggrenox)    *Resume Pre-Procedure Medications as prescribed except Blood Thinners unless directed by your Physician or Cardiologist.     *Avoid Hot tubs and Heating pad for 24 hours to prevent dissipation of medications, you may shower to remove bandages and remaining prep residue on the skin. * If you develop a Headache, drink plenty of fluids including beverages with caffeine (Coffee, Mt. Dew etc.) and rest.  If the headache persists longer than 24 hoursor intensifies - Please call [facility] ([facility]) [phone]    * If you are DIABETIC, check your blood sugar three times a day for the next three days, the steroids will increase your blood sugar. If your blood sugar is greater than 400 have someone drive you to the nearest [address]. * If you experience any of the following problems, call the [facility] [phone] between 8:00 am - 4:30pm or After Hours [phone].     Shortness of breath    Fever of 101 F or higher    Nausea / Vomiting (not normal to you)    Increasing stiffness in the neck    Weakness or numbness in the arms or legs that is not resolving    Prolonged and increasing pain > than 4 days    ANYTHING OUT of the ORDINARY TO YOU    If YOU are experiencing a severe reaction / complication that you have never had before post procedure, call 911 or go to the nearest emergency room! All patients must have a  for transportation [place] regardless if you do or do not receive sedation. DISCHARGE SUMMARY from Nurse      PATIENT INSTRUCTIONS:    After Oral  or intravenous sedation, for 24 hours or while taking prescription Narcotics:  · Limit your activities  · Do not drive and operate hazardous machinery  · Do not make important personal or business decisions  · Do  not drink alcoholic beverages  · If you have not urinated within 8 hours after discharge, please contact your surgeon on call. Report the following to your surgeon:  · Excessive pain, swelling, redness or odor of or around the surgical area  · Temperature over 101  · Nausea and vomiting lasting longer than 4 hours or if unable to take medications  · Any signs of decreased circulation or nerve impairment to extremity: change in color, persistent  numbness, tingling, coldness or increase pain  · Any questions        What to do at Home:  Recommended activity: Activity as tolerated, NO DRIVING FOR 24 Hours post injection          *  Please give a list of your current medications to your Primary Care Provider. *  Please update this list whenever your medications are discontinued, doses are      changed, or new medications (including over-the-counter products) are added. *  Please carry medication information at all times in case of emergency situations. These are general instructions for a healthy lifestyle:    No smoking/ No tobacco products/ Avoid exposure to second hand smoke    Surgeon General's Warning:  Quitting smoking now greatly reduces serious risk to your health.     Obesity, smoking, and sedentary lifestyle greatly increases your risk for illness    A healthy diet, regular physical exercise & weight monitoring are important for maintaining a healthy lifestyle    You may be retaining fluid if you have a history of heart failure or if you experience any of the following symptoms:  Weight gain of 3 pounds or more overnight or 5 pounds in a week, increased swelling in our hands or feet or shortness of breath while lying flat in bed. Please call your doctor as soon as you notice any of these symptoms; do not wait until your next office visit. Recognize signs and symptoms of STROKE:    F-face looks uneven    A-arms unable to move or move unevenly    S-speech slurred or non-existent    T-time-call 911 as soon as signs and symptoms begin-DO NOT go       Back to bed or wait to see if you get better-TIME IS BRAIN.

## 2018-03-19 RX ORDER — MELOXICAM 15 MG/1
TABLET ORAL
Qty: 90 TAB | Refills: 3 | Status: SHIPPED | OUTPATIENT
Start: 2018-03-19 | End: 2019-02-18 | Stop reason: SDUPTHER

## 2018-03-26 ENCOUNTER — TELEPHONE (OUTPATIENT)
Dept: ORTHOPEDIC SURGERY | Age: 68
End: 2018-03-26

## 2018-03-26 NOTE — TELEPHONE ENCOUNTER
Pharm Tech with [facility] reports the RX phoned in on 02/21/2018 did not have a refill. The patient is requesting to receive a 3 month supply per fill. Please advise.      Last Visit: 03/06/2018 with MD Bruce Schneider    Next Appointment: 04/19/2017 with MD Bruce Schneider

## 2018-03-26 NOTE — TELEPHONE ENCOUNTER
Patient called to request refill of traMADol (ULTRAM) 50 mg tablet called in to [facility] on [address]. Patient says she normally gets a 3 month prescription, but only received one month last time, is it possible to go back to the 3 month supply? Please advise patient if any issues at [phone].

## 2018-03-26 NOTE — TELEPHONE ENCOUNTER
The 3/1 script had 1 one refill on this. The state regulations have changed. She has a FU in April. She will obtain further refills at this visit. We have to see the patients every 3 months when on this type of medication.

## 2018-03-27 NOTE — TELEPHONE ENCOUNTER
Spoke to the pharmacy. Patient does have a refill. I advised her to get a 3 month supply at her next appointment to get her back on track.

## 2018-04-24 ENCOUNTER — TELEPHONE (OUTPATIENT)
Dept: ORTHOPEDIC SURGERY | Age: 68
End: 2018-04-24

## 2018-04-24 DIAGNOSIS — M54.16 RADICULOPATHY, LUMBAR REGION: Primary | ICD-10-CM

## 2018-04-24 DIAGNOSIS — M48.062 SPINAL STENOSIS OF LUMBAR REGION WITH NEUROGENIC CLAUDICATION: ICD-10-CM

## 2018-04-24 NOTE — TELEPHONE ENCOUNTER
PATIENT CALLED FOR DR. JERONIMO. PATIENT SAID SHE HAD HER SPINAL BLOCK INJECTION DONE BY DR. JERONIMO ON 03/15/18. PATIENT HAS AN APPOINTMENT TO SEE DR. JERONIMO ON 05/24/18. PATIENT WOULD LIKE TO KNOW IF IT IS OKAY FOR HER TO WAIT TO SEE DR. JERONIMO ON 05/24/18 AFTER SHE HAD THE SPINAL BLOCK OR IF THAT IS TO LONG FOR HER TO WAIT. PATIENT TEL. [phone]. NOTE: KASH SHOWS THE PT HAD AN APPOINTMENT WITH DR. JERONIMO ON 04/26/18 BUT RESCHEDULE FOR 05/24/18.

## 2018-04-26 NOTE — TELEPHONE ENCOUNTER
Patient called in states at her last appt 03/06/18 she did not receive her 3rd refill on her Tramadol. She states she will be out on  May 1st please contact patient at [phone]. Patient uses [email or web address] [address] [place] [address], [address] [place] [address]

## 2018-04-30 RX ORDER — TRAMADOL HYDROCHLORIDE 50 MG/1
50 TABLET ORAL
Qty: 120 TAB | Refills: 0 | Status: SHIPPED | OUTPATIENT
Start: 2018-05-01 | End: 2018-10-09 | Stop reason: SDUPTHER

## 2018-04-30 NOTE — TELEPHONE ENCOUNTER
The patient contacted the office regarding a prescription for Ultram. She would like for someone to phone the RX in to [address] [phone].

## 2018-05-04 RX ORDER — METFORMIN HYDROCHLORIDE 500 MG/1
TABLET ORAL
Qty: 90 TAB | Refills: 1 | Status: SHIPPED | OUTPATIENT
Start: 2018-05-04 | End: 2018-08-17 | Stop reason: SDUPTHER

## 2018-05-08 ENCOUNTER — OFFICE VISIT (OUTPATIENT)
Dept: FAMILY MEDICINE CLINIC | Age: 68
End: 2018-05-08

## 2018-05-08 VITALS
BODY MASS INDEX: 34.07 KG/M2 | DIASTOLIC BLOOD PRESSURE: 77 MMHG | RESPIRATION RATE: 20 BRPM | OXYGEN SATURATION: 98 % | HEART RATE: 102 BPM | SYSTOLIC BLOOD PRESSURE: 110 MMHG | HEIGHT: 68 IN | TEMPERATURE: 95.3 F | WEIGHT: 224.8 LBS

## 2018-05-08 DIAGNOSIS — J30.89 NON-SEASONAL ALLERGIC RHINITIS DUE TO OTHER ALLERGIC TRIGGER: ICD-10-CM

## 2018-05-08 DIAGNOSIS — I72.8 SPLENIC ARTERY ANEURYSM (HCC): ICD-10-CM

## 2018-05-08 DIAGNOSIS — R05.9 COUGH: ICD-10-CM

## 2018-05-08 DIAGNOSIS — I42.2 HYPERTROPHIC NONOBSTRUCTIVE CARDIOMYOPATHY (HCC): ICD-10-CM

## 2018-05-08 DIAGNOSIS — J30.1 SEASONAL ALLERGIC RHINITIS DUE TO POLLEN: ICD-10-CM

## 2018-05-08 DIAGNOSIS — J44.9 CHRONIC OBSTRUCTIVE PULMONARY DISEASE, UNSPECIFIED COPD TYPE (HCC): ICD-10-CM

## 2018-05-08 DIAGNOSIS — J06.9 UPPER RESPIRATORY TRACT INFECTION, UNSPECIFIED TYPE: ICD-10-CM

## 2018-05-08 DIAGNOSIS — E11.21 TYPE 2 DIABETES WITH NEPHROPATHY (HCC): Primary | ICD-10-CM

## 2018-05-08 DIAGNOSIS — E66.9 OBESITY (BMI 30.0-34.9): ICD-10-CM

## 2018-05-08 RX ORDER — BENZONATATE 100 MG/1
100 CAPSULE ORAL
Qty: 30 CAP | Refills: 0 | Status: SHIPPED | OUTPATIENT
Start: 2018-05-08 | End: 2018-05-15

## 2018-05-08 RX ORDER — FLUTICASONE PROPIONATE 50 MCG
SPRAY, SUSPENSION (ML) NASAL
Qty: 1 BOTTLE | Refills: 5 | Status: SHIPPED | OUTPATIENT
Start: 2018-05-08 | End: 2019-02-18 | Stop reason: SDUPTHER

## 2018-05-08 RX ORDER — FLUTICASONE PROPIONATE 50 MCG
SPRAY, SUSPENSION (ML) NASAL
Qty: 3 BOTTLE | Refills: 1 | Status: CANCELLED | OUTPATIENT
Start: 2018-05-08

## 2018-05-08 RX ORDER — CEPHALEXIN 500 MG/1
500 CAPSULE ORAL 3 TIMES DAILY
Qty: 30 CAP | Refills: 0 | Status: SHIPPED | OUTPATIENT
Start: 2018-05-08 | End: 2018-05-18

## 2018-05-08 NOTE — PROGRESS NOTES
Subjective:     HPI:  Lizy Tovar is a 76 y.o. female who presents to the office complaining of URI symptoms and for routine follow up. Diabetes Mellitus:  female has diabetes mellitus  Diabetic ROS - medication compliance: compliant all of the time, diabetic diet compliance: compliant all of the time, home glucose monitoring: is performed regularly, values are usually normal and below 130. further diabetic ROS: no polyuria or polydipsia, no chest pain, dyspnea or TIA's, no numbness, tingling or pain in extremities, no medication side effects noted. Allergies/ URI: Pt complains of environmental allergies and shortness of breath. She takes Zyrtec daily. She reports taking generic Mucinex to help with congestion. COPD: Pt complains of SOB. She uses Spiriva inhaler 2 doses once a day. She uses Ventolin once a day. Splenic artery aneurism: Pt reports that she has not followed up on cardiology since march, 2017. Next follow-up appointment is in September. Last Chest CT was completed on 9/16/2017. CT CHEST WO CONT   IMPRESSION:     1. Subpleural minimal interlobular septal thickening most consistent with subtle  chronic fibrotic scarring. No infiltrate or acute pulmonary abnormality. No  evidence of diffuse interstitial fibrosis or diffuse chronic interstitial lung  disease. Mild air trapping with expiration, likely related to constrictive  bronchiolitis. Mild bilateral bronchial thickening suggestive of a component of  chronic bronchitis as well. 2. Ascending aorta ectasia. Back pain: Pt is following up with ortho for back pain. She has radiculopathy in her L3. She received steroid injection last month. She states that back pain is gradually getting worse. Of note,   Pt lost 3 lbs since last office visit.    Wt Readings from Last 3 Encounters:   05/08/18 224 lb 12.8 oz (102 kg)   03/15/18 227 lb (103 kg)   03/06/18 227 lb (103 kg)       Current Outpatient Prescriptions   Medication Sig Dispense Refill    cephALEXin (KEFLEX) 500 mg capsule Take 1 Cap by mouth three (3) times daily for 10 days. 30 Cap 0    benzonatate (TESSALON) 100 mg capsule Take 1 Cap by mouth three (3) times daily as needed for Cough for up to 7 days. 30 Cap 0    metFORMIN (GLUCOPHAGE) 500 mg tablet TAKE ONE TABLET BY MOUTH ONCE DAILY WITH  DINNER. 90 Tab 1    traMADol (ULTRAM) 50 mg tablet Take 1 Tab by mouth every six (6) hours as needed for Pain. Max Daily Amount: 200 mg. 120 Tab 0    meloxicam (MOBIC) 15 mg tablet TAKE 1 TABLET BY MOUTH DAILY 90 Tab 3    traMADol (ULTRAM) 50 mg tablet Take 1 Tab by mouth four (4) times daily as needed for Pain. Max Daily Amount: 200 mg. Indications: NEUROPATHIC PAIN, Pain 120 Tab 1    atorvastatin (LIPITOR) 20 mg tablet TAKE 1 TABLET BY MOUTH ONCE EVERY DAY. GENERIC FOR LIPITOR. 90 Tab 1    clobetasol (TEMOVATE) 0.05 % external solution APPLY TO SCALP AFTER SHAMPOO DAILY 50 mL 3    cetirizine (ZYRTEC) 10 mg tablet TAKE 1 TABLET BY MOUTH EVERY DAY. GENERIC FOR ZYRTEC 90 Tab 2    DULoxetine (CYMBALTA) 30 mg capsule TAKE 1 CAPSULE BY MOUTH TWICE DAILY 180 Cap 1    SPIRIVA RESPIMAT 2.5 mcg/actuation inhaler INL 2 PFS PO D  3    levothyroxine (SYNTHROID) 25 mcg tablet TAKE 1 TABLET BY MOUTH EVERY DAY BEFORE BREAKFAST 90 Tab 2    FLUoxetine (PROZAC) 10 mg capsule TAKE 1 CAPSULE BY MOUTH ONCE DAILY. GENERICFOR PROZAC. 90 Cap 3    fluticasone (FLONASE) 50 mcg/actuation nasal spray SHAKE LIQUID AND USE 1 SPRAY IN EACH NOSTRIL TWICE DAILY 3 Bottle 1    VENTOLIN HFA 90 mcg/actuation inhaler 2 Puffs by Aerosolization route every four (4) hours as needed. 0    butalbital-acetaminophen-caffeine (FIORICET, ESGIC) -40 mg per tablet Take 1 Tab by mouth every four (4) hours as needed for Pain. Max Daily Amount: 6 Tabs. 30 Tab 0    B.infantis-B.ani-B.long-B.bifi (PROBIOTIC 4X) 10-15 mg TbEC Take  by mouth daily.       ranitidine (ZANTAC) 150 mg tablet 150mg QAM and 75mg QPM      aspirin delayed-release 81 mg tablet Take 81 mg by mouth daily.  Cholecalciferol, Vitamin D3, (VITAMIN D3) 1,000 unit cap Take 1,000 Units by mouth daily.  glucose blood VI test strips (ACCU-CHEK MARCE PLUS TEST STRP) strip Please use one strip to test blood sugars once a day. 100 strip 20    Lancets (ACCU-CHEK SOFTCLIX LANCETS) misc Please use one lancet to test blood sugars twice a day. 1 Package 20    BLOOD-GLUCOSE METER (ACCU-CHEK MARCE MONITORING) by Does Not Apply route.  triamcinolone acetonide (KENALOG) 0.1 % topical cream Apply  to affected area two (2) times a day. use thin layer          Allergies   Allergen Reactions    Latex, Natural Rubber Hives    Adhesive Rash     Some bandaids    Ciprofloxacin Rash    Codeine Nausea and Vomiting    Demerol [Meperidine] Nausea and Vomiting    Diclofenac Shortness of Breath and Palpitations    Erythromycin Rash    Levaquin [Levofloxacin] Rash    Loratadine Swelling    Morphine Nausea and Vomiting    Penicillin G Hives and Rash     Does fine with Keflex    Sulfa (Sulfonamide Antibiotics) Itching    Vicodin [Hydrocodone-Acetaminophen] Other (comments)     Metallic taste in mouth       Past Medical History:   Diagnosis Date    Ankle fracture     Aortic stenosis     S/P AVR in 2016    Asthma     Chronic pain     back pain    Diabetes (HCC)     Diverticulitis     Fibromyalgia     H/O aortic valve replacement 03/16    21 mm bovine pericardial bioprosthesis and epiaortic scanning      Hypertension     PT denies    Hypothyroidism     Lumbar post-laminectomy syndrome     Menopause     Obesity     Osteoarthritis     Psychotic disorder     Sciatica     Skin cancer 2013    right forearm.          Past Surgical History:   Procedure Laterality Date    CHEST SURGERY PROCEDURE UNLISTED  03/2016    Open Heart Surgery    COLONOSCOPY      COLONOSCOPY N/A 9/15/2017    COLONOSCOPY performed by Berhane Lopez MD at [address] [place] [address] CATARACT REMOVAL Right 1995    lens  replaced.  HX COLONOSCOPY  09/15/2017    dr. Nicola Holden  f/u 5 years.  HX HEENT  1990's    sinus surgery     HX HEENT Right 11/02/2017    laser for right eye cataracts.  HX LUMBAR FUSION  2004    L3-L4-L5    HX LUMBAR LAMINECTOMY  2002    HX MOHS PROCEDURES Left 1990    HX ORTHOPAEDIC      Rotator cuff Bilateral    HX SHOULDER REPLACEMENT Right 03/29/2017    reverse.  PA COLSC FLX W/RMVL OF TUMOR POLYP LESION SNARE TQ  07/09/2014 repeat in 3 years    Dr. Peraza Felt      Aortic valve replacement       Family History   Problem Relation Age of Onset    Hypertension Father     Heart Disease Father     Alcohol abuse Father        Social History     Social History    Marital status:      Spouse name: N/A    Number of children: N/A    Years of education: N/A     Occupational History    Not on file. Social History Main Topics    Smoking status: Former Smoker     Years: 45.00     Types: Cigarettes     Quit date: 7/1/2011    Smokeless tobacco: Never Used    Alcohol use No    Drug use: No    Sexual activity: No     Other Topics Concern    Not on file     Social History Narrative       REVIEW OF SYSTEM:  Review of Systems   Constitutional: Negative for chills and fever. HENT: Positive for congestion. Eyes: Negative for blurred vision. Respiratory: Positive for cough and shortness of breath. Cardiovascular: Negative for chest pain, palpitations and leg swelling. Gastrointestinal: Negative for constipation, diarrhea, nausea and vomiting. Musculoskeletal: Positive for back pain. Negative for joint pain. Neurological: Negative for headaches. Endo/Heme/Allergies: Positive for environmental allergies.        Objective:     Visit Vitals    /77 (BP 1 Location: Right arm, BP Patient Position: Sitting)    Pulse (!) 102    Temp 95.3 °F (35.2 °C) (Oral)    Resp 20    Ht 5' 8\" (1.727 m)    Wt 224 lb 12.8 oz (102 kg)    SpO2 98%    BMI 34.18 kg/m2       PHYSICAL EXAM:  Physical Exam   Constitutional: She is oriented to person, place, and time and well-developed, well-nourished, and in no distress. HENT:   Head: Normocephalic and atraumatic. Left Ear: External ear normal.   Mouth/Throat: Oropharynx is clear and moist.   Eyes: Pupils are equal, round, and reactive to light. Neck: Normal range of motion. Cardiovascular: Normal rate and regular rhythm. Pulmonary/Chest: Effort normal and breath sounds normal.   Coarse lung sounds in the bases   Lymphadenopathy:        Head (right side): Submandibular adenopathy present. Head (left side): Submandibular adenopathy present. Neurological: She is alert and oriented to person, place, and time. Skin: Skin is warm and dry. Assessment/Plan:       ICD-10-CM ICD-9-CM    1. Type 2 diabetes with nephropathy (HCC) Z18.76 401.17 METABOLIC PANEL, COMPREHENSIVE     583.81 HEMOGLOBIN A1C WITH EAG   2. Upper respiratory tract infection, unspecified type J06.9 465.9 cephALEXin (KEFLEX) 500 mg capsule      benzonatate (TESSALON) 100 mg capsule   3. Cough R05 786.2 benzonatate (TESSALON) 100 mg capsule   4. Hypertrophic nonobstructive cardiomyopathy (HCC) I42.2 425.18    5. Obesity (BMI 30.0-34. 9) E66.9 278.00    6. Splenic artery aneurysm (HCC) I72.8 442.83    7. Chronic obstructive pulmonary disease, unspecified COPD type ([facility] J44.9 496      Patient given opportunity to ask questions. Questions answered. Will treat URI with Keflex. Patient understands plan of care. Follow-up Disposition:  Return in about 3 months (around 8/8/2018), or if symptoms worsen or fail to improve. Written by Kayla Salgado, as dictated by Hanna Orellana DO.    I, Dr. Hanna Orellana, confirm that all documentation is accurate.

## 2018-05-08 NOTE — MR AVS SNAPSHOT
[address]  [place] [address] [id number] 
[phone] Patient: Lizy Tovar MRN: [id number] HWJ:[id number] Visit Information Date & Time Provider Department Dept. Phone Encounter #  
 5/8/2018  2:00 PM Tatyana Joiner, [address] [place] [address] [phone] [id number] Follow-up Instructions Return in about 3 months (around 8/8/2018), or if symptoms worsen or fail to improve. Your Appointments 5/24/2018 10:50 AM  
Follow Up with Collette Gonzalez MD  
[facility] Appt Note: 3 MO FU; PT R/S FROM 4/26 PT NEEDED A 3 MO FU  
 [address] [facility] [address]  
[phone]  
  
   
 [address] [facility] [facility] [address] 8/16/2018  1:45 PM  
Follow Up with Joaquín Greer MD  
Cardio Specialist at [facility] Appt Note: 9 months follow up  
 [facility] [address] Upcoming Health Maintenance Date Due  
 EYE EXAM RETINAL OR DILATED Q1 1/19/2017 GLAUCOMA SCREENING Q2Y 1/19/2018 HEMOGLOBIN A1C Q6M 5/7/2018 Influenza Age 5 to Adult 8/1/2018 FOOT EXAM Q1 8/7/2018 MICROALBUMIN Q1 11/7/2018 LIPID PANEL Q1 11/7/2018 MEDICARE YEARLY EXAM 11/8/2018 BREAST CANCER SCRN MAMMOGRAM 2/20/2020 COLONOSCOPY 9/15/2022 DTaP/Tdap/Td series (2 - Td) 12/15/2025 Allergies as of 5/8/2018  Review Complete On: 5/8/2018 By: Tatyana Joiner DO Severity Noted Reaction Type Reactions Latex, Natural Rubber  07/14/2016    Hives Adhesive  03/18/2016    Rash Some bandaids Ciprofloxacin  01/28/2014    Rash Codeine  01/28/2014    Nausea and Vomiting Demerol [Meperidine]  01/28/2014    Nausea and Vomiting Diclofenac  08/06/2015    Shortness of Breath, Palpitations Erythromycin  01/28/2014    Rash Levaquin [Levofloxacin]  01/28/2014    Rash Loratadine  01/28/2014    Swelling Morphine  01/28/2014    Nausea and Vomiting Penicillin G  01/28/2014    Hives, Rash Does fine with Keflex Sulfa (Sulfonamide Antibiotics)  01/28/2014    Itching Vicodin [Hydrocodone-acetaminophen]  02/12/2015    Other (comments) Metallic taste in mouth Current Immunizations  Reviewed on 11/7/2017 Name Date Influenza High Dose Vaccine PF 11/7/2017, 10/3/2016 Influenza Vaccine 10/10/2014 Influenza Vaccine (Quad) PF 10/28/2015 Pneumococcal Conjugate (PCV-13) 12/7/2015 Pneumococcal Polysaccharide (PPSV-23) 11/7/2017 Tdap 12/15/2015 Zoster Vaccine, Live 6/2/2014 Not reviewed this visit You Were Diagnosed With   
  
 Codes Comments Type 2 diabetes with nephropathy (HCC)    -  Primary ICD-10-CM: E11.21 
ICD-9-CM: 250.40, 583.81 Upper respiratory tract infection, unspecified type     ICD-10-CM: J06.9 ICD-9-CM: 465.9 Cough     ICD-10-CM: R05 ICD-9-CM: 786.2 Hypertrophic nonobstructive cardiomyopathy (HCC)     ICD-10-CM: I42.2 ICD-9-CM: 425.18 Obesity (BMI 30.0-34.9)     ICD-10-CM: F07.5 ICD-9-CM: 278.00 Splenic artery aneurysm (HCC)     ICD-10-CM: I72.8 ICD-9-CM: 442.83 Chronic obstructive pulmonary disease, unspecified COPD type ([facility]     ICD-10-CM: J44.9 ICD-9-CM: 029 Vitals BP Pulse Temp Resp Height(growth percentile) Weight(growth percentile) 110/77 (BP 1 Location: Right arm, BP Patient Position: Sitting) (!) 102 95.3 °F (35.2 °C) (Oral) 20 5' 8\" (1.727 m) 224 lb 12.8 oz (102 kg) SpO2 BMI OB Status Smoking Status 98% 34.18 kg/m2 Menopause Former Smoker BMI and BSA Data Body Mass Index Body Surface Area  
 34.18 kg/m 2 2.21 m 2 Preferred Pharmacy Pharmacy Name Phone [address], [address] [place] [address] [phone] Your Updated Medication List  
  
   
This list is accurate as of 5/8/18  3:18 PM.  Always use your most recent med list.  
  
  
  
  
 Harjit Montalvo  
by Does Not Apply route. aspirin delayed-release 81 mg tablet Take 81 mg by mouth daily. atorvastatin 20 mg tablet Commonly known as:  LIPITOR  
TAKE 1 TABLET BY MOUTH ONCE EVERY DAY. GENERIC FOR LIPITOR. benzonatate 100 mg capsule Commonly known as:  TESSALON Take 1 Cap by mouth three (3) times daily as needed for Cough for up to 7 days. butalbital-acetaminophen-caffeine -40 mg per tablet Commonly known as:  Vicci [place] Take 1 Tab by mouth every four (4) hours as needed for Pain. Max Daily Amount: 6 Tabs. cephALEXin 500 mg capsule Commonly known as:  Sadaf Rumple Take 1 Cap by mouth three (3) times daily for 10 days. cetirizine 10 mg tablet Commonly known as:  ZYRTEC  
TAKE 1 TABLET BY MOUTH EVERY DAY. GENERIC FOR ZYRTEC  
  
 clobetasol 0.05 % external solution Commonly known as:  TEMOVATE  
APPLY TO SCALP AFTER SHAMPOO DAILY DULoxetine 30 mg capsule Commonly known as:  CYMBALTA TAKE 1 CAPSULE BY MOUTH TWICE DAILY FLUoxetine 10 mg capsule Commonly known as:  PROzac TAKE 1 CAPSULE BY MOUTH ONCE DAILY. Delroy Schulte. fluticasone 50 mcg/actuation nasal spray Commonly known as:  Anibal Pitts SHAKE LIQUID AND USE 1 SPRAY IN EACH NOSTRIL TWICE DAILY  
  
 glucose blood VI test strips strip Commonly known as:  ACCU-CHEK MARCE PLUS TEST STRP Please use one strip to test blood sugars once a day. Lancets Misc Commonly known as:  ACCU-CHEK SOFTCLIX LANCETS Please use one lancet to test blood sugars twice a day. levothyroxine 25 mcg tablet Commonly known as:  SYNTHROID  
TAKE 1 TABLET BY MOUTH EVERY DAY BEFORE BREAKFAST  
  
 meloxicam 15 mg tablet Commonly known as:  MOBIC  
TAKE 1 TABLET BY MOUTH DAILY  
  
 metFORMIN 500 mg tablet Commonly known as:  GLUCOPHAGE  
TAKE ONE TABLET BY MOUTH ONCE DAILY WITH  DINNER. PROBIOTIC 4X 10-15 mg Tbec Generic drug:  B.infantis-B.ani-B.long-B.bifi Take  by mouth daily. raNITIdine 150 mg tablet Commonly known as:  ZANTAC  
150mg QAM and 75mg QPM  
  
 SPIRIVA RESPIMAT 2.5 mcg/actuation inhaler Generic drug:  tiotropium bromide INL 2 PFS PO D  
  
 * traMADol 50 mg tablet Commonly known as:  ULTRAM  
Take 1 Tab by mouth four (4) times daily as needed for Pain. Max Daily Amount: 200 mg. Indications: NEUROPATHIC PAIN, Pain * traMADol 50 mg tablet Commonly known as:  ULTRAM  
Take 1 Tab by mouth every six (6) hours as needed for Pain. Max Daily Amount: 200 mg.  
  
 triamcinolone acetonide 0.1 % topical cream  
Commonly known as:  KENALOG Apply  to affected area two (2) times a day. use thin layer VENTOLIN HFA 90 mcg/actuation inhaler Generic drug:  albuterol 2 Puffs by Aerosolization route every four (4) hours as needed. VITAMIN D3 1,000 unit Cap Generic drug:  cholecalciferol Take 1,000 Units by mouth daily. * Notice: This list has 2 medication(s) that are the same as other medications prescribed for you. Read the directions carefully, and ask your doctor or other care provider to review them with you. Prescriptions Sent to Pharmacy Refills  
 cephALEXin (KEFLEX) 500 mg capsule 0 Sig: Take 1 Cap by mouth three (3) times daily for 10 days. Class: Normal  
 Pharmacy: [address] [place] [address] Ph #: [phone] Route: Oral  
 benzonatate (TESSALON) 100 mg capsule 0 Sig: Take 1 Cap by mouth three (3) times daily as needed for Cough for up to 7 days. Class: Normal  
 Pharmacy: [address] [place] [address] Ph #: [phone] Route: Oral  
  
Follow-up Instructions Return in about 3 months (around 8/8/2018), or if symptoms worsen or fail to improve. To-Do List   
 05/08/2018 Lab:  HEMOGLOBIN A1C WITH EAG   
  
 05/08/2018 Lab:  METABOLIC PANEL, COMPREHENSIVE [facility] SERVICES! Dear Ashanti Duran: Thank you for requesting a mycirQle account. Our records indicate that you already have an active mycirQle account. You can access your account anytime at [email or web address] Did you know that you can access your hospital and ER discharge instructions at any time in mycirQle? You can also review all of your test results from your hospital stay or ER visit. Additional Information If you have questions, please visit the Frequently Asked Questions section of the mycirQle website at [email or web address] Remember, mycirQle is NOT to be used for urgent needs. For medical emergencies, dial 911. Now available from your iPhone and Android! Please provide this summary of care documentation to your next provider. Your primary care clinician is listed as Huntingdon Kind. If you have any questions after today's visit, please call [phone].

## 2018-05-08 NOTE — PROGRESS NOTES
Rj Salvador is a 76 y.o. female presented to clinic for routine f/u for DM, HTN, and Chronic pain. 1. Have you been to the ER, urgent care clinic since your last visit? Hospitalized since your last visit? No    2. Have you seen or consulted any other health care providers outside of the [facility] since your last visit? Include any pap smears or colon screening.  No     Learning Assessment 4/22/2016   PRIMARY LEARNER Patient   HIGHEST LEVEL OF EDUCATION - PRIMARY LEARNER  -   PRIMARY LANGUAGE ENGLISH   LEARNER PREFERENCE PRIMARY LISTENING   ANSWERED BY patient   RELATIONSHIP SELF     Health Maintenance Due   Topic Date Due    EYE EXAM RETINAL OR DILATED Q1  01/19/2017    GLAUCOMA SCREENING Q2Y  01/19/2018    HEMOGLOBIN A1C Q6M  05/07/2018

## 2018-05-09 LAB
ALBUMIN SERPL-MCNC: 4.4 G/DL (ref 3.6–4.8)
ALBUMIN/GLOB SERPL: 1.4 {RATIO} (ref 1.2–2.2)
ALP SERPL-CCNC: 105 IU/L (ref 39–117)
ALT SERPL-CCNC: 38 IU/L (ref 0–32)
AST SERPL-CCNC: 38 IU/L (ref 0–40)
BILIRUB SERPL-MCNC: 0.4 MG/DL (ref 0–1.2)
BUN SERPL-MCNC: 14 MG/DL (ref 8–27)
BUN/CREAT SERPL: 16 (ref 12–28)
CALCIUM SERPL-MCNC: 10.2 MG/DL (ref 8.7–10.3)
CHLORIDE SERPL-SCNC: 101 MMOL/L (ref 96–106)
CO2 SERPL-SCNC: 23 MMOL/L (ref 18–29)
CREAT SERPL-MCNC: 0.89 MG/DL (ref 0.57–1)
EST. AVERAGE GLUCOSE BLD GHB EST-MCNC: 140 MG/DL
GFR SERPLBLD CREATININE-BSD FMLA CKD-EPI: 67 ML/MIN/1.73
GFR SERPLBLD CREATININE-BSD FMLA CKD-EPI: 77 ML/MIN/1.73
GLOBULIN SER CALC-MCNC: 3.1 G/DL (ref 1.5–4.5)
GLUCOSE SERPL-MCNC: 81 MG/DL (ref 65–99)
HBA1C MFR BLD: 6.5 % (ref 4.8–5.6)
POTASSIUM SERPL-SCNC: 4.7 MMOL/L (ref 3.5–5.2)
PROT SERPL-MCNC: 7.5 G/DL (ref 6–8.5)
SODIUM SERPL-SCNC: 141 MMOL/L (ref 134–144)

## 2018-05-24 ENCOUNTER — OFFICE VISIT (OUTPATIENT)
Dept: ORTHOPEDIC SURGERY | Age: 68
End: 2018-05-24

## 2018-05-24 VITALS
HEIGHT: 68 IN | SYSTOLIC BLOOD PRESSURE: 106 MMHG | WEIGHT: 225 LBS | HEART RATE: 90 BPM | BODY MASS INDEX: 34.1 KG/M2 | DIASTOLIC BLOOD PRESSURE: 70 MMHG

## 2018-05-24 DIAGNOSIS — M48.062 SPINAL STENOSIS OF LUMBAR REGION WITH NEUROGENIC CLAUDICATION: ICD-10-CM

## 2018-05-24 DIAGNOSIS — M96.1 LUMBAR POSTLAMINECTOMY SYNDROME: Primary | ICD-10-CM

## 2018-05-24 DIAGNOSIS — M54.16 RADICULOPATHY, LUMBAR REGION: ICD-10-CM

## 2018-05-24 DIAGNOSIS — Z51.81 THERAPEUTIC DRUG MONITORING: ICD-10-CM

## 2018-05-24 DIAGNOSIS — M79.7 FIBROMYALGIA: ICD-10-CM

## 2018-05-24 DIAGNOSIS — Z79.891 LONG TERM CURRENT USE OF OPIATE ANALGESIC: ICD-10-CM

## 2018-05-24 RX ORDER — TRAMADOL HYDROCHLORIDE 50 MG/1
50 TABLET ORAL 4 TIMES DAILY
Qty: 120 TAB | Refills: 0 | Status: SHIPPED | OUTPATIENT
Start: 2018-06-01 | End: 2018-06-11 | Stop reason: SDUPTHER

## 2018-05-24 NOTE — MR AVS SNAPSHOT
[address] [facility] [facility] [address] 
[phone] Patient: Dameon Baker MRN: [id number] PZE:[id number] Visit Information Date & Time Provider Department Dept. Phone Encounter #  
 5/24/2018 10:50 AM Noble Lou MD [facility] [phone] [id number] Follow-up Instructions Return in about 3 months (around 8/24/2018). Your Appointments 8/16/2018  1:45 PM  
Follow Up with Joaquín Lowry MD  
Cardio Specialist at [address]) Appt Note: 9 months follow up  
 [facility] 400 [facility] 83 [address] [place] [address] 8/17/2018  2:20 PM  
[place] [address] [facility] [address] [place] [address]) Appt Note: Return in 3 months (8/16/2018). [facility] [address] [facility] 83 222 [facility] [address] [facility] Upcoming Health Maintenance Date Due  
 EYE EXAM RETINAL OR DILATED Q1 1/19/2017 GLAUCOMA SCREENING Q2Y 1/19/2018 Influenza Age 5 to Adult 8/1/2018 FOOT EXAM Q1 8/7/2018 MICROALBUMIN Q1 11/7/2018 LIPID PANEL Q1 11/7/2018 MEDICARE YEARLY EXAM 11/8/2018 HEMOGLOBIN A1C Q6M 11/8/2018 BREAST CANCER SCRN MAMMOGRAM 2/20/2020 COLONOSCOPY 9/15/2022 DTaP/Tdap/Td series (2 - Td) 12/15/2025 Allergies as of 5/24/2018  Review Complete On: 5/24/2018 By: Noble Lou MD  
  
 Severity Noted Reaction Type Reactions Latex, Natural Rubber  07/14/2016    Hives Adhesive  03/18/2016    Rash Some bandaids Ciprofloxacin  01/28/2014    Rash Codeine  01/28/2014    Nausea and Vomiting Demerol [Meperidine]  01/28/2014    Nausea and Vomiting Diclofenac  08/06/2015    Shortness of Breath, Palpitations Erythromycin  01/28/2014    Rash Levaquin [Levofloxacin]  01/28/2014    Rash Loratadine  01/28/2014    Swelling Morphine  01/28/2014    Nausea and Vomiting Penicillin G  01/28/2014    Hives, Rash Does fine with Keflex Sulfa (Sulfonamide Antibiotics)  01/28/2014    Itching Vicodin [Hydrocodone-acetaminophen]  02/12/2015    Other (comments) Metallic taste in mouth Current Immunizations  Reviewed on 11/7/2017 Name Date Influenza High Dose Vaccine PF 11/7/2017, 10/3/2016 Influenza Vaccine 10/10/2014 Influenza Vaccine (Quad) PF 10/28/2015 Pneumococcal Conjugate (PCV-13) 12/7/2015 Pneumococcal Polysaccharide (PPSV-23) 11/7/2017 Tdap 12/15/2015 Zoster Vaccine, Live 6/2/2014 Not reviewed this visit You Were Diagnosed With   
  
 Codes Comments Lumbar postlaminectomy syndrome    -  Primary ICD-10-CM: M96.1 ICD-9-CM: 722.83 Spinal stenosis of lumbar region with neurogenic claudication     ICD-10-CM: M48.062 
ICD-9-CM: 724.03 Radiculopathy, lumbar region     ICD-10-CM: M54.16 
ICD-9-CM: 724.4 Fibromyalgia     ICD-10-CM: M79.7 ICD-9-CM: 729.1 Long term current use of opiate analgesic     ICD-10-CM: D32.528 ICD-9-CM: V58.69 Therapeutic drug monitoring     ICD-10-CM: Z51.81 
ICD-9-CM: V58.83 Vitals BP Pulse Height(growth percentile) Weight(growth percentile) BMI OB Status 106/70 90 5' 8\" (1.727 m) 225 lb (102.1 kg) 34.21 kg/m2 Menopause Smoking Status Former Smoker Vitals History BMI and BSA Data Body Mass Index Body Surface Area  
 34.21 kg/m 2 2.21 m 2 Preferred Pharmacy Pharmacy Name Phone [address] [place] [address], [address] [place] [address] [phone] Your Updated Medication List  
  
   
This list is accurate as of 5/24/18 11:40 AM.  Always use your most recent med list.  
  
  
  
  
 ACCU-CHEK MARCE MONITORING  
by Does Not Apply route. aspirin delayed-release 81 mg tablet Take 81 mg by mouth daily. atorvastatin 20 mg tablet Commonly known as:  LIPITOR  
TAKE 1 TABLET BY MOUTH ONCE EVERY DAY. GENERIC FOR LIPITOR. butalbital-acetaminophen-caffeine -40 mg per tablet Commonly known as:  Thiago Creed Take 1 Tab by mouth every four (4) hours as needed for Pain. Max Daily Amount: 6 Tabs. cetirizine 10 mg tablet Commonly known as:  ZYRTEC  
TAKE 1 TABLET BY MOUTH EVERY DAY. GENERIC FOR ZYRTEC  
  
 clobetasol 0.05 % external solution Commonly known as:  TEMOVATE  
APPLY TO SCALP AFTER SHAMPOO DAILY DULoxetine 30 mg capsule Commonly known as:  CYMBALTA TAKE 1 CAPSULE BY MOUTH TWICE DAILY FLUoxetine 10 mg capsule Commonly known as:  PROzac TAKE 1 CAPSULE BY MOUTH ONCE DAILY. Tyrone Ragland. * fluticasone 50 mcg/actuation nasal spray Commonly known as:  Zeus Vargas SHAKE LIQUID AND USE 1 SPRAY IN EACH NOSTRIL TWICE DAILY * fluticasone 50 mcg/actuation nasal spray Commonly known as:  Zeus Vargas One spray each nostril twice a day. glucose blood VI test strips strip Commonly known as:  ACCU-CHEK MARCE PLUS TEST STRP Please use one strip to test blood sugars once a day. Lancets Misc Commonly known as:  ACCU-CHEK SOFTCLIX LANCETS Please use one lancet to test blood sugars twice a day. levothyroxine 25 mcg tablet Commonly known as:  SYNTHROID  
TAKE 1 TABLET BY MOUTH EVERY DAY BEFORE BREAKFAST  
  
 meloxicam 15 mg tablet Commonly known as:  MOBIC  
TAKE 1 TABLET BY MOUTH DAILY  
  
 metFORMIN 500 mg tablet Commonly known as:  GLUCOPHAGE  
TAKE ONE TABLET BY MOUTH ONCE DAILY WITH  DINNER. PROBIOTIC 4X 10-15 mg Tbec Generic drug:  B.infantis-B.ani-B.long-B.bifi Take  by mouth daily. raNITIdine 150 mg tablet Commonly known as:  ZANTAC  
150mg QAM and 75mg QPM  
  
 SPIRIVA RESPIMAT 2.5 mcg/actuation inhaler Generic drug:  tiotropium bromide INL 2 PFS PO D  
  
 * traMADol 50 mg tablet Commonly known as:  ULTRAM  
Take 1 Tab by mouth four (4) times daily as needed for Pain. Max Daily Amount: 200 mg. Indications: NEUROPATHIC PAIN, Pain * traMADol 50 mg tablet Commonly known as:  ULTRAM  
Take 1 Tab by mouth every six (6) hours as needed for Pain. Max Daily Amount: 200 mg.  
  
 * traMADol 50 mg tablet Commonly known as:  ULTRAM  
Take 1 Tab by mouth four (4) times daily. Max Daily Amount: 200 mg. Indications: NEUROPATHIC PAIN Start taking on:  6/1/2018  
  
 triamcinolone acetonide 0.1 % topical cream  
Commonly known as:  KENALOG Apply  to affected area two (2) times a day. use thin layer VENTOLIN HFA 90 mcg/actuation inhaler Generic drug:  albuterol 2 Puffs by Aerosolization route every four (4) hours as needed. VITAMIN D3 1,000 unit Cap Generic drug:  cholecalciferol Take 1,000 Units by mouth daily. * Notice: This list has 5 medication(s) that are the same as other medications prescribed for you. Read the directions carefully, and ask your doctor or other care provider to review them with you. Prescriptions Printed Refills  
 traMADol (ULTRAM) 50 mg tablet 0 Starting on: 6/1/2018 Sig: Take 1 Tab by mouth four (4) times daily. Max Daily Amount: 200 mg. Indications: NEUROPATHIC PAIN Class: Print Route: Oral  
  
Follow-up Instructions Return in about 3 months (around 8/24/2018). To-Do List   
 05/24/2018 Lab:  DRUG SCREEN UR - W/ CONFIRM Introducing [facility]! Dear Olivia Mckeon: Thank you for requesting a [employer] account. Our records indicate that you already have an active [employer] account. You can access your account anytime at [email or web address] Did you know that you can access your hospital and ER discharge instructions at any time in [employer]?   You can also review all of your test results from your hospital stay or ER visit. Additional Information If you have questions, please visit the Frequently Asked Questions section of the [employer] website at [email or web address] Remember, [employer] is NOT to be used for urgent needs. For medical emergencies, dial 911. Now available from your iPhone and Android! Please provide this summary of care documentation to your next provider. Your primary care clinician is listed as Jackie Calzada. If you have any questions after today's visit, please call [phone].

## 2018-05-24 NOTE — PROGRESS NOTES
[facility]  [address] [facility]  Natalie Lebron, [address]  Phone: [phone]  Fax: [phone]        PROGRESS NOTE      HISTORY OF PRESENT ILLNESS:  The patient is a 76 y.o. female and was seen today for follow up of low back and left hip pain extending into the LLE in a S1 distribution to an area below the knee. Pt was initially seen for low back pain into the BLE extending in roughly an L4 distribution in the LLE and in an L5 distribution to the foot in the RLE. Pt also endorses numbness in the LLE. She had a diagnosis of lumbar postlaminectomy syndrome with a previous history of L3 through S1 fusion in 2002 and 2004 by a physician in [place]. The patient had an additional diagnosis of fibromyalgia. Note dated 6/29/16 indicating patient was seen and underwent porcine aortic valve replacement by Dr. Krissy Craig on 3/18/16. Pt began cardiac rehab in 1/2017. Pt underwent right shoulder surgery on 3/29/17 by Dr. Alexey Romo. Note from Dr. Peyton Grider, cardiologist dated 5/18/17 indicating patient was seen with h/o aortic stenosis, s/p aortic valve replacement 3/16 and was seen with moderate dysphemia with minimal exertion. CT revealed no evidence of pulmonary embolism, no obvious fluid overload. They were ordering further workup to look for functioning bioprosthetic aortic valve. Per patient, a cause for her SOB has still not been found. She has been scheduled to see a pulmonologist. She states she has been restricted to take her Meloxicam since her right shoulder surgery, which has caused her increase in low back pain. Upon review of our records, it was noted she previously failed TOPAMAX, NEURONTIN, and LYRICA. Pt was switched from Ultram ER back to Ultram immediate release. Lumbar spine CT w/o contrast dated 7/20/14  per report revealed L3 through L5 laminectomies with L3 through S1 dorsal hardware fusion. There was no evidence for fractures.  There was advanced degenerative disc disease at L2-L3 at the junctional level with marked central canal stenosis. At her last clinic appointment, patient presented earlier than schedule to a recent increase in low back and left hip pain extending into LLE to an area below the knee. Clinically, patient demonstrated left SI joint dysfunction. I ordered a left SI joint injection. I refilled her medication. The patient returns today with pain location and distribution remain unchanged. She rates pain 4/10, an improvement since her last visit (6/10). Pt underwent left SI joint injection on 3/15/18 with good relief. Pt continues with Ultram 1 tab TID-QID. She admits completing her HEP 2x/week.  reviewed. Body mass index is 34.21 kg/(m^2). PCP: Arpita Franco DO      Past Medical History:   Diagnosis Date    Ankle fracture     Aortic stenosis     S/P AVR in 2016    Asthma     Chronic pain     back pain    Diabetes ([facility] [address]     Diverticulitis     Fibromyalgia     H/O aortic valve replacement 03/16    21 mm bovine pericardial bioprosthesis and epiaortic scanning      Hypertension     PT denies    Hypothyroidism     Lumbar post-laminectomy syndrome     Menopause     Obesity     Osteoarthritis     Psychotic disorder     Sciatica     Skin cancer 2013    right forearm. Social History     Social History    Marital status:      Spouse name: N/A    Number of children: N/A    Years of education: N/A     Occupational History    Not on file. Social History Main Topics    Smoking status: Former Smoker     Years: 45.00     Types: Cigarettes     Quit date: 7/1/2011    Smokeless tobacco: Never Used    Alcohol use No    Drug use: No    Sexual activity: No     Other Topics Concern    Not on file     Social History Narrative       Current Outpatient Prescriptions   Medication Sig Dispense Refill    [START ON 6/1/2018] traMADol (ULTRAM) 50 mg tablet Take 1 Tab by mouth four (4) times daily. Max Daily Amount: 200 mg. Indications: NEUROPATHIC PAIN 120 Tab 0    fluticasone (FLONASE) 50 mcg/actuation nasal spray One spray each nostril twice a day. 1 Bottle 5    metFORMIN (GLUCOPHAGE) 500 mg tablet TAKE ONE TABLET BY MOUTH ONCE DAILY WITH  DINNER. 90 Tab 1    traMADol (ULTRAM) 50 mg tablet Take 1 Tab by mouth every six (6) hours as needed for Pain. Max Daily Amount: 200 mg. 120 Tab 0    meloxicam (MOBIC) 15 mg tablet TAKE 1 TABLET BY MOUTH DAILY 90 Tab 3    traMADol (ULTRAM) 50 mg tablet Take 1 Tab by mouth four (4) times daily as needed for Pain. Max Daily Amount: 200 mg. Indications: NEUROPATHIC PAIN, Pain 120 Tab 1    atorvastatin (LIPITOR) 20 mg tablet TAKE 1 TABLET BY MOUTH ONCE EVERY DAY. GENERIC FOR LIPITOR. 90 Tab 1    clobetasol (TEMOVATE) 0.05 % external solution APPLY TO SCALP AFTER SHAMPOO DAILY 50 mL 3    cetirizine (ZYRTEC) 10 mg tablet TAKE 1 TABLET BY MOUTH EVERY DAY. GENERIC FOR ZYRTEC 90 Tab 2    DULoxetine (CYMBALTA) 30 mg capsule TAKE 1 CAPSULE BY MOUTH TWICE DAILY 180 Cap 1    SPIRIVA RESPIMAT 2.5 mcg/actuation inhaler INL 2 PFS PO D  3    levothyroxine (SYNTHROID) 25 mcg tablet TAKE 1 TABLET BY MOUTH EVERY DAY BEFORE BREAKFAST 90 Tab 2    FLUoxetine (PROZAC) 10 mg capsule TAKE 1 CAPSULE BY MOUTH ONCE DAILY. GENERICFOR PROZAC. 90 Cap 3    fluticasone (FLONASE) 50 mcg/actuation nasal spray SHAKE LIQUID AND USE 1 SPRAY IN EACH NOSTRIL TWICE DAILY 3 Bottle 1    VENTOLIN HFA 90 mcg/actuation inhaler 2 Puffs by Aerosolization route every four (4) hours as needed. 0    butalbital-acetaminophen-caffeine (FIORICET, ESGIC) -40 mg per tablet Take 1 Tab by mouth every four (4) hours as needed for Pain. Max Daily Amount: 6 Tabs. 30 Tab 0    B.infantis-B.ani-B.long-B.bifi (PROBIOTIC 4X) 10-15 mg TbEC Take  by mouth daily.  ranitidine (ZANTAC) 150 mg tablet 150mg QAM and 75mg QPM      aspirin delayed-release 81 mg tablet Take 81 mg by mouth daily.       Cholecalciferol, Vitamin D3, (VITAMIN D3) 1,000 unit cap Take 1,000 Units by mouth daily.  glucose blood VI test strips (ACCU-CHEK MARCE PLUS TEST STRP) strip Please use one strip to test blood sugars once a day. 100 strip 20    Lancets (ACCU-CHEK SOFTCLIX LANCETS) misc Please use one lancet to test blood sugars twice a day. 1 Package 20    BLOOD-GLUCOSE METER (ACCU-CHEK MARCE MONITORING) by Does Not Apply route.  triamcinolone acetonide (KENALOG) 0.1 % topical cream Apply  to affected area two (2) times a day. use thin layer         Allergies   Allergen Reactions    Latex, Natural Rubber Hives    Adhesive Rash     Some bandaids    Ciprofloxacin Rash    Codeine Nausea and Vomiting    Demerol [Meperidine] Nausea and Vomiting    Diclofenac Shortness of Breath and Palpitations    Erythromycin Rash    Levaquin [Levofloxacin] Rash    Loratadine Swelling    Morphine Nausea and Vomiting    Penicillin G Hives and Rash     Does fine with Keflex    Sulfa (Sulfonamide Antibiotics) Itching    Vicodin [Hydrocodone-Acetaminophen] Other (comments)     Metallic taste in mouth        Ambulates with a single point cane. PHYSICAL EXAMINATION    Visit Vitals    /70    Pulse 90    Ht 5' 8\" (1.727 m)    Wt 102.1 kg (225 lb)    BMI 34.21 kg/m2       CONSTITUTIONAL: NAD, A&O x 3  SENSATION: Decreased sensation to light touch at L5 of the LLE. Sensation to light touch otherwise intact. RANGE OF MOTION: The patient has full passive range of motion in all four extremities. MOTOR:  Straight Leg Raise: Negative, bilateral               Hip Flex Knee Ext Knee Flex Ankle DF GTE Ankle PF Tone   Right +4/5 +4/5 +4/5 +4/5 +4/5 +4/5 +4/5   Left +4/5 +4/5 +4/5 +4/5 +4/5 +4/5 +4/5       ASSESSMENT   Diagnoses and all orders for this visit:    1. Lumbar postlaminectomy syndrome  -     DRUG SCREEN UR - W/ CONFIRM; Future  -     traMADol (ULTRAM) 50 mg tablet; Take 1 Tab by mouth four (4) times daily. Max Daily Amount: 200 mg.  Indications: NEUROPATHIC PAIN    2. Spinal stenosis of lumbar region with neurogenic claudication  -     DRUG SCREEN UR - W/ CONFIRM; Future  -     traMADol (ULTRAM) 50 mg tablet; Take 1 Tab by mouth four (4) times daily. Max Daily Amount: 200 mg. Indications: NEUROPATHIC PAIN    3. Radiculopathy, lumbar region  -     DRUG SCREEN UR - W/ CONFIRM; Future  -     traMADol (ULTRAM) 50 mg tablet; Take 1 Tab by mouth four (4) times daily. Max Daily Amount: 200 mg. Indications: NEUROPATHIC PAIN    4. Fibromyalgia  -     DRUG SCREEN UR - W/ CONFIRM; Future  -     traMADol (ULTRAM) 50 mg tablet; Take 1 Tab by mouth four (4) times daily. Max Daily Amount: 200 mg. Indications: NEUROPATHIC PAIN    5. Long term current use of opiate analgesic  -     DRUG SCREEN UR - W/ CONFIRM; Future  -     traMADol (ULTRAM) 50 mg tablet; Take 1 Tab by mouth four (4) times daily. Max Daily Amount: 200 mg. Indications: NEUROPATHIC PAIN    6. Therapeutic drug monitoring  -     DRUG SCREEN UR - W/ CONFIRM; Future  -     traMADol (ULTRAM) 50 mg tablet; Take 1 Tab by mouth four (4) times daily. Max Daily Amount: 200 mg. Indications: NEUROPATHIC PAIN          IMPRESSION AND PLAN:  Patient wished to continue her current treatment. She was provided with refills of her Ultram. I recommend she increase the frequency of HEP to daily. I will see the patient back in 3 month's time or earlier if needed. Written by Elisabeth Weeks, as dictated by Joe Bee MD  I examined the patient, reviewed and agree with the note.

## 2018-06-08 ENCOUNTER — NEW PATIENT (OUTPATIENT)
Dept: URBAN - METROPOLITAN AREA CLINIC 60 | Facility: CLINIC | Age: 68
End: 2018-06-08
Payer: MEDICARE

## 2018-06-08 DIAGNOSIS — Z86.73 OLD CVA: ICD-10-CM

## 2018-06-08 DIAGNOSIS — H35.411 LATTICE DEGENERATION OF RETINA, RIGHT EYE: ICD-10-CM

## 2018-06-08 DIAGNOSIS — H25.813 COMBINED FORMS OF AGE-RELATED CATARACT, BILATERAL: ICD-10-CM

## 2018-06-08 DIAGNOSIS — H52.4 PRESBYOPIA: ICD-10-CM

## 2018-06-08 DIAGNOSIS — G43.801 OTHER MIGRAINE, NOT INTRACTABLE, WITH STATUS MIGRAINOSUS: ICD-10-CM

## 2018-06-08 PROCEDURE — 92004 COMPRE OPH EXAM NEW PT 1/>: CPT | Performed by: OPHTHALMOLOGY

## 2018-06-08 PROCEDURE — 92250 FUNDUS PHOTOGRAPHY W/I&R: CPT | Performed by: OPHTHALMOLOGY

## 2018-06-08 ASSESSMENT — INTRAOCULAR PRESSURE
OS: 16
OD: 15

## 2018-06-08 ASSESSMENT — VISUAL ACUITY
OS: 20/20
OD: 20/25

## 2018-06-11 DIAGNOSIS — Z51.81 THERAPEUTIC DRUG MONITORING: ICD-10-CM

## 2018-06-11 DIAGNOSIS — M79.7 FIBROMYALGIA: ICD-10-CM

## 2018-06-11 DIAGNOSIS — M48.062 SPINAL STENOSIS OF LUMBAR REGION WITH NEUROGENIC CLAUDICATION: ICD-10-CM

## 2018-06-11 DIAGNOSIS — Z79.891 LONG TERM CURRENT USE OF OPIATE ANALGESIC: ICD-10-CM

## 2018-06-11 DIAGNOSIS — M96.1 LUMBAR POSTLAMINECTOMY SYNDROME: ICD-10-CM

## 2018-06-11 DIAGNOSIS — M54.16 RADICULOPATHY, LUMBAR REGION: ICD-10-CM

## 2018-06-11 NOTE — TELEPHONE ENCOUNTER
PHONE IN RX    Last Visit: 05/24/2018 with MD Justin Concepcion    Next Appointment: 08/23/2018 with MD Justin Concepcion   Previous Refill Encounters: 06/01/2018 per MD Justin Concepcion #[id number]    Requested Prescriptions     Pending Prescriptions Disp Refills    traMADol (ULTRAM) 50 mg tablet 120 Tab 1     Sig: Take 1 Tab by mouth four (4) times daily. Max Daily Amount: 200 mg.  Indications: NEUROPATHIC PAIN

## 2018-06-12 RX ORDER — TRAMADOL HYDROCHLORIDE 50 MG/1
50 TABLET ORAL 4 TIMES DAILY
Qty: 120 TAB | Refills: 1 | OUTPATIENT
Start: 2018-07-01 | End: 2018-10-09 | Stop reason: SDUPTHER

## 2018-07-05 DIAGNOSIS — Z51.81 THERAPEUTIC DRUG MONITORING: ICD-10-CM

## 2018-07-05 DIAGNOSIS — Z79.891 LONG TERM CURRENT USE OF OPIATE ANALGESIC: ICD-10-CM

## 2018-07-05 DIAGNOSIS — M48.062 SPINAL STENOSIS OF LUMBAR REGION WITH NEUROGENIC CLAUDICATION: ICD-10-CM

## 2018-07-05 DIAGNOSIS — M54.16 RADICULOPATHY, LUMBAR REGION: ICD-10-CM

## 2018-07-05 DIAGNOSIS — M96.1 LUMBAR POSTLAMINECTOMY SYNDROME: ICD-10-CM

## 2018-07-05 DIAGNOSIS — M79.7 FIBROMYALGIA: ICD-10-CM

## 2018-07-20 ENCOUNTER — FOLLOW UP ESTABLISHED (OUTPATIENT)
Dept: URBAN - METROPOLITAN AREA CLINIC 60 | Facility: CLINIC | Age: 68
End: 2018-07-20
Payer: MEDICARE

## 2018-07-20 PROCEDURE — 76514 ECHO EXAM OF EYE THICKNESS: CPT | Performed by: OPHTHALMOLOGY

## 2018-07-20 PROCEDURE — 92133 CPTRZD OPH DX IMG PST SGM ON: CPT | Performed by: OPHTHALMOLOGY

## 2018-07-20 PROCEDURE — 92083 EXTENDED VISUAL FIELD XM: CPT | Performed by: OPHTHALMOLOGY

## 2018-07-20 PROCEDURE — 99213 OFFICE O/P EST LOW 20 MIN: CPT | Performed by: OPHTHALMOLOGY

## 2018-07-20 ASSESSMENT — INTRAOCULAR PRESSURE
OD: 13
OS: 14

## 2018-08-17 ENCOUNTER — OFFICE VISIT (OUTPATIENT)
Dept: FAMILY MEDICINE CLINIC | Age: 68
End: 2018-08-17

## 2018-08-17 VITALS
BODY MASS INDEX: 33.68 KG/M2 | TEMPERATURE: 96.4 F | HEART RATE: 102 BPM | OXYGEN SATURATION: 97 % | SYSTOLIC BLOOD PRESSURE: 127 MMHG | DIASTOLIC BLOOD PRESSURE: 79 MMHG | RESPIRATION RATE: 16 BRPM | WEIGHT: 222.2 LBS | HEIGHT: 68 IN

## 2018-08-17 DIAGNOSIS — F40.01 PANIC DISORDER WITH AGORAPHOBIA: ICD-10-CM

## 2018-08-17 DIAGNOSIS — E11.69 TYPE 2 DIABETES MELLITUS WITH OTHER SPECIFIED COMPLICATION, WITHOUT LONG-TERM CURRENT USE OF INSULIN (HCC): Primary | ICD-10-CM

## 2018-08-17 DIAGNOSIS — E03.9 ACQUIRED HYPOTHYROIDISM: ICD-10-CM

## 2018-08-17 DIAGNOSIS — F42.2 MIXED OBSESSIONAL THOUGHTS AND ACTS: ICD-10-CM

## 2018-08-17 DIAGNOSIS — L65.9 HAIR LOSS: ICD-10-CM

## 2018-08-17 DIAGNOSIS — R00.0 TACHYCARDIA: ICD-10-CM

## 2018-08-17 DIAGNOSIS — E78.5 HYPERLIPIDEMIA LDL GOAL <100: ICD-10-CM

## 2018-08-17 RX ORDER — LANOLIN ALCOHOL/MO/W.PET/CERES
500 CREAM (GRAM) TOPICAL DAILY
COMMUNITY

## 2018-08-17 RX ORDER — LEVOTHYROXINE SODIUM 25 UG/1
TABLET ORAL
Qty: 90 TAB | Refills: 2 | Status: SHIPPED | OUTPATIENT
Start: 2018-08-17 | End: 2018-09-25 | Stop reason: SDUPTHER

## 2018-08-17 RX ORDER — FLUOXETINE 10 MG/1
CAPSULE ORAL
Qty: 90 CAP | Refills: 3 | Status: SHIPPED | OUTPATIENT
Start: 2018-08-17 | End: 2019-10-14 | Stop reason: SDUPTHER

## 2018-08-17 RX ORDER — METFORMIN HYDROCHLORIDE 500 MG/1
TABLET ORAL
Qty: 90 TAB | Refills: 1 | Status: SHIPPED | OUTPATIENT
Start: 2018-08-17 | End: 2019-02-18 | Stop reason: SDUPTHER

## 2018-08-17 RX ORDER — ATORVASTATIN CALCIUM 20 MG/1
TABLET, FILM COATED ORAL
Qty: 90 TAB | Refills: 1 | Status: SHIPPED | OUTPATIENT
Start: 2018-08-17 | End: 2019-05-01 | Stop reason: SDUPTHER

## 2018-08-17 NOTE — PROGRESS NOTES
Nasir Otoole is a 76 y.o. female presented to clinic for a routine f/u for DM, HTN, and COPD. Pt c/o 6/10 lower back pain. 1. Have you been to the ER, urgent care clinic since your last visit? Hospitalized since your last visit? No    2. Have you seen or consulted any other health care providers outside of the [address] [place] [address] since your last visit? Include any pap smears or colon screening. No     Learning Assessment 4/22/2016   PRIMARY LEARNER Patient   HIGHEST LEVEL OF EDUCATION - PRIMARY LEARNER  -   PRIMARY LANGUAGE ENGLISH   LEARNER PREFERENCE PRIMARY LISTENING   ANSWERED BY patient   RELATIONSHIP SELF     Health Maintenance Due   Topic Date Due    Influenza Age 5 to Adult  08/01/2018    FOOT EXAM Q1  08/07/2018      Patient verbally agrees to permit the Students working in [phone] office to observe and participate in medical care during the appointment today, including, where appropriate, providing direct medical care to patient under the physicians direct supervision. Patient agrees that he/she have been given the opportunity to refuse to give such consent and may withdraw consent at any time during appointment.

## 2018-08-17 NOTE — MR AVS SNAPSHOT
[address] [facility] [address] [id number] 
[phone] Patient: Angelica Herron MRN: [id number] CCT:[id number] Visit Information Date & Time Provider Department Dept. Phone Encounter #  
 8/17/2018  2:20 PM Eladio GilbertKera [facility] [phone] [id number] Follow-up Instructions Return in about 3 months (around 11/17/2018). Your Appointments 8/23/2018 10:50 AM  
Follow Up with Joselin Alexis MD  
[facility] ([address] [place] [address]) Appt Note: 3 mo f/u low back [address] [facility] [address]  
[phone]  
  
   
 [address] [facility] [address] [place] [address]  
  
    
 9/25/2018  1:30 PM  
Follow Up with Breann Domínguez MD  
Cardio Specialist at [address]) Appt Note: 9 months follow up; r/s provider out of office [facility] [address] [facility] 83 [address] Upcoming Health Maintenance Date Due Influenza Age 5 to Adult 8/1/2018 FOOT EXAM Q1 8/7/2018 MICROALBUMIN Q1 11/7/2018 LIPID PANEL Q1 11/7/2018 MEDICARE YEARLY EXAM 11/8/2018 HEMOGLOBIN A1C Q6M 11/8/2018 EYE EXAM RETINAL OR DILATED Q1 4/27/2019 BREAST CANCER SCRN MAMMOGRAM 2/20/2020 GLAUCOMA SCREENING Q2Y 4/27/2020 COLONOSCOPY 9/15/2022 DTaP/Tdap/Td series (2 - Td) 12/15/2025 Allergies as of 8/17/2018  Review Complete On: 8/17/2018 By: Eladio Gilbert, DO Severity Noted Reaction Type Reactions Latex, Natural Rubber  07/14/2016    Hives Adhesive  03/18/2016    Rash Some bandaids Ciprofloxacin  01/28/2014    Rash Codeine  01/28/2014    Nausea and Vomiting Demerol [Meperidine]  01/28/2014    Nausea and Vomiting Diclofenac  08/06/2015    Shortness of Breath, Palpitations Erythromycin  01/28/2014    Rash Levaquin [Levofloxacin]  01/28/2014    Rash Loratadine  01/28/2014    Swelling Morphine  01/28/2014    Nausea and Vomiting Penicillin G  01/28/2014    Hives, Rash Does fine with Keflex Sulfa (Sulfonamide Antibiotics)  01/28/2014    Itching Vicodin [Hydrocodone-acetaminophen]  02/12/2015    Other (comments) Metallic taste in mouth Current Immunizations  Reviewed on 11/7/2017 Name Date Influenza High Dose Vaccine PF 11/7/2017, 10/3/2016 Influenza Vaccine 10/10/2014 Influenza Vaccine (Quad) PF 10/28/2015 Pneumococcal Conjugate (PCV-13) 12/7/2015 Pneumococcal Polysaccharide (PPSV-23) 11/7/2017 Tdap 12/15/2015 Zoster Vaccine, Live 6/2/2014 Not reviewed this visit You Were Diagnosed With   
  
 Codes Comments Hair loss    -  Primary ICD-10-CM: L65.9 ICD-9-CM: 704.00 Tachycardia     ICD-10-CM: R00.0 ICD-9-CM: 785.0 Acquired hypothyroidism     ICD-10-CM: E03.9 ICD-9-CM: 244.9 Hyperlipidemia LDL goal <100     ICD-10-CM: E78.5 ICD-9-CM: 272.4 Panic disorder with agoraphobia     ICD-10-CM: F40.01 
ICD-9-CM: 300.21 Mixed obsessional thoughts and acts     ICD-10-CM: F42.2 ICD-9-CM: 300.3 Type 2 diabetes mellitus with other specified complication, without long-term current use of insulin (HCC)     ICD-10-CM: E11.69 ICD-9-CM: 250.80 Vitals BP Pulse Temp Resp Height(growth percentile) Weight(growth percentile) 127/79 (BP 1 Location: Right arm, BP Patient Position: Sitting) (!) 102 96.4 °F (35.8 °C) (Oral) 16 5' 8\" (1.727 m) 222 lb 3.2 oz (100.8 kg) SpO2 BMI OB Status Smoking Status 97% 33.79 kg/m2 Menopause Former Smoker BMI and BSA Data Body Mass Index Body Surface Area 33.79 kg/m 2 2.2 m 2 Preferred Pharmacy Pharmacy Name Phone [address] [place] [address] Drive, [address] [place] [address] [phone] Your Updated Medication List  
  
   
 This list is accurate as of 8/17/18  3:54 PM.  Always use your most recent med list.  
  
  
  
  
 Perico Coffin  
by Does Not Apply route. aspirin delayed-release 81 mg tablet Take 81 mg by mouth daily. atorvastatin 20 mg tablet Commonly known as:  LIPITOR  
TAKE 1 TABLET BY MOUTH ONCE EVERY DAY. GENERIC FOR LIPITOR. butalbital-acetaminophen-caffeine -40 mg per tablet Commonly known as:  Lavella Amble Take 1 Tab by mouth every four (4) hours as needed for Pain. Max Daily Amount: 6 Tabs. cetirizine 10 mg tablet Commonly known as:  ZYRTEC  
TAKE 1 TABLET BY MOUTH EVERY DAY. GENERIC FOR ZYRTEC  
  
 clobetasol 0.05 % external solution Commonly known as:  TEMOVATE  
APPLY TO SCALP AFTER SHAMPOO DAILY DULoxetine 30 mg capsule Commonly known as:  CYMBALTA TAKE 1 CAPSULE BY MOUTH TWICE DAILY FLUoxetine 10 mg capsule Commonly known as:  PROzac TAKE 1 CAPSULE BY MOUTH ONCE DAILY. Irvin Gave. * fluticasone 50 mcg/actuation nasal spray Commonly known as:  Jose Alberto Metro SHAKE LIQUID AND USE 1 SPRAY IN EACH NOSTRIL TWICE DAILY * fluticasone 50 mcg/actuation nasal spray Commonly known as:  Jose Alberto Metro One spray each nostril twice a day. glucose blood VI test strips strip Commonly known as:  ACCU-CHEK MARCE PLUS TEST STRP Please use one strip to test blood sugars once a day. Lancets Misc Commonly known as:  ACCU-CHEK SOFTCLIX LANCETS Please use one lancet to test blood sugars twice a day. levothyroxine 25 mcg tablet Commonly known as:  SYNTHROID  
TAKE 1 TABLET BY MOUTH EVERY DAY BEFORE BREAKFAST  
  
 meloxicam 15 mg tablet Commonly known as:  MOBIC  
TAKE 1 TABLET BY MOUTH DAILY  
  
 metFORMIN 500 mg tablet Commonly known as:  GLUCOPHAGE  
TAKE ONE TABLET BY MOUTH ONCE DAILY WITH  DINNER. PROBIOTIC 4X 10-15 mg Tbec Generic drug:  B.infantis-B.ani-B.long-B.bifi Take  by mouth daily. raNITIdine 150 mg tablet Commonly known as:  ZANTAC  
150mg QAM and 75mg QPM  
  
 SPIRIVA RESPIMAT 2.5 mcg/actuation inhaler Generic drug:  tiotropium bromide INL 2 PFS PO D  
  
 * traMADol 50 mg tablet Commonly known as:  ULTRAM  
Take 1 Tab by mouth four (4) times daily as needed for Pain. Max Daily Amount: 200 mg. Indications: NEUROPATHIC PAIN, Pain * traMADol 50 mg tablet Commonly known as:  ULTRAM  
Take 1 Tab by mouth every six (6) hours as needed for Pain. Max Daily Amount: 200 mg.  
  
 * traMADol 50 mg tablet Commonly known as:  ULTRAM  
Take 1 Tab by mouth four (4) times daily. Max Daily Amount: 200 mg. Indications: NEUROPATHIC PAIN, chronic pain  
  
 triamcinolone acetonide 0.1 % topical cream  
Commonly known as:  KENALOG Apply  to affected area two (2) times a day. use thin layer VENTOLIN HFA 90 mcg/actuation inhaler Generic drug:  albuterol 2 Puffs by Aerosolization route every four (4) hours as needed. VITAMIN B-12 500 mcg tablet Generic drug:  cyanocobalamin Take 500 mcg by mouth daily. VITAMIN D3 1,000 unit Cap Generic drug:  cholecalciferol Take 1,000 Units by mouth daily. * Notice: This list has 5 medication(s) that are the same as other medications prescribed for you. Read the directions carefully, and ask your doctor or other care provider to review them with you. Prescriptions Sent to Pharmacy Refills  
 metFORMIN (GLUCOPHAGE) 500 mg tablet 1 Sig: TAKE ONE TABLET BY MOUTH ONCE DAILY WITH  DINNER. Class: Normal  
 Pharmacy: [facility] Ph #: [phone]  
 atorvastatin (LIPITOR) 20 mg tablet 1 Sig: TAKE 1 TABLET BY MOUTH ONCE EVERY DAY. GENERIC FOR LIPITOR. Class: Normal  
 Pharmacy: [address] [place] [address], [address] [facility] Ph #: [phone]  FLUoxetine (PROZAC) 10 mg capsule 3  
 Sig: TAKE 1 CAPSULE BY MOUTH ONCE DAILY. Marleni Morgan. Class: Normal  
 Pharmacy: [facility] Ph #: [phone]  
 levothyroxine (SYNTHROID) 25 mcg tablet 2 Sig: TAKE 1 TABLET BY MOUTH EVERY DAY BEFORE BREAKFAST Class: Normal  
 Pharmacy: [facility], [address] [facility] Ph #: [phone] Follow-up Instructions Return in about 3 months (around 11/17/2018). To-Do List   
 08/17/2018 Lab:  TSH 3RD GENERATION Introducing [facility] SERVICES! Dear Sonia Persons: Thank you for requesting a [employer] account. Our records indicate that you already have an active [employer] account. You can access your account anytime at [email or web address] Did you know that you can access your hospital and ER discharge instructions at any time in [employer]? You can also review all of your test results from your hospital stay or ER visit. Additional Information If you have questions, please visit the Frequently Asked Questions section of the [employer] website at [email or web address] Remember, [employer] is NOT to be used for urgent needs. For medical emergencies, dial 911. Now available from your iPhone and Android! Please provide this summary of care documentation to your next provider. Your primary care clinician is listed as Jay Lowe. If you have any questions after today's visit, please call [phone].

## 2018-08-17 NOTE — PROGRESS NOTES
Subjective:     HPI:  Aziza Lindsey is a 76 y.o. female who presents for routine care. Stress: Pt reports that she recently had to go to [place] to help her grandson with custody issues he was having with the mother of his child. She states that this caused her a little stress. She goes on to say that she has returned home and is feeling less stress. Diabetes Mellitus:  female has diabetes mellitus, and  diabetes and hypertension. Diabetic ROS - medication compliance: compliant all of the time, home glucose monitoring: is performed regularly, My Chart glucose log reviewed with patient today at this visit. Pt's BS have been below 130. Hair Loss: Pt complains of hair loss. She states that when she wakes up in the morning there is hair on her pillow. Joint pain: Pt complains of joint pain. This is a chronic problem. Weight loss: Pt reports she has lost weight. Wt Readings from Last 3 Encounters:   08/17/18 222 lb 3.2 oz (100.8 kg)   05/24/18 225 lb (102.1 kg)   05/08/18 224 lb 12.8 oz (102 kg)     Labs reviewed:  Lab Results   Component Value Date/Time    TSH 2.290 03/27/2017 03:32 AM     Lab Results   Component Value Date/Time    Sodium 141 05/08/2018 03:33 AM    Potassium 4.7 05/08/2018 03:33 AM    Chloride 101 05/08/2018 03:33 AM    CO2 23 05/08/2018 03:33 AM    Anion gap 8 09/13/2017 02:11 PM    Glucose 81 05/08/2018 03:33 AM    BUN 14 05/08/2018 03:33 AM    Creatinine 0.89 05/08/2018 03:33 AM    BUN/Creatinine ratio 16 05/08/2018 03:33 AM    GFR est AA 77 05/08/2018 03:33 AM    GFR est non-AA 67 05/08/2018 03:33 AM    Calcium 10.2 05/08/2018 03:33 AM    Bilirubin, total 0.4 05/08/2018 03:33 AM    AST (SGOT) 38 05/08/2018 03:33 AM    Alk.  phosphatase 105 05/08/2018 03:33 AM    Protein, total 7.5 05/08/2018 03:33 AM    Albumin 4.4 05/08/2018 03:33 AM    Globulin 3.8 04/27/2017 03:20 PM    A-G Ratio 1.4 05/08/2018 03:33 AM    ALT (SGPT) 38 (H) 05/08/2018 03:33 AM         Current Outpatient Prescriptions   Medication Sig Dispense Refill    metFORMIN (GLUCOPHAGE) 500 mg tablet TAKE ONE TABLET BY MOUTH ONCE DAILY WITH  DINNER. 90 Tab 1    atorvastatin (LIPITOR) 20 mg tablet TAKE 1 TABLET BY MOUTH ONCE EVERY DAY. GENERIC FOR LIPITOR. 90 Tab 1    FLUoxetine (PROZAC) 10 mg capsule TAKE 1 CAPSULE BY MOUTH ONCE DAILY. GENERICFOR PROZAC. 90 Cap 3    levothyroxine (SYNTHROID) 25 mcg tablet TAKE 1 TABLET BY MOUTH EVERY DAY BEFORE BREAKFAST 90 Tab 2    cyanocobalamin (VITAMIN B-12) 500 mcg tablet Take 500 mcg by mouth daily.  traMADol (ULTRAM) 50 mg tablet Take 1 Tab by mouth four (4) times daily. Max Daily Amount: 200 mg. Indications: NEUROPATHIC PAIN, chronic pain 120 Tab 1    fluticasone (FLONASE) 50 mcg/actuation nasal spray One spray each nostril twice a day. 1 Bottle 5    traMADol (ULTRAM) 50 mg tablet Take 1 Tab by mouth every six (6) hours as needed for Pain. Max Daily Amount: 200 mg. 120 Tab 0    meloxicam (MOBIC) 15 mg tablet TAKE 1 TABLET BY MOUTH DAILY 90 Tab 3    traMADol (ULTRAM) 50 mg tablet Take 1 Tab by mouth four (4) times daily as needed for Pain. Max Daily Amount: 200 mg. Indications: NEUROPATHIC PAIN, Pain 120 Tab 1    clobetasol (TEMOVATE) 0.05 % external solution APPLY TO SCALP AFTER SHAMPOO DAILY 50 mL 3    cetirizine (ZYRTEC) 10 mg tablet TAKE 1 TABLET BY MOUTH EVERY DAY. GENERIC FOR ZYRTEC 90 Tab 2    DULoxetine (CYMBALTA) 30 mg capsule TAKE 1 CAPSULE BY MOUTH TWICE DAILY 180 Cap 1    SPIRIVA RESPIMAT 2.5 mcg/actuation inhaler INL 2 PFS PO D  3    fluticasone (FLONASE) 50 mcg/actuation nasal spray SHAKE LIQUID AND USE 1 SPRAY IN EACH NOSTRIL TWICE DAILY 3 Bottle 1    VENTOLIN HFA 90 mcg/actuation inhaler 2 Puffs by Aerosolization route every four (4) hours as needed. 0    butalbital-acetaminophen-caffeine (FIORICET, ESGIC) -40 mg per tablet Take 1 Tab by mouth every four (4) hours as needed for Pain. Max Daily Amount: 6 Tabs.  30 Tab 0    B.infantis-B.ani-B.long-B.bifi (PROBIOTIC 4X) 10-15 mg TbEC Take  by mouth daily.  ranitidine (ZANTAC) 150 mg tablet 150mg QAM and 75mg QPM      aspirin delayed-release 81 mg tablet Take 81 mg by mouth daily.  Cholecalciferol, Vitamin D3, (VITAMIN D3) 1,000 unit cap Take 1,000 Units by mouth daily.  glucose blood VI test strips (ACCU-CHEK MARCE PLUS TEST STRP) strip Please use one strip to test blood sugars once a day. 100 strip 20    Lancets (ACCU-CHEK SOFTCLIX LANCETS) misc Please use one lancet to test blood sugars twice a day. 1 Package 20    BLOOD-GLUCOSE METER (ACCU-CHEK MARCE MONITORING) by Does Not Apply route.  triamcinolone acetonide (KENALOG) 0.1 % topical cream Apply  to affected area two (2) times a day. use thin layer          Allergies   Allergen Reactions    Latex, Natural Rubber Hives    Adhesive Rash     Some bandaids    Ciprofloxacin Rash    Codeine Nausea and Vomiting    Demerol [Meperidine] Nausea and Vomiting    Diclofenac Shortness of Breath and Palpitations    Erythromycin Rash    Levaquin [Levofloxacin] Rash    Loratadine Swelling    Morphine Nausea and Vomiting    Penicillin G Hives and Rash     Does fine with Keflex    Sulfa (Sulfonamide Antibiotics) Itching    Vicodin [Hydrocodone-Acetaminophen] Other (comments)     Metallic taste in mouth       Past Medical History:   Diagnosis Date    Ankle fracture     Aortic stenosis     S/P AVR in 2016    Asthma     Chronic pain     back pain    Diabetes (HCC)     Diverticulitis     Fibromyalgia     H/O aortic valve replacement 03/16    21 mm bovine pericardial bioprosthesis and epiaortic scanning      Hypertension     PT denies    Hypothyroidism     Lumbar post-laminectomy syndrome     Menopause     Obesity     Osteoarthritis     Psychotic disorder     Sciatica     Skin cancer 2013    right forearm.          Past Surgical History:   Procedure Laterality Date    CHEST SURGERY PROCEDURE UNLISTED 03/2016    Open Heart Surgery    COLONOSCOPY      COLONOSCOPY N/A 9/15/2017    COLONOSCOPY performed by Tamara Rosas MD at [address] Right 1995    lens  replaced.  HX COLONOSCOPY  09/15/2017    dr. Boles Brood  f/u 5 years.  HX HEENT  1990's    sinus surgery     HX HEENT Right 11/02/2017    laser for right eye cataracts.  HX LUMBAR FUSION  2004    L3-L4-L5    HX LUMBAR LAMINECTOMY  2002    HX MOHS PROCEDURES Left 1990    HX ORTHOPAEDIC      Rotator cuff Bilateral    HX SHOULDER REPLACEMENT Right 03/29/2017    reverse.  DE COLSC FLX W/RMVL OF TUMOR POLYP LESION SNARE TQ  07/09/2014 repeat in 3 years    Dr. Shiv Chsae      Aortic valve replacement       Family History   Problem Relation Age of Onset    Hypertension Father     Heart Disease Father     Alcohol abuse Father        Social History     Social History    Marital status:      Spouse name: N/A    Number of children: N/A    Years of education: N/A     Occupational History    Not on file. Social History Main Topics    Smoking status: Former Smoker     Years: 45.00     Types: Cigarettes     Quit date: 7/1/2011    Smokeless tobacco: Never Used    Alcohol use No    Drug use: No    Sexual activity: No     Other Topics Concern    Not on file     Social History Narrative       REVIEW OF SYSTEM:  Review of Systems   Constitutional: Negative for chills and fever. Eyes: Negative for blurred vision. Respiratory: Negative for shortness of breath. Cardiovascular: Negative for chest pain, palpitations and leg swelling. Gastrointestinal: Negative for constipation, diarrhea, nausea and vomiting. Musculoskeletal: Positive for joint pain. Neurological: Negative for headaches.        Objective:     Visit Vitals    /79 (BP 1 Location: Right arm, BP Patient Position: Sitting)    Pulse (!) 102    Temp 96.4 °F (35.8 °C) (Oral)    Resp 16    Ht 5' 8\" (1.727 m)    Wt 222 lb 3.2 oz (100.8 kg)    SpO2 97%    BMI 33.79 kg/m2       PHYSICAL EXAM:  Physical Exam   Constitutional: She is oriented to person, place, and time and well-developed, well-nourished, and in no distress. HENT:   Right Ear: Tympanic membrane, external ear and ear canal normal.   Left Ear: Tympanic membrane, external ear and ear canal normal.   Nose: Nose normal.   Mouth/Throat: Oropharynx is clear and moist.   Eyes: Pupils are equal, round, and reactive to light. Neck: Normal range of motion. Neck supple. No thyromegaly present. Cardiovascular: Regular rhythm, normal heart sounds and intact distal pulses. Tachycardia present. No murmur heard. Pulmonary/Chest: Effort normal and breath sounds normal. She has no wheezes. Neurological: She is alert and oriented to person, place, and time. GCS score is 15. Skin: Skin is warm and dry. Vitals reviewed. Assessment/Plan:       ICD-10-CM ICD-9-CM    1. Type 2 diabetes mellitus with other specified complication, without long-term current use of insulin (HCC) E11.69 250.80 metFORMIN (GLUCOPHAGE) 500 mg tablet   2. Hair loss L65.9 704.00    3. Tachycardia R00.0 785.0 TSH 3RD GENERATION   4. Acquired hypothyroidism E03.9 244.9 TSH 3RD GENERATION      levothyroxine (SYNTHROID) 25 mcg tablet   5. Hyperlipidemia LDL goal <100 E78.5 272.4 atorvastatin (LIPITOR) 20 mg tablet   6. Panic disorder with agoraphobia F40.01 300.21 FLUoxetine (PROZAC) 10 mg capsule   7. Mixed obsessional thoughts and acts F42.2 300.3 FLUoxetine (PROZAC) 10 mg capsule     Patient given opportunity to ask questions. Questions answered. Patient understands plan of care. Follow-up Disposition:  Return in about 3 months (around 11/17/2018). Written by Radha Bhakta, as dictated by Wilda Scruggs DO.    ISAIAS, Dr. Wilda Scruggs, confirm that all documentation is accurate.

## 2018-08-22 LAB
Lab: NORMAL
TSH SERPL DL<=0.005 MIU/L-ACNC: 4.93 UIU/ML (ref 0.45–4.5)

## 2018-08-23 ENCOUNTER — OFFICE VISIT (OUTPATIENT)
Dept: ORTHOPEDIC SURGERY | Age: 68
End: 2018-08-23

## 2018-08-23 VITALS
HEIGHT: 68 IN | DIASTOLIC BLOOD PRESSURE: 71 MMHG | SYSTOLIC BLOOD PRESSURE: 109 MMHG | WEIGHT: 220 LBS | BODY MASS INDEX: 33.34 KG/M2 | HEART RATE: 96 BPM

## 2018-08-23 DIAGNOSIS — M48.062 SPINAL STENOSIS OF LUMBAR REGION WITH NEUROGENIC CLAUDICATION: Primary | ICD-10-CM

## 2018-08-23 DIAGNOSIS — M96.1 POSTLAMINECTOMY SYNDROME: ICD-10-CM

## 2018-08-23 DIAGNOSIS — M54.16 RADICULOPATHY, LUMBAR REGION: ICD-10-CM

## 2018-08-23 DIAGNOSIS — M79.7 FIBROMYALGIA: ICD-10-CM

## 2018-08-23 RX ORDER — TRAMADOL HYDROCHLORIDE 50 MG/1
50 TABLET ORAL 4 TIMES DAILY
Qty: 120 TAB | Refills: 2 | Status: SHIPPED | OUTPATIENT
Start: 2018-09-01 | End: 2018-10-09 | Stop reason: SDUPTHER

## 2018-08-23 NOTE — MR AVS SNAPSHOT
Jailene Nithya 
 
 
 [address] [facility] [address] [phone] Patient: Yayo Ibrahim MRN: [id number] Visit Information Date & Time Provider Department Dept. Phone Encounter #  
 8/23/2018 10:50 AM Della Chairez MD [facility] [phone] [id number] Follow-up Instructions Return in about 3 months (around 11/23/2018). Your Appointments 9/25/2018  1:30 PM  
Follow Up with Joaquín Olsen MD  
Cardio Specialist at [facility]/[address]) Appt Note: 9 months follow up; r/s provider out of office [facility] [address] [facility]  
  
    
 11/16/2018  1:00 PM  
[place] [address] [facility] [address] [place] [address] Road) Appt Note: Return in about 3 months (around 11/17/2018). [address] [facility] [address]  
  
   
 [address] [facility] Upcoming Health Maintenance Date Due Influenza Age 5 to Adult 8/1/2018 FOOT EXAM Q1 8/7/2018 MICROALBUMIN Q1 11/7/2018 LIPID PANEL Q1 11/7/2018 MEDICARE YEARLY EXAM 11/8/2018 HEMOGLOBIN A1C Q6M 11/8/2018 EYE EXAM RETINAL OR DILATED Q1 4/27/2019 BREAST CANCER SCRN MAMMOGRAM 2/20/2020 GLAUCOMA SCREENING Q2Y 4/27/2020 COLONOSCOPY 9/15/2022 DTaP/Tdap/Td series (2 - Td) 12/15/2025 Allergies as of 8/23/2018  Review Complete On: 8/23/2018 By: Della Chairez MD  
  
 Severity Noted Reaction Type Reactions Latex, Natural Rubber  07/14/2016    Hives Adhesive  03/18/2016    Rash Some bandaids Ciprofloxacin  01/28/2014    Rash Codeine  01/28/2014    Nausea and Vomiting Demerol [Meperidine]  01/28/2014    Nausea and Vomiting Diclofenac  08/06/2015    Shortness of Breath, Palpitations Erythromycin  01/28/2014    Rash Levaquin [Levofloxacin]  01/28/2014    Rash Loratadine  01/28/2014    Swelling Morphine  01/28/2014    Nausea and Vomiting Penicillin G  01/28/2014    Hives, Rash Does fine with Keflex Sulfa (Sulfonamide Antibiotics)  01/28/2014    Itching Vicodin [Hydrocodone-acetaminophen]  02/12/2015    Other (comments) Metallic taste in mouth Current Immunizations  Reviewed on 11/7/2017 Name Date Influenza High Dose Vaccine PF 11/7/2017, 10/3/2016 Influenza Vaccine 10/10/2014 Influenza Vaccine (Quad) PF 10/28/2015 Pneumococcal Conjugate (PCV-13) 12/7/2015 Pneumococcal Polysaccharide (PPSV-23) 11/7/2017 Tdap 12/15/2015 Zoster Vaccine, Live 6/2/2014 Not reviewed this visit You Were Diagnosed With   
  
 Codes Comments Spinal stenosis of lumbar region with neurogenic claudication    -  Primary ICD-10-CM: A67.061 
ICD-9-CM: 724.03 Radiculopathy, lumbar region     ICD-10-CM: M54.16 
ICD-9-CM: 724.4 Postlaminectomy syndrome     ICD-10-CM: M96.1 ICD-9-CM: 722.80 Fibromyalgia     ICD-10-CM: M79.7 ICD-9-CM: 729.1 Vitals BP Pulse Height(growth percentile) Weight(growth percentile) BMI OB Status 109/71 96 5' 8\" (1.727 m) 220 lb (99.8 kg) 33.45 kg/m2 Menopause Smoking Status Former Smoker Vitals History BMI and BSA Data Body Mass Index Body Surface Area  
 33.45 kg/m 2 2.19 m 2 Preferred Pharmacy Pharmacy Name Phone [address] [place] [address], [address] [place] [address] [phone] Your Updated Medication List  
  
   
This list is accurate as of 8/23/18 11:16 AM.  Always use your most recent med list.  
  
  
  
  
 ACCU-CHEK MARCE MONITORING  
by Does Not Apply route. aspirin delayed-release 81 mg tablet Take 81 mg by mouth daily. atorvastatin 20 mg tablet Commonly known as:  LIPITOR TAKE 1 TABLET BY MOUTH ONCE EVERY DAY. GENERIC FOR LIPITOR. butalbital-acetaminophen-caffeine -40 mg per tablet Commonly known as:  Gensteph Madura Take 1 Tab by mouth every four (4) hours as needed for Pain. Max Daily Amount: 6 Tabs. cetirizine 10 mg tablet Commonly known as:  ZYRTEC  
TAKE 1 TABLET BY MOUTH EVERY DAY. GENERIC FOR ZYRTEC  
  
 clobetasol 0.05 % external solution Commonly known as:  TEMOVATE  
APPLY TO SCALP AFTER SHAMPOO DAILY DULoxetine 30 mg capsule Commonly known as:  CYMBALTA TAKE 1 CAPSULE BY MOUTH TWICE DAILY FLUoxetine 10 mg capsule Commonly known as:  PROzac TAKE 1 CAPSULE BY MOUTH ONCE DAILY. Adriana Mancia. * fluticasone 50 mcg/actuation nasal spray Commonly known as:  [place] Escalona SHAKE LIQUID AND USE 1 SPRAY IN EACH NOSTRIL TWICE DAILY * fluticasone 50 mcg/actuation nasal spray Commonly known as:  [place] Escalona One spray each nostril twice a day. glucose blood VI test strips strip Commonly known as:  ACCU-CHEK MARCE PLUS TEST STRP Please use one strip to test blood sugars once a day. Lancets Misc Commonly known as:  ACCU-CHEK SOFTCLIX LANCETS Please use one lancet to test blood sugars twice a day. levothyroxine 25 mcg tablet Commonly known as:  SYNTHROID  
TAKE 1 TABLET BY MOUTH EVERY DAY BEFORE BREAKFAST  
  
 meloxicam 15 mg tablet Commonly known as:  MOBIC  
TAKE 1 TABLET BY MOUTH DAILY  
  
 metFORMIN 500 mg tablet Commonly known as:  GLUCOPHAGE  
TAKE ONE TABLET BY MOUTH ONCE DAILY WITH  DINNER. PROBIOTIC 4X 10-15 mg Tbec Generic drug:  B.infantis-B.ani-B.long-B.bifi Take  by mouth daily. raNITIdine 150 mg tablet Commonly known as:  ZANTAC  
150mg QAM and 75mg QPM  
  
 SPIRIVA RESPIMAT 2.5 mcg/actuation inhaler Generic drug:  tiotropium bromide INL 2 PFS PO D  
  
 * traMADol 50 mg tablet Commonly known as:  Freddy Reina  
 Take 1 Tab by mouth four (4) times daily as needed for Pain. Max Daily Amount: 200 mg. Indications: NEUROPATHIC PAIN, Pain * traMADol 50 mg tablet Commonly known as:  ULTRAM  
Take 1 Tab by mouth every six (6) hours as needed for Pain. Max Daily Amount: 200 mg.  
  
 * traMADol 50 mg tablet Commonly known as:  ULTRAM  
Take 1 Tab by mouth four (4) times daily. Max Daily Amount: 200 mg. Indications: NEUROPATHIC PAIN, chronic pain * traMADol 50 mg tablet Commonly known as:  ULTRAM  
Take 1 Tab by mouth four (4) times daily. Max Daily Amount: 200 mg. Start taking on:  9/1/2018  
  
 triamcinolone acetonide 0.1 % topical cream  
Commonly known as:  KENALOG Apply  to affected area two (2) times a day. use thin layer VENTOLIN HFA 90 mcg/actuation inhaler Generic drug:  albuterol 2 Puffs by Aerosolization route every four (4) hours as needed. VITAMIN B-12 500 mcg tablet Generic drug:  cyanocobalamin Take 500 mcg by mouth daily. VITAMIN D3 1,000 unit Cap Generic drug:  cholecalciferol Take 1,000 Units by mouth daily. * Notice: This list has 6 medication(s) that are the same as other medications prescribed for you. Read the directions carefully, and ask your doctor or other care provider to review them with you. Prescriptions Printed Refills  
 traMADol (ULTRAM) 50 mg tablet 2 Starting on: 9/1/2018 Sig: Take 1 Tab by mouth four (4) times daily. Max Daily Amount: 200 mg. Class: Print Route: Oral  
  
Follow-up Instructions Return in about 3 months (around 11/23/2018). Introducing [facility]! Dear Duong Georgette: Thank you for requesting a MobileRQ account. Our records indicate that you already have an active MobileRQ account. You can access your account anytime at [email or web address] Did you know that you can access your hospital and ER discharge instructions at any time in SoundRoadie? You can also review all of your test results from your hospital stay or ER visit. Additional Information If you have questions, please visit the Frequently Asked Questions section of the SoundRoadie website at [email or web address] Remember, SoundRoadie is NOT to be used for urgent needs. For medical emergencies, dial 911. Now available from your iPhone and Android! Please provide this summary of care documentation to your next provider. Your primary care clinician is listed as Kamala Anguiano. If you have any questions after today's visit, please call [phone].

## 2018-08-23 NOTE — PROGRESS NOTES
[facility]  [address]  Phone: [phone]  Fax: [phone]        PROGRESS NOTE      HISTORY OF PRESENT ILLNESS:  The patient is a 76 y.o. female and was seen today for follow up of low back and left hip pain extending into the LLE in a S1 distribution to an area below the knee. Pt was initially seen for low back pain into the BLE extending in roughly an L4 distribution in the LLE and in an L5 distribution to the foot in the RLE. Pt also endorses numbness in the LLE. She had a diagnosis of lumbar postlaminectomy syndrome with a previous history of L3 through S1 fusion in 2002 and 2004 by a physician in [place]. The patient had an additional diagnosis of fibromyalgia. Note dated 6/29/16 indicating patient was seen and underwent porcine aortic valve replacement by Dr. Lorena Begum on 3/18/16. Pt began cardiac rehab in 1/2017. Pt underwent right shoulder surgery on 3/29/17 by Dr. Radha Ma. Note from Dr. Jeremy Pagan, cardiologist dated 5/18/17 indicating patient was seen with h/o aortic stenosis, s/p aortic valve replacement 3/16 and was seen with moderate dysphemia with minimal exertion. CT revealed no evidence of pulmonary embolism, no obvious fluid overload. They were ordering further workup to look for functioning bioprosthetic aortic valve. Per patient, a cause for her SOB has still not been found. She has been scheduled to see a pulmonologist. She states she has been restricted to take her Meloxicam since her right shoulder surgery, which has caused her increase in low back pain. Upon review of our records, it was noted she previously failed TOPAMAX, NEURONTIN, and LYRICA. Pt was switched from Ultram ER back to Ultram immediate release. Pt underwent left SI joint injection on 3/15/18 with good relief. Lumbar spine CT w/o contrast dated 7/20/14  per report revealed L3 through L5 laminectomies with L3 through S1 dorsal hardware fusion.  There was no evidence for fractures. There was advanced degenerative disc disease at L2-L3 at the junctional level with marked central canal stenosis. At her last clinical appointment, patient wished to continue her current treatment. She was provided with refills of her Ultram.        The patient returns today with pain location and distribution remain unchanged. She continues to rate her pain 4/10. A UDS obtained today was consistent. Pt continues with Ultram 1 tab TID-QID. She admits completing her HEP 3x/week. Pt denies change in bowel or bladder habits.  reviewed. Body mass index is 33.45 kg/(m^2). PCP: Rosendo Huff DO      Past Medical History:   Diagnosis Date    Ankle fracture     Aortic stenosis     S/P AVR in 2016    Asthma     Chronic pain     back pain    Diabetes ([facility] [address] [address]     Diverticulitis     Fibromyalgia     H/O aortic valve replacement 03/16    21 mm bovine pericardial bioprosthesis and epiaortic scanning      Hypertension     PT denies    Hypothyroidism     Lumbar post-laminectomy syndrome     Menopause     Obesity     Osteoarthritis     Psychotic disorder     Sciatica     Skin cancer 2013    right forearm. Social History     Social History    Marital status:      Spouse name: N/A    Number of children: N/A    Years of education: N/A     Occupational History    Not on file. Social History Main Topics    Smoking status: Former Smoker     Years: 45.00     Types: Cigarettes     Quit date: 7/1/2011    Smokeless tobacco: Never Used    Alcohol use No    Drug use: No    Sexual activity: No     Other Topics Concern    Not on file     Social History Narrative       Current Outpatient Prescriptions   Medication Sig Dispense Refill    [START ON 9/1/2018] traMADol (ULTRAM) 50 mg tablet Take 1 Tab by mouth four (4) times daily.  Max Daily Amount: 200 mg. 120 Tab 2    metFORMIN (GLUCOPHAGE) 500 mg tablet TAKE ONE TABLET BY MOUTH ONCE DAILY WITH  DINNER. 90 Tab 1  atorvastatin (LIPITOR) 20 mg tablet TAKE 1 TABLET BY MOUTH ONCE EVERY DAY. GENERIC FOR LIPITOR. 90 Tab 1    FLUoxetine (PROZAC) 10 mg capsule TAKE 1 CAPSULE BY MOUTH ONCE DAILY. GENERICFOR PROZAC. 90 Cap 3    levothyroxine (SYNTHROID) 25 mcg tablet TAKE 1 TABLET BY MOUTH EVERY DAY BEFORE BREAKFAST 90 Tab 2    cyanocobalamin (VITAMIN B-12) 500 mcg tablet Take 500 mcg by mouth daily.  traMADol (ULTRAM) 50 mg tablet Take 1 Tab by mouth four (4) times daily. Max Daily Amount: 200 mg. Indications: NEUROPATHIC PAIN, chronic pain 120 Tab 1    fluticasone (FLONASE) 50 mcg/actuation nasal spray One spray each nostril twice a day. 1 Bottle 5    traMADol (ULTRAM) 50 mg tablet Take 1 Tab by mouth every six (6) hours as needed for Pain. Max Daily Amount: 200 mg. 120 Tab 0    meloxicam (MOBIC) 15 mg tablet TAKE 1 TABLET BY MOUTH DAILY 90 Tab 3    traMADol (ULTRAM) 50 mg tablet Take 1 Tab by mouth four (4) times daily as needed for Pain. Max Daily Amount: 200 mg. Indications: NEUROPATHIC PAIN, Pain 120 Tab 1    clobetasol (TEMOVATE) 0.05 % external solution APPLY TO SCALP AFTER SHAMPOO DAILY 50 mL 3    cetirizine (ZYRTEC) 10 mg tablet TAKE 1 TABLET BY MOUTH EVERY DAY. GENERIC FOR ZYRTEC 90 Tab 2    DULoxetine (CYMBALTA) 30 mg capsule TAKE 1 CAPSULE BY MOUTH TWICE DAILY 180 Cap 1    SPIRIVA RESPIMAT 2.5 mcg/actuation inhaler INL 2 PFS PO D  3    fluticasone (FLONASE) 50 mcg/actuation nasal spray SHAKE LIQUID AND USE 1 SPRAY IN EACH NOSTRIL TWICE DAILY 3 Bottle 1    VENTOLIN HFA 90 mcg/actuation inhaler 2 Puffs by Aerosolization route every four (4) hours as needed. 0    butalbital-acetaminophen-caffeine (FIORICET, ESGIC) -40 mg per tablet Take 1 Tab by mouth every four (4) hours as needed for Pain. Max Daily Amount: 6 Tabs. 30 Tab 0    B.infantis-B.ani-B.long-B.bifi (PROBIOTIC 4X) 10-15 mg TbEC Take  by mouth daily.       ranitidine (ZANTAC) 150 mg tablet 150mg QAM and 75mg QPM      aspirin delayed-release 81 mg tablet Take 81 mg by mouth daily.  Cholecalciferol, Vitamin D3, (VITAMIN D3) 1,000 unit cap Take 1,000 Units by mouth daily.  glucose blood VI test strips (ACCU-CHEK MARCE PLUS TEST STRP) strip Please use one strip to test blood sugars once a day. 100 strip 20    Lancets (ACCU-CHEK SOFTCLIX LANCETS) misc Please use one lancet to test blood sugars twice a day. 1 Package 20    BLOOD-GLUCOSE METER (ACCU-CHEK MARCE MONITORING) by Does Not Apply route.  triamcinolone acetonide (KENALOG) 0.1 % topical cream Apply  to affected area two (2) times a day. use thin layer         Allergies   Allergen Reactions    Latex, Natural Rubber Hives    Adhesive Rash     Some bandaids    Ciprofloxacin Rash    Codeine Nausea and Vomiting    Demerol [Meperidine] Nausea and Vomiting    Diclofenac Shortness of Breath and Palpitations    Erythromycin Rash    Levaquin [Levofloxacin] Rash    Loratadine Swelling    Morphine Nausea and Vomiting    Penicillin G Hives and Rash     Does fine with Keflex    Sulfa (Sulfonamide Antibiotics) Itching    Vicodin [Hydrocodone-Acetaminophen] Other (comments)     Metallic taste in mouth          PHYSICAL EXAMINATION    Visit Vitals    /71    Pulse 96    Ht 5' 8\" (1.727 m)    Wt 99.8 kg (220 lb)    BMI 33.45 kg/m2       CONSTITUTIONAL: NAD, A&O x 3  SENSATION: Intact to light touch throughout  RANGE OF MOTION: The patient has full passive range of motion in all four extremities. MOTOR:  Straight Leg Raise: Negative, bilateral               Hip Flex Knee Ext Knee Flex Ankle DF GTE Ankle PF Tone   Right +4/5 +4/5 +4/5 +4/5 +4/5 +4/5 +4/5   Left +4/5 +4/5 +4/5 +4/5 +4/5 +4/5 +4/5       ASSESSMENT   Diagnoses and all orders for this visit:    1. Spinal stenosis of lumbar region with neurogenic claudication  -     traMADol (ULTRAM) 50 mg tablet; Take 1 Tab by mouth four (4) times daily.  Max Daily Amount: 200 mg.    2. Radiculopathy, lumbar region  - traMADol (ULTRAM) 50 mg tablet; Take 1 Tab by mouth four (4) times daily. Max Daily Amount: 200 mg.    3. Postlaminectomy syndrome  -     traMADol (ULTRAM) 50 mg tablet; Take 1 Tab by mouth four (4) times daily. Max Daily Amount: 200 mg.    4. Fibromyalgia  -     traMADol (ULTRAM) 50 mg tablet; Take 1 Tab by mouth four (4) times daily. Max Daily Amount: 200 mg. IMPRESSION AND PLAN:  Patient wished to continue her current treatment. I provided her with refills of her Ultram 1 tab TID-QID. A UDS obtained today was consistent. Patient is neurologically intact. I recommend she increase the frequency of her HEP to daily. I will see the patient back in 3 month's time or earlier if needed. Written by Doris Riggins, as dictated by Wilberto Breaux MD  I examined the patient, reviewed and agree with the note.

## 2018-09-20 ENCOUNTER — TELEPHONE (OUTPATIENT)
Dept: FAMILY MEDICINE CLINIC | Age: 68
End: 2018-09-20

## 2018-09-25 ENCOUNTER — OFFICE VISIT (OUTPATIENT)
Dept: FAMILY MEDICINE CLINIC | Age: 68
End: 2018-09-25

## 2018-09-25 VITALS
RESPIRATION RATE: 20 BRPM | DIASTOLIC BLOOD PRESSURE: 87 MMHG | SYSTOLIC BLOOD PRESSURE: 118 MMHG | OXYGEN SATURATION: 99 % | HEIGHT: 68 IN | WEIGHT: 222 LBS | TEMPERATURE: 96 F | HEART RATE: 91 BPM | BODY MASS INDEX: 33.65 KG/M2

## 2018-09-25 DIAGNOSIS — R42 VERTIGO: Primary | ICD-10-CM

## 2018-09-25 DIAGNOSIS — E03.9 ACQUIRED HYPOTHYROIDISM: ICD-10-CM

## 2018-09-25 RX ORDER — LEVOTHYROXINE SODIUM 50 UG/1
TABLET ORAL
Qty: 90 TAB | Refills: 0 | Status: SHIPPED | OUTPATIENT
Start: 2018-09-25 | End: 2018-12-04 | Stop reason: SDUPTHER

## 2018-09-25 RX ORDER — MECLIZINE HYDROCHLORIDE 25 MG/1
25 TABLET ORAL
Qty: 30 TAB | Refills: 0 | Status: SHIPPED | OUTPATIENT
Start: 2018-09-25 | End: 2018-10-05

## 2018-09-25 NOTE — PATIENT INSTRUCTIONS
Vertigo: Care Instructions  Your Care Instructions    Vertigo is the feeling that you or your surroundings are moving when there is no actual movement. It is often described as a feeling of spinning, whirling, falling, or tilting. Vertigo may make you vomit or feel nauseated. You may have trouble standing or walking and may lose your balance. Vertigo is often related to an inner ear problem, but it can have other more serious causes. If vertigo continues, you may need more tests to find its cause. Follow-up care is a key part of your treatment and safety. Be sure to make and go to all appointments, and call your doctor if you are having problems. It's also a good idea to know your test results and keep a list of the medicines you take. How can you care for yourself at home? · Do not lie flat on your back. Prop yourself up slightly. This may reduce the spinning feeling. Keep your eyes open. · Move slowly so that you do not fall. · If your doctor recommends medicine, take it exactly as directed. · Do not drive while you are having vertigo. Certain exercises, called Flower-Daroff exercises, can help decrease vertigo. To do Flower-Daroff exercises:  · Sit on the edge of a bed or sofa and quickly lie down on the side that causes the worst vertigo. Lie on your side with your ear down. · Stay in this position for at least 30 seconds or until the vertigo goes away. · Sit up. If this causes vertigo, wait for it to stop. · Repeat the procedure on the other side. · Repeat this 10 times. Do these exercises 2 times a day until the vertigo is gone. When should you call for help? Call 911 anytime you think you may need emergency care. For example, call if:    · You passed out (lost consciousness).     · You have symptoms of a stroke. These may include:  ¨ Sudden numbness, tingling, weakness, or loss of movement in your face, arm, or leg, especially on only one side of your body.   ¨ Sudden vision changes. ¨ Sudden trouble speaking. ¨ Sudden confusion or trouble understanding simple statements. ¨ Sudden problems with walking or balance. ¨ A sudden, severe headache that is different from past headaches.    Call your doctor now or seek immediate medical care if:    · Vertigo occurs with a fever, a headache, or ringing in your ears.     · You have new or increased nausea and vomiting.    Watch closely for changes in your health, and be sure to contact your doctor if:    · Vertigo gets worse or happens more often.     · Vertigo has not gotten better after 2 weeks. Where can you learn more? Go to http://gaby-francisco.info/. Enter H924 in the search box to learn more about \"Vertigo: Care Instructions. \"  Current as of: May 12, 2017  Content Version: 11.7  © 6791-0326 [employer]. Care instructions adapted under license by [employer] (which disclaims liability or warranty for this information). If you have questions about a medical condition or this instruction, always ask your healthcare professional. [place] [address] any warranty or liability for your use of this information.

## 2018-09-25 NOTE — PROGRESS NOTES
1. Have you been to the ER, urgent care clinic since your last visit? Hospitalized since your last visit? No    2. Have you seen or consulted any other health care providers outside of the [address] [place] [address] since your last visit? Include any pap smears or colon screening.  No

## 2018-09-25 NOTE — PROGRESS NOTES
History of Present Illness  Mariangel Han is a 76 y.o. female who presents today for management of    Chief Complaint   Patient presents with    Dizziness       Dizziness  Patient complains of dizzy spells. The symptoms started 2 days ago and are gradually worsening. The attacks occur every time she bends over. Previous workup/treatments: none. Associated symptoms: nausea and decreased appetite. Associated CNS symptoms: dimming vision. Denies palpitations, syncope, headache, slurred speech. Recent infections: none. Head trauma: denied. Patient has diabetes. Average fasting blood sugar: low 100s, highest 130 mg/dl. No hypoglycemic episodes. She is drinking plenty of water. Patient is also requesting a refill of synthroid.     Problem List  Patient Active Problem List    Diagnosis Date Noted    Type 2 diabetes with nephropathy ([address] 03/06/2018    Lumbar postlaminectomy syndrome 08/10/2017    Lumbar spinal stenosis 05/04/2017    Radiculopathy, lumbar region 02/09/2017    Ankle impingement syndrome 10/25/2016    Long term (current) use of anticoagulants 03/24/2016    AS (aortic stenosis) 03/18/2016    Postlaminectomy syndrome 01/28/2016    Lumbar neuritis 01/28/2016    Hyperlipidemia LDL goal <100 09/02/2015    Aortic stenosis 06/09/2015    Tachycardia 01/28/2014    Diabetes ([address] 01/28/2014    Pulmonary hypertension ([address] 01/28/2014    Osteoarthritis 01/28/2014    Fibromyalgia 01/28/2014    PTSD (post-traumatic stress disorder) 01/28/2014    OCD (obsessive compulsive disorder) 01/28/2014    Panic disorder with agoraphobia 01/28/2014    Recurrent major depression ([address] 01/28/2014       Past Medical History  Past Medical History:   Diagnosis Date    Ankle fracture     Aortic stenosis     S/P AVR in 2016    Asthma     Chronic pain     back pain    Diabetes ([address]     Diverticulitis     Fibromyalgia     H/O aortic valve replacement 03/16    21 mm bovine pericardial bioprosthesis and epiaortic scanning      Hypertension     PT denies    Hypothyroidism     Lumbar post-laminectomy syndrome     Menopause     Obesity     Osteoarthritis     Psychotic disorder     Sciatica     Skin cancer 2013    right forearm. Surgical History  Past Surgical History:   Procedure Laterality Date    CHEST SURGERY PROCEDURE UNLISTED  03/2016    Open Heart Surgery    COLONOSCOPY      COLONOSCOPY N/A 9/15/2017    COLONOSCOPY performed by Michaela Ramirez MD at [facility]    HX CATARACT REMOVAL Right 1995    lens  replaced.  HX COLONOSCOPY  09/15/2017    dr. Jelly Jennings  f/u 5 years.  HX HEENT  1990's    sinus surgery     HX HEENT Right 11/02/2017    laser for right eye cataracts.  HX LUMBAR FUSION  2004    L3-L4-L5    HX LUMBAR LAMINECTOMY  2002    HX MOHS PROCEDURES Left 1990    HX ORTHOPAEDIC      Rotator cuff Bilateral    HX SHOULDER REPLACEMENT Right 03/29/2017    reverse.  MD COLSC FLX W/RMVL OF TUMOR POLYP LESION SNARE TQ  07/09/2014 repeat in 3 years    Dr. Edelmira Linton      Aortic valve replacement        Current Medications  Current Outpatient Prescriptions   Medication Sig    levothyroxine (SYNTHROID) 50 mcg tablet TAKE 1 TABLET BY MOUTH EVERY DAY BEFORE BREAKFAST    meclizine (ANTIVERT) 25 mg tablet Take 1 Tab by mouth three (3) times daily as needed for up to 10 days.  metFORMIN (GLUCOPHAGE) 500 mg tablet TAKE ONE TABLET BY MOUTH ONCE DAILY WITH  DINNER.  atorvastatin (LIPITOR) 20 mg tablet TAKE 1 TABLET BY MOUTH ONCE EVERY DAY. GENERIC FOR LIPITOR.  FLUoxetine (PROZAC) 10 mg capsule TAKE 1 CAPSULE BY MOUTH ONCE DAILY. Lamond Lente.  cyanocobalamin (VITAMIN B-12) 500 mcg tablet Take 500 mcg by mouth daily.  fluticasone (FLONASE) 50 mcg/actuation nasal spray One spray each nostril twice a day.     meloxicam (MOBIC) 15 mg tablet TAKE 1 TABLET BY MOUTH DAILY    traMADol (ULTRAM) 50 mg tablet Take 1 Tab by mouth four (4) times daily as needed for Pain. Max Daily Amount: 200 mg. Indications: NEUROPATHIC PAIN, Pain    cetirizine (ZYRTEC) 10 mg tablet TAKE 1 TABLET BY MOUTH EVERY DAY. GENERIC FOR ZYRTEC    DULoxetine (CYMBALTA) 30 mg capsule TAKE 1 CAPSULE BY MOUTH TWICE DAILY    SPIRIVA RESPIMAT 2.5 mcg/actuation inhaler INL 2 PFS PO D    fluticasone (FLONASE) 50 mcg/actuation nasal spray SHAKE LIQUID AND USE 1 SPRAY IN EACH NOSTRIL TWICE DAILY    VENTOLIN HFA 90 mcg/actuation inhaler 2 Puffs by Aerosolization route every four (4) hours as needed.  B.infantis-B.ani-B.long-B.bifi (PROBIOTIC 4X) 10-15 mg TbEC Take  by mouth daily.  ranitidine (ZANTAC) 150 mg tablet 150mg QAM and 75mg QPM    aspirin delayed-release 81 mg tablet Take 81 mg by mouth daily.  Cholecalciferol, Vitamin D3, (VITAMIN D3) 1,000 unit cap Take 1,000 Units by mouth daily.  glucose blood VI test strips (ACCU-CHEK MARCE PLUS TEST STRP) strip Please use one strip to test blood sugars once a day.  Lancets (ACCU-CHEK SOFTCLIX LANCETS) misc Please use one lancet to test blood sugars twice a day.  BLOOD-GLUCOSE METER (ACCU-CHEK MARCE MONITORING) by Does Not Apply route.  triamcinolone acetonide (KENALOG) 0.1 % topical cream Apply  to affected area two (2) times a day. use thin layer    traMADol (ULTRAM) 50 mg tablet Take 1 Tab by mouth four (4) times daily. Max Daily Amount: 200 mg.  traMADol (ULTRAM) 50 mg tablet Take 1 Tab by mouth four (4) times daily. Max Daily Amount: 200 mg. Indications: NEUROPATHIC PAIN, chronic pain    traMADol (ULTRAM) 50 mg tablet Take 1 Tab by mouth every six (6) hours as needed for Pain. Max Daily Amount: 200 mg.  clobetasol (TEMOVATE) 0.05 % external solution APPLY TO SCALP AFTER SHAMPOO DAILY    butalbital-acetaminophen-caffeine (FIORICET, ESGIC) -40 mg per tablet Take 1 Tab by mouth every four (4) hours as needed for Pain. Max Daily Amount: 6 Tabs.      No current facility-administered medications for this visit. Allergies/Drug Reactions  Allergies   Allergen Reactions    Latex, Natural Rubber Hives    Adhesive Rash     Some bandaids    Ciprofloxacin Rash    Codeine Nausea and Vomiting    Demerol [Meperidine] Nausea and Vomiting    Diclofenac Shortness of Breath and Palpitations    Erythromycin Rash    Levaquin [Levofloxacin] Rash    Loratadine Swelling    Morphine Nausea and Vomiting    Penicillin G Hives and Rash     Does fine with Keflex    Sulfa (Sulfonamide Antibiotics) Itching    Vicodin [Hydrocodone-Acetaminophen] Other (comments)     Metallic taste in mouth        Family History  Family History   Problem Relation Age of Onset    Hypertension Father     Heart Disease Father     Alcohol abuse Father         Social History  Social History     Social History    Marital status:      Spouse name: N/A    Number of children: N/A    Years of education: N/A     Occupational History    Not on file.      Social History Main Topics    Smoking status: Former Smoker     Years: 45.00     Types: Cigarettes     Quit date: 7/1/2011    Smokeless tobacco: Never Used    Alcohol use No    Drug use: No    Sexual activity: No     Other Topics Concern    Not on file     Social History Narrative       Review of Systems  Negative except as mentioned in HPI      Physical Exam  Vital signs:   Vitals:    09/25/18 1541   BP: 118/87   Pulse: 91   Resp: 20   Temp: 96 °F (35.6 °C)   TempSrc: Oral   SpO2: 99%   Weight: 222 lb (100.7 kg)   Height: 5' 8\" (1.727 m)     BP sitting 130/65  BP standing 130/80    General: alert, oriented, not in distress  Eyes: clear conjunctivae, anicteric sclerae, full and equal ROMs  Chest/Lungs: clear breath sounds, no wheezing or crackles  Heart: normal rate, regular rhythm, no murmur  Extremities: no focal deformities, no edema  Neuro: AAOx3, CN's grossly intact, no nystagmus  Skin: no visible abnormalities    Laboratory/Tests:    Component      Latest Ref Rng & Units 8/21/2018 8/21/2018 5/8/2018           8:23 AM  8:23 AM  3:33 AM   Glucose      65 - 99 mg/dL      BUN      8 - 27 mg/dL      Creatinine      0.57 - 1.00 mg/dL      GFR est non-AA      >59 mL/min/1.73      GFR est AA      >59 mL/min/1.73      BUN/Creatinine ratio      12 - 28      Sodium      134 - 144 mmol/L      Potassium      3.5 - 5.2 mmol/L      Chloride      96 - 106 mmol/L      CO2      18 - 29 mmol/L      Calcium      8.7 - 10.3 mg/dL      Protein, total      6.0 - 8.5 g/dL      Albumin      3.6 - 4.8 g/dL      GLOBULIN, TOTAL      1.5 - 4.5 g/dL      A-G Ratio      1.2 - 2.2      Bilirubin, total      0.0 - 1.2 mg/dL      Alk. phosphatase      39 - 117 IU/L      AST      0 - 40 IU/L      ALT (SGPT)      0 - 32 IU/L      Hemoglobin A1c, (calculated)      4.8 - 5.6 %   6.5 (H)   Estimated average glucose      mg/dL   140   TSH      0.450 - 4.500 uIU/mL  4.930 (H)    CA PRENATAL SCREENING PROGRAM       Comment       Component      Latest Ref Rng & Units 5/8/2018           3:33 AM   Glucose      65 - 99 mg/dL 81   BUN      8 - 27 mg/dL 14   Creatinine      0.57 - 1.00 mg/dL 0.89   GFR est non-AA      >59 mL/min/1.73 67   GFR est AA      >59 mL/min/1.73 77   BUN/Creatinine ratio      12 - 28 16   Sodium      134 - 144 mmol/L 141   Potassium      3.5 - 5.2 mmol/L 4.7   Chloride      96 - 106 mmol/L 101   CO2      18 - 29 mmol/L 23   Calcium      8.7 - 10.3 mg/dL 10.2   Protein, total      6.0 - 8.5 g/dL 7.5   Albumin      3.6 - 4.8 g/dL 4.4   GLOBULIN, TOTAL      1.5 - 4.5 g/dL 3.1   A-G Ratio      1.2 - 2.2 1.4   Bilirubin, total      0.0 - 1.2 mg/dL 0.4   Alk. phosphatase      39 - 117 IU/L 105   AST      0 - 40 IU/L 38   ALT (SGPT)      0 - 32 IU/L 38 (H)   Hemoglobin A1c, (calculated)      4.8 - 5.6 %    Estimated average glucose      mg/dL    TSH      0.450 - 4.500 uIU/mL    CA PRENATAL SCREENING PROGRAM            Assessment/Plan:        ICD-10-CM ICD-9-CM    1.  Vertigo R42 780.4 meclizine (ANTIVERT) 25 mg tablet   2. Acquired hypothyroidism E03.9 244.9 levothyroxine (SYNTHROID) 50 mcg tablet     Dizziness, likely due to vertigo  - trial of meclizine      Follow-up Disposition:  Return in about 7 weeks (around 11/16/2018), or if symptoms worsen or fail to improve, for follow-up with PCP. I have discussed the diagnosis with the patient and the intended plan as seen in the above orders. The patient has received an after-visit summary and questions were answered concerning future plans. I have discussed medication side effects and warnings with the patient as well. I have reviewed the plan of care with the patient, accepted their input and they are in agreement with the treatment goals.        Alize Ni MD  September 25, 2018

## 2018-09-25 NOTE — MR AVS SNAPSHOT
[facility] 
 
 
 [address] [facility] [address] [facility] [address] [id number] 
[phone] Patient: Aziza Lindsey MRN: [id number] CET:1/85/3649 Visit Information Date & Time Provider Department Dept. Phone Encounter #  
 9/25/2018  3:30 PM Archana Ayala [phone] [id number] Follow-up Instructions Return in about 7 weeks (around 11/16/2018), or if symptoms worsen or fail to improve, for follow-up with PCP. Routing History Follow-up and Disposition History Your Appointments 10/9/2018  3:15 PM  
Follow Up with Karla Byrne MD  
[facility]: 9 months follow up; r/s provider out of office; Rescheduling Appointment From 09/25/2018  
 [facility] [address]  
  
    
 11/15/2018 10:20 AM  
Follow Up with Rodney Livingston MD  
[facility] Appt Note: 3 mo f/u low back [address] [facility] [address]  
[address] [facility] [address] [facility] [address] [place] [address]  
  
    
 11/16/2018  1:00 PM  
Follow Up with [address] [facility] [address] [address] Appt Note: Return in about 3 months (around 11/17/2018). [address] [facility] [address] [facility] [address] 222 [facility]  
  
   
 [address] Upcoming Health Maintenance Date Due Shingrix Vaccine Age 50> (1 of 2) 3/29/2000 Influenza Age 5 to Adult 8/1/2018 FOOT EXAM Q1 8/7/2018 MICROALBUMIN Q1 11/7/2018 LIPID PANEL Q1 11/7/2018 MEDICARE YEARLY EXAM 11/8/2018 HEMOGLOBIN A1C Q6M 11/8/2018 EYE EXAM RETINAL OR DILATED Q1 4/27/2019 BREAST CANCER SCRN MAMMOGRAM 2/20/2020 GLAUCOMA SCREENING Q2Y 4/27/2020 COLONOSCOPY 9/15/2022 DTaP/Tdap/Td series (2 - Td) 12/15/2025 Allergies as of 9/25/2018  Review Complete On: 9/25/2018 By: Lisa Sterling MD  
  
 Severity Noted Reaction Type Reactions Latex, Natural Rubber  07/14/2016    Hives Adhesive  03/18/2016    Rash Some bandaids Ciprofloxacin  01/28/2014    Rash Codeine  01/28/2014    Nausea and Vomiting Demerol [Meperidine]  01/28/2014    Nausea and Vomiting Diclofenac  08/06/2015    Shortness of Breath, Palpitations Erythromycin  01/28/2014    Rash Levaquin [Levofloxacin]  01/28/2014    Rash Loratadine  01/28/2014    Swelling Morphine  01/28/2014    Nausea and Vomiting Penicillin G  01/28/2014    Hives, Rash Does fine with Keflex Sulfa (Sulfonamide Antibiotics)  01/28/2014    Itching Vicodin [Hydrocodone-acetaminophen]  02/12/2015    Other (comments) Metallic taste in mouth Current Immunizations  Reviewed on 11/7/2017 Name Date Influenza High Dose Vaccine PF 11/7/2017, 10/3/2016 Influenza Vaccine 10/10/2014 Influenza Vaccine (Quad) PF 10/28/2015 Pneumococcal Conjugate (PCV-13) 12/7/2015 Pneumococcal Polysaccharide (PPSV-23) 11/7/2017 Tdap 12/15/2015 Zoster Vaccine, Live 6/2/2014 Not reviewed this visit You Were Diagnosed With   
  
 Codes Comments Vertigo    -  Primary ICD-10-CM: C01 ICD-9-CM: 780.4 Acquired hypothyroidism     ICD-10-CM: E03.9 ICD-9-CM: 226. 9 Vitals BP Pulse Temp Resp Height(growth percentile) Weight(growth percentile) 118/87 91 96 °F (35.6 °C) (Oral) 20 5' 8\" (1.727 m) 222 lb (100.7 kg) SpO2 BMI OB Status Smoking Status 99% 33.75 kg/m2 Menopause Former Smoker BMI and BSA Data Body Mass Index Body Surface Area 33.75 kg/m 2 2.2 m 2 Preferred Pharmacy Pharmacy Name Phone [address], [address] [place] [address] [phone] Your Updated Medication List  
  
   
 This list is accurate as of 9/25/18  4:00 PM.  Always use your most recent med list.  
  
  
  
  
 Jie Cavanaugh  
by Does Not Apply route. aspirin delayed-release 81 mg tablet Take 81 mg by mouth daily. atorvastatin 20 mg tablet Commonly known as:  LIPITOR  
TAKE 1 TABLET BY MOUTH ONCE EVERY DAY. GENERIC FOR LIPITOR. butalbital-acetaminophen-caffeine -40 mg per tablet Commonly known as:  Darnella Nim Take 1 Tab by mouth every four (4) hours as needed for Pain. Max Daily Amount: 6 Tabs. cetirizine 10 mg tablet Commonly known as:  ZYRTEC  
TAKE 1 TABLET BY MOUTH EVERY DAY. GENERIC FOR ZYRTEC  
  
 clobetasol 0.05 % external solution Commonly known as:  TEMOVATE  
APPLY TO SCALP AFTER SHAMPOO DAILY DULoxetine 30 mg capsule Commonly known as:  CYMBALTA TAKE 1 CAPSULE BY MOUTH TWICE DAILY FLUoxetine 10 mg capsule Commonly known as:  PROzac TAKE 1 CAPSULE BY MOUTH ONCE DAILY. Stacia Espinoza. * fluticasone 50 mcg/actuation nasal spray Commonly known as:  Deborah Reges SHAKE LIQUID AND USE 1 SPRAY IN EACH NOSTRIL TWICE DAILY * fluticasone 50 mcg/actuation nasal spray Commonly known as:  Deborah Reges One spray each nostril twice a day. glucose blood VI test strips strip Commonly known as:  ACCU-CHEK MARCE PLUS TEST STRP Please use one strip to test blood sugars once a day. Lancets Misc Commonly known as:  ACCU-CHEK SOFTCLIX LANCETS Please use one lancet to test blood sugars twice a day. levothyroxine 50 mcg tablet Commonly known as:  SYNTHROID  
TAKE 1 TABLET BY MOUTH EVERY DAY BEFORE BREAKFAST  
  
 meclizine 25 mg tablet Commonly known as:  ANTIVERT Take 1 Tab by mouth three (3) times daily as needed for up to 10 days. meloxicam 15 mg tablet Commonly known as:  MOBIC  
TAKE 1 TABLET BY MOUTH DAILY  
  
 metFORMIN 500 mg tablet Commonly known as:  GLUCOPHAGE  
 TAKE ONE TABLET BY MOUTH ONCE DAILY WITH  DINNER. PROBIOTIC 4X 10-15 mg Tbec Generic drug:  B.infantis-B.ani-B.long-B.bifi Take  by mouth daily. raNITIdine 150 mg tablet Commonly known as:  ZANTAC  
150mg QAM and 75mg QPM  
  
 SPIRIVA RESPIMAT 2.5 mcg/actuation inhaler Generic drug:  tiotropium bromide INL 2 PFS PO D  
  
 * traMADol 50 mg tablet Commonly known as:  ULTRAM  
Take 1 Tab by mouth four (4) times daily as needed for Pain. Max Daily Amount: 200 mg. Indications: NEUROPATHIC PAIN, Pain * traMADol 50 mg tablet Commonly known as:  ULTRAM  
Take 1 Tab by mouth every six (6) hours as needed for Pain. Max Daily Amount: 200 mg.  
  
 * traMADol 50 mg tablet Commonly known as:  ULTRAM  
Take 1 Tab by mouth four (4) times daily. Max Daily Amount: 200 mg. Indications: NEUROPATHIC PAIN, chronic pain * traMADol 50 mg tablet Commonly known as:  ULTRAM  
Take 1 Tab by mouth four (4) times daily. Max Daily Amount: 200 mg.  
  
 triamcinolone acetonide 0.1 % topical cream  
Commonly known as:  KENALOG Apply  to affected area two (2) times a day. use thin layer VENTOLIN HFA 90 mcg/actuation inhaler Generic drug:  albuterol 2 Puffs by Aerosolization route every four (4) hours as needed. VITAMIN B-12 500 mcg tablet Generic drug:  cyanocobalamin Take 500 mcg by mouth daily. VITAMIN D3 1,000 unit Cap Generic drug:  cholecalciferol Take 1,000 Units by mouth daily. * Notice: This list has 6 medication(s) that are the same as other medications prescribed for you. Read the directions carefully, and ask your doctor or other care provider to review them with you. Prescriptions Sent to Pharmacy Refills  
 levothyroxine (SYNTHROID) 50 mcg tablet 0 Sig: TAKE 1 TABLET BY MOUTH EVERY DAY BEFORE BREAKFAST Class: Normal  
 Pharmacy: [address] [place] [address], [address] [place] [address] Ph #: [phone] meclizine (ANTIVERT) 25 mg tablet 0 Sig: Take 1 Tab by mouth three (3) times daily as needed for up to 10 days. Class: Normal  
 Pharmacy: [facility], [facility] #: [phone] Route: Oral  
  
Follow-up Instructions Return in about 7 weeks (around 11/16/2018), or if symptoms worsen or fail to improve, for follow-up with PCP. Patient Instructions Vertigo: Care Instructions Your Care Instructions Vertigo is the feeling that you or your surroundings are moving when there is no actual movement. It is often described as a feeling of spinning, whirling, falling, or tilting. Vertigo may make you vomit or feel nauseated. You may have trouble standing or walking and may lose your balance. Vertigo is often related to an inner ear problem, but it can have other more serious causes. If vertigo continues, you may need more tests to find its cause. Follow-up care is a key part of your treatment and safety. Be sure to make and go to all appointments, and call your doctor if you are having problems. It's also a good idea to know your test results and keep a list of the medicines you take. How can you care for yourself at home? · Do not lie flat on your back. Prop yourself up slightly. This may reduce the spinning feeling. Keep your eyes open. · Move slowly so that you do not fall. · If your doctor recommends medicine, take it exactly as directed. · Do not drive while you are having vertigo. Certain exercises, called Flower-Daroff exercises, can help decrease vertigo. To do Flower-Daroff exercises: · Sit on the edge of a bed or sofa and quickly lie down on the side that causes the worst vertigo. Lie on your side with your ear down. · Stay in this position for at least 30 seconds or until the vertigo goes away. · Sit up. If this causes vertigo, wait for it to stop. · Repeat the procedure on the other side. · Repeat this 10 times. Do these exercises 2 times a day until the vertigo is gone. When should you call for help? Call 911 anytime you think you may need emergency care. For example, call if: 
  · You passed out (lost consciousness).  
  · You have symptoms of a stroke. These may include: 
¨ Sudden numbness, tingling, weakness, or loss of movement in your face, arm, or leg, especially on only one side of your body. ¨ Sudden vision changes. ¨ Sudden trouble speaking. ¨ Sudden confusion or trouble understanding simple statements. ¨ Sudden problems with walking or balance. ¨ A sudden, severe headache that is different from past headaches.  
 Call your doctor now or seek immediate medical care if: 
  · Vertigo occurs with a fever, a headache, or ringing in your ears.  
  · You have new or increased nausea and vomiting.  
 Watch closely for changes in your health, and be sure to contact your doctor if: 
  · Vertigo gets worse or happens more often.  
  · Vertigo has not gotten better after 2 weeks. Where can you learn more? Go to http://gaby-francisco.info/. Enter O392 in the search box to learn more about \"Vertigo: Care Instructions. \" Current as of: May 12, 2017 Content Version: 11.7 © 6244-2218 [employer]. Care instructions adapted under license by [employer] (which disclaims liability or warranty for this information). If you have questions about a medical condition or this instruction, always ask your healthcare professional. [place] [address] any warranty or liability for your use of this information. Patient Instructions History Introducing [facility]! Dear Jes Mohr: Thank you for requesting a Regalister account. Our records indicate that you already have an active Regalister account. You can access your account anytime at [email or web address] Did you know that you can access your hospital and ER discharge instructions at any time in 3Jam? You can also review all of your test results from your hospital stay or ER visit. Additional Information If you have questions, please visit the Frequently Asked Questions section of the 3Jam website at [email or web address]. [employer]/. Remember, 3Jam is NOT to be used for urgent needs. For medical emergencies, dial 911. Now available from your iPhone and Android! Please provide this summary of care documentation to your next provider. Your primary care clinician is listed as Alison Lepe. If you have any questions after today's visit, please call [phone].

## 2018-10-01 ENCOUNTER — TELEPHONE (OUTPATIENT)
Dept: FAMILY MEDICINE CLINIC | Age: 68
End: 2018-10-01

## 2018-10-01 DIAGNOSIS — M79.7 FIBROMYALGIA: ICD-10-CM

## 2018-10-01 NOTE — TELEPHONE ENCOUNTER
Patient requesting a call back regarding a mediation the rx if recommending she take along with the Mobic to help with stomach bleeding.   Please advise

## 2018-10-03 RX ORDER — DULOXETIN HYDROCHLORIDE 30 MG/1
CAPSULE, DELAYED RELEASE ORAL
Qty: 180 CAP | Refills: 1 | Status: SHIPPED | OUTPATIENT
Start: 2018-10-03 | End: 2019-02-18 | Stop reason: SDUPTHER

## 2018-10-04 ENCOUNTER — TELEPHONE (OUTPATIENT)
Dept: FAMILY MEDICINE CLINIC | Age: 68
End: 2018-10-04

## 2018-10-04 NOTE — TELEPHONE ENCOUNTER
Patient called in stating she is still awaiting a call back from nurse regarding a medication she can take along with Mobic, to help with stomach bleeding

## 2018-10-05 RX ORDER — PHENOL/SODIUM PHENOLATE
20 AEROSOL, SPRAY (ML) MUCOUS MEMBRANE DAILY
COMMUNITY
End: 2018-10-05 | Stop reason: SDUPTHER

## 2018-10-05 NOTE — TELEPHONE ENCOUNTER
Returned patients call: she states she would like to take prilosec for stomach protection from mobic.

## 2018-10-08 RX ORDER — PHENOL/SODIUM PHENOLATE
20 AEROSOL, SPRAY (ML) MUCOUS MEMBRANE DAILY
Qty: 90 TAB | Refills: 1 | Status: SHIPPED | OUTPATIENT
Start: 2018-10-08 | End: 2018-10-09 | Stop reason: ALTCHOICE

## 2018-10-09 ENCOUNTER — OFFICE VISIT (OUTPATIENT)
Dept: CARDIOLOGY CLINIC | Age: 68
End: 2018-10-09

## 2018-10-09 VITALS
BODY MASS INDEX: 33.75 KG/M2 | WEIGHT: 222 LBS | SYSTOLIC BLOOD PRESSURE: 108 MMHG | HEART RATE: 86 BPM | OXYGEN SATURATION: 98 % | DIASTOLIC BLOOD PRESSURE: 70 MMHG

## 2018-10-09 DIAGNOSIS — R06.02 SOB (SHORTNESS OF BREATH): ICD-10-CM

## 2018-10-09 DIAGNOSIS — E66.01 MORBID OBESITY, UNSPECIFIED OBESITY TYPE (HCC): Primary | ICD-10-CM

## 2018-10-09 DIAGNOSIS — I10 ESSENTIAL HYPERTENSION WITH GOAL BLOOD PRESSURE LESS THAN 140/90: ICD-10-CM

## 2018-10-09 DIAGNOSIS — I35.0 NONRHEUMATIC AORTIC VALVE STENOSIS: ICD-10-CM

## 2018-10-09 NOTE — PROGRESS NOTES
[facility]    Ms. Brodie Cogan is a 76 y.o. female with history of Aortic stenosis s/p Aortic valve replacement (03/16), fibromyalgia, diabetes, obesity, hypertension, hypothyroidism and COPD. Mr. Brodie Cogan is here today for follow up appointment. No new complaint since last time  Dyspnea stable or slightly better. Now on spiriva. No Chest pain or tightness  Using 1/2 pillows at night  No presyncope or syncope  Had some vertigo last week and resolved after meclizine. She denies any PND or lower extremity swelling. Past Medical History:   Diagnosis Date    Ankle fracture     Aortic stenosis     S/P AVR in 2016    Asthma     Chronic pain     back pain    Diabetes ([address]     Diverticulitis     Fibromyalgia     H/O aortic valve replacement 03/16    21 mm bovine pericardial bioprosthesis and epiaortic scanning      Hypertension     PT denies    Hypothyroidism     Lumbar post-laminectomy syndrome     Menopause     Obesity     Osteoarthritis     Psychotic disorder ([address]     Sciatica     Skin cancer 2013    right forearm. Past Surgical History:   Procedure Laterality Date    CHEST SURGERY PROCEDURE UNLISTED  03/2016    Open Heart Surgery    COLONOSCOPY      COLONOSCOPY N/A 9/15/2017    COLONOSCOPY performed by Raeann Quevedo [facility] at [facility] ENDOSCOPY    HX CATARACT REMOVAL Right 1995    lens  replaced.  HX COLONOSCOPY  09/15/2017    dr. August Peak  f/u 5 years.  HX HEENT  1990's    sinus surgery     HX HEENT Right 11/02/2017    laser for right eye cataracts.  HX LUMBAR FUSION  2004    L3-L4-L5    HX LUMBAR LAMINECTOMY  2002    HX MOHS PROCEDURES Left 1990    HX ORTHOPAEDIC      Rotator cuff Bilateral    HX SHOULDER REPLACEMENT Right 03/29/2017    reverse.      AL COLSC FLX W/RMVL OF TUMOR POLYP LESION SNARE TQ  07/09/2014 repeat in 3 years    Dr. Tristen Gordon      Aortic valve replacement       Current Outpatient Prescriptions   Medication Sig    DULoxetine (CYMBALTA) 30 mg capsule TAKE ONE CAPSULE BY MOUTH TWICE DAILY    levothyroxine (SYNTHROID) 50 mcg tablet TAKE 1 TABLET BY MOUTH EVERY DAY BEFORE BREAKFAST    metFORMIN (GLUCOPHAGE) 500 mg tablet TAKE ONE TABLET BY MOUTH ONCE DAILY WITH  DINNER.  atorvastatin (LIPITOR) 20 mg tablet TAKE 1 TABLET BY MOUTH ONCE EVERY DAY. GENERIC FOR LIPITOR.  FLUoxetine (PROZAC) 10 mg capsule TAKE 1 CAPSULE BY MOUTH ONCE DAILY. Liya Krueger.  cyanocobalamin (VITAMIN B-12) 500 mcg tablet Take 500 mcg by mouth daily.  fluticasone (FLONASE) 50 mcg/actuation nasal spray One spray each nostril twice a day.  meloxicam (MOBIC) 15 mg tablet TAKE 1 TABLET BY MOUTH DAILY    traMADol (ULTRAM) 50 mg tablet Take 1 Tab by mouth four (4) times daily as needed for Pain. Max Daily Amount: 200 mg. Indications: NEUROPATHIC PAIN, Pain    clobetasol (TEMOVATE) 0.05 % external solution APPLY TO SCALP AFTER SHAMPOO DAILY    cetirizine (ZYRTEC) 10 mg tablet TAKE 1 TABLET BY MOUTH EVERY DAY. GENERIC FOR ZYRTEC    SPIRIVA RESPIMAT 2.5 mcg/actuation inhaler INL 2 PFS PO D    VENTOLIN HFA 90 mcg/actuation inhaler 2 Puffs by Aerosolization route every four (4) hours as needed.  butalbital-acetaminophen-caffeine (FIORICET, ESGIC) -40 mg per tablet Take 1 Tab by mouth every four (4) hours as needed for Pain. Max Daily Amount: 6 Tabs.  B.infantis-B.ani-B.long-B.bifi (PROBIOTIC 4X) 10-15 mg TbEC Take  by mouth daily.  ranitidine (ZANTAC) 150 mg tablet 150mg QAM and 75mg QPM    aspirin delayed-release 81 mg tablet Take 81 mg by mouth daily.  Cholecalciferol, Vitamin D3, (VITAMIN D3) 1,000 unit cap Take 1,000 Units by mouth daily.  glucose blood VI test strips (ACCU-CHEK MARCE PLUS TEST STRP) strip Please use one strip to test blood sugars once a day.     Lancets (ACCU-CHEK SOFTCLIX LANCETS) misc Please use one lancet to test blood sugars twice a day.  BLOOD-GLUCOSE METER (ACCU-CHEK MARCE MONITORING) by Does Not Apply route.  triamcinolone acetonide (KENALOG) 0.1 % topical cream Apply  to affected area two (2) times a day. use thin layer     No current facility-administered medications for this visit. Allergies and Sensitivities:  Allergies   Allergen Reactions    Latex, Natural Rubber Hives    Adhesive Rash     Some bandaids    Ciprofloxacin Rash    Codeine Nausea and Vomiting    Demerol [Meperidine] Nausea and Vomiting    Diclofenac Shortness of Breath and Palpitations    Erythromycin Rash    Levaquin [Levofloxacin] Rash    Loratadine Swelling    Morphine Nausea and Vomiting    Penicillin G Hives and Rash     Does fine with Keflex    Sulfa (Sulfonamide Antibiotics) Itching    Vicodin [Hydrocodone-Acetaminophen] Other (comments)     Metallic taste in mouth       Family History:  Family History   Problem Relation Age of Onset    Hypertension Father     Heart Disease Father     Alcohol abuse Father        Social History:  Social History   Substance Use Topics    Smoking status: Former Smoker     Years: 45.00     Types: Cigarettes     Quit date: 7/1/2011    Smokeless tobacco: Never Used    Alcohol use No     She  reports that she quit smoking about 7 years ago. Her smoking use included Cigarettes. She quit after 45.00 years of use. She has never used smokeless tobacco.  She  reports that she does not drink alcohol. Review of Systems:  Cardiac symptoms as noted above in HPI. All others negative. Denies skin rash,  photophobia, neck pain, hemoptysis, chronic cough, nausea, vomiting, hematuria, burning micturition, BRBPR, chronic headaches.     Physical Exam:  BP Readings from Last 3 Encounters:   10/09/18 108/70   09/25/18 118/87   08/23/18 109/71         Pulse Readings from Last 3 Encounters:   10/09/18 86   09/25/18 91   08/23/18 96          Wt Readings from Last 3 Encounters:   10/09/18 222 lb (100.7 kg)   09/25/18 222 lb (100.7 kg)   08/23/18 220 lb (99.8 kg)       Constitutional: Oriented to person, place, and time. HENT: Head: Normocephalic and atraumatic. Neck: No JVD present. Cardiovascular: Regular rhythm. No gallop or rubs appreciated. No significant murmur  Lung: Breath sounds normal. No respiratory distress. No ronchi or rales appreciated  Abdominal: No tenderness. No rebound and no guarding. Musculoskeletal: No LE swelling    Review of Data  LABS:   Lab Results   Component Value Date/Time    Sodium 141 05/08/2018 03:33 AM    Potassium 4.7 05/08/2018 03:33 AM    Chloride 101 05/08/2018 03:33 AM    CO2 23 05/08/2018 03:33 AM    Glucose 81 05/08/2018 03:33 AM    BUN 14 05/08/2018 03:33 AM    Creatinine 0.89 05/08/2018 03:33 AM     Lipids Latest Ref Rng & Units 11/7/2017 9/2/2016   Chol, Total 100 - 199 mg/dL 119 141   HDL >39 mg/dL 56 77   LDL 0 - 99 mg/dL 45 49   Trig 0 - 149 mg/dL 88 77   Some recent data might be hidden     Lab Results   Component Value Date/Time    ALT (SGPT) 38 (H) 05/08/2018 03:33 AM     Lab Results   Component Value Date/Time    Hemoglobin A1c 6.5 (H) 05/08/2018 03:33 AM     EKG  (04/16) Sinus rhythm at 96 bpm    STRESS TEST (2012)   No EKG or scintigraphic evidence of ischemia or infarction. Normal LVEF    ECHO (01/16)  SUMMARY:  Left ventricle: Systolic function was normal. Ejection fraction was estimated in the range of 55 % to 60 %. There were no regional wall motion  abnormalities. Doppler parameters were consistent with abnormal left ventricular relaxation (grade 1 diastolic dysfunction). Right ventricle: Systolic function was normal.  Aortic valve: The valve was trileaflet. Leaflets exhibited calcification and moderate- severely reduced cuspal separation. There was severe aortic  stenosis. There was no significant regurgitation. Valve peak gradient was 85 mmHg. Valve mean gradient was 50 mmHg.     ELIS (02/16)  Left ventricle: Size was normal. Systolic function was normal.  Right ventricle: The size was normal.  Left atrium: Size was normal. No thrombus was identified. There was no  spontaneous echo contrast (\"smoke\"). Left atrial appendage: No thrombus was identified. There was no  spontaneous echo contrast (\"smoke\") in the appendage. Right atrium: Size was normal.  Mitral valve: There was mild to moderate multi jet regurgitation. No obvious mass, vegetation or thrombus noted. Aortic valve: Aortic valve assessment was performed using 2D  transesophageal as well as transthoracic doppler data. The valve was  trileaflet. Leaflets were thickened, calcific and stenotic. A mean  gradient across the valve was measured at 49mmHg with a calculated valve  area of 0.70 - 0.75 cm based on an LVOT diameter of 2.1 cm, an LVOT VTI of  21 cm, and an AV VTI of 101 cm. There was no regurgitation. Tricuspid valve: There was no regurgitation. No obvious mass, vegetation or thrombus noted. HOLTER (05/16)  Interpretation:  1. Rhythm is sinus. 2. PACs (1.3% burden). 3. Rare PVCs. ECHO (06/17)  SUMMARY:  Left ventricle: Systolic function was mildly reduced. Ejection fraction has estimated in the range of 45 % to 50 %. There was mild diffuse  hypokinesis. Wall thickness was mildly increased. There was mild oncentric hypertrophy. Left ventricular diastolic function parameters  were normal for the patient's age. Right ventricle: Systolic function was normal.  Mitral valve: There was mild regurgitation. Aortic valve: A bioprosthesis was present. Not well visualized. Peak velocity 2.2 m/second  Peak gradient ~20 mm hg Mean gradient ~ 10 mm hg. There was no significant regurgitation. Tricuspid valve: There was mild regurgitation. CARDIAC CATH: (01/16)  PRESSURE HEMODYNAMICS: Systemic aortic pressure was 131/68 mmHg. Left ventricular pressure was 160/2/12 mmHg. Based on the data, peak-to-peak gradient between LV and aortic was approximately 30 mmHg.     Mean RA  7 mmHg,   RV  32/1/8 mmHg,  PA  27/9/16 mmHg,   PCWP 10 mm hg  Cardiac output by Ellis calculation was 4.3 L/min and cardiac index was 2.02 liters L/min/meter squared. Mixed venous saturation was 65%, PA saturation was 65%, and the radial artery saturation was 90%. No evidence of intracardiac shunting. CORONARY ANGIOGRAM  1. LM: No significant obstructive disease. 2. LAD: 10-20% luminal irregularities, otherwise normal. Three diagonal branches angiographically normal.  3. LCX: 20-30% luminal irregularities, otherwise no obstructive disease. 4. OM1: Large bifurcating vessel without any obstructive disease. 5. RCA: Proximal 20%, mid discrete 40%, otherwise angiographically normal. Dominant vessel. IMPRESSION & PLAN:  Ms. Gagan Johnson is a 76 y.o. female with Aortic stenosis, COPD, obesity, diabetes, fibromyalgia. Aortic stenosis:    Ms. Gagan Johnson [facility]. Aspirin will be continued lifelong. ECHO in 06/17 with mean gradient across valve ~ 10 mm Hg. Physiologic for prosthetic valve. Continue same and observation  Continue ASA    Diabetes:  Goal hemoglobin A1c less than 7 is recommended from a cardiovascular standpoint. Last hemoglobin A1c was 6.5. Continue same management per Dr. Amarjit Turner. Hypertension:  BP today 108/70 mm Hg. Not on any meds. Continue same. Off BB because of low BP in past.     Fibromyalgia and COPD:    She has chronic pain syndrome from her fibromyalgia. HLD: last LDL 45. On atorvastatin. Continue same. Dyspnea:   Stable  No obvious fluid overload on exam. NO LE edema. ECHO 06/17 with EF ~ 50%  Mean gradient across valve 10 mm Hg, physiologic for prosthetic valve in 07/17   CT without any PE or any pulmonary process. Continue with follow up with Carol Saeed. Now on spiriva and its slightly better. This plan was discussed with patient who is in agreement. Thank you for allowing me to participate in patient care.  Please feel free to call me if you have any question or concern.

## 2018-10-09 NOTE — PROGRESS NOTES
1. Have you been to the ER, urgent care clinic since your last visit? Hospitalized since your last visit? No     2. Have you seen or consulted any other health care providers outside of the [facility] since your last visit? Include any pap smears or colon screening.   No

## 2018-10-18 ENCOUNTER — CLINICAL SUPPORT (OUTPATIENT)
Dept: FAMILY MEDICINE CLINIC | Age: 68
End: 2018-10-18

## 2018-10-18 DIAGNOSIS — Z23 ENCOUNTER FOR IMMUNIZATION: ICD-10-CM

## 2018-10-18 NOTE — PROGRESS NOTES
Elizabeth Patricia is a 76 y.o. female  Chief Complaint   Patient presents with    Immunization/Injection     . Patient here today for high dose flu administered in left deltoid only

## 2018-11-15 ENCOUNTER — OFFICE VISIT (OUTPATIENT)
Dept: ORTHOPEDIC SURGERY | Age: 68
End: 2018-11-15

## 2018-11-15 VITALS
WEIGHT: 218.6 LBS | RESPIRATION RATE: 18 BRPM | BODY MASS INDEX: 34.31 KG/M2 | HEART RATE: 93 BPM | TEMPERATURE: 96.1 F | DIASTOLIC BLOOD PRESSURE: 79 MMHG | OXYGEN SATURATION: 99 % | HEIGHT: 67 IN | SYSTOLIC BLOOD PRESSURE: 119 MMHG

## 2018-11-15 DIAGNOSIS — M48.062 SPINAL STENOSIS OF LUMBAR REGION WITH NEUROGENIC CLAUDICATION: ICD-10-CM

## 2018-11-15 DIAGNOSIS — Z51.81 THERAPEUTIC DRUG MONITORING: ICD-10-CM

## 2018-11-15 DIAGNOSIS — M79.7 FIBROMYALGIA: ICD-10-CM

## 2018-11-15 DIAGNOSIS — M48.062 SPINAL STENOSIS OF LUMBAR REGION WITH NEUROGENIC CLAUDICATION: Primary | ICD-10-CM

## 2018-11-15 DIAGNOSIS — Z79.891 LONG TERM PRESCRIPTION OPIATE USE: ICD-10-CM

## 2018-11-15 DIAGNOSIS — M96.1 POSTLAMINECTOMY SYNDROME: ICD-10-CM

## 2018-11-15 DIAGNOSIS — M54.16 RADICULOPATHY, LUMBAR REGION: ICD-10-CM

## 2018-11-15 RX ORDER — TRAMADOL HYDROCHLORIDE 50 MG/1
50 TABLET ORAL 4 TIMES DAILY
Qty: 120 TAB | Refills: 0 | Status: SHIPPED | OUTPATIENT
Start: 2018-11-15 | End: 2018-11-16 | Stop reason: SDUPTHER

## 2018-11-15 NOTE — PROGRESS NOTES
[facility]  [address]  [address], [address]   Phone: [phone]  Fax: [phone]        PROGRESS NOTE      HISTORY OF PRESENT ILLNESS:  The patient is a 76 y.o. female and was seen today for follow up of low back and left hip pain extending into the LLE in a S1 distribution to an area below the knee. Pt was initially seen for low back pain into the BLE extending in roughly an L4 distribution in the LLE and in an L5 distribution to the foot in the RLE. Pt also endorses numbness in the LLE. She had a diagnosis of lumbar postlaminectomy syndrome with a previous history of L3 through S1 fusion in 2002 and 2004 by a physician in [place]. The patient had an additional diagnosis of fibromyalgia. Note dated 6/29/16 indicating patient was seen and underwent porcine aortic valve replacement by Dr. Bhavani Mike on 3/18/16. Pt began cardiac rehab in 1/2017. Pt underwent right shoulder surgery on 3/29/17 by Dr. Corrinne Beat. Note from Dr. Sean Nunez, cardiologist dated 5/18/17 indicating patient was seen with h/o aortic stenosis, s/p aortic valve replacement 3/16 and was seen with moderate dysphemia with minimal exertion. CT revealed no evidence of pulmonary embolism, no obvious fluid overload. They were ordering further workup to look for functioning bioprosthetic aortic valve. Per patient, a cause for her SOB has still not been found. She has been scheduled to see a pulmonologist. She states she has been restricted to take her Meloxicam since her right shoulder surgery, which has caused her increase in low back pain. Upon review of our records, it was noted she previously failed TOPAMAX, NEURONTIN, and LYRICA. Pt was switched from Ultram ER back to Ultram immediate release. Pt underwent left SI joint injection on 3/15/18 with good relief. Lumbar spine CT w/o contrast dated 7/20/14  per report revealed L3 through L5 laminectomies with L3 through S1 dorsal hardware fusion.  There was no evidence for fractures. There was advanced degenerative disc disease at L2-L3 at the junctional level with marked central canal stenosis. At her last clinical appointment, patient wished to continue her current treatment. I provided her with refills of her Ultram 1 tab TID-QID. A UDS obtained today was consistent. The patient returns today with low back pain and paraesthesias in the LLE to the knee in an L4 distribution and the RLE to the foot. She rates her pain 4-5/10, consistent with her last visit (4/10). She has bilateral knee pain and is followed by her PCP for this. Pt continues with Ultram 1 tab TID-QID. She admits completing her HEP 3x/week. Pt denies change in bowel or bladder habits.  reviewed. Body mass index is 34.24 kg/m². PCP: Gilbert Augustin DO      Past Medical History:   Diagnosis Date    Ankle fracture     Aortic stenosis     S/P AVR in 2016    Asthma     Chronic pain     back pain    Diabetes ([address]     Diverticulitis     Fibromyalgia     H/O aortic valve replacement 03/16    21 mm bovine pericardial bioprosthesis and epiaortic scanning      Hypertension     PT denies    Hypothyroidism     Lumbar post-laminectomy syndrome     Menopause     Obesity     Osteoarthritis     Psychotic disorder ([address]     Sciatica     Skin cancer 2013    right forearm.          Social History     Socioeconomic History    Marital status:      Spouse name: Not on file    Number of children: Not on file    Years of education: Not on file    Highest education level: Not on file   Social Needs    Financial resource strain: Not on file    Food insecurity - worry: Not on file    Food insecurity - inability: Not on file    Transportation needs - medical: Not on file   Pelotonics needs - non-medical: Not on file   Occupational History    Not on file   Tobacco Use    Smoking status: Former Smoker     Years: 45.00     Types: Cigarettes     Last attempt to quit: 2011     Years since quittin.3    Smokeless tobacco: Never Used   Substance and Sexual Activity    Alcohol use: No    Drug use: No    Sexual activity: No   Other Topics Concern    Not on file   Social History Narrative    Not on file       Current Outpatient Medications   Medication Sig Dispense Refill    DULoxetine (CYMBALTA) 30 mg capsule TAKE ONE CAPSULE BY MOUTH TWICE DAILY 180 Cap 1    levothyroxine (SYNTHROID) 50 mcg tablet TAKE 1 TABLET BY MOUTH EVERY DAY BEFORE BREAKFAST 90 Tab 0    metFORMIN (GLUCOPHAGE) 500 mg tablet TAKE ONE TABLET BY MOUTH ONCE DAILY WITH  DINNER. 90 Tab 1    atorvastatin (LIPITOR) 20 mg tablet TAKE 1 TABLET BY MOUTH ONCE EVERY DAY. GENERIC FOR LIPITOR. 90 Tab 1    FLUoxetine (PROZAC) 10 mg capsule TAKE 1 CAPSULE BY MOUTH ONCE DAILY. GENERICFOR PROZAC. 90 Cap 3    cyanocobalamin (VITAMIN B-12) 500 mcg tablet Take 500 mcg by mouth daily.  fluticasone (FLONASE) 50 mcg/actuation nasal spray One spray each nostril twice a day. 1 Bottle 5    meloxicam (MOBIC) 15 mg tablet TAKE 1 TABLET BY MOUTH DAILY 90 Tab 3    traMADol (ULTRAM) 50 mg tablet Take 1 Tab by mouth four (4) times daily as needed for Pain. Max Daily Amount: 200 mg. Indications: NEUROPATHIC PAIN, Pain 120 Tab 1    clobetasol (TEMOVATE) 0.05 % external solution APPLY TO SCALP AFTER SHAMPOO DAILY 50 mL 3    cetirizine (ZYRTEC) 10 mg tablet TAKE 1 TABLET BY MOUTH EVERY DAY. GENERIC FOR ZYRTEC 90 Tab 2    SPIRIVA RESPIMAT 2.5 mcg/actuation inhaler INL 2 PFS PO D  3    VENTOLIN HFA 90 mcg/actuation inhaler 2 Puffs by Aerosolization route every four (4) hours as needed. 0    butalbital-acetaminophen-caffeine (FIORICET, ESGIC) -40 mg per tablet Take 1 Tab by mouth every four (4) hours as needed for Pain. Max Daily Amount: 6 Tabs. 30 Tab 0    B.infantis-B.ani-B.long-B.bifi (PROBIOTIC 4X) 10-15 mg TbEC Take  by mouth daily.       ranitidine (ZANTAC) 150 mg tablet 150mg QAM and 75mg QPM      aspirin delayed-release 81 mg tablet Take 81 mg by mouth daily.  Cholecalciferol, Vitamin D3, (VITAMIN D3) 1,000 unit cap Take 1,000 Units by mouth daily.  glucose blood VI test strips (ACCU-CHEK MARCE PLUS TEST STRP) strip Please use one strip to test blood sugars once a day. 100 strip 20    Lancets (ACCU-CHEK SOFTCLIX LANCETS) misc Please use one lancet to test blood sugars twice a day. 1 Package 20    BLOOD-GLUCOSE METER (ACCU-CHEK MARCE MONITORING) by Does Not Apply route.  triamcinolone acetonide (KENALOG) 0.1 % topical cream Apply  to affected area two (2) times a day. use thin layer         Allergies   Allergen Reactions    Latex, Natural Rubber Hives    Adhesive Rash     Some bandaids    Ciprofloxacin Rash    Codeine Nausea and Vomiting    Demerol [Meperidine] Nausea and Vomiting    Diclofenac Shortness of Breath and Palpitations    Erythromycin Rash    Levaquin [Levofloxacin] Rash    Loratadine Swelling    Morphine Nausea and Vomiting    Penicillin G Hives and Rash     Does fine with Keflex    Sulfa (Sulfonamide Antibiotics) Itching    Vicodin [Hydrocodone-Acetaminophen] Other (comments)     Metallic taste in mouth        Ambulates with a single point cane. PHYSICAL EXAMINATION    Visit Vitals  /79   Pulse 93   Temp 96.1 °F (35.6 °C) (Oral)   Resp 18   Ht 5' 7\" (1.702 m)   Wt 218 lb 9.6 oz (99.2 kg)   SpO2 99%   BMI 34.24 kg/m²       CONSTITUTIONAL: NAD, A&O x 3  SENSATION: Decreased sensation to light touch L4/5 in the LLE. Sensation to light touch otherwise intact. RANGE OF MOTION: The patient has full passive range of motion in all four extremities. MOTOR:  Straight Leg Raise: Negative, bilateral               Hip Flex Knee Ext Knee Flex Ankle DF GTE Ankle PF Tone   Right +4/5 +4/5 +4/5 +4/5 +4/5 +4/5 +4/5   Left +4/5 +4/5 +4/5 +4/5 +4/5 +4/5 +4/5       ASSESSMENT   Diagnoses and all orders for this visit:    1.  Spinal stenosis of lumbar region with neurogenic claudication    2. Radiculopathy, lumbar region    3. Postlaminectomy syndrome    4. Fibromyalgia          IMPRESSION AND PLAN:  Patient wished to continue her current treatment. I provided her with refills of her Ultram 1 tab TID-QID. Patient is neurologically intact. I recommend she increase the frequency of her HEP to daily. Patient is not interested in surgical intervention or lumbar blocks at this time. I will see the patient back in 3 month's time or earlier if needed. Written by Missy Morse, as dictated by Zulma Hill MD  I examined the patient, reviewed and agree with the note.

## 2018-11-16 ENCOUNTER — OFFICE VISIT (OUTPATIENT)
Dept: FAMILY MEDICINE CLINIC | Age: 68
End: 2018-11-16

## 2018-11-16 VITALS
WEIGHT: 224 LBS | HEART RATE: 94 BPM | OXYGEN SATURATION: 97 % | SYSTOLIC BLOOD PRESSURE: 117 MMHG | DIASTOLIC BLOOD PRESSURE: 82 MMHG | TEMPERATURE: 98 F | RESPIRATION RATE: 16 BRPM | BODY MASS INDEX: 35.16 KG/M2 | HEIGHT: 67 IN

## 2018-11-16 DIAGNOSIS — E11.69 CONTROLLED TYPE 2 DIABETES MELLITUS WITH OTHER SPECIFIED COMPLICATION, WITHOUT LONG-TERM CURRENT USE OF INSULIN (HCC): ICD-10-CM

## 2018-11-16 DIAGNOSIS — Z23 NEED FOR SHINGLES VACCINE: ICD-10-CM

## 2018-11-16 DIAGNOSIS — Z00.00 MEDICARE ANNUAL WELLNESS VISIT, SUBSEQUENT: Primary | ICD-10-CM

## 2018-11-16 DIAGNOSIS — R42 VERTIGO: ICD-10-CM

## 2018-11-16 RX ORDER — MECLIZINE HYDROCHLORIDE 25 MG/1
25 TABLET ORAL
Qty: 60 TAB | Refills: 1 | Status: SHIPPED | OUTPATIENT
Start: 2018-11-16 | End: 2018-11-26

## 2018-11-16 NOTE — PROGRESS NOTES
1. Have you been to the ER, urgent care clinic since your last visit? Hospitalized since your last visit? No    2. Have you seen or consulted any other health care providers outside of the [address] [place] [address] since your last visit? Include any pap smears or colon screening. No     Patient presents in office today for routine care.   Patient concerns: med refill for vertigo

## 2018-11-16 NOTE — PROGRESS NOTES
Subjective:     HPI:  Daya Hines is a 76 y.o. female who presents to the office today for routine care. Vertigo: Pt reports she experiences vertigo at times if she moves too fast or the wrong way. She states that she took Meclizine with relief from her symptoms. She would like to refill her prescription to take with her on her trip to visit her children for thanksgiving. Hypertension  The patient presents for follow up of hypertension. Pt's BP is 117/82 in the office today. Cardiovascular ROS: taking medications as instructed, no medication side effects noted, no TIA's, no chest pain on exertion, no dyspnea on exertion, no swelling of ankles. Diabetes Mellitus:  female has diabetes mellitus, and  diabetes and hypertension. Diabetic ROS - medication compliance: compliant all of the time, home glucose monitoring: is performed regularly, Entire glucose log was reviewed with patient at this visit. Pt's bs are consistently under 130, further diabetic ROS: no polyuria or polydipsia, no chest pain, dyspnea or TIA's, no numbness, tingling or pain in extremities. Lab review: labs are reviewed, up to date and normal, orders written for new lab studies as appropriate; see orders.      Lab Results   Component Value Date/Time    Hemoglobin A1c 6.5 (H) 05/08/2018 03:33 AM     Lab Results   Component Value Date/Time    Cholesterol, total 119 11/07/2017 03:22 AM    HDL Cholesterol 56 11/07/2017 03:22 AM    LDL, calculated 45 11/07/2017 03:22 AM    VLDL, calculated 18 11/07/2017 03:22 AM    Triglyceride 88 11/07/2017 03:22 AM    CHOL/HDL Ratio 2.0 02/13/2015 08:47 AM     Lab Results   Component Value Date/Time    Sodium 141 05/08/2018 03:33 AM    Potassium 4.7 05/08/2018 03:33 AM    Chloride 101 05/08/2018 03:33 AM    CO2 23 05/08/2018 03:33 AM    Anion gap 8 09/13/2017 02:11 PM    Glucose 81 05/08/2018 03:33 AM    BUN 14 05/08/2018 03:33 AM    Creatinine 0.89 05/08/2018 03:33 AM    BUN/Creatinine ratio 16 05/08/2018 03:33 AM    GFR est AA 77 05/08/2018 03:33 AM    GFR est non-AA 67 05/08/2018 03:33 AM    Calcium 10.2 05/08/2018 03:33 AM    Bilirubin, total 0.4 05/08/2018 03:33 AM    AST (SGOT) 38 05/08/2018 03:33 AM    Alk. phosphatase 105 05/08/2018 03:33 AM    Protein, total 7.5 05/08/2018 03:33 AM    Albumin 4.4 05/08/2018 03:33 AM    Globulin 3.8 04/27/2017 03:20 PM    A-G Ratio 1.4 05/08/2018 03:33 AM    ALT (SGPT) 38 (H) 05/08/2018 03:33 AM     Current Outpatient Medications   Medication Sig Dispense Refill    meclizine (ANTIVERT) 25 mg tablet Take 1 Tab by mouth three (3) times daily as needed for up to 10 days. 60 Tab 1    varicella-zoster recombinant, PF, (SHINGRIX, PF,) 50 mcg/0.5 mL susr injection 0.5 mL by IntraMUSCular route once for 1 dose. 0.5 mL 0    DULoxetine (CYMBALTA) 30 mg capsule TAKE ONE CAPSULE BY MOUTH TWICE DAILY 180 Cap 1    levothyroxine (SYNTHROID) 50 mcg tablet TAKE 1 TABLET BY MOUTH EVERY DAY BEFORE BREAKFAST 90 Tab 0    metFORMIN (GLUCOPHAGE) 500 mg tablet TAKE ONE TABLET BY MOUTH ONCE DAILY WITH  DINNER. 90 Tab 1    atorvastatin (LIPITOR) 20 mg tablet TAKE 1 TABLET BY MOUTH ONCE EVERY DAY. GENERIC FOR LIPITOR. 90 Tab 1    FLUoxetine (PROZAC) 10 mg capsule TAKE 1 CAPSULE BY MOUTH ONCE DAILY. GENERICFOR PROZAC. 90 Cap 3    cyanocobalamin (VITAMIN B-12) 500 mcg tablet Take 500 mcg by mouth daily.  fluticasone (FLONASE) 50 mcg/actuation nasal spray One spray each nostril twice a day. 1 Bottle 5    meloxicam (MOBIC) 15 mg tablet TAKE 1 TABLET BY MOUTH DAILY 90 Tab 3    traMADol (ULTRAM) 50 mg tablet Take 1 Tab by mouth four (4) times daily as needed for Pain. Max Daily Amount: 200 mg. Indications: NEUROPATHIC PAIN, Pain 120 Tab 1    clobetasol (TEMOVATE) 0.05 % external solution APPLY TO SCALP AFTER SHAMPOO DAILY 50 mL 3    cetirizine (ZYRTEC) 10 mg tablet TAKE 1 TABLET BY MOUTH EVERY DAY.  GENERIC FOR ZYRTEC 90 Tab 2    SPIRIVA RESPIMAT 2.5 mcg/actuation inhaler INL 2 PFS PO D  3    VENTOLIN HFA 90 mcg/actuation inhaler 2 Puffs by Aerosolization route every four (4) hours as needed. 0    butalbital-acetaminophen-caffeine (FIORICET, ESGIC) -40 mg per tablet Take 1 Tab by mouth every four (4) hours as needed for Pain. Max Daily Amount: 6 Tabs. 30 Tab 0    B.infantis-B.ani-B.long-B.bifi (PROBIOTIC 4X) 10-15 mg TbEC Take  by mouth daily.  ranitidine (ZANTAC) 150 mg tablet 150mg QAM and 75mg QPM      aspirin delayed-release 81 mg tablet Take 81 mg by mouth daily.  Cholecalciferol, Vitamin D3, (VITAMIN D3) 1,000 unit cap Take 1,000 Units by mouth daily.  glucose blood VI test strips (ACCU-CHEK MARCE PLUS TEST STRP) strip Please use one strip to test blood sugars once a day. 100 strip 20    Lancets (ACCU-CHEK SOFTCLIX LANCETS) misc Please use one lancet to test blood sugars twice a day. 1 Package 20    BLOOD-GLUCOSE METER (ACCU-CHEK MARCE MONITORING) by Does Not Apply route.  triamcinolone acetonide (KENALOG) 0.1 % topical cream Apply  to affected area two (2) times a day.  use thin layer          Allergies   Allergen Reactions    Latex, Natural Rubber Hives    Adhesive Rash     Some bandaids    Ciprofloxacin Rash    Codeine Nausea and Vomiting    Demerol [Meperidine] Nausea and Vomiting    Diclofenac Shortness of Breath and Palpitations    Erythromycin Rash    Levaquin [Levofloxacin] Rash    Loratadine Swelling    Morphine Nausea and Vomiting    Penicillin G Hives and Rash     Does fine with Keflex    Sulfa (Sulfonamide Antibiotics) Itching    Vicodin [Hydrocodone-Acetaminophen] Other (comments)     Metallic taste in mouth       Past Medical History:   Diagnosis Date    Ankle fracture     Aortic stenosis     S/P AVR in 2016    Asthma     Chronic pain     back pain    Diabetes (HCC)     Diverticulitis     Fibromyalgia     H/O aortic valve replacement 03/16    21 mm bovine pericardial bioprosthesis and epiaortic scanning      Hypertension PT denies    Hypothyroidism     Lumbar post-laminectomy syndrome     Menopause     Obesity     Osteoarthritis     Psychotic disorder ([address]     Sciatica     Skin cancer     right forearm. Past Surgical History:   Procedure Laterality Date    CARDIAC SURG PROCEDURE UNLIST  2016    aortic valve replacement.  CHEST SURGERY PROCEDURE UNLISTED  2016    Open Heart Surgery    COLONOSCOPY      HX CATARACT REMOVAL Right     lens  replaced.  HX CATARACT REMOVAL Left 2018    HX COLONOSCOPY  09/15/2017    dr. Saravanan Aguirre  f/u 5 years.  HX HEENT  's    sinus surgery     HX HEENT Right 2017    laser for right eye cataracts.  HX LUMBAR FUSION      L3-L4-L5    HX LUMBAR LAMINECTOMY      HX MOHS PROCEDURES Left     HX ORTHOPAEDIC      Rotator cuff Bilateral    HX SHOULDER REPLACEMENT Right 2017    reverse.  [place] COLSC FLX W/RMVL OF TUMOR POLYP LESION SNARE TQ  2014 repeat in 3 years    Dr. Allie Hobbs      Aortic valve replacement       Family History   Problem Relation Age of Onset    Hypertension Father     Heart Disease Father     Alcohol abuse Father        Social History     Socioeconomic History    Marital status:      Spouse name: Not on file    Number of children: Not on file    Years of education: Not on file    Highest education level: Not on file   Social Needs    Financial resource strain: Not on file    Food insecurity - worry: Not on file    Food insecurity - inability: Not on file   [employer] needs - medical: Not on file   [employer] needs - non-medical: Not on file   Occupational History    Not on file   Tobacco Use    Smoking status: Former Smoker     Years: 45.00     Types: Cigarettes     Last attempt to quit: 2011     Years since quittin.3    Smokeless tobacco: Never Used   Substance and Sexual Activity    Alcohol use: No    Drug use:  No  Sexual activity: No   Other Topics Concern    Not on file   Social History Narrative    Not on file       REVIEW OF SYSTEM:  Review of Systems   Constitutional: Negative for chills and fever. Eyes: Negative for blurred vision. Respiratory: Negative for shortness of breath. Cardiovascular: Negative for chest pain, palpitations and leg swelling. Gastrointestinal: Negative for constipation, diarrhea, nausea and vomiting. Musculoskeletal: Negative for joint pain. Neurological: Positive for dizziness. Negative for headaches. Objective:     Visit Vitals  /82 (BP 1 Location: Right arm, BP Patient Position: Sitting)   Pulse 94   Temp 98 °F (36.7 °C) (Oral)   Resp 16   Ht 5' 7\" (1.702 m)   Wt 224 lb (101.6 kg)   SpO2 97%   BMI 35.08 kg/m²       PHYSICAL EXAM:  Physical Exam   Constitutional: She is oriented to person, place, and time and well-developed, well-nourished, and in no distress. HENT:   Right Ear: Tympanic membrane, external ear and ear canal normal.   Left Ear: Tympanic membrane, external ear and ear canal normal.   Nose: Nose normal.   Mouth/Throat: Oropharynx is clear and moist.   Eyes: Pupils are equal, round, and reactive to light. Neck: Normal range of motion. Neck supple. No thyromegaly present. Cardiovascular: Normal rate, regular rhythm, normal heart sounds and intact distal pulses. No murmur heard. Pulmonary/Chest: Effort normal and breath sounds normal. She has no wheezes. Musculoskeletal:   Exam: feet: warm, good capillary refill, nail exam normal nails without lesions, no trophic changes or ulcerative lesions, normal DP and PT pulses and normal monofilament exam     Neurological: She is alert and oriented to person, place, and time. GCS score is 15. Skin: Skin is warm and dry. Vitals reviewed. Assessment/Plan:       ICD-10-CM ICD-9-CM    1. Medicare annual wellness visit, subsequent Z00.00 V70.0    2.  Controlled type 2 diabetes mellitus with other specified complication, without long-term current use of insulin (HCC) E11.69 250.80 LIPID PANEL      METABOLIC PANEL, COMPREHENSIVE      HEMOGLOBIN A1C WITH EAG      MICROALBUMIN, UR, RAND W/ MICROALB/CREAT RATIO       DIABETES FOOT EXAM   3. Vertigo R42 780.4 meclizine (ANTIVERT) 25 mg tablet   4. Need for shingles vaccine Z23 V04.89 varicella-zoster recombinant, PF, (SHINGRIX, PF,) 50 mcg/0.5 mL susr injection     Patient given opportunity to ask questions. Questions answered. Patient understands plan of care. Follow-up Disposition:  Return in about 3 months (around 2/16/2019). Written by Lorin Marcelino, as dictated by Jessica Aviles DO.    I, Dr. Jessica Aviles, confirm that all documentation is accurate.

## 2018-11-16 NOTE — PROGRESS NOTES
This is the Subsequent Medicare Annual Wellness Exam, performed 12 months or more after the Initial AWV or the last Subsequent AWV    I have reviewed the patient's medical history in detail and updated the computerized patient record. History     Past Medical History:   Diagnosis Date    Ankle fracture     Aortic stenosis     S/P AVR in 2016    Asthma     Chronic pain     back pain    Diabetes ([facility]     Diverticulitis     Fibromyalgia     H/O aortic valve replacement 03/16    21 mm bovine pericardial bioprosthesis and epiaortic scanning      Hypertension     PT denies    Hypothyroidism     Lumbar post-laminectomy syndrome     Menopause     Obesity     Osteoarthritis     Psychotic disorder ([facility]     Sciatica     Skin cancer 2013    right forearm. Past Surgical History:   Procedure Laterality Date    CARDIAC SURG PROCEDURE UNLIST  03/2016    aortic valve replacement.  CHEST SURGERY PROCEDURE UNLISTED  03/2016    Open Heart Surgery    COLONOSCOPY      HX CATARACT REMOVAL Right 1995    lens  replaced.  HX CATARACT REMOVAL Left 02/2018    HX COLONOSCOPY  09/15/2017    dr. Kira Morales  f/u 5 years.  HX HEENT  1990's    sinus surgery     HX HEENT Right 11/02/2017    laser for right eye cataracts.  HX LUMBAR FUSION  2004    L3-L4-L5    HX LUMBAR LAMINECTOMY  2002    HX MOHS PROCEDURES Left 1990    HX ORTHOPAEDIC      Rotator cuff Bilateral    HX SHOULDER REPLACEMENT Right 03/29/2017    reverse.  [place] COLSC FLX W/RMVL OF TUMOR POLYP LESION SNARE TQ  07/09/2014 repeat in 3 years    Dr. Vielka Garner      Aortic valve replacement     Current Outpatient Medications   Medication Sig Dispense Refill    meclizine (ANTIVERT) 25 mg tablet Take 1 Tab by mouth three (3) times daily as needed for up to 10 days. 60 Tab 1    varicella-zoster recombinant, PF, (SHINGRIX, PF,) 50 mcg/0.5 mL susr injection 0.5 mL by IntraMUSCular route once for 1 dose. 0.5 mL 0    DULoxetine (CYMBALTA) 30 mg capsule TAKE ONE CAPSULE BY MOUTH TWICE DAILY 180 Cap 1    levothyroxine (SYNTHROID) 50 mcg tablet TAKE 1 TABLET BY MOUTH EVERY DAY BEFORE BREAKFAST 90 Tab 0    metFORMIN (GLUCOPHAGE) 500 mg tablet TAKE ONE TABLET BY MOUTH ONCE DAILY WITH  DINNER. 90 Tab 1    atorvastatin (LIPITOR) 20 mg tablet TAKE 1 TABLET BY MOUTH ONCE EVERY DAY. GENERIC FOR LIPITOR. 90 Tab 1    FLUoxetine (PROZAC) 10 mg capsule TAKE 1 CAPSULE BY MOUTH ONCE DAILY. GENERICFOR PROZAC. 90 Cap 3    cyanocobalamin (VITAMIN B-12) 500 mcg tablet Take 500 mcg by mouth daily.  fluticasone (FLONASE) 50 mcg/actuation nasal spray One spray each nostril twice a day. 1 Bottle 5    meloxicam (MOBIC) 15 mg tablet TAKE 1 TABLET BY MOUTH DAILY 90 Tab 3    traMADol (ULTRAM) 50 mg tablet Take 1 Tab by mouth four (4) times daily as needed for Pain. Max Daily Amount: 200 mg. Indications: NEUROPATHIC PAIN, Pain 120 Tab 1    clobetasol (TEMOVATE) 0.05 % external solution APPLY TO SCALP AFTER SHAMPOO DAILY 50 mL 3    cetirizine (ZYRTEC) 10 mg tablet TAKE 1 TABLET BY MOUTH EVERY DAY. GENERIC FOR ZYRTEC 90 Tab 2    SPIRIVA RESPIMAT 2.5 mcg/actuation inhaler INL 2 PFS PO D  3    VENTOLIN HFA 90 mcg/actuation inhaler 2 Puffs by Aerosolization route every four (4) hours as needed. 0    butalbital-acetaminophen-caffeine (FIORICET, ESGIC) -40 mg per tablet Take 1 Tab by mouth every four (4) hours as needed for Pain. Max Daily Amount: 6 Tabs. 30 Tab 0    B.infantis-B.ani-B.long-B.bifi (PROBIOTIC 4X) 10-15 mg TbEC Take  by mouth daily.  ranitidine (ZANTAC) 150 mg tablet 150mg QAM and 75mg QPM      aspirin delayed-release 81 mg tablet Take 81 mg by mouth daily.  Cholecalciferol, Vitamin D3, (VITAMIN D3) 1,000 unit cap Take 1,000 Units by mouth daily.  glucose blood VI test strips (ACCU-CHEK MARCE PLUS TEST STRP) strip Please use one strip to test blood sugars once a day.  100 strip 20    Lancets (ACCU-CHEK SOFTCLIX LANCETS) misc Please use one lancet to test blood sugars twice a day. 1 Package 20    BLOOD-GLUCOSE METER (ACCU-CHEK MARCE MONITORING) by Does Not Apply route.  triamcinolone acetonide (KENALOG) 0.1 % topical cream Apply  to affected area two (2) times a day.  use thin layer       Allergies   Allergen Reactions    Latex, Natural Rubber Hives    Adhesive Rash     Some bandaids    Ciprofloxacin Rash    Codeine Nausea and Vomiting    Demerol [Meperidine] Nausea and Vomiting    Diclofenac Shortness of Breath and Palpitations    Erythromycin Rash    Levaquin [Levofloxacin] Rash    Loratadine Swelling    Morphine Nausea and Vomiting    Penicillin G Hives and Rash     Does fine with Keflex    Sulfa (Sulfonamide Antibiotics) Itching    Vicodin [Hydrocodone-Acetaminophen] Other (comments)     Metallic taste in mouth     Family History   Problem Relation Age of Onset    Hypertension Father     Heart Disease Father     Alcohol abuse Father      Social History     Tobacco Use    Smoking status: Former Smoker     Years: 45.00     Types: Cigarettes     Last attempt to quit: 2011     Years since quittin.3    Smokeless tobacco: Never Used   Substance Use Topics    Alcohol use: No     Patient Active Problem List   Diagnosis Code    Tachycardia R00.0    Diabetes ([facility] E11.9    Pulmonary hypertension (HCC) I27.20    Osteoarthritis M19.90    Fibromyalgia M79.7    PTSD (post-traumatic stress disorder) F43.10    OCD (obsessive compulsive disorder) F42.9    Panic disorder with agoraphobia F40.01    Recurrent major depression ([facility] F33.9    Aortic stenosis I35.0    Hyperlipidemia LDL goal <100 E78.5    Postlaminectomy syndrome M96.1    Lumbar neuritis M54.16    AS (aortic stenosis) I35.0    Long term (current) use of anticoagulants Z79.01    Ankle impingement syndrome M25.879    Radiculopathy, lumbar region M54.16    Lumbar spinal stenosis M48.061    Lumbar postlaminectomy syndrome M96.1    Type 2 diabetes with nephropathy (HCC) E11.21       Depression Risk Factor Screening:     PHQ over the last two weeks 8/17/2018   Little interest or pleasure in doing things Not at all   Feeling down, depressed, irritable, or hopeless Not at all   Total Score PHQ 2 0     Alcohol Risk Factor Screening: You do not drink alcohol or very rarely. Functional Ability and Level of Safety:   Hearing Loss  Hearing is good. Hearing Screening    125Hz 250Hz 500Hz 1000Hz 2000Hz 3000Hz 4000Hz 6000Hz 8000Hz   Right ear:   Pass Pass Pass  Pass     Left ear:   Pass Pass Pass  Pass        Visual Acuity Screening    Right eye Left eye Both eyes   Without correction: 20/25 20/25 20/25   With correction:          Activities of Daily Living  The home contains: no safety equipment. Patient does total self care    Fall Risk  Fall Risk Assessment, last 12 mths 10/9/2018   Able to walk? Yes   Fall in past 12 months? No     Abuse Screen  Patient is not abused    Cognitive Screening   Evaluation of Cognitive Function:  Has your family/caregiver stated any concerns about your memory: no  Normal    Patient Care Team   Patient Care Team:  Adalid Baron DO as PCP - General (Family Practice)  Elsie De Luna MD (Colon and Rectal Surgery)  Elroy Greer MD (Cardiology)  Rubens Ortiz MD (Cardiothoracic Surgery)  Albino Ordaz MD (Pulmonary Disease)    Assessment/Plan   Education and counseling provided:  Are appropriate based on today's review and evaluation  End-of-Life planning (with patient's consent)  Pneumococcal Vaccine: Completed  Influenza Vaccine: Pt received 10/2018  Screening Mammography: February 2018, normal   Screening Pap and pelvic (covered once every 2 years)  Colorectal cancer screening tests  Cardiovascular screening blood test  Bone mass measurement (DEXA)  Screening for glaucoma: Done February 2018  Diabetes screening test    Diagnoses and all orders for this visit:    1. Controlled type 2 diabetes mellitus with other specified complication, without long-term current use of insulin ([facility] [address]  -     LIPID PANEL; Future  -     METABOLIC PANEL, COMPREHENSIVE; Future  -     HEMOGLOBIN A1C WITH EAG; Future  -     MICROALBUMIN, UR, RAND W/ MICROALB/CREAT RATIO; Future  -      DIABETES FOOT EXAM    2. Medicare annual wellness visit, subsequent    3. Vertigo  -     meclizine (ANTIVERT) 25 mg tablet; Take 1 Tab by mouth three (3) times daily as needed for up to 10 days. 4. Need for shingles vaccine  -     varicella-zoster recombinant, PF, (SHINGRIX, PF,) 50 mcg/0.5 mL susr injection; 0.5 mL by IntraMUSCular route once for 1 dose.         Health Maintenance Due   Topic Date Due    Shingrix Vaccine Age 50> (1 of 2) 03/29/2000    FOOT EXAM Q1  08/07/2018    MICROALBUMIN Q1  11/07/2018    LIPID PANEL Q1  11/07/2018    MEDICARE YEARLY EXAM  11/08/2018    HEMOGLOBIN A1C Q6M  11/08/2018

## 2018-11-19 ENCOUNTER — TELEPHONE (OUTPATIENT)
Dept: FAMILY MEDICINE CLINIC | Age: 68
End: 2018-11-19

## 2018-11-19 NOTE — TELEPHONE ENCOUNTER
FYI- Pt. Stated she got her  shingles shot at her pharmacy and wanted to inform nurse that her pharmacy has more shots available and to pass along to other pts.

## 2018-11-20 LAB
ALBUMIN SERPL-MCNC: 4.3 G/DL (ref 3.6–4.8)
ALBUMIN/GLOB SERPL: 1.7 {RATIO} (ref 1.2–2.2)
ALP SERPL-CCNC: 87 IU/L (ref 39–117)
ALT SERPL-CCNC: 46 IU/L (ref 0–32)
AST SERPL-CCNC: 39 IU/L (ref 0–40)
BILIRUB SERPL-MCNC: 0.5 MG/DL (ref 0–1.2)
BUN SERPL-MCNC: 11 MG/DL (ref 8–27)
BUN/CREAT SERPL: 13 (ref 12–28)
CALCIUM SERPL-MCNC: 10.1 MG/DL (ref 8.7–10.3)
CHLORIDE SERPL-SCNC: 105 MMOL/L (ref 96–106)
CHOLEST SERPL-MCNC: 105 MG/DL (ref 100–199)
CO2 SERPL-SCNC: 25 MMOL/L (ref 20–29)
CREAT SERPL-MCNC: 0.86 MG/DL (ref 0.57–1)
EST. AVERAGE GLUCOSE BLD GHB EST-MCNC: 134 MG/DL
GLOBULIN SER CALC-MCNC: 2.6 G/DL (ref 1.5–4.5)
GLUCOSE SERPL-MCNC: 92 MG/DL (ref 65–99)
HBA1C MFR BLD: 6.3 % (ref 4.8–5.6)
HDLC SERPL-MCNC: 53 MG/DL
INTERPRETATION, 910389: NORMAL
LDLC SERPL CALC-MCNC: 38 MG/DL (ref 0–99)
Lab: NORMAL
POTASSIUM SERPL-SCNC: 5.4 MMOL/L (ref 3.5–5.2)
PROT SERPL-MCNC: 6.9 G/DL (ref 6–8.5)
SODIUM SERPL-SCNC: 144 MMOL/L (ref 134–144)
SPECIMEN STATUS REPORT, ROLRST: NORMAL
TRIGL SERPL-MCNC: 70 MG/DL (ref 0–149)
VLDLC SERPL CALC-MCNC: 14 MG/DL (ref 5–40)

## 2018-12-03 ENCOUNTER — TELEPHONE (OUTPATIENT)
Dept: ORTHOPEDIC SURGERY | Age: 68
End: 2018-12-03

## 2018-12-03 DIAGNOSIS — M48.062 SPINAL STENOSIS OF LUMBAR REGION WITH NEUROGENIC CLAUDICATION: ICD-10-CM

## 2018-12-03 DIAGNOSIS — M96.1 LUMBAR POSTLAMINECTOMY SYNDROME: ICD-10-CM

## 2018-12-03 DIAGNOSIS — M54.16 RADICULOPATHY, LUMBAR REGION: ICD-10-CM

## 2018-12-03 DIAGNOSIS — M96.1 POSTLAMINECTOMY SYNDROME: ICD-10-CM

## 2018-12-03 DIAGNOSIS — M79.7 FIBROMYALGIA: ICD-10-CM

## 2018-12-03 RX ORDER — TRAMADOL HYDROCHLORIDE 50 MG/1
50 TABLET ORAL
Qty: 120 TAB | Refills: 1 | Status: SHIPPED | OUTPATIENT
Start: 2018-12-15 | End: 2019-05-24 | Stop reason: SDUPTHER

## 2018-12-03 NOTE — TELEPHONE ENCOUNTER
PHONE IN RX    Last Visit: 11/15/2018 with MD Rayshawn Villanueva  Next Appointment: 02/14/2019 with MD Rayshawn Villanueva  Previous Refill Encounter(s): 11/15/2018 per MD [id number]    Requested Prescriptions     Pending Prescriptions Disp Refills    traMADol (ULTRAM) 50 mg tablet 120 Tab 1     Sig: Take 1 Tab by mouth four (4) times daily as needed for Pain.  Max Daily Amount: 200 mg. NO EARLY FILLS

## 2018-12-03 NOTE — TELEPHONE ENCOUNTER
rx printed   Call pt for       Printed at [facility]

## 2018-12-04 DIAGNOSIS — E03.9 ACQUIRED HYPOTHYROIDISM: ICD-10-CM

## 2018-12-05 NOTE — TELEPHONE ENCOUNTER
Called patient and left voice mail informing her that her prescription for  Tramadol is ready for  at our [facility] office. No further action needed at this time.

## 2018-12-07 NOTE — TELEPHONE ENCOUNTER
Patient requesting the following medication refill     Medication requesting synthroid 50 mcg tab    Date last prescribed 09/25/2018  QTY 90  Refills 0    Date patient last seen 11/16/2018    Follow up appt scheduled for 02/18/2019    Please advise

## 2018-12-07 NOTE — TELEPHONE ENCOUNTER
Patient called back to speak with Kaleb Main ref the Tramadol prescriptions which is at the office. Patient lives in [place] and needs to have the other Tramadol prescription sent to [facility] on [address]. Patient has always had her prescriptions sent to [facility].  Please call patient back at [phone]

## 2018-12-10 NOTE — TELEPHONE ENCOUNTER
Patient is checking status of prescription being called into [facility] [phone]    Her# [phone]

## 2018-12-10 NOTE — TELEPHONE ENCOUNTER
Pt. Needs medication refill. She is about to run out and has been waiting for a week.  Please assist.

## 2018-12-11 DIAGNOSIS — E03.9 ACQUIRED HYPOTHYROIDISM: ICD-10-CM

## 2018-12-11 NOTE — TELEPHONE ENCOUNTER
Called patient and informed her that her prescription for her Tramadol is ready for pickup at our [facility] office. Patient verbalized understanding.

## 2018-12-12 RX ORDER — LEVOTHYROXINE SODIUM 50 UG/1
TABLET ORAL
Qty: 90 TAB | Refills: 1 | Status: SHIPPED | OUTPATIENT
Start: 2018-12-12 | End: 2019-06-03 | Stop reason: SDUPTHER

## 2018-12-17 RX ORDER — LEVOTHYROXINE SODIUM 50 UG/1
TABLET ORAL
Qty: 90 TAB | Refills: 0 | Status: SHIPPED | OUTPATIENT
Start: 2018-12-17 | End: 2019-06-03 | Stop reason: SDUPTHER

## 2018-12-20 ENCOUNTER — TELEPHONE (OUTPATIENT)
Dept: ORTHOPEDIC SURGERY | Age: 68
End: 2018-12-20

## 2018-12-20 NOTE — TELEPHONE ENCOUNTER
1. Spinal stenosis of lumbar region with neurogenic claudication     2. Radiculopathy, lumbar region     3.  Postlaminectomy syndrome

## 2018-12-20 NOTE — TELEPHONE ENCOUNTER
[facility] DR JUDY GARCÍA called in requesting to know what chronic issue patient is being given traMADol (ULTRAM) 50 mg tablet . Please contact pharmacist at [phone].

## 2018-12-20 NOTE — TELEPHONE ENCOUNTER
Spoke with [facility] pharmacist [address] [facility]. Per NP Leanna verified below info. 1. Spinal stenosis of lumbar region with neurogenic claudication     2. Radiculopathy, lumbar region     3.  Postlaminectomy syndrome

## 2019-02-14 ENCOUNTER — OFFICE VISIT (OUTPATIENT)
Dept: ORTHOPEDIC SURGERY | Age: 69
End: 2019-02-14

## 2019-02-14 VITALS
RESPIRATION RATE: 16 BRPM | TEMPERATURE: 97.3 F | HEIGHT: 67 IN | SYSTOLIC BLOOD PRESSURE: 115 MMHG | BODY MASS INDEX: 35.35 KG/M2 | OXYGEN SATURATION: 98 % | HEART RATE: 90 BPM | DIASTOLIC BLOOD PRESSURE: 75 MMHG | WEIGHT: 225.2 LBS

## 2019-02-14 DIAGNOSIS — S39.012A STRAIN OF LUMBAR REGION, INITIAL ENCOUNTER: Primary | ICD-10-CM

## 2019-02-14 DIAGNOSIS — M96.1 POSTLAMINECTOMY SYNDROME: ICD-10-CM

## 2019-02-14 DIAGNOSIS — M54.16 RADICULOPATHY, LUMBAR REGION: ICD-10-CM

## 2019-02-14 DIAGNOSIS — M79.7 FIBROMYALGIA: ICD-10-CM

## 2019-02-14 DIAGNOSIS — M48.062 SPINAL STENOSIS OF LUMBAR REGION WITH NEUROGENIC CLAUDICATION: ICD-10-CM

## 2019-02-14 PROBLEM — E66.01 SEVERE OBESITY (HCC): Status: ACTIVE | Noted: 2019-02-14

## 2019-02-14 RX ORDER — CYCLOBENZAPRINE HCL 10 MG
10 TABLET ORAL
Qty: 10 TAB | Refills: 0 | Status: SHIPPED | OUTPATIENT
Start: 2019-02-14 | End: 2019-06-03

## 2019-02-14 RX ORDER — CYCLOBENZAPRINE HCL 10 MG
5 TABLET ORAL
COMMUNITY
End: 2019-06-03

## 2019-02-14 RX ORDER — METHYLPREDNISOLONE 4 MG/1
TABLET ORAL
Qty: 1 DOSE PACK | Refills: 0 | Status: SHIPPED | OUTPATIENT
Start: 2019-02-14 | End: 2019-06-03 | Stop reason: ALTCHOICE

## 2019-02-14 RX ORDER — TRAMADOL HYDROCHLORIDE 50 MG/1
TABLET ORAL
Qty: 120 TAB | Refills: 2 | Status: SHIPPED | OUTPATIENT
Start: 2019-03-02 | End: 2019-11-12 | Stop reason: CLARIF

## 2019-02-14 NOTE — PROGRESS NOTES
[facility]  [address], [place]   Phone: [phone]  Fax: [phone]        PROGRESS NOTE      HISTORY OF PRESENT ILLNESS:  The patient is a 76 y.o. female and was seen today for follow up of low back pain and paraesthesias in the LLE to the knee in an L4 distribution and the RLE to the foot. She was previously seen for low back and left hip pain extending into the LLE in a S1 distribution to an area below the knee. Pt was initially seen for low back pain into the BLE extending in roughly an L4 distribution in the LLE and in an L5 distribution to the foot in the RLE. Pt also endorses numbness in the LLE. She had a diagnosis of lumbar postlaminectomy syndrome with a previous history of L3 through S1 fusion in 2002 and 2004 by a physician in [place]. The patient had an additional diagnosis of fibromyalgia. Note dated 6/29/16 indicating patient was seen and underwent porcine aortic valve replacement by Dr. Jose Maria Winslow on 3/18/16. Pt began cardiac rehab in 1/2017. Pt underwent right shoulder surgery on 3/29/17 by Dr. Deng Jain. Note from Dr. Jm Arnett, cardiologist dated 5/18/17 indicating patient was seen with h/o aortic stenosis, s/p aortic valve replacement 3/16 and was seen with moderate dysphemia with minimal exertion. CT revealed no evidence of pulmonary embolism, no obvious fluid overload. They were ordering further workup to look for functioning bioprosthetic aortic valve. Per patient, a cause for her SOB has still not been found. She has been scheduled to see a pulmonologist. She states she has been restricted to take her Meloxicam since her right shoulder surgery, which has caused her increase in low back pain. Upon review of our records, it was noted she previously failed TOPAMAX, NEURONTIN, and LYRICA. Pt was switched from Ultram ER back to Ultram immediate release. Pt underwent left SI joint injection on 3/15/18 with good relief.  Lumbar spine CT w/o contrast dated 7/20/14  per report revealed L3 through L5 laminectomies with L3 through S1 dorsal hardware fusion. There was no evidence for fractures. There was advanced degenerative disc disease at L2-L3 at the junctional level with marked central canal stenosis. At her last clinical appointment, patient wished to continue her current treatment. I provided her with refills of her Ultram 1 tab TID-QID. The patient returns today with pain location and distribution remain unchanged. she rates her pain 6/10, a slight increase from her last visit (4-5/10). Pt reports a fall down the stairs yesterday which caused her pain to increase. She does not believe she broke anything. She continues with Ultram 1 tab TID-QID. She is completing her HEP inconsistently. Pt denies change in bowel or bladder habits.  reviewed. Body mass index is 35.27 kg/m². PCP: Maria Luisa Guy DO      Past Medical History:   Diagnosis Date    Ankle fracture     Aortic stenosis     S/P AVR in 2016    Asthma     Chronic pain     back pain    Diabetes ([address]     Diverticulitis     Fibromyalgia     H/O aortic valve replacement 03/16    21 mm bovine pericardial bioprosthesis and epiaortic scanning      Hypertension     PT denies    Hypothyroidism     Lumbar post-laminectomy syndrome     Menopause     Obesity     Osteoarthritis     Psychotic disorder ([address]     Sciatica     Skin cancer 2013    right forearm.          Social History     Socioeconomic History    Marital status:      Spouse name: Not on file    Number of children: Not on file    Years of education: Not on file    Highest education level: Not on file   Social Needs    Financial resource strain: Not on file    Food insecurity - worry: Not on file    Food insecurity - inability: Not on file    Transportation needs - medical: Not on file   [employer] needs - non-medical: Not on file   Occupational History    Not on file   Tobacco Use  Smoking status: Former Smoker     Years: 45.00     Types: Cigarettes     Last attempt to quit: 2011     Years since quittin.6    Smokeless tobacco: Never Used   Substance and Sexual Activity    Alcohol use: No    Drug use: No    Sexual activity: No   Other Topics Concern    Not on file   Social History Narrative    Not on file       Current Outpatient Medications   Medication Sig Dispense Refill    [START ON 3/2/2019] traMADol (ULTRAM) 50 mg tablet 1 tab PO QID prn pain 120 Tab 2    Lactobacillus acidophilus (PROBIOTIC PO) Take 1 Cap by Mouth Once a Day. Lactose defense      cyclobenzaprine (FLEXERIL) 10 mg tablet 5 mg.  docusate sodium (COLACE) 50 mg capsule 50 mg.      ascorbate calcium (VITAMIN C PO) Take  by mouth.  vitamin E acetate (VITAMIN E PO) Take  by mouth.  methylPREDNISolone (MEDROL DOSEPACK) 4 mg tablet Per dose pack instructions 1 Dose Pack 0    cyclobenzaprine (FLEXERIL) 10 mg tablet Take 1 Tab by mouth three (3) times daily as needed for Muscle Spasm(s). 10 Tab 0    levothyroxine (SYNTHROID) 50 mcg tablet TAKE 1 TABLET BY MOUTH EVERY DAY BEFORE BREAKFAST 90 Tab 0    levothyroxine (SYNTHROID) 50 mcg tablet TAKE 1 TABLET BY MOUTH EVERY DAY BEFORE BREAKFAST 90 Tab 1    traMADol (ULTRAM) 50 mg tablet Take 1 Tab by mouth four (4) times daily as needed for Pain. Max Daily Amount: 200 mg. NO EARLY FILLS 120 Tab 1    DULoxetine (CYMBALTA) 30 mg capsule TAKE ONE CAPSULE BY MOUTH TWICE DAILY 180 Cap 1    metFORMIN (GLUCOPHAGE) 500 mg tablet TAKE ONE TABLET BY MOUTH ONCE DAILY WITH  DINNER. 90 Tab 1    atorvastatin (LIPITOR) 20 mg tablet TAKE 1 TABLET BY MOUTH ONCE EVERY DAY. GENERIC FOR LIPITOR. 90 Tab 1    FLUoxetine (PROZAC) 10 mg capsule TAKE 1 CAPSULE BY MOUTH ONCE DAILY. GENERICFOR PROZAC. 90 Cap 3    cyanocobalamin (VITAMIN B-12) 500 mcg tablet Take 500 mcg by mouth daily.       fluticasone (FLONASE) 50 mcg/actuation nasal spray One spray each nostril twice a day. 1 Bottle 5    meloxicam (MOBIC) 15 mg tablet TAKE 1 TABLET BY MOUTH DAILY 90 Tab 3    clobetasol (TEMOVATE) 0.05 % external solution APPLY TO SCALP AFTER SHAMPOO DAILY 50 mL 3    cetirizine (ZYRTEC) 10 mg tablet TAKE 1 TABLET BY MOUTH EVERY DAY. GENERIC FOR ZYRTEC 90 Tab 2    SPIRIVA RESPIMAT 2.5 mcg/actuation inhaler INL 2 PFS PO D  3    VENTOLIN HFA 90 mcg/actuation inhaler 2 Puffs by Aerosolization route every four (4) hours as needed. 0    B.infantis-B.ani-B.long-B.bifi (PROBIOTIC 4X) 10-15 mg TbEC Take  by mouth daily.  ranitidine (ZANTAC) 150 mg tablet 150mg QAM and 75mg QPM      aspirin delayed-release 81 mg tablet Take 81 mg by mouth daily.  Cholecalciferol, Vitamin D3, (VITAMIN D3) 1,000 unit cap Take 1,000 Units by mouth daily.  glucose blood VI test strips (ACCU-CHEK MARCE PLUS TEST STRP) strip Please use one strip to test blood sugars once a day. 100 strip 20    Lancets (ACCU-CHEK SOFTCLIX LANCETS) misc Please use one lancet to test blood sugars twice a day. 1 Package 20    BLOOD-GLUCOSE METER (ACCU-CHEK MAREC MONITORING) by Does Not Apply route.  triamcinolone acetonide (KENALOG) 0.1 % topical cream Apply  to affected area two (2) times a day. use thin layer      butalbital-acetaminophen-caffeine (FIORICET, ESGIC) -40 mg per tablet Take 1 Tab by mouth every four (4) hours as needed for Pain. Max Daily Amount: 6 Tabs.  30 Tab 0       Allergies   Allergen Reactions    Latex, Natural Rubber Hives    Adhesive Rash     Some bandaids    Ciprofloxacin Rash    Codeine Nausea and Vomiting    Demerol [Meperidine] Nausea and Vomiting    Diclofenac Shortness of Breath and Palpitations    Erythromycin Rash    Levaquin [Levofloxacin] Rash    Loratadine Swelling    Morphine Nausea and Vomiting    Penicillin G Hives and Rash     Does fine with Keflex    Sulfa (Sulfonamide Antibiotics) Itching    Vicodin [Hydrocodone-Acetaminophen] Other (comments)     Metallic taste in mouth      Ambulates with a single point cane. PHYSICAL EXAMINATION    Visit Vitals  /75   Pulse 90   Temp 97.3 °F (36.3 °C) (Oral)   Resp 16   Ht 5' 7\" (1.702 m)   Wt 225 lb 3.2 oz (102.2 kg)   SpO2 98%   BMI 35.27 kg/m²       CONSTITUTIONAL: NAD, A&O x 3  SENSATION: Decreased sensation to light touch L4-5 on the LLE and S1 on the RLE. Sensation to light touch otherwise intact. RANGE OF MOTION: The patient has full passive range of motion in all four extremities. MOTOR:  Straight Leg Raise: Negative, bilateral               Hip Flex Knee Ext Knee Flex Ankle DF GTE Ankle PF Tone   Right +4/5 +4/5 +4/5 +4/5 +4/5 +4/5 +4/5   Left +4/5 +4/5 +4/5 +4/5 +4/5 +4/5 +4/5       ASSESSMENT   Diagnoses and all orders for this visit:    1. Strain of lumbar region, initial encounter  -     traMADol (ULTRAM) 50 mg tablet; 1 tab PO QID prn pain    2. Spinal stenosis of lumbar region with neurogenic claudication  -     traMADol (ULTRAM) 50 mg tablet; 1 tab PO QID prn pain    3. Radiculopathy, lumbar region  -     traMADol (ULTRAM) 50 mg tablet; 1 tab PO QID prn pain    4. Postlaminectomy syndrome  -     traMADol (ULTRAM) 50 mg tablet; 1 tab PO QID prn pain    5. Fibromyalgia  -     traMADol (ULTRAM) 50 mg tablet; 1 tab PO QID prn pain    Other orders  -     methylPREDNISolone (MEDROL DOSEPACK) 4 mg tablet; Per dose pack instructions  -     cyclobenzaprine (FLEXERIL) 10 mg tablet; Take 1 Tab by mouth three (3) times daily as needed for Muscle Spasm(s). IMPRESSION AND PLAN:  Pt has increase in discomfort with a recent fall down the stairs. She does not believe she broke anything, she just feels beat up.  I will start her on Medrol Dosepak. I gave her a prescription for Naproxen following completion of the Medrol Dosepak. I provided her with a short term Rx for Flexeril. I provided her with refills of Ultram. Patient is not interested in surgical intervention or lumbar blocks at this time.  Patient is neurologically intact. I will see the patient back in 3 month's time or earlier if needed. Written by Harsha Cavanaugh, as dictated by Dahlia Persaud MD  I examined the patient, reviewed and agree with the note.

## 2019-02-18 DIAGNOSIS — M79.7 FIBROMYALGIA: ICD-10-CM

## 2019-02-18 DIAGNOSIS — E11.69 TYPE 2 DIABETES MELLITUS WITH OTHER SPECIFIED COMPLICATION, WITHOUT LONG-TERM CURRENT USE OF INSULIN (HCC): ICD-10-CM

## 2019-02-18 DIAGNOSIS — J30.1 SEASONAL ALLERGIC RHINITIS DUE TO POLLEN: ICD-10-CM

## 2019-02-18 NOTE — TELEPHONE ENCOUNTER
Patient requested refills and stated she normally would have gone over the list with Dr. Santiago Hanna as she had an appointment today but had to cancel due to provider being out of the office and her next appt is on march 21st. Please advise.

## 2019-02-19 RX ORDER — FLUTICASONE PROPIONATE 50 MCG
SPRAY, SUSPENSION (ML) NASAL
Qty: 1 BOTTLE | Refills: 5 | Status: SHIPPED | OUTPATIENT
Start: 2019-02-19 | End: 2019-06-03 | Stop reason: SDUPTHER

## 2019-02-19 RX ORDER — METFORMIN HYDROCHLORIDE 500 MG/1
TABLET ORAL
Qty: 90 TAB | Refills: 1 | Status: SHIPPED | OUTPATIENT
Start: 2019-02-19 | End: 2019-10-08 | Stop reason: SDUPTHER

## 2019-02-19 RX ORDER — DULOXETIN HYDROCHLORIDE 30 MG/1
CAPSULE, DELAYED RELEASE ORAL
Qty: 180 CAP | Refills: 1 | Status: SHIPPED | OUTPATIENT
Start: 2019-02-19 | End: 2019-11-12 | Stop reason: CLARIF

## 2019-02-19 RX ORDER — MELOXICAM 15 MG/1
TABLET ORAL
Qty: 90 TAB | Refills: 3 | Status: SHIPPED | OUTPATIENT
Start: 2019-02-19 | End: 2020-03-23 | Stop reason: SDUPTHER

## 2019-03-13 ENCOUNTER — HOSPITAL ENCOUNTER (OUTPATIENT)
Dept: MAMMOGRAPHY | Age: 69
Discharge: HOME | End: 2019-03-13
Attending: FAMILY MEDICINE
Payer: MEDICARE

## 2019-03-13 DIAGNOSIS — Z12.31 VISIT FOR SCREENING MAMMOGRAM: ICD-10-CM

## 2019-03-13 PROCEDURE — 77063 BREAST TOMOSYNTHESIS BI: CPT

## 2019-05-01 DIAGNOSIS — E78.5 HYPERLIPIDEMIA LDL GOAL <100: ICD-10-CM

## 2019-05-04 RX ORDER — ATORVASTATIN CALCIUM 20 MG/1
TABLET, FILM COATED ORAL
Qty: 90 TAB | Refills: 1 | Status: SHIPPED | OUTPATIENT
Start: 2019-05-04 | End: 2019-10-14 | Stop reason: SDUPTHER

## 2019-05-09 ENCOUNTER — OFFICE VISIT (OUTPATIENT)
Dept: ORTHOPEDIC SURGERY | Age: 69
End: 2019-05-09

## 2019-05-09 VITALS
RESPIRATION RATE: 19 BRPM | TEMPERATURE: 97.4 F | WEIGHT: 230 LBS | SYSTOLIC BLOOD PRESSURE: 130 MMHG | DIASTOLIC BLOOD PRESSURE: 84 MMHG | HEART RATE: 106 BPM | BODY MASS INDEX: 36.1 KG/M2 | OXYGEN SATURATION: 98 % | HEIGHT: 67 IN

## 2019-05-09 DIAGNOSIS — M48.062 SPINAL STENOSIS OF LUMBAR REGION WITH NEUROGENIC CLAUDICATION: ICD-10-CM

## 2019-05-09 DIAGNOSIS — S39.012A STRAIN OF LUMBAR REGION, INITIAL ENCOUNTER: ICD-10-CM

## 2019-05-09 DIAGNOSIS — M96.1 POSTLAMINECTOMY SYNDROME: ICD-10-CM

## 2019-05-09 DIAGNOSIS — Z79.891 LONG TERM CURRENT USE OF OPIATE ANALGESIC: ICD-10-CM

## 2019-05-09 DIAGNOSIS — M54.16 RADICULOPATHY, LUMBAR REGION: ICD-10-CM

## 2019-05-09 DIAGNOSIS — Z79.891 LONG TERM CURRENT USE OF OPIATE ANALGESIC: Primary | ICD-10-CM

## 2019-05-09 RX ORDER — TRAMADOL HYDROCHLORIDE 50 MG/1
50 TABLET ORAL 4 TIMES DAILY
Qty: 120 TAB | Refills: 0 | Status: SHIPPED | OUTPATIENT
Start: 2019-05-09 | End: 2019-06-03 | Stop reason: SDUPTHER

## 2019-05-09 NOTE — PROGRESS NOTES
[facility]  [address], [place]   Phone: [phone]  Fax: [phone]        PROGRESS NOTE      HISTORY OF PRESENT ILLNESS:  The patient is a 71 y.o. female and was seen today for follow up of low back pain and paraesthesias in the LLE to the knee in an L4 distribution and the RLE to the foot. She was previously seen for low back and left hip pain extending into the LLE in a S1 distribution to an area below the knee. Pt was initially seen for low back pain into the BLE extending in roughly an L4 distribution in the LLE and in an L5 distribution to the foot in the RLE. Pt also endorses numbness in the LLE. She had a diagnosis of lumbar postlaminectomy syndrome with a previous history of L3 through S1 fusion in 2002 and 2004 by a physician in [place]. The patient had an additional diagnosis of fibromyalgia. Note dated 6/29/16 indicating patient was seen and underwent porcine aortic valve replacement by Dr. Silvino Dean on 3/18/16. Pt began cardiac rehab in 1/2017. Pt underwent right shoulder surgery on 3/29/17 by Dr. Renetta Saucedo. Note from Dr. Celine Thompson, cardiologist dated 5/18/17 indicating patient was seen with h/o aortic stenosis, s/p aortic valve replacement 3/16 and was seen with moderate dysphemia with minimal exertion. CT revealed no evidence of pulmonary embolism, no obvious fluid overload. They were ordering further workup to look for functioning bioprosthetic aortic valve. Per patient, a cause for her SOB has still not been found. She has been scheduled to see a pulmonologist. She states she has been restricted to take her Meloxicam since her right shoulder surgery, which has caused her increase in low back pain. Upon review of our records, it was noted she previously failed TOPAMAX, NEURONTIN, and LYRICA. Pt was switched from Ultram ER back to Ultram immediate release. Pt underwent left SI joint injection on 3/15/18 with good relief.  Lumbar spine CT w/o contrast dated 7/20/14  per report revealed L3 through L5 laminectomies with L3 through S1 dorsal hardware fusion. There was no evidence for fractures. There was advanced degenerative disc disease at L2-L3 at the junctional level with marked central canal stenosis. At her last clinical appointment, pt had increase in discomfort with a recent fall down the stairs. She did not believe she broke anything, she just feels beat up.  I started her on Medrol Dosepak. I gave her a prescription for Naproxen following completion of the Medrol Dosepak. I provided her with a short term Rx for Flexeril. I provided her with refills of Ultram. Patient was not interested in surgical intervention or lumbar blocks at that time. The patient returns today with pain location and distribution remain unchanged. She rates her pain 5/10, previously 6/10. Pt completed MDP with good relief. She continues on Ultram 50 mg QID. Pt denies change in bowel or bladder habits.  reviewed. Body mass index is 36.02 kg/m². PCP: Karla Polo DO      Past Medical History:   Diagnosis Date    Ankle fracture     Aortic stenosis     S/P AVR in 2016    Asthma     Chronic pain     back pain    Diabetes ([address]     Diverticulitis     Fibromyalgia     H/O aortic valve replacement 03/16    21 mm bovine pericardial bioprosthesis and epiaortic scanning      Hypertension     PT denies    Hypothyroidism     Lumbar post-laminectomy syndrome     Menopause     Obesity     Osteoarthritis     Psychotic disorder ([address]     Sciatica     Skin cancer 2013    right forearm.          Social History     Socioeconomic History    Marital status:      Spouse name: Not on file    Number of children: Not on file    Years of education: Not on file    Highest education level: Not on file   Occupational History    Not on file   Social Needs    Financial resource strain: Not on file    Food insecurity:     Worry: Not on file Inability: Not on file    Transportation needs:     Medical: Not on file     Non-medical: Not on file   Tobacco Use    Smoking status: Former Smoker     Years: 45.00     Types: Cigarettes     Last attempt to quit: 2011     Years since quittin.8    Smokeless tobacco: Never Used   Substance and Sexual Activity    Alcohol use: No    Drug use: No    Sexual activity: Never   Lifestyle    Physical activity:     Days per week: Not on file     Minutes per session: Not on file    Stress: Not on file   Relationships    Social connections:     Talks on phone: Not on file     Gets together: Not on file     Attends [employer] service: Not on file     Active member of club or organization: Not on file     Attends meetings of clubs or organizations: Not on file     Relationship status: Not on file    Intimate partner violence:     Fear of current or ex partner: Not on file     Emotionally abused: Not on file     Physically abused: Not on file     Forced sexual activity: Not on file   Other Topics Concern    Not on file   Social History Narrative    Not on file       Current Outpatient Medications   Medication Sig Dispense Refill    atorvastatin (LIPITOR) 20 mg tablet TAKE 1 TABLET BY MOUTH ONCE DAILY 90 Tab 1    metFORMIN (GLUCOPHAGE) 500 mg tablet TAKE ONE TABLET BY MOUTH ONCE DAILY WITH  DINNER. 90 Tab 1    meloxicam (MOBIC) 15 mg tablet TAKE 1 TABLET BY MOUTH DAILY 90 Tab 3    DULoxetine (CYMBALTA) 30 mg capsule TAKE ONE CAPSULE BY MOUTH TWICE DAILY 180 Cap 1    fluticasone (FLONASE) 50 mcg/actuation nasal spray One spray each nostril twice a day. 1 Bottle 5    traMADol (ULTRAM) 50 mg tablet 1 tab PO QID prn pain 120 Tab 2    Lactobacillus acidophilus (PROBIOTIC PO) Take 1 Cap by Mouth Once a Day. Lactose defense      cyclobenzaprine (FLEXERIL) 10 mg tablet 5 mg.  docusate sodium (COLACE) 50 mg capsule 50 mg.      ascorbate calcium (VITAMIN C PO) Take  by mouth.       vitamin E acetate (VITAMIN E PO) Take  by mouth.  cyclobenzaprine (FLEXERIL) 10 mg tablet Take 1 Tab by mouth three (3) times daily as needed for Muscle Spasm(s). 10 Tab 0    levothyroxine (SYNTHROID) 50 mcg tablet TAKE 1 TABLET BY MOUTH EVERY DAY BEFORE BREAKFAST 90 Tab 0    levothyroxine (SYNTHROID) 50 mcg tablet TAKE 1 TABLET BY MOUTH EVERY DAY BEFORE BREAKFAST 90 Tab 1    traMADol (ULTRAM) 50 mg tablet Take 1 Tab by mouth four (4) times daily as needed for Pain. Max Daily Amount: 200 mg. NO EARLY FILLS 120 Tab 1    FLUoxetine (PROZAC) 10 mg capsule TAKE 1 CAPSULE BY MOUTH ONCE DAILY. GENERICFOR PROZAC. 90 Cap 3    cyanocobalamin (VITAMIN B-12) 500 mcg tablet Take 500 mcg by mouth daily.  clobetasol (TEMOVATE) 0.05 % external solution APPLY TO SCALP AFTER SHAMPOO DAILY 50 mL 3    cetirizine (ZYRTEC) 10 mg tablet TAKE 1 TABLET BY MOUTH EVERY DAY. GENERIC FOR ZYRTEC 90 Tab 2    SPIRIVA RESPIMAT 2.5 mcg/actuation inhaler INL 2 PFS PO D  3    VENTOLIN HFA 90 mcg/actuation inhaler 2 Puffs by Aerosolization route every four (4) hours as needed. 0    butalbital-acetaminophen-caffeine (FIORICET, ESGIC) -40 mg per tablet Take 1 Tab by mouth every four (4) hours as needed for Pain. Max Daily Amount: 6 Tabs. 30 Tab 0    B.infantis-B.ani-B.long-B.bifi (PROBIOTIC 4X) 10-15 mg TbEC Take  by mouth daily.  ranitidine (ZANTAC) 150 mg tablet 150mg QAM and 75mg QPM      aspirin delayed-release 81 mg tablet Take 81 mg by mouth daily.  Cholecalciferol, Vitamin D3, (VITAMIN D3) 1,000 unit cap Take 1,000 Units by mouth daily.  glucose blood VI test strips (ACCU-CHEK MARCE PLUS TEST STRP) strip Please use one strip to test blood sugars once a day. 100 strip 20    Lancets (ACCU-CHEK SOFTCLIX LANCETS) misc Please use one lancet to test blood sugars twice a day. 1 Package 20    BLOOD-GLUCOSE METER (ACCU-CHEK MARCE MONITORING) by Does Not Apply route.       triamcinolone acetonide (KENALOG) 0.1 % topical cream Apply  to affected area two (2) times a day. use thin layer      methylPREDNISolone (MEDROL DOSEPACK) 4 mg tablet Per dose pack instructions 1 Dose Pack 0       Allergies   Allergen Reactions    Latex, Natural Rubber Hives    Adhesive Rash     Some bandaids    Ciprofloxacin Rash    Codeine Nausea and Vomiting    Demerol [Meperidine] Nausea and Vomiting    Diclofenac Shortness of Breath and Palpitations    Erythromycin Rash    Levaquin [Levofloxacin] Rash    Loratadine Swelling    Morphine Nausea and Vomiting    Penicillin G Hives and Rash     Does fine with Keflex    Sulfa (Sulfonamide Antibiotics) Itching    Vicodin [Hydrocodone-Acetaminophen] Other (comments)     Metallic taste in mouth          PHYSICAL EXAMINATION    Visit Vitals  /84   Pulse (!) 106   Temp 97.4 °F (36.3 °C)   Resp 19   Ht 5' 7\" (1.702 m)   Wt 230 lb (104.3 kg)   SpO2 98%   BMI 36.02 kg/m²       CONSTITUTIONAL: NAD, A&O x 3  SENSATION: Decreased sensation to light touch L4 LLE and L5 RLE. Sensation to light touch otherwise intact. RANGE OF MOTION: The patient has full passive range of motion in all four extremities. MOTOR:  Straight Leg Raise: Negative, bilateral               Hip Flex Knee Ext Knee Flex Ankle DF GTE Ankle PF Tone   Right +4/5 +4/5 +4/5 +4/5 +4/5 +4/5 +4/5   Left +4/5 +4/5 +4/5 +4/5 +4/5 +4/5 +4/5       ASSESSMENT   Diagnoses and all orders for this visit:    1. Long term current use of opiate analgesic  -     DRUG SCREEN UR - W/ CONFIRM; Future    2. Strain of lumbar region, initial encounter    3. Spinal stenosis of lumbar region with neurogenic claudication    4. Radiculopathy, lumbar region    5. Postlaminectomy syndrome          IMPRESSION AND PLAN:  Patient wished to continue her current treatment. I provided her with refills of Ultram 50 mg QID. Patient is neurologically intact. I will see the patient back in 3 month's time or earlier if needed. UDS was obtained today.       Written by Berry Pope Shelby Rice, as dictated by Ce Polanco MD  I examined the patient, reviewed and agree with the note.

## 2019-05-09 NOTE — LETTER
5/9/19 Patient: Brenna Corea YOB: 1950 Date of Visit: 5/9/2019 [address] [place] [address] 
[id number] [facility] [address] [place] [id number] VIA In Basket Dear [address] [place] [address], Thank you for referring Ms. Angela Jaeger to [address] [place] [address] for evaluation. My notes for this consultation are attached. If you have questions, please do not hesitate to call me. I look forward to following your patient along with you. Sincerely, Aryan Shrestha MD

## 2019-05-23 ENCOUNTER — TELEPHONE (OUTPATIENT)
Dept: ORTHOPEDIC SURGERY | Age: 69
End: 2019-05-23

## 2019-05-23 DIAGNOSIS — M96.1 LUMBAR POSTLAMINECTOMY SYNDROME: ICD-10-CM

## 2019-05-23 DIAGNOSIS — M54.16 RADICULOPATHY, LUMBAR REGION: ICD-10-CM

## 2019-05-23 DIAGNOSIS — M79.7 FIBROMYALGIA: ICD-10-CM

## 2019-05-23 DIAGNOSIS — M48.062 SPINAL STENOSIS OF LUMBAR REGION WITH NEUROGENIC CLAUDICATION: ICD-10-CM

## 2019-05-23 DIAGNOSIS — M96.1 POSTLAMINECTOMY SYNDROME: ICD-10-CM

## 2019-05-23 NOTE — TELEPHONE ENCOUNTER
Patient left message checking on the results of her UDS.  She is wanting the next 2 Rx's for Ultram. Please advise patient back at [phone]    Last visit:  5/9/19 with MD Asher Alonzo  Next appt:  9/5/19 with MD Asher Alonzo

## 2019-05-24 ENCOUNTER — HOSPITAL ENCOUNTER (OUTPATIENT)
Dept: NON INVASIVE DIAGNOSTICS | Age: 69
Discharge: HOME | End: 2019-05-24
Attending: NURSE PRACTITIONER
Payer: MEDICARE

## 2019-05-24 VITALS
WEIGHT: 230 LBS | SYSTOLIC BLOOD PRESSURE: 108 MMHG | BODY MASS INDEX: 36.1 KG/M2 | HEIGHT: 67 IN | DIASTOLIC BLOOD PRESSURE: 70 MMHG

## 2019-05-24 DIAGNOSIS — M79.7 FIBROMYALGIA: ICD-10-CM

## 2019-05-24 DIAGNOSIS — M96.1 LUMBAR POSTLAMINECTOMY SYNDROME: ICD-10-CM

## 2019-05-24 DIAGNOSIS — M48.062 SPINAL STENOSIS OF LUMBAR REGION WITH NEUROGENIC CLAUDICATION: ICD-10-CM

## 2019-05-24 DIAGNOSIS — M96.1 POSTLAMINECTOMY SYNDROME: ICD-10-CM

## 2019-05-24 DIAGNOSIS — R06.02 SHORTNESS OF BREATH: ICD-10-CM

## 2019-05-24 DIAGNOSIS — M54.16 RADICULOPATHY, LUMBAR REGION: ICD-10-CM

## 2019-05-24 LAB
ECHO AO ROOT DIAM: 2.73 CM
ECHO AV AREA PEAK VELOCITY: 1 CM2
ECHO AV AREA VTI: 1.1 CM2
ECHO AV AREA/BSA PEAK VELOCITY: 0.4 CM2/M2
ECHO AV AREA/BSA VTI: 0.5 CM2/M2
ECHO AV MEAN GRADIENT: 13.4 MMHG
ECHO AV PEAK GRADIENT: 22.4 MMHG
ECHO AV PEAK VELOCITY: 236.53 CM/S
ECHO AV VTI: 35.29 CM
ECHO LA AREA 4C: 17.2 CM2
ECHO LA VOL 2C: 59.55 ML (ref 22–52)
ECHO LA VOL 4C: 40.74 ML (ref 22–52)
ECHO LA VOL BP: 56.01 ML (ref 22–52)
ECHO LA VOL/BSA BIPLANE: 26.1 ML/M2 (ref 16–28)
ECHO LA VOLUME INDEX A2C: 27.75 ML/M2 (ref 16–28)
ECHO LA VOLUME INDEX A4C: 18.99 ML/M2 (ref 16–28)
ECHO LV INTERNAL DIMENSION DIASTOLIC: 3.88 CM (ref 3.9–5.3)
ECHO LV INTERNAL DIMENSION SYSTOLIC: 2.15 CM
ECHO LV IVSD: 1.31 CM (ref 0.6–0.9)
ECHO LV MASS 2D: 191.3 G (ref 67–162)
ECHO LV MASS INDEX 2D: 89.2 G/M2 (ref 43–95)
ECHO LV POSTERIOR WALL DIASTOLIC: 1.16 CM (ref 0.6–0.9)
ECHO LVOT DIAM: 2.05 CM
ECHO LVOT PEAK GRADIENT: 1.9 MMHG
ECHO LVOT PEAK VELOCITY: 69.18 CM/S
ECHO LVOT VTI: 11.73 CM
ECHO MV A VELOCITY: 73.39 CM/S
ECHO MV E DECELERATION TIME (DT): 270.2 MS
ECHO MV E VELOCITY: 43.19 CM/S
ECHO MV E/A RATIO: 0.59
ECHO PVEIN A DURATION: 138.4 MS
ECHO PVEIN A VELOCITY: 21.54 CM/S

## 2019-05-24 PROCEDURE — 93306 TTE W/DOPPLER COMPLETE: CPT

## 2019-05-24 RX ORDER — TRAMADOL HYDROCHLORIDE 50 MG/1
50 TABLET ORAL
Qty: 120 TAB | Refills: 1 | Status: SHIPPED | OUTPATIENT
Start: 2019-07-01 | End: 2019-07-31

## 2019-05-24 RX ORDER — TRAMADOL HYDROCHLORIDE 50 MG/1
50 TABLET ORAL
Qty: 120 TAB | Refills: 1 | Status: SHIPPED | OUTPATIENT
Start: 2019-06-01 | End: 2019-05-24 | Stop reason: SDUPTHER

## 2019-05-24 NOTE — TELEPHONE ENCOUNTER
Called patient and left voice mail informing her that her prescription is ready for  at our [facility] office. No further action needed at this time.

## 2019-05-24 NOTE — TELEPHONE ENCOUNTER
UDS consistent. Please pend 2 months of Ultram as last prescribed for my review. Thank you.      I will pull

## 2019-05-24 NOTE — TELEPHONE ENCOUNTER
Tramadol 50 mg #120 take 1 po four times a day prn chronic pain with 1 refill. With DNF 7/1/19 because  shows she last filled on 5/1/19 and she received a prescription on 5/9/19. That she can fill on 6/1/19. Please advise.

## 2019-06-03 ENCOUNTER — OFFICE VISIT (OUTPATIENT)
Dept: FAMILY MEDICINE CLINIC | Age: 69
End: 2019-06-03

## 2019-06-03 VITALS
WEIGHT: 228 LBS | HEIGHT: 67 IN | TEMPERATURE: 97.7 F | RESPIRATION RATE: 16 BRPM | BODY MASS INDEX: 35.79 KG/M2 | SYSTOLIC BLOOD PRESSURE: 117 MMHG | OXYGEN SATURATION: 96 % | HEART RATE: 98 BPM | DIASTOLIC BLOOD PRESSURE: 77 MMHG

## 2019-06-03 DIAGNOSIS — E03.9 ACQUIRED HYPOTHYROIDISM: ICD-10-CM

## 2019-06-03 DIAGNOSIS — J30.1 SEASONAL ALLERGIC RHINITIS DUE TO POLLEN: ICD-10-CM

## 2019-06-03 DIAGNOSIS — Z78.0 POST-MENOPAUSAL: Primary | ICD-10-CM

## 2019-06-03 DIAGNOSIS — R10.11 RIGHT UPPER QUADRANT ABDOMINAL PAIN: ICD-10-CM

## 2019-06-03 RX ORDER — LEVOTHYROXINE SODIUM 50 UG/1
TABLET ORAL
Qty: 90 TAB | Refills: 1 | Status: SHIPPED | OUTPATIENT
Start: 2019-06-03 | End: 2019-12-05 | Stop reason: SDUPTHER

## 2019-06-03 RX ORDER — FLUTICASONE PROPIONATE 50 MCG
SPRAY, SUSPENSION (ML) NASAL
Qty: 1 BOTTLE | Refills: 5 | Status: SHIPPED | OUTPATIENT
Start: 2019-06-03 | End: 2020-03-03 | Stop reason: SDUPTHER

## 2019-06-03 NOTE — PROGRESS NOTES
Subjective:     HPI:  Lizeth Shepherd is a 71 y.o. female who presents to the office today for routine care and medication refills. Chronic pain: Pt has chronic pain due to fibromyalgia and arthritis. She complains that \"every joint hurts\" and complains of limited ROM in her thumbs. Her pain has been worsening for the past 2 months. She has been taking the maximum dose of Cymbalta and meloxicam without relief. She has previously been on Lyrica but did not tolerate higher doses. She has also previously failed Neurontin. Diabetes Mellitus:  female has diabetes mellitus, and  diabetes and hyperlipidemia. Diabetic ROS - medication compliance: compliant all of the time, home glucose monitoring: is performed regularly, Entire glucose log was reviewed with patient at this visit. Blood sugars are well controlled. They are consistently under 120, only a couple episodes over 120 (144), further diabetic ROS: no polyuria or polydipsia, no chest pain, dyspnea or TIA's, no numbness, tingling or pain in extremities. Lab review: labs are reviewed, up to date and normal.   Lab Results   Component Value Date/Time    Hemoglobin A1c 6.3 (H) 2018 02:28 AM     Shortness of breath: Pt complains of worsening shortness of breath. She has an upcoming follow up with Dr. Simi Madden. Cardiology: Pt follows up regularly with cardiology. She has an ultrasound scheduled for next week. Pt had an echo done on 19. She states that it showed enlarged left heart. RUQ pain: Pt complains of RUQ pain with bending over lasting 1 month. When she bends over, she feels like a \"balloon expands\" under her ribs and something slips out of place. When she stands back up, the feeling resolves. Of note, pt states that her handicap placard is . Pt had an eye exam done by Dr. Esteban Mars 2019. Pt was discharged from psychiatry due to missed appointments. She is looking for another provider to take over her care.       Current Outpatient Medications   Medication Sig Dispense Refill    fluticasone propionate (FLONASE) 50 mcg/actuation nasal spray One spray each nostril twice a day. 1 Bottle 5    levothyroxine (SYNTHROID) 50 mcg tablet TAKE 1 TABLET BY MOUTH EVERY DAY BEFORE BREAKFAST 90 Tab 1    atorvastatin (LIPITOR) 20 mg tablet TAKE 1 TABLET BY MOUTH ONCE DAILY 90 Tab 1    metFORMIN (GLUCOPHAGE) 500 mg tablet TAKE ONE TABLET BY MOUTH ONCE DAILY WITH  DINNER. 90 Tab 1    meloxicam (MOBIC) 15 mg tablet TAKE 1 TABLET BY MOUTH DAILY 90 Tab 3    DULoxetine (CYMBALTA) 30 mg capsule TAKE ONE CAPSULE BY MOUTH TWICE DAILY 180 Cap 1    traMADol (ULTRAM) 50 mg tablet 1 tab PO QID prn pain 120 Tab 2    Lactobacillus acidophilus (PROBIOTIC PO) Take 1 Cap by Mouth Once a Day. Lactose defense      docusate sodium (COLACE) 50 mg capsule 50 mg.      ascorbate calcium (VITAMIN C PO) Take  by mouth.  vitamin E acetate (VITAMIN E PO) Take  by mouth.  FLUoxetine (PROZAC) 10 mg capsule TAKE 1 CAPSULE BY MOUTH ONCE DAILY. GENERICFOR PROZAC. 90 Cap 3    cyanocobalamin (VITAMIN B-12) 500 mcg tablet Take 500 mcg by mouth daily.  clobetasol (TEMOVATE) 0.05 % external solution APPLY TO SCALP AFTER SHAMPOO DAILY 50 mL 3    cetirizine (ZYRTEC) 10 mg tablet TAKE 1 TABLET BY MOUTH EVERY DAY. GENERIC FOR ZYRTEC 90 Tab 2    SPIRIVA RESPIMAT 2.5 mcg/actuation inhaler INL 2 PFS PO D  3    VENTOLIN HFA 90 mcg/actuation inhaler 2 Puffs by Aerosolization route every four (4) hours as needed. 0    butalbital-acetaminophen-caffeine (FIORICET, ESGIC) -40 mg per tablet Take 1 Tab by mouth every four (4) hours as needed for Pain. Max Daily Amount: 6 Tabs. 30 Tab 0    B.infantis-B.ani-B.long-B.bifi (PROBIOTIC 4X) 10-15 mg TbEC Take  by mouth daily.  ranitidine (ZANTAC) 150 mg tablet 150mg QAM and 75mg QPM      aspirin delayed-release 81 mg tablet Take 81 mg by mouth daily.       Cholecalciferol, Vitamin D3, (VITAMIN D3) 1,000 unit cap Take 1,000 Units by mouth daily.  glucose blood VI test strips (ACCU-CHEK MARCE PLUS TEST STRP) strip Please use one strip to test blood sugars once a day. 100 strip 20    Lancets (ACCU-CHEK SOFTCLIX LANCETS) misc Please use one lancet to test blood sugars twice a day. 1 Package 20    BLOOD-GLUCOSE METER (ACCU-CHEK MARCE MONITORING) by Does Not Apply route.  triamcinolone acetonide (KENALOG) 0.1 % topical cream Apply  to affected area two (2) times a day. use thin layer      [START ON 7/1/2019] traMADol (ULTRAM) 50 mg tablet Take 1 Tab by mouth four (4) times daily as needed for Pain for up to 30 days. Max Daily Amount: 200 mg. NO EARLY FILLS  Indications: Neuropathic Pain, Pain 120 Tab 1        Allergies   Allergen Reactions    Latex, Natural Rubber Hives    Adhesive Rash     Some bandaids    Ciprofloxacin Rash    Codeine Nausea and Vomiting    Demerol [Meperidine] Nausea and Vomiting    Diclofenac Shortness of Breath and Palpitations    Erythromycin Rash    Levaquin [Levofloxacin] Rash    Loratadine Swelling    Morphine Nausea and Vomiting    Penicillin G Hives and Rash     Does fine with Keflex    Sulfa (Sulfonamide Antibiotics) Itching    Vicodin [Hydrocodone-Acetaminophen] Other (comments)     Metallic taste in mouth       Past Medical History:   Diagnosis Date    Ankle fracture     Aortic stenosis     S/P AVR in 2016    Asthma     Chronic pain     back pain    Diabetes (HCC)     Diverticulitis     Fibromyalgia     H/O aortic valve replacement 03/16    21 mm bovine pericardial bioprosthesis and epiaortic scanning      Hypertension     PT denies    Hypothyroidism     Lumbar post-laminectomy syndrome     Menopause     Obesity     Osteoarthritis     Psychotic disorder ([facility] [address]     Sciatica     Skin cancer 2013    right forearm. Past Surgical History:   Procedure Laterality Date    CARDIAC SURG PROCEDURE UNLIST  03/2016    aortic valve replacement.      CHEST SURGERY PROCEDURE UNLISTED  2016    Open Heart Surgery    COLONOSCOPY      COLONOSCOPY N/A 9/15/2017    COL[facility]COPY performed [facility] at [facility] ENDOSCOPY    HX CATARACT REMOVAL Right     lens  replaced.  HX CATARACT REMOVAL Left 2018    HX COLONOSCOPY  09/15/2017    dr. Santosh Pena  f/u 5 years.  HX HEENT  's    sinus surgery     HX HEENT Right 2017    laser for right eye cataracts.  HX LUMBAR FUSION      L3-L4-L5    HX LUMBAR LAMINECTOMY      HX MOHS PROCEDURES Left     HX ORTHOPAEDIC      Rotator cuff Bilateral    HX SHOULDER REPLACEMENT Right 2017    reverse.      [place] COLSC FLX W/RMVL OF TUMOR POLYP LESION SNARE TQ  2014 repeat in 3 years    Dr. Kasey Whipple      Aortic valve replacement       Family History   Problem Relation Age of Onset    Hypertension Father     Heart Disease Father     Alcohol abuse Father        Social History     Socioeconomic History    Marital status:      Spouse name: Not on file    Number of children: Not on file    Years of education: Not on file    Highest education level: Not on file   Occupational History    Not on file   Social Needs    Financial resource strain: Not on file    Food insecurity:     Worry: Not on file     Inability: Not on file    Transportation needs:     Medical: Not on file     Non-medical: Not on file   Tobacco Use    Smoking status: Former Smoker     Years: 45.00     Types: Cigarettes     Last attempt to quit: 2011     Years since quittin.9    Smokeless tobacco: Never Used   Substance and Sexual Activity    Alcohol use: No    Drug use: No    Sexual activity: Never   Lifestyle    Physical activity:     Days per week: Not on file     Minutes per session: Not on file    Stress: Not on file   Relationships    Social connections:     Talks on phone: Not on file     Gets together: Not on file     Attends Church service: Not on file Active member of club or organization: Not on file     Attends meetings of clubs or organizations: Not on file     Relationship status: Not on file    Intimate partner violence:     Fear of current or ex partner: Not on file     Emotionally abused: Not on file     Physically abused: Not on file     Forced sexual activity: Not on file   Other Topics Concern    Not on file   Social History Narrative    Not on file       REVIEW OF SYSTEM:  Review of Systems   Constitutional: Negative for chills and fever. Eyes: Negative for blurred vision. Respiratory: Positive for shortness of breath. Cardiovascular: Negative for chest pain, palpitations and leg swelling. Gastrointestinal: Positive for abdominal pain (RUQ). Negative for constipation, diarrhea, nausea and vomiting. Musculoskeletal: Positive for back pain, joint pain and myalgias. Neurological: Negative for headaches. Objective:     Visit Vitals  /77 (BP 1 Location: Right arm, BP Patient Position: Sitting)   Pulse 98   Temp 97.7 °F (36.5 °C) (Oral)   Resp 16   Ht 5' 7\" (1.702 m)   Wt 228 lb (103.4 kg)   SpO2 96%   BMI 35.71 kg/m²       PHYSICAL EXAM:  Physical Exam   Constitutional: She is oriented to person, place, and time and well-developed, well-nourished, and in no distress. HENT:   Right Ear: Tympanic membrane, external ear and ear canal normal.   Left Ear: Tympanic membrane, external ear and ear canal normal.   Nose: Nose normal.   Mouth/Throat: Oropharynx is clear and moist. Mucous membranes are dry. Eyes: Pupils are equal, round, and reactive to light. Neck: Normal range of motion. Neck supple. No thyromegaly present. Cardiovascular: Normal rate, regular rhythm, normal heart sounds and intact distal pulses. No murmur heard. Pulmonary/Chest: Effort normal and breath sounds normal. She has no wheezes. Neurological: She is alert and oriented to person, place, and time. Skin: Skin is warm and dry. Vitals reviewed.     Lab Results   Component Value Date/Time    Sodium 144 11/16/2018 02:28 AM    Potassium 5.4 (H) 11/16/2018 02:28 AM    Chloride 105 11/16/2018 02:28 AM    CO2 25 11/16/2018 02:28 AM    Anion gap 8 09/13/2017 02:11 PM    Glucose 92 11/16/2018 02:28 AM    BUN 11 11/16/2018 02:28 AM    Creatinine 0.86 11/16/2018 02:28 AM    BUN/Creatinine ratio 13 11/16/2018 02:28 AM    GFR est AA 80 11/16/2018 02:28 AM    GFR est non-AA 70 11/16/2018 02:28 AM    Calcium 10.1 11/16/2018 02:28 AM    Bilirubin, total 0.5 11/16/2018 02:28 AM    AST (SGOT) 39 11/16/2018 02:28 AM    Alk. phosphatase 87 11/16/2018 02:28 AM    Protein, total 6.9 11/16/2018 02:28 AM    Albumin 4.3 11/16/2018 02:28 AM    Globulin 3.8 04/27/2017 03:20 PM    A-G Ratio 1.7 11/16/2018 02:28 AM    ALT (SGPT) 46 (H) 11/16/2018 02:28 AM     Lab Results   Component Value Date/Time    Cholesterol, total 105 11/16/2018 02:28 AM    HDL Cholesterol 53 11/16/2018 02:28 AM    LDL, calculated 38 11/16/2018 02:28 AM    VLDL, calculated 14 11/16/2018 02:28 AM    Triglyceride 70 11/16/2018 02:28 AM    CHOL/HDL Ratio 2.0 02/13/2015 08:47 AM     Lab Results   Component Value Date/Time    Hemoglobin A1c 6.3 (H) 11/16/2018 02:28 AM       Assessment/Plan:       ICD-10-CM ICD-9-CM    1. Post-menopausal Z78.0 V49.81 DEXA BONE DENSITY STUDY AXIAL   2. Seasonal allergic rhinitis due to pollen J30.1 477.0 fluticasone propionate (FLONASE) 50 mcg/actuation nasal spray   3. Acquired hypothyroidism E03.9 244.9 levothyroxine (SYNTHROID) 50 mcg tablet   4. Right upper quadrant abdominal pain R10.11 789.01 US ABD COMP     Patient given opportunity to ask questions. Questions answered. Patient understands plan of care. Written by Be Appiah, as dictated by DO. ISAIAS Frost, Dr. Silvia Gonzales, confirm that all documentation is accurate.

## 2019-06-03 NOTE — PROGRESS NOTES
1. Have you been to the ER, urgent care clinic since your last visit? Hospitalized since your last visit? No    2. Have you seen or consulted any other health care providers outside of the [address] [place] [address] since your last visit? Include any pap smears or colon screening. No     Patient presents in office today for routine care. Patient concerns: med refills.

## 2019-06-03 NOTE — TELEPHONE ENCOUNTER
Patient called today asking if her prescriptions can stay in the lockbox until next week due to her car being worked on. Please advise.

## 2019-06-03 NOTE — TELEPHONE ENCOUNTER
Spoke with patient, name and  were both verified. Patient is aware that Rx's will be in lock box. No further action needed at this present time.

## 2019-06-06 ENCOUNTER — HOSPITAL ENCOUNTER (OUTPATIENT)
Dept: ULTRASOUND IMAGING | Age: 69
Discharge: HOME | End: 2019-06-06
Attending: FAMILY MEDICINE
Payer: MEDICARE

## 2019-06-06 DIAGNOSIS — R10.11 RIGHT UPPER QUADRANT ABDOMINAL PAIN: ICD-10-CM

## 2019-06-06 PROCEDURE — 76705 ECHO EXAM OF ABDOMEN: CPT

## 2019-06-10 ENCOUNTER — TELEPHONE (OUTPATIENT)
Dept: FAMILY MEDICINE CLINIC | Age: 69
End: 2019-06-10

## 2019-06-10 NOTE — TELEPHONE ENCOUNTER
Pt called in and needs Abimbola Jeffers to call her back in regards to her ultrasound results and she was also wanting to know if orders could be put in for her to leave a urine specimen as she thinks she is getting a bladder infection. Please advise.

## 2019-06-13 ENCOUNTER — OFFICE VISIT (OUTPATIENT)
Dept: FAMILY MEDICINE CLINIC | Age: 69
End: 2019-06-13

## 2019-06-13 VITALS
HEART RATE: 94 BPM | DIASTOLIC BLOOD PRESSURE: 79 MMHG | HEIGHT: 67 IN | TEMPERATURE: 98.1 F | OXYGEN SATURATION: 98 % | BODY MASS INDEX: 35.82 KG/M2 | WEIGHT: 228.2 LBS | RESPIRATION RATE: 19 BRPM | SYSTOLIC BLOOD PRESSURE: 117 MMHG

## 2019-06-13 DIAGNOSIS — N30.01 ACUTE CYSTITIS WITH HEMATURIA: ICD-10-CM

## 2019-06-13 DIAGNOSIS — R39.89 BLADDER PAIN: Primary | ICD-10-CM

## 2019-06-13 LAB
BILIRUB UR QL STRIP: NORMAL
GLUCOSE UR-MCNC: NEGATIVE MG/DL
KETONES P FAST UR STRIP-MCNC: NORMAL MG/DL
PH UR STRIP: 7 [PH] (ref 4.6–8)
PROT UR QL STRIP: NORMAL
SP GR UR STRIP: 1.02 (ref 1–1.03)
UA UROBILINOGEN AMB POC: NORMAL (ref 0.2–1)
URINALYSIS CLARITY POC: NORMAL
URINALYSIS COLOR POC: YELLOW
URINE BLOOD POC: NORMAL
URINE LEUKOCYTES POC: NORMAL
URINE NITRITES POC: POSITIVE

## 2019-06-13 RX ORDER — CEPHALEXIN 500 MG/1
500 CAPSULE ORAL 2 TIMES DAILY
Qty: 20 CAP | Refills: 0 | Status: SHIPPED | OUTPATIENT
Start: 2019-06-13 | End: 2019-06-23

## 2019-06-13 NOTE — PROGRESS NOTES
Subjective:     HPI:  Alexey Wilkins is a 71 y.o. female who presents to the office today for problem visit. Bladder infection: Pt complains of groin pain and abnormal urine odor. She has a hx of bladder infections. Urine dip positive for leukocytes and nitrites. RUQ pain: Pt complains of RUQ pain with bending over lasting 1 month. Imaging study reviewed, normal.     [facility] 06/06/19  IMPRESSION:     Study degraded by bowel gas, ribs, and body habitus.     1. No evidence of acute cholecystitis. No abnormal biliary dilatation. 2. No definite right upper quadrant abnormality. Current Outpatient Medications   Medication Sig Dispense Refill    cephALEXin (KEFLEX) 500 mg capsule Take 1 Cap by mouth two (2) times a day for 10 days. 20 Cap 0    fluticasone propionate (FLONASE) 50 mcg/actuation nasal spray One spray each nostril twice a day. 1 Bottle 5    levothyroxine (SYNTHROID) 50 mcg tablet TAKE 1 TABLET BY MOUTH EVERY DAY BEFORE BREAKFAST 90 Tab 1    [START ON 7/1/2019] traMADol (ULTRAM) 50 mg tablet Take 1 Tab by mouth four (4) times daily as needed for Pain for up to 30 days. Max Daily Amount: 200 mg. NO EARLY FILLS  Indications: Neuropathic Pain, Pain 120 Tab 1    atorvastatin (LIPITOR) 20 mg tablet TAKE 1 TABLET BY MOUTH ONCE DAILY 90 Tab 1    metFORMIN (GLUCOPHAGE) 500 mg tablet TAKE ONE TABLET BY MOUTH ONCE DAILY WITH  DINNER. 90 Tab 1    meloxicam (MOBIC) 15 mg tablet TAKE 1 TABLET BY MOUTH DAILY 90 Tab 3    DULoxetine (CYMBALTA) 30 mg capsule TAKE ONE CAPSULE BY MOUTH TWICE DAILY 180 Cap 1    traMADol (ULTRAM) 50 mg tablet 1 tab PO QID prn pain 120 Tab 2    Lactobacillus acidophilus (PROBIOTIC PO) Take 1 Cap by Mouth Once a Day. Lactose defense      docusate sodium (COLACE) 50 mg capsule 50 mg.      ascorbate calcium (VITAMIN C PO) Take  by mouth.  vitamin E acetate (VITAMIN E PO) Take  by mouth.  FLUoxetine (PROZAC) 10 mg capsule TAKE 1 CAPSULE BY MOUTH ONCE DAILY. GENERICFOR PROZAC. 90 Cap 3    cyanocobalamin (VITAMIN B-12) 500 mcg tablet Take 500 mcg by mouth daily.  clobetasol (TEMOVATE) 0.05 % external solution APPLY TO SCALP AFTER SHAMPOO DAILY 50 mL 3    cetirizine (ZYRTEC) 10 mg tablet TAKE 1 TABLET BY MOUTH EVERY DAY. GENERIC FOR ZYRTEC 90 Tab 2    SPIRIVA RESPIMAT 2.5 mcg/actuation inhaler INL 2 PFS PO D  3    VENTOLIN HFA 90 mcg/actuation inhaler 2 Puffs by Aerosolization route every four (4) hours as needed. 0    butalbital-acetaminophen-caffeine (FIORICET, ESGIC) -40 mg per tablet Take 1 Tab by mouth every four (4) hours as needed for Pain. Max Daily Amount: 6 Tabs. 30 Tab 0    B.infantis-B.ani-B.long-B.bifi (PROBIOTIC 4X) 10-15 mg TbEC Take  by mouth daily.  ranitidine (ZANTAC) 150 mg tablet 150mg QAM and 75mg QPM      aspirin delayed-release 81 mg tablet Take 81 mg by mouth daily.  Cholecalciferol, Vitamin D3, (VITAMIN D3) 1,000 unit cap Take 1,000 Units by mouth daily.  glucose blood VI test strips (ACCU-CHEK MARCE PLUS TEST STRP) strip Please use one strip to test blood sugars once a day. 100 strip 20    Lancets (ACCU-CHEK SOFTCLIX LANCETS) misc Please use one lancet to test blood sugars twice a day. 1 Package 20    BLOOD-GLUCOSE METER (ACCU-CHEK MARCE MONITORING) by Does Not Apply route.  triamcinolone acetonide (KENALOG) 0.1 % topical cream Apply  to affected area two (2) times a day.  use thin layer          Allergies   Allergen Reactions    Latex, Natural Rubber Hives    Adhesive Rash     Some bandaids    Ciprofloxacin Rash    Codeine Nausea and Vomiting    Demerol [Meperidine] Nausea and Vomiting    Diclofenac Shortness of Breath and Palpitations    Erythromycin Rash    Levaquin [Levofloxacin] Rash    Loratadine Swelling    Morphine Nausea and Vomiting    Penicillin G Hives and Rash     Does fine with Keflex    Sulfa (Sulfonamide Antibiotics) Itching    Vicodin [Hydrocodone-Acetaminophen] Other (comments)     Metallic taste in mouth       Past Medical History:   Diagnosis Date    Ankle fracture     Aortic stenosis     S/P AVR in 2016    Asthma     Chronic pain     back pain    Diabetes ([address]     Diverticulitis     Fibromyalgia     H/O aortic valve replacement 03/16    21 mm bovine pericardial bioprosthesis and epiaortic scanning      Hypertension     PT denies    Hypothyroidism     Lumbar post-laminectomy syndrome     Menopause     Obesity     Osteoarthritis     Psychotic disorder ([address]     Sciatica     Skin cancer 2013    right forearm. Past Surgical History:   Procedure Laterality Date    CARDIAC SURG PROCEDURE UNLIST  03/2016    aortic valve replacement.  CHEST SURGERY PROCEDURE UNLISTED  03/2016    Open Heart Surgery    COLONOSCOPY      COLONOSCOPY N/A 9/15/2017    COLONOSCOPY performed by Tamara Rosas MD at [facility]  Right 1995    lens  replaced.  HX CATARACT REMOVAL Left 02/2018    HX COLONOSCOPY  09/15/2017    dr. Boles Brood  f/u 5 years.  HX HEENT  1990's    sinus surgery     HX HEENT Right 11/02/2017    laser for right eye cataracts.  HX LUMBAR FUSION  2004    L3-L4-L5    HX LUMBAR LAMINECTOMY  2002    HX MOHS PROCEDURES Left 1990    HX ORTHOPAEDIC      Rotator cuff Bilateral    HX SHOULDER REPLACEMENT Right 03/29/2017    reverse.      [place] COLSC FLX W/RMVL OF TUMOR POLYP LESION SNARE TQ  07/09/2014 repeat in 3 years    Dr. Shiv Chase      Aortic valve replacement       Family History   Problem Relation Age of Onset    Hypertension Father     Heart Disease Father     Alcohol abuse Father        Social History     Socioeconomic History    Marital status:      Spouse name: Not on file    Number of children: Not on file    Years of education: Not on file    Highest education level: Not on file   Occupational History    Not on file   Social Needs    Financial resource strain: Not on file    Food insecurity:     Worry: Not on file     Inability: Not on file    Transportation needs:     Medical: Not on file     Non-medical: Not on file   Tobacco Use    Smoking status: Former Smoker     Years: 45.00     Types: Cigarettes     Last attempt to quit: 2011     Years since quittin.9    Smokeless tobacco: Never Used   Substance and Sexual Activity    Alcohol use: No    Drug use: No    Sexual activity: Never   Lifestyle    Physical activity:     Days per week: Not on file     Minutes per session: Not on file    Stress: Not on file   Relationships    Social connections:     Talks on phone: Not on file     Gets together: Not on file     Attends Islam service: Not on file     Active member of club or organization: Not on file     Attends meetings of clubs or organizations: Not on file     Relationship status: Not on file    Intimate partner violence:     Fear of current or ex partner: Not on file     Emotionally abused: Not on file     Physically abused: Not on file     Forced sexual activity: Not on file   Other Topics Concern    Not on file   Social History Narrative    Not on file       REVIEW OF SYSTEM:  Review of Systems   Constitutional: Negative for chills and fever. Eyes: Negative for blurred vision. Respiratory: Negative for shortness of breath. Cardiovascular: Negative for chest pain, palpitations and leg swelling. Gastrointestinal: Positive for abdominal pain (groin). Negative for constipation, diarrhea, nausea and vomiting. Musculoskeletal: Negative for joint pain. Neurological: Negative for headaches.        Objective:     Visit Vitals  /79 (BP 1 Location: Right arm, BP Patient Position: Sitting)   Pulse 94   Temp 98.1 °F (36.7 °C) (Oral)   Resp 19   Ht 5' 7\" (1.702 m)   Wt 228 lb 3.2 oz (103.5 kg)   SpO2 98%   BMI 35.74 kg/m²       PHYSICAL EXAM:  Physical Exam   Constitutional: She is oriented to person, place, and time and well-developed, well-nourished, and in no distress. HENT:   Right Ear: Tympanic membrane, external ear and ear canal normal.   Left Ear: Tympanic membrane, external ear and ear canal normal.   Nose: Nose normal.   Mouth/Throat: Oropharynx is clear and moist.   Eyes: Pupils are equal, round, and reactive to light. Neck: Normal range of motion. Neck supple. No thyromegaly present. Cardiovascular: Normal rate, regular rhythm and intact distal pulses. Murmur heard. Pulmonary/Chest: Effort normal and breath sounds normal. She has no wheezes. Abdominal: There is CVA tenderness (right). Neurological: She is alert and oriented to person, place, and time. Skin: Skin is warm and dry. Vitals reviewed. Results for orders placed or performed in visit on 06/13/19   AMB POC URINALYSIS DIP STICK AUTO W/O MICRO     Status: None   Result Value Ref Range Status    Color (UA POC) Yellow  Final    Clarity (UA POC) Turbid  Final    Glucose (UA POC) Negative Negative Final    Bilirubin (UA POC) 1+ Negative Final    Ketones (UA POC) Trace Negative Final    Specific gravity (UA POC) 1.020 1.001 - 1.035 Final    Blood (UA POC) Trace Negative Final     Comment: Intact    pH (UA POC) 7.0 4.6 - 8.0 Final    Protein (UA POC) 1+ Negative Final    Urobilinogen (UA POC) 1 mg/dL 0.2 - 1 Final    Nitrites (UA POC) Positive Negative Final    Leukocyte esterase (UA POC) 3+ Negative Final     Assessment/Plan:       ICD-10-CM ICD-9-CM    1. Bladder pain R39.89 788.99 AMB POC URINALYSIS DIP STICK AUTO W/O MICRO      CULTURE, URINE      cephALEXin (KEFLEX) 500 mg capsule   2. Acute cystitis with hematuria N30.01 595.0 cephALEXin (KEFLEX) 500 mg capsule     Patient given opportunity to ask questions. Questions answered. Patient understands plan of care. Follow-up and Dispositions    · Return if symptoms worsen or fail to improve.        Written by María Mars, as dictated by Cayden Schuler DO.    I, Dr. Cayden Schuler, confirm that all documentation is accurate.

## 2019-06-13 NOTE — PATIENT INSTRUCTIONS
Urinary Tract Infection in Women: Care Instructions  Your Care Instructions    A urinary tract infection, or UTI, is a general term for an infection anywhere between the kidneys and the urethra (where urine comes out). Most UTIs are bladder infections. They often cause pain or burning when you urinate. UTIs are caused by bacteria and can be cured with antibiotics. Be sure to complete your treatment so that the infection goes away. Follow-up care is a key part of your treatment and safety. Be sure to make and go to all appointments, and call your doctor if you are having problems. It's also a good idea to know your test results and keep a list of the medicines you take. How can you care for yourself at home? · Take your antibiotics as directed. Do not stop taking them just because you feel better. You need to take the full course of antibiotics. · Drink extra water and other fluids for the next day or two. This may help wash out the bacteria that are causing the infection. (If you have kidney, heart, or liver disease and have to limit fluids, talk with your doctor before you increase your fluid intake.)  · Avoid drinks that are carbonated or have caffeine. They can irritate the bladder. · Urinate often. Try to empty your bladder each time. · To relieve pain, take a hot bath or lay a heating pad set on low over your lower belly or genital area. Never go to sleep with a heating pad in place. To prevent UTIs  · Drink plenty of water each day. This helps you urinate often, which clears bacteria from your system. (If you have kidney, heart, or liver disease and have to limit fluids, talk with your doctor before you increase your fluid intake.)  · Urinate when you need to. · Urinate right after you have sex. · Change sanitary pads often. · Avoid douches, bubble baths, feminine hygiene sprays, and other feminine hygiene products that have deodorants.   · After going to the bathroom, wipe from front to back.  When should you call for help? Call your doctor now or seek immediate medical care if:    · Symptoms such as fever, chills, nausea, or vomiting get worse or appear for the first time.     · You have new pain in your back just below your rib cage. This is called flank pain.     · There is new blood or pus in your urine.     · You have any problems with your antibiotic medicine.    Watch closely for changes in your health, and be sure to contact your doctor if:    · You are not getting better after taking an antibiotic for 2 days.     · Your symptoms go away but then come back. Where can you learn more? Go to http://gaby-francisco.info/. Enter U994 in the search box to learn more about \"Urinary Tract Infection in Women: Care Instructions. \"  Current as of: March 20, 2018  Content Version: 11.9  © 2363-8925 [employer]. Care instructions adapted under license by [employer] (which disclaims liability or warranty for this information). If you have questions about a medical condition or this instruction, always ask your healthcare professional. [address] any warranty or liability for your use of this information.

## 2019-06-14 ENCOUNTER — TELEPHONE (OUTPATIENT)
Dept: ORTHOPEDIC SURGERY | Age: 69
End: 2019-06-14

## 2019-06-14 NOTE — TELEPHONE ENCOUNTER
Pt came by mo office to p/u her 2 months of tramadol and only one was in the box for p/u. Pt was told there were 2 months of refills please call pt to advise.  If it can be called to pharmacy that would be nice as she lives in [facility] and does not want to drive to [place] to p/u

## 2019-06-15 LAB — BACTERIA UR CULT: ABNORMAL

## 2019-06-25 ENCOUNTER — HOSPITAL ENCOUNTER (OUTPATIENT)
Dept: GENERAL RADIOLOGY | Age: 69
Discharge: HOME | End: 2019-06-25
Attending: FAMILY MEDICINE
Payer: MEDICARE

## 2019-06-25 DIAGNOSIS — Z78.0 POST-MENOPAUSAL: ICD-10-CM

## 2019-06-25 PROCEDURE — 77080 DXA BONE DENSITY AXIAL: CPT

## 2019-07-02 NOTE — PROGRESS NOTES
Bone mass measures as osteopenia. Discuss with your new provider starting therapy with alendronate 70 mg once a week.

## 2019-08-01 ENCOUNTER — TELEPHONE (OUTPATIENT)
Dept: ORTHOPEDIC SURGERY | Age: 69
End: 2019-08-01

## 2019-08-01 NOTE — TELEPHONE ENCOUNTER
Called to get different provider to call in verbal for ultram for patient due to the update of NP NATALYA

## 2019-08-02 NOTE — TELEPHONE ENCOUNTER
Please see if Dr. Johan Vernon would be willing to give a VO for this rx? My NATALYA was renewed early July so I am not sure Why [facility] is having an issue.

## 2019-08-02 NOTE — TELEPHONE ENCOUNTER
Regarding RX: traMADol (ULTRAM) 50 mg tablet [[id number]]    Pharmacy location: [address] [place] [address] (Pharmacy)      Recvd call from pt adv that pharmacy stated today they can not refill her RX for Tramadol b/c KATELYN Ram NATALYA # is  outdated. I adv per NP Leanna note that her NATALYA # is up to date and exp 7/31/2022. Pt adv pharmacy states that her NATALYA # Mar Martinezder not be updated in their system yet since its only 8/2. \" Pt asked if another doctor can put in the RX so she can pick it up ASAP. I adv would post note to clinical so they can review and handle accordingly.  Pt requested call back at [phone] (O)

## 2019-08-02 NOTE — TELEPHONE ENCOUNTER
Called and spoke to the pharmacy. They have resubmitted the rx and she can pick it up later today. Patient is aware.

## 2019-08-22 ENCOUNTER — HOSPITAL ENCOUNTER (OUTPATIENT)
Dept: LAB | Age: 69
Discharge: HOME | End: 2019-08-22

## 2019-08-22 ENCOUNTER — HOSPITAL ENCOUNTER (OUTPATIENT)
Dept: GENERAL RADIOLOGY | Age: 69
Discharge: HOME | End: 2019-08-22
Payer: MEDICARE

## 2019-08-22 ENCOUNTER — APPOINTMENT (OUTPATIENT)
Dept: LAB | Age: 69
End: 2019-08-22
Payer: MEDICARE

## 2019-08-22 ENCOUNTER — OFFICE VISIT (OUTPATIENT)
Dept: FAMILY MEDICINE CLINIC | Age: 69
End: 2019-08-22

## 2019-08-22 VITALS
SYSTOLIC BLOOD PRESSURE: 122 MMHG | OXYGEN SATURATION: 96 % | RESPIRATION RATE: 20 BRPM | DIASTOLIC BLOOD PRESSURE: 82 MMHG | HEART RATE: 94 BPM | WEIGHT: 226 LBS | TEMPERATURE: 98.4 F | BODY MASS INDEX: 35.47 KG/M2 | HEIGHT: 67 IN

## 2019-08-22 DIAGNOSIS — M79.10 MYALGIA: ICD-10-CM

## 2019-08-22 DIAGNOSIS — E55.9 VITAMIN D DEFICIENCY: ICD-10-CM

## 2019-08-22 DIAGNOSIS — G62.9 PERIPHERAL POLYNEUROPATHY: ICD-10-CM

## 2019-08-22 DIAGNOSIS — E11.69 CONTROLLED TYPE 2 DIABETES MELLITUS WITH OTHER SPECIFIED COMPLICATION, WITHOUT LONG-TERM CURRENT USE OF INSULIN (HCC): ICD-10-CM

## 2019-08-22 DIAGNOSIS — M85.89 OSTEOPENIA OF MULTIPLE SITES: Primary | ICD-10-CM

## 2019-08-22 DIAGNOSIS — F33.41 RECURRENT MAJOR DEPRESSIVE DISORDER, IN PARTIAL REMISSION (HCC): ICD-10-CM

## 2019-08-22 DIAGNOSIS — E53.8 VITAMIN B12 DEFICIENCY: ICD-10-CM

## 2019-08-22 DIAGNOSIS — E66.01 MORBID OBESITY, UNSPECIFIED OBESITY TYPE (HCC): ICD-10-CM

## 2019-08-22 DIAGNOSIS — M79.644 THUMB PAIN, RIGHT: ICD-10-CM

## 2019-08-22 DIAGNOSIS — J44.9 CHRONIC OBSTRUCTIVE PULMONARY DISEASE, UNSPECIFIED COPD TYPE (HCC): ICD-10-CM

## 2019-08-22 LAB — XX-LABCORP SPECIMEN COL,LCBCF: NORMAL

## 2019-08-22 PROCEDURE — 73130 X-RAY EXAM OF HAND: CPT

## 2019-08-22 PROCEDURE — 99001 SPECIMEN HANDLING PT-LAB: CPT

## 2019-08-22 RX ORDER — PREGABALIN 50 MG/1
50 CAPSULE ORAL 3 TIMES DAILY
Qty: 90 CAP | Refills: 3 | Status: SHIPPED | OUTPATIENT
Start: 2019-08-22 | End: 2019-10-29 | Stop reason: SDUPTHER

## 2019-08-22 RX ORDER — TIOTROPIUM BROMIDE AND OLODATEROL 3.124; 2.736 UG/1; UG/1
2 SPRAY, METERED RESPIRATORY (INHALATION) DAILY
Refills: 1 | COMMUNITY
Start: 2019-05-29 | End: 2022-02-15

## 2019-08-22 NOTE — PROGRESS NOTES
Room #      SUBJECTIVE:    Colleen Rivera is a 71 y.o. female who presents today for medication refills/ evaluation establish care muscle cramps    1. Have you been to the ER, urgent care clinic since your last visit? Hospitalized since your last visit? NO    2. Have you seen or consulted any other health care providers outside of the [address] [place] [address] since your last visit? Include any pap smears or colon screening. NO  When :  Reason:    Health Maintenance reviewed Yes    Health Maintenance Due   Topic Date Due    Influenza Age 5 to Adult  08/01/2019

## 2019-08-22 NOTE — PROGRESS NOTES
Impression / Plan     Diagnoses and all orders for this visit:    1. Osteopenia of multiple sites  -     PTH INTACT; Future  -     MAGNESIUM; Future  -     PHOSPHORUS; Future  -     PROTEIN ELECTROPHORESIS; Future  -     ALKALINE PHOSPHATASE, BONE; Future  -     CALCIUM, IONIZED; Future    2. Myalgia  -     METABOLIC PANEL, COMPREHENSIVE; Future  -     CK; Future  -     TSH 3RD GENERATION; Future  -     CBC W/O DIFF; Future    3. Vitamin D deficiency  -     VITAMIN D, 25 HYDROXY; Future    4. Peripheral polyneuropathy  -     pregabalin (LYRICA) 50 mg capsule; Take 1 Cap by mouth three (3) times daily. Max Daily Amount: 150 mg.    5. Thumb pain, right  -     XR HAND RT MIN 3 V; Future    6. Controlled type 2 diabetes mellitus with other specified complication, without long-term current use of insulin ([facility] [address] [address]    7. Morbid obesity, unspecified obesity type ([address]    8. Chronic obstructive pulmonary disease, unspecified COPD type ([address]    9. Recurrent major depressive disorder, in partial remission ([facility] [address]    Plan: Myalgias, will obtain CK while on STATIN Therapy and BMP to evaluate for electrolyte disturbance. Will obtain XR RIGHT Hand, suspect Basal Joint OA, discussed treatment options including NSAID's, Hand HOME PT, and Injections. Will obtain TSH while on Synthroid. Instructions given to [facility] Cymbalta and will start Titration of Lyrica for Fibromyalgia, continue Provac. Will obtain a Metabolic Bone Disease laboratory evaluation before considering to start Bisphosphonate Therapy, continue Vitamin D supplementation. Will address DM management on Follow up. Follow up with Pulmonology regarding Obstructive Lung Disease. Follow-up and Dispositions    · Return in about 6 months (around 2/22/2020). SHELBI Holliday is a 71 y. o.female presenting today to Establish care, however, also describes Myalgia. Currently on STATIN Therapy, however, also on Cymbalta and Prozac. No TSH > 12 months while on Synthroid.  Last Colonoscopy 4/18/2017, Follow up 4/2022. No Abnormal PAP Smears. Follow up Mammogram 1/2020. Seen by Pulmonology for Obstructive Lung Disease. Last AuI8i-6.3%, on Metformin 500 mg every day. UTD Eye and Foot Exams. Requesting restart of Lyrica for Fibromyalgia. Also, reports RIGHT Basal Joint Thumb Pain, No injury. S/P AVR, on ASA Therapy. Requesting start of Alendronate from Abnormal DEXA Scan 6/3/2019. Medications     Outpatient Medications Prior to Visit   Medication Sig Dispense Refill    STIOLTO RESPIMAT 2.5-2.5 mcg/actuation inhaler Take  by inhalation. 1    fluticasone propionate (FLONASE) 50 mcg/actuation nasal spray One spray each nostril twice a day. 1 Bottle 5    levothyroxine (SYNTHROID) 50 mcg tablet TAKE 1 TABLET BY MOUTH EVERY DAY BEFORE BREAKFAST 90 Tab 1    atorvastatin (LIPITOR) 20 mg tablet TAKE 1 TABLET BY MOUTH ONCE DAILY 90 Tab 1    metFORMIN (GLUCOPHAGE) 500 mg tablet TAKE ONE TABLET BY MOUTH ONCE DAILY WITH  DINNER. 90 Tab 1    meloxicam (MOBIC) 15 mg tablet TAKE 1 TABLET BY MOUTH DAILY 90 Tab 3    DULoxetine (CYMBALTA) 30 mg capsule TAKE ONE CAPSULE BY MOUTH TWICE DAILY 180 Cap 1    traMADol (ULTRAM) 50 mg tablet 1 tab PO QID prn pain 120 Tab 2    Lactobacillus acidophilus (PROBIOTIC PO) Take 1 Cap by Mouth Once a Day. Lactose defense      docusate sodium (COLACE) 50 mg capsule 50 mg.      vitamin E acetate (VITAMIN E PO) Take  by mouth.  FLUoxetine (PROZAC) 10 mg capsule TAKE 1 CAPSULE BY MOUTH ONCE DAILY. GENERICFOR PROZAC. 90 Cap 3    cyanocobalamin (VITAMIN B-12) 500 mcg tablet Take 500 mcg by mouth daily.  cetirizine (ZYRTEC) 10 mg tablet TAKE 1 TABLET BY MOUTH EVERY DAY. GENERIC FOR ZYRTEC 90 Tab 2    SPIRIVA RESPIMAT 2.5 mcg/actuation inhaler INL 2 PFS PO D  3    VENTOLIN HFA 90 mcg/actuation inhaler 2 Puffs by Aerosolization route every four (4) hours as needed. 0    B.infantis-B.ani-B.long-B.bifi (PROBIOTIC 4X) 10-15 mg TbEC Take  by mouth daily.       ranitidine (ZANTAC) 150 mg tablet 150mg QAM and 75mg QPM      aspirin delayed-release 81 mg tablet Take 81 mg by mouth daily.  Cholecalciferol, Vitamin D3, (VITAMIN D3) 1,000 unit cap Take 1,000 Units by mouth daily.  glucose blood VI test strips (ACCU-CHEK MARCE PLUS TEST STRP) strip Please use one strip to test blood sugars once a day. 100 strip 20    Lancets (ACCU-CHEK SOFTCLIX LANCETS) misc Please use one lancet to test blood sugars twice a day. 1 Package 20    BLOOD-GLUCOSE METER (ACCU-CHEK MARCE MONITORING) by Does Not Apply route.  ascorbate calcium (VITAMIN C PO) Take  by mouth.  clobetasol (TEMOVATE) 0.05 % external solution APPLY TO SCALP AFTER SHAMPOO DAILY 50 mL 3    butalbital-acetaminophen-caffeine (FIORICET, ESGIC) -40 mg per tablet Take 1 Tab by mouth every four (4) hours as needed for Pain. Max Daily Amount: 6 Tabs. 30 Tab 0    triamcinolone acetonide (KENALOG) 0.1 % topical cream Apply  to affected area two (2) times a day. use thin layer       No facility-administered medications prior to visit.          Allergies     Allergies   Allergen Reactions    Latex, Natural Rubber Hives    Adhesive Rash     Some bandaids    Ciprofloxacin Rash    Codeine Nausea and Vomiting    Demerol [Meperidine] Nausea and Vomiting    Diclofenac Shortness of Breath and Palpitations    Erythromycin Rash    Levaquin [Levofloxacin] Rash    Loratadine Swelling    Morphine Nausea and Vomiting    Penicillin G Hives and Rash     Does fine with Keflex    Sulfa (Sulfonamide Antibiotics) Itching    Vicodin [Hydrocodone-Acetaminophen] Other (comments)     Metallic taste in mouth       Problem List     Patient Active Problem List    Diagnosis Date Noted    Severe obesity ([facility] 02/14/2019    Type 2 diabetes with nephropathy ([facility] 03/06/2018    Lumbar postlaminectomy syndrome 08/10/2017    Lumbar spinal stenosis 05/04/2017    Radiculopathy, lumbar region 02/09/2017    Ankle impingement syndrome 10/25/2016    Long term (current) use of anticoagulants 03/24/2016    AS (aortic stenosis) 03/18/2016    Postlaminectomy syndrome 01/28/2016    Lumbar neuritis 01/28/2016    Hyperlipidemia LDL goal <100 09/02/2015    Aortic stenosis 06/09/2015    Tachycardia 01/28/2014    Diabetes ([address] 01/28/2014    Pulmonary hypertension ([address] 01/28/2014    Osteoarthritis 01/28/2014    Fibromyalgia 01/28/2014    PTSD (post-traumatic stress disorder) 01/28/2014    OCD (obsessive compulsive disorder) 01/28/2014    Panic disorder with agoraphobia 01/28/2014    Recurrent major depression ([address] 01/28/2014        Medical / Surgical / Family History     Past Medical History:   Diagnosis Date    Ankle fracture     Aortic stenosis     S/P AVR in 2016    Asthma     Chronic pain     back pain    Diabetes ([address]     Diverticulitis     Fibromyalgia     H/O aortic valve replacement 03/16    21 mm bovine pericardial bioprosthesis and epiaortic scanning      Hypertension     PT denies    Hypothyroidism     Lumbar post-laminectomy syndrome     Menopause     Obesity     Osteoarthritis     Psychotic disorder ([address]     Sciatica     Skin cancer 2013    right forearm. Past Surgical History:   Procedure Laterality Date    CARDIAC SURG PROCEDURE UNLIST  03/2016    aortic valve replacement.  CHEST SURGERY PROCEDURE UNLISTED  03/2016    Open Heart Surgery    COLONOSCOPY      COLONOSCOPY N/A 9/15/2017    COLONOSCOPY performed by Doreen Pichardo MD at [address] [facility] Right 1995    lens  replaced.  HX CATARACT REMOVAL Left 02/2018    HX COLONOSCOPY  09/15/2017    dr. Edouard Reed  f/u 5 years.  HX HEENT  1990's    sinus surgery     HX HEENT Right 11/02/2017    laser for right eye cataracts.      HX LUMBAR FUSION  2004    L3-L4-L5    HX LUMBAR LAMINECTOMY  2002    HX MOHS PROCEDURES Left 1990    HX ORTHOPAEDIC      Rotator cuff Bilateral    HX SHOULDER REPLACEMENT Right 2017    reverse.  NH COLSC FLX W/RMVL OF TUMOR POLYP LESION SNARE TQ  2014 repeat in 3 years    Dr. Elpidio Senior      Aortic valve replacement     Family History   Problem Relation Age of Onset    Hypertension Father     Heart Disease Father     Alcohol abuse Father      Social History     Social History     Socioeconomic History    Marital status:      Spouse name: Not on file    Number of children: Not on file    Years of education: Not on file    Highest education level: Not on file   Occupational History    Not on file   Social Needs    Financial resource strain: Not on file    Food insecurity:     Worry: Not on file     Inability: Not on file    Transportation needs:     Medical: Not on file     Non-medical: Not on file   Tobacco Use    Smoking status: Former Smoker     Years: 45.00     Types: Cigarettes     Last attempt to quit: 2011     Years since quittin.1    Smokeless tobacco: Never Used   Substance and Sexual Activity    Alcohol use: No    Drug use: No    Sexual activity: Never   Lifestyle    Physical activity:     Days per week: Not on file     Minutes per session: Not on file    Stress: Not on file   Relationships    Social connections:     Talks on phone: Not on file     Gets together: Not on file     Attends Confucianist service: Not on file     Active member of club or organization: Not on file     Attends meetings of clubs or organizations: Not on file     Relationship status: Not on file    Intimate partner violence:     Fear of current or ex partner: Not on file     Emotionally abused: Not on file     Physically abused: Not on file     Forced sexual activity: Not on file   Other Topics Concern    Not on file   Social History Narrative    Not on file     ROS   Review of Systems    10 Element ROS negative unless specifically stated in History of Present Illness.      Health Maintenance     Health Maintenance Topic Date Due    Influenza Age 5 to Adult  08/01/2019    HEMOGLOBIN A1C Q6M  10/22/2019    FOOT EXAM Q1  11/16/2019    LIPID PANEL Q1  11/16/2019    MEDICARE YEARLY EXAM  11/17/2019    MICROALBUMIN Q1  04/18/2020    GLAUCOMA SCREENING Q2Y  04/27/2020    EYE EXAM RETINAL OR DILATED  04/27/2020    BREAST CANCER SCRN MAMMOGRAM  03/13/2021    COLONOSCOPY  09/15/2022    DTaP/Tdap/Td series (2 - Td) 12/15/2025    Hepatitis C Screening  Completed    Bone Densitometry (Dexa) Screening  Completed    Shingrix Vaccine Age 50>  Completed    Pneumococcal 65+ years  Completed      Physical Exam   /82 (BP 1 Location: Right arm, BP Patient Position: Sitting)   Pulse 94   Temp 98.4 °F (36.9 °C) (Oral)   Resp 20   Ht 5' 7\" (1.702 m)   Wt 226 lb (102.5 kg)   SpO2 96%   BMI 35.40 kg/m²     Physical Exam   Constitutional: She is oriented to person, place, and time. She appears well-developed and well-nourished. No distress. HENT:   Head: Normocephalic and atraumatic. Eyes: Pupils are equal, round, and reactive to light. Conjunctivae and EOM are normal.   Cardiovascular: Normal rate, regular rhythm, normal heart sounds and intact distal pulses. No murmur heard. Pulmonary/Chest: Effort normal and breath sounds normal. No respiratory distress. She has no wheezes. Harsh Bibasilar. Neurological: She is alert and oriented to person, place, and time. No cranial nerve deficit. Coordination abnormal.   Ambulates with Cane. Skin: Skin is warm and dry. No rash noted. No erythema. Psychiatric: She has a normal mood and affect.  Her behavior is normal.     Ortho Exam    Daniel Mcdaniel DO

## 2019-08-23 ENCOUNTER — TELEPHONE (OUTPATIENT)
Dept: FAMILY MEDICINE CLINIC | Age: 69
End: 2019-08-23

## 2019-08-23 NOTE — TELEPHONE ENCOUNTER
Please let Mrs. Dodge know that the RIGHT Hand XR does show osteoarthritis and evidence for Instability of the Carpometacarpal joint. I would keep with the Thumb Spica Brace for physical activity and Hand Exercises per our discussion. Can try Injection Therapy, prefer when I get my US. Thanks.

## 2019-08-26 ENCOUNTER — TELEPHONE (OUTPATIENT)
Dept: ORTHOPEDIC SURGERY | Age: 69
End: 2019-08-26

## 2019-08-26 LAB
25(OH)D3+25(OH)D2 SERPL-MCNC: 43.1 NG/ML (ref 30–100)
ALBUMIN SERPL ELPH-MCNC: 3.9 G/DL (ref 2.9–4.4)
ALBUMIN SERPL-MCNC: 4.3 G/DL (ref 3.6–4.8)
ALBUMIN/GLOB SERPL: 1.1 {RATIO} (ref 0.7–1.7)
ALBUMIN/GLOB SERPL: 1.4 {RATIO} (ref 1.2–2.2)
ALP BONE SERPL-MCNC: 18.9 UG/L
ALP SERPL-CCNC: 99 IU/L (ref 39–117)
ALPHA1 GLOB SERPL ELPH-MCNC: 0.3 G/DL (ref 0–0.4)
ALPHA2 GLOB SERPL ELPH-MCNC: 0.8 G/DL (ref 0.4–1)
ALT SERPL-CCNC: 24 IU/L (ref 0–32)
AST SERPL-CCNC: 23 IU/L (ref 0–40)
B-GLOBULIN SERPL ELPH-MCNC: 1.4 G/DL (ref 0.7–1.3)
BILIRUB SERPL-MCNC: 0.5 MG/DL (ref 0–1.2)
BUN SERPL-MCNC: 14 MG/DL (ref 8–27)
BUN/CREAT SERPL: 15 (ref 12–28)
CA-I SERPL ISE-MCNC: 5 MG/DL (ref 4.5–5.6)
CALCIUM SERPL-MCNC: 10 MG/DL (ref 8.7–10.3)
CHLORIDE SERPL-SCNC: 100 MMOL/L (ref 96–106)
CK SERPL-CCNC: 80 U/L (ref 24–173)
CO2 SERPL-SCNC: 23 MMOL/L (ref 20–29)
CREAT SERPL-MCNC: 0.94 MG/DL (ref 0.57–1)
ERYTHROCYTE [DISTWIDTH] IN BLOOD BY AUTOMATED COUNT: 14.2 % (ref 12.3–15.4)
GAMMA GLOB SERPL ELPH-MCNC: 1 G/DL (ref 0.4–1.8)
GLOBULIN SER CALC-MCNC: 3.1 G/DL (ref 1.5–4.5)
GLOBULIN SER CALC-MCNC: 3.5 G/DL (ref 2.2–3.9)
GLUCOSE SERPL-MCNC: 94 MG/DL (ref 65–99)
HCT VFR BLD AUTO: 39 % (ref 34–46.6)
HGB BLD-MCNC: 13.1 G/DL (ref 11.1–15.9)
M PROTEIN SERPL ELPH-MCNC: ABNORMAL G/DL
MAGNESIUM SERPL-MCNC: 2.1 MG/DL (ref 1.6–2.3)
MCH RBC QN AUTO: 29.6 PG (ref 26.6–33)
MCHC RBC AUTO-ENTMCNC: 33.6 G/DL (ref 31.5–35.7)
MCV RBC AUTO: 88 FL (ref 79–97)
PHOSPHATE SERPL-MCNC: 3.3 MG/DL (ref 2.5–4.5)
PLATELET # BLD AUTO: 269 X10E3/UL (ref 150–450)
PLEASE NOTE, 011150: ABNORMAL
POTASSIUM SERPL-SCNC: 4.3 MMOL/L (ref 3.5–5.2)
PROT SERPL-MCNC: 7.4 G/DL (ref 6–8.5)
PTH-INTACT SERPL-MCNC: NORMAL PG/ML
RBC # BLD AUTO: 4.42 X10E6/UL (ref 3.77–5.28)
SODIUM SERPL-SCNC: 140 MMOL/L (ref 134–144)
TSH SERPL DL<=0.005 MIU/L-ACNC: 1.56 UIU/ML (ref 0.45–4.5)
WBC # BLD AUTO: 5.4 X10E3/UL (ref 3.4–10.8)

## 2019-08-26 NOTE — TELEPHONE ENCOUNTER
We do not a  Spine office in [place], but Dr Patrica Carroll does go to [place] who does sports medicine. She can call there and see if they will take her as a patient.

## 2019-08-26 NOTE — TELEPHONE ENCOUNTER
Patient says she just found out that we have an office at [facility] and wanted to see if that provider could continue care for her. She's perfectly happy with NLP and treatment she's received thus far, however, with her living in [place], it would be much more convenient. Patient was explained that [facility] has a different type of specialist and not sure if care can be continued there. Please contact patient to advise if this would be an option for her, [phone].

## 2019-08-26 NOTE — TELEPHONE ENCOUNTER
Called patient and informed her that we do have an office in [place] however, Dr. Patrica Carroll is more sports medicine and concussion and would not treat her for spine long term.  Patient verbalized understanding, stating \" I was just curious\"

## 2019-08-27 ENCOUNTER — TELEPHONE (OUTPATIENT)
Dept: FAMILY MEDICINE CLINIC | Age: 69
End: 2019-08-27

## 2019-08-27 DIAGNOSIS — M85.80 OSTEOPENIA, UNSPECIFIED LOCATION: Primary | ICD-10-CM

## 2019-08-27 NOTE — TELEPHONE ENCOUNTER
Please let Mrs. Dodge know that her Labs are Normal, however, can we call the Lab and see why they cancelled the PTH and if Mrs. Tiffany Stevens has to re-draw? Thank you. Otherwise, which we have this result, and If Normal, can move forward with Prolia.

## 2019-08-28 ENCOUNTER — TELEPHONE (OUTPATIENT)
Dept: FAMILY MEDICINE CLINIC | Age: 69
End: 2019-08-28

## 2019-08-28 NOTE — TELEPHONE ENCOUNTER
Pt. Is providing phone muber to ferny COYLE for Lyrica. The phone number is   [phone].  Please assist.

## 2019-09-04 ENCOUNTER — TELEPHONE (OUTPATIENT)
Dept: FAMILY MEDICINE CLINIC | Age: 69
End: 2019-09-04

## 2019-09-04 LAB — PTH-INTACT SERPL-MCNC: 30 PG/ML (ref 15–65)

## 2019-09-04 NOTE — TELEPHONE ENCOUNTER
Please let Mrs. Dodge know that her Bone Density Labs are WNL and would benefit from Alendronate based on her DEXA. If she would like to move forward, I send to her pharmacy. Therapy is typically for 5 years, check DEXA Scan in 2 years. Thanks.

## 2019-09-05 ENCOUNTER — TELEPHONE (OUTPATIENT)
Dept: FAMILY MEDICINE CLINIC | Age: 69
End: 2019-09-05

## 2019-09-05 NOTE — TELEPHONE ENCOUNTER
Returned call to Ms Emily Senior to let her know that her Bone Density Labs are WNL and would benefit from Alendronate based on her DEXA. If she would like to move forward, Dr Jaskaran Bales sent  to her pharmacy. Therapy is typically for 5 years, check DEXA Scan in 2 years.  Unable to leave message on VM

## 2019-10-07 ENCOUNTER — OFFICE VISIT (OUTPATIENT)
Dept: ORTHOPEDIC SURGERY | Age: 69
End: 2019-10-07

## 2019-10-07 VITALS
WEIGHT: 234.4 LBS | RESPIRATION RATE: 16 BRPM | OXYGEN SATURATION: 98 % | BODY MASS INDEX: 36.79 KG/M2 | DIASTOLIC BLOOD PRESSURE: 83 MMHG | TEMPERATURE: 97.6 F | SYSTOLIC BLOOD PRESSURE: 137 MMHG | HEIGHT: 67 IN | HEART RATE: 81 BPM

## 2019-10-07 DIAGNOSIS — M48.062 SPINAL STENOSIS OF LUMBAR REGION WITH NEUROGENIC CLAUDICATION: ICD-10-CM

## 2019-10-07 DIAGNOSIS — Z51.81 THERAPEUTIC DRUG MONITORING: ICD-10-CM

## 2019-10-07 DIAGNOSIS — M54.16 RADICULOPATHY, LUMBAR REGION: ICD-10-CM

## 2019-10-07 DIAGNOSIS — S39.012A STRAIN OF LUMBAR REGION, INITIAL ENCOUNTER: ICD-10-CM

## 2019-10-07 DIAGNOSIS — Z79.891 LONG TERM PRESCRIPTION OPIATE USE: ICD-10-CM

## 2019-10-07 DIAGNOSIS — S39.012A STRAIN OF LUMBAR REGION, INITIAL ENCOUNTER: Primary | ICD-10-CM

## 2019-10-07 RX ORDER — TRAMADOL HYDROCHLORIDE 50 MG/1
50 TABLET ORAL 4 TIMES DAILY
Qty: 120 TAB | Refills: 2 | Status: SHIPPED | OUTPATIENT
Start: 2019-10-07 | End: 2020-01-05

## 2019-10-07 NOTE — PROGRESS NOTES
[facility]  [address], [place]   Phone: [phone]  Fax: [phone]        PROGRESS NOTE      HISTORY OF PRESENT ILLNESS:  The patient is a 71 y.o. female and was seen today for follow up of low back pain and paraesthesias in the LLE to the knee in an L4 distribution and the RLE to the foot. She was previously seen for low back and left hip pain extending into the LLE in a S1 distribution to an area below the knee. Pt was initially seen for low back pain into the BLE extending in roughly an L4 distribution in the LLE and in an L5 distribution to the foot in the RLE. Pt also endorses numbness in the LLE. Pt completed MDP with good relief. She had a diagnosis of lumbar postlaminectomy syndrome with a previous history of L3 through S1 fusion in 2002 and 2004 by a physician in [place]. The patient had an additional diagnosis of fibromyalgia. Note dated 6/29/16 indicating patient was seen and underwent porcine aortic valve replacement by Dr. Elias Fairchild on 3/18/16. Pt began cardiac rehab in 1/2017. Pt underwent right shoulder surgery on 3/29/17 by Dr. Maninder Cabrera. Note from Dr. Edwin Brown, cardiologist dated 5/18/17 indicating patient was seen with h/o aortic stenosis, s/p aortic valve replacement 3/16 and was seen with moderate dysphemia with minimal exertion. CT revealed no evidence of pulmonary embolism, no obvious fluid overload. They were ordering further workup to look for functioning bioprosthetic aortic valve. Per patient, a cause for her SOB has still not been found. She has been scheduled to see a pulmonologist. She states she has been restricted to take her Meloxicam since her right shoulder surgery, which has caused her increase in low back pain. Upon review of our records, it was noted she previously failed TOPAMAX, NEURONTIN, and LYRICA. Pt was switched from Ultram ER back to Ultram immediate release.  Pt underwent left SI joint injection on 3/15/18 with good relief. Lumbar spine CT w/o contrast dated 7/20/14  per report revealed L3 through L5 laminectomies with L3 through S1 dorsal hardware fusion. There was no evidence for fractures. There was advanced degenerative disc disease at L2-L3 at the junctional level with marked central canal stenosis. At her last clinical appointment, patient wished to continue her current treatment. I provided her with refills of Ultram 50 mg QID. The patient returns today with pain location and distribution remain unchanged. She rates her pain 6/10, previously 5/10. UDS from 5/9/19 revealed to be consistent. She continues on Ultram 50 mg QID. Pt is back on Lyrica 50 mg TID as Rx by Jeannine Gillespie DO. Pt denies change in bowel or bladder habits.  reviewed. Body mass index is 36.71 kg/m². PCP: Jeannine Gillespie DO      Past Medical History:   Diagnosis Date    Ankle fracture     Aortic stenosis     S/P AVR in 2016    Asthma     Chronic pain     back pain    Diabetes ([address]     Diverticulitis     Fibromyalgia     H/O aortic valve replacement 03/16    21 mm bovine pericardial bioprosthesis and epiaortic scanning      Hypertension     PT denies    Hypothyroidism     Lumbar post-laminectomy syndrome     Menopause     Obesity     Osteoarthritis     Psychotic disorder ([address]     Sciatica     Skin cancer 2013    right forearm.          Social History     Socioeconomic History    Marital status:      Spouse name: Not on file    Number of children: Not on file    Years of education: Not on file    Highest education level: Not on file   Occupational History    Not on file   Social Needs    Financial resource strain: Not on file    Food insecurity:     Worry: Not on file     Inability: Not on file    Transportation needs:     Medical: Not on file     Non-medical: Not on file   Tobacco Use    Smoking status: Former Smoker     Years: 45.00     Types: Cigarettes     Last attempt to quit: 2011     Years since quittin.2    Smokeless tobacco: Never Used   Substance and Sexual Activity    Alcohol use: No    Drug use: No    Sexual activity: Never   Lifestyle    Physical activity:     Days per week: Not on file     Minutes per session: Not on file    Stress: Not on file   Relationships    Social connections:     Talks on phone: Not on file     Gets together: Not on file     Attends Sikh service: Not on file     Active member of club or organization: Not on file     Attends meetings of clubs or organizations: Not on file     Relationship status: Not on file    Intimate partner violence:     Fear of current or ex partner: Not on file     Emotionally abused: Not on file     Physically abused: Not on file     Forced sexual activity: Not on file   Other Topics Concern    Not on file   Social History Narrative    Not on file       Current Outpatient Medications   Medication Sig Dispense Refill    STIOLTO RESPIMAT 2.5-2.5 mcg/actuation inhaler Take  by inhalation. 1    pregabalin (LYRICA) 50 mg capsule Take 1 Cap by mouth three (3) times daily. Max Daily Amount: 150 mg. 90 Cap 3    fluticasone propionate (FLONASE) 50 mcg/actuation nasal spray One spray each nostril twice a day. 1 Bottle 5    levothyroxine (SYNTHROID) 50 mcg tablet TAKE 1 TABLET BY MOUTH EVERY DAY BEFORE BREAKFAST 90 Tab 1    atorvastatin (LIPITOR) 20 mg tablet TAKE 1 TABLET BY MOUTH ONCE DAILY 90 Tab 1    metFORMIN (GLUCOPHAGE) 500 mg tablet TAKE ONE TABLET BY MOUTH ONCE DAILY WITH  DINNER. 90 Tab 1    meloxicam (MOBIC) 15 mg tablet TAKE 1 TABLET BY MOUTH DAILY 90 Tab 3    traMADol (ULTRAM) 50 mg tablet 1 tab PO QID prn pain 120 Tab 2    Lactobacillus acidophilus (PROBIOTIC PO) Take 1 Cap by Mouth Once a Day. Lactose defense      docusate sodium (COLACE) 50 mg capsule 50 mg.      ascorbate calcium (VITAMIN C PO) Take  by mouth.  vitamin E acetate (VITAMIN E PO) Take  by mouth.       FLUoxetine (PROZAC) 10 mg capsule TAKE 1 CAPSULE BY MOUTH ONCE DAILY. GENERICFOR PROZAC. 90 Cap 3    cyanocobalamin (VITAMIN B-12) 500 mcg tablet Take 500 mcg by mouth daily.  clobetasol (TEMOVATE) 0.05 % external solution APPLY TO SCALP AFTER SHAMPOO DAILY 50 mL 3    cetirizine (ZYRTEC) 10 mg tablet TAKE 1 TABLET BY MOUTH EVERY DAY. GENERIC FOR ZYRTEC 90 Tab 2    SPIRIVA RESPIMAT 2.5 mcg/actuation inhaler INL 2 PFS PO D  3    VENTOLIN HFA 90 mcg/actuation inhaler 2 Puffs by Aerosolization route every four (4) hours as needed. 0    butalbital-acetaminophen-caffeine (FIORICET, ESGIC) -40 mg per tablet Take 1 Tab by mouth every four (4) hours as needed for Pain. Max Daily Amount: 6 Tabs. 30 Tab 0    B.infantis-B.ani-B.long-B.bifi (PROBIOTIC 4X) 10-15 mg TbEC Take  by mouth daily.  ranitidine (ZANTAC) 150 mg tablet 150mg QAM and 75mg QPM      aspirin delayed-release 81 mg tablet Take 81 mg by mouth daily.  Cholecalciferol, Vitamin D3, (VITAMIN D3) 1,000 unit cap Take 1,000 Units by mouth daily.  glucose blood VI test strips (ACCU-CHEK MARCE PLUS TEST STRP) strip Please use one strip to test blood sugars once a day. 100 strip 20    Lancets (ACCU-CHEK SOFTCLIX LANCETS) misc Please use one lancet to test blood sugars twice a day. 1 Package 20    BLOOD-GLUCOSE METER (ACCU-CHEK MARCE MONITORING) by Does Not Apply route.  triamcinolone acetonide (KENALOG) 0.1 % topical cream Apply  to affected area two (2) times a day.  use thin layer      DULoxetine (CYMBALTA) 30 mg capsule TAKE ONE CAPSULE BY MOUTH TWICE DAILY 180 Cap 1       Allergies   Allergen Reactions    Latex, Natural Rubber Hives    Adhesive Rash     Some bandaids    Ciprofloxacin Rash    Codeine Nausea and Vomiting    Demerol [Meperidine] Nausea and Vomiting    Diclofenac Shortness of Breath and Palpitations    Erythromycin Rash    Levaquin [Levofloxacin] Rash    Loratadine Swelling    Morphine Nausea and Vomiting    Penicillin G Hives and Rash     Does fine with Keflex    Sulfa (Sulfonamide Antibiotics) Itching    Vicodin [Hydrocodone-Acetaminophen] Other (comments)     Metallic taste in mouth          PHYSICAL EXAMINATION    Visit Vitals  /83 (BP 1 Location: Left arm, BP Patient Position: Sitting)   Pulse 81   Temp 97.6 °F (36.4 °C) (Oral)   Resp 16   Ht 5' 7\" (1.702 m)   Wt 234 lb 6.4 oz (106.3 kg)   SpO2 98%   BMI 36.71 kg/m²       CONSTITUTIONAL: NAD, A&O x 3  SENSATION: Decreased sensation to light touch L4 LLE. Sensation to light touch otherwise intact. RANGE OF MOTION: The patient has full passive range of motion in all four extremities. MOTOR:  Straight Leg Raise: Negative, bilateral               Hip Flex Knee Ext Knee Flex Ankle DF GTE Ankle PF Tone   Right +4/5 +4/5 +4/5 +4/5 +4/5 +4/5 +4/5   Left +4/5 +4/5 +4/5 +4/5 +4/5 +4/5 +4/5       ASSESSMENT   Diagnoses and all orders for this visit:    1. Strain of lumbar region, initial encounter    2. Spinal stenosis of lumbar region with neurogenic claudication    3. Radiculopathy, lumbar region    Other orders  -     DRUG SCREEN UR - W/ CONFIRM; Future          IMPRESSION AND PLAN:  I provided her with refills of Ultram 50 mg. Blocks deferred at this time. Patient is neurologically intact. I will see the patient back in 3 month's time or earlier if needed. Written by Douglas Nixon, as dictated by Adilia Christensen MD  I examined the patient, reviewed and agree with the note.

## 2019-10-07 NOTE — LETTER
10/7/19 Patient: Xochitl Sanders YOB: 1950 Date of Visit: 10/7/2019 Conor Kelsey DO 
[id number] [place] [id number] VIA In Basket Dear Conor Kelsey DO, Thank you for referring Ms. Anni David to [address] [place] [address] for evaluation. My notes for this consultation are attached. If you have questions, please do not hesitate to call me. I look forward to following your patient along with you. Sincerely, Coy Abdi MD

## 2019-10-08 ENCOUNTER — TELEPHONE (OUTPATIENT)
Dept: FAMILY MEDICINE CLINIC | Age: 69
End: 2019-10-08

## 2019-10-08 ENCOUNTER — OFFICE VISIT (OUTPATIENT)
Dept: FAMILY MEDICINE CLINIC | Age: 69
End: 2019-10-08

## 2019-10-08 VITALS
HEIGHT: 67 IN | RESPIRATION RATE: 18 BRPM | TEMPERATURE: 98.3 F | DIASTOLIC BLOOD PRESSURE: 73 MMHG | OXYGEN SATURATION: 97 % | HEART RATE: 88 BPM | SYSTOLIC BLOOD PRESSURE: 103 MMHG | WEIGHT: 236 LBS | BODY MASS INDEX: 37.04 KG/M2

## 2019-10-08 DIAGNOSIS — M18.11 ARTHRITIS OF CARPOMETACARPAL (CMC) JOINT OF RIGHT THUMB: Primary | ICD-10-CM

## 2019-10-08 DIAGNOSIS — E11.69 TYPE 2 DIABETES MELLITUS WITH OTHER SPECIFIED COMPLICATION, WITHOUT LONG-TERM CURRENT USE OF INSULIN (HCC): ICD-10-CM

## 2019-10-08 DIAGNOSIS — Z23 ENCOUNTER FOR IMMUNIZATION: ICD-10-CM

## 2019-10-08 DIAGNOSIS — M85.89 OSTEOPENIA OF MULTIPLE SITES: ICD-10-CM

## 2019-10-08 DIAGNOSIS — E11.65 UNCONTROLLED TYPE 2 DIABETES MELLITUS WITH HYPERGLYCEMIA (HCC): ICD-10-CM

## 2019-10-08 RX ORDER — METFORMIN HYDROCHLORIDE 500 MG/1
TABLET ORAL
Qty: 90 TAB | Refills: 1 | Status: SHIPPED | OUTPATIENT
Start: 2019-10-08 | End: 2020-03-23 | Stop reason: SDUPTHER

## 2019-10-08 RX ORDER — MECLIZINE HYDROCHLORIDE 25 MG/1
TABLET ORAL
COMMUNITY
End: 2019-12-12 | Stop reason: SDUPTHER

## 2019-10-08 RX ORDER — ALENDRONATE SODIUM 70 MG/1
70 TABLET ORAL
Qty: 60 TAB | Refills: 3 | Status: SHIPPED | OUTPATIENT
Start: 2019-10-08 | End: 2019-11-12 | Stop reason: CLARIF

## 2019-10-08 NOTE — PROGRESS NOTES
Asmita Cadena is a 71 y.o. female  Chief Complaint   Patient presents with    Results     1. Have you been to the ER, urgent care clinic since your last visit? Hospitalized since your last visit? No    2. Have you seen or consulted any other health care providers outside of the [address] [place] [address] since your last visit? Include any pap smears or colon screening. Yes Elsie Reyes is a 71 y.o. female who presents for routine immunizations. She denies any symptoms , reactions or allergies that would exclude them from being immunized today. Risks and adverse reactions were discussed and the VIS was given to them. All questions were addressed. There were no reactions observed.     Sandro Banuelos LPN

## 2019-10-10 NOTE — PROGRESS NOTES
Impression / Plan     Diagnoses and all orders for this visit:    1. Arthritis of carpometacarpal (CMC) joint of right thumb  -     REFERRAL TO ORTHOPEDICS    2. Encounter for immunization  -     INFLUENZA VACCINE INACTIVATED (IIV), SUBUNIT, ADJUVANTED, IM  -     ADMIN INFLUENZA VIRUS VAC    3. Osteopenia of multiple sites  -     alendronate (FOSAMAX) 70 mg tablet; Take 1 Tab by mouth every seven (7) days. 4. Uncontrolled type 2 diabetes mellitus with hyperglycemia (HCC)  -     HEMOGLOBIN A1C WITH EAG; Future  -     MICROALBUMIN, UR, RAND W/ MICROALB/CREAT RATIO; Future  -     LIPID PANEL; Future  -     metFORMIN (GLUCOPHAGE) 500 mg tablet; TAKE ONE TABLET BY MOUTH ONCE DAILY WITH  DINNER.  -     REFERRAL TO PODIATRY    5. Type 2 diabetes mellitus with other specified complication, without long-term current use of insulin ([facility]    Plan: Will start Alendronate for Osteopenia/ Osteoporosis for 5 years, DEXA Scan in 10/2021 and further laboratory monitoring on Follow up. Will obtain HbA1c, Micro albumin, and Lipid Panel for DM, send to Podiatry for Foot Exam. UTD Immunizations. Will send to Orthopedics, OFFICE location closer to her HOME and renewal of Ultram. Will continue to [facility] Cymbalta and start Lyrica per discussion, consider further Titration IF necessary. Follow-up and Dispositions    · Return in about 3 months (around 1/8/2020) for DM. SHELBI Medina is a 71 y. o.female presenting for Follow Up from 8/22/2019, DX- Osteopenia, Fibromyalgia, DM, and RIGHT Hand Pain. Regarding Osteopenia, Negative evaluation for Metabolic Bone Disease, will start Bisphosphonate Therapy and Monitor. OFF Cymbalta and yet to start Lyrica, will start today. Plain Films of the RIGHT Hand shows Instability of the 1st CMC Joint and OA, using Thumb Spica Brace.  Seen by Orthopedics 10/7/2019 for Lumbar Disc Disease and request OFFICE with closer location for Travel, renewal of Ultram.     Medications     Outpatient Medications Prior to Visit   Medication Sig Dispense Refill    meclizine (ANTIVERT) 25 mg tablet Take  by mouth three (3) times daily as needed.  traMADol (ULTRAM) 50 mg tablet Take 1 Tab by mouth four (4) times daily for 90 days. Max Daily Amount: 200 mg. 120 Tab 2    STIOLTO RESPIMAT 2.5-2.5 mcg/actuation inhaler Take  by inhalation. 1    pregabalin (LYRICA) 50 mg capsule Take 1 Cap by mouth three (3) times daily. Max Daily Amount: 150 mg. 90 Cap 3    fluticasone propionate (FLONASE) 50 mcg/actuation nasal spray One spray each nostril twice a day. 1 Bottle 5    levothyroxine (SYNTHROID) 50 mcg tablet TAKE 1 TABLET BY MOUTH EVERY DAY BEFORE BREAKFAST 90 Tab 1    atorvastatin (LIPITOR) 20 mg tablet TAKE 1 TABLET BY MOUTH ONCE DAILY 90 Tab 1    meloxicam (MOBIC) 15 mg tablet TAKE 1 TABLET BY MOUTH DAILY 90 Tab 3    DULoxetine (CYMBALTA) 30 mg capsule TAKE ONE CAPSULE BY MOUTH TWICE DAILY 180 Cap 1    traMADol (ULTRAM) 50 mg tablet 1 tab PO QID prn pain 120 Tab 2    Lactobacillus acidophilus (PROBIOTIC PO) Take 1 Cap by Mouth Once a Day. Lactose defense      docusate sodium (COLACE) 50 mg capsule 50 mg.      ascorbate calcium (VITAMIN C PO) Take  by mouth.  vitamin E acetate (VITAMIN E PO) Take  by mouth.  FLUoxetine (PROZAC) 10 mg capsule TAKE 1 CAPSULE BY MOUTH ONCE DAILY. GENERICFOR PROZAC. 90 Cap 3    cyanocobalamin (VITAMIN B-12) 500 mcg tablet Take 500 mcg by mouth daily.  cetirizine (ZYRTEC) 10 mg tablet TAKE 1 TABLET BY MOUTH EVERY DAY. GENERIC FOR ZYRTEC 90 Tab 2    VENTOLIN HFA 90 mcg/actuation inhaler 2 Puffs by Aerosolization route every four (4) hours as needed. 0    B.infantis-B.ani-B.long-B.bifi (PROBIOTIC 4X) 10-15 mg TbEC Take  by mouth daily.  aspirin delayed-release 81 mg tablet Take 81 mg by mouth daily.  Cholecalciferol, Vitamin D3, (VITAMIN D3) 1,000 unit cap Take 1,000 Units by mouth daily.       glucose blood VI test strips (ACCU-CHEK MARCE PLUS TEST STRP) strip Please use one strip to test blood sugars once a day. 100 strip 20    Lancets (ACCU-CHEK SOFTCLIX LANCETS) misc Please use one lancet to test blood sugars twice a day. 1 Package 20    BLOOD-GLUCOSE METER (ACCU-CHEK MARCE MONITORING) by Does Not Apply route.  triamcinolone acetonide (KENALOG) 0.1 % topical cream Apply  to affected area two (2) times a day. use thin layer      metFORMIN (GLUCOPHAGE) 500 mg tablet TAKE ONE TABLET BY MOUTH ONCE DAILY WITH  DINNER. 90 Tab 1    clobetasol (TEMOVATE) 0.05 % external solution APPLY TO SCALP AFTER SHAMPOO DAILY 50 mL 3    SPIRIVA RESPIMAT 2.5 mcg/actuation inhaler INL 2 PFS PO D  3    butalbital-acetaminophen-caffeine (FIORICET, ESGIC) -40 mg per tablet Take 1 Tab by mouth every four (4) hours as needed for Pain. Max Daily Amount: 6 Tabs. 30 Tab 0    ranitidine (ZANTAC) 150 mg tablet 150mg QAM and 75mg QPM       No facility-administered medications prior to visit.       Allergies     Allergies   Allergen Reactions    Latex, Natural Rubber Hives    Adhesive Rash     Some bandaids    Ciprofloxacin Rash    Codeine Nausea and Vomiting    Demerol [Meperidine] Nausea and Vomiting    Diclofenac Shortness of Breath and Palpitations    Erythromycin Rash    Levaquin [Levofloxacin] Rash    Loratadine Swelling    Morphine Nausea and Vomiting    Penicillin G Hives and Rash     Does fine with Keflex    Sulfa (Sulfonamide Antibiotics) Itching    Vicodin [Hydrocodone-Acetaminophen] Other (comments)     Metallic taste in mouth     Problem List     Patient Active Problem List    Diagnosis Date Noted    Severe obesity ([facility] 02/14/2019    Type 2 diabetes with nephropathy ([facility] 03/06/2018    Lumbar postlaminectomy syndrome 08/10/2017    Lumbar spinal stenosis 05/04/2017    Radiculopathy, lumbar region 02/09/2017    Ankle impingement syndrome 10/25/2016    Long term (current) use of anticoagulants 03/24/2016    AS (aortic stenosis) 03/18/2016  Postlaminectomy syndrome 01/28/2016    Lumbar neuritis 01/28/2016    Hyperlipidemia LDL goal <100 09/02/2015    Aortic stenosis 06/09/2015    Tachycardia 01/28/2014    Diabetes ([facility] 01/28/2014    Pulmonary hypertension ([address] 01/28/2014    Osteoarthritis 01/28/2014    Fibromyalgia 01/28/2014    PTSD (post-traumatic stress disorder) 01/28/2014    OCD (obsessive compulsive disorder) 01/28/2014    Panic disorder with agoraphobia 01/28/2014    Recurrent major depression ([address] 01/28/2014      Medical / Surgical / Family History     Past Medical History:   Diagnosis Date    Ankle fracture     Aortic stenosis     S/P AVR in 2016    Asthma     Chronic pain     back pain    Diabetes ([address]     Diverticulitis     Fibromyalgia     H/O aortic valve replacement 03/16    21 mm bovine pericardial bioprosthesis and epiaortic scanning      Hypertension     PT denies    Hypothyroidism     Lumbar post-laminectomy syndrome     Menopause     Obesity     Osteoarthritis     Psychotic disorder ([address]     Sciatica     Skin cancer 2013    right forearm. Past Surgical History:   Procedure Laterality Date    CARDIAC SURG PROCEDURE UNLIST  03/2016    aortic valve replacement.  CHEST SURGERY PROCEDURE UNLISTED  03/2016    Open Heart Surgery    COLONOSCOPY      COLONOSCOPY N/A 9/15/2017    COLONOSCOPY performed by Mari Gallagher MD at [address] Right 1995    lens  replaced.  HX CATARACT REMOVAL Left 02/2018    HX COLONOSCOPY  09/15/2017    dr. Ganga Chandler  f/u 5 years.  HX HEENT  1990's    sinus surgery     HX HEENT Right 11/02/2017    laser for right eye cataracts.  HX LUMBAR FUSION  2004    L3-L4-L5    HX LUMBAR LAMINECTOMY  2002    HX MOHS PROCEDURES Left 1990    HX ORTHOPAEDIC      Rotator cuff Bilateral    HX SHOULDER REPLACEMENT Right 03/29/2017    reverse.      PA COLSC FLX W/RMVL OF TUMOR POLYP LESION SNARE TQ  07/09/2014 repeat in 3 years    Dr. Citlalli Workman Gosia Parmar VASCULAR SURGERY PROCEDURE UNLIST      Aortic valve replacement     Family History   Problem Relation Age of Onset    Hypertension Father     Heart Disease Father     Alcohol abuse Father      Social History     Social History     Socioeconomic History    Marital status:      Spouse name: Not on file    Number of children: Not on file    Years of education: Not on file    Highest education level: Not on file   Occupational History    Not on file   Social Needs    Financial resource strain: Not on file    Food insecurity:     Worry: Not on file     Inability: Not on file    Transportation needs:     Medical: Not on file     Non-medical: Not on file   Tobacco Use    Smoking status: Former Smoker     Years: 45.00     Types: Cigarettes     Last attempt to quit: 2011     Years since quittin.2    Smokeless tobacco: Never Used   Substance and Sexual Activity    Alcohol use: No    Drug use: No    Sexual activity: Never   Lifestyle    Physical activity:     Days per week: Not on file     Minutes per session: Not on file    Stress: Not on file   Relationships    Social connections:     Talks on phone: Not on file     Gets together: Not on file     Attends Druze service: Not on file     Active member of club or organization: Not on file     Attends meetings of clubs or organizations: Not on file     Relationship status: Not on file    Intimate partner violence:     Fear of current or ex partner: Not on file     Emotionally abused: Not on file     Physically abused: Not on file     Forced sexual activity: Not on file   Other Topics Concern    Not on file   Social History Narrative    Not on file     ROS   Review of Systems    10 Element ROS negative unless specifically stated in History of Present Illness.      Health Maintenance     Health Maintenance   Topic Date Due    HEMOGLOBIN A1C Q6M  10/22/2019    FOOT EXAM Q1  2019    LIPID PANEL Q1  2019    MEDICARE YEARLY EXAM  11/17/2019    MICROALBUMIN Q1  04/18/2020    GLAUCOMA SCREENING Q2Y  04/27/2020    EYE EXAM RETINAL OR DILATED  04/27/2020    BREAST CANCER SCRN MAMMOGRAM  03/13/2021    COLONOSCOPY  09/15/2022    DTaP/Tdap/Td series (2 - Td) 12/15/2025    Hepatitis C Screening  Completed    Bone Densitometry (Dexa) Screening  Completed    Shingrix Vaccine Age 50>  Completed    Influenza Age 5 to Adult  Completed    Pneumococcal 65+ years  Completed     Physical Exam   /73   Pulse 88   Temp 98.3 °F (36.8 °C) (Oral)   Resp 18   Ht 5' 7\" (1.702 m)   Wt 236 lb (107 kg)   SpO2 97%   BMI 36.96 kg/m²     Physical Exam   Constitutional: She is oriented to person, place, and time. She appears well-developed and well-nourished. No distress. HENT:   Head: Normocephalic and atraumatic. Eyes: Conjunctivae and EOM are normal.   Cardiovascular: Normal rate, regular rhythm, normal heart sounds and intact distal pulses. No murmur heard. Pulmonary/Chest: Effort normal and breath sounds normal. No respiratory distress. She has no wheezes. Musculoskeletal:   Thumb Spica Brace RIGHT Hand. Neurological: She is alert and oriented to person, place, and time. No cranial nerve deficit. Coordination abnormal.   Ambulatory Dysfunction   Skin: Skin is warm and dry. No rash noted. No erythema. Psychiatric: She has a normal mood and affect.  Her behavior is normal.     Ortho Exam    Luis Miguel Wright,

## 2019-10-11 LAB
ALBUMIN/CREAT UR: 7.2 MG/G CREAT (ref 0–30)
CHOLEST SERPL-MCNC: 118 MG/DL (ref 100–199)
CREAT UR-MCNC: 75.2 MG/DL
EST. AVERAGE GLUCOSE BLD GHB EST-MCNC: 134 MG/DL
HBA1C MFR BLD: 6.3 % (ref 4.8–5.6)
HDLC SERPL-MCNC: 55 MG/DL
INTERPRETATION, 910389: NORMAL
LDLC SERPL CALC-MCNC: 49 MG/DL (ref 0–99)
Lab: NORMAL
MICROALBUMIN UR-MCNC: 5.4 UG/ML
TRIGL SERPL-MCNC: 70 MG/DL (ref 0–149)
VLDLC SERPL CALC-MCNC: 14 MG/DL (ref 5–40)

## 2019-10-14 DIAGNOSIS — F42.2 MIXED OBSESSIONAL THOUGHTS AND ACTS: ICD-10-CM

## 2019-10-14 DIAGNOSIS — F40.01 PANIC DISORDER WITH AGORAPHOBIA: ICD-10-CM

## 2019-10-14 DIAGNOSIS — E78.5 HYPERLIPIDEMIA LDL GOAL <100: ICD-10-CM

## 2019-10-14 RX ORDER — FLUOXETINE 10 MG/1
CAPSULE ORAL
Qty: 90 CAP | Refills: 3 | Status: SHIPPED | OUTPATIENT
Start: 2019-10-14 | End: 2020-11-03 | Stop reason: ALTCHOICE

## 2019-10-14 RX ORDER — ATORVASTATIN CALCIUM 20 MG/1
TABLET, FILM COATED ORAL
Qty: 90 TAB | Refills: 1 | Status: SHIPPED | OUTPATIENT
Start: 2019-10-14 | End: 2020-03-23 | Stop reason: SDUPTHER

## 2019-10-14 NOTE — TELEPHONE ENCOUNTER
Requested Prescriptions     Pending Prescriptions Disp Refills    FLUoxetine (PROZAC) 10 mg capsule 90 Cap 3     Sig: TAKE 1 CAPSULE BY MOUTH ONCE DAILY. Concha Stark     atorvastatin (LIPITOR) 20 mg tablet 90 Tab 1     Sig: TAKE 1 TABLET BY MOUTH ONCE DAILY

## 2019-10-28 ENCOUNTER — TELEPHONE (OUTPATIENT)
Dept: FAMILY MEDICINE CLINIC | Age: 69
End: 2019-10-28

## 2019-10-28 ENCOUNTER — HOSPITAL ENCOUNTER (OUTPATIENT)
Dept: CT IMAGING | Age: 69
Discharge: HOME | End: 2019-10-28
Attending: INTERNAL MEDICINE
Payer: MEDICARE

## 2019-10-28 DIAGNOSIS — J98.4 OTHER DISORDERS OF LUNG: ICD-10-CM

## 2019-10-28 PROCEDURE — 71250 CT THORAX DX C-: CPT

## 2019-10-28 NOTE — TELEPHONE ENCOUNTER
Called patient (identified self and office) advised patient to contact office to let her know if she is ready to titrate then Dr Tuyet Patino would be happy to   ready to Titrate, sure, I have no problem with that at all.

## 2019-10-29 ENCOUNTER — TELEPHONE (OUTPATIENT)
Dept: FAMILY MEDICINE CLINIC | Age: 69
End: 2019-10-29

## 2019-10-29 DIAGNOSIS — G62.9 PERIPHERAL POLYNEUROPATHY: ICD-10-CM

## 2019-10-29 RX ORDER — PREGABALIN 50 MG/1
100 CAPSULE ORAL 3 TIMES DAILY
Qty: 90 CAP | Refills: 3 | Status: SHIPPED | OUTPATIENT
Start: 2019-10-29 | End: 2019-11-12 | Stop reason: CLARIF

## 2019-10-29 NOTE — TELEPHONE ENCOUNTER
Called pt back and informed her of the previous message and she said she was ready to have it increased to 100mg 3 times a day. she would like it called in to the [facility] on file. Please advise.      She also wanted to let Dr. Jolly Ching know that it is not her shoulder it is her neck that is causing the pain and that if he wanted to talk with  Dr. Marya Gilbert he could call him at  [phone].

## 2019-10-29 NOTE — TELEPHONE ENCOUNTER
Called pt back and informed her of the previous message and she said she was ready to have it increased to 100mg 3 times a day. she would like it called in to the [facility] on file. Please advise. She also wanted to let Dr. Katya Taylor know that it is not her shoulder it is her neck that is causing the pain and that if he wanted to talk with  Dr. Eros Jay he could call him at  [phone].

## 2019-11-12 ENCOUNTER — OFFICE VISIT (OUTPATIENT)
Dept: FAMILY MEDICINE CLINIC | Age: 69
End: 2019-11-12

## 2019-11-12 VITALS
SYSTOLIC BLOOD PRESSURE: 139 MMHG | DIASTOLIC BLOOD PRESSURE: 84 MMHG | RESPIRATION RATE: 20 BRPM | HEART RATE: 97 BPM | WEIGHT: 238.4 LBS | BODY MASS INDEX: 37.42 KG/M2 | OXYGEN SATURATION: 97 % | HEIGHT: 67 IN | TEMPERATURE: 97.8 F

## 2019-11-12 DIAGNOSIS — M79.7 FIBROMYALGIA: ICD-10-CM

## 2019-11-12 DIAGNOSIS — I27.20 PULMONARY HYPERTENSION (HCC): Primary | ICD-10-CM

## 2019-11-12 RX ORDER — DULOXETIN HYDROCHLORIDE 30 MG/1
30 CAPSULE, DELAYED RELEASE ORAL 3 TIMES DAILY
Qty: 90 CAP | Refills: 3 | Status: SHIPPED | OUTPATIENT
Start: 2019-11-12 | End: 2020-03-23 | Stop reason: SDUPTHER

## 2019-11-16 NOTE — PROGRESS NOTES
Impression / Plan     Diagnoses and all orders for this visit:    1. Pulmonary hypertension (HCC)  -     AMB POC EKG ROUTINE W/ 12 LEADS, INTER & REP    2. Fibromyalgia  -     DULoxetine (CYMBALTA) 30 mg capsule; Take 1 Cap by mouth three (3) times daily. Plan: Defers Prolia instead of Fosamax for Osteoporosis, understands Fracture Risk, discussed use of Calcium and Vitamin D supplementation. Will STOP Lyrica and start Cymbalta 30 mg TID. No literature on clinical benefit with dosage > 60 mg for Cymbalta, however, Mrs. Myra Mir requests. Made aware of risk of Serotonin Syndrome with use of Prozac, however, defers stoppage of Prozac and will Monitor symptoms. Use Ultram PRN ONLY. Will obtain EKG to access QT Interval.     Follow-up and Dispositions    · Return in about 4 weeks (around 12/10/2019) for Medication Management. SHELBI Alamo is a 71 y. o.female presenting for Follow Up from 10/8/2019, DX-Osteoporosis, Fibromyalgia. Reports problems with both Fosamax and Lyrica. Mrs. Dodge describes Anaphylactoid symptoms with Fosamax, also Jaw Pain. Currently OFF the medication with resolution of symptoms. Lyrica causing Weight Pain, NOT of benefit, requests renewal of Cymbalta. Currently WEAN of Lyrica. Uses Prozac 10 mg PO QD and Ultram PRN. EKG 4/24/2017 WNL. Review of Systems   Constitutional: Positive for malaise/fatigue. HENT: Negative. Eyes: Negative. Respiratory: Negative. Cardiovascular: Negative. Gastrointestinal: Negative. Genitourinary: Negative. Musculoskeletal: Negative. Skin: Negative. Neurological: Negative. Endo/Heme/Allergies: Negative. Psychiatric/Behavioral: Negative. Medications     Outpatient Medications Prior to Visit   Medication Sig Dispense Refill    FLUoxetine (PROZAC) 10 mg capsule TAKE 1 CAPSULE BY MOUTH ONCE DAILY.  GENERICFOR PROZAC. 90 Cap 3    atorvastatin (LIPITOR) 20 mg tablet TAKE 1 TABLET BY MOUTH ONCE DAILY 90 Tab 1    meclizine (ANTIVERT) 25 mg tablet Take  by mouth three (3) times daily as needed.  metFORMIN (GLUCOPHAGE) 500 mg tablet TAKE ONE TABLET BY MOUTH ONCE DAILY WITH  DINNER. 90 Tab 1    traMADol (ULTRAM) 50 mg tablet Take 1 Tab by mouth four (4) times daily for 90 days. Max Daily Amount: 200 mg. 120 Tab 2    STIOLTO RESPIMAT 2.5-2.5 mcg/actuation inhaler Take  by inhalation. 1    fluticasone propionate (FLONASE) 50 mcg/actuation nasal spray One spray each nostril twice a day. 1 Bottle 5    levothyroxine (SYNTHROID) 50 mcg tablet TAKE 1 TABLET BY MOUTH EVERY DAY BEFORE BREAKFAST 90 Tab 1    meloxicam (MOBIC) 15 mg tablet TAKE 1 TABLET BY MOUTH DAILY 90 Tab 3    Lactobacillus acidophilus (PROBIOTIC PO) Take 1 Cap by Mouth Once a Day. Lactose defense      docusate sodium (COLACE) 50 mg capsule 50 mg.      ascorbate calcium (VITAMIN C PO) Take  by mouth.  vitamin E acetate (VITAMIN E PO) Take  by mouth.  cyanocobalamin (VITAMIN B-12) 500 mcg tablet Take 500 mcg by mouth daily.  clobetasol (TEMOVATE) 0.05 % external solution APPLY TO SCALP AFTER SHAMPOO DAILY 50 mL 3    cetirizine (ZYRTEC) 10 mg tablet TAKE 1 TABLET BY MOUTH EVERY DAY. GENERIC FOR ZYRTEC 90 Tab 2    VENTOLIN HFA 90 mcg/actuation inhaler 2 Puffs by Aerosolization route every four (4) hours as needed. 0    B.infantis-B.ani-B.long-B.bifi (PROBIOTIC 4X) 10-15 mg TbEC Take  by mouth daily.  aspirin delayed-release 81 mg tablet Take 81 mg by mouth daily.  Cholecalciferol, Vitamin D3, (VITAMIN D3) 1,000 unit cap Take 1,000 Units by mouth daily.  glucose blood VI test strips (ACCU-CHEK MARCE PLUS TEST STRP) strip Please use one strip to test blood sugars once a day. 100 strip 20    Lancets (ACCU-CHEK SOFTCLIX LANCETS) misc Please use one lancet to test blood sugars twice a day. 1 Package 20    BLOOD-GLUCOSE METER (ACCU-CHEK MARCE MONITORING) by Does Not Apply route.       triamcinolone acetonide (KENALOG) 0.1 % topical cream Apply  to affected area two (2) times a day. use thin layer      pregabalin (LYRICA) 50 mg capsule Take 2 Caps by mouth three (3) times daily. Max Daily Amount: 300 mg. 90 Cap 3    alendronate (FOSAMAX) 70 mg tablet Take 1 Tab by mouth every seven (7) days. 60 Tab 3    DULoxetine (CYMBALTA) 30 mg capsule TAKE ONE CAPSULE BY MOUTH TWICE DAILY 180 Cap 1    traMADol (ULTRAM) 50 mg tablet 1 tab PO QID prn pain 120 Tab 2     No facility-administered medications prior to visit.          Allergies     Allergies   Allergen Reactions    Latex, Natural Rubber Hives    Adhesive Rash     Some bandaids    Ciprofloxacin Rash    Codeine Nausea and Vomiting    Demerol [Meperidine] Nausea and Vomiting    Diclofenac Shortness of Breath and Palpitations    Erythromycin Rash    Levaquin [Levofloxacin] Rash    Loratadine Swelling    Morphine Nausea and Vomiting    Penicillin G Hives and Rash     Does fine with Keflex    Sulfa (Sulfonamide Antibiotics) Itching    Vicodin [Hydrocodone-Acetaminophen] Other (comments)     Metallic taste in mouth       Problem List     Patient Active Problem List    Diagnosis Date Noted    Severe obesity ([facility] [address] 02/14/2019    Type 2 diabetes with nephropathy ([facility] [address] 03/06/2018    Lumbar postlaminectomy syndrome 08/10/2017    Lumbar spinal stenosis 05/04/2017    Radiculopathy, lumbar region 02/09/2017    Ankle impingement syndrome 10/25/2016    Long term (current) use of anticoagulants 03/24/2016    AS (aortic stenosis) 03/18/2016    Postlaminectomy syndrome 01/28/2016    Lumbar neuritis 01/28/2016    Hyperlipidemia LDL goal <100 09/02/2015    Aortic stenosis 06/09/2015    Tachycardia 01/28/2014    Diabetes ([address] 01/28/2014    Pulmonary hypertension ([address] 01/28/2014    Osteoarthritis 01/28/2014    Fibromyalgia 01/28/2014    PTSD (post-traumatic stress disorder) 01/28/2014    OCD (obsessive compulsive disorder) 01/28/2014    Panic disorder with agoraphobia 01/28/2014    Recurrent major depression ([facility] [address] 01/28/2014        Medical / Surgical / Family History     Past Medical History:   Diagnosis Date    Ankle fracture     Aortic stenosis     S/P AVR in 2016    Asthma     Chronic pain     back pain    Diabetes ([facility] [address]     Diverticulitis     Fibromyalgia     H/O aortic valve replacement 03/16    21 mm bovine pericardial bioprosthesis and epiaortic scanning      Hypertension     PT denies    Hypothyroidism     Lumbar post-laminectomy syndrome     Menopause     Obesity     Osteoarthritis     Psychotic disorder ([facility] [address]     Sciatica     Skin cancer 2013    right forearm. Past Surgical History:   Procedure Laterality Date    CARDIAC SURG PROCEDURE UNLIST  03/2016    aortic valve replacement.  CHEST SURGERY PROCEDURE UNLISTED  03/2016    Open Heart Surgery    COLONOSCOPY      COLONOSCOPY N/A 9/15/2017    COLONOSCOPY performed by Chidi Scott MD at [address] Right 1995    lens  replaced.  HX CATARACT REMOVAL Left 02/2018    HX COLONOSCOPY  09/15/2017    dr. [employer]  f/u 5 years.  HX HEENT  1990's    sinus surgery     HX HEENT Right 11/02/2017    laser for right eye cataracts.  HX LUMBAR FUSION  2004    L3-L4-L5    HX LUMBAR LAMINECTOMY  2002    HX MOHS PROCEDURES Left 1990    HX ORTHOPAEDIC      Rotator cuff Bilateral    HX SHOULDER REPLACEMENT Right 03/29/2017    reverse.      [place] COLSC FLX W/RMVL OF TUMOR POLYP LESION SNARE TQ  07/09/2014 repeat in 3 years    Dr. Boothe Erp      Aortic valve replacement     Family History   Problem Relation Age of Onset    Hypertension Father     Heart Disease Father     Alcohol abuse Father      Social History     Social History     Socioeconomic History    Marital status:      Spouse name: Not on file    Number of children: Not on file    Years of education: Not on file    Highest education level: Not on file   Occupational History  Not on file   Social Needs    Financial resource strain: Not on file    Food insecurity:     Worry: Not on file     Inability: Not on file    Transportation needs:     Medical: Not on file     Non-medical: Not on file   Tobacco Use    Smoking status: Former Smoker     Years: 45.00     Types: Cigarettes     Last attempt to quit: 2011     Years since quittin.3    Smokeless tobacco: Never Used   Substance and Sexual Activity    Alcohol use: No    Drug use: No    Sexual activity: Never   Lifestyle    Physical activity:     Days per week: Not on file     Minutes per session: Not on file    Stress: Not on file   Relationships    Social connections:     Talks on phone: Not on file     Gets together: Not on file     Attends Jainism service: Not on file     Active member of club or organization: Not on file     Attends meetings of clubs or organizations: Not on file     Relationship status: Not on file    Intimate partner violence:     Fear of current or ex partner: Not on file     Emotionally abused: Not on file     Physically abused: Not on file     Forced sexual activity: Not on file   Other Topics Concern    Not on file   Social History Narrative    Not on file     ROS   Review of Systems    10 Element ROS negative unless specifically stated in History of Present Illness.      Health Maintenance     Health Maintenance   Topic Date Due    FOOT EXAM Q1  2019    MEDICARE YEARLY EXAM  2019    HEMOGLOBIN A1C Q6M  04/10/2020    GLAUCOMA SCREENING Q2Y  2020    EYE EXAM RETINAL OR DILATED  2020    MICROALBUMIN Q1  10/10/2020    LIPID PANEL Q1  10/10/2020    BREAST CANCER SCRN MAMMOGRAM  2021    COLONOSCOPY  09/15/2022    DTaP/Tdap/Td series (2 - Td) 12/15/2025    Hepatitis C Screening  Completed    Bone Densitometry (Dexa) Screening  Completed    Shingrix Vaccine Age 50>  Completed    Influenza Age 5 to Adult  Completed    Pneumococcal 65+ years  Completed Physical Exam   /84 (BP 1 Location: Right arm, BP Patient Position: Sitting)   Pulse 97   Temp 97.8 °F (36.6 °C) (Oral)   Resp 20   Ht 5' 7\" (1.702 m)   Wt 238 lb 6.4 oz (108.1 kg)   SpO2 97%   BMI 37.34 kg/m²     Physical Exam   Constitutional: She is oriented to person, place, and time. She appears well-developed and well-nourished. No distress. HENT:   Head: Normocephalic and atraumatic. Eyes: Pupils are equal, round, and reactive to light. Conjunctivae and EOM are normal.   Cardiovascular: Normal rate, regular rhythm, normal heart sounds and intact distal pulses. No murmur heard. Pulmonary/Chest: Effort normal and breath sounds normal. No respiratory distress. She has no wheezes. Neurological: She is alert and oriented to person, place, and time. No cranial nerve deficit. Coordination normal.   Skin: Skin is warm and dry. No rash noted. No erythema. Psychiatric: She has a normal mood and affect.  Her behavior is normal. Thought content normal.     Ortho Exam    Rajats Colin, DO

## 2019-12-05 DIAGNOSIS — E03.9 ACQUIRED HYPOTHYROIDISM: ICD-10-CM

## 2019-12-05 NOTE — TELEPHONE ENCOUNTER
Pt called in and was requesting a refill of her medication. Pt stated she is completely out. Please advise.

## 2019-12-09 RX ORDER — LEVOTHYROXINE SODIUM 50 UG/1
TABLET ORAL
Qty: 90 TAB | Refills: 1 | Status: SHIPPED | OUTPATIENT
Start: 2019-12-09 | End: 2020-03-23 | Stop reason: SDUPTHER

## 2019-12-09 NOTE — TELEPHONE ENCOUNTER
Requested Prescriptions     Pending Prescriptions Disp Refills    levothyroxine (SYNTHROID) 50 mcg tablet 90 Tab 1     Sig: TAKE 1 TABLET BY MOUTH EVERY DAY BEFORE BREAKFAST     Last appt. 11/12/19    Next appt.  12/10/19

## 2019-12-12 ENCOUNTER — OFFICE VISIT (OUTPATIENT)
Dept: FAMILY MEDICINE CLINIC | Age: 69
End: 2019-12-12

## 2019-12-12 VITALS
DIASTOLIC BLOOD PRESSURE: 79 MMHG | TEMPERATURE: 97.5 F | BODY MASS INDEX: 36.66 KG/M2 | OXYGEN SATURATION: 98 % | SYSTOLIC BLOOD PRESSURE: 116 MMHG | RESPIRATION RATE: 20 BRPM | HEART RATE: 88 BPM | HEIGHT: 67 IN | WEIGHT: 233.6 LBS

## 2019-12-12 DIAGNOSIS — Z00.00 MEDICARE ANNUAL WELLNESS VISIT, SUBSEQUENT: ICD-10-CM

## 2019-12-12 DIAGNOSIS — R42 DIZZINESS: Primary | ICD-10-CM

## 2019-12-12 RX ORDER — MECLIZINE HYDROCHLORIDE 25 MG/1
25 TABLET ORAL
Qty: 60 TAB | Refills: 3 | Status: SHIPPED | OUTPATIENT
Start: 2019-12-12 | End: 2021-06-08 | Stop reason: SDUPTHER

## 2019-12-12 NOTE — PROGRESS NOTES
SHELBI Burns is a 71 y.o. old female who presents for the Medicare AWV (Annual Wellness Visit). Depression Screen:    Negative    Cognitive Impairment:    Negative    End of Life Planning:    Advanced Directive reviewed and I agree to follow the patient's wishes for End of Life Planning. Results of the PHQ9, Mini-Cog, fall and safety risk assessments were reviewed and significant findings addressed: Yes    Patient Active Problem List   Diagnosis Code    Tachycardia R00.0    Diabetes ([address] E11.9    Pulmonary hypertension ([facility] [address] I27.20    Osteoarthritis M19.90    Fibromyalgia M79.7    PTSD (post-traumatic stress disorder) F43.10    OCD (obsessive compulsive disorder) F42.9    Panic disorder with agoraphobia F40.01    Recurrent major depression ([facility] [address] F33.9    Aortic stenosis I35.0    Hyperlipidemia LDL goal <100 E78.5    Postlaminectomy syndrome M96.1    Lumbar neuritis M54.16    AS (aortic stenosis) I35.0    Long term (current) use of anticoagulants Z79.01    Ankle impingement syndrome M25.879    Radiculopathy, lumbar region M54.16    Lumbar spinal stenosis M48.061    Lumbar postlaminectomy syndrome M96.1    Type 2 diabetes with nephropathy (HCC) E11.21    Severe obesity (HCC) E66.01     Status of patient's pertinent medical problems: Last seen 11/12/2019, Mr. Mills Staff reports benefit with Cymbalta 90 mg and Fibromyalgia, also Mobic 15 mg PO every day. To reschedule with Cardiology to Monitor AS. Outpatient Medications Prior to Visit   Medication Sig Dispense Refill    levothyroxine (SYNTHROID) 50 mcg tablet TAKE 1 TABLET BY MOUTH EVERY DAY BEFORE BREAKFAST 90 Tab 1    DULoxetine (CYMBALTA) 30 mg capsule Take 1 Cap by mouth three (3) times daily. 90 Cap 3    FLUoxetine (PROZAC) 10 mg capsule TAKE 1 CAPSULE BY MOUTH ONCE DAILY.  GENERICFOR PROZAC. 90 Cap 3    atorvastatin (LIPITOR) 20 mg tablet TAKE 1 TABLET BY MOUTH ONCE DAILY 90 Tab 1    metFORMIN (GLUCOPHAGE) 500 mg tablet TAKE ONE TABLET BY MOUTH ONCE DAILY WITH  DINNER. 90 Tab 1    traMADol (ULTRAM) 50 mg tablet Take 1 Tab by mouth four (4) times daily for 90 days. Max Daily Amount: 200 mg. 120 Tab 2    STIOLTO RESPIMAT 2.5-2.5 mcg/actuation inhaler Take  by inhalation. 1    fluticasone propionate (FLONASE) 50 mcg/actuation nasal spray One spray each nostril twice a day. 1 Bottle 5    meloxicam (MOBIC) 15 mg tablet TAKE 1 TABLET BY MOUTH DAILY 90 Tab 3    Lactobacillus acidophilus (PROBIOTIC PO) Take 1 Cap by Mouth Once a Day. Lactose defense      docusate sodium (COLACE) 50 mg capsule 50 mg.      ascorbate calcium (VITAMIN C PO) Take  by mouth.  vitamin E acetate (VITAMIN E PO) Take  by mouth.  cyanocobalamin (VITAMIN B-12) 500 mcg tablet Take 500 mcg by mouth daily.  clobetasol (TEMOVATE) 0.05 % external solution APPLY TO SCALP AFTER SHAMPOO DAILY 50 mL 3    cetirizine (ZYRTEC) 10 mg tablet TAKE 1 TABLET BY MOUTH EVERY DAY. GENERIC FOR ZYRTEC 90 Tab 2    VENTOLIN HFA 90 mcg/actuation inhaler 2 Puffs by Aerosolization route every four (4) hours as needed. 0    B.infantis-B.ani-B.long-B.bifi (PROBIOTIC 4X) 10-15 mg TbEC Take  by mouth daily.  aspirin delayed-release 81 mg tablet Take 81 mg by mouth daily.  Cholecalciferol, Vitamin D3, (VITAMIN D3) 1,000 unit cap Take 1,000 Units by mouth daily.  glucose blood VI test strips (ACCU-CHEK MARCE PLUS TEST STRP) strip Please use one strip to test blood sugars once a day. 100 strip 20    Lancets (ACCU-CHEK SOFTCLIX LANCETS) misc Please use one lancet to test blood sugars twice a day. 1 Package 20    BLOOD-GLUCOSE METER (ACCU-CHEK MARCE MONITORING) by Does Not Apply route.  triamcinolone acetonide (KENALOG) 0.1 % topical cream Apply  to affected area two (2) times a day. use thin layer      meclizine (ANTIVERT) 25 mg tablet Take  by mouth three (3) times daily as needed. No facility-administered medications prior to visit.         The patient's past medical history, medications, allergies, family history and social history were reviewed in her electronic medical record at today's visit. ROS     Not done as a standard part of AWV. Physical Exam      Not done as a standard part of AWV. Vitals:    See Nurses Notes    Hearing and Visual Acuity Screening:    See Nurses Notes    Results of the hearing and visual acuity screening were reviewed and significant findings addressed: Yes    Education/Counseling/Referrals Based on Visit        ICD-10-CM ICD-9-CM    1. Dizziness R42 780.4 meclizine (ANTIVERT) 25 mg tablet   2. Medicare annual wellness visit, subsequent Z00.00 V70.0      Education and counseling provided    Health Maintenance reviewed    Individualized screening schedule, health care maintenance options and preventative health measures have been discussed with Radha Moya and written recommendations were provided to her.

## 2019-12-12 NOTE — PROGRESS NOTES
Room #  4  Chief Complaint:  Medicare Wellness visit  HPI:    Jackie Palomares is a 71 y.o. female who presents today for c/o Medicare Wellness visit    1. Have you been to the ER, urgent care clinic since your last visit? Hospitalized since your last visit? NO    2. Have you seen or consulted any other health care providers outside of the [address] [place] [address] since your last visit? Include any pap smears or colon screening. NO  When :  Reason:    Health Maintenance reviewed Yes    Health Maintenance Due   Topic Date Due    FOOT EXAM Q1  11/16/2019    MEDICARE YEARLY EXAM  11/17/2019

## 2019-12-19 ENCOUNTER — OFFICE VISIT (OUTPATIENT)
Dept: CARDIOLOGY CLINIC | Age: 69
End: 2019-12-19

## 2019-12-19 VITALS
WEIGHT: 232 LBS | HEART RATE: 91 BPM | SYSTOLIC BLOOD PRESSURE: 121 MMHG | BODY MASS INDEX: 36.34 KG/M2 | DIASTOLIC BLOOD PRESSURE: 77 MMHG | OXYGEN SATURATION: 99 %

## 2019-12-19 DIAGNOSIS — E66.01 MORBID OBESITY, UNSPECIFIED OBESITY TYPE (HCC): Primary | ICD-10-CM

## 2019-12-19 DIAGNOSIS — I35.0 NONRHEUMATIC AORTIC VALVE STENOSIS: ICD-10-CM

## 2019-12-19 DIAGNOSIS — I10 ESSENTIAL HYPERTENSION WITH GOAL BLOOD PRESSURE LESS THAN 140/90: ICD-10-CM

## 2019-12-19 NOTE — PROGRESS NOTES
[facility]    Ms. Martin Martinez is a 71 y.o. female with history of Aortic stenosis s/p Aortic valve replacement (03/16), fibromyalgia, diabetes, obesity, hypertension, hypothyroidism and COPD. Mr. Martin Martinez is here today for follow up appointment. Dyspnea stable or slightly better. No Chest pain or tightness  Using 1/2 pillows at night  No presyncope or syncope  Patient is supposed to undergo MRI to evaluate cervical spine. Past Medical History:   Diagnosis Date    Ankle fracture     Aortic stenosis     S/P AVR in 2016    Asthma     Chronic pain     back pain    Diabetes ([address]     Diverticulitis     Fibromyalgia     H/O aortic valve replacement 03/16    21 mm bovine pericardial bioprosthesis and epiaortic scanning      Hypertension     PT denies    Hypothyroidism     Lumbar post-laminectomy syndrome     Menopause     Obesity     Osteoarthritis     Psychotic disorder ([address]     Sciatica     Skin cancer 2013    right forearm. Past Surgical History:   Procedure Laterality Date    CARDIAC SURG PROCEDURE UNLIST  03/2016    aortic valve replacement.  CHEST SURGERY PROCEDURE UNLISTED  03/2016    Open Heart Surgery    COLONOSCOPY      COLONOSCOPY N/A 9/15/2017    COLONOSCOPY performed by Bee Powell MD at [facility]  Right 1995    lens  replaced.  HX CATARACT REMOVAL Left 02/2018    HX COLONOSCOPY  09/15/2017    dr. Kaylie Calderón  f/u 5 years.  HX HEENT  1990's    sinus surgery     HX HEENT Right 11/02/2017    laser for right eye cataracts.  HX LUMBAR FUSION  2004    L3-L4-L5    HX LUMBAR LAMINECTOMY  2002    HX MOHS PROCEDURES Left 1990    HX ORTHOPAEDIC      Rotator cuff Bilateral    HX SHOULDER REPLACEMENT Right 03/29/2017    reverse.      SD COLSC FLX W/RMVL OF TUMOR POLYP LESION SNARE TQ  07/09/2014 repeat in 3 years    Dr. Flowers Resides Aortic valve replacement       Current Outpatient Medications   Medication Sig    meclizine (ANTIVERT) 25 mg tablet Take 1 Tab by mouth three (3) times daily as needed for Dizziness.  levothyroxine (SYNTHROID) 50 mcg tablet TAKE 1 TABLET BY MOUTH EVERY DAY BEFORE BREAKFAST    DULoxetine (CYMBALTA) 30 mg capsule Take 1 Cap by mouth three (3) times daily.  FLUoxetine (PROZAC) 10 mg capsule TAKE 1 CAPSULE BY MOUTH ONCE DAILY. Abby Nuñez  atorvastatin (LIPITOR) 20 mg tablet TAKE 1 TABLET BY MOUTH ONCE DAILY    metFORMIN (GLUCOPHAGE) 500 mg tablet TAKE ONE TABLET BY MOUTH ONCE DAILY WITH  DINNER.  traMADol (ULTRAM) 50 mg tablet Take 1 Tab by mouth four (4) times daily for 90 days. Max Daily Amount: 200 mg.    STIOLTO RESPIMAT 2.5-2.5 mcg/actuation inhaler Take  by inhalation.  fluticasone propionate (FLONASE) 50 mcg/actuation nasal spray One spray each nostril twice a day.  meloxicam (MOBIC) 15 mg tablet TAKE 1 TABLET BY MOUTH DAILY    docusate sodium (COLACE) 50 mg capsule 50 mg.    vitamin E acetate (VITAMIN E PO) Take  by mouth.  cyanocobalamin (VITAMIN B-12) 500 mcg tablet Take 500 mcg by mouth daily.  clobetasol (TEMOVATE) 0.05 % external solution APPLY TO SCALP AFTER SHAMPOO DAILY    cetirizine (ZYRTEC) 10 mg tablet TAKE 1 TABLET BY MOUTH EVERY DAY. GENERIC FOR ZYRTEC    VENTOLIN HFA 90 mcg/actuation inhaler 2 Puffs by Aerosolization route every four (4) hours as needed.  B.infantis-B.ani-B.long-B.bifi (PROBIOTIC 4X) 10-15 mg TbEC Take  by mouth daily.  aspirin delayed-release 81 mg tablet Take 81 mg by mouth daily.  Cholecalciferol, Vitamin D3, (VITAMIN D3) 1,000 unit cap Take 1,000 Units by mouth daily.  glucose blood VI test strips (ACCU-CHEK MARCE PLUS TEST STRP) strip Please use one strip to test blood sugars once a day.  Lancets (ACCU-CHEK SOFTCLIX LANCETS) misc Please use one lancet to test blood sugars twice a day.     BLOOD-GLUCOSE METER (ACCU-CHEK MARCE MONITORING) by Does Not Apply route.  triamcinolone acetonide (KENALOG) 0.1 % topical cream Apply  to affected area two (2) times a day. use thin layer     No current facility-administered medications for this visit. Allergies and Sensitivities:  Allergies   Allergen Reactions    Latex, Natural Rubber Hives    Adhesive Rash     Some bandaids    Ciprofloxacin Rash    Codeine Nausea and Vomiting    Demerol [Meperidine] Nausea and Vomiting    Diclofenac Shortness of Breath and Palpitations    Erythromycin Rash    Levaquin [Levofloxacin] Rash    Loratadine Swelling    Morphine Nausea and Vomiting    Penicillin G Hives and Rash     Does fine with Keflex    Sulfa (Sulfonamide Antibiotics) Itching    Vicodin [Hydrocodone-Acetaminophen] Other (comments)     Metallic taste in mouth       Family History:  Family History   Problem Relation Age of Onset    Hypertension Father     Heart Disease Father     Alcohol abuse Father        Social History:  Social History     Tobacco Use    Smoking status: Former Smoker     Years: 45.00     Types: Cigarettes     Last attempt to quit: 2011     Years since quittin.4    Smokeless tobacco: Never Used   Substance Use Topics    Alcohol use: No    Drug use: No     She  reports that she quit smoking about 8 years ago. Her smoking use included cigarettes. She quit after 45.00 years of use. She has never used smokeless tobacco.  She  reports no history of alcohol use. Review of Systems:  Cardiac symptoms as noted above in HPI. All others negative. Denies skin rash,  photophobia, neck pain, hemoptysis, chronic cough, nausea, vomiting, hematuria, burning micturition, BRBPR, chronic headaches.     Physical Exam:  BP Readings from Last 3 Encounters:   19 121/77   19 116/79   19 139/84         Pulse Readings from Last 3 Encounters:   19 91   19 88   19 97          Wt Readings from Last 3 Encounters:   19 232 lb (105.2 kg)   12/12/19 233 lb 9.6 oz (106 kg)   11/12/19 238 lb 6.4 oz (108.1 kg)       Constitutional: Oriented to person, place, and time. HENT: Head: Normocephalic and atraumatic. Neck: No JVD present. Cardiovascular: Regular rhythm. No gallop or rubs appreciated. No significant murmur  Lung: Breath sounds normal. No respiratory distress. No ronchi or rales appreciated  Abdominal: No tenderness. No rebound and no guarding. Musculoskeletal: No LE swelling    Review of Data  LABS:   Lab Results   Component Value Date/Time    Sodium 140 08/22/2019 03:24 AM    Potassium 4.3 08/22/2019 03:24 AM    Chloride 100 08/22/2019 03:24 AM    CO2 23 08/22/2019 03:24 AM    Glucose 94 08/22/2019 03:24 AM    BUN 14 08/22/2019 03:24 AM    Creatinine 0.94 08/22/2019 03:24 AM     Lipids Latest Ref Rng & Units 10/10/2019 11/16/2018 11/7/2017 9/2/2016   Chol, Total 100 - 199 mg/dL 118 105 119 141   HDL >39 mg/dL 55 53 56 77   LDL 0 - 99 mg/dL 49 38 45 49   Trig 0 - 149 mg/dL 70 70 88 77   Some recent data might be hidden     Lab Results   Component Value Date/Time    ALT (SGPT) 24 08/22/2019 03:24 AM     Lab Results   Component Value Date/Time    Hemoglobin A1c 6.3 (H) 10/10/2019 11:34 AM     EKG  (04/16) Sinus rhythm at 96 bpm    STRESS TEST (2012)   No EKG or scintigraphic evidence of ischemia or infarction. Normal LVEF    ECHO (01/16)  SUMMARY:  Left ventricle: Systolic function was normal. Ejection fraction was estimated in the range of 55 % to 60 %. There were no regional wall motion  abnormalities. Doppler parameters were consistent with abnormal left ventricular relaxation (grade 1 diastolic dysfunction). Right ventricle: Systolic function was normal.  Aortic valve: The valve was trileaflet. Leaflets exhibited calcification and moderate- severely reduced cuspal separation. There was severe aortic  stenosis. There was no significant regurgitation. Valve peak gradient was 85 mmHg. Valve mean gradient was 50 mmHg.     ELIS (02/16)  Left ventricle: Size was normal. Systolic function was normal.  Right ventricle: The size was normal.  Left atrium: Size was normal. No thrombus was identified. There was no  spontaneous echo contrast (\"smoke\"). Left atrial appendage: No thrombus was identified. There was no  spontaneous echo contrast (\"smoke\") in the appendage. Right atrium: Size was normal.  Mitral valve: There was mild to moderate multi jet regurgitation. No obvious mass, vegetation or thrombus noted. Aortic valve: Aortic valve assessment was performed using 2D  transesophageal as well as transthoracic doppler data. The valve was  trileaflet. Leaflets were thickened, calcific and stenotic. A mean  gradient across the valve was measured at 49mmHg with a calculated valve  area of 0.70 - 0.75 cm based on an LVOT diameter of 2.1 cm, an LVOT VTI of  21 cm, and an AV VTI of 101 cm. There was no regurgitation. Tricuspid valve: There was no regurgitation. No obvious mass, vegetation or thrombus noted. HOLTER (05/16)  Interpretation:  1. Rhythm is sinus. 2. PACs (1.3% burden). 3. Rare PVCs. ECHO (05/19)  Left Ventricle Normal cavity size and systolic function (ejection fraction normal). Mildly increased wall thickness. The estimated ejection fraction is 56 - 60%. Visually measured ejection fraction. No regional wall motion abnormality noted. There is inconclusive left ventricular diastolic function. Left Atrium Normal cavity size. Left Atrium volume index is 26 mL/m2. Right Ventricle Not well visualized. Right Atrium Normal cavity size. Aortic Valve No regurgitation. Prosthetic aortic valve. Aortic valve peak gradient is 22 mmHg. Aortic valve mean gradient is 13 mmHg. Aortic valve area is 1.1 cm2. There is a unknown type of bioprosthetic valve present. The prosthetic valve is normal.   Mitral Valve Normal valve structure and no stenosis. Trace regurgitation.    Tricuspid Valve Normal valve structure, no stenosis and no regurgitation. Pulmonic Valve Pulmonic valve not well visualized, but normal doppler findings. Aorta Normal aortic root. CARDIAC CATH: (01/16)  PRESSURE HEMODYNAMICS: Systemic aortic pressure was 131/68 mmHg. Left ventricular pressure was 160/2/12 mmHg. Based on the data, peak-to-peak gradient between LV and aortic was approximately 30 mmHg. Mean RA  7 mmHg,   RV  32/1/8 mmHg,  PA  27/9/16 mmHg,   PCWP 10 mm hg  Cardiac output by Ellis calculation was 4.3 L/min and cardiac index was 2.02 liters L/min/meter squared. Mixed venous saturation was 65%, PA saturation was 65%, and the radial artery saturation was 90%. No evidence of intracardiac shunting. CORONARY ANGIOGRAM  1. LM: No significant obstructive disease. 2. LAD: 10-20% luminal irregularities, otherwise normal. Three diagonal branches angiographically normal.  3. LCX: 20-30% luminal irregularities, otherwise no obstructive disease. 4. OM1: Large bifurcating vessel without any obstructive disease. 5. RCA: Proximal 20%, mid discrete 40%, otherwise angiographically normal. Dominant vessel. IMPRESSION & PLAN:  Ms. Jimi Brody is a 71 y.o. female with Aortic stenosis, COPD, obesity, diabetes, fibromyalgia. Aortic stenosis:    Ms. Jimi Brody [facility]. Aspirin will be continued lifelong. ECHO in 06/17 with mean gradient across valve ~ 10 mm Hg. Repeat echo in May 2019 with gradient 13 mmHg. Physiologic for prosthetic valve. Continue same and observation  Continue ASA    Diabetes:  Goal hemoglobin A1c less than 7 is recommended from a cardiovascular standpoint. Last hemoglobin A1c was 6.3. Continue same management per PCP    Hypertension:  BP today 120/77  mm Hg. Not on any meds. Continue same. Off BB because of low BP in past.     Fibromyalgia and COPD:    She has chronic pain syndrome from her fibromyalgia. HLD: last LDL 49. On atorvastatin. Continue same. This plan was discussed with patient who is in agreement. Thank you for allowing me to participate in patient care. Please feel free to call me if you have any question or concern.

## 2020-01-07 ENCOUNTER — OFFICE VISIT (OUTPATIENT)
Dept: ORTHOPEDIC SURGERY | Age: 70
End: 2020-01-07

## 2020-01-07 VITALS
HEIGHT: 67 IN | DIASTOLIC BLOOD PRESSURE: 80 MMHG | WEIGHT: 236.4 LBS | BODY MASS INDEX: 37.1 KG/M2 | RESPIRATION RATE: 16 BRPM | SYSTOLIC BLOOD PRESSURE: 137 MMHG | HEART RATE: 87 BPM | OXYGEN SATURATION: 95 %

## 2020-01-07 DIAGNOSIS — S39.012A STRAIN OF LUMBAR REGION, INITIAL ENCOUNTER: Primary | ICD-10-CM

## 2020-01-07 DIAGNOSIS — M54.2 CERVICAL PAIN: ICD-10-CM

## 2020-01-07 DIAGNOSIS — M48.062 SPINAL STENOSIS OF LUMBAR REGION WITH NEUROGENIC CLAUDICATION: ICD-10-CM

## 2020-01-07 DIAGNOSIS — M54.12 CERVICAL NEURITIS: ICD-10-CM

## 2020-01-07 DIAGNOSIS — Z51.81 THERAPEUTIC DRUG MONITORING: ICD-10-CM

## 2020-01-07 DIAGNOSIS — M54.16 RADICULOPATHY, LUMBAR REGION: ICD-10-CM

## 2020-01-07 RX ORDER — CYCLOBENZAPRINE HCL 10 MG
10 TABLET ORAL
Qty: 30 TAB | Refills: 0 | Status: SHIPPED | OUTPATIENT
Start: 2020-01-07 | End: 2020-08-07 | Stop reason: SDUPTHER

## 2020-01-07 RX ORDER — TRAMADOL HYDROCHLORIDE 50 MG/1
50 TABLET ORAL 4 TIMES DAILY
Qty: 120 TAB | Refills: 2 | Status: SHIPPED | OUTPATIENT
Start: 2020-01-07 | End: 2020-04-06

## 2020-01-07 RX ORDER — PREDNISONE 10 MG/1
TABLET ORAL
Refills: 0 | COMMUNITY
Start: 2019-10-25 | End: 2020-10-08 | Stop reason: ALTCHOICE

## 2020-01-07 NOTE — PROGRESS NOTES
[facility]  [address], [place]   Phone: [phone]  Fax: [phone]        PROGRESS NOTE      HISTORY OF PRESENT ILLNESS:  The patient is a 71 y.o. female and was seen today for follow up of low back pain and paraesthesias in the LLE to the knee in an L4 distribution and the RLE to the foot. She was previously seen for low back and left hip pain extending into the LLE in a S1 distribution to an area below the knee. Pt was initially seen for low back pain into the BLE extending in roughly an L4 distribution in the LLE and in an L5 distribution to the foot in the RLE. Pt also endorses numbness in the LLE. Pt completed MDP with good relief. She had a diagnosis of lumbar postlaminectomy syndrome with a previous history of L3 through S1 fusion in 2002 and 2004 by a physician in [place]. The patient had an additional diagnosis of fibromyalgia. Note dated 6/29/16 indicating patient was seen and underwent porcine aortic valve replacement by Dr. Gary Abebe on 3/18/16. Pt began cardiac rehab in 1/2017. Pt underwent right shoulder surgery on 3/29/17 by Dr. Carly Dickens. Note from Dr. Gracy Rivers, cardiologist dated 5/18/17 indicating patient was seen with h/o aortic stenosis, s/p aortic valve replacement 3/16 and was seen with moderate dysphemia with minimal exertion. CT revealed no evidence of pulmonary embolism, no obvious fluid overload. They were ordering further workup to look for functioning bioprosthetic aortic valve. Per patient, a cause for her SOB has still not been found. She has been scheduled to see a pulmonologist. She states she has been restricted to take her Meloxicam since her right shoulder surgery, which has caused her increase in low back pain. Upon review of our records, it was noted she previously failed TOPAMAX, NEURONTIN, and LYRICA. Pt was switched from Ultram ER back to Ultram immediate release.  Pt underwent left SI joint injection on 3/15/18 with good relief. Lumbar spine CT w/o contrast dated 7/20/14  per report revealed L3 through L5 laminectomies with L3 through S1 dorsal hardware fusion. There was no evidence for fractures. There was advanced degenerative disc disease at L2-L3 at the junctional level with marked central canal stenosis. At her last clinical appointment, I provided her with refills of Ultram 50 mg. Blocks deferred at that time. The patient returns today with c/o low back pain and paraesthesias in the LLE to the knee in an L4 distribution and the RLE to the foot. she rates her pain 7/10, previously 6/10. She continues to take Ultram 50mg PRN. Pt notes she was on Fosomax, Lyrica, and Prednisone. She was intolerant of combination so she was DCed on Fosomax and changed from Lyrica to Cymbalta 30mg TID. She notes she has thrown her back out of whack by turning around while standing in line. She affirms she has been using heat with benefit. Pt denies change in bowel or bladder habits. She notes her RUE has been hurting and numb for 3 months and her LUE began to hurt more recently. She c/o neck pain also. Hx of R shoulder replacement. She reports she is being evaluated for possible cervical surgery elsewhere. She notes she would prefer Dr. Buddy Frost perform this if that is determined to be appropriate. Note from Dr. Nakita Lewis dated 11/12/19 indicated they stopped Lyrica and started her on Cymbalta 90 mg per day. Note from Dr. Sylvia Rodríguez dated 12/19/19 indicating patient was seen with c/o history of aortic valve replacement, fibromyalgia, and DM. The patient has a history of DM and reports blood sugars are well controlled, consistently remaining below 200, average of 119.  reviewed. Body mass index is 37.03 kg/m².       PCP: Yola Brown DO      Past Medical History:   Diagnosis Date    Ankle fracture     Aortic stenosis     S/P AVR in 2016    Asthma     Chronic pain     back pain    [address] [address]     Diverticulitis     Fibromyalgia     H/O aortic valve replacement     21 mm bovine pericardial bioprosthesis and epiaortic scanning      Hypertension     PT denies    Hypothyroidism     Lumbar post-laminectomy syndrome     Menopause     Obesity     Osteoarthritis     Psychotic disorder ([address]     Sciatica     Skin cancer 2013    right forearm.          Social History     Socioeconomic History    Marital status:      Spouse name: Not on file    Number of children: Not on file    Years of education: Not on file    Highest education level: Not on file   Occupational History    Not on file   Social Needs    Financial resource strain: Not on file    Food insecurity:     Worry: Not on file     Inability: Not on file    Transportation needs:     Medical: Not on file     Non-medical: Not on file   Tobacco Use    Smoking status: Former Smoker     Years: 45.00     Types: Cigarettes     Last attempt to quit: 2011     Years since quittin.5    Smokeless tobacco: Never Used   Substance and Sexual Activity    Alcohol use: No    Drug use: No    Sexual activity: Never   Lifestyle    Physical activity:     Days per week: Not on file     Minutes per session: Not on file    Stress: Not on file   Relationships    Social connections:     Talks on phone: Not on file     Gets together: Not on file     Attends Hindu service: Not on file     Active member of club or organization: Not on file     Attends meetings of clubs or organizations: Not on file     Relationship status: Not on file    Intimate partner violence:     Fear of current or ex partner: Not on file     Emotionally abused: Not on file     Physically abused: Not on file     Forced sexual activity: Not on file   Other Topics Concern    Not on file   Social History Narrative    Not on file       Current Outpatient Medications   Medication Sig Dispense Refill    cyclobenzaprine (FLEXERIL) 10 mg tablet Take 1 Tab by mouth three (3) times daily as needed for Muscle Spasm(s). 30 Tab 0    meclizine (ANTIVERT) 25 mg tablet Take 1 Tab by mouth three (3) times daily as needed for Dizziness. 60 Tab 3    levothyroxine (SYNTHROID) 50 mcg tablet TAKE 1 TABLET BY MOUTH EVERY DAY BEFORE BREAKFAST 90 Tab 1    DULoxetine (CYMBALTA) 30 mg capsule Take 1 Cap by mouth three (3) times daily. 90 Cap 3    FLUoxetine (PROZAC) 10 mg capsule TAKE 1 CAPSULE BY MOUTH ONCE DAILY. GENERICFOR PROZAC. 90 Cap 3    atorvastatin (LIPITOR) 20 mg tablet TAKE 1 TABLET BY MOUTH ONCE DAILY 90 Tab 1    metFORMIN (GLUCOPHAGE) 500 mg tablet TAKE ONE TABLET BY MOUTH ONCE DAILY WITH  DINNER. 90 Tab 1    STIOLTO RESPIMAT 2.5-2.5 mcg/actuation inhaler Take  by inhalation. 1    fluticasone propionate (FLONASE) 50 mcg/actuation nasal spray One spray each nostril twice a day. 1 Bottle 5    meloxicam (MOBIC) 15 mg tablet TAKE 1 TABLET BY MOUTH DAILY 90 Tab 3    docusate sodium (COLACE) 50 mg capsule 50 mg.      vitamin E acetate (VITAMIN E PO) Take  by mouth.  cyanocobalamin (VITAMIN B-12) 500 mcg tablet Take 500 mcg by mouth daily.  clobetasol (TEMOVATE) 0.05 % external solution APPLY TO SCALP AFTER SHAMPOO DAILY 50 mL 3    cetirizine (ZYRTEC) 10 mg tablet TAKE 1 TABLET BY MOUTH EVERY DAY. GENERIC FOR ZYRTEC 90 Tab 2    VENTOLIN HFA 90 mcg/actuation inhaler 2 Puffs by Aerosolization route every four (4) hours as needed. 0    B.infantis-B.ani-B.long-B.bifi (PROBIOTIC 4X) 10-15 mg TbEC Take  by mouth daily.  aspirin delayed-release 81 mg tablet Take 81 mg by mouth daily.  Cholecalciferol, Vitamin D3, (VITAMIN D3) 1,000 unit cap Take 1,000 Units by mouth daily.  glucose blood VI test strips (ACCU-CHEK MARCE PLUS TEST STRP) strip Please use one strip to test blood sugars once a day. 100 strip 20    Lancets (ACCU-CHEK SOFTCLIX LANCETS) misc Please use one lancet to test blood sugars twice a day.  1 Package 20    BLOOD-GLUCOSE METER (ACCU-CHEK MARCE MONITORING) by Does Not Apply route.  triamcinolone acetonide (KENALOG) 0.1 % topical cream Apply  to affected area two (2) times a day. use thin layer      predniSONE (DELTASONE) 10 mg tablet   0       Allergies   Allergen Reactions    Latex, Natural Rubber Hives    Adhesive Rash     Some bandaids    Ciprofloxacin Rash    Codeine Nausea and Vomiting    Demerol [Meperidine] Nausea and Vomiting    Diclofenac Shortness of Breath and Palpitations    Erythromycin Rash    Levaquin [Levofloxacin] Rash    Loratadine Swelling    Morphine Nausea and Vomiting    Penicillin G Hives and Rash     Does fine with Keflex    Sulfa (Sulfonamide Antibiotics) Itching    Vicodin [Hydrocodone-Acetaminophen] Other (comments)     Metallic taste in mouth          PHYSICAL EXAMINATION    Visit Vitals  /80 (BP 1 Location: Left arm, BP Patient Position: Sitting)   Pulse 87   Resp 16   Ht 5' 7\" (1.702 m)   Wt 236 lb 6.4 oz (107.2 kg)   SpO2 95%   BMI 37.03 kg/m²       CONSTITUTIONAL: NAD, A&O x 3  SENSATION: Decreased to light touch throughout L L4-5  NEURO: Belen's is negative bilaterally. RANGE OF MOTION: The patient has full passive range of motion in all four extremities. MOTOR:  Straight Leg Raise: Negative, bilateral  Pt ambulates with single point cane. Shoulder AB/Flex Elbow Flex Wrist Ext Elbow Ext Wrist Flex Hand Intrin Tone   Right +4/5 +4/5 +4/5 +4/5 +4/5 +4/5 +4/5   Left +4/5 +4/5 +4/5 +4/5 +4/5 +4/5 +4/5              Hip Flex Knee Ext Knee Flex Ankle DF GTE Ankle PF Tone   Right +4/5 +4/5 +4/5 +4/5 +4/5 +4/5 +4/5   Left +4/5 +4/5 +4/5 +4/5 +4/5 +4/5 +4/5       ASSESSMENT   Diagnoses and all orders for this visit:    1. Strain of lumbar region, initial encounter  -     cyclobenzaprine (FLEXERIL) 10 mg tablet; Take 1 Tab by mouth three (3) times daily as needed for Muscle Spasm(s). -     traMADol (ULTRAM) 50 mg tablet; Take 1 Tab by mouth four (4) times daily for 90 days.  Max Daily Amount: 200 mg.    2. Spinal stenosis of lumbar region with neurogenic claudication  -     cyclobenzaprine (FLEXERIL) 10 mg tablet; Take 1 Tab by mouth three (3) times daily as needed for Muscle Spasm(s). -     traMADol (ULTRAM) 50 mg tablet; Take 1 Tab by mouth four (4) times daily for 90 days. Max Daily Amount: 200 mg.    3. Radiculopathy, lumbar region  -     cyclobenzaprine (FLEXERIL) 10 mg tablet; Take 1 Tab by mouth three (3) times daily as needed for Muscle Spasm(s). -     traMADol (ULTRAM) 50 mg tablet; Take 1 Tab by mouth four (4) times daily for 90 days. Max Daily Amount: 200 mg.    4. Therapeutic drug monitoring  -     cyclobenzaprine (FLEXERIL) 10 mg tablet; Take 1 Tab by mouth three (3) times daily as needed for Muscle Spasm(s). -     traMADol (ULTRAM) 50 mg tablet; Take 1 Tab by mouth four (4) times daily for 90 days. Max Daily Amount: 200 mg.    5. Cervical pain  -     cyclobenzaprine (FLEXERIL) 10 mg tablet; Take 1 Tab by mouth three (3) times daily as needed for Muscle Spasm(s). -     traMADol (ULTRAM) 50 mg tablet; Take 1 Tab by mouth four (4) times daily for 90 days. Max Daily Amount: 200 mg.    6. Cervical neuritis  -     cyclobenzaprine (FLEXERIL) 10 mg tablet; Take 1 Tab by mouth three (3) times daily as needed for Muscle Spasm(s). -     traMADol (ULTRAM) 50 mg tablet; Take 1 Tab by mouth four (4) times daily for 90 days. Max Daily Amount: 200 mg. IMPRESSION AND PLAN:  The patient presents with c/o low back pain and paraesthesias in the LLE to the knee in an L4 distribution and the RLE to the foot. She reports new neck and RUE pain, which is being evaluated elsewhere for cervical surgery. I agreed with her request to see Dr. Juanita Greene if she appears to need surgery. I refilled Ultram 50mg. I will provide an acute prescription of Flexeril 10mg tID PRN. Patient is neurologically intact. I will see the patient back in 1 month's time or earlier if needed.       Written by Sheryl Garcia Negro Ivan, as dictated by Sury Moore MD  I examined the patient, reviewed and agree with the note.

## 2020-03-02 ENCOUNTER — OFFICE VISIT (OUTPATIENT)
Dept: ORTHOPEDIC SURGERY | Age: 70
End: 2020-03-02

## 2020-03-02 VITALS
OXYGEN SATURATION: 97 % | HEART RATE: 92 BPM | WEIGHT: 240.4 LBS | DIASTOLIC BLOOD PRESSURE: 74 MMHG | BODY MASS INDEX: 37.73 KG/M2 | HEIGHT: 67 IN | TEMPERATURE: 97.3 F | SYSTOLIC BLOOD PRESSURE: 117 MMHG

## 2020-03-02 DIAGNOSIS — M54.16 RADICULOPATHY, LUMBAR REGION: ICD-10-CM

## 2020-03-02 DIAGNOSIS — S39.012S STRAIN OF LUMBAR REGION, SEQUELA: ICD-10-CM

## 2020-03-02 DIAGNOSIS — M48.062 SPINAL STENOSIS OF LUMBAR REGION WITH NEUROGENIC CLAUDICATION: ICD-10-CM

## 2020-03-02 DIAGNOSIS — M54.12 CERVICAL NEURITIS: ICD-10-CM

## 2020-03-02 DIAGNOSIS — M54.2 NECK PAIN: Primary | ICD-10-CM

## 2020-03-02 RX ORDER — METHYLPREDNISOLONE 4 MG/1
TABLET ORAL
Qty: 1 DOSE PACK | Refills: 0 | Status: SHIPPED | OUTPATIENT
Start: 2020-03-02 | End: 2020-10-08 | Stop reason: ALTCHOICE

## 2020-03-02 NOTE — LETTER
3/2/20 Patient: Loli Shay YOB: 1950 Date of Visit: 3/2/2020 Lorraine Lopez DO 
[id number] [place] [id number] VIA In Basket Dear Lorraine Lopez DO, Thank you for referring Ms. Jolly Bauer to [address] for evaluation. My notes for this consultation are attached. If you have questions, please do not hesitate to call me. I look forward to following your patient along with you. Sincerely, Arline Hood MD

## 2020-03-02 NOTE — PROGRESS NOTES
[facility]  [address], [place]   Phone: [phone]  Fax: [phone]        PROGRESS NOTE      HISTORY OF PRESENT ILLNESS:  The patient is a 71 y.o. female and was seen today for follow up of low back pain and paraesthesias into the LLE to the knee in an L4 distribution and the RLE to the foot. Additionally, she reported chronic neck pain into the BUE (R>L) and indicated being evaluated elsewhere for cervical surgery. She was previously seen for low back and left hip pain extending into the LLE in a S1 distribution to an area below the knee. Pt was initially seen for low back pain into the BLE extending in roughly an L4 distribution in the LLE and in an L5 distribution to the foot in the RLE. Pt also endorses numbness in the LLE. Pt completed MDP with good relief. She had a diagnosis of lumbar postlaminectomy syndrome with a previous history of L3 through S1 fusion in 2002 and 2004 by a physician in [place]. PmHx of fibromyalgia, DM, aortic valve replacement, right shoulder replacement. Pt notes intolerance to FOSOMAX, LYRICA, and PREDNISONE combination. Subsequently, d/c Fosomax due to allergy and started Cymbalta 30 mg TID in place of Lyrica. Note dated 6/29/16 indicating patient was seen and underwent porcine aortic valve replacement by Dr. Vini Jang on 3/18/16. Pt began cardiac rehab in 1/2017. Pt underwent right shoulder surgery on 3/29/17 by Dr. Kamilla Tovar. Note from Dr. Vania Joel, cardiologist dated 5/18/17 indicating patient was seen with h/o aortic stenosis, s/p aortic valve replacement 3/16 and was seen with moderate dysphemia with minimal exertion. CT revealed no evidence of pulmonary embolism, no obvious fluid overload. They were ordering further workup to look for functioning bioprosthetic aortic valve. Per patient, a cause for her SOB has still not been found.  She has been scheduled to see a pulmonologist. She states she has been restricted to take her Meloxicam since her right shoulder surgery, which has caused her increase in low back pain. Upon review of our records, it was noted she previously failed TOPAMAX, NEURONTIN, and LYRICA. Pt was switched from Ultram ER back to Ultram immediate release. Pt underwent left SI joint injection on 3/15/18 with good relief. Note from Dr. Daris Gilford dated 11/12/19 indicated they stopped Lyrica and started her on Cymbalta 90 mg per day. Note from Dr. Argelia Florentino dated 12/19/19 indicating patient was seen with c/o history of aortic valve replacement, fibromyalgia, and DM. The patient has a history of DM and reports blood sugars are well controlled, consistently remaining below 200, average of 119. Lumbar spine CT w/o contrast dated 7/20/14  per report revealed L3 through L5 laminectomies with L3 through S1 dorsal hardware fusion. There was no evidence for fractures. There was advanced degenerative disc disease at L2-L3 at the junctional level with marked central canal stenosis. At her last clinical appointment, she reported new neck and RUE pain, which was being evaluated elsewhere for cervical surgery. I agreed with her request to see Dr. David Harrell if she appears to need surgery. I refilled her Ultram 50mg. I provided an acute prescription of Flexeril 10mg TID prn. The patient returns today with low back pain into the BLE in a S1 distribution and neck pain into the BUE extending to the hands, involving all digits. She rates her pain 7/10, unchanged. She reports the neck pain started in 11/2019 without trauma. Additionally, she reports endorsing a locking sensation of the BUE. She denies pain in the mid back. Patient reports previously receiving a shoulder injection with temporary relief. Pt endorses significant standing and walking intolerance. She describes relief with sitting. Pt denies change in bowel or bladder habits. Pt denies dropping things or loss of balance. She denies any recent falls.  The patient has a history of DM and reports blood sugars are well controlled, consistently remaining below 200. Patient reports taking MDP 2x in the last 12 months. Pt denies h/o stomach ulcers, bleeding disorders, or current use of anticoagulants.  reviewed. Body mass index is 37.65 kg/m². PCP: Maryan Ellis DO      Past Medical History:   Diagnosis Date    Ankle fracture     Aortic stenosis     S/P AVR in 2016    Asthma     Chronic pain     back pain    Diabetes ([facility] [address]     Diverticulitis     Fibromyalgia     H/O aortic valve replacement     21 mm bovine pericardial bioprosthesis and epiaortic scanning      Hypertension     PT denies    Hypothyroidism     Lumbar post-laminectomy syndrome     Menopause     Obesity     Osteoarthritis     Psychotic disorder ([address]     Sciatica     Skin cancer 2013    right forearm.          Social History     Socioeconomic History    Marital status:      Spouse name: Not on file    Number of children: Not on file    Years of education: Not on file    Highest education level: Not on file   Occupational History    Not on file   Social Needs    Financial resource strain: Not on file    Food insecurity:     Worry: Not on file     Inability: Not on file    Transportation needs:     Medical: Not on file     Non-medical: Not on file   Tobacco Use    Smoking status: Former Smoker     Years: 45.00     Types: Cigarettes     Last attempt to quit: 2011     Years since quittin.6    Smokeless tobacco: Never Used   Substance and Sexual Activity    Alcohol use: No    Drug use: No    Sexual activity: Never   Lifestyle    Physical activity:     Days per week: Not on file     Minutes per session: Not on file    Stress: Not on file   Relationships    Social connections:     Talks on phone: Not on file     Gets together: Not on file     Attends Yazidism service: Not on file     Active member of club or organization: Not on file     Attends meetings of clubs or organizations: Not on file     Relationship status: Not on file    Intimate partner violence:     Fear of current or ex partner: Not on file     Emotionally abused: Not on file     Physically abused: Not on file     Forced sexual activity: Not on file   Other Topics Concern    Not on file   Social History Narrative    Not on file       Current Outpatient Medications   Medication Sig Dispense Refill    methylPREDNISolone (MEDROL DOSEPACK) 4 mg tablet Per dose pack instructions 1 Dose Pack 0    traMADol (ULTRAM) 50 mg tablet Take 1 Tab by mouth four (4) times daily for 90 days. Max Daily Amount: 200 mg. 120 Tab 2    meclizine (ANTIVERT) 25 mg tablet Take 1 Tab by mouth three (3) times daily as needed for Dizziness. 60 Tab 3    levothyroxine (SYNTHROID) 50 mcg tablet TAKE 1 TABLET BY MOUTH EVERY DAY BEFORE BREAKFAST 90 Tab 1    DULoxetine (CYMBALTA) 30 mg capsule Take 1 Cap by mouth three (3) times daily. 90 Cap 3    FLUoxetine (PROZAC) 10 mg capsule TAKE 1 CAPSULE BY MOUTH ONCE DAILY. GENERICFOR PROZAC. 90 Cap 3    atorvastatin (LIPITOR) 20 mg tablet TAKE 1 TABLET BY MOUTH ONCE DAILY 90 Tab 1    metFORMIN (GLUCOPHAGE) 500 mg tablet TAKE ONE TABLET BY MOUTH ONCE DAILY WITH  DINNER. 90 Tab 1    STIOLTO RESPIMAT 2.5-2.5 mcg/actuation inhaler Take  by inhalation. 1    fluticasone propionate (FLONASE) 50 mcg/actuation nasal spray One spray each nostril twice a day. 1 Bottle 5    meloxicam (MOBIC) 15 mg tablet TAKE 1 TABLET BY MOUTH DAILY 90 Tab 3    vitamin E acetate (VITAMIN E PO) Take  by mouth.  cyanocobalamin (VITAMIN B-12) 500 mcg tablet Take 500 mcg by mouth daily.  clobetasol (TEMOVATE) 0.05 % external solution APPLY TO SCALP AFTER SHAMPOO DAILY 50 mL 3    cetirizine (ZYRTEC) 10 mg tablet TAKE 1 TABLET BY MOUTH EVERY DAY. GENERIC FOR ZYRTEC 90 Tab 2    VENTOLIN HFA 90 mcg/actuation inhaler 2 Puffs by Aerosolization route every four (4) hours as needed.   0    B.infantis-B.ani-B.long-B.bifi (PROBIOTIC 4X) 10-15 mg TbEC Take  by mouth daily.  aspirin delayed-release 81 mg tablet Take 81 mg by mouth daily.  Cholecalciferol, Vitamin D3, (VITAMIN D3) 1,000 unit cap Take 1,000 Units by mouth daily.  glucose blood VI test strips (ACCU-CHEK MARCE PLUS TEST STRP) strip Please use one strip to test blood sugars once a day. 100 strip 20    Lancets (ACCU-CHEK SOFTCLIX LANCETS) misc Please use one lancet to test blood sugars twice a day. 1 Package 20    BLOOD-GLUCOSE METER (ACCU-CHEK MARCE MONITORING) by Does Not Apply route.  predniSONE (DELTASONE) 10 mg tablet   0    cyclobenzaprine (FLEXERIL) 10 mg tablet Take 1 Tab by mouth three (3) times daily as needed for Muscle Spasm(s). 30 Tab 0    docusate sodium (COLACE) 50 mg capsule 50 mg.      triamcinolone acetonide (KENALOG) 0.1 % topical cream Apply  to affected area two (2) times a day. use thin layer         Allergies   Allergen Reactions    Latex, Natural Rubber Hives    Adhesive Rash     Some bandaids    Ciprofloxacin Rash    Codeine Nausea and Vomiting    Demerol [Meperidine] Nausea and Vomiting    Diclofenac Shortness of Breath and Palpitations    Erythromycin Rash    Levaquin [Levofloxacin] Rash    Loratadine Swelling    Morphine Nausea and Vomiting    Penicillin G Hives and Rash     Does fine with Keflex    Sulfa (Sulfonamide Antibiotics) Itching    Vicodin [Hydrocodone-Acetaminophen] Other (comments)     Metallic taste in mouth          PHYSICAL EXAMINATION    Visit Vitals  /74 (BP 1 Location: Left arm, BP Patient Position: Sitting)   Pulse 92   Temp 97.3 °F (36.3 °C) (Oral)   Ht 5' 7\" (1.702 m)   Wt 240 lb 6.4 oz (109 kg)   SpO2 97%   BMI 37.65 kg/m²       CONSTITUTIONAL: NAD, A&O x 3  SENSATION: Decreased sensation to light touch on the RLE in a S1 distribution and on the LLE in a L5 distribution.  Otherwise, intact to light touch throughout   NEURO: Belen's is negative bilaterally. RANGE OF MOTION: The patient has full passive range of motion in all four extremities. MOTOR:  Straight Leg Raise: Negative, bilateral    Ambulates with a single point cane     Shoulder AB/Flex Elbow Flex Wrist Ext Elbow Ext Wrist Flex Hand Intrin Tone   Right +4/5 +4/5 +4/5 +4/5 +4/5 +4/5 +4/5   Left +4/5 +4/5 +4/5 +4/5 +4/5 +4/5 +4/5              Hip Flex Knee Ext Knee Flex Ankle DF GTE Ankle PF Tone   Right +4/5 +4/5 +4/5 +4/5 +4/5 +4/5 +4/5   Left +4/5 +4/5 +4/5 +4/5 +4/5 +4/5 +4/5   RADIOGRAPHS  Preliminary reading of C Spine plain films dated 3/2/2020 revealed:  Severe disc space narrowing at C4-5, moderate at C5-6, and the space between C6-7 was not well visualized. Small anterior osteophytes noted at C4, C5 and C6. C7 not visualized due to shoulder pathology. No acute pathology identified. These are being sent out for official reading by Dr. Pancho Joe. ASSESSMENT   Diagnoses and all orders for this visit:    1. Neck pain  -     AMB POC XRAY, SPINE, CERVICAL; 2 OR 3  -     methylPREDNISolone (MEDROL DOSEPACK) 4 mg tablet; Per dose pack instructions  -     REFERRAL TO PHYSICAL THERAPY    2. Radiculopathy, lumbar region  -     methylPREDNISolone (MEDROL DOSEPACK) 4 mg tablet; Per dose pack instructions  -     REFERRAL TO PHYSICAL THERAPY    3. Cervical neuritis  -     methylPREDNISolone (MEDROL DOSEPACK) 4 mg tablet; Per dose pack instructions  -     REFERRAL TO PHYSICAL THERAPY    4. Spinal stenosis of lumbar region with neurogenic claudication  -     methylPREDNISolone (MEDROL DOSEPACK) 4 mg tablet; Per dose pack instructions  -     REFERRAL TO PHYSICAL THERAPY    5. Strain of lumbar region, sequela      IMPRESSION AND PLAN:  Patient returns to the office today with c/o low back pain into the BLE in a S1 distribution and neck pain into the BUE extending to the hands, involving all digits. Multiple treatment options were discussed.  Patient really has diffuse, widespread pain complaints. I will start her on MDP followed by her Meloxicam. The risks, benefits, and potential side effects of this medication were discussed. Patient understands and wishes to proceed. Patient advised to call the office if intolerant to new medication. I will refer her to physical therapy with an emphasis on HEP. Patient is neurologically intact. I will see the patient back in 1 month's time or earlier if needed. Written by Kelsea Villatoro, as dictated by Aminta Ramos MD  I examined the patient, reviewed and agree with the note.

## 2020-03-03 DIAGNOSIS — J30.1 SEASONAL ALLERGIC RHINITIS DUE TO POLLEN: ICD-10-CM

## 2020-03-04 ENCOUNTER — TELEPHONE (OUTPATIENT)
Dept: ORTHOPEDIC SURGERY | Age: 70
End: 2020-03-04

## 2020-03-04 RX ORDER — FLUTICASONE PROPIONATE 50 MCG
SPRAY, SUSPENSION (ML) NASAL
Qty: 1 BOTTLE | Refills: 5 | Status: SHIPPED | OUTPATIENT
Start: 2020-03-04 | End: 2020-09-22

## 2020-03-04 NOTE — TELEPHONE ENCOUNTER
Patient reports that the denial for the MRI has been reversed and she is now going to have it done without having to go to physical therapy. She will bring the disc to her follow up appointment with us on 4/2.

## 2020-03-04 NOTE — TELEPHONE ENCOUNTER
MRI Cervical Spine was ordered by Dr. Boris Torres. I have asked MsAnatoliy Dar to obtain the images on a disk and bring her to her next appointment with us. She expressed her understanding. Thank you.

## 2020-03-10 ENCOUNTER — APPOINTMENT (OUTPATIENT)
Dept: PHYSICAL THERAPY | Age: 70
End: 2020-03-10

## 2020-03-23 DIAGNOSIS — E11.65 UNCONTROLLED TYPE 2 DIABETES MELLITUS WITH HYPERGLYCEMIA (HCC): ICD-10-CM

## 2020-03-23 DIAGNOSIS — E78.5 HYPERLIPIDEMIA LDL GOAL <100: ICD-10-CM

## 2020-03-23 DIAGNOSIS — M79.7 FIBROMYALGIA: ICD-10-CM

## 2020-03-23 DIAGNOSIS — E03.9 ACQUIRED HYPOTHYROIDISM: ICD-10-CM

## 2020-03-23 RX ORDER — METFORMIN HYDROCHLORIDE 500 MG/1
TABLET ORAL
Qty: 90 TAB | Refills: 0 | Status: SHIPPED | OUTPATIENT
Start: 2020-03-23 | End: 2020-06-25

## 2020-03-23 RX ORDER — DULOXETIN HYDROCHLORIDE 30 MG/1
30 CAPSULE, DELAYED RELEASE ORAL 3 TIMES DAILY
Qty: 90 CAP | Refills: 3 | Status: CANCELLED | OUTPATIENT
Start: 2020-03-23

## 2020-03-23 RX ORDER — LEVOTHYROXINE SODIUM 50 UG/1
TABLET ORAL
Qty: 90 TAB | Refills: 0 | Status: SHIPPED | OUTPATIENT
Start: 2020-03-23 | End: 2020-08-25

## 2020-03-23 RX ORDER — DULOXETIN HYDROCHLORIDE 30 MG/1
30 CAPSULE, DELAYED RELEASE ORAL 3 TIMES DAILY
Qty: 90 CAP | Refills: 2 | Status: SHIPPED | OUTPATIENT
Start: 2020-03-23 | End: 2020-06-15

## 2020-03-23 RX ORDER — ATORVASTATIN CALCIUM 20 MG/1
TABLET, FILM COATED ORAL
Qty: 90 TAB | Refills: 0 | Status: SHIPPED | OUTPATIENT
Start: 2020-03-23 | End: 2020-06-25

## 2020-03-23 RX ORDER — MELOXICAM 15 MG/1
TABLET ORAL
Qty: 90 TAB | Refills: 3 | Status: SHIPPED | OUTPATIENT
Start: 2020-03-23 | End: 2021-03-06

## 2020-03-30 ENCOUNTER — TELEPHONE (OUTPATIENT)
Dept: ORTHOPEDIC SURGERY | Age: 70
End: 2020-03-30

## 2020-03-30 NOTE — TELEPHONE ENCOUNTER
I contacted Ms. Dodge in regards to a virtual visit. She does not have internet access but she will try to get help so she can have a virtual visit. I explained we cannot refill her Tramadol without a face to face but we try to determine if another option is available, if possible. If patient is unable to reschedule, please see what we can do for her. Contact the patient to advise. Thank you.

## 2020-03-30 NOTE — TELEPHONE ENCOUNTER
This pt was just seen by Dr. Radha Alford on 3/2/2020. She is within the 3 month time frame for us to refill her medication if needed. She was suppose to come back 1 month from her last visit after PT. This can be moved out until June when we hopefully can see her back in the office.

## 2020-04-02 ENCOUNTER — VIRTUAL VISIT (OUTPATIENT)
Dept: ORTHOPEDIC SURGERY | Age: 70
End: 2020-04-02

## 2020-04-02 DIAGNOSIS — M50.00 HNP (HERNIATED NUCLEUS PULPOSUS) WITH MYELOPATHY, CERVICAL: Primary | ICD-10-CM

## 2020-04-02 DIAGNOSIS — M96.1 LUMBAR POST-LAMINECTOMY SYNDROME: ICD-10-CM

## 2020-04-02 DIAGNOSIS — M48.02 CERVICAL SPINAL STENOSIS: ICD-10-CM

## 2020-04-02 DIAGNOSIS — M47.812 CERVICAL SPONDYLOSIS WITHOUT MYELOPATHY: ICD-10-CM

## 2020-04-02 DIAGNOSIS — M54.12 CERVICAL NEURITIS: ICD-10-CM

## 2020-04-02 DIAGNOSIS — M54.16 LUMBAR NEURITIS: ICD-10-CM

## 2020-04-02 DIAGNOSIS — M43.10 SPONDYLOLISTHESIS, ACQUIRED: ICD-10-CM

## 2020-04-02 RX ORDER — TRAMADOL HYDROCHLORIDE 50 MG/1
50 TABLET ORAL 4 TIMES DAILY
Qty: 120 TAB | Refills: 2 | Status: SHIPPED | OUTPATIENT
Start: 2020-04-02 | End: 2020-07-01

## 2020-04-02 NOTE — PROGRESS NOTES
[facility]  [address], [address]  Phone: [phone]  Fax: [phone]        PROGRESS NOTE    CONSENT:  Pursuant to the emergency declaration under the [address] and [facility] waiver authority and the [employer] Act, this Virtual Visit was conducted, with patient's consent, to reduce the patient's risk of exposure to COVID-19 and provide continuity of care for an established patient. Services were provided through a video synchronous discussion virtually to substitute for in-person appointment. HISTORY OF PRESENT ILLNESS:  The patient is a 79 y.o. female and was seen via [employer] TeleVisit at the [facility] [place] office for follow up of low back pain into the BLE in a S1 distribution extending to the foot and new neck pain into the BUE (R>L) extending to the hands, involving all digits x 11/2019 without trauma. Additionally, she reports endorsing a locking sensation of the BUE. Previously, she was seen for c/o low back pain and paraesthesias into the LLE to the knee in an L4 distribution and the RLE to the foot. Additionally, she reported chronic neck pain into the BUE (R>L) and indicated being evaluated elsewhere for cervical surgery. She was previously seen for low back and left hip pain extending into the LLE in a S1 distribution to an area below the knee. Initially she was seen for c/o low back pain into the BLE extending in roughly an L4 distribution in the LLE and in an L5 distribution to the foot in the RLE. Pt also endorses numbness in the LLE. Her pain is exacerbated with standing and walking intolerance. Pt completed MDP with good relief. She had a diagnosis of lumbar postlaminectomy syndrome with a previous history of L3 through S1 fusion in 2002 and 2004 by a physician in [place]. Pt denies h/o stomach ulcers, bleeding disorders, or current use of anticoagulants.  PmHx of fibromyalgia, DM, aortic valve replacement, right shoulder replacement. Patient reports previously receiving a shoulder injection with temporary relief. Pt notes intolerance to FOSOMAX, LYRICA, and PREDNISONE combination. Subsequently, d/c Fosomax due to allergy and started Cymbalta 30 mg TID in place of Lyrica. Note dated 6/29/16 indicating patient was seen and underwent porcine aortic valve replacement by Dr. Monalisa Moncada on 3/18/16. Pt began cardiac rehab in 1/2017. Pt underwent right shoulder surgery on 3/29/17 by Dr. Adele Pulido. Note from Dr. Adela Ryan, cardiologist dated 5/18/17 indicating patient was seen with h/o aortic stenosis, s/p aortic valve replacement 3/16 and was seen with moderate dysphemia with minimal exertion. CT revealed no evidence of pulmonary embolism, no obvious fluid overload. They were ordering further workup to look for functioning bioprosthetic aortic valve. Per patient, a cause for her SOB has still not been found. She has been scheduled to see a pulmonologist. She states she has been restricted to take her Meloxicam since her right shoulder surgery, which has caused her increase in low back pain. Upon review of our records, it was noted she previously failed TOPAMAX, NEURONTIN, and LYRICA. Pt was switched from Ultram ER back to Ultram immediate release. Pt underwent left SI joint injection on 3/15/18 with good relief. Note from Dr. Thao Lynn dated 11/12/19 indicated they stopped Lyrica and started her on Cymbalta 90 mg per day.   Note from Dr. Adela Ryan dated 12/19/19 indicating patient was seen with c/o history of aortic valve replacement, fibromyalgia, and DM. The patient has a history of DM and reports blood sugars are well controlled, consistently remaining below 200, average of 119. Preliminary reading of C Spine plain films dated 3/2/2020 revealed: severe disc space narrowing at C4-5, moderate at C5-6, and the space between C6-7 was not well visualized.  Small anterior osteophytes noted at C4, C5 and C6. C7 not visualized due to shoulder pathology. No acute pathology identified. Lumbar spine CT w/o contrast dated 7/20/14  per report revealed L3 through L5 laminectomies with L3 through S1 dorsal hardware fusion. There was no evidence for fractures. There was advanced degenerative disc disease at L2-L3 at the junctional level with marked central canal stenosis. At her last clinical appointment, patient really had diffuse, widespread pain complaints. I started her on MDP followed by her Meloxicam. I referred her to physical therapy with an emphasis on HEP. At today's video consultation, the patient reports her pain location and distribution remain unchanged. She rates her pain  6/10, previously 7/10. Patient reports never starting the MDP and indicates her PT was cancelled. Pt denies change in bladder habits. Patient indicates some episodes of bowel incontinence x 3-4 months. Pt denies loss of balance. Patient reports dropping objects x couple months. Pt continues on Tramadol 3-4x/day. She continues on Cymbalta as prescribed by Dr. Ange Fall. C Spine MRI dated 3/15/2020 films unavailable for my review. Per report, at C3-4: anterolisthesis. 3.5 mm central disc extrusion extending 4 mm cephalad. Mild central stenosis and subtle ventral cord flattening. Advanced left facet arthropathy. At C4-5: mild posterior spondylotic ridge. Mild central stenosis and subtle ventral cord flattening. At C5-6: central to right posterolateral 2.5 mm broad-based disc protrusion with subtle ventral cord flattening and mild central stenosis. At C6-7: mild posterior spondylotic ridge. Mild to moderate central stenosis. At C7-T1: anterolisthesis. Mild posterior annular bulge. Mild central stenosis. Upper thoracic spondylosis including a 5 mm posterior disc extension at T3-4.  reviewed. There is no height or weight on file to calculate BMI.     PCP: Mayda Luevano DO      Past Medical History: Diagnosis Date    Ankle fracture     Aortic stenosis     S/P AVR in 2016    Asthma     Chronic pain     back pain    Diabetes ([facility]     Diverticulitis     Fibromyalgia     H/O aortic valve replacement     21 mm bovine pericardial bioprosthesis and epiaortic scanning      Hypertension     PT denies    Hypothyroidism     Lumbar post-laminectomy syndrome     Menopause     Obesity     Osteoarthritis     Psychotic disorder ([facility]     Sciatica     Skin cancer 2013    right forearm.          Social History     Socioeconomic History    Marital status:      Spouse name: Not on file    Number of children: Not on file    Years of education: Not on file    Highest education level: Not on file   Occupational History    Not on file   Social Needs    Financial resource strain: Not on file    Food insecurity     Worry: Not on file     Inability: Not on file    Transportation needs     Medical: Not on file     Non-medical: Not on file   Tobacco Use    Smoking status: Former Smoker     Years: 45.00     Types: Cigarettes     Last attempt to quit: 2011     Years since quittin.7    Smokeless tobacco: Never Used   Substance and Sexual Activity    Alcohol use: No    Drug use: No    Sexual activity: Never   Lifestyle    Physical activity     Days per week: Not on file     Minutes per session: Not on file    Stress: Not on file   Relationships    Social connections     Talks on phone: Not on file     Gets together: Not on file     Attends Yazidi service: Not on file     Active member of club or organization: Not on file     Attends meetings of clubs or organizations: Not on file     Relationship status: Not on file    Intimate partner violence     Fear of current or ex partner: Not on file     Emotionally abused: Not on file     Physically abused: Not on file     Forced sexual activity: Not on file   Other Topics Concern    Not on file   Social History Narrative    Not on file Current Outpatient Medications   Medication Sig Dispense Refill    meloxicam (MOBIC) 15 mg tablet TAKE 1 TABLET BY MOUTH DAILY 90 Tab 3    levothyroxine (SYNTHROID) 50 mcg tablet TAKE 1 TABLET BY MOUTH EVERY DAY BEFORE BREAKFAST 90 Tab 0    atorvastatin (LIPITOR) 20 mg tablet TAKE 1 TABLET BY MOUTH ONCE DAILY 90 Tab 0    metFORMIN (GLUCOPHAGE) 500 mg tablet TAKE ONE TABLET BY MOUTH ONCE DAILY WITH  DINNER. 90 Tab 0    DULoxetine (CYMBALTA) 30 mg capsule Take 1 Cap by mouth three (3) times daily. 90 Cap 2    fluticasone propionate (FLONASE) 50 mcg/actuation nasal spray One spray each nostril twice a day. 1 Bottle 5    cyclobenzaprine (FLEXERIL) 10 mg tablet Take 1 Tab by mouth three (3) times daily as needed for Muscle Spasm(s). 30 Tab 0    traMADol (ULTRAM) 50 mg tablet Take 1 Tab by mouth four (4) times daily for 90 days. Max Daily Amount: 200 mg. 120 Tab 2    meclizine (ANTIVERT) 25 mg tablet Take 1 Tab by mouth three (3) times daily as needed for Dizziness. 60 Tab 3    FLUoxetine (PROZAC) 10 mg capsule TAKE 1 CAPSULE BY MOUTH ONCE DAILY. GENERICFOR PROZAC. 90 Cap 3    STIOLTO RESPIMAT 2.5-2.5 mcg/actuation inhaler Take  by inhalation. 1    vitamin E acetate (VITAMIN E PO) Take  by mouth.  cyanocobalamin (VITAMIN B-12) 500 mcg tablet Take 500 mcg by mouth daily.  cetirizine (ZYRTEC) 10 mg tablet TAKE 1 TABLET BY MOUTH EVERY DAY. GENERIC FOR ZYRTEC 90 Tab 2    VENTOLIN HFA 90 mcg/actuation inhaler 2 Puffs by Aerosolization route every four (4) hours as needed. 0    B.infantis-B.ani-B.long-B.bifi (PROBIOTIC 4X) 10-15 mg TbEC Take  by mouth daily.  aspirin delayed-release 81 mg tablet Take 81 mg by mouth daily.  Cholecalciferol, Vitamin D3, (VITAMIN D3) 1,000 unit cap Take 1,000 Units by mouth daily.  glucose blood VI test strips (ACCU-CHEK MARCE PLUS TEST STRP) strip Please use one strip to test blood sugars once a day.  100 strip 20    Lancets (ACCU-CHEK SOFTCLIX LANCETS) misc Please use one lancet to test blood sugars twice a day. 1 Package 20    BLOOD-GLUCOSE METER (ACCU-CHEK MARCE MONITORING) by Does Not Apply route.  methylPREDNISolone (MEDROL DOSEPACK) 4 mg tablet Per dose pack instructions 1 Dose Pack 0    predniSONE (DELTASONE) 10 mg tablet   0    docusate sodium (COLACE) 50 mg capsule 50 mg.      clobetasol (TEMOVATE) 0.05 % external solution APPLY TO SCALP AFTER SHAMPOO DAILY (Patient not taking: Reported on 4/2/2020) 50 mL 3    triamcinolone acetonide (KENALOG) 0.1 % topical cream Apply  to affected area two (2) times a day. use thin layer         Allergies   Allergen Reactions    Latex, Natural Rubber Hives    Adhesive Rash     Some bandaids    Ciprofloxacin Rash    Codeine Nausea and Vomiting    Demerol [Meperidine] Nausea and Vomiting    Diclofenac Shortness of Breath and Palpitations    Erythromycin Rash    Levaquin [Levofloxacin] Rash    Loratadine Swelling    Morphine Nausea and Vomiting    Penicillin G Hives and Rash     Does fine with Keflex    Sulfa (Sulfonamide Antibiotics) Itching    Vicodin [Hydrocodone-Acetaminophen] Other (comments)     Metallic taste in mouth          PHYSICAL EXAMINATION  Unable to perform examination secondary to COVID-19. CONSTITUTIONAL: NAD, A&O x 3    ASSESSMENT   Diagnoses and all orders for this visit:    1. HNP (herniated nucleus pulposus) with myelopathy, cervical    2. Cervical spondylosis without myelopathy    3. Cervical spinal stenosis    4. Cervical neuritis    5. Lumbar neuritis    6. Lumbar post-laminectomy syndrome    7. Spondylolisthesis, acquired      IMPRESSION AND PLAN:  The patient consented to the tele health visit and was aware that there would be a charge. During today's [employer] TeleVisit patient had c/o really diffuse, widespread pain complaints. Multiple treatment options were discussed, which are limited at this point due to COVID-19. Pt denies any weakness.  Patient wished to continue her current treatment. I provided her refills of Tramadol. I will see the patient back in 3 month's time or earlier if needed. Written by Karan Galeas, as dictated by Abena Benz MD  I examined the patient, reviewed and agree with the note.

## 2020-04-13 ENCOUNTER — VIRTUAL VISIT (OUTPATIENT)
Dept: FAMILY MEDICINE CLINIC | Age: 70
End: 2020-04-13

## 2020-04-13 DIAGNOSIS — Z95.2 S/P AORTIC VALVE REPLACEMENT: ICD-10-CM

## 2020-04-13 DIAGNOSIS — M48.02 CERVICAL SPINAL STENOSIS: ICD-10-CM

## 2020-04-13 DIAGNOSIS — J45.40 MODERATE PERSISTENT ASTHMA WITHOUT COMPLICATION: ICD-10-CM

## 2020-04-13 DIAGNOSIS — F42.2 MIXED OBSESSIONAL THOUGHTS AND ACTS: ICD-10-CM

## 2020-04-13 DIAGNOSIS — M79.7 FIBROMYALGIA: ICD-10-CM

## 2020-04-13 DIAGNOSIS — I71.40 ABDOMINAL AORTIC ANEURYSM (AAA) WITHOUT RUPTURE: Primary | ICD-10-CM

## 2020-04-13 DIAGNOSIS — E11.65 TYPE 2 DIABETES MELLITUS WITH HYPERGLYCEMIA, WITHOUT LONG-TERM CURRENT USE OF INSULIN (HCC): ICD-10-CM

## 2020-04-13 DIAGNOSIS — Z87.891 PERSONAL HISTORY OF NICOTINE DEPENDENCE: ICD-10-CM

## 2020-04-13 NOTE — PROGRESS NOTES
Tyra Good presents today for   Chief Complaint   Patient presents with    Fibromyalgia       Is someone accompanying this pt? na    Is the patient using any DME equipment during OV? na    Depression Screening:  3 most recent PHQ Screens 12/12/2019   Little interest or pleasure in doing things Not at all   Feeling down, depressed, irritable, or hopeless Not at all   Total Score PHQ 2 0       Learning Assessment:  Learning Assessment 4/22/2016   PRIMARY LEARNER Patient   HIGHEST LEVEL OF EDUCATION - PRIMARY LEARNER  -   PRIMARY LANGUAGE ENGLISH   LEARNER PREFERENCE PRIMARY LISTENING   ANSWERED BY patient   RELATIONSHIP SELF       Abuse Screening:  Abuse Screening Questionnaire 12/12/2019   Do you ever feel afraid of your partner? N   Are you in a relationship with someone who physically or mentally threatens you? N   Is it safe for you to go home? Y       Fall Risk  Fall Risk Assessment, last 12 mths 3/2/2020   Able to walk? Yes   Fall in past 12 months? Yes   Fall with injury? Yes   Number of falls in past 12 months 1   Fall Risk Score 2       Health Maintenance reviewed and discussed and ordered per Provider. Health Maintenance Due   Topic Date Due    Foot Exam Q1  11/16/2019    Medicare Yearly Exam  11/17/2019    GLAUCOMA SCREENING Q2Y  04/27/2020    Eye Exam Retinal or Dilated  04/27/2020   . Coordination of Care:  1. Have you been to the ER, urgent care clinic since your last visit? Hospitalized since your last visit? no    2. Have you seen or consulted any other health care providers outside of the [address] [place] [address] since your last visit? Include any pap smears or colon screening. no      Last  Checked na  Last UDS Checked na  Last Pain contract signed: na    Patient presents in office today for routine care.   Patient concerns: med joseal

## 2020-04-13 NOTE — PROGRESS NOTES
Summer Osman is a 79 y.o.  female and presents with    Chief Complaint   Patient presents with   Sienna David is a 79 y.o. female evaluated via [employer]. me on 4/13/2020. Consent:  She and/or health care decision maker is aware that that she may receive a bill for this audio/video service, depending on her insurance coverage, and has provided verbal consent to proceed: Yes  Pt is at home, and I am at [facility]. Dorothy Bowser LPN assisted with this visit. Documentation:  I communicated with the patient and/or health care decision maker about neck and shoulder pain. Details of this discussion including any medical advice provided: see below      I affirm this is a Patient Initiated Episode with an Established Patient who has not had a related appointment within my department in the past 7 days or scheduled within the next 24 hours. Total Time: minutes: 11-20 minutes    Note: not billable if this call serves to triage the patient into an appointment for the relevant concern    Wagner Ya MD     Subjective:  She has back and neck pain and bilateral shoulder. Dr. David Childers prescribes tramadol. She has h/o complete joint replacement in right shoulder 3 years ago. She feared problems with her joint but dr. Shaun Wise performed exam and did xray and found joint to be intact; she has cervical disc disease and is discussing surgery with dr. Ian Youngblood. She is s/p lumbar fusion 2004. She continues to use tramadol and ordered this today. She takes #4 per day. Asthma Review:  The patient is being seen for follow up of asthma, not currently in exacerbation. Asthma symptoms occur: less than 2x/week. She is using stiolto prescribed by dr. Delmis Harris. Wheezing when present is described as moderate and easily relieved with rescue bronchodilator. Current limitations in activity from asthma: exercising. Number of days of school or work missed in the last month: N/A.    Frequency of use of quick-relief meds: < once per week. Regimen compliance: The patient reports adherence to this regimen. Patient does not smoke cigarettes. Quit 9 years ago    ROS   General ROS: negative for - chills, fatigue or fever  Psychological ROS: positive for - depression associated with COVID 19 and being home bound  Ophthalmic ROS: positive for - uses glasses  ENT ROS: positive for - headaches intermittently  Endocrine ROS: negative for - temperature intolerance or unexpected weight changes  Respiratory ROS: no cough, + shortness of breath, no wheezing;   Cardiovascular ROS: no chest pain or dyspnea on exertion; intermittent tachycardia; she has h/o aortic valve replacement and is followed by Dr. Lindsay Tai. Gastrointestinal ROS: no abdominal pain, change in bowel habits, or black or bloody stools  Genito-Urinary ROS: no dysuria, trouble voiding, or hematuria  Neurological ROS: no TIA or stroke symptoms  Dermatological ROS: negative for - rash or skin lesion changes; Positive for dry skin    All other systems reviewed and are negative. Objective:  Vitals:    04/13/20 1431   PainSc:   6   PainLoc: Generalized       alert, well appearing, and in no distress, oriented to person, place, and time and obese  Mental status - normal mood, behavior, speech, dress, motor activity, and thought processes  Chest - normal work of breathing  Neurological - cranial nerves II through XII intact    LABS   hgb a1c 6.3    Assessment/Plan:    1. Abdominal aortic aneurysm (AAA) without rupture ([address]  Assess for worsening  - US EXAM SCREENING AAA; Future    2. Cervical spinal stenosis  F/u with dr. Aliene Dandy in ortho spine    3. Type 2 diabetes mellitus with hyperglycemia, without long-term current use of insulin ([facility])  Goal hgb a1c <7; well controlled    4. Fibromyalgia  Continue pain management    5. Moderate persistent asthma without complication  F/u with dr. Genoveva De Paz    6. Mixed obsessional thoughts and acts  improved    7.  S/P aortic valve replacement  F/u with dr Rachel Camacho    8. Personal history of nicotine dependence   9 years nicotine free. - US EXAM SCREENING AAA; Future      Lab review: labs reviewed, I note that glycosylated hemoglobin mildly abnormal but acceptable      I have discussed the diagnosis with the patient and the intended plan as seen in the above orders. I have discussed medication side effects and warnings with the patient as well. I have reviewed the plan of care with the patient, accepted their input and they are in agreement with the treatment goals.

## 2020-04-15 ENCOUNTER — TELEPHONE (OUTPATIENT)
Dept: FAMILY MEDICINE CLINIC | Age: 70
End: 2020-04-15

## 2020-04-16 DIAGNOSIS — K21.9 GASTROESOPHAGEAL REFLUX DISEASE WITHOUT ESOPHAGITIS: Primary | ICD-10-CM

## 2020-04-16 RX ORDER — PANTOPRAZOLE SODIUM 20 MG/1
20 TABLET, DELAYED RELEASE ORAL DAILY
Qty: 90 TAB | Refills: 3 | Status: SHIPPED | OUTPATIENT
Start: 2020-04-16 | End: 2021-04-02

## 2020-06-01 ENCOUNTER — PATIENT MESSAGE (OUTPATIENT)
Dept: FAMILY MEDICINE CLINIC | Age: 70
End: 2020-06-01

## 2020-06-01 ENCOUNTER — HOSPITAL ENCOUNTER (OUTPATIENT)
Dept: ULTRASOUND IMAGING | Age: 70
Discharge: HOME | End: 2020-06-01
Attending: FAMILY MEDICINE
Payer: MEDICARE

## 2020-06-01 DIAGNOSIS — I71.40 ABDOMINAL AORTIC ANEURYSM (AAA) WITHOUT RUPTURE: ICD-10-CM

## 2020-06-01 DIAGNOSIS — Z87.891 PERSONAL HISTORY OF NICOTINE DEPENDENCE: ICD-10-CM

## 2020-06-01 PROCEDURE — 76706 US ABDL AORTA SCREEN AAA: CPT

## 2020-06-02 ENCOUNTER — VIRTUAL VISIT (OUTPATIENT)
Dept: FAMILY MEDICINE CLINIC | Age: 70
End: 2020-06-02

## 2020-06-02 DIAGNOSIS — E11.65 TYPE 2 DIABETES MELLITUS WITH HYPERGLYCEMIA, WITHOUT LONG-TERM CURRENT USE OF INSULIN (HCC): ICD-10-CM

## 2020-06-02 DIAGNOSIS — J45.40 MODERATE PERSISTENT ASTHMA WITHOUT COMPLICATION: ICD-10-CM

## 2020-06-02 DIAGNOSIS — F51.04 PSYCHOPHYSIOLOGIC INSOMNIA: Primary | ICD-10-CM

## 2020-06-02 DIAGNOSIS — G47.20 DISRUPTIONS OF 24 HOUR SLEEP-WAKE CYCLE: ICD-10-CM

## 2020-06-02 DIAGNOSIS — M48.02 CERVICAL SPINAL STENOSIS: ICD-10-CM

## 2020-06-02 DIAGNOSIS — M79.7 FIBROMYALGIA: ICD-10-CM

## 2020-06-02 RX ORDER — TRAZODONE HYDROCHLORIDE 50 MG/1
50 TABLET ORAL
Qty: 30 TAB | Refills: 1 | Status: SHIPPED | OUTPATIENT
Start: 2020-06-02 | End: 2020-06-05 | Stop reason: DRUGHIGH

## 2020-06-02 NOTE — PROGRESS NOTES
Pilo Mitchell presents today for   Chief Complaint   Patient presents with    Sleep Problem       Is someone accompanying this pt? na    Is the patient using any DME equipment during OV? na    Depression Screening:  3 most recent PHQ Screens 12/12/2019   Little interest or pleasure in doing things Not at all   Feeling down, depressed, irritable, or hopeless Not at all   Total Score PHQ 2 0       Learning Assessment:  Learning Assessment 4/22/2016   PRIMARY LEARNER Patient   HIGHEST LEVEL OF EDUCATION - PRIMARY LEARNER  -   PRIMARY LANGUAGE ENGLISH   LEARNER PREFERENCE PRIMARY LISTENING   ANSWERED BY patient   RELATIONSHIP SELF       Abuse Screening:  Abuse Screening Questionnaire 12/12/2019   Do you ever feel afraid of your partner? N   Are you in a relationship with someone who physically or mentally threatens you? N   Is it safe for you to go home? Y       Fall Risk  Fall Risk Assessment, last 12 mths 3/2/2020   Able to walk? Yes   Fall in past 12 months? Yes   Fall with injury? Yes   Number of falls in past 12 months 1   Fall Risk Score 2       Health Maintenance reviewed and discussed and ordered per Provider. Health Maintenance Due   Topic Date Due    Foot Exam Q1  11/16/2019    Medicare Yearly Exam  11/17/2019    GLAUCOMA SCREENING Q2Y  04/27/2020    Eye Exam Retinal or Dilated  04/27/2020   . Coordination of Care:  1. Have you been to the ER, urgent care clinic since your last visit? Hospitalized since your last visit? no    2. Have you seen or consulted any other health care providers outside of the [address] [place] [address] since your last visit? Include any pap smears or colon screening. no      Last  Checked na  Last UDS Checked na  Last Pain contract signed: na    Patient presents in office today for routine care.   Patient concerns: sleep issue
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
## 2020-06-05 ENCOUNTER — TELEPHONE (OUTPATIENT)
Dept: FAMILY MEDICINE CLINIC | Age: 70
End: 2020-06-05

## 2020-06-05 DIAGNOSIS — F51.04 PSYCHOPHYSIOLOGIC INSOMNIA: ICD-10-CM

## 2020-06-05 DIAGNOSIS — E11.65 TYPE 2 DIABETES MELLITUS WITH HYPERGLYCEMIA, WITHOUT LONG-TERM CURRENT USE OF INSULIN (HCC): Primary | ICD-10-CM

## 2020-06-05 RX ORDER — TRAZODONE HYDROCHLORIDE 100 MG/1
100 TABLET ORAL
Qty: 30 TAB | Refills: 1 | Status: SHIPPED | OUTPATIENT
Start: 2020-06-05 | End: 2020-10-08 | Stop reason: ALTCHOICE

## 2020-06-05 NOTE — TELEPHONE ENCOUNTER
Pt stated that she needs her trumetrix test strips (2 boxes with 3 refills) [place] [address] [place] [address] [place] [facility]. Pt would like for it to be faxed at [phone]. Pt also stated trazadone 50mg is not working for her so she took 1 1/2 a night.

## 2020-06-09 NOTE — PROGRESS NOTES
[facility]  [address], [address]  Phone: [phone]  Fax: [phone]        PROGRESS NOTE      HISTORY OF PRESENT ILLNESS:  The patient is a 79 y.o. female and was seen today for follow up of low back pain into the BLE in a S1 distribution extending to the foot and new neck pain into the BUE (R>L) extending to the hands, involving all digits x 11/2019 without trauma. Additionally, she reports endorsing a locking sensation of the BUE. Previously, she was seen for c/o low back pain and paraesthesias into the LLE to the knee in an L4 distribution and the RLE to the foot. Additionally, she reported chronic neck pain into the BUE (R>L) and indicated being evaluated elsewhere for cervical surgery. She was previously seen for low back and left hip pain extending into the LLE in a S1 distribution to an area below the knee. Initially she was seen for c/o low back pain into the BLE extending in roughly an L4 distribution in the LLE and in an L5 distribution to the foot in the RLE. Pt also endorses numbness in the LLE. Her pain is exacerbated with standing and walking intolerance. Patient indicates some episodes of bowel incontinence x 3-4 months. Patient reports dropping objects x couple months. Pt completed MDP with good relief. She had a diagnosis of lumbar postlaminectomy syndrome with a previous history of L3 through S1 fusion in 2002 and 2004 by a physician in [place]. Pt denies h/o stomach ulcers, bleeding disorders, or current use of anticoagulants. PmHx of fibromyalgia, DM, aortic valve replacement, right shoulder replacement. Patient reports previously receiving a shoulder injection with temporary relief. Pt notes intolerance to FOSOMAX, LYRICA, and PREDNISONE combination.  Subsequently, d/c Fosomax due to allergy and started Cymbalta 30 mg TID in place of Lyrica. Note dated 6/29/16 indicating patient was seen and underwent porcine aortic valve replacement by Dr. Quan Bui on 3/18/16. Pt began cardiac rehab in 1/2017. Pt underwent right shoulder surgery on 3/29/17 by Dr. Jaspreet Park. Note from Dr. Kamille Raphael, cardiologist dated 5/18/17 indicating patient was seen with h/o aortic stenosis, s/p aortic valve replacement 3/16 and was seen with moderate dysphemia with minimal exertion. CT revealed no evidence of pulmonary embolism, no obvious fluid overload. They were ordering further workup to look for functioning bioprosthetic aortic valve. Per patient, a cause for her SOB has still not been found. She has been scheduled to see a pulmonologist. She states she has been restricted to take her Meloxicam since her right shoulder surgery, which has caused her increase in low back pain. Upon review of our records, it was noted she previously failed TOPAMAX, NEURONTIN, and LYRICA. Pt was switched from Ultram ER back to Ultram immediate release. Pt underwent left SI joint injection on 3/15/18 with good relief. Note from Dr. Waqas Lynn dated 11/12/19 indicated they stopped Lyrica and started her on Cymbalta 90 mg per day.   Note from Dr. Kamille Raphael dated 12/19/19 indicating patient was seen with c/o history of aortic valve replacement, fibromyalgia, and DM. The patient has a history of DM and reports blood sugars are well controlled, consistently remaining below 200, average of 119. Preliminary reading [id number] [facility] Spine plain films dated 3/2/2020 revealed: severe disc space narrowing at C4-5, moderate at C5-6, and the space between C6-7 was not well visualized. Small anterior osteophytes noted at C4, C5 and C6. C7 not visualized due to shoulder pathology. No acute pathology identified.  Lumbar spine CT w/o contrast dated 7/20/14  per report revealed L3 through L5 laminectomies with L3 through S1 dorsal hardware fusion. There was no evidence for fractures. There was advanced degenerative disc disease at L2-L3 at the junctional level with marked central canal stenosis.  C Spine MRI dated 3/15/2020 films independently reviewed. Per report, at C3-4: anterolisthesis. 3.5 mm central disc extrusion extending 4 mm cephalad. Mild central stenosis and subtle ventral cord flattening. Advanced left facet arthropathy. At C4-5: mild posterior spondylotic ridge. Mild central stenosis and subtle ventral cord flattening. At C5-6: central to right posterolateral 2.5 mm broad-based disc protrusion with subtle ventral cord flattening and mild central stenosis. At C6-7: mild posterior spondylotic ridge. Mild to moderate central stenosis. At C7-T1: anterolisthesis. Mild posterior annular bulge. Mild central stenosis. Upper thoracic spondylosis including a 5 mm posterior disc extension at T3-4. At her last clinical appointment, patient wished to continue her current treatment. I provided her refills of Tramadol.        The patient returns today with low back pain radiating into BLE in a L4 distribution on the right and in a S1 distribution on the left to the greater toe and chronic neck pain which became progressive x 12/2019 radiating into BUE (L>R) to the hands. She rates her pain 6-7/10, previously 6/10. Her pain is exacerbated by overhead activity and reaching behind. This is an earlier than planned f/u. She is noncompliant with her HEP due to bronchitis x 6/9/2020, she is treating with antibiotics. Pt denies change in bowel or bladder habits, however she reports not knowing when she is done using the restroom. Pt continues to report dropping objects x a couple months. Patient denies previous cervical spinal surgery. Note from Dr. Kang Barney dated 12/16/19 indicating patient was seen with c/o neck and RT shoulder pain. Pt still has issues with pain and numbness, her thumb becomes locked. Prednisone provided some relief x 1 week. She received a LT shoulder injection. Pt later clarified this was without relief.  Note from Dr. Kunal Carter dated 4/13/2020 indicating patient was seen with c/o back, neck, and bilateral shoulder pain. Pt underwent total shoulder replacement x 2017.  reviewed. Body mass index is 37.5 kg/m². PCP: Tex Anderson MD      Past Medical History:   Diagnosis Date    Ankle fracture     Aortic stenosis     S/P AVR in 2016    Asthma     Chronic pain     back pain    [facility] ([facility] [address]     Diverticulitis     Fibromyalgia     H/O aortic valve replacement     21 mm bovine pericardial bioprosthesis and epiaortic scanning      Hypertension     PT denies    Hypothyroidism     Lumbar post-laminectomy syndrome     Menopause     Obesity     Osteoarthritis     Psychotic disorder ([facility] [address]     Sciatica     Skin cancer 2013    right forearm.          Social History     Socioeconomic History    Marital status:      Spouse name: Not on file    Number of children: Not on file    Years of education: Not on file    Highest education level: Not on file   Occupational History    Not on file   Social Needs    Financial resource strain: Not on file    Food insecurity     Worry: Not on file     Inability: Not on file    Transportation needs     Medical: Not on file     Non-medical: Not on file   Tobacco Use    Smoking status: Former Smoker     Years: 45.00     Types: Cigarettes     Last attempt to quit: 2011     Years since quittin.9    Smokeless tobacco: Never Used   Substance and Sexual Activity    Alcohol use: No    Drug use: No    Sexual activity: Never   Lifestyle    Physical activity     Days per week: Not on file     Minutes per session: Not on file    Stress: Not on file   Relationships    Social connections     Talks on phone: Not on file     Gets together: Not on file     Attends Restoration service: Not on file     Active member of club or organization: Not on file     Attends meetings of clubs or organizations: Not on file     Relationship status: Not on file    Intimate partner violence     Fear of current or ex partner: Not on file     Emotionally abused: Not on file     Physically abused: Not on file     Forced sexual activity: Not on file   Other Topics Concern    Not on file   Social History Narrative    Not on file       Current Outpatient Medications   Medication Sig Dispense Refill    cephALEXin (KEFLEX) 500 mg capsule       glucose blood VI test strips (True Metrix Pro Test Strip) strip Test blood glucose 3 times a day 200 Strip 3    traZODone (DESYREL) 100 mg tablet Take 1 Tab by mouth nightly. 30 Tab 1    pantoprazole (PROTONIX) 20 mg tablet Take 1 Tab by mouth daily. 90 Tab 3    traMADoL (Ultram) 50 mg tablet Take 1 Tab by mouth four (4) times daily for 90 days. Max Daily Amount: 200 mg. 120 Tab 2    meloxicam (MOBIC) 15 mg tablet TAKE 1 TABLET BY MOUTH DAILY 90 Tab 3    levothyroxine (SYNTHROID) 50 mcg tablet TAKE 1 TABLET BY MOUTH EVERY DAY BEFORE BREAKFAST 90 Tab 0    atorvastatin (LIPITOR) 20 mg tablet TAKE 1 TABLET BY MOUTH ONCE DAILY 90 Tab 0    metFORMIN (GLUCOPHAGE) 500 mg tablet TAKE ONE TABLET BY MOUTH ONCE DAILY WITH  DINNER. 90 Tab 0    DULoxetine (CYMBALTA) 30 mg capsule Take 1 Cap by mouth three (3) times daily. 90 Cap 2    fluticasone propionate (FLONASE) 50 mcg/actuation nasal spray One spray each nostril twice a day. 1 Bottle 5    cyclobenzaprine (FLEXERIL) 10 mg tablet Take 1 Tab by mouth three (3) times daily as needed for Muscle Spasm(s). 30 Tab 0    meclizine (ANTIVERT) 25 mg tablet Take 1 Tab by mouth three (3) times daily as needed for Dizziness. 60 Tab 3    FLUoxetine (PROZAC) 10 mg capsule TAKE 1 CAPSULE BY MOUTH ONCE DAILY. GENERICFOR PROZAC. 90 Cap 3    STIOLTO RESPIMAT 2.5-2.5 mcg/actuation inhaler Take  by inhalation. 1    vitamin E acetate (VITAMIN E PO) Take  by mouth.  cyanocobalamin (VITAMIN B-12) 500 mcg tablet Take 500 mcg by mouth daily.  cetirizine (ZYRTEC) 10 mg tablet TAKE 1 TABLET BY MOUTH EVERY DAY.  GENERIC FOR ZYRTEC 90 Tab 2    VENTOLIN HFA 90 mcg/actuation inhaler 2 Puffs by Aerosolization route every four (4) hours as needed. 0    B.infantis-B.ani-B.long-B.bifi (PROBIOTIC 4X) 10-15 mg TbEC Take  by mouth daily.  aspirin delayed-release 81 mg tablet Take 81 mg by mouth daily.  Cholecalciferol, Vitamin D3, (VITAMIN D3) 1,000 unit cap Take 1,000 Units by mouth daily.  Lancets (ACCU-CHEK SOFTCLIX LANCETS) misc Please use one lancet to test blood sugars twice a day. 1 Package 20    BLOOD-GLUCOSE METER (ACCU-CHEK MARCE MONITORING) by Does Not Apply route.  triamcinolone acetonide (KENALOG) 0.1 % topical cream Apply  to affected area two (2) times a day. use thin layer      methylPREDNISolone (MEDROL DOSEPACK) 4 mg tablet Per dose pack instructions 1 Dose Pack 0    predniSONE (DELTASONE) 10 mg tablet   0    docusate sodium (COLACE) 50 mg capsule 50 mg.      clobetasol (TEMOVATE) 0.05 % external solution APPLY TO SCALP AFTER SHAMPOO DAILY (Patient not taking: Reported on 4/2/2020) 50 mL 3       Allergies   Allergen Reactions    Latex, Natural Rubber Hives    Adhesive Rash     Some bandaids    Ciprofloxacin Rash    Codeine Nausea and Vomiting    Demerol [Meperidine] Nausea and Vomiting    Diclofenac Shortness of Breath and Palpitations    Erythromycin Rash    Levaquin [Levofloxacin] Rash    Loratadine Swelling    Morphine Nausea and Vomiting    Penicillin G Hives and Rash     Does fine with Keflex    Sulfa (Sulfonamide Antibiotics) Itching    Vicodin [Hydrocodone-Acetaminophen] Other (comments)     Metallic taste in mouth          PHYSICAL EXAMINATION    Visit Vitals  /75 (BP 1 Location: Left arm, BP Patient Position: Sitting)   Pulse 93   Temp (!) 96.7 °F (35.9 °C) (Oral)   Ht 5' 7\" (1.702 m)   Wt 239 lb 6.4 oz (108.6 kg)   SpO2 94%   BMI 37.50 kg/m²       CONSTITUTIONAL: NAD, A&O x 3  SENSATION: Decreased sensation to light touch on the RLE in a L4 and L5 distribution.  Otherwise, intact to light touch throughout   NEURO: Belen's is negative bilaterally. RANGE OF MOTION: The patient has full passive range of motion in all four extremities. MOTOR:  Straight Leg Raise: Negative, bilateral    Positive impingement sign LUE. Shoulder AB/Flex Elbow Flex Wrist Ext Elbow Ext Wrist Flex Hand Intrin Tone   Right +4/5 +4/5 +4/5 +4/5 +4/5 +4/5 +4/5   Left +4/5 +4/5 +4/5 +4/5 +4/5 +4/5 +4/5              Hip Flex Knee Ext Knee Flex Ankle DF GTE Ankle PF Tone   Right +4/5 +4/5 +4/5 +4/5 +4/5 +4/5 +4/5   Left +4/5 +4/5 +4/5 +4/5 +4/5 +4/5 +4/5       ASSESSMENT   Diagnoses and all orders for this visit:    1. HNP (herniated nucleus pulposus) with myelopathy, cervical  -     REFERRAL TO SPINE SURGERY    2. Cervical spondylosis without myelopathy  -     REFERRAL TO SPINE SURGERY    3. Cervical spinal stenosis  -     REFERRAL TO SPINE SURGERY    4. Cervical neuritis  -     REFERRAL TO SPINE SURGERY    5. Lumbar neuritis  -     REFERRAL TO SPINE SURGERY    6. Lumbar post-laminectomy syndrome  -     REFERRAL TO SPINE SURGERY    7. Spondylolisthesis, acquired          IMPRESSION AND PLAN:  Patient returns to the office today with c/o low back pain radiating into BLE in a L4 distribution on the right and in a S1 distribution on the left to the greater toe and chronic neck pain which became progressive x 12/2020 radiating into BUE (L>R) to the hands. Multiple treatment options were discussed. I think her symptoms are multifactorial in nature, with some related to shoulder pathology and some related to cervical spine pathology. She certainly has some pathology identified on cervical MRI. At this point, she would like to seek surgical consultation. I will refer her to Dr. hSemar Hartmann for evaluation. I advised patient to bring copies of films to this visit. Patient is neurologically intact. I will see the patient back prn. Written by Raul Thompson, as dictated by Sue Varner MD  I examined the patient, reviewed and agree with the note.

## 2020-06-10 ENCOUNTER — TELEPHONE (OUTPATIENT)
Dept: FAMILY MEDICINE CLINIC | Age: 70
End: 2020-06-10

## 2020-06-10 NOTE — TELEPHONE ENCOUNTER
Ankita Borja from [facility] stated pt. Is requesting Trumetrix test strips, not the Trumetrix Pro. Ref #[id number].  They will take a verbal. Please assist.

## 2020-06-11 ENCOUNTER — OFFICE VISIT (OUTPATIENT)
Dept: ORTHOPEDIC SURGERY | Age: 70
End: 2020-06-11

## 2020-06-11 VITALS
WEIGHT: 239.4 LBS | BODY MASS INDEX: 37.57 KG/M2 | SYSTOLIC BLOOD PRESSURE: 123 MMHG | OXYGEN SATURATION: 94 % | DIASTOLIC BLOOD PRESSURE: 75 MMHG | HEIGHT: 67 IN | HEART RATE: 93 BPM | TEMPERATURE: 96.7 F

## 2020-06-11 DIAGNOSIS — M54.12 CERVICAL NEURITIS: ICD-10-CM

## 2020-06-11 DIAGNOSIS — M48.02 CERVICAL SPINAL STENOSIS: ICD-10-CM

## 2020-06-11 DIAGNOSIS — M43.10 SPONDYLOLISTHESIS, ACQUIRED: ICD-10-CM

## 2020-06-11 DIAGNOSIS — M47.812 CERVICAL SPONDYLOSIS WITHOUT MYELOPATHY: ICD-10-CM

## 2020-06-11 DIAGNOSIS — M96.1 LUMBAR POST-LAMINECTOMY SYNDROME: ICD-10-CM

## 2020-06-11 DIAGNOSIS — M54.16 LUMBAR NEURITIS: ICD-10-CM

## 2020-06-11 DIAGNOSIS — M50.00 HNP (HERNIATED NUCLEUS PULPOSUS) WITH MYELOPATHY, CERVICAL: Primary | ICD-10-CM

## 2020-06-11 RX ORDER — CEPHALEXIN 500 MG/1
CAPSULE ORAL
COMMUNITY
Start: 2020-06-09 | End: 2020-12-01 | Stop reason: ALTCHOICE

## 2020-06-11 NOTE — LETTER
6/11/20 Patient: Aashish Robles YOB: 1950 Date of Visit: 6/11/2020 Romero Dahl MD 
[id number] [id number] VIA In Basket Dear Romero Dahl MD, Thank you for referring Ms. Anil Mendes to [address] for evaluation. My notes for this consultation are attached. If you have questions, please do not hesitate to call me. I look forward to following your patient along with you. Sincerely, Rebekah Cooper MD

## 2020-06-13 DIAGNOSIS — M79.7 FIBROMYALGIA: ICD-10-CM

## 2020-06-15 RX ORDER — DULOXETIN HYDROCHLORIDE 30 MG/1
CAPSULE, DELAYED RELEASE ORAL
Qty: 90 CAP | Refills: 0 | Status: SHIPPED | OUTPATIENT
Start: 2020-06-15 | End: 2020-07-13 | Stop reason: SDUPTHER

## 2020-06-16 ENCOUNTER — OFFICE VISIT (OUTPATIENT)
Dept: ORTHOPEDIC SURGERY | Age: 70
End: 2020-06-16

## 2020-06-16 VITALS
HEART RATE: 96 BPM | SYSTOLIC BLOOD PRESSURE: 145 MMHG | BODY MASS INDEX: 37.2 KG/M2 | TEMPERATURE: 97.3 F | HEIGHT: 67 IN | WEIGHT: 237 LBS | RESPIRATION RATE: 16 BRPM | DIASTOLIC BLOOD PRESSURE: 88 MMHG

## 2020-06-16 DIAGNOSIS — M54.2 NECK PAIN: ICD-10-CM

## 2020-06-16 DIAGNOSIS — R20.0 NUMBNESS AND TINGLING OF BOTH UPPER EXTREMITIES: ICD-10-CM

## 2020-06-16 DIAGNOSIS — R20.2 NUMBNESS AND TINGLING OF RIGHT ARM: ICD-10-CM

## 2020-06-16 DIAGNOSIS — R20.0 NUMBNESS AND TINGLING OF RIGHT ARM: ICD-10-CM

## 2020-06-16 DIAGNOSIS — R20.2 NUMBNESS AND TINGLING OF BOTH UPPER EXTREMITIES: ICD-10-CM

## 2020-06-16 DIAGNOSIS — M25.512 BILATERAL SHOULDER PAIN, UNSPECIFIED CHRONICITY: ICD-10-CM

## 2020-06-16 DIAGNOSIS — M54.50 LUMBAR PAIN: Primary | ICD-10-CM

## 2020-06-16 DIAGNOSIS — M25.511 BILATERAL SHOULDER PAIN, UNSPECIFIED CHRONICITY: ICD-10-CM

## 2020-06-16 NOTE — PROGRESS NOTES
[address]  [address], [address]  [place], [address] [place] [address] Street  Phone: [phone]  Fax: [phone]  INITIAL CONSULTATION  Patient: Leeanne Lara                MRN: [id number]       SSN: [id number]  YOB: 1950        AGE: 79 y.o. SEX: female  Body mass index is 37.12 kg/m². PCP: Eva Jang MD  06/16/20    Chief Complaint   Patient presents with    Back Pain     SC    Neck Pain         HISTORY OF PRESENT ILLNESS, RADIOGRAPHS, and PLAN:       He was seen today at request of Dr. Soo Terrazas. Ms. Juani Gallego is a pleasant 72-year-old female previous medical history including includes valve replacement in 2017 and diabetes Dr. Morgan Tomlinson performed a right total shoulder replacement on her several years ago. She also has known left-sided shoulder rotator cuff disease she comes in today with primarily wants to discuss pains that she gets in her neck but more it unplugged her it more bilateral shoulder pain pain and some radiculopathy on lifting her right arm radiating down her into her hand. Cramping of her hand. She has some balance dysfunction. No issues with dexterity with her hands. Her physical exam demonstrates painful range of motion of her shoulders flat reflexes normal sensation radiographs of her cervical spine demonstrate diffuse cervical spondylosis without instability MRI demonstrates spondylosis mild cervicals stenosis foraminal stenosis no clear cord compression of significance x-rays of her shoulders demonstrate I believe of rotator chronic rotator cuff tear changes and degenerative changes of the left shoulder the right shoulder has had a previous total shoulder replacement. I am not competent to discuss its placement or other issues regarding it. At this point I am not convinced her pain syndrome is cervically mediated. I believe she may have had have a brachial plexus issue on the right. She has mechanical pain in both shoulders.   I like to obtain an upper extremity nerve conduction study. The operative surgeon who did her shoulder replacement close their office and I am going to refer her to 1 of my associates who specializes in shoulders to have them evaluate both the shoulder that had a previous shoulder surgery as well as the unoperated left shoulder. I will see her back following her nerve conduction studies    This dictation was created utilizing voice recognition software. Errors may be present. Past Medical History:   Diagnosis Date    Ankle fracture     Aortic stenosis     S/P AVR in 2016    Asthma     Chronic pain     back pain    Diabetes ([address]     Diverticulitis     Fibromyalgia     H/O aortic valve replacement 03/16    21 mm bovine pericardial bioprosthesis and epiaortic scanning      Hypertension     PT denies    Hypothyroidism     Lumbar post-laminectomy syndrome     Menopause     Obesity     Osteoarthritis     Psychotic disorder ([address]     Sciatica     Skin cancer 2013    right forearm. Family History   Problem Relation Age of Onset    Hypertension Father     Heart Disease Father     Alcohol abuse Father        Current Outpatient Medications   Medication Sig Dispense Refill    DULoxetine (CYMBALTA) 30 mg capsule TAKE 1 CAPSULE BY MOUTH THREE TIMES DAILY 90 Cap 0    cephALEXin (KEFLEX) 500 mg capsule       glucose blood VI test strips (True Metrix Pro Test Strip) strip Test blood glucose 3 times a day 200 Strip 3    traZODone (DESYREL) 100 mg tablet Take 1 Tab by mouth nightly. 30 Tab 1    pantoprazole (PROTONIX) 20 mg tablet Take 1 Tab by mouth daily. 90 Tab 3    traMADoL (Ultram) 50 mg tablet Take 1 Tab by mouth four (4) times daily for 90 days.  Max Daily Amount: 200 mg. 120 Tab 2    meloxicam (MOBIC) 15 mg tablet TAKE 1 TABLET BY MOUTH DAILY 90 Tab 3    levothyroxine (SYNTHROID) 50 mcg tablet TAKE 1 TABLET BY MOUTH EVERY DAY BEFORE BREAKFAST 90 Tab 0    atorvastatin (LIPITOR) 20 mg tablet TAKE 1 TABLET BY MOUTH ONCE DAILY 90 Tab 0    metFORMIN (GLUCOPHAGE) 500 mg tablet TAKE ONE TABLET BY MOUTH ONCE DAILY WITH  DINNER. 90 Tab 0    fluticasone propionate (FLONASE) 50 mcg/actuation nasal spray One spray each nostril twice a day. 1 Bottle 5    cyclobenzaprine (FLEXERIL) 10 mg tablet Take 1 Tab by mouth three (3) times daily as needed for Muscle Spasm(s). 30 Tab 0    meclizine (ANTIVERT) 25 mg tablet Take 1 Tab by mouth three (3) times daily as needed for Dizziness. 60 Tab 3    FLUoxetine (PROZAC) 10 mg capsule TAKE 1 CAPSULE BY MOUTH ONCE DAILY. GENERICFOR PROZAC. 90 Cap 3    STIOLTO RESPIMAT 2.5-2.5 mcg/actuation inhaler Take  by inhalation. 1    vitamin E acetate (VITAMIN E PO) Take  by mouth.  cyanocobalamin (VITAMIN B-12) 500 mcg tablet Take 500 mcg by mouth daily.  cetirizine (ZYRTEC) 10 mg tablet TAKE 1 TABLET BY MOUTH EVERY DAY. GENERIC FOR ZYRTEC 90 Tab 2    VENTOLIN HFA 90 mcg/actuation inhaler 2 Puffs by Aerosolization route every four (4) hours as needed. 0    B.infantis-B.ani-B.long-B.bifi (PROBIOTIC 4X) 10-15 mg TbEC Take  by mouth daily.  aspirin delayed-release 81 mg tablet Take 81 mg by mouth daily.  Cholecalciferol, Vitamin D3, (VITAMIN D3) 1,000 unit cap Take 1,000 Units by mouth daily.  Lancets (ACCU-CHEK SOFTCLIX LANCETS) misc Please use one lancet to test blood sugars twice a day. 1 Package 20    BLOOD-GLUCOSE METER (ACCU-CHEK MARCE MONITORING) by Does Not Apply route.  triamcinolone acetonide (KENALOG) 0.1 % topical cream Apply  to affected area two (2) times a day.  use thin layer      methylPREDNISolone (MEDROL DOSEPACK) 4 mg tablet Per dose pack instructions 1 Dose Pack 0    predniSONE (DELTASONE) 10 mg tablet   0    docusate sodium (COLACE) 50 mg capsule 50 mg.      clobetasol (TEMOVATE) 0.05 % external solution APPLY TO SCALP AFTER SHAMPOO DAILY (Patient not taking: Reported on 4/2/2020) 50 mL 3       Allergies   Allergen Reactions    Latex, Natural Rubber Hives    Adhesive Rash     Some bandaids    Ciprofloxacin Rash    Codeine Nausea and Vomiting    Demerol [Meperidine] Nausea and Vomiting    Diclofenac Shortness of Breath and Palpitations    Erythromycin Rash    Levaquin [Levofloxacin] Rash    Loratadine Swelling    Morphine Nausea and Vomiting    Penicillin G Hives and Rash     Does fine with Keflex    Sulfa (Sulfonamide Antibiotics) Itching    Vicodin [Hydrocodone-Acetaminophen] Other (comments)     Metallic taste in mouth       Past Surgical History:   Procedure Laterality Date    CARDIAC SURG PROCEDURE UNLIST  03/2016    aortic valve replacement.  CHEST SURGERY PROCEDURE UNLISTED  03/2016    Open Heart Surgery    COLONOSCOPY      COLONOSCOPY N/A 9/15/2017    COLONOSCOPY performed by Lanie Crawford MD at [address] [facility] [address] Right 1995    lens  replaced.  HX CATARACT REMOVAL Left 02/2018    HX COLONOSCOPY  09/15/2017    dr. Rosana Nowak  f/u 5 years.  HX HEENT  1990's    sinus surgery     HX HEENT Right 11/02/2017    laser for right eye cataracts.  HX LUMBAR FUSION  2004    L3-L4-L5    HX LUMBAR LAMINECTOMY  2002    HX MOHS PROCEDURES Left 1990    HX ORTHOPAEDIC      Rotator cuff Bilateral    HX SHOULDER REPLACEMENT Right 03/29/2017    reverse.      MD COLSC FLX W/RMVL OF TUMOR POLYP LESION SNARE TQ  07/09/2014 repeat in 3 years    Dr. Leanne Gonzalez      Aortic valve replacement       Past Medical History:   Diagnosis Date    Ankle fracture     Aortic stenosis     S/P AVR in 2016    Asthma     Chronic pain     back pain    Diabetes ([address]     Diverticulitis     Fibromyalgia     H/O aortic valve replacement 03/16    21 mm bovine pericardial bioprosthesis and epiaortic scanning      Hypertension     PT denies    Hypothyroidism     Lumbar post-laminectomy syndrome     Menopause     Obesity     Osteoarthritis     Psychotic disorder [facility])     Sciatica     Skin cancer 2013    right forearm. Social History     Socioeconomic History    Marital status:      Spouse name: Not on file    Number of children: Not on file    Years of education: Not on file    Highest education level: Not on file   Occupational History    Not on file   Social Needs    Financial resource strain: Not on file    Food insecurity     Worry: Not on file     Inability: Not on file    Transportation needs     Medical: Not on file     Non-medical: Not on file   Tobacco Use    Smoking status: Former Smoker     Years: 45.00     Types: Cigarettes     Last attempt to quit: 2011     Years since quittin.9    Smokeless tobacco: Never Used   Substance and Sexual Activity    Alcohol use: No    Drug use: No    Sexual activity: Never   Lifestyle    Physical activity     Days per week: Not on file     Minutes per session: Not on file    Stress: Not on file   Relationships    Social connections     Talks on phone: Not on file     Gets together: Not on file     Attends Hinduism service: Not on file     Active member of club or organization: Not on file     Attends meetings of clubs or organizations: Not on file     Relationship status: Not on file    Intimate partner violence     Fear of current or ex partner: Not on file     Emotionally abused: Not on file     Physically abused: Not on file     Forced sexual activity: Not on file   Other Topics Concern    Not on file   Social History Narrative    Not on file           REVIEW OF SYSTEMS:   CONSTITUTIONAL SYMPTOMS:  Negative. EYES:  Negative. EARS, NOSE, THROAT AND MOUTH:  Negative. CARDIOVASCULAR:  Negative. RESPIRATORY:  Negative. GENITOURINARY: Per HPI. GASTROINTESTINAL:  Per HPI. INTEGUMENTARY (SKIN AND/OR BREAST):  Negative. MUSCULOSKELETAL: Per HPI.   ENDOCRINE/RHEUMATOLOGIC:  Negative. NEUROLOGICAL:  Per HPI. HEMATOLOGIC/LYMPHATIC:  Negative.    ALLERGIC/IMMUNOLOGIC: Negative. PSYCHIATRIC:  Negative. PHYSICAL EXAMINATION:   Visit Vitals  /88 (BP 1 Location: Left arm, BP Patient Position: Sitting)   Pulse 96   Temp 97.3 °F (36.3 °C) (Oral)   Resp 16   Ht 5' 7\" (1.702 m)   Wt 237 lb (107.5 kg)   BMI 37.12 kg/m²    PAIN SCALE: /10    CONSTITUTIONAL: The patient is in no apparent distress and is alert and oriented x 3. HEENT: Normocephalic. Hearing grossly intact. NECK: Supple and symmetric. no tenderness, or masses were felt. RESPIRATORY: No labored breathing. CARDIOVASCULAR: The carotid pulses were normal. Peripheral pulses were 2+. CHEST: Normal AP diameter and normal contour without any kyphoscoliosis. LYMPHATIC: No lymphadenopathy was appreciated in the neck, axillae or groin. SKIN:  Negative for scars, rashes, lesions, or ulcers on the right upper, right lower, left upper, left lower and trunk. NEUROLOGICAL: Alert and oriented x 3. Ambulation with single point cane. FWB. Imbalance. EXTREMITIES:  See musculoskeletal.  MUSCULOSKELETAL:   Head and Neck: Neck pain. Negative for misalignment, asymmetry, crepitation, defects, tenderness masses or effusions.  Left Upper Extremity: Shoulder pain, especially with ROM. Inspection, percussion and palpation performed. Jacksons sign is negative.  Right Upper Extremity: Shoulder pain and hand numbness with ROM. Fingers lock with upper reaching. Inspection, percussion and palpation performed. Jacksons sign is negative.  Spine, Ribs and Pelvis: Back pain. Neck > back pain. Inspection, percussion and palpation performed. Negative for misalignment, asymmetry, crepitation, defects, tenderness masses or effusions.  Left Lower Extremity: Flat reflex in toes. Inspection, percussion and palpation performed. Negative straight leg raise.  Right Lower Extremity: Flat reflex in toes. Inspection, percussion and palpation performed. Negative straight leg raise.       SPINE EXAM:     Cervical spine: Neck is midline. Normal muscle tone. No focal atrophy is noted. Lumbar spine: No rash, ecchymosis, or gross obliquity. No focal atrophy is noted. ASSESSMENT    ICD-10-CM ICD-9-CM    1. Lumbar pain M54.5 724.2    2. Bilateral shoulder pain, unspecified chronicity M25.511 719.41 AMB POC XRAY, SHOULDER; COMPLETE, 2+    M25.512  AMB POC XRAY, SHOULDER; COMPLETE, 2+      REFERRAL TO ORTHOPEDICS   3. Neck pain M54.2 723.1 AMB POC XRAY, SPINE, CERVICAL; 4+ VIE   4. Numbness and tingling of both upper extremities R20.0 782. 0 EMG TWO EXTREMITIES UPPER    R20.2     5. Numbness and tingling of right arm R20.0 782.0     R20.2         Written by Scottie Rodriguez, as dictated by An Bah MD.    I, Dr. An Bah MD, confirm that all documentation is accurate.

## 2020-06-16 NOTE — LETTER
6/16/20 Patient: James Patino YOB: 1950 Date of Visit: 6/16/2020 Olive Amaral MD 
[id number] [place] [address] VIA In Basket Dear Olive Amaral MD, Thank you for referring Ms. Eileen Harrison to [address] [facility] for evaluation. My notes for this consultation are attached. If you have questions, please do not hesitate to call me. I look forward to following your patient along with you.  
 
 
Sincerely, 
 
Beata Pires MD

## 2020-06-25 DIAGNOSIS — E78.5 HYPERLIPIDEMIA LDL GOAL <100: ICD-10-CM

## 2020-06-25 DIAGNOSIS — E11.65 UNCONTROLLED TYPE 2 DIABETES MELLITUS WITH HYPERGLYCEMIA (HCC): ICD-10-CM

## 2020-06-25 RX ORDER — ATORVASTATIN CALCIUM 20 MG/1
TABLET, FILM COATED ORAL
Qty: 90 TAB | Refills: 0 | Status: SHIPPED | OUTPATIENT
Start: 2020-06-25 | End: 2020-11-03

## 2020-06-25 RX ORDER — METFORMIN HYDROCHLORIDE 500 MG/1
TABLET ORAL
Qty: 90 TAB | Refills: 0 | Status: SHIPPED | OUTPATIENT
Start: 2020-06-25 | End: 2020-09-22

## 2020-06-30 ENCOUNTER — OFFICE VISIT (OUTPATIENT)
Dept: ORTHOPEDIC SURGERY | Age: 70
End: 2020-06-30

## 2020-06-30 VITALS
OXYGEN SATURATION: 98 % | DIASTOLIC BLOOD PRESSURE: 77 MMHG | TEMPERATURE: 97.1 F | HEIGHT: 67 IN | WEIGHT: 238.6 LBS | RESPIRATION RATE: 15 BRPM | SYSTOLIC BLOOD PRESSURE: 144 MMHG | HEART RATE: 98 BPM | BODY MASS INDEX: 37.45 KG/M2

## 2020-06-30 DIAGNOSIS — Z96.619 PAIN IN SHOULDER REGION AFTER SHOULDER REPLACEMENT: ICD-10-CM

## 2020-06-30 DIAGNOSIS — M12.812 ROTATOR CUFF ARTHROPATHY OF LEFT SHOULDER: Primary | ICD-10-CM

## 2020-06-30 DIAGNOSIS — M25.519 PAIN IN SHOULDER REGION AFTER SHOULDER REPLACEMENT: ICD-10-CM

## 2020-06-30 NOTE — PROGRESS NOTES
Any Robles  1950   Chief Complaint   Patient presents with    Shoulder Pain     ramonita shoulder pain        HISTORY OF PRESENT ILLNESS  Any Robles is a 79 y.o. female who presents today for evaluation of b/l shoulder pain. Pt referred by Dr. Steph Zambrano. She rates her pain 7/10 today. Pain has been present for awhile. Pain at night. Pain with certain movements, turning in certain directions. She had a cortisone injection in the left shoulder months ago with minimal relief. Pt states the left shoulder pain started around February 2020. She reports a hx of a torn left rotator cuff years ago, had surgery to repair it. She notes hx of right shoulder replacement surgery around 2017 by Dr. Gladis Walker which provided relief up until 6-8 months ago. Pt presents today ambulating with a cane. Patient describes the pain as aching, sharp and throbbing that is Intermittent in nature. Symptoms are worse with bending, stretching, straightening, Activity and is better with  nothing. Associated symptoms include nothing. Since problem started, it: is unchanged. Pain does wake patient up at night. Has taken no meds for the problem. Has tried following treatments: Injections:YES; Brace:NO;  Therapy:NO; Cane/Crutch:YES       Allergies   Allergen Reactions    Latex, Natural Rubber Hives    Adhesive Rash     Some bandaids    Ciprofloxacin Rash    Codeine Nausea and Vomiting    Demerol [Meperidine] Nausea and Vomiting    Diclofenac Shortness of Breath and Palpitations    Erythromycin Rash    Levaquin [Levofloxacin] Rash    Loratadine Swelling    Morphine Nausea and Vomiting    Penicillin G Hives and Rash     Does fine with Keflex    Sulfa (Sulfonamide Antibiotics) Itching    Vicodin [Hydrocodone-Acetaminophen] Other (comments)     Metallic taste in mouth        Past Medical History:   Diagnosis Date    Ankle fracture     Aortic stenosis     S/P AVR in 2016    Asthma     Chronic pain     back pain    Diabetes ([facility]  Diverticulitis     Fibromyalgia     H/O aortic valve replacement     21 mm bovine pericardial bioprosthesis and epiaortic scanning      Hypertension     PT denies    Hypothyroidism     Lumbar post-laminectomy syndrome     Menopause     Obesity     Osteoarthritis     [facility]otic disorder [facility]     Sciatica     Skin cancer 2013    right forearm. Social History     Socioeconomic History    Marital status:      Spouse name: Not on file    Number of children: Not on file    Years of education: Not on file    Highest education level: Not on file   Occupational History    Not on file   Social Needs    Financial resource strain: Not on file    Food insecurity     Worry: Not on file     Inability: Not on file    Transportation needs     Medical: Not on file     Non-medical: Not on file   Tobacco Use    Smoking status: Former Smoker     Years: 45.00     Types: Cigarettes     Last attempt to quit: 2011     Years since quittin.0    Smokeless tobacco: Never Used   Substance and Sexual Activity    Alcohol use: No    Drug use: No    Sexual activity: Never   Lifestyle    Physical activity     Days per week: Not on file     Minutes per session: Not on file    Stress: Not on file   Relationships    Social connections     Talks on phone: Not on file     Gets together: Not on file     Attends Yazidi service: Not on file     Active member of club or organization: Not on file     Attends meetings of clubs or organizations: Not on file     Relationship status: Not on file    Intimate partner violence     Fear of current or ex partner: Not on file     Emotionally abused: Not on file     Physically abused: Not on file     Forced sexual activity: Not on file   Other Topics Concern    Not on file   Social History Narrative    Not on file      Past Surgical History:   Procedure Laterality Date    CARDIAC SURG PROCEDURE UNLIST  2016    aortic valve replacement.      CHEST SURGERY PROCEDURE UNLISTED  03/2016    Open Heart Surgery    COLONOSCOPY      COLONOSCOPY N/A 9/15/2017    [facility] ENDOSCOPY    HX CATARACT REMOVAL Right 1995    lens  replaced.  HX CATARACT REMOVAL Left 02/2018    HX COLONOSCOPY  09/15/2017    dr. Simone Gil  f/u 5 years.  HX HEENT  1990's    sinus surgery     HX HEENT Right 11/02/2017    laser for right eye cataracts.  HX LUMBAR FUSION  2004    L3-L4-L5    HX LUMBAR LAMINECTOMY  2002    HX MOHS PROCEDURES Left 1990    HX ORTHOPAEDIC      Rotator cuff Bilateral    HX SHOULDER REPLACEMENT Right 03/29/2017    reverse.  [place] COLSC FLX W/RMVL OF TUMOR POLYP LESION SNARE TQ  07/09/2014 repeat in 3 years    Dr. Christine Pena      Aortic valve replacement      Family History   Problem Relation Age of Onset    Hypertension Father     Heart Disease Father     Alcohol abuse Father       Current Outpatient Medications   Medication Sig    metFORMIN (GLUCOPHAGE) 500 mg tablet TAKE 1 TABLET BY MOUTH ONCE DAILY WITH  DINNER    atorvastatin (LIPITOR) 20 mg tablet Take 1 tablet by mouth once daily    DULoxetine (CYMBALTA) 30 mg capsule TAKE 1 CAPSULE BY MOUTH THREE TIMES DAILY    cephALEXin (KEFLEX) 500 mg capsule     glucose blood VI test strips (True Metrix Pro Test Strip) strip Test blood glucose 3 times a day    traZODone (DESYREL) 100 mg tablet Take 1 Tab by mouth nightly.  pantoprazole (PROTONIX) 20 mg tablet Take 1 Tab by mouth daily.  traMADoL (Ultram) 50 mg tablet Take 1 Tab by mouth four (4) times daily for 90 days. Max Daily Amount: 200 mg.    meloxicam (MOBIC) 15 mg tablet TAKE 1 TABLET BY MOUTH DAILY    levothyroxine (SYNTHROID) 50 mcg tablet TAKE 1 TABLET BY MOUTH EVERY DAY BEFORE BREAKFAST    fluticasone propionate (FLONASE) 50 mcg/actuation nasal spray One spray each nostril twice a day.     methylPREDNISolone (MEDROL DOSEPACK) 4 mg tablet Per dose pack instructions    predniSONE (DELTASONE) 10 mg tablet     cyclobenzaprine (FLEXERIL) 10 mg tablet Take 1 Tab by mouth three (3) times daily as needed for Muscle Spasm(s).  meclizine (ANTIVERT) 25 mg tablet Take 1 Tab by mouth three (3) times daily as needed for Dizziness.  FLUoxetine (PROZAC) 10 mg capsule TAKE 1 CAPSULE BY MOUTH ONCE DAILY. Edel Pee.  STIOLTO RESPIMAT 2.5-2.5 mcg/actuation inhaler Take  by inhalation.  docusate sodium (COLACE) 50 mg capsule 50 mg.    vitamin E acetate (VITAMIN E PO) Take  by mouth.  cyanocobalamin (VITAMIN B-12) 500 mcg tablet Take 500 mcg by mouth daily.  clobetasol (TEMOVATE) 0.05 % external solution APPLY TO SCALP AFTER SHAMPOO DAILY    cetirizine (ZYRTEC) 10 mg tablet TAKE 1 TABLET BY MOUTH EVERY DAY. GENERIC FOR ZYRTEC    VENTOLIN HFA 90 mcg/actuation inhaler 2 Puffs by Aerosolization route every four (4) hours as needed.  B.infantis-B.ani-B.long-B.bifi (PROBIOTIC 4X) 10-15 mg TbEC Take  by mouth daily.  aspirin delayed-release 81 mg tablet Take 81 mg by mouth daily.  Cholecalciferol, Vitamin D3, (VITAMIN D3) 1,000 unit cap Take 1,000 Units by mouth daily.  Lancets (ACCU-CHEK SOFTCLIX LANCETS) misc Please use one lancet to test blood sugars twice a day.  BLOOD-GLUCOSE METER (ACCU-CHEK MARCE MONITORING) by Does Not Apply route.  triamcinolone acetonide (KENALOG) 0.1 % topical cream Apply  to affected area two (2) times a day. use thin layer     No current facility-administered medications for this visit. REVIEW OF SYSTEM   Patient denies: Weight loss, Fever/Chills, HA, Visual changes, Fatigue, Chest pain, SOB, Abdominal pain, N/V/D/C, Blood in stool or urine, Edema. Pertinent positive as above in HPI.  All others were negative    PHYSICAL EXAM:   Visit Vitals  /77 (BP 1 Location: Left arm, BP Patient Position: Sitting)   Pulse 98   Temp 97.1 °F (36.2 °C) (Oral)   Resp 15   Ht 5' 7\" (1.702 m)   Wt 238 lb 9.6 oz (108.2 kg)   SpO2 98%   BMI 37.37 kg/m²     The patient is a well-developed, well-nourished female   in no acute distress. The patient is alert and oriented times three. The patient is alert and oriented times three. Mood and affect are normal.  LYMPHATIC: lymph nodes are not enlarged and are within normal limits  SKIN: normal in color and non tender to palpation. There are no bruises or abrasions noted. NEUROLOGICAL: Motor sensory exam is within normal limits. Reflexes are equal bilaterally. There is normal sensation to pinprick and light touch  MUSCULOSKELETAL:  Examination Left shoulder   Skin Intact   AC joint tenderness -   Biceps tenderness -   Forward flexion/Elevation ROM  120   Active abduction ROM  110   Glenohumeral abduction  80   External rotation ROM  30   Internal rotation ROM  30   Apprehension -   Sonnys Relocation -   Jerk -   Load and Shift -   Obriens -   Speeds -   Impingement sign  +   Supraspinatus/Empty Can  + 5   External Rotation Strength -, 5/5   Lift Off/Belly Press -, 5/5   Neurovascular Intact       IMAGING: XR of left shoulder from Dr. Rocio Pa office dated 6/16/2020 was reviewed and read by Dr. Federica Li: Proximal migration of the humeral head with degenerative changes of the glenohumeral joint    XR of right shoulder from Dr. Rocio Pa office dated 6/16/2020 was reviewed and read by Dr. Federica Li: Reverse total shoulder arthroplasty with valgus angulation and loosening present      IMPRESSION:      ICD-10-CM ICD-9-CM    1. Rotator cuff arthropathy of left shoulder M12.812 716.81 MRI SHOULDER LT WO CONT   2. Pain in shoulder region after shoulder replacement M25.519 719.41     Z96.619 V43.61         PLAN:  1. Pt presents today with b/l shoulder pain due to left rotator cuff arthropathy and a failed right shoulder replacement. I am ordering an MRI of the left shoulder to r/o a RCT.  I advised the patient that I cannot treat her right shoulder given her prior hx of surgery on the right shoulder by Dr. Tiesha Gray. Risk factors include: dm, htn, BMI>35  2. No ultrasound exam indicated today  3. No cortisone injection indicated today   4. No Physical/Occupational Therapy indicated today  5. Yes diagnostic test indicated today: MRI L SHOULDER  6. No durable medical equipment indicated today  7. No referral indicated today   8. No medications indicated today:   9. No Narcotic indicated today        RTC following MRI      Scribed by Aria Wheatley) as dictated by Malini Bonilla MD    I, Dr. Malini Bonilla, confirm that all documentation is accurate.     Malini Bonilla M.D.   [facility]

## 2020-07-07 ENCOUNTER — TELEPHONE (OUTPATIENT)
Dept: FAMILY MEDICINE CLINIC | Age: 70
End: 2020-07-07

## 2020-07-07 NOTE — TELEPHONE ENCOUNTER
Patient called in and informed me that she has an MRI scheduled for Friday at [facility] and she was waning to know if Dr. Nayan Hendricks could please send a prescription for either 2 mg tabs or 1 10mg tab that she can cut in half of Valium to the [address], [address] as she is claustrophobic. Please advise.

## 2020-07-07 NOTE — PROGRESS NOTES
[address], [address]  [facility], [address]  Phone: [phone]  Fax: [phone]          PROGRESS NOTE      HISTORY OF PRESENT ILLNESS:  The patient is a 79 y.o. female and was seen today for follow up of  low back pain radiating into BLE in a L4 distribution on the right and in a S1 distribution on the left to the greater toe and chronic neck pain which became progressive x 12/2019 radiating into BUE (L>R) to the hands. . Pt was last evaluated by me 6/11/2020 with c/o low back pain radiating into BLE in a L4 distribution on the right and in a S1 distribution on the left to the greater toe and chronic neck pain which became progressive x 12/2019 radiating into BUE (L>R) to the hands. She was previously seen for low back and left hip pain extending into the LLE in a S1 distribution to an area below the knee. Initially she was seen for c/o low back pain into the BLE extending in roughly an L4 distribution in the LLE and in an L5 distribution to the foot in the RLE. Pt also endorses numbness in the LLE. Her pain is exacerbated with standing and walking intolerance. Patient indicates some episodes of bowel incontinence x 3-4 months. Patient reports dropping objects x couple months. Pt completed MDP with good relief. She had a diagnosis of lumbar postlaminectomy syndrome with a previous history of L3 through S1 fusion in 2002 and 2004 by a physician in [place]. Pt denies h/o stomach ulcers, bleeding disorders, or current use of anticoagulants. PmHx of fibromyalgia, DM, aortic valve replacement, right shoulder replacement, left rotator cuff disease. Patient reports previously receiving a shoulder injection with temporary relief. Pt notes intolerance to FOSOMAX, LYRICA, and PREDNISONE combination.  Subsequently, d/c Fosomax due to allergy and started Cymbalta 30 mg TID in place of Lyrica. Note dated 6/29/16 indicating patient was seen and underwent porcine aortic valve replacement by Dr. Fang Ramirez on 3/18/16. Pt began cardiac rehab in 1/2017. Pt underwent right shoulder surgery on 3/29/17 by Dr. Mary Kate Louie. Note from Dr. Melissa Arevalo, cardiologist dated 5/18/17 indicating patient was seen with h/o aortic stenosis, s/p aortic valve replacement 3/16 and was seen with moderate dysphemia with minimal exertion. CT revealed no evidence of pulmonary embolism, no obvious fluid overload. They were ordering further workup to look for functioning bioprosthetic aortic valve. Per patient, a cause for her SOB has still not been found. She has been scheduled to see a pulmonologist. She states she has been restricted to take her Meloxicam since her right shoulder surgery, which has caused her increase in low back pain. Upon review of our records, it was noted she previously failed TOPAMAX, NEURONTIN, and LYRICA. Pt was switched from Ultram ER back to Ultram immediate release. Pt underwent left SI joint injection on 3/15/18 with good relief. Note from Dr. Ronni Lynn dated 11/12/19 indicated they stopped Lyrica and started her on Cymbalta 90 mg per day.  Note from Dr. Melissa Arevalo dated 12/19/19 indicating patient was seen with c/o history of aortic valve replacement, fibromyalgia, and DM. The patient has a history of DM and reports blood sugars are well controlled, consistently remaining below 200, average of 119. Preliminary reading [id number] [facility] 43 Spine plain films dated 3/2/2020 revealed: severe disc space narrowing at C4-5, moderate at C5-6, and the space between C6-7 was not well visualized. Small anterior osteophytes noted at C4, C5 and C6. C7 not visualized due to shoulder pathology. No acute pathology identified.  Lumbar spine CT w/o contrast dated 7/20/14  per report revealed L3 through L5 laminectomies with L3 through S1 dorsal hardware fusion. There was no evidence for fractures.  There was advanced degenerative disc disease at L2-L3 at the junctional level with marked central canal stenosis. C Spine MRI dated 3/15/2020 films independently reviewed. Per report, at C3-4: anterolisthesis. 3.5 mm central disc extrusion extending 4 mm cephalad. Mild central stenosis and subtle ventral cord flattening. Advanced left facet arthropathy. At C4-5: mild posterior spondylotic ridge. Mild central stenosis and subtle ventral cord flattening. At C5-6: central to right posterolateral 2.5 mm broad-based disc protrusion with subtle ventral cord flattening and mild central stenosis. At C6-7: mild posterior spondylotic ridge. Mild to moderate central stenosis. At C7-T1: anterolisthesis. Mild posterior annular bulge. Mild central stenosis. Upper thoracic spondylosis including a 5 mm posterior disc extension at T3-4. At last visit, I referred her to Dr. Daniel Irwin for surgical evaluation and advised patient to bring copies of films to this visit. The patient returns today with low back pain radiating into BLE in a L4 distribution on the right and in a S1 distribution on the left to the greater toe and chronic neck pain which became progressive x 12/2019 radiating into BUE (L>R) to the hands. She rates her pain 7/10, previously 6-7/10. She reports pain comes down right arm to digits 2-5. Left shoulder pain down to elbow. R leg anterior numbness: L5 right. L anterior leg pain: L5 left. Pt previously reported dropping objects x a couple months. Patient denies previous cervical spinal surgery. Note from Dr. Aneta Fletcher dated 12/16/19 indicating patient was seen with c/o neck and RT shoulder pain. Pt still has issues with pain and numbness, her thumb becomes locked. Prednisone provided some relief x 1 week. She received a LT shoulder injection. Pt later clarified this was without relief. Note from Dr. Cherelle Lara dated 4/13/2020 indicating patient was seen with c/o back, neck, and bilateral shoulder pain. Pt underwent total shoulder replacement x 2017.  Note from Dr. Daniel Irwin 6/16/2020: pt has known left rotator cuff disease, balance dysfunction, pain with ROM in upper shoulders. Cerv spine MRI showed diffuse spondylosis without instability and no clear cord compression. Note from Dr. Stephanie Vazquez 2020: chronic b/l shoulder pain, 7/10 pain scale. She has pain with certain movements. Had minimal relief with cortisone injection given seven months ago. Hx of L rotator cuff years ago. Pt is to have an MRI tomorrow, 7/10/2020 of her left shoulder and will follow up with Dr. Stephanie Vazquez.  reviewed. Body mass index is 37.18 kg/m². PCP: Alicia Mckeon MD      Past Medical History:   Diagnosis Date    Ankle fracture     Aortic stenosis     S/P AVR in 2016    Asthma     Chronic pain     back pain    Diabetes ([address]     Diverticulitis     Fibromyalgia     H/O aortic valve replacement     21 mm bovine pericardial bioprosthesis and epiaortic scanning      Hypertension     PT denies    Hypothyroidism     Lumbar post-laminectomy syndrome     Menopause     Obesity     Osteoarthritis     Psychotic disorder ([address]     Sciatica     Skin cancer 2013    right forearm.          Social History     Socioeconomic History    Marital status:      Spouse name: Not on file    Number of children: Not on file    Years of education: Not on file    Highest education level: Not on file   Occupational History    Not on file   Social Needs    Financial resource strain: Not on file    Food insecurity     Worry: Not on file     Inability: Not on file    Transportation needs     Medical: Not on file     Non-medical: Not on file   Tobacco Use    Smoking status: Former Smoker     Years: 45.00     Types: Cigarettes     Last attempt to quit: 2011     Years since quittin.0    Smokeless tobacco: Never Used   Substance and Sexual Activity    Alcohol use: No    Drug use: No    Sexual activity: Never   Lifestyle    Physical activity     Days per week: Not on file     Minutes per session: Not on file    Stress: Not on file Relationships    Social connections     Talks on phone: Not on file     Gets together: Not on file     Attends Worship service: Not on file     Active member of club or organization: Not on file     Attends meetings of clubs or organizations: Not on file     Relationship status: Not on file    Intimate partner violence     Fear of current or ex partner: Not on file     Emotionally abused: Not on file     Physically abused: Not on file     Forced sexual activity: Not on file   Other Topics Concern    Not on file   Social History Narrative    Not on file       Current Outpatient Medications   Medication Sig Dispense Refill    metFORMIN (GLUCOPHAGE) 500 mg tablet TAKE 1 TABLET BY MOUTH ONCE DAILY WITH  DINNER 90 Tab 0    atorvastatin (LIPITOR) 20 mg tablet Take 1 tablet by mouth once daily 90 Tab 0    DULoxetine (CYMBALTA) 30 mg capsule TAKE 1 CAPSULE BY MOUTH THREE TIMES DAILY 90 Cap 0    glucose blood VI test strips (True Metrix Pro Test Strip) strip Test blood glucose 3 times a day 200 Strip 3    traZODone (DESYREL) 100 mg tablet Take 1 Tab by mouth nightly. 30 Tab 1    pantoprazole (PROTONIX) 20 mg tablet Take 1 Tab by mouth daily. 90 Tab 3    meloxicam (MOBIC) 15 mg tablet TAKE 1 TABLET BY MOUTH DAILY 90 Tab 3    levothyroxine (SYNTHROID) 50 mcg tablet TAKE 1 TABLET BY MOUTH EVERY DAY BEFORE BREAKFAST 90 Tab 0    fluticasone propionate (FLONASE) 50 mcg/actuation nasal spray One spray each nostril twice a day. 1 Bottle 5    meclizine (ANTIVERT) 25 mg tablet Take 1 Tab by mouth three (3) times daily as needed for Dizziness. 60 Tab 3    FLUoxetine (PROZAC) 10 mg capsule TAKE 1 CAPSULE BY MOUTH ONCE DAILY. GENERICFOR PROZAC. 90 Cap 3    STIOLTO RESPIMAT 2.5-2.5 mcg/actuation inhaler Take  by inhalation. 1    vitamin E acetate (VITAMIN E PO) Take  by mouth.  cyanocobalamin (VITAMIN B-12) 500 mcg tablet Take 500 mcg by mouth daily.       clobetasol (TEMOVATE) 0.05 % external solution APPLY TO SCALP AFTER SHAMPOO DAILY 50 mL 3    cetirizine (ZYRTEC) 10 mg tablet TAKE 1 TABLET BY MOUTH EVERY DAY. GENERIC FOR ZYRTEC 90 Tab 2    VENTOLIN HFA 90 mcg/actuation inhaler 2 Puffs by Aerosolization route every four (4) hours as needed. 0    B.infantis-B.ani-B.long-B.bifi (PROBIOTIC 4X) 10-15 mg TbEC Take  by mouth daily.  aspirin delayed-release 81 mg tablet Take 81 mg by mouth daily.  Cholecalciferol, Vitamin D3, (VITAMIN D3) 1,000 unit cap Take 1,000 Units by mouth daily.  Lancets (ACCU-CHEK SOFTCLIX LANCETS) misc Please use one lancet to test blood sugars twice a day. 1 Package 20    BLOOD-GLUCOSE METER (ACCU-CHEK MARCE MONITORING) by Does Not Apply route.  diazePAM (Valium) 5 mg tablet Take by mouth 30 minutes prior to MRI 2 Tab 0    cephALEXin (KEFLEX) 500 mg capsule       methylPREDNISolone (MEDROL DOSEPACK) 4 mg tablet Per dose pack instructions 1 Dose Pack 0    predniSONE (DELTASONE) 10 mg tablet   0    cyclobenzaprine (FLEXERIL) 10 mg tablet Take 1 Tab by mouth three (3) times daily as needed for Muscle Spasm(s). 30 Tab 0    docusate sodium (COLACE) 50 mg capsule 50 mg.      triamcinolone acetonide (KENALOG) 0.1 % topical cream Apply  to affected area two (2) times a day.  use thin layer         Allergies   Allergen Reactions    Latex, Natural Rubber Hives    Adhesive Rash     Some bandaids    Ciprofloxacin Rash    Codeine Nausea and Vomiting    Demerol [Meperidine] Nausea and Vomiting    Diclofenac Shortness of Breath and Palpitations    Erythromycin Rash    Levaquin [Levofloxacin] Rash    Loratadine Swelling    Morphine Nausea and Vomiting    Penicillin G Hives and Rash     Does fine with Keflex    Sulfa (Sulfonamide Antibiotics) Itching    Vicodin [Hydrocodone-Acetaminophen] Other (comments)     Metallic taste in mouth          PHYSICAL EXAMINATION    Visit Vitals  /67 (BP 1 Location: Right arm, BP Patient Position: Sitting)   Pulse 90   Temp 97.4 °F (36.3 °C) (Temporal)   Ht 5' 7\" (1.702 m)   Wt 237 lb 6.4 oz (107.7 kg)   SpO2 99%   BMI 37.18 kg/m²       CONSTITUTIONAL: NAD, A&O x 3  SENSATION: Decreased sensation S1 right. NEURO: Belen's is negative bilaterally. RANGE OF MOTION: The patient has full passive range of motion in all four extremities. MOTOR:  Straight Leg Raise: Negative, bilateral    Ambulates without assistive devicse. Shoulder AB/Flex Elbow Flex Wrist Ext Elbow Ext Wrist Flex Hand Intrin Tone   Right +4/5 +4/5 +4/5 +4/5 +4/5 +4/5 +4/5   Left +4/5 +4/5 +4/5 +4/5 +4/5 +4/5 +4/5                Hip Flex Quads Hamstrings Ankle DF EHL Ankle PF   Right +4/5 +4/5 +4/5 +4/5 +4/5 +4/5   Left +4/5 +4/5 +4/5 +4/5 +4/5 +4/5           ASSESSMENT   Diagnoses and all orders for this visit:    1. HNP (herniated nucleus pulposus) with myelopathy, cervical    2. Cervical spondylosis without myelopathy    3. Cervical spinal stenosis    4. Cervical neuritis    5. Lumbar neuritis    6. Lumbar post-laminectomy syndrome    7. Spondylolisthesis, acquired        IMPRESSION AND PLAN:  Patient returns to the office today with c/o neck pain radiating BUE and low back pain BLE. Multiple treatment options were discussed. Reviewing Dr. Katerine Sloan records, indicating a BUE EMG to evaluate for possible brachial, but appears that it was never scheduled. I will reorder the EMG and have the patient follow up with Dr. Galindo Cabezas after completion. I will reorder Tramadol 50 mg. Patient is neurologically intact. I will see the patient back as needed. Written by Daquan Fletcher, as dictated by Analisa Avelar MD  I examined the patient, reviewed and agree with the note.

## 2020-07-08 DIAGNOSIS — F40.240 CLAUSTROPHOBIA: Primary | ICD-10-CM

## 2020-07-08 RX ORDER — DIAZEPAM 5 MG/1
TABLET ORAL
Qty: 2 TAB | Refills: 0 | Status: SHIPPED | OUTPATIENT
Start: 2020-07-08 | End: 2020-10-08 | Stop reason: ALTCHOICE

## 2020-07-09 ENCOUNTER — OFFICE VISIT (OUTPATIENT)
Dept: ORTHOPEDIC SURGERY | Age: 70
End: 2020-07-09

## 2020-07-09 VITALS
TEMPERATURE: 97.4 F | HEIGHT: 67 IN | DIASTOLIC BLOOD PRESSURE: 67 MMHG | BODY MASS INDEX: 37.26 KG/M2 | HEART RATE: 90 BPM | SYSTOLIC BLOOD PRESSURE: 128 MMHG | WEIGHT: 237.4 LBS | OXYGEN SATURATION: 99 %

## 2020-07-09 DIAGNOSIS — M96.1 LUMBAR POST-LAMINECTOMY SYNDROME: ICD-10-CM

## 2020-07-09 DIAGNOSIS — M47.812 CERVICAL SPONDYLOSIS WITHOUT MYELOPATHY: ICD-10-CM

## 2020-07-09 DIAGNOSIS — Z79.891 LONG TERM CURRENT USE OF OPIATE ANALGESIC: ICD-10-CM

## 2020-07-09 DIAGNOSIS — M54.12 CERVICAL NEURITIS: ICD-10-CM

## 2020-07-09 DIAGNOSIS — M48.02 CERVICAL SPINAL STENOSIS: ICD-10-CM

## 2020-07-09 DIAGNOSIS — M50.00 HNP (HERNIATED NUCLEUS PULPOSUS) WITH MYELOPATHY, CERVICAL: ICD-10-CM

## 2020-07-09 DIAGNOSIS — M54.16 LUMBAR NEURITIS: ICD-10-CM

## 2020-07-09 DIAGNOSIS — Z51.81 THERAPEUTIC DRUG MONITORING: ICD-10-CM

## 2020-07-09 DIAGNOSIS — M43.10 SPONDYLOLISTHESIS, ACQUIRED: ICD-10-CM

## 2020-07-09 DIAGNOSIS — M50.00 HNP (HERNIATED NUCLEUS PULPOSUS) WITH MYELOPATHY, CERVICAL: Primary | ICD-10-CM

## 2020-07-09 RX ORDER — TRAMADOL HYDROCHLORIDE 50 MG/1
50 TABLET ORAL
Qty: 120 TAB | Refills: 0 | Status: SHIPPED | OUTPATIENT
Start: 2020-07-09 | End: 2020-08-03 | Stop reason: SDUPTHER

## 2020-07-09 NOTE — LETTER
7/9/20 Patient: Alma Armendariz YOB: 1950 Date of Visit: 7/9/2020 Ac Leon MD 
[address] [id number] VIA In Basket Dear Ac Leon MD, Thank you for referring Ms. Norris Moya to [address] for evaluation. My notes for this consultation are attached. If you have questions, please do not hesitate to call me. I look forward to following your patient along with you. Sincerely, Perry Jasso MD

## 2020-07-10 ENCOUNTER — HOSPITAL ENCOUNTER (OUTPATIENT)
Dept: MRI IMAGING | Age: 70
Discharge: HOME | End: 2020-07-10
Attending: ORTHOPAEDIC SURGERY
Payer: MEDICAID

## 2020-07-10 DIAGNOSIS — M12.812 ROTATOR CUFF ARTHROPATHY OF LEFT SHOULDER: ICD-10-CM

## 2020-07-10 PROCEDURE — 73221 MRI JOINT UPR EXTREM W/O DYE: CPT

## 2020-07-13 ENCOUNTER — VIRTUAL VISIT (OUTPATIENT)
Dept: FAMILY MEDICINE CLINIC | Age: 70
End: 2020-07-13

## 2020-07-13 DIAGNOSIS — R20.2 ARM PARESTHESIA, RIGHT: ICD-10-CM

## 2020-07-13 DIAGNOSIS — L30.9 DERMATITIS: ICD-10-CM

## 2020-07-13 DIAGNOSIS — L21.9 SEBORRHEIC DERMATITIS OF SCALP: ICD-10-CM

## 2020-07-13 DIAGNOSIS — R20.2 ARM PARESTHESIA, LEFT: ICD-10-CM

## 2020-07-13 DIAGNOSIS — Z71.89 ADVANCE CARE PLANNING: ICD-10-CM

## 2020-07-13 DIAGNOSIS — F51.04 PSYCHOPHYSIOLOGIC INSOMNIA: ICD-10-CM

## 2020-07-13 DIAGNOSIS — M85.861 OTHER SPECIFIED DISORDERS OF BONE DENSITY AND STRUCTURE, RIGHT LOWER LEG: ICD-10-CM

## 2020-07-13 DIAGNOSIS — Z00.00 MEDICARE ANNUAL WELLNESS VISIT, INITIAL: Primary | ICD-10-CM

## 2020-07-13 DIAGNOSIS — R63.5 WEIGHT GAIN: ICD-10-CM

## 2020-07-13 DIAGNOSIS — E11.65 UNCONTROLLED TYPE 2 DIABETES MELLITUS WITH HYPERGLYCEMIA (HCC): ICD-10-CM

## 2020-07-13 DIAGNOSIS — M79.7 FIBROMYALGIA: ICD-10-CM

## 2020-07-13 DIAGNOSIS — E78.5 HYPERLIPIDEMIA LDL GOAL <100: ICD-10-CM

## 2020-07-13 RX ORDER — CLOBETASOL PROPIONATE 0.46 MG/ML
SOLUTION TOPICAL
Qty: 50 ML | Refills: 3 | Status: SHIPPED | OUTPATIENT
Start: 2020-07-13 | End: 2021-06-25 | Stop reason: CLARIF

## 2020-07-13 RX ORDER — DULOXETIN HYDROCHLORIDE 30 MG/1
CAPSULE, DELAYED RELEASE ORAL
Qty: 90 CAP | Refills: 0 | Status: SHIPPED | OUTPATIENT
Start: 2020-07-13 | End: 2020-08-25

## 2020-07-13 RX ORDER — DESONIDE 0.5 MG/G
CREAM TOPICAL 2 TIMES DAILY
Qty: 15 G | Refills: 0 | Status: SHIPPED | OUTPATIENT
Start: 2020-07-13 | End: 2021-02-01

## 2020-07-13 NOTE — PROGRESS NOTES
Deann Singh presents today for   Chief Complaint   Patient presents with    Anxiety    Asthma    Pain (Chronic)    Insomnia       Is someone accompanying this pt? na    Is the patient using any DME equipment during OV? na    Depression Screening:  3 most recent PHQ Screens 12/12/2019   Little interest or pleasure in doing things Not at all   Feeling down, depressed, irritable, or hopeless Not at all   Total Score PHQ 2 0       Learning Assessment:  Learning Assessment 4/22/2016   PRIMARY LEARNER Patient   HIGHEST LEVEL OF EDUCATION - PRIMARY LEARNER  -   PRIMARY LANGUAGE ENGLISH   LEARNER PREFERENCE PRIMARY LISTENING   ANSWERED BY patient   RELATIONSHIP SELF       Abuse Screening:  Abuse Screening Questionnaire 12/12/2019   Do you ever feel afraid of your partner? N   Are you in a relationship with someone who physically or mentally threatens you? N   Is it safe for you to go home? Y       Fall Risk  Fall Risk Assessment, last 12 mths 7/9/2020   Able to walk? Yes   Fall in past 12 months? No   Fall with injury? -   Number of falls in past 12 months -   Fall Risk Score -       Health Maintenance reviewed and discussed and ordered per Provider. Health Maintenance Due   Topic Date Due    Foot Exam Q1  11/16/2019    Medicare Yearly Exam  11/17/2019    GLAUCOMA SCREENING Q2Y  04/27/2020    Eye Exam Retinal or Dilated  04/27/2020   . Coordination of Care:  1. Have you been to the ER, urgent care clinic since your last visit? Hospitalized since your last visit? no    2. Have you seen or consulted any other health care providers outside of the [address] [place] [address] since your last visit? Include any pap smears or colon screening.  no      Last  Checked na  Last UDS Checked na  Last Pain contract signed: na    Patient concerns today:  Med eval
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
## 2020-07-13 NOTE — ACP (ADVANCE CARE PLANNING)
Advance Care Planning       Advance Care Planning (ACP) Physician/NP/PA (Provider) Conversation        Date of ACP Conversation: 7/13/2020    Conversation Conducted with:   Patient with Decision Making [address] Maker:    Current Designated Health Care Decision Maker:   (If there is a valid [place] named in the [address]" box in the ACP activity, but it is not visible above, be sure to open that field and then select the health care decision maker relationship (ie \"primary\") in the blank space to the right of the name.)    Note: Assess and validate information in current ACP documents, as indicated. If no Authorized Decision Maker has previously been identified, then patient chooses [place]:  \"Who would you like to name as your primary health care decision-maker? \"    Name: Neal Dowd   Relationship: friend Phone number: [phone]  Thermon Pheasant this person be reached easily? \" YES  \"Who would you like to name as your back-up decision maker? \"   Name: So Reynaga  Relationship: son Phone number: [phone]  Thermon Pheasant this person be reached easily? \" YES    Note: If the relationship of these Decision-Makers to the patient does NOT follow your state's Next of Kin hierarchy, recommend that patient complete ACP document that meets state-specific requirements to allow them to act on the patient's behalf when appropriate. Care Preferences:    Hospitalization: \"If your health worsens and it becomes clear that your chance of recovery is unlikely, what would your preference be regarding hospitalization? \"  If the patient would want hospitalization, answer \"yes\". If the patient would prefer comfort-focused treatment without hospitalization, answer \"no\". yes      Ventilation:   \"If you were in your present state of health and suddenly became very ill and were unable to breathe on your own, what would your preference be about the use of a ventilator (breathing machine) if it was available to you? \"    If patient would desire the use of a ventilator (breathing machine), answer \"yes\", if not answer \"no\":yes    \"If your health worsens and it becomes clear that your chance of recovery is unlikely, what would your preference be about the use of a ventilator (breathing machine) if it was available to you? \"   yes      Resuscitation:  \"CPR works best to restart the heart when there is a sudden event, like a heart attack, in someone who is otherwise healthy. Unfortunately, CPR does not typically restart the heart for people who have serious health conditions or who are very sick. \"    \"In the event your heart stopped as a result of an underlying serious health condition, would you want attempts to be made to restart your heart (answer \"yes\" for attempt to resuscitate) or would you prefer a natural death (answer \"no\" for do not attempt to resuscitate)? \"   yes    NOTE: If the patient has a valid advance directive AND provides care preference(s) that are inconsistent with that prior directive, advise the patient to consider either: creating a new advance directive that complies with state-specific requirements; or, if that is not possible, orally revoking that prior directive in accordance with state-specific requirements, which must be documented in the EHR.     Conversation Outcomes / Follow-Up Plan:   reviewed advance directive      Length of Voluntary ACP Conversation in minutes:  16 minutes      Lorraine Whitten MD

## 2020-07-16 ENCOUNTER — OFFICE VISIT (OUTPATIENT)
Dept: ORTHOPEDIC SURGERY | Age: 70
End: 2020-07-16

## 2020-07-16 ENCOUNTER — HOSPITAL ENCOUNTER (OUTPATIENT)
Dept: LAB | Age: 70
Discharge: HOME | End: 2020-07-16

## 2020-07-16 VITALS
HEART RATE: 82 BPM | BODY MASS INDEX: 37.32 KG/M2 | TEMPERATURE: 97.5 F | RESPIRATION RATE: 16 BRPM | SYSTOLIC BLOOD PRESSURE: 144 MMHG | OXYGEN SATURATION: 100 % | DIASTOLIC BLOOD PRESSURE: 78 MMHG | WEIGHT: 237.8 LBS | HEIGHT: 67 IN

## 2020-07-16 DIAGNOSIS — M19.012 GLENOHUMERAL ARTHRITIS, LEFT: Primary | ICD-10-CM

## 2020-07-16 LAB — XX-LABCORP SPECIMEN COL,LCBCF: NORMAL

## 2020-07-16 PROCEDURE — 99001 SPECIMEN HANDLING PT-LAB: CPT

## 2020-07-16 NOTE — PROGRESS NOTES
Garett Lazaro  1950   Chief Complaint   Patient presents with    Shoulder Pain     left shoulder pain        HISTORY OF PRESENT ILLNESS  Garett Lazaro is a 79 y.o. female who presents today for reevaluation of b/l shoulder pain and MRI review for the left shoulder. L>R. Patient rates pain as 7/10 today. Pain has been present for awhile. Pain at night. Pain with certain movements, turning in certain directions. She had a cortisone injection in the left shoulder months ago with minimal relief. Pt states the left shoulder pain started around February 2020. She reports a hx of a torn left rotator cuff years ago, had surgery to repair it. She notes hx of right shoulder replacement surgery around 2017 by Dr. Kamryn Lincoln which provided relief up until 6-8 months ago. Pt presents today ambulating with a cane. Patient denies any fever, chills, chest pain, shortness of breath or calf pain. The remainder of the review of systems is negative. There are no new illness or injuries to report since last seen in the office. There are no changes to medications, allergies, family or social history. Pain Assessment  7/16/2020   Location of Pain Shoulder   Location Modifiers Left   Severity of Pain 7   Quality of Pain Throbbing; Sharp;Dull;Aching   Quality of Pain Comment -   Duration of Pain Persistent   Frequency of Pain Constant   Aggravating Factors Bending;Stretching;Straightening   Aggravating Factors Comment -   Limiting Behavior -   Relieving Factors Ice;Rest   Relieving Factors Comment -   Result of Injury No   Work-Related Injury -   Type of Injury -     PHYSICAL EXAM:   Visit Vitals  /78 (BP 1 Location: Left arm, BP Patient Position: Sitting)   Pulse 82   Temp 97.5 °F (36.4 °C) (Oral)   Resp 16   Ht 5' 7\" (1.702 m)   Wt 237 lb 12.8 oz (107.9 kg)   SpO2 100%   BMI 37.24 kg/m²     The patient is a well-developed, well-nourished female   in no acute distress. The patient is alert and oriented times three.   The patient is alert and oriented times three. Mood and affect are normal.  LYMPHATIC: lymph nodes are not enlarged and are within normal limits  SKIN: normal in color and non tender to palpation. There are no bruises or abrasions noted. NEUROLOGICAL: Motor sensory exam is within normal limits. Reflexes are equal bilaterally. There is normal sensation to pinprick and light touch  MUSCULOSKELETAL:  Examination Left shoulder   Skin Intact   AC joint tenderness -   Biceps tenderness -   Forward flexion/Elevation ROM  120   Active abduction ROM  110   Glenohumeral abduction  80   External rotation ROM  30   Internal rotation ROM  30   Apprehension -   Sonnys Relocation -   Jerk -   Load and Shift -   Obriens -   Speeds -   Impingement sign  +   Supraspinatus/Empty Can  + 5   External Rotation Strength -, 5/5   Lift Off/Belly Press -, 5/5   Neurovascular Intact       IMAGING: MRI of left shoulder dated 7/10/2020 was reviewed and read by Dr. Yahir Polo:   IMPRESSION:   1. Tendinosis of the left shoulder rotator cuff with small area of partial-thickness articular sided tearing involving the anterior fibers of the supraspinatus tendon.     > Normal rotator cuff muscle bulk and signal.  2. Advanced glenohumeral joint chondrosis and osteoarthritis with small joint effusion. 3. Prior acromioplasty without significant subacromial impingement morphology present. 4. Small quantity of fluid within the subacromial-subdeltoid bursa, as can be seen with symptoms referrable to bursitis. XR of left shoulder from Dr. Tayler Marie office dated 6/16/2020 was reviewed and read by Dr. Yahir Polo: Proximal migration of the humeral head with degenerative changes of the glenohumeral joint       XR of right shoulder from Dr. Tayler Marie office dated 6/16/2020 was reviewed and read by Dr. Yahir Polo: Reverse total shoulder arthroplasty with valgus angulation and loosening present      IMPRESSION:      ICD-10-CM ICD-9-CM    1.  Glenohumeral arthritis, left M19.012 716.91         PLAN:   1. I discussed the risks and benefits and potential adverse outcomes of both operative vs non operative treatment of left shoulder glenohumeral arthritis with the patient and patient wishes to proceed with left TSA. Risks of operative intervention include but not limited to bleeding, infection, deep vein thrombosis, pulmonary embolism, death, limb length discrepancy, reflexive sympathetic dystrophy, fat embolism syndrome,damage to blood vessels and nerves, malunion, non-union, delayed union, failure of hardware, post traumatic arthritis, stroke, heart attack, and death. Patient understands that infection may arise and may require numerous surgeries. The patient was counseled at length about the risks of viviane Covid-19 during their perioperative period and any recovery window from their procedure. The patient was made aware that viviane Covid-19  may worsen their prognosis for recovering from their procedure and lend to a higher morbidity and/or mortality risk. All material risks, benefits, and reasonable alternatives including postponing the procedure were discussed. The patient does  wish to proceed with the procedure at this time. History and physical exam to be preformed at a later date. Risk factors include: dm, htn, BMI>35  2. No ultrasound exam indicated today  3. No cortisone injection indicated today   4. No Physical/Occupational Therapy indicated today  5. No diagnostic test indicated today:   6. No durable medical equipment indicated today  7. No referral indicated today   8. No medications indicated today:   9. No Narcotic indicated today      RTC H&P      Scribed by Ryan Davis [address]) as dictated by MD ISAIAS Cespedes, Dr. Fito Garcia, confirm that all documentation is accurate.     Fito Garcia M.D.   [facility]

## 2020-07-17 LAB
25(OH)D3+25(OH)D2 SERPL-MCNC: 44.1 NG/ML (ref 30–100)
ALBUMIN SERPL-MCNC: 4.1 G/DL (ref 3.8–4.8)
ALBUMIN/GLOB SERPL: 1.5 {RATIO} (ref 1.2–2.2)
ALP SERPL-CCNC: 83 IU/L (ref 39–117)
ALT SERPL-CCNC: 25 IU/L (ref 0–32)
AST SERPL-CCNC: 23 IU/L (ref 0–40)
BILIRUB SERPL-MCNC: 0.4 MG/DL (ref 0–1.2)
BUN SERPL-MCNC: 14 MG/DL (ref 8–27)
BUN/CREAT SERPL: 14 (ref 12–28)
CALCIUM SERPL-MCNC: 10 MG/DL (ref 8.7–10.3)
CHLORIDE SERPL-SCNC: 100 MMOL/L (ref 96–106)
CO2 SERPL-SCNC: 23 MMOL/L (ref 20–29)
CREAT SERPL-MCNC: 1.01 MG/DL (ref 0.57–1)
FOLATE SERPL-MCNC: 11.1 NG/ML
GLOBULIN SER CALC-MCNC: 2.8 G/DL (ref 1.5–4.5)
GLUCOSE SERPL-MCNC: 106 MG/DL (ref 65–99)
INTERPRETATION: NORMAL
MAGNESIUM SERPL-MCNC: 2.1 MG/DL (ref 1.6–2.3)
POTASSIUM SERPL-SCNC: 4.5 MMOL/L (ref 3.5–5.2)
PROT SERPL-MCNC: 6.9 G/DL (ref 6–8.5)
SODIUM SERPL-SCNC: 140 MMOL/L (ref 134–144)
SPECIMEN STATUS REPORT, ROLRST: NORMAL
TSH SERPL DL<=0.005 MIU/L-ACNC: 2.74 UIU/ML (ref 0.45–4.5)
VIT B12 SERPL-MCNC: 1460 PG/ML (ref 232–1245)

## 2020-08-03 DIAGNOSIS — M96.1 LUMBAR POST-LAMINECTOMY SYNDROME: ICD-10-CM

## 2020-08-03 DIAGNOSIS — M50.00 HNP (HERNIATED NUCLEUS PULPOSUS) WITH MYELOPATHY, CERVICAL: ICD-10-CM

## 2020-08-03 DIAGNOSIS — M43.10 SPONDYLOLISTHESIS, ACQUIRED: ICD-10-CM

## 2020-08-03 DIAGNOSIS — M54.16 LUMBAR NEURITIS: ICD-10-CM

## 2020-08-03 DIAGNOSIS — M47.812 CERVICAL SPONDYLOSIS WITHOUT MYELOPATHY: ICD-10-CM

## 2020-08-03 DIAGNOSIS — M54.12 CERVICAL NEURITIS: ICD-10-CM

## 2020-08-03 DIAGNOSIS — M48.02 CERVICAL SPINAL STENOSIS: ICD-10-CM

## 2020-08-03 RX ORDER — TRAMADOL HYDROCHLORIDE 50 MG/1
50 TABLET ORAL
Qty: 120 TAB | Refills: 0 | Status: SHIPPED | OUTPATIENT
Start: 2020-08-09 | End: 2020-09-08 | Stop reason: SDUPTHER

## 2020-08-03 NOTE — TELEPHONE ENCOUNTER
Patient is asking for her next 2 Rx's for Ultram    UDS done 7/9/20    Last Visit: 7/9/20 with MD Neyda Spencer  Next Appointment: 8/5/20 with MD Chantale Hay  Previous Refill Encounter(s): 7/9/20 #120    Requested Prescriptions     Pending Prescriptions Disp Refills    traMADoL (Ultram) 50 mg tablet 120 Tab 0     Sig: Take 1 Tab by mouth every six (6) hours as needed for Pain for up to 30 days. Max Daily Amount: 200 mg.

## 2020-08-05 ENCOUNTER — OFFICE VISIT (OUTPATIENT)
Dept: ORTHOPEDIC SURGERY | Age: 70
End: 2020-08-05

## 2020-08-05 VITALS
TEMPERATURE: 98.1 F | HEART RATE: 89 BPM | WEIGHT: 238 LBS | RESPIRATION RATE: 26 BRPM | BODY MASS INDEX: 36.07 KG/M2 | SYSTOLIC BLOOD PRESSURE: 118 MMHG | OXYGEN SATURATION: 97 % | DIASTOLIC BLOOD PRESSURE: 72 MMHG | HEIGHT: 68 IN

## 2020-08-05 DIAGNOSIS — R94.131 ABNORMAL EMG: ICD-10-CM

## 2020-08-05 DIAGNOSIS — R20.2 NUMBNESS AND TINGLING OF BOTH UPPER EXTREMITIES: Primary | ICD-10-CM

## 2020-08-05 DIAGNOSIS — R20.0 NUMBNESS AND TINGLING OF RIGHT ARM: ICD-10-CM

## 2020-08-05 DIAGNOSIS — R20.2 NUMBNESS AND TINGLING OF RIGHT ARM: ICD-10-CM

## 2020-08-05 DIAGNOSIS — R20.0 NUMBNESS AND TINGLING OF BOTH UPPER EXTREMITIES: Primary | ICD-10-CM

## 2020-08-05 NOTE — PROGRESS NOTES
[address]  [address], [address]  [address]  Phone: [phone]  Fax: [phone]        Salma Cuellar  : 1950  PCP: Bree Palacios MD  2020    ELECTROMYOGRAPHY AND NERVE CONDUCTION STUDIES    Eloy Zhou was referred by Dr. John Eller for electrodiagnostic evaluation of bilateral shoulder and BUE pain, numbness, tingling. NCV & EMG Findings:  Evaluation of the left median motor and the right median motor nerves showed prolonged distal onset latency (L4.5, R4.7 ms). The right ulnar motor nerve showed decreased conduction velocity (A Elbow-B Elbow, 42 m/s). The left median sensory and the right median sensory nerves showed prolonged distal peak latency (L4.1, R4.2 ms) and decreased conduction velocity (Wrist-2nd Digit, L34, R33 m/s). The left ulnar sensory nerve showed prolonged distal peak latency (3.9 ms) and decreased conduction velocity (Wrist-5th Digit, 36 m/s). The right ulnar sensory nerve showed prolonged distal peak latency (4.2 ms), reduced amplitude (9.8 µV), and decreased conduction velocity (Wrist-5th Digit, 33 m/s). All remaining nerves (as indicated in the following tables) were within normal limits. All left vs. right side differences were within normal limits. All examined muscles (as indicated in the following table) showed no evidence of electrical instability. INTERPRETATION  This is an abnormal electrodiagnostic examination. These findings may be consistent with:   1. Moderate median mononeuropathy at the wrist bilaterally, R>L (carpal tunnel syndrome)  2. Moderate ulnar mononeuropathy at the right elbow (cubital tunnel syndrome)  3. Mild ulnar mononeuropathy at the left wrist.    There is no electrodiagnostic evidence of any cervical radiculopathy, brachial plexopathy, peripheral polyneuropathy, or any other mononeuropathy.       CLINICAL INTERPRETATION  Her electrodiagnostic findings of bilateral carpal tunnel and ulnar mononeuropathies may explain some of her bilateral hand symptoms. There is no electrodiagnostic evidence to explain more proximal symptoms. HISTORY OF PRESENT ILLNESS  Sherine Martines is a 79 y.o. female. Pt presents today for BUE EMG evaluation of bilateral shoulder and BUE pain and paraesthesia. Pt notes that sometimes when she lifts her right arm, it will \"lock\" in the shoulder and hand. She also reports cramping at that time throughout the RUE. She has h/o right shoulder replacement, and Dr. Dillan Dee has recommended a left TSA as well. Cervical MRI 3/25/2020:    IMPRESSION:  C3-4: Anterolisthesis. 3.5 mm central disc extrusion extending 4 mm cephalad. Mild central stenosis and subtle ventral cord flattening. Advanced left facet arthropahy. C4-5: Mild posterior spondylotic ridge. Mild central stenosis and subtle ventral cord flattening. C5-6: Central-to-right posterolateral 2.5 mm broad-based disc protrusion with subtle ventral cord flattening and mild central stenosis. C6-7: Mild posterior spondylotic ridge. Mild-to-moderate central stenosis. C7-T1: Anterolisthesis. Mild posterior annular bulge. Mild central stenosis. Upper thoracic spondylosis including a 5 mm posterior disc extension at T3-4. PAST MEDICAL HISTORY   Past Medical History:   Diagnosis Date    Ankle fracture     Aortic stenosis     S/P AVR in 2016    Asthma     Chronic pain     back pain    Diabetes ([address]     Diverticulitis     Fibromyalgia     H/O aortic valve replacement 03/16    21 mm bovine pericardial bioprosthesis and epiaortic scanning      Hypertension     PT denies    Hypothyroidism     Lumbar post-laminectomy syndrome     Menopause     Obesity     Osteoarthritis     Psychotic disorder ([address]     Sciatica     Skin cancer 2013    right forearm. Past Surgical History:   Procedure Laterality Date    CARDIAC SURG PROCEDURE UNLIST  03/2016    aortic valve replacement.      CHEST SURGERY PROCEDURE UNLISTED  03/2016    Open Heart Surgery    COLONOSCOPY      COLONOSCOPY N/A 9/15/2017    [facility] ENDOSCOPY    HX CATARACT REMOVAL Right 1995    lens  replaced.  HX CATARACT REMOVAL Left 02/2018    HX COLONOSCOPY  09/15/2017    dr. Marimar Christianson  f/u 5 years.  HX HEENT  1990's    sinus surgery     HX HEENT Right 11/02/2017    laser for right eye cataracts.  HX LUMBAR FUSION  2004    L3-L4-L5    HX LUMBAR LAMINECTOMY  2002    HX MOHS PROCEDURES Left 1990    HX ORTHOPAEDIC      Rotator cuff Bilateral    HX SHOULDER REPLACEMENT Right 03/29/2017    reverse.  WA COLSC FLX W/RMVL OF TUMOR POLYP LESION SNARE TQ  07/09/2014 repeat in 3 years    Dr. La Sequeira      Aortic valve replacement   . MEDICATIONS    Current Outpatient Medications   Medication Sig Dispense Refill    [START ON 8/9/2020] traMADoL (Ultram) 50 mg tablet Take 1 Tab by mouth every six (6) hours as needed for Pain for up to 30 days. Max Daily Amount: 200 mg. 120 Tab 0    DULoxetine (CYMBALTA) 30 mg capsule TAKE 1 CAPSULE BY MOUTH THREE TIMES DAILY 90 Cap 0    clobetasoL (TEMOVATE) 0.05 % external solution APPLY TO SCALP AFTER SHAMPOO DAILY 50 mL 3    desonide (TRIDESILON) 0.05 % cream Apply  to affected area two (2) times a day. 15 g 0    diazePAM (Valium) 5 mg tablet Take by mouth 30 minutes prior to MRI 2 Tab 0    metFORMIN (GLUCOPHAGE) 500 mg tablet TAKE 1 TABLET BY MOUTH ONCE DAILY WITH  DINNER 90 Tab 0    atorvastatin (LIPITOR) 20 mg tablet Take 1 tablet by mouth once daily 90 Tab 0    cephALEXin (KEFLEX) 500 mg capsule       glucose blood VI test strips (True Metrix Pro Test Strip) strip Test blood glucose 3 times a day 200 Strip 3    traZODone (DESYREL) 100 mg tablet Take 1 Tab by mouth nightly. 30 Tab 1    pantoprazole (PROTONIX) 20 mg tablet Take 1 Tab by mouth daily.  90 Tab 3    meloxicam (MOBIC) 15 mg tablet TAKE 1 TABLET BY MOUTH DAILY 90 Tab 3    levothyroxine (SYNTHROID) 50 mcg tablet TAKE 1 TABLET BY MOUTH EVERY DAY BEFORE BREAKFAST 90 Tab 0    fluticasone propionate (FLONASE) 50 mcg/actuation nasal spray One spray each nostril twice a day. 1 Bottle 5    methylPREDNISolone (MEDROL DOSEPACK) 4 mg tablet Per dose pack instructions 1 Dose Pack 0    predniSONE (DELTASONE) 10 mg tablet   0    cyclobenzaprine (FLEXERIL) 10 mg tablet Take 1 Tab by mouth three (3) times daily as needed for Muscle Spasm(s). 30 Tab 0    meclizine (ANTIVERT) 25 mg tablet Take 1 Tab by mouth three (3) times daily as needed for Dizziness. 60 Tab 3    FLUoxetine (PROZAC) 10 mg capsule TAKE 1 CAPSULE BY MOUTH ONCE DAILY. GENERICFOR PROZAC. 90 Cap 3    STIOLTO RESPIMAT 2.5-2.5 mcg/actuation inhaler Take  by inhalation. 1    docusate sodium (COLACE) 50 mg capsule 50 mg.      vitamin E acetate (VITAMIN E PO) Take  by mouth.  cyanocobalamin (VITAMIN B-12) 500 mcg tablet Take 500 mcg by mouth daily.  cetirizine (ZYRTEC) 10 mg tablet TAKE 1 TABLET BY MOUTH EVERY DAY. GENERIC FOR ZYRTEC 90 Tab 2    VENTOLIN HFA 90 mcg/actuation inhaler 2 Puffs by Aerosolization route every four (4) hours as needed. 0    B.infantis-B.ani-B.long-B.bifi (PROBIOTIC 4X) 10-15 mg TbEC Take  by mouth daily.  aspirin delayed-release 81 mg tablet Take 81 mg by mouth daily.  Cholecalciferol, Vitamin D3, (VITAMIN D3) 1,000 unit cap Take 1,000 Units by mouth daily.  Lancets (ACCU-CHEK SOFTCLIX LANCETS) misc Please use one lancet to test blood sugars twice a day. 1 Package 20    BLOOD-GLUCOSE METER (ACCU-CHEK MARCE MONITORING) by Does Not Apply route.  triamcinolone acetonide (KENALOG) 0.1 % topical cream Apply  to affected area two (2) times a day.  use thin layer          ALLERGIES  Allergies   Allergen Reactions    Latex, Natural Rubber Hives    Adhesive Rash     Some bandaids    Ciprofloxacin Rash    Codeine Nausea and Vomiting    Demerol [Meperidine] Nausea and Vomiting    Diclofenac Shortness of Breath and Palpitations    Erythromycin Rash    Levaquin [Levofloxacin] Rash    Loratadine Swelling    Morphine Nausea and Vomiting    Penicillin G Hives and Rash     Does fine with Keflex    Sulfa (Sulfonamide Antibiotics) Itching    Vicodin [Hydrocodone-Acetaminophen] Other (comments)     Metallic taste in mouth          SOCIAL HISTORY    Social History     Socioeconomic History    Marital status:      Spouse name: Not on file    Number of children: Not on file    Years of education: Not on file    Highest education level: Not on file   Tobacco Use    Smoking status: Former Smoker     Years: 45.00     Types: Cigarettes     Last attempt to quit: 2011     Years since quittin.1    Smokeless tobacco: Never Used   Substance and Sexual Activity    Alcohol use: No    Drug use: No    Sexual activity: Never       FAMILY HISTORY  Family History   Problem Relation Age of Onset    Hypertension Father     Heart Disease Father     Alcohol abuse Father          PHYSICAL EXAMINATION  Visit Vitals  /72 (BP 1 Location: Right arm, BP Patient Position: Sitting)   Pulse 89   Temp 98.1 °F (36.7 °C) (Oral)   Resp 26   Ht 5' 8\" (1.727 m)   Wt 238 lb (108 kg)   SpO2 97% Comment: RA   BMI 36.19 kg/m²       Pain Assessment  2020   Location of Pain Neck; Shoulder   Location Modifiers (No Data)   Severity of Pain 6   Quality of Pain Aching   Quality of Pain Comment -   Duration of Pain Persistent   Frequency of Pain Constant   Aggravating Factors Other (Comment)   Aggravating Factors Comment movement   Limiting Behavior Yes   Relieving Factors Ice; Other (Comment)   Relieving Factors Comment sit down   Result of Injury No   Work-Related Injury -   Type of Injury -           Constitutional:  Well developed, well nourished, in no acute distress. Psychiatric: Affect and mood are appropriate.    Integumentary: No rashes or abrasions noted on exposed areas. SPINE/MUSCULOSKELETAL EXAM    On brief examination: None.       NCV & EMG Findings:  Nerve Conduction Studies  Anti Sensory Summary Table     Stim Site NR Peak (ms) Norm Peak (ms) O-P Amp (µV) Norm O-P Amp Site1 Site2 Delta-P (ms) Dist (cm) Zhou (m/s) Norm Zhou (m/s)   Left DorsCutan Anti Sensory (Dorsum 5th MC)   Wrist    1.6  15.5  Wrist Dorsum 5th MC 1.6 0.0     Site 2    1.6  11.7          Left Median Anti Sensory (2nd Digit)   Wrist    4.1 <3.6 25.2 >10 Wrist 2nd Digit 4.1 14.0 34 >39   Right Median Anti Sensory (2nd Digit)   Wrist    4.2 <3.6 13.6 >10 Wrist 2nd Digit 4.2 14.0 33 >39   Left Radial Anti Sensory (Base 1st Digit)   Wrist    2.4 <3.1 20.0  Wrist Base 1st Digit 2.4 0.0     Right Radial Anti Sensory (Base 1st Digit)   Wrist    2.0 <3.1 33.6  Wrist Base 1st Digit 2.0 0.0     Left Ulnar Anti Sensory (5th Digit)   Wrist    3.9 <3.7 16.2 >15.0 Wrist 5th Digit 3.9 14.0 36 >38   B Elbow    3.9  19.1  B Elbow Wrist 0.0 0.0  >47   Right Ulnar Anti Sensory (5th Digit)   Wrist    4.2 <3.7 9.8 >15.0 Wrist 5th Digit 4.2 14.0 33 >38     Motor Summary Table     Stim Site NR Onset (ms) Norm Onset (ms) O-P Amp (mV) Norm O-P Amp Site1 Site2 Delta-0 (ms) Dist (cm) Zhou (m/s) Norm Zhou (m/s)   Left Median Motor (Abd Poll Brev)   Wrist    4.5 <4.2 7.2 >5 Elbow Wrist 4.2 21.0 50 >50   Elbow    8.7  6.7          Right Median Motor (Abd Poll Brev)   Wrist    4.7 <4.2 8.0 >5 Elbow Wrist 4.1 20.5 50 >50   Elbow    8.8  7.3          Left Ulnar Motor (Abd Dig Min)   Wrist    2.6 <4.2 9.4 >3 B Elbow Wrist 3.4 18.0 53 >53   B Elbow    6.0  8.8  A Elbow B Elbow 1.7 10.0 59 >53   A Elbow    7.7  6.7          Right Ulnar Motor (Abd Dig Min)   Wrist    2.7 <4.2 10.8 >3 B Elbow Wrist 3.4 19.0 56 >53   B Elbow    6.1  10.3  A Elbow B Elbow 2.4 10.0 42 >53   A Elbow    8.5  9.3            EMG     Side Muscle Nerve Root Ins Act Fibs Psw Amp Dur Poly Recrt Int Margurette Spindle Comment   Right Biceps Musculocut C5-6 Nml Nml Nml Nml Nml 0 Nml Nml    Right Triceps Radial C6-7-8 Nml Nml Nml Nml Nml 0 Nml Nml    Right PronatorTeres Median C6-7 Nml Nml Nml Nml Nml 0 Nml Nml    Right Abd Poll Brev Median C8-T1 Nml Nml Nml Nml Nml 0 Nml Nml    Right 1stDorInt Ulnar C8-T1 Nml Nml Nml Nml Nml 0 Nml Nml    Left Biceps Musculocut C5-6 Nml Nml Nml Nml Nml 0 Nml Nml    Left Triceps Radial C6-7-8 Nml Nml Nml Nml Nml 0 Nml Nml    Left PronatorTeres Median C6-7 Nml Nml Nml Nml Nml 0 Nml Nml    Left Abd Poll Brev Median C8-T1 Nml Nml Nml Nml Nml 0 Nml Nml    Left 1stDorInt Ulnar C8-T1 Nml Nml Nml Nml Nml 0 Nml Nml    Right FlexCarpiUln Ulnar C8,T1 Nml Nml Nml Nml Nml 0 Nml Nml    Right Cervical Parasp Up Rami C1-3 Nml Nml Nml         Right Cervical Parasp Mid Rami C4-6 Nml Nml Nml         Right Cervical Parasp Low Rami C7-8 Nml Nml Nml         Left Cervical Parasp Up Rami C1-3 Nml Nml Nml         Left Cervical Parasp Mid Rami C4-6 Nml Nml Nml         Left Cervical Parasp Low Rami C7-8 Nml Nml Nml             Nerve Conduction Studies  Anti Sensory Left/Right Comparison     Stim Site L Lat (ms) R Lat (ms) L-R Lat (ms) L Amp (µV) R Amp (µV) L-R Amp (%) Site1 Site2 L Zhou (m/s) R Zhou (m/s) L-R Zhou (m/s)   DorsCutan Anti Sensory (Dorsum 5th MC)   Wrist 1.6   15.5   Wrist Dorsum 5th MC      Site 2 1.6   11.7          Median Anti Sensory (2nd Digit)   Wrist 4.1 4.2 0.1 25.2 13.6 46.0 Wrist 2nd Digit 34 33 1   Radial Anti Sensory (Base 1st Digit)   Wrist 2.4 2.0 0.4 20.0 33.6 40.5 Wrist Base 1st Digit      Ulnar Anti Sensory (5th Digit)   Wrist 3.9 4.2 0.3 16.2 9.8 39.5 Wrist 5th Digit 36 33 3   B Elbow 3.9   19.1   B Elbow Wrist        Motor Left/Right Comparison     Stim Site L Lat (ms) R Lat (ms) L-R Lat (ms) L Amp (mV) R Amp (mV) L-R Amp (%) Site1 Site2 L Zhou (m/s) R Zhou (m/s) L-R Zhou (m/s)   Median Motor (Abd Poll Brev)   Wrist 4.5 4.7 0.2 7.2 8.0 10.0 Elbow Wrist 50 50 0   Elbow 8.7 8.8 0.1 6.7 7.3 8.2        Ulnar Motor (Abd Dig Min)   Wrist 2.6 2.7 0.1 9.4 10.8 13.0 B Elbow Wrist 53 56 3   B Elbow 6.0 6.1 0.1 8.8 10.3 14.6 A Elbow B Elbow 59 42 17   A Elbow 7.7 8.5 0.8 6.7 9.3 28.0              Waveforms:                                   [facility]  OFFICE PROCEDURE PROGRESS NOTE        Chart reviewed for the following:   Lisa ESPARZA, have reviewed the History, Physical and updated the Allergic reactions for [address] performed immediately prior to start of procedure:   Lisa ESPARZA, have performed the following reviews on Natalya Medina prior to the start of the procedure:            * Patient was identified by name and date of birth   * Agreement on procedure being performed was verified  * Risks and Benefits explained to the patient  * Procedure site verified and marked as necessary  * Patient was positioned for comfort  * Consent was signed and verified     Time: 2:57 PM    Date of procedure: 8/5/2020    Procedure performed by:  Michael Uribe MD    Provider accompanied by: Scribe. Patient accompanied by: Self.     How tolerated by patient: tolerated the procedure well with no complications    Post Procedural Pain Scale: 0 - No Hurt    Comments: none    Written by Tammy Gutierrez, [address] as dictated by Lisa Mitchell MD

## 2020-08-05 NOTE — LETTER
8/5/20 Patient: Flora Christy YOB: 1950 Date of Visit: 8/5/2020 Yesenia Comer MD 
[id number] [facility] [address] [facility] [address] [address] VIA In Basket [facility], [address] [place] [address] [address] [address] [place] [address] VIA In Basket Shae West MD 
[address] [place] [address] VIA In Basket Dear Yesenia Comer MD 
[facility], MD Shae West MD, Thank you for referring Ms. Charmaine Tavares to [address] [facility] for evaluation. My notes for this consultation are attached. If you have questions, please do not hesitate to call me. I look forward to following your patient along with you.  
 
 
Sincerely, 
 
Tu Funes MD

## 2020-08-05 NOTE — PROGRESS NOTES
Velvet Nuñez presents today for   Chief Complaint   Patient presents with    Other     bilateral UE EMG       Is someone accompanying this pt? no    Is the patient using any DME equipment during OV? Yes, cane    Depression Screening:  3 most recent PHQ Screens 12/12/2019   Little interest or pleasure in doing things Not at all   Feeling down, depressed, irritable, or hopeless Not at all   Total Score PHQ 2 0       Learning Assessment:  Learning Assessment 4/22/2016   PRIMARY LEARNER Patient   HIGHEST LEVEL OF EDUCATION - PRIMARY LEARNER  -   PRIMARY LANGUAGE ENGLISH   LEARNER PREFERENCE PRIMARY LISTENING   ANSWERED BY patient   RELATIONSHIP SELF       Abuse Screening:  Abuse Screening Questionnaire 12/12/2019   Do you ever feel afraid of your partner? N   Are you in a relationship with someone who physically or mentally threatens you? N   Is it safe for you to go home? Y       Fall Risk  Fall Risk Assessment, last 12 mths 7/16/2020   Able to walk? Yes   Fall in past 12 months? No   Fall with injury? -   Number of falls in past 12 months -   Fall Risk Score -       OPIOID RISK TOOL  Opioid Risk Tool 11/9/2017   Age (if between 12 to 39) 0       Coordination of Care:  1. Have you been to the ER, urgent care clinic since your last visit? no  Hospitalized since your last visit? no    2. Have you seen or consulted any other health care providers outside of the [address] [place] [address] since your last visit? Yes, orthopedics Include any pap smears or colon screening.  no

## 2020-08-07 ENCOUNTER — OFFICE VISIT (OUTPATIENT)
Dept: ORTHOPEDIC SURGERY | Age: 70
End: 2020-08-07

## 2020-08-07 VITALS
BODY MASS INDEX: 36.19 KG/M2 | SYSTOLIC BLOOD PRESSURE: 128 MMHG | DIASTOLIC BLOOD PRESSURE: 80 MMHG | TEMPERATURE: 97.3 F | HEART RATE: 88 BPM | WEIGHT: 238 LBS | RESPIRATION RATE: 14 BRPM

## 2020-08-07 DIAGNOSIS — S39.012A STRAIN OF LUMBAR REGION, INITIAL ENCOUNTER: ICD-10-CM

## 2020-08-07 DIAGNOSIS — M54.16 RADICULOPATHY, LUMBAR REGION: ICD-10-CM

## 2020-08-07 DIAGNOSIS — M48.062 SPINAL STENOSIS OF LUMBAR REGION WITH NEUROGENIC CLAUDICATION: ICD-10-CM

## 2020-08-07 DIAGNOSIS — M54.16 LUMBAR RADICULOPATHY: Primary | ICD-10-CM

## 2020-08-07 DIAGNOSIS — Z51.81 THERAPEUTIC DRUG MONITORING: ICD-10-CM

## 2020-08-07 DIAGNOSIS — M54.12 CERVICAL NEURITIS: ICD-10-CM

## 2020-08-07 DIAGNOSIS — M54.2 CERVICAL PAIN: ICD-10-CM

## 2020-08-07 RX ORDER — CYCLOBENZAPRINE HCL 10 MG
10 TABLET ORAL
Qty: 30 TAB | Refills: 0 | Status: SHIPPED | OUTPATIENT
Start: 2020-08-07 | End: 2021-05-24

## 2020-08-07 RX ORDER — CYCLOBENZAPRINE HCL 10 MG
10 TABLET ORAL
Qty: 90 TAB | Refills: 0 | Status: CANCELLED | OUTPATIENT
Start: 2020-08-07

## 2020-08-07 NOTE — LETTER
8/7/20 Patient: Garett Lazaro YOB: 1950 Date of Visit: 8/7/2020 Sabi Hernandez MD 
[id number] [place] [id number] VIA In Basket Dear Sabi Hernandez MD, Thank you for referring Ms. Jack Prieto to [address] [facility] for evaluation. My notes for this consultation are attached. If you have questions, please do not hesitate to call me. I look forward to following your patient along with you.  
 
 
Sincerely, 
 
Mac Ha MD

## 2020-08-07 NOTE — PROGRESS NOTES
Juana Alonso [facility] [address]  [address], [address]  [facility], [address]  Phone: [phone]  Fax: [phone]  PROGRESS NOTE  Patient: Lise Phoenix                MRN: [id number]       SSN: [id number]  YOB: 1950        AGE: 79 y.o. SEX: female  Body mass index is 36.19 kg/m². PCP: Ryan Mak MD  08/07/20    No chief complaint on file. HISTORY OF PRESENT ILLNESS, RADIOGRAPHS, and PLAN:     He now and I had about 9 years speaking to me in my ear again. Migraine it Ms. Dodeg is seen today in follow-up. Her EMG indicates some measure of carpal tunnel syndrome and ulnar nerve compression at the elbow but nothing emanating from her cervical spine. She has been evaluated by Dr. Samanta Chu regarding her shoulders. It would appear that her left shoulder may need replacement in her right shoulder may need revision. At this time with regards to her cervical spine I have nothing to offer her I do not think her pain is emanating from her spine. I would imagine it would be best for Dr. Bolivar Cuh to take the lead in the situation and decide what further treatments for her shoulders are required. If there is still is some nerve issue it would appear to be able to be addressed by Dr. Bolivar Chu or by our hand specialist.  I will be happy to see her again should spine issues arise. This dictation was created utilizing voice recognition software. Errors may be present.        Past Medical History:   Diagnosis Date    Ankle fracture     Aortic stenosis     S/P AVR in 2016    Asthma     Chronic pain     back pain    Diabetes ([facility] [address]     Diverticulitis     Fibromyalgia     H/O aortic valve replacement 03/16    21 mm bovine pericardial bioprosthesis and epiaortic scanning      Hypertension     PT denies    Hypothyroidism     Lumbar post-laminectomy syndrome     Menopause     Obesity     Osteoarthritis     Psychotic disorder (HCC)     Sciatica     Skin cancer 2013    right forearm. Family History   Problem Relation Age of Onset    Hypertension Father     Heart Disease Father     Alcohol abuse Father        Current Outpatient Medications   Medication Sig Dispense Refill    cyclobenzaprine (FLEXERIL) 10 mg tablet Take 1 Tab by mouth three (3) times daily as needed for Muscle Spasm(s). 30 Tab 0    [START ON 8/9/2020] traMADoL (Ultram) 50 mg tablet Take 1 Tab by mouth every six (6) hours as needed for Pain for up to 30 days. Max Daily Amount: 200 mg. 120 Tab 0    DULoxetine (CYMBALTA) 30 mg capsule TAKE 1 CAPSULE BY MOUTH THREE TIMES DAILY 90 Cap 0    clobetasoL (TEMOVATE) 0.05 % external solution APPLY TO SCALP AFTER SHAMPOO DAILY 50 mL 3    desonide (TRIDESILON) 0.05 % cream Apply  to affected area two (2) times a day. 15 g 0    metFORMIN (GLUCOPHAGE) 500 mg tablet TAKE 1 TABLET BY MOUTH ONCE DAILY WITH  DINNER 90 Tab 0    atorvastatin (LIPITOR) 20 mg tablet Take 1 tablet by mouth once daily 90 Tab 0    glucose blood VI test strips (True Metrix Pro Test Strip) strip Test blood glucose 3 times a day 200 Strip 3    traZODone (DESYREL) 100 mg tablet Take 1 Tab by mouth nightly. 30 Tab 1    pantoprazole (PROTONIX) 20 mg tablet Take 1 Tab by mouth daily. 90 Tab 3    meloxicam (MOBIC) 15 mg tablet TAKE 1 TABLET BY MOUTH DAILY 90 Tab 3    levothyroxine (SYNTHROID) 50 mcg tablet TAKE 1 TABLET BY MOUTH EVERY DAY BEFORE BREAKFAST 90 Tab 0    fluticasone propionate (FLONASE) 50 mcg/actuation nasal spray One spray each nostril twice a day. 1 Bottle 5    meclizine (ANTIVERT) 25 mg tablet Take 1 Tab by mouth three (3) times daily as needed for Dizziness. 60 Tab 3    FLUoxetine (PROZAC) 10 mg capsule TAKE 1 CAPSULE BY MOUTH ONCE DAILY. GENERICFOR PROZAC. 90 Cap 3    STIOLTO RESPIMAT 2.5-2.5 mcg/actuation inhaler Take  by inhalation. 1    vitamin E acetate (VITAMIN E PO) Take  by mouth.       cyanocobalamin (VITAMIN B-12) 500 mcg tablet Take 500 mcg by mouth daily.  cetirizine (ZYRTEC) 10 mg tablet TAKE 1 TABLET BY MOUTH EVERY DAY. GENERIC FOR ZYRTEC 90 Tab 2    VENTOLIN HFA 90 mcg/actuation inhaler 2 Puffs by Aerosolization route every four (4) hours as needed. 0    B.infantis-B.ani-B.long-B.bifi (PROBIOTIC 4X) 10-15 mg TbEC Take  by mouth daily.  aspirin delayed-release 81 mg tablet Take 81 mg by mouth daily.  Cholecalciferol, Vitamin D3, (VITAMIN D3) 1,000 unit cap Take 1,000 Units by mouth daily.  Lancets (ACCU-CHEK SOFTCLIX LANCETS) misc Please use one lancet to test blood sugars twice a day. 1 Package 20    BLOOD-GLUCOSE METER (ACCU-CHEK MARCE MONITORING) by Does Not Apply route.  triamcinolone acetonide (KENALOG) 0.1 % topical cream Apply  to affected area two (2) times a day. use thin layer      diazePAM (Valium) 5 mg tablet Take by mouth 30 minutes prior to MRI 2 Tab 0    cephALEXin (KEFLEX) 500 mg capsule       methylPREDNISolone (MEDROL DOSEPACK) 4 mg tablet Per dose pack instructions 1 Dose Pack 0    predniSONE (DELTASONE) 10 mg tablet   0    docusate sodium (COLACE) 50 mg capsule 50 mg. Allergies   Allergen Reactions    Latex, Natural Rubber Hives    Adhesive Rash     Some bandaids    Ciprofloxacin Rash    Codeine Nausea and Vomiting    Demerol [Meperidine] Nausea and Vomiting    Diclofenac Shortness of Breath and Palpitations    Erythromycin Rash    Levaquin [Levofloxacin] Rash    Loratadine Swelling    Morphine Nausea and Vomiting    Penicillin G Hives and Rash     Does fine with Keflex    Sulfa (Sulfonamide Antibiotics) Itching    Vicodin [Hydrocodone-Acetaminophen] Other (comments)     Metallic taste in mouth       Past Surgical History:   Procedure Laterality Date    CARDIAC SURG PROCEDURE UNLIST  03/2016    aortic valve replacement.      CHEST SURGERY PROCEDURE UNLISTED  03/2016    Open Heart Surgery    COLONOSCOPY      COLONOSCOPY N/A 9/15/2017    [facility] ENDOSCOPY    HX CATARACT REMOVAL Right     lens  replaced.  HX CATARACT REMOVAL Left 2018    HX COLONOSCOPY  09/15/2017    dr. Stefania Kumari  f/u 5 years.  HX HEENT  's    sinus surgery     HX HEENT Right 2017    laser for right eye cataracts.  HX LUMBAR FUSION      L3-L4-L5    HX LUMBAR LAMINECTOMY      HX MOHS PROCEDURES Left     HX ORTHOPAEDIC      Rotator cuff Bilateral    HX SHOULDER REPLACEMENT Right 2017    reverse.  [place] COLSC FLX W/RMVL OF TUMOR POLYP LESION SNARE TQ  2014 repeat in 3 years    Dr. Kamala Carrasco      Aortic valve replacement       Past Medical History:   Diagnosis Date    Ankle fracture     Aortic stenosis     S/P AVR in     Asthma     Chronic pain     back pain    Diabetes ([address]     Diverticulitis     Fibromyalgia     H/O aortic valve replacement     21 mm bovine pericardial bioprosthesis and epiaortic scanning      Hypertension     PT denies    Hypothyroidism     Lumbar post-laminectomy syndrome     Menopause     Obesity     Osteoarthritis     Psychotic disorder ([address]     Sciatica     Skin cancer     right forearm.         Social History     Socioeconomic History    Marital status:      Spouse name: Not on file    Number of children: Not on file    Years of education: Not on file    Highest education level: Not on file   Occupational History    Not on file   Social Needs    Financial resource strain: Not on file    Food insecurity     Worry: Not on file     Inability: Not on file    Transportation needs     Medical: Not on file     Non-medical: Not on file   Tobacco Use    Smoking status: Former Smoker     Years: 45.00     Types: Cigarettes     Last attempt to quit: 2011     Years since quittin.1    Smokeless tobacco: Never Used   Substance and Sexual Activity    Alcohol use: No    Drug use: No    Sexual activity: Never Lifestyle    Physical activity     Days per week: Not on file     Minutes per session: Not on file    Stress: Not on file   Relationships    Social connections     Talks on phone: Not on file     Gets together: Not on file     Attends [employer] service: Not on file     Active member of club or organization: Not on file     Attends meetings of clubs or organizations: Not on file     Relationship status: Not on file    Intimate partner violence     Fear of current or ex partner: Not on file     Emotionally abused: Not on file     Physically abused: Not on file     Forced sexual activity: Not on file   Other Topics Concern    Not on file   Social History Narrative    Not on file         REVIEW OF SYSTEMS:   CONSTITUTIONAL SYMPTOMS:  Negative. EYES:  Negative. EARS, NOSE, THROAT AND MOUTH:  Negative. CARDIOVASCULAR:  Negative. RESPIRATORY:  Negative. GENITOURINARY: Per HPI. GASTROINTESTINAL:  Per HPI. INTEGUMENTARY (SKIN AND/OR BREAST):  Negative. MUSCULOSKELETAL: Per HPI.   ENDOCRINE/RHEUMATOLOGIC:  Negative. NEUROLOGICAL:  Per HPI. HEMATOLOGIC/LYMPHATIC:  Negative. ALLERGIC/IMMUNOLOGIC:  Negative. PSYCHIATRIC:  Negative. PHYSICAL EXAMINATION:   Visit Vitals  /80 (BP 1 Location: Left arm, BP Patient Position: Sitting)   Pulse 88   Temp 97.3 °F (36.3 °C) (Temporal)   Resp 14   Wt 238 lb (108 kg)   BMI 36.19 kg/m²    PAIN SCALE: 6/10    CONSTITUTIONAL: The patient is in no apparent distress and is alert and oriented x 3. HEENT: Normocephalic. Hearing grossly intact. NECK: Supple and symmetric. no tenderness, or masses were felt. RESPIRATORY: No labored breathing. CARDIOVASCULAR: The carotid pulses were normal. Peripheral pulses were 2+. CHEST: Normal AP diameter and normal contour without any kyphoscoliosis. LYMPHATIC: No lymphadenopathy was appreciated in the neck, axillae or groin.   SKIN: Negative for scars, rashes, lesions, or ulcers on the right upper, right lower, left upper, left lower and trunk. NEUROLOGICAL: Alert and oriented x 3. Ambulation with single point cane. FWB. EXTREMITIES: See musculoskeletal.  MUSCULOSKELETAL:   Head and Neck: Negative for misalignment, asymmetry, crepitation, defects, tenderness masses or effusions.  Left Upper Extremity: Inspection, percussion and palpation performed. Jacksons sign is negative.  Right Upper Extremity: Inspection, percussion and palpation performed. Jacksons sign is negative.  Spine, Ribs and Pelvis: Inspection, percussion and palpation performed. Negative for misalignment, asymmetry, crepitation, defects, tenderness masses or effusions.  Left Lower Extremity: Inspection, percussion and palpation performed. Negative straight leg raise.  Right Lower Extremity: Inspection, percussion and palpation performed. Negative straight leg raise. SPINE EXAM:     Cervical spine: Neck is midline. Normal muscle tone. No focal atrophy is noted. Lumbar spine: No rash, ecchymosis, or gross obliquity. No focal atrophy is noted. ASSESSMENT    ICD-10-CM ICD-9-CM    1. Lumbar radiculopathy  M54.16 724.4    2. Strain of lumbar region, initial encounter  S39.012A 847.2 cyclobenzaprine (FLEXERIL) 10 mg tablet   3. Spinal stenosis of lumbar region with neurogenic claudication  M48.062 724.03 cyclobenzaprine (FLEXERIL) 10 mg tablet   4. Radiculopathy, lumbar region  M54.16 724.4 cyclobenzaprine (FLEXERIL) 10 mg tablet   5. Therapeutic drug monitoring  Z51.81 V58.83 cyclobenzaprine (FLEXERIL) 10 mg tablet   6. Cervical pain  M54.2 723.1 cyclobenzaprine (FLEXERIL) 10 mg tablet   7. Cervical neuritis  M54.12 723.4 cyclobenzaprine (FLEXERIL) 10 mg tablet       Written by Lonnie Bernardo, as dictated by Eddie Mckeon MD.    I, Dr. Eddie Mckeon MD, confirm that all documentation is accurate.

## 2020-08-12 ENCOUNTER — NEW PATIENT (OUTPATIENT)
Dept: URBAN - METROPOLITAN AREA CLINIC 60 | Facility: CLINIC | Age: 70
End: 2020-08-12
Payer: MEDICARE

## 2020-08-12 DIAGNOSIS — H40.013 OPEN ANGLE WITH BORDERLINE FINDINGS, LOW RISK, BILATERAL: ICD-10-CM

## 2020-08-12 DIAGNOSIS — R73.03 OTHER ABNORMAL GLUCOSE: Primary | ICD-10-CM

## 2020-08-12 DIAGNOSIS — D48.5 NEOPLASM OF UNCERTAIN BEHAVIOR OF SKIN: ICD-10-CM

## 2020-08-12 PROCEDURE — 92285 EXTERNAL OCULAR PHOTOGRAPHY: CPT | Performed by: OPTOMETRIST

## 2020-08-12 PROCEDURE — 92004 COMPRE OPH EXAM NEW PT 1/>: CPT | Performed by: OPTOMETRIST

## 2020-08-12 RX ORDER — BACITRACIN 500 [USP'U]/G
OINTMENT OPHTHALMIC
Qty: 1 | Refills: 0 | Status: INACTIVE
Start: 2020-08-12 | End: 2020-08-26

## 2020-08-12 ASSESSMENT — INTRAOCULAR PRESSURE
OS: 19
OD: 19

## 2020-08-24 DIAGNOSIS — E03.9 ACQUIRED HYPOTHYROIDISM: ICD-10-CM

## 2020-08-24 DIAGNOSIS — M79.7 FIBROMYALGIA: ICD-10-CM

## 2020-08-25 RX ORDER — LEVOTHYROXINE SODIUM 50 UG/1
TABLET ORAL
Qty: 90 TAB | Refills: 0 | Status: SHIPPED | OUTPATIENT
Start: 2020-08-25 | End: 2020-11-12

## 2020-08-25 RX ORDER — DULOXETIN HYDROCHLORIDE 30 MG/1
CAPSULE, DELAYED RELEASE ORAL
Qty: 90 CAP | Refills: 0 | Status: SHIPPED | OUTPATIENT
Start: 2020-08-25 | End: 2020-09-22

## 2020-08-26 ENCOUNTER — FOLLOW UP ESTABLISHED (OUTPATIENT)
Dept: URBAN - METROPOLITAN AREA CLINIC 60 | Facility: CLINIC | Age: 70
End: 2020-08-26
Payer: MEDICARE

## 2020-08-26 PROCEDURE — 92012 INTRM OPH EXAM EST PATIENT: CPT | Performed by: OPTOMETRIST

## 2020-08-26 ASSESSMENT — INTRAOCULAR PRESSURE
OS: 19
OD: 18

## 2020-09-08 ENCOUNTER — TELEPHONE (OUTPATIENT)
Dept: ORTHOPEDIC SURGERY | Age: 70
End: 2020-09-08

## 2020-09-08 DIAGNOSIS — M96.1 LUMBAR POST-LAMINECTOMY SYNDROME: ICD-10-CM

## 2020-09-08 DIAGNOSIS — M54.12 CERVICAL NEURITIS: ICD-10-CM

## 2020-09-08 DIAGNOSIS — M54.16 LUMBAR NEURITIS: ICD-10-CM

## 2020-09-08 DIAGNOSIS — M43.10 SPONDYLOLISTHESIS, ACQUIRED: ICD-10-CM

## 2020-09-08 DIAGNOSIS — M47.812 CERVICAL SPONDYLOSIS WITHOUT MYELOPATHY: ICD-10-CM

## 2020-09-08 DIAGNOSIS — M50.00 HNP (HERNIATED NUCLEUS PULPOSUS) WITH MYELOPATHY, CERVICAL: ICD-10-CM

## 2020-09-08 DIAGNOSIS — M48.02 CERVICAL SPINAL STENOSIS: ICD-10-CM

## 2020-09-08 RX ORDER — TRAMADOL HYDROCHLORIDE 50 MG/1
50 TABLET ORAL
Qty: 120 TAB | Refills: 0 | Status: SHIPPED | OUTPATIENT
Start: 2020-09-08 | End: 2020-10-08 | Stop reason: SDUPTHER

## 2020-09-08 NOTE — TELEPHONE ENCOUNTER
Pt requesting refill for her Tramadol. .. per her [address]  states she does not have refills available.     lidya ryan    Pt J#[phone]

## 2020-09-09 ENCOUNTER — FOLLOW UP ESTABLISHED (OUTPATIENT)
Dept: URBAN - METROPOLITAN AREA CLINIC 60 | Facility: CLINIC | Age: 70
End: 2020-09-09
Payer: MEDICARE

## 2020-09-09 PROCEDURE — 92012 INTRM OPH EXAM EST PATIENT: CPT | Performed by: OPTOMETRIST

## 2020-09-21 DIAGNOSIS — J30.1 SEASONAL ALLERGIC RHINITIS DUE TO POLLEN: ICD-10-CM

## 2020-09-21 DIAGNOSIS — E11.65 UNCONTROLLED TYPE 2 DIABETES MELLITUS WITH HYPERGLYCEMIA (HCC): ICD-10-CM

## 2020-09-21 DIAGNOSIS — M79.7 FIBROMYALGIA: ICD-10-CM

## 2020-09-22 RX ORDER — METFORMIN HYDROCHLORIDE 500 MG/1
TABLET ORAL
Qty: 90 TAB | Refills: 0 | Status: SHIPPED | OUTPATIENT
Start: 2020-09-22 | End: 2020-12-30

## 2020-09-22 RX ORDER — FLUTICASONE PROPIONATE 50 MCG
SPRAY, SUSPENSION (ML) NASAL
Qty: 16 G | Refills: 0 | Status: SHIPPED | OUTPATIENT
Start: 2020-09-22 | End: 2020-11-03

## 2020-09-22 RX ORDER — DULOXETIN HYDROCHLORIDE 30 MG/1
CAPSULE, DELAYED RELEASE ORAL
Qty: 90 CAP | Refills: 0 | Status: SHIPPED | OUTPATIENT
Start: 2020-09-22 | End: 2020-11-03

## 2020-09-25 ENCOUNTER — HOSPITAL ENCOUNTER (OUTPATIENT)
Dept: MAMMOGRAPHY | Age: 70
Discharge: HOME | End: 2020-09-25
Attending: FAMILY MEDICINE
Payer: MEDICARE

## 2020-09-25 DIAGNOSIS — Z12.31 VISIT FOR SCREENING MAMMOGRAM: ICD-10-CM

## 2020-09-25 PROCEDURE — 77063 BREAST TOMOSYNTHESIS BI: CPT

## 2020-10-08 ENCOUNTER — OFFICE VISIT (OUTPATIENT)
Dept: ORTHOPEDIC SURGERY | Age: 70
End: 2020-10-08
Payer: MEDICARE

## 2020-10-08 ENCOUNTER — OFFICE VISIT (OUTPATIENT)
Dept: CARDIOLOGY CLINIC | Age: 70
End: 2020-10-08
Payer: MEDICARE

## 2020-10-08 VITALS
TEMPERATURE: 97.4 F | OXYGEN SATURATION: 98 % | HEART RATE: 95 BPM | BODY MASS INDEX: 36.34 KG/M2 | SYSTOLIC BLOOD PRESSURE: 118 MMHG | DIASTOLIC BLOOD PRESSURE: 66 MMHG | WEIGHT: 239 LBS

## 2020-10-08 VITALS
TEMPERATURE: 97.8 F | DIASTOLIC BLOOD PRESSURE: 83 MMHG | HEIGHT: 68 IN | HEART RATE: 89 BPM | SYSTOLIC BLOOD PRESSURE: 120 MMHG | OXYGEN SATURATION: 100 % | WEIGHT: 239.2 LBS | RESPIRATION RATE: 16 BRPM | BODY MASS INDEX: 36.25 KG/M2

## 2020-10-08 DIAGNOSIS — M48.02 CERVICAL SPINAL STENOSIS: ICD-10-CM

## 2020-10-08 DIAGNOSIS — G56.03 CARPAL TUNNEL SYNDROME, BILATERAL: ICD-10-CM

## 2020-10-08 DIAGNOSIS — G56.21 CUBITAL TUNNEL SYNDROME, RIGHT: ICD-10-CM

## 2020-10-08 DIAGNOSIS — M43.10 SPONDYLOLISTHESIS, ACQUIRED: ICD-10-CM

## 2020-10-08 DIAGNOSIS — M47.812 CERVICAL SPONDYLOSIS WITHOUT MYELOPATHY: ICD-10-CM

## 2020-10-08 DIAGNOSIS — E66.01 MORBID OBESITY, UNSPECIFIED OBESITY TYPE (HCC): Primary | ICD-10-CM

## 2020-10-08 DIAGNOSIS — M54.12 CERVICAL NEURITIS: ICD-10-CM

## 2020-10-08 DIAGNOSIS — I10 ESSENTIAL HYPERTENSION WITH GOAL BLOOD PRESSURE LESS THAN 140/90: ICD-10-CM

## 2020-10-08 DIAGNOSIS — I35.0 NONRHEUMATIC AORTIC VALVE STENOSIS: ICD-10-CM

## 2020-10-08 DIAGNOSIS — M96.1 LUMBAR POST-LAMINECTOMY SYNDROME: ICD-10-CM

## 2020-10-08 DIAGNOSIS — M50.00 HNP (HERNIATED NUCLEUS PULPOSUS) WITH MYELOPATHY, CERVICAL: Primary | ICD-10-CM

## 2020-10-08 DIAGNOSIS — M54.16 LUMBAR NEURITIS: ICD-10-CM

## 2020-10-08 PROCEDURE — 3017F COLORECTAL CA SCREEN DOC REV: CPT | Performed by: PHYSICAL MEDICINE & REHABILITATION

## 2020-10-08 PROCEDURE — G8536 NO DOC ELDER MAL SCRN: HCPCS | Performed by: INTERNAL MEDICINE

## 2020-10-08 PROCEDURE — G8399 PT W/DXA RESULTS DOCUMENT: HCPCS | Performed by: PHYSICAL MEDICINE & REHABILITATION

## 2020-10-08 PROCEDURE — G8428 CUR MEDS NOT DOCUMENT: HCPCS | Performed by: INTERNAL MEDICINE

## 2020-10-08 PROCEDURE — G8536 NO DOC ELDER MAL SCRN: HCPCS | Performed by: PHYSICAL MEDICINE & REHABILITATION

## 2020-10-08 PROCEDURE — G8417 CALC BMI ABV UP PARAM F/U: HCPCS | Performed by: INTERNAL MEDICINE

## 2020-10-08 PROCEDURE — G9899 SCRN MAM PERF RSLTS DOC: HCPCS | Performed by: INTERNAL MEDICINE

## 2020-10-08 PROCEDURE — G9717 DOC PT DX DEP/BP F/U NT REQ: HCPCS | Performed by: PHYSICAL MEDICINE & REHABILITATION

## 2020-10-08 PROCEDURE — 1090F PRES/ABSN URINE INCON ASSESS: CPT | Performed by: PHYSICAL MEDICINE & REHABILITATION

## 2020-10-08 PROCEDURE — G9899 SCRN MAM PERF RSLTS DOC: HCPCS | Performed by: PHYSICAL MEDICINE & REHABILITATION

## 2020-10-08 PROCEDURE — 1101F PT FALLS ASSESS-DOCD LE1/YR: CPT | Performed by: PHYSICAL MEDICINE & REHABILITATION

## 2020-10-08 PROCEDURE — G9717 DOC PT DX DEP/BP F/U NT REQ: HCPCS | Performed by: INTERNAL MEDICINE

## 2020-10-08 PROCEDURE — G8399 PT W/DXA RESULTS DOCUMENT: HCPCS | Performed by: INTERNAL MEDICINE

## 2020-10-08 PROCEDURE — G8417 CALC BMI ABV UP PARAM F/U: HCPCS | Performed by: PHYSICAL MEDICINE & REHABILITATION

## 2020-10-08 PROCEDURE — 99213 OFFICE O/P EST LOW 20 MIN: CPT | Performed by: PHYSICAL MEDICINE & REHABILITATION

## 2020-10-08 PROCEDURE — 99214 OFFICE O/P EST MOD 30 MIN: CPT | Performed by: INTERNAL MEDICINE

## 2020-10-08 PROCEDURE — G8427 DOCREV CUR MEDS BY ELIG CLIN: HCPCS | Performed by: PHYSICAL MEDICINE & REHABILITATION

## 2020-10-08 RX ORDER — TRAMADOL HYDROCHLORIDE 50 MG/1
50 TABLET ORAL
Qty: 120 TAB | Refills: 0 | Status: SHIPPED | OUTPATIENT
Start: 2020-10-08 | End: 2020-11-03 | Stop reason: SDUPTHER

## 2020-10-08 NOTE — PROGRESS NOTES
[facility]    Ms. Benji Sharpe is a 79 y.o. female with history of Aortic stenosis s/p Aortic valve replacement (03/16), fibromyalgia, diabetes, obesity, hypertension, hypothyroidism and COPD. Mr. Benji Sharpe is here today for follow up appointment. Dyspnea stable . She follows up with Dr. Alvaro Soria for her asthma and COPD. No Chest pain or tightness at rest or with exertion  Using 1 pillows at night  No presyncope or syncope    Past Medical History:   Diagnosis Date    Ankle fracture     Aortic stenosis     S/P AVR in 2016    Asthma     Carpal tunnel syndrome on both sides     Chronic pain     back pain    Diabetes ([address]     Diverticulitis     Fibromyalgia     H/O aortic valve replacement 03/16    21 mm bovine pericardial bioprosthesis and epiaortic scanning      Hypertension     PT denies    Hypothyroidism     Lumbar post-laminectomy syndrome     Menopause     Obesity     Osteoarthritis     Psychotic disorder ([facility] [address]     Sciatica     Skin cancer 2013    right forearm. Past Surgical History:   Procedure Laterality Date    CARDIAC SURG PROCEDURE UNLIST  03/2016    aortic valve replacement.  CHEST SURGERY PROCEDURE UNLISTED  03/2016    Open Heart Surgery    COLONOSCOPY      COLONOSCOPY N/A 9/15/2017    COL[facility]COPY performed by Tereasa Buerger, [facility] at [facility] [address] Right 1995    lens  replaced.  HX CATARACT REMOVAL Left 02/2018    HX COLONOSCOPY  09/15/2017    dr. Joseph Chaves  f/u 5 years.  HX HEENT  1990's    sinus surgery     HX HEENT Right 11/02/2017    laser for right eye cataracts.  HX LUMBAR FUSION  2004    L3-L4-L5    HX LUMBAR LAMINECTOMY  2002    HX MOHS PROCEDURES Left 1990    HX ORTHOPAEDIC      Rotator cuff Bilateral    HX SHOULDER REPLACEMENT Right 03/29/2017    reverse.      [place] COLSC FLX W/RMVL OF TUMOR POLYP LESION SNARE TQ  07/09/2014 repeat in 3 years    Dr. Enrique Dupree VASCULAR SURGERY PROCEDURE UNLIST      Aortic valve replacement       Current Outpatient Medications   Medication Sig    traMADoL (Ultram) 50 mg tablet Take 1 Tab by mouth every six (6) hours as needed for Pain for up to 30 days. Max Daily Amount: 200 mg.    DULoxetine (CYMBALTA) 30 mg capsule TAKE 1 CAPSULE BY MOUTH THREE TIMES DAILY    metFORMIN (GLUCOPHAGE) 500 mg tablet TAKE 1 TABLET BY MOUTH ONCE DAILY WITH  DINNER.  fluticasone propionate (FLONASE) 50 mcg/actuation nasal spray Use 1 spray(s) in each nostril twice daily    levothyroxine (SYNTHROID) 50 mcg tablet TAKE 1 TABLET BY MOUTH ONCE DAILY BEFORE BREAKFAST    cyclobenzaprine (FLEXERIL) 10 mg tablet Take 1 Tab by mouth three (3) times daily as needed for Muscle Spasm(s).  clobetasoL (TEMOVATE) 0.05 % external solution APPLY TO SCALP AFTER SHAMPOO DAILY    desonide (TRIDESILON) 0.05 % cream Apply  to affected area two (2) times a day.  atorvastatin (LIPITOR) 20 mg tablet Take 1 tablet by mouth once daily    cephALEXin (KEFLEX) 500 mg capsule     glucose blood VI test strips (True Metrix Pro Test Strip) strip Test blood glucose 3 times a day    pantoprazole (PROTONIX) 20 mg tablet Take 1 Tab by mouth daily.  meloxicam (MOBIC) 15 mg tablet TAKE 1 TABLET BY MOUTH DAILY    meclizine (ANTIVERT) 25 mg tablet Take 1 Tab by mouth three (3) times daily as needed for Dizziness.  FLUoxetine (PROZAC) 10 mg capsule TAKE 1 CAPSULE BY MOUTH ONCE DAILY. Eldonna Winston.  STIOLTO RESPIMAT 2.5-2.5 mcg/actuation inhaler Take  by inhalation.  docusate sodium (COLACE) 50 mg capsule 50 mg.    vitamin E acetate (VITAMIN E PO) Take  by mouth.  cyanocobalamin (VITAMIN B-12) 500 mcg tablet Take 500 mcg by mouth daily.  cetirizine (ZYRTEC) 10 mg tablet TAKE 1 TABLET BY MOUTH EVERY DAY. GENERIC FOR ZYRTEC    VENTOLIN HFA 90 mcg/actuation inhaler 2 Puffs by Aerosolization route every four (4) hours as needed.     B.infantis-B.ani-B.long-B.bifi (PROBIOTIC 4X) 10-15 mg TbEC Take  by mouth daily.  aspirin delayed-release 81 mg tablet Take 81 mg by mouth daily.  Cholecalciferol, Vitamin D3, (VITAMIN D3) 1,000 unit cap Take 1,000 Units by mouth daily.  Lancets (ACCU-CHEK SOFTCLIX LANCETS) misc Please use one lancet to test blood sugars twice a day.  BLOOD-GLUCOSE METER (ACCU-CHEK MARCE MONITORING) by Does Not Apply route. No current facility-administered medications for this visit. Allergies and Sensitivities:  Allergies   Allergen Reactions    Latex, Natural Rubber Hives    Adhesive Rash     Some bandaids    Ciprofloxacin Rash    Codeine Nausea and Vomiting    Demerol [Meperidine] Nausea and Vomiting    Diclofenac Shortness of Breath and Palpitations    Erythromycin Rash    Levaquin [Levofloxacin] Rash    Loratadine Swelling    Morphine Nausea and Vomiting    Penicillin G Hives and Rash     Does fine with Keflex    Sulfa (Sulfonamide Antibiotics) Itching    Vicodin [Hydrocodone-Acetaminophen] Other (comments)     Metallic taste in mouth       Family History:  Family History   Problem Relation Age of Onset    Hypertension Father     Heart Disease Father     Alcohol abuse Father        Social History:  Social History     Tobacco Use    Smoking status: Former Smoker     Years: 45.00     Types: Cigarettes     Last attempt to quit: 2011     Years since quittin.2    Smokeless tobacco: Never Used   Substance Use Topics    Alcohol use: No    Drug use: No     She  reports that she quit smoking about 9 years ago. Her smoking use included cigarettes. She quit after 45.00 years of use. She has never used smokeless tobacco.  She  reports no history of alcohol use. Review of Systems:  Cardiac symptoms as noted above in HPI. All others negative. Denies skin rash,  photophobia, neck pain, hemoptysis, chronic cough, nausea, vomiting, hematuria, burning micturition, BRBPR, chronic headaches.     Physical Exam:  BP Readings from Last 3 Encounters:   10/08/20 118/66   10/08/20 120/83   08/07/20 128/80         Pulse Readings from Last 3 Encounters:   10/08/20 95   10/08/20 89   08/07/20 88          Wt Readings from Last 3 Encounters:   10/08/20 239 lb (108.4 kg)   10/08/20 239 lb 3.2 oz (108.5 kg)   08/07/20 238 lb (108 kg)       Constitutional: Oriented to person, place, and time. HENT: Head: Normocephalic and atraumatic. Neck: No JVD present. Cardiovascular: Regular rhythm. No gallop or rubs appreciated. No significant murmur  Lung: Breath sounds normal. No respiratory distress. No ronchi or rales appreciated  Abdominal: No tenderness. No rebound and no guarding. Musculoskeletal: No LE swelling    Review of Data  LABS:   Lab Results   Component Value Date/Time    Sodium 140 07/16/2020 11:36 AM    Potassium 4.5 07/16/2020 11:36 AM    Chloride 100 07/16/2020 11:36 AM    CO2 23 07/16/2020 11:36 AM    Glucose 106 (H) 07/16/2020 11:36 AM    BUN 14 07/16/2020 11:36 AM    Creatinine 1.01 (H) 07/16/2020 11:36 AM     Lipids Latest Ref Rng & Units 10/10/2019 11/16/2018 11/7/2017 9/2/2016   Chol, Total 100 - 199 mg/dL 118 105 119 141   HDL >39 mg/dL 55 53 56 77   LDL 0 - 99 mg/dL 49 38 45 49   Trig 0 - 149 mg/dL 70 70 88 77   Some recent data might be hidden     Lab Results   Component Value Date/Time    ALT (SGPT) 25 07/16/2020 11:36 AM     Lab Results   Component Value Date/Time    Hemoglobin A1c 6.3 (H) 10/10/2019 11:34 AM     EKG  (04/16) Sinus rhythm at 96 bpm    STRESS TEST (2012)   No EKG or scintigraphic evidence of ischemia or infarction. Normal LVEF    ECHO (01/16)  SUMMARY:  Left ventricle: Systolic function was normal. Ejection fraction was estimated in the range of 55 % to 60 %. There were no regional wall motion  abnormalities. Doppler parameters were consistent with abnormal left ventricular relaxation (grade 1 diastolic dysfunction). Right ventricle: Systolic function was normal.  Aortic valve: The valve was trileaflet. Leaflets exhibited calcification and moderate- severely reduced cuspal separation. There was severe aortic  stenosis. There was no significant regurgitation. Valve peak gradient was 85 mmHg. Valve mean gradient was 50 mmHg. ELIS (02/16)  Left ventricle: Size was normal. Systolic function was normal.  Right ventricle: The size was normal.  Left atrium: Size was normal. No thrombus was identified. There was no  spontaneous echo contrast (\"smoke\"). Left atrial appendage: No thrombus was identified. There was no  spontaneous echo contrast (\"smoke\") in the appendage. Right atrium: Size was normal.  Mitral valve: There was mild to moderate multi jet regurgitation. No obvious mass, vegetation or thrombus noted. Aortic valve: Aortic valve assessment was performed using 2D  transesophageal as well as transthoracic doppler data. The valve was  trileaflet. Leaflets were thickened, calcific and stenotic. A mean  gradient across the valve was measured at 49mmHg with a calculated valve  area of 0.70 - 0.75 cm based on an LVOT diameter of 2.1 cm, an LVOT VTI of  21 cm, and an AV VTI of 101 cm. There was no regurgitation. Tricuspid valve: There was no regurgitation. No obvious mass, vegetation or thrombus noted. HOLTER (05/16)  Interpretation:  1. Rhythm is sinus. 2. PACs (1.3% burden). 3. Rare PVCs. ECHO (05/19)  Left Ventricle Normal cavity size and systolic function (ejection fraction normal). Mildly increased wall thickness. The estimated ejection fraction is 56 - 60%. Visually measured ejection fraction. No regional wall motion abnormality noted. There is inconclusive left ventricular diastolic function. Left Atrium Normal cavity size. Left Atrium volume index is 26 mL/m2. Right Ventricle Not well visualized. Right Atrium Normal cavity size. Aortic Valve No regurgitation. Prosthetic aortic valve. Aortic valve peak gradient is 22 mmHg. Aortic valve mean gradient is 13 mmHg. Aortic valve area is 1.1 cm2. There is a unknown type of bioprosthetic valve present. The prosthetic valve is normal.   Mitral Valve Normal valve structure and no stenosis. Trace regurgitation. Tricuspid Valve Normal valve structure, no stenosis and no regurgitation. Pulmonic Valve Pulmonic valve not well visualized, but normal doppler findings. Aorta Normal aortic root. CARDIAC CATH: (01/16)  PRESSURE HEMODYNAMICS: Systemic aortic pressure was 131/68 mmHg. Left ventricular pressure was 160/2/12 mmHg. Based on the data, peak-to-peak gradient between LV and aortic was approximately 30 mmHg. Mean RA  7 mmHg,   RV  32/1/8 mmHg,  PA  27/9/16 mmHg,   PCWP 10 mm hg  Cardiac output by Ellis calculation was 4.3 L/min and cardiac index was 2.02 liters L/min/meter squared. Mixed venous saturation was 65%, PA saturation was 65%, and the radial artery saturation was 90%. No evidence of intracardiac shunting. CORONARY ANGIOGRAM  1. LM: No significant obstructive disease. 2. LAD: 10-20% luminal irregularities, otherwise normal. Three diagonal branches angiographically normal.  3. LCX: 20-30% luminal irregularities, otherwise no obstructive disease. 4. OM1: Large bifurcating vessel without any obstructive disease. 5. RCA: Proximal 20%, mid discrete 40%, otherwise angiographically normal. Dominant vessel. IMPRESSION & PLAN:  Ms. Jose Gentile is a 79 y.o. female with Aortic stenosis, COPD, obesity, diabetes, fibromyalgia. Aortic stenosis:    Ms. Jose Gentile had a bioprosthetic aortic valve replacement at  [facility] in March, 2016. Aspirin will be continued lifelong. ECHO in 06/17 with mean gradient across valve ~ 10 mm Hg. Repeat echo in May 2019 with gradient 13 mmHg. Physiologic for prosthetic valve. Continue same and observation  Continue ASA    Diabetes:  Goal hemoglobin A1c less than 7 is recommended from a cardiovascular standpoint. Last hemoglobin A1c was 6.3.   Continue same management per PCP    Hypertension:  BP today 118/66 mm Hg. Not on any meds. Continue same. Off BB because of low BP in past.     HLD: last LDL 49. On atorvastatin. Continue same. This plan was discussed with patient who is in agreement. Thank you for allowing me to participate in patient care. Please feel free to call me if you have any question or concern.

## 2020-10-08 NOTE — LETTER
10/8/20 Patient: Blaine May YOB: 1950 Date of Visit: 10/8/2020 Fuentes Trimble MD 
[address] [place] [id number] VIA In Basket Dear Fuentes Trimble MD, Thank you for referring Ms. Olamide Lawler to [address] for evaluation. My notes for this consultation are attached. If you have questions, please do not hesitate to call me. I look forward to following your patient along with you. Sincerely, Rossi Meza MD

## 2020-10-08 NOTE — PROGRESS NOTES
[facility]  [address], [place]   Phone: [phone]  Fax: [phone]        PROGRESS NOTE      HISTORY OF PRESENT ILLNESS:  The patient is a 79 y.o. female and was seen today for follow up of low back pain radiating into BLE in a L4 distribution on the right and in a S1 distribution on the left to the greater toe and chronic neck pain which became progressive x 12/2019 radiating into BUE (L>R) to the hands. Pt was last evaluated by me 7/9/2020 with c/o low back pain radiating into BLE in a L4 distribution on the right and in a S1 distribution on the left to the greater toe and chronic neck pain which became progressive x 12/2019 radiating into BUE (L>R) to the hands. She was seen prior for low back and left hip pain extending into the LLE in a S1 distribution to an area below the knee. Initially she was seen for c/o low back pain into the BLE extending in roughly an L4 distribution in the LLE and in an L5 distribution to the foot in the RLE. Her pain is exacerbated with standing and walking intolerance. Patient reports dropping objects x couple months. Pt completed MDP with good relief. She had a diagnosis of lumbar postlaminectomy syndrome with a previous history of L3 through S1 fusion in 2002 and 2004 by a physician in [place]. Pt denies h/o stomach ulcers, bleeding disorders, or current use of anticoagulants. PmHx of fibromyalgia, DM, aortic valve replacement, right shoulder replacement, left rotator cuff disease. Patient reports previously receiving a shoulder injection with temporary relief. Pt notes intolerance to FOSOMAX, LYRICA, and PREDNISONE combination. Subsequently, d/c Fosomax due to allergy and started Cymbalta 30 mg TID in place of Lyrica. Note dated 6/29/16 indicating patient was seen and underwent porcine aortic valve replacement by Dr. Dahlia Gonzales on 3/18/16.  Pt began cardiac rehab in 1/2017. Pt underwent right shoulder surgery on 3/29/17 by Dr. Bruce Root. Note from Dr. Karuna Alexis, cardiologist dated 5/18/17 indicating patient was seen with h/o aortic stenosis, s/p aortic valve replacement 3/16 and was seen with moderate dysphemia with minimal exertion. CT revealed no evidence of pulmonary embolism, no obvious fluid overload. They were ordering further workup to look for functioning bioprosthetic aortic valve. Per patient, a cause for her SOB has still not been found. She has been scheduled to see a pulmonologist. She states she has been restricted to take her Meloxicam since her right shoulder surgery, which has caused her increase in low back pain. Upon review of our records, it was noted she previously failed TOPAMAX, NEURONTIN, and LYRICA. Pt was switched from Ultram ER back to Ultram immediate release. Pt underwent left SI joint injection on 3/15/18 with good relief. Note from Dr. Arnoldo Lynn dated 11/12/19 indicated they stopped Lyrica and started her on Cymbalta 90 mg per day. Note from Dr. Karuna Alexis dated 12/19/19 indicating patient was seen with c/o history of aortic valve replacement, fibromyalgia, and DM. The patient has a history of DM and reports blood sugars are well controlled, consistently remaining below 200, average of 119. Preliminary reading [id number] [facility] Spine plain films dated 3/2/2020 revealed: severe disc space narrowing at C4-5, moderate at C5-6, and the space between C6-7 was not well visualized. Small anterior osteophytes noted at C4, C5 and C6. C7 not visualized due to shoulder pathology. No acute pathology identified.  Lumbar spine CT w/o contrast dated 7/20/14  per report revealed L3 through L5 laminectomies with L3 through S1 dorsal hardware fusion. There was no evidence for fractures. There was advanced degenerative disc disease at L2-L3 at the junctional level with marked central canal stenosis. C Spine MRI dated 3/15/2020 films independently reviewed. Per report, at C3-4: anterolisthesis.  3.5 mm central disc extrusion extending 4 mm cephalad. Mild central stenosis and subtle ventral cord flattening. Advanced left facet arthropathy. At C4-5: mild posterior spondylotic ridge. Mild central stenosis and subtle ventral cord flattening. At C5-6: central to right posterolateral 2.5 mm broad-based disc protrusion with subtle ventral cord flattening and mild central stenosis. At C6-7: mild posterior spondylotic ridge. Mild to moderate central stenosis. At C7-T1: anterolisthesis. Mild posterior annular bulge. Mild central stenosis. Upper thoracic spondylosis including a 5 mm posterior disc extension at T3-4. The patient returns today with low back pain radiating into BLE in a L4 distribution on the right and in a S1 distribution on the left to the greater toe and chronic neck pain which became progressive x 12/2019 radiating into BUE (L>R) to the hands. She rates her pain 7/10, previously 7/10. Note from Dr. Marko Shook dated 8/7/2020: It would appear that her left shoulder may need replacement in her right shoulder may need revision. At this time with regards to her cervical spine I have nothing to offer her I do not think her pain is emanating from her spine. I would imagine it would be best for Dr. Allison Parnell to take the lead in the situation and decide what further treatments for her shoulders are required. Given acute Rx of Flexeril 10 mg TID. Patient reports a benefit in sleeping while taking Flexeril and inquired about making it a new medication. Patient understands that it is not a long-term medication.  reviewed. Body mass index is 36.37 kg/m².     PCP: Josue Toscano MD      Past Medical History:   Diagnosis Date    Ankle fracture     Aortic stenosis     S/P AVR in 2016    Asthma     Carpal tunnel syndrome on both sides     Chronic pain     back pain    Diabetes ([facility] [address]     Diverticulitis     Fibromyalgia     H/O aortic valve replacement 03/16    21 mm bovine pericardial bioprosthesis and epiaortic scanning   Hypertension     PT denies    Hypothyroidism     Lumbar post-laminectomy syndrome     Menopause     Obesity     Osteoarthritis     Psychotic disorder ([facility] [address]     Sciatica     Skin cancer 2013    right forearm.          Social History     Socioeconomic History    Marital status:      Spouse name: Not on file    Number of children: Not on file    Years of education: Not on file    Highest education level: Not on file   Occupational History    Not on file   Social Needs    Financial resource strain: Not on file    Food insecurity     Worry: Not on file     Inability: Not on file    Transportation needs     Medical: Not on file     Non-medical: Not on file   Tobacco Use    Smoking status: Former Smoker     Years: 45.00     Types: Cigarettes     Last attempt to quit: 2011     Years since quittin.2    Smokeless tobacco: Never Used   Substance and Sexual Activity    Alcohol use: No    Drug use: No    Sexual activity: Never   Lifestyle    Physical activity     Days per week: Not on file     Minutes per session: Not on file    Stress: Not on file   Relationships    Social connections     Talks on phone: Not on file     Gets together: Not on file     Attends Mosque service: Not on file     Active member of club or organization: Not on file     Attends meetings of clubs or organizations: Not on file     Relationship status: Not on file    Intimate partner violence     Fear of current or ex partner: Not on file     Emotionally abused: Not on file     Physically abused: Not on file     Forced sexual activity: Not on file   Other Topics Concern    Not on file   Social History Narrative    Not on file       Current Outpatient Medications   Medication Sig Dispense Refill    DULoxetine (CYMBALTA) 30 mg capsule TAKE 1 CAPSULE BY MOUTH THREE TIMES DAILY 90 Cap 0    metFORMIN (GLUCOPHAGE) 500 mg tablet TAKE 1 TABLET BY MOUTH ONCE DAILY WITH  DINNER. 90 Tab 0    fluticasone propionate (FLONASE) 50 mcg/actuation nasal spray Use 1 spray(s) in each nostril twice daily 16 g 0    traMADoL (Ultram) 50 mg tablet Take 1 Tab by mouth every six (6) hours as needed for Pain for up to 30 days. Max Daily Amount: 200 mg. 120 Tab 0    levothyroxine (SYNTHROID) 50 mcg tablet TAKE 1 TABLET BY MOUTH ONCE DAILY BEFORE BREAKFAST 90 Tab 0    cyclobenzaprine (FLEXERIL) 10 mg tablet Take 1 Tab by mouth three (3) times daily as needed for Muscle Spasm(s). 30 Tab 0    clobetasoL (TEMOVATE) 0.05 % external solution APPLY TO SCALP AFTER SHAMPOO DAILY 50 mL 3    desonide (TRIDESILON) 0.05 % cream Apply  to affected area two (2) times a day. 15 g 0    atorvastatin (LIPITOR) 20 mg tablet Take 1 tablet by mouth once daily 90 Tab 0    glucose blood VI test strips (True Metrix Pro Test Strip) strip Test blood glucose 3 times a day 200 Strip 3    pantoprazole (PROTONIX) 20 mg tablet Take 1 Tab by mouth daily. 90 Tab 3    meloxicam (MOBIC) 15 mg tablet TAKE 1 TABLET BY MOUTH DAILY 90 Tab 3    meclizine (ANTIVERT) 25 mg tablet Take 1 Tab by mouth three (3) times daily as needed for Dizziness. 60 Tab 3    FLUoxetine (PROZAC) 10 mg capsule TAKE 1 CAPSULE BY MOUTH ONCE DAILY. GENERICFOR PROZAC. 90 Cap 3    STIOLTO RESPIMAT 2.5-2.5 mcg/actuation inhaler Take  by inhalation. 1    vitamin E acetate (VITAMIN E PO) Take  by mouth.  cyanocobalamin (VITAMIN B-12) 500 mcg tablet Take 500 mcg by mouth daily.  cetirizine (ZYRTEC) 10 mg tablet TAKE 1 TABLET BY MOUTH EVERY DAY. GENERIC FOR ZYRTEC 90 Tab 2    VENTOLIN HFA 90 mcg/actuation inhaler 2 Puffs by Aerosolization route every four (4) hours as needed. 0    B.infantis-B.ani-B.long-B.bifi (PROBIOTIC 4X) 10-15 mg TbEC Take  by mouth daily.  aspirin delayed-release 81 mg tablet Take 81 mg by mouth daily.  Cholecalciferol, Vitamin D3, (VITAMIN D3) 1,000 unit cap Take 1,000 Units by mouth daily.       Lancets (ACCU-CHEK SOFTCLIX LANCETS) [facility] Please use one lancet to test blood sugars twice a day. 1 Package 20    BLOOD-GLUCOSE METER (ACCU-CHEK MARCE MONITORING) by Does Not Apply route.  triamcinolone acetonide (KENALOG) 0.1 % topical cream Apply  to affected area two (2) times a day. use thin layer      cephALEXin (KEFLEX) 500 mg capsule       traZODone (DESYREL) 100 mg tablet Take 1 Tab by mouth nightly. 30 Tab 1    predniSONE (DELTASONE) 10 mg tablet   0    docusate sodium (COLACE) 50 mg capsule 50 mg. Allergies   Allergen Reactions    Latex, Natural Rubber Hives    Adhesive Rash     Some bandaids    Ciprofloxacin Rash    Codeine Nausea and Vomiting    Demerol [Meperidine] Nausea and Vomiting    Diclofenac Shortness of Breath and Palpitations    Erythromycin Rash    Levaquin [Levofloxacin] Rash    Loratadine Swelling    Morphine Nausea and Vomiting    Penicillin G Hives and Rash     Does fine with Keflex    Sulfa (Sulfonamide Antibiotics) Itching    Vicodin [Hydrocodone-Acetaminophen] Other (comments)     Metallic taste in mouth          PHYSICAL EXAMINATION    Visit Vitals  /83 (BP 1 Location: Right arm, BP Patient Position: Sitting)   Pulse 89   Temp 97.8 °F (36.6 °C)   Resp 16   Ht 5' 8\" (1.727 m)   Wt 239 lb 3.2 oz (108.5 kg)   SpO2 100%   BMI 36.37 kg/m²       CONSTITUTIONAL: NAD, A&O x 3  SENSATION: Intact to light touch throughout  NEURO: Belen's is negative bilaterally. RANGE OF MOTION: The patient has full passive range of motion in all four extremities. MOTOR:  Straight Leg Raise: Not Tested, bilateral    Ambulates with a single point cane.      Shoulder AB/Flex Elbow Flex Wrist Ext Elbow Ext Wrist Flex Hand Intrin Tone   Right +4/5 +4/5 +4/5 +4/5 +4/5 +4/5 +4/5   Left +4/5 +4/5 +4/5 +4/5 +4/5 +4/5 +4/5              Hip Flex Knee Ext Knee Flex Ankle DF GTE Ankle PF Tone   Right +4/5 +4/5 +4/5 +4/5 +4/5 +4/5 +4/5   Left +4/5 +4/5 +4/5 +4/5 +4/5 +4/5 +4/5       ASSESSMENT   Diagnoses and all orders for this visit:    1. HNP (herniated nucleus pulposus) with myelopathy, cervical  -     traMADoL (Ultram) 50 mg tablet; Take 1 Tab by mouth every six (6) hours as needed for Pain for up to 30 days. Max Daily Amount: 200 mg.    2. Cervical spondylosis without myelopathy  -     traMADoL (Ultram) 50 mg tablet; Take 1 Tab by mouth every six (6) hours as needed for Pain for up to 30 days. Max Daily Amount: 200 mg.    3. Cervical spinal stenosis  -     traMADoL (Ultram) 50 mg tablet; Take 1 Tab by mouth every six (6) hours as needed for Pain for up to 30 days. Max Daily Amount: 200 mg.    4. Cervical neuritis  -     traMADoL (Ultram) 50 mg tablet; Take 1 Tab by mouth every six (6) hours as needed for Pain for up to 30 days. Max Daily Amount: 200 mg.    5. Lumbar neuritis  -     traMADoL (Ultram) 50 mg tablet; Take 1 Tab by mouth every six (6) hours as needed for Pain for up to 30 days. Max Daily Amount: 200 mg.    6. Lumbar post-laminectomy syndrome  -     traMADoL (Ultram) 50 mg tablet; Take 1 Tab by mouth every six (6) hours as needed for Pain for up to 30 days. Max Daily Amount: 200 mg.    7. Spondylolisthesis, acquired  -     traMADoL (Ultram) 50 mg tablet; Take 1 Tab by mouth every six (6) hours as needed for Pain for up to 30 days. Max Daily Amount: 200 mg.    8. Carpal tunnel syndrome, bilateral  -     REFERRAL TO ORTHOPEDICS    9. Cubital tunnel syndrome, right  -     REFERRAL TO ORTHOPEDICS          IMPRESSION AND PLAN:  Patient returns to the office today with c/o low back and neck pain. Multiple treatment options were discussed. I will refer patient to Dr. Melvin Jha for carpal tunnel syndrome and cubital tunnel syndrome. I will refill Tramadol until next office visit. Advised patient to see Dr. Alta Barros for shoulder when she is ready to have surgery. Patient is neurologically intact. I will see the patient back in 3 month's time or earlier if needed.       Written by Genaro Mckinney, as dictated by Dalia Sanchez Marguerite Aschoff, MD  I examined the patient, reviewed and agree with the note.

## 2020-10-08 NOTE — LETTER
10/8/20 Patient: Eliza Garcia YOB: 1950 Date of Visit: 10/8/2020 dOin Cohen MD 
[address] [place] [id number] VIA In Basket Dear Odin Cohen MD, Thank you for referring Ms. Jada Corona to [address] [place] [address] for evaluation. My notes for this consultation are attached. If you have questions, please do not hesitate to call me. I look forward to following your patient along with you. Sincerely, Nafisa Garvin MD

## 2020-10-08 NOTE — PROGRESS NOTES
Aramshaji Araujo presents today for   Chief Complaint   Patient presents with   Kesha Matthewsaña 40 preferred language for health care discussion is english/other. Is someone accompanying this pt? no    Is the patient using any DME equipment during [address]? Yes cane    Depression Screening:  3 most recent PHQ Screens 12/12/2019   Little interest or pleasure in doing things Not at all   Feeling down, depressed, irritable, or hopeless Not at all   Total Score PHQ 2 0       Learning Assessment:  Learning Assessment 4/22/2016   PRIMARY LEARNER Patient   HIGHEST LEVEL OF EDUCATION - PRIMARY LEARNER  -   PRIMARY LANGUAGE ENGLISH   LEARNER PREFERENCE PRIMARY LISTENING   ANSWERED BY patient   RELATIONSHIP SELF       Abuse Screening:  Abuse Screening Questionnaire 12/12/2019   Do you ever feel afraid of your partner? N   Are you in a relationship with someone who physically or mentally threatens you? N   Is it safe for you to go home? Y       Fall Risk  Fall Risk Assessment, last 12 mths 10/8/2020   Able to walk? Yes   Fall in past 12 months? No   Fall with injury? -   Number of falls in past 12 months -   Fall Risk Score -       Pt currently taking Anticoagulant therapy? no    Coordination of Care:  1. Have you been to the ER, urgent care clinic since your last visit? Hospitalized since your last visit? no    2. Have you seen or consulted any other health care providers outside of the [address] [place] [address] since your last visit? Include any pap smears or colon screening.   yes

## 2020-10-26 ENCOUNTER — OFFICE VISIT (OUTPATIENT)
Dept: ORTHOPEDIC SURGERY | Age: 70
End: 2020-10-26
Payer: MEDICARE

## 2020-10-26 VITALS
TEMPERATURE: 97.6 F | RESPIRATION RATE: 16 BRPM | OXYGEN SATURATION: 99 % | WEIGHT: 237.6 LBS | HEART RATE: 81 BPM | SYSTOLIC BLOOD PRESSURE: 117 MMHG | HEIGHT: 68 IN | DIASTOLIC BLOOD PRESSURE: 76 MMHG | BODY MASS INDEX: 36.01 KG/M2

## 2020-10-26 DIAGNOSIS — G56.03 BILATERAL CARPAL TUNNEL SYNDROME: ICD-10-CM

## 2020-10-26 DIAGNOSIS — G56.21 CUBITAL TUNNEL SYNDROME, RIGHT: ICD-10-CM

## 2020-10-26 DIAGNOSIS — M18.0 PRIMARY OSTEOARTHRITIS OF BOTH FIRST CARPOMETACARPAL JOINTS: Primary | ICD-10-CM

## 2020-10-26 DIAGNOSIS — G56.22 ULNAR NEUROPATHY AT WRIST, LEFT: ICD-10-CM

## 2020-10-26 PROCEDURE — 99214 OFFICE O/P EST MOD 30 MIN: CPT | Performed by: ORTHOPAEDIC SURGERY

## 2020-10-26 PROCEDURE — G9899 SCRN MAM PERF RSLTS DOC: HCPCS | Performed by: ORTHOPAEDIC SURGERY

## 2020-10-26 PROCEDURE — G9717 DOC PT DX DEP/BP F/U NT REQ: HCPCS | Performed by: ORTHOPAEDIC SURGERY

## 2020-10-26 PROCEDURE — 20526 THER INJECTION CARP TUNNEL: CPT | Performed by: ORTHOPAEDIC SURGERY

## 2020-10-26 PROCEDURE — 1101F PT FALLS ASSESS-DOCD LE1/YR: CPT | Performed by: ORTHOPAEDIC SURGERY

## 2020-10-26 PROCEDURE — G8536 NO DOC ELDER MAL SCRN: HCPCS | Performed by: ORTHOPAEDIC SURGERY

## 2020-10-26 PROCEDURE — 1090F PRES/ABSN URINE INCON ASSESS: CPT | Performed by: ORTHOPAEDIC SURGERY

## 2020-10-26 PROCEDURE — G8417 CALC BMI ABV UP PARAM F/U: HCPCS | Performed by: ORTHOPAEDIC SURGERY

## 2020-10-26 PROCEDURE — 73110 X-RAY EXAM OF WRIST: CPT | Performed by: ORTHOPAEDIC SURGERY

## 2020-10-26 PROCEDURE — 20600 DRAIN/INJ JOINT/BURSA W/O US: CPT | Performed by: ORTHOPAEDIC SURGERY

## 2020-10-26 PROCEDURE — G8427 DOCREV CUR MEDS BY ELIG CLIN: HCPCS | Performed by: ORTHOPAEDIC SURGERY

## 2020-10-26 PROCEDURE — 3017F COLORECTAL CA SCREEN DOC REV: CPT | Performed by: ORTHOPAEDIC SURGERY

## 2020-10-26 PROCEDURE — G8399 PT W/DXA RESULTS DOCUMENT: HCPCS | Performed by: ORTHOPAEDIC SURGERY

## 2020-10-26 RX ORDER — TRIAMCINOLONE ACETONIDE 10 MG/ML
10 INJECTION, SUSPENSION INTRA-ARTICULAR; INTRALESIONAL ONCE
Qty: 1 VIAL | Refills: 0
Start: 2020-10-26 | End: 2020-10-26

## 2020-10-26 NOTE — PROGRESS NOTES
Odelia Severe is a 79 y.o. female right handed retiree. Worker's Compensation and legal considerations: not known. Vitals:    10/26/20 1308   BP: 117/76   Pulse: 81   Resp: 16   Temp: 97.6 °F (36.4 °C)   TempSrc: Temporal   SpO2: 99%   Weight: 237 lb 9.6 oz (107.8 kg)   Height: 5' 8\" (1.727 m)   PainSc:   7   PainLoc: Hand           Chief Complaint   Patient presents with    Hand Pain     ramonita hand pain         HPI: Patient comes in today with complaints of bilateral thumb base pain and bilateral numbness and tingling in the hands. She has recently had an EMG. Date of onset: Indeterminate    Injury: No    Prior Treatment:  No    Numbness/ Tingling: Yes: Comment: Bilateral hands right worse than left      ROS: Review of Systems - General ROS: negative  Psychological ROS: negative  ENT ROS: negative  Allergy and Immunology ROS: negative  Hematological and Lymphatic ROS: negative  Respiratory ROS: no cough, shortness of breath, or wheezing  Cardiovascular ROS: no chest pain or dyspnea on exertion  Gastrointestinal ROS: no abdominal pain, change in bowel habits, or black or bloody stools  Musculoskeletal ROS: positive for - pain in hand - bilateral  Neurological ROS: positive for - numbness/tingling  Dermatological ROS: negative    Past Medical History:   Diagnosis Date    Ankle fracture     Aortic stenosis     S/P AVR in 2016    Asthma     Carpal tunnel syndrome on both sides     Chronic pain     back pain    Diabetes ([address]     Diverticulitis     Fibromyalgia     H/O aortic valve replacement 03/2016    21 mm bovine pericardial bioprosthesis and epiaortic scanning      Hypertension     PT denies    Hypothyroidism     Lumbar post-laminectomy syndrome     Menopause     Obesity     Osteoarthritis     Psychotic disorder ([address]     Sciatica     Skin cancer 2013    right forearm.         Past Surgical History:   Procedure Laterality Date    CARDIAC SURG PROCEDURE UNLIST  03/2016    aortic valve replacement.  CHEST SURGERY PROCEDURE UNLISTED  03/2016    Open Heart Surgery    COLONOSCOPY      COLONOSCOPY N/A 9/15/2017    COLONOSCOPY performed by Nita Mo MD at [facility]  Right 1995    lens  replaced.  HX CATARACT REMOVAL Left 02/2018    HX COLONOSCOPY  09/15/2017    dr. Mindy Butler  f/u 5 years.  HX HEENT  1990's    sinus surgery     HX HEENT Right 11/02/2017    laser for right eye cataracts.  HX LUMBAR FUSION  2004    L3-L4-L5    HX LUMBAR LAMINECTOMY  2002    HX MOHS PROCEDURES Left 1990    HX ORTHOPAEDIC      Rotator cuff Bilateral    HX SHOULDER REPLACEMENT Right 03/29/2017    reverse.  [place] COLSC FLX W/RMVL OF TUMOR POLYP LESION SNARE TQ  07/09/2014 repeat in 3 years    Dr. Jeremy Terry      Aortic valve replacement       Current Outpatient Medications   Medication Sig Dispense Refill    triamcinolone acetonide (Kenalog) 10 mg/mL injection 1 mL by IntraMUSCular route once for 1 dose. 1 Vial 0    traMADoL (Ultram) 50 mg tablet Take 1 Tab by mouth every six (6) hours as needed for Pain for up to 30 days. Max Daily Amount: 200 mg. 120 Tab 0    DULoxetine (CYMBALTA) 30 mg capsule TAKE 1 CAPSULE BY MOUTH THREE TIMES DAILY 90 Cap 0    metFORMIN (GLUCOPHAGE) 500 mg tablet TAKE 1 TABLET BY MOUTH ONCE DAILY WITH  DINNER. 90 Tab 0    fluticasone propionate (FLONASE) 50 mcg/actuation nasal spray Use 1 spray(s) in each nostril twice daily 16 g 0    levothyroxine (SYNTHROID) 50 mcg tablet TAKE 1 TABLET BY MOUTH ONCE DAILY BEFORE BREAKFAST 90 Tab 0    cyclobenzaprine (FLEXERIL) 10 mg tablet Take 1 Tab by mouth three (3) times daily as needed for Muscle Spasm(s). 30 Tab 0    clobetasoL (TEMOVATE) 0.05 % external solution APPLY TO SCALP AFTER SHAMPOO DAILY 50 mL 3    desonide (TRIDESILON) 0.05 % cream Apply  to affected area two (2) times a day.  15 g 0    atorvastatin (LIPITOR) 20 mg tablet Take 1 tablet by mouth once daily 90 Tab 0    cephALEXin (KEFLEX) 500 mg capsule       glucose blood VI test strips (True Metrix Pro Test Strip) strip Test blood glucose 3 times a day 200 Strip 3    pantoprazole (PROTONIX) 20 mg tablet Take 1 Tab by mouth daily. 90 Tab 3    meloxicam (MOBIC) 15 mg tablet TAKE 1 TABLET BY MOUTH DAILY 90 Tab 3    meclizine (ANTIVERT) 25 mg tablet Take 1 Tab by mouth three (3) times daily as needed for Dizziness. 60 Tab 3    FLUoxetine (PROZAC) 10 mg capsule TAKE 1 CAPSULE BY MOUTH ONCE DAILY. GENERICFOR PROZAC. 90 Cap 3    STIOLTO RESPIMAT 2.5-2.5 mcg/actuation inhaler Take  by inhalation. 1    docusate sodium (COLACE) 50 mg capsule 50 mg.      vitamin E acetate (VITAMIN E PO) Take  by mouth.  cyanocobalamin (VITAMIN B-12) 500 mcg tablet Take 500 mcg by mouth daily.  cetirizine (ZYRTEC) 10 mg tablet TAKE 1 TABLET BY MOUTH EVERY DAY. GENERIC FOR ZYRTEC 90 Tab 2    VENTOLIN HFA 90 mcg/actuation inhaler 2 Puffs by Aerosolization route every four (4) hours as needed. 0    B.infantis-B.ani-B.long-B.bifi (PROBIOTIC 4X) 10-15 mg TbEC Take  by mouth daily.  aspirin delayed-release 81 mg tablet Take 81 mg by mouth daily.  Cholecalciferol, Vitamin D3, (VITAMIN D3) 1,000 unit cap Take 1,000 Units by mouth daily.  Lancets (ACCU-CHEK SOFTCLIX LANCETS) misc Please use one lancet to test blood sugars twice a day. 1 Package 20    BLOOD-GLUCOSE METER (ACCU-CHEK MARCE MONITORING) by Does Not Apply route.          Allergies   Allergen Reactions    Latex, Natural Rubber Hives    Adhesive Rash     Some bandaids    Ciprofloxacin Rash    Codeine Nausea and Vomiting    Demerol [Meperidine] Nausea and Vomiting    Diclofenac Shortness of Breath and Palpitations    Erythromycin Rash    Levaquin [Levofloxacin] Rash    Loratadine Swelling    Morphine Nausea and Vomiting    Penicillin G Hives and Rash     Does fine with Keflex    Sulfa (Sulfonamide Antibiotics) Itching    Vicodin [Hydrocodone-Acetaminophen] Other (comments)     Metallic taste in mouth           PE:     Physical Exam  Vitals signs and nursing note reviewed. Constitutional:       General: She is not in acute distress. Appearance: Normal appearance. She is not ill-appearing. Eyes:      Extraocular Movements: Extraocular movements intact. Pupils: Pupils are equal, round, and reactive to light. Neck:      Musculoskeletal: Normal range of motion and neck supple. Cardiovascular:      Pulses: Normal pulses. Pulmonary:      Effort: Pulmonary effort is normal. No respiratory distress. Abdominal:      General: Abdomen is flat. There is no distension. Musculoskeletal: Normal range of motion. General: Tenderness present. No swelling, deformity or signs of injury. Right lower leg: No edema. Left lower leg: No edema. Skin:     General: Skin is warm and dry. Capillary Refill: Capillary refill takes less than 2 seconds. Findings: No bruising or erythema. Neurological:      General: No focal deficit present. Mental Status: She is alert and oriented to person, place, and time. Psychiatric:         Mood and Affect: Mood normal.         Behavior: Behavior normal.            NEUROVASCULAR    Examination L R Examination L R   Carpal Comp. + + Pronator Comp. - -   Carpal Tinel + + Pronator Tinel - -   Phalen's + + Pronator Stress - -   Cubital Comp. - + Guyon Comp. ? -   Cubital Tinel - + Guyon Tinel ? -   Elbow Hyperflexion - + Adson's - -   Spurling's - - SC Comp. - -   PCB Median abn - - SC Tinel - -   Radial Tinel - - IC Comp.  - -   Digital Tinel - - IC Tinel - -   Radial 2-Pt WNL WNL Ulnar 2-Pt WNL WNL     Radial Pulse: 2+  Capillary Refill: < 2 sec  Jamal: Not Performed  Digital Jamal: Not Performed    Hand:    Examination L Digit(s) R Digit(s)   1st CMC Tenderness +  +    1st CMC Grind +  +    Zaina Nodes -  -    Heberden Nodes -  -    A1 Pulley Tenderness -  -    Triggering - -    UCL Instability -  -    RCL Instability -  -    Lateral Stress Pain -  -    Palmar Cords -  -    Tabletop test -  -    Garrod's Pads -  -     Strength       Pinch Strength         ROM: Full        NCV & EMG Findings:  Evaluation of the left median motor and the right median motor nerves showed prolonged distal onset latency (L4.5, R4.7 ms). The right ulnar motor nerve showed decreased conduction velocity (A Elbow-B Elbow, 42 m/s). The left median sensory and the right median sensory nerves showed prolonged distal peak latency (L4.1, R4.2 ms) and decreased conduction velocity (Wrist-2nd Digit, L34, R33 m/s). The left ulnar sensory nerve showed prolonged distal peak latency (3.9 ms) and decreased conduction velocity (Wrist-5th Digit, 36 m/s). The right ulnar sensory nerve showed prolonged distal peak latency (4.2 ms), reduced amplitude (9.8 µV), and decreased conduction velocity (Wrist-5th Digit, 33 m/s). All remaining nerves (as indicated in the following tables) were within normal limits. All left vs. right side differences were within normal limits.       All examined muscles (as indicated in the following table) showed no evidence of electrical instability.       INTERPRETATION  This is an abnormal electrodiagnostic examination. These findings may be consistent with:   1. Moderate median mononeuropathy at the wrist bilaterally, R>L (carpal tunnel syndrome)  2. Moderate ulnar mononeuropathy at the right elbow (cubital tunnel syndrome)  3. Mild ulnar mononeuropathy at the left wrist.     There is no electrodiagnostic evidence of any cervical radiculopathy, brachial plexopathy, peripheral polyneuropathy, or any other mononeuropathy.        CLINICAL INTERPRETATION  Her electrodiagnostic findings of bilateral carpal tunnel and ulnar mononeuropathies may explain some of her bilateral hand symptoms. There is no electrodiagnostic evidence to explain more proximal symptoms.     Imaging:     10/26/2020 plain films of bilateral wrists are significant for Eaton stage III-IV degenerative changes at the [facility] [place] joint on the right and Eaton stage II degenerative changes at the [facility] [place] joint on the left. There may also be evidence of radio scaphoid arthritis at the level of the styloid. This is bilateral.        ICD-10-CM ICD-9-CM    1. Primary osteoarthritis of both first carpometacarpal joints  M18.0 715.14 AMB POC XRAY, WRIST; COMPLETE, 3+ VIE      AMB POC XRAY, WRIST; COMPLETE, 3+ VIE      TRIAMCINOLONE ACETONIDE INJ      triamcinolone acetonide (Kenalog) 10 mg/mL injection      DRAIN/INJECT SMALL JOINT/BURSA      AMB SUPPLY ORDER   2. Bilateral carpal tunnel syndrome  G56.03 354.0 TRIAMCINOLONE ACETONIDE INJ      triamcinolone acetonide (Kenalog) 10 mg/mL injection      INJECT CARPAL TUNNEL      AMB SUPPLY ORDER   3. Cubital tunnel syndrome, right  G56.21 354.2    4. Ulnar neuropathy at wrist, left  G56.22 354.2          Plan:     Bilateral carpal tunnel injections and bilateral thumb CMC joint injections. Bilateral cool comfort braces for daytime active wear and bilateral carpal tunnel braces for nighttime wear. I discussed the possibility of surgery in the future with regards to her carpal tunnel syndrome cubital tunnel syndrome and may also need surgery for her arthritis. Follow-up and Dispositions    · Return in about 3 months (around 1/26/2021) for Reevaluation and possible surgical discussion.           Plan was reviewed with patient, who verbalized agreement and understanding of the plan    [address] NOTE        Chart reviewed for the following:   IYonathan DO, have reviewed the History, Physical and updated the Allergic reactions for [address] performed immediately prior to start of procedure:   IJailyn DO, have performed the following reviews on [place] prior to the start of the procedure:            * Patient was identified by name and date of birth   * Agreement on procedure being performed was verified  * Risks and Benefits explained to the patient  * Procedure site verified and marked as necessary  * Patient was positioned for comfort  * Consent was signed and verified     Time: 13:44      Date of procedure: 10/26/2020    Procedure performed by: Priscilla Warren DO    Provider assisted by: Aaron Antonio LPN    Patient assisted by: self    How tolerated by patient: tolerated the procedure well with no complications    Post Procedural Pain Scale: 0 - No Hurt    Comments: none    Procedure:  After consent was obtained, using sterile technique the joint and carpal tunnel was prepped. Local anesthetic used: 1% lidocaine. Kenalog 5 mg X4 and was then injected and the needle withdrawn. The procedure was well tolerated. The patient is asked to continue to rest the area for a few more days before resuming regular activities. It may be more painful for the first 1-2 days. Watch for fever, or increased swelling or persistent pain in the joint. Call or return to clinic prn if such symptoms occur or there is failure to improve as anticipated.

## 2020-10-26 NOTE — PATIENT INSTRUCTIONS
Learning About Arthritis at the [facility] of [facility]  What is it? Arthritis at the base of the thumb joint is wear and tear on the cartilage. Cartilage is a firm, thick, slippery tissue. It covers and protects the ends of bones where they meet to form a joint. When you have arthritis, there are changes in the cartilage that cause it to break down. The bones rub together and cause joint damage and pain. What causes it? Experts don't know what causes arthritis at the base of the thumb. But aging, a lot of use, an injury, or family history may play a part. What are the symptoms? Symptoms of arthritis at the base of the thumb include aching in your joint. Or the pain may feel burning or sharp. You may feel clicking, creaking, or catching in the joint. It may get stiff. You may have more pain and less strength when you pinch or  things. Symptoms may come and go, stay the same, or get worse over time. How is it diagnosed? Your doctor can often diagnose arthritis by asking you questions about your joint pain and other symptoms and examining you. You may also have X-rays and blood tests. Blood tests can help make sure another disease isn't causing your symptoms. How is it treated? Arthritis at the base of your thumb may be treated with rest, pain relievers, steroid medicines, using a brace or splint, and--in some cases--surgery. To help relieve pain in the joint, rest your sore hand. Switch hands for some activities. You can try heat and cold therapy, such as hot compresses, paraffin wax, cold packs, or ice massage. Your doctor may give you a splint to wear during some activities or when pain flares up. You can often manage mild or moderate arthritis pain with over-the-counter pain relievers. These include medicines that reduce swelling, such as ibuprofen or naproxen. You can also use acetaminophen. Sometimes these medicines are in creams that you can rub on your thumb and hand.  Your doctor may also prescribe other medicine for your pain. For some people, steroid shots may be an option. If none of the treatments work, your doctor may discuss surgery with you. Follow-up care is a key part of your treatment and safety. Be sure to make and go to all appointments, and call your doctor if you are having problems. It's also a good idea to know your test results and keep a list of the medicines you take. Where can you learn more? Go to http://www.gray.com/  Enter T110 in the search box to learn more about \"Learning About Arthritis at the [facility] of the [facility]. \"  Current as of: December 9, 2019               Content Version: 12.6  © 4678-2138 [employer]. Care instructions adapted under license by [employer] (which disclaims liability or warranty for this information). If you have questions about a medical condition or this instruction, always ask your healthcare professional. [place] [address] any warranty or liability for your use of this information. Carpal Tunnel Syndrome: Care Instructions  Overview     Carpal tunnel syndrome is numbness, tingling, weakness, and pain in your hand, wrist, and sometimes forearm. It is caused by pressure on the median nerve. This nerve and several tough tissues called tendons run through a space in the wrist. This space is called the carpal tunnel. The repeated hand motions used in work and some hobbies and sports can put pressure on the median nerve. Pregnancy can cause carpal tunnel syndrome. Several conditions, such as diabetes, arthritis, and an underactive thyroid, can also cause it. You may be able to limit an activity or change the way you do it to reduce your symptoms. You also can take other steps to feel better. If your symptoms are mild, 1 to 2 weeks of home treatment are likely to ease your pain. Surgery is needed only if other treatments do not work.   Follow-up care is a key part of your treatment and safety. Be sure to make and go to all appointments, and call your doctor if you are having problems. It's also a good idea to know your test results and keep a list of the medicines you take. How can you care for yourself at home? · If possible, stop or reduce the activity that causes your symptoms. If you cannot stop the activity, take frequent breaks to rest and stretch or change hand positions to do a task. Try switching hands, such as when using a computer mouse. · Try to avoid bending or twisting your wrists. · Ask your doctor if you can take an over-the-counter pain medicine, such as acetaminophen (Tylenol), ibuprofen (Advil, Motrin), or naproxen (Aleve). Be safe with medicines. Read and follow all instructions on the label. · If your doctor prescribes corticosteroid medicine to help reduce pain and swelling, take it exactly as prescribed. Call your doctor if you think you are having a problem with your medicine. · Put ice or a cold pack on your wrist for 10 to 20 minutes at a time to ease pain. Put a thin cloth between the ice and your skin. · If your doctor or your physical or occupational therapist tells you to wear a wrist splint, wear it as directed to keep your wrist in a neutral position. This also eases pressure on your median nerve. · Ask your doctor whether you should have physical or occupational therapy to learn how to do tasks differently. · Try a yoga class to stretch your muscles and build strength in your hands and wrists. Yoga has been shown to ease carpal tunnel symptoms. To prevent carpal tunnel  · When working at a computer, keep your hands and wrists in line with your forearms. Hold your elbows close to your sides. Take a break every 10 to 15 minutes. · Try these exercises:  ? Warm up: Rotate your wrist up, down, and from side to side. Repeat this 4 times. Stretch your fingers far apart, relax them, then stretch them again. Repeat 4 times.  Stretch your thumb by pulling it back gently, holding it, and then releasing it. Repeat 4 times. ? Prayer stretch: Start with your palms together in front of your chest just below your chin. Slowly lower your hands toward your waistline while keeping your hands close to your stomach and your palms together until you feel a mild to moderate stretch under your forearms. Hold for 10 to 20 seconds. Repeat 4 times. ? Wrist flexor stretch: Hold your arm in front of you with your palm up. Bend your wrist, pointing your hand toward the floor. With your other hand, gently bend your wrist further until you feel a mild to moderate stretch in your forearm. Hold for 10 to 20 seconds. Repeat 4 times. ? Wrist extensor stretch: Repeat the steps for the wrist flexor stretch, but begin with your extended hand palm down. · Squeeze a rubber exercise ball several times a day to keep your hands and fingers strong. · Avoid holding objects (such as a book) in one position for a long time. When possible, use your whole hand to grasp an object. Using just the thumb and index finger can put stress on the wrist.  · Do not smoke. It can make this condition worse by reducing blood flow to the median nerve. If you need help quitting, talk to your doctor about stop-smoking programs and medicines. These can increase your chances of quitting for good. When should you call for help? Watch closely for changes in your health, and be sure to contact your doctor if:    · Your pain or other problems do not get better with home care.     · You want more information about physical or occupational therapy.     · You have side effects of your corticosteroid medicine, such as:  ? Weight gain. ? Mood changes. ? Trouble sleeping. ? Bruising easily.     · You have any other problems with your medicine. Where can you learn more?   Go to http://www.gray.com/  Enter R432 in the search box to learn more about \"Carpal Tunnel Syndrome: Care Instructions. \"  Current as of: March 2, 2020               Content Version: 12.6  © 5337-1045 [employer]. Care instructions adapted under license by [employer] (which disclaims liability or warranty for this information). If you have questions about a medical condition or this instruction, always ask your healthcare professional. [place] [address] any warranty or liability for your use of this information.

## 2020-11-01 DIAGNOSIS — E78.5 HYPERLIPIDEMIA LDL GOAL <100: ICD-10-CM

## 2020-11-01 DIAGNOSIS — J30.1 SEASONAL ALLERGIC RHINITIS DUE TO POLLEN: ICD-10-CM

## 2020-11-01 DIAGNOSIS — M79.7 FIBROMYALGIA: ICD-10-CM

## 2020-11-03 DIAGNOSIS — M50.00 HNP (HERNIATED NUCLEUS PULPOSUS) WITH MYELOPATHY, CERVICAL: ICD-10-CM

## 2020-11-03 DIAGNOSIS — M54.12 CERVICAL NEURITIS: ICD-10-CM

## 2020-11-03 DIAGNOSIS — M43.10 SPONDYLOLISTHESIS, ACQUIRED: ICD-10-CM

## 2020-11-03 DIAGNOSIS — M48.02 CERVICAL SPINAL STENOSIS: ICD-10-CM

## 2020-11-03 DIAGNOSIS — M47.812 CERVICAL SPONDYLOSIS WITHOUT MYELOPATHY: ICD-10-CM

## 2020-11-03 DIAGNOSIS — M54.16 LUMBAR NEURITIS: ICD-10-CM

## 2020-11-03 DIAGNOSIS — M96.1 LUMBAR POST-LAMINECTOMY SYNDROME: ICD-10-CM

## 2020-11-03 RX ORDER — FLUTICASONE PROPIONATE 50 MCG
SPRAY, SUSPENSION (ML) NASAL
Qty: 16 G | Refills: 4 | Status: SHIPPED | OUTPATIENT
Start: 2020-11-03 | End: 2021-06-08 | Stop reason: SDUPTHER

## 2020-11-03 RX ORDER — TRAMADOL HYDROCHLORIDE 50 MG/1
50 TABLET ORAL
Qty: 120 TAB | Refills: 1 | Status: SHIPPED | OUTPATIENT
Start: 2020-11-07 | End: 2021-01-06

## 2020-11-03 RX ORDER — ATORVASTATIN CALCIUM 20 MG/1
TABLET, FILM COATED ORAL
Qty: 90 TAB | Refills: 4 | Status: SHIPPED | OUTPATIENT
Start: 2020-11-03 | End: 2022-02-02

## 2020-11-03 RX ORDER — DULOXETIN HYDROCHLORIDE 30 MG/1
CAPSULE, DELAYED RELEASE ORAL
Qty: 90 CAP | Refills: 11 | Status: SHIPPED | OUTPATIENT
Start: 2020-11-03 | End: 2020-11-12 | Stop reason: ALTCHOICE

## 2020-11-03 NOTE — TELEPHONE ENCOUNTER
Last Visit: 10/8/20 with MD Floresita Barclay  Next Appointment: 1/7/21 with MD Floresita Barclay  Previous Refill Encounter(s): 10/8/20 #120    Requested Prescriptions     Pending Prescriptions Disp Refills    traMADoL (Ultram) 50 mg tablet 120 Tab 1     Sig: Take 1 Tab by mouth every six (6) hours as needed for Pain for up to 30 days. Max Daily Amount: 200 mg.

## 2020-11-03 NOTE — TELEPHONE ENCOUNTER
Patient called and is asking for two more refills on the Tramadol Medication from [address] [facility] on [address]  Tel. [phone]. Patient tel. [phone].

## 2020-11-11 DIAGNOSIS — E03.9 ACQUIRED HYPOTHYROIDISM: ICD-10-CM

## 2020-11-11 NOTE — TELEPHONE ENCOUNTER
Pt called in and informed me that she needs a new prescription sent in for her Prozac with Dr. Nichole Huff' name on it. Please advise.

## 2020-11-12 RX ORDER — FLUOXETINE 10 MG/1
1 CAPSULE ORAL
COMMUNITY
End: 2020-11-12 | Stop reason: SDUPTHER

## 2020-11-12 RX ORDER — LEVOTHYROXINE SODIUM 50 UG/1
TABLET ORAL
Qty: 90 TAB | Refills: 0 | Status: SHIPPED | OUTPATIENT
Start: 2020-11-12 | End: 2021-02-01

## 2020-11-12 RX ORDER — FLUOXETINE 10 MG/1
10 CAPSULE ORAL DAILY
Qty: 90 CAP | Refills: 3 | Status: SHIPPED | OUTPATIENT
Start: 2020-11-12 | End: 2021-11-02

## 2020-12-01 ENCOUNTER — VIRTUAL VISIT (OUTPATIENT)
Dept: FAMILY MEDICINE CLINIC | Age: 70
End: 2020-12-01
Payer: MEDICARE

## 2020-12-01 DIAGNOSIS — G56.03 CARPAL TUNNEL SYNDROME, BILATERAL: Primary | ICD-10-CM

## 2020-12-01 DIAGNOSIS — M79.7 FIBROMYALGIA: ICD-10-CM

## 2020-12-01 DIAGNOSIS — E03.9 ACQUIRED HYPOTHYROIDISM: ICD-10-CM

## 2020-12-01 DIAGNOSIS — E78.5 HYPERLIPIDEMIA LDL GOAL <100: ICD-10-CM

## 2020-12-01 DIAGNOSIS — E11.65 TYPE 2 DIABETES MELLITUS WITH HYPERGLYCEMIA, WITHOUT LONG-TERM CURRENT USE OF INSULIN (HCC): ICD-10-CM

## 2020-12-01 DIAGNOSIS — J30.1 SEASONAL ALLERGIC RHINITIS DUE TO POLLEN: ICD-10-CM

## 2020-12-01 PROCEDURE — 2022F DILAT RTA XM EVC RTNOPTHY: CPT | Performed by: FAMILY MEDICINE

## 2020-12-01 PROCEDURE — G9717 DOC PT DX DEP/BP F/U NT REQ: HCPCS | Performed by: FAMILY MEDICINE

## 2020-12-01 PROCEDURE — 99214 OFFICE O/P EST MOD 30 MIN: CPT | Performed by: FAMILY MEDICINE

## 2020-12-01 PROCEDURE — G8417 CALC BMI ABV UP PARAM F/U: HCPCS | Performed by: FAMILY MEDICINE

## 2020-12-01 PROCEDURE — G8399 PT W/DXA RESULTS DOCUMENT: HCPCS | Performed by: FAMILY MEDICINE

## 2020-12-01 PROCEDURE — 3046F HEMOGLOBIN A1C LEVEL >9.0%: CPT | Performed by: FAMILY MEDICINE

## 2020-12-01 PROCEDURE — 1101F PT FALLS ASSESS-DOCD LE1/YR: CPT | Performed by: FAMILY MEDICINE

## 2020-12-01 PROCEDURE — 3017F COLORECTAL CA SCREEN DOC REV: CPT | Performed by: FAMILY MEDICINE

## 2020-12-01 PROCEDURE — G9899 SCRN MAM PERF RSLTS DOC: HCPCS | Performed by: FAMILY MEDICINE

## 2020-12-01 PROCEDURE — 1090F PRES/ABSN URINE INCON ASSESS: CPT | Performed by: FAMILY MEDICINE

## 2020-12-01 PROCEDURE — G8536 NO DOC ELDER MAL SCRN: HCPCS | Performed by: FAMILY MEDICINE

## 2020-12-01 PROCEDURE — G8428 CUR MEDS NOT DOCUMENT: HCPCS | Performed by: FAMILY MEDICINE

## 2020-12-01 NOTE — PROGRESS NOTES
Margret Aguilar presents today for   Chief Complaint   Patient presents with    Diabetes    Insomnia    Fibromyalgia    Thyroid Problem       Is someone accompanying this pt? na    Is the patient using any DME equipment during OV? na    Depression Screening:  3 most recent PHQ Screens 12/1/2020   Little interest or pleasure in doing things Not at all   Feeling down, depressed, irritable, or hopeless Not at all   Total Score PHQ 2 0       Learning Assessment:  Learning Assessment 4/22/2016   PRIMARY LEARNER Patient   HIGHEST LEVEL OF EDUCATION - PRIMARY LEARNER  -   PRIMARY LANGUAGE ENGLISH   LEARNER PREFERENCE PRIMARY LISTENING   ANSWERED BY patient   RELATIONSHIP SELF       Abuse Screening:  Abuse Screening Questionnaire 12/12/2019   Do you ever feel afraid of your partner? N   Are you in a relationship with someone who physically or mentally threatens you? N   Is it safe for you to go home? Y       Fall Risk  Fall Risk Assessment, last 12 mths 10/26/2020   Able to walk? Yes   Fall in past 12 months? No   Fall with injury? -   Number of falls in past 12 months -   Fall Risk Score -       Health Maintenance reviewed and discussed and ordered per Provider. Health Maintenance Due   Topic Date Due    Foot Exam Q1  11/16/2019    GLAUCOMA SCREENING Q2Y  04/27/2020    Eye Exam Retinal or Dilated  04/27/2020    Flu Vaccine (1) 09/01/2020    A1C test (Diabetic or Prediabetic)  10/10/2020    MICROALBUMIN Q1  10/10/2020    Lipid Screen  10/10/2020   . Coordination of Care:  1. Have you been to the ER, urgent care clinic since your last visit? Hospitalized since your last visit? no    2. Have you seen or consulted any other health care providers outside of the [address] [place] [address] since your last visit? Include any pap smears or colon screening.  no      Last  Checked na  Last UDS Checked na  Last Pain contract signed: na    Patients concerns today: med refill
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
## 2020-12-10 ENCOUNTER — APPOINTMENT (OUTPATIENT)
Dept: FAMILY MEDICINE CLINIC | Age: 70
End: 2020-12-10

## 2020-12-11 LAB
EST. AVERAGE GLUCOSE BLD GHB EST-MCNC: 143 MG/DL
HBA1C MFR BLD: 6.6 % (ref 4.8–5.6)

## 2021-01-07 ENCOUNTER — VIRTUAL VISIT (OUTPATIENT)
Dept: ORTHOPEDIC SURGERY | Age: 71
End: 2021-01-07
Payer: MEDICARE

## 2021-01-07 DIAGNOSIS — M48.02 CERVICAL SPINAL STENOSIS: ICD-10-CM

## 2021-01-07 DIAGNOSIS — M54.16 LUMBAR NEURITIS: ICD-10-CM

## 2021-01-07 DIAGNOSIS — G56.03 CARPAL TUNNEL SYNDROME, BILATERAL: ICD-10-CM

## 2021-01-07 DIAGNOSIS — M96.1 LUMBAR POST-LAMINECTOMY SYNDROME: ICD-10-CM

## 2021-01-07 DIAGNOSIS — M47.812 CERVICAL SPONDYLOSIS WITHOUT MYELOPATHY: ICD-10-CM

## 2021-01-07 DIAGNOSIS — M54.12 CERVICAL NEURITIS: ICD-10-CM

## 2021-01-07 DIAGNOSIS — G56.21 CUBITAL TUNNEL SYNDROME, RIGHT: ICD-10-CM

## 2021-01-07 DIAGNOSIS — M43.10 SPONDYLOLISTHESIS, ACQUIRED: ICD-10-CM

## 2021-01-07 DIAGNOSIS — M50.00 HNP (HERNIATED NUCLEUS PULPOSUS) WITH MYELOPATHY, CERVICAL: Primary | ICD-10-CM

## 2021-01-07 PROCEDURE — 3017F COLORECTAL CA SCREEN DOC REV: CPT | Performed by: PHYSICAL MEDICINE & REHABILITATION

## 2021-01-07 PROCEDURE — 1101F PT FALLS ASSESS-DOCD LE1/YR: CPT | Performed by: PHYSICAL MEDICINE & REHABILITATION

## 2021-01-07 PROCEDURE — G8417 CALC BMI ABV UP PARAM F/U: HCPCS | Performed by: PHYSICAL MEDICINE & REHABILITATION

## 2021-01-07 PROCEDURE — G9717 DOC PT DX DEP/BP F/U NT REQ: HCPCS | Performed by: PHYSICAL MEDICINE & REHABILITATION

## 2021-01-07 PROCEDURE — G8536 NO DOC ELDER MAL SCRN: HCPCS | Performed by: PHYSICAL MEDICINE & REHABILITATION

## 2021-01-07 PROCEDURE — G9899 SCRN MAM PERF RSLTS DOC: HCPCS | Performed by: PHYSICAL MEDICINE & REHABILITATION

## 2021-01-07 PROCEDURE — G8427 DOCREV CUR MEDS BY ELIG CLIN: HCPCS | Performed by: PHYSICAL MEDICINE & REHABILITATION

## 2021-01-07 PROCEDURE — G8399 PT W/DXA RESULTS DOCUMENT: HCPCS | Performed by: PHYSICAL MEDICINE & REHABILITATION

## 2021-01-07 PROCEDURE — 1090F PRES/ABSN URINE INCON ASSESS: CPT | Performed by: PHYSICAL MEDICINE & REHABILITATION

## 2021-01-07 PROCEDURE — 99214 OFFICE O/P EST MOD 30 MIN: CPT | Performed by: PHYSICAL MEDICINE & REHABILITATION

## 2021-01-07 RX ORDER — TIAGABINE HYDROCHLORIDE 2 MG/1
2 TABLET, FILM COATED ORAL 2 TIMES DAILY WITH MEALS
Qty: 60 TAB | Refills: 1 | Status: SHIPPED | OUTPATIENT
Start: 2021-01-07 | End: 2021-04-02

## 2021-01-07 RX ORDER — TRAMADOL HYDROCHLORIDE 50 MG/1
50 TABLET ORAL
Qty: 120 TAB | Refills: 1 | Status: SHIPPED | OUTPATIENT
Start: 2021-01-07 | End: 2021-02-11 | Stop reason: SDUPTHER

## 2021-01-07 NOTE — PROGRESS NOTES
[facility]  [address], [address]  Phone: [phone]  Fax: [phone]        PROGRESS NOTE    CONSENT:  Pursuant to the emergency declaration under the [employer] and [facility], 1135 waiver authority and the [employer] and [employer] Act, this Virtual Visit was conducted, with patient's consent, to reduce the patient's risk of exposure to COVID-19 and provide continuity of care for an established patient. Services were provided through a video synchronous discussion virtually to substitute for in-person appointment. ENCOUNTER : 12 minutes 22 seconds    HISTORY OF PRESENT ILLNESS:  The patient is a 79 y.o. female and is being seen via Doxy. Me TeleVisit at the [facility] office for follow up of low back pain radiating into BLE in a L4 distribution on the right and in a S1 distribution on the left to the greater toe and chronic neck pain which became progressive x 12/2019 radiating into BUE (L>R) to the hands. Previously, she was seen for low back pain radiating into BLE in a L4 distribution on the right and in a S1 distribution on the left to the greater toe and chronic neck pain which became progressive x 12/2019 radiating into BUE (L>R) to the hands. Pt was last evaluated by me 7/9/2020 with c/o low back pain radiating into BLE in a L4 distribution on the right and in a S1 distribution on the left to the greater toe and chronic neck pain which became progressive x 12/2019 radiating into BUE (L>R) to the hands. She was seen prior for low back and left hip pain extending into the LLE in a S1 distribution to an area below the knee. Initially she was seen for c/o low back pain into the BLE extending in roughly an L4 distribution in the LLE and in an L5 distribution to the foot in the RLE. Her pain is exacerbated with standing and walking intolerance.  Patient reports dropping objects x couple months. Pt completed MDP with good relief. She had a diagnosis of lumbar postlaminectomy syndrome with a previous history of L3 through S1 fusion in 2002 and 2004 by a physician in [place]. Pt denies h/o stomach ulcers, bleeding disorders, or current use of anticoagulants. PmHx of fibromyalgia, DM, aortic valve replacement, right shoulder replacement, left rotator cuff disease. Patient reports previously receiving a shoulder injection with temporary relief. Pt notes intolerance to FOSOMAX, LYRICA, and PREDNISONE combination. Subsequently, d/c Fosomax due to allergy and started Cymbalta 30 mg TID in place of Lyrica. Note dated 6/29/16 indicating patient was seen and underwent porcine aortic valve replacement by Dr. Ed Bueno on 3/18/16. Pt began cardiac rehab in 1/2017. Pt underwent right shoulder surgery on 3/29/17 by Dr. Roni Mendoza. Note from Dr. Isi Pollock, cardiologist dated 5/18/17 indicating patient was seen with h/o aortic stenosis, s/p aortic valve replacement 3/16 and was seen with moderate dysphemia with minimal exertion. CT revealed no evidence of pulmonary embolism, no obvious fluid overload. They were ordering further workup to look for functioning bioprosthetic aortic valve. Per patient, a cause for her SOB has still not been found. She has been scheduled to see a pulmonologist. She states she has been restricted to take her Meloxicam since her right shoulder surgery, which has caused her increase in low back pain. Upon review of our records, it was noted she previously failed TOPAMAX, NEURONTIN, and LYRICA. Pt was switched from Ultram ER back to Ultram immediate release. Pt underwent left SI joint injection on 3/15/18 with good relief. Note from Dr. Jared Lynn dated 11/12/19 indicated they stopped Lyrica and started her on Cymbalta 90 mg per day.  Note from Dr. Isi Pollock dated 12/19/19 indicating patient was seen with c/o history of aortic valve replacement, fibromyalgia, and DM. The patient has a history of DM and reports blood sugars are well controlled, consistently remaining below 200, average of 119. Note from Dr. Aden Singh dated 8/7/2020: It would appear that her left shoulder may need replacement in her right shoulder may need revision. At this time with regards to her cervical spine I have nothing to offer her I do not think her pain is emanating from her spine. I would imagine it would be best for Dr. Rachell Calvin to take the lead in the situation and decide what further treatments for her shoulders are required. Given acute Rx of Flexeril 10 mg TID. Patient reports a benefit in sleeping while taking Flexeril and inquired about making it a new medication. Patient understands that it is not a long-term medication. Preliminary reading [id number] Spine plain films dated 3/2/2020 revealed: severe disc space narrowing at C4-5, moderate at C5-6, and the space between C6-7 was not well visualized. Small anterior osteophytes noted at C4, C5 and C6. C7 not visualized due to shoulder pathology. No acute pathology identified.  Lumbar spine CT w/o contrast dated 7/20/14  per report revealed L3 through L5 laminectomies with L3 through S1 dorsal hardware fusion. There was no evidence for fractures. There was advanced degenerative disc disease at L2-L3 at the junctional level with marked central canal stenosis. C Spine MRI dated 3/15/2020 films independently reviewed. Per report, at C3-4: anterolisthesis. 3.5 mm central disc extrusion extending 4 mm cephalad. Mild central stenosis and subtle ventral cord flattening. Advanced left facet arthropathy. At C4-5: mild posterior spondylotic ridge. Mild central stenosis and subtle ventral cord flattening. At C5-6: central to right posterolateral 2.5 mm broad-based disc protrusion with subtle ventral cord flattening and mild central stenosis. At C6-7: mild posterior spondylotic ridge. Mild to moderate central stenosis. At C7-T1: anterolisthesis. Mild posterior annular bulge.  Mild central stenosis. Upper thoracic spondylosis including a 5 mm posterior disc extension at T3-4. At her last clinical appointment, I referred patient to Dr. Dulce Gordon for carpal tunnel syndrome and cubital tunnel syndrome. I refilled Tramadol until next office visit. Advised patient to see Dr. Kate Hawk for shoulder when she is ready to have surgery.        At today's video consultation, the patient reports pain location and distribution remains unchanged. She rates her pain 5-8/10, previously 7/10. Pt describes her symptoms as \"leg cramps\". Pt continues on Cymbalta and Tramadol. Pt has not contacted Dr. Kate Hawk in regards to shoulder surgery at this time. Pt denies change in bowel or bladder habits. Pt denies any signs of weakness. The patient has a history of DM and reports blood sugars are well controlled, consistently remaining below 200. Note from Dr. Dulce Gordon dated 10/26/2020 indicating patient was seen with c/o bilateral thumb base pain. Numbness and tingling in her hands. Evidence of CTS and cubital tunnel syndrome on the right side. Indicated she has OA of the first CMC joints bilaterally. Performed injections. Pt reports temporary relief with the injections. She is scheduled to f/u with Dr. Dulce Gordon next week to discuss surgery. Note from Megan Sahni NP dated 12/1/2020 indicating patient has h/o DM, fibromyalgia, and thyroid problems.  reviewed. There is no height or weight on file to calculate BMI.     PCP: Barb Peter MD      Past Medical History:   Diagnosis Date    Ankle fracture     Aortic stenosis     S/P AVR in 2016    Asthma     Carpal tunnel syndrome on both sides     Chronic pain     back pain    Diabetes ([address]     Diverticulitis     Fibromyalgia     H/O aortic valve replacement 03/2016    21 mm bovine pericardial bioprosthesis and epiaortic scanning      Hypertension     PT denies    Hypothyroidism     Lumbar post-laminectomy syndrome     Menopause     Obesity     Osteoarthritis  [facility]otic disorder [facility]     Sciatica     Skin cancer 2013    right forearm. Social History     Socioeconomic History    Marital status:      Spouse name: Not on file    Number of children: Not on file    Years of education: Not on file    Highest education level: Not on file   Occupational History    Not on file   Social Needs    Financial resource strain: Not on file    Food insecurity     Worry: Not on file     Inability: Not on file    Transportation needs     Medical: Not on file     Non-medical: Not on file   Tobacco Use    Smoking status: Former Smoker     Years: 45.00     Types: Cigarettes     Quit date: 2011     Years since quittin.5    Smokeless tobacco: Never Used   Substance and Sexual Activity    Alcohol use: No    Drug use: No    Sexual activity: Never   Lifestyle    Physical activity     Days per week: Not on file     Minutes per session: Not on file    Stress: Not on file   Relationships    Social connections     Talks on phone: Not on file     Gets together: Not on file     Attends [employer] service: Not on file     Active member of club or organization: Not on file     Attends meetings of clubs or organizations: Not on file     Relationship status: Not on file    Intimate partner violence     Fear of current or ex partner: Not on file     Emotionally abused: Not on file     Physically abused: Not on file     Forced sexual activity: Not on file   Other Topics Concern    Not on file   Social History Narrative    Not on file       Current Outpatient Medications   Medication Sig Dispense Refill    metFORMIN (GLUCOPHAGE) 500 mg tablet TAKE 1 TABLET BY MOUTH ONCE DAILY WITH SUPPER 90 Tab 3    levothyroxine (SYNTHROID) 50 mcg tablet TAKE 1 TABLET BY MOUTH ONCE DAILY BEFORE BREAKFAST 90 Tab 0    FLUoxetine (PROzac) 10 mg capsule Take 1 Cap by mouth daily.  90 Cap 3    atorvastatin (LIPITOR) 20 mg tablet Take 1 tablet by mouth once daily 90 Tab 4    fluticasone propionate (FLONASE) 50 mcg/actuation nasal spray Use 1 spray(s) in each nostril twice daily 16 g 4    cyclobenzaprine (FLEXERIL) 10 mg tablet Take 1 Tab by mouth three (3) times daily as needed for Muscle Spasm(s). 30 Tab 0    clobetasoL (TEMOVATE) 0.05 % external solution APPLY TO SCALP AFTER SHAMPOO DAILY 50 mL 3    desonide (TRIDESILON) 0.05 % cream Apply  to affected area two (2) times a day. 15 g 0    pantoprazole (PROTONIX) 20 mg tablet Take 1 Tab by mouth daily. 90 Tab 3    meloxicam (MOBIC) 15 mg tablet TAKE 1 TABLET BY MOUTH DAILY 90 Tab 3    meclizine (ANTIVERT) 25 mg tablet Take 1 Tab by mouth three (3) times daily as needed for Dizziness. 61 Tab 3    STIOLTO RESPIMAT 2.5-2.5 mcg/actuation inhaler Take  by inhalation. 1    vitamin E acetate (VITAMIN E PO) Take  by mouth.  cyanocobalamin (VITAMIN B-12) 500 mcg tablet Take 500 mcg by mouth daily.  cetirizine (ZYRTEC) 10 mg tablet TAKE 1 TABLET BY MOUTH EVERY DAY. GENERIC FOR ZYRTEC 90 Tab 2    VENTOLIN HFA 90 mcg/actuation inhaler 2 Puffs by Aerosolization route every four (4) hours as needed. 0    B.infantis-B.ani-B.long-B.bifi (PROBIOTIC 4X) 10-15 mg TbEC Take  by mouth daily.  aspirin delayed-release 81 mg tablet Take 81 mg by mouth daily.  Cholecalciferol, Vitamin D3, (VITAMIN D3) 1,000 unit cap Take 1,000 Units by mouth daily.  BLOOD-GLUCOSE METER (ACCU-CHEK MARCE MONITORING) by Does Not Apply route.  glucose blood VI test strips (True Metrix Pro Test Strip) strip Test blood glucose 3 times a day 200 Strip 3    docusate sodium (COLACE) 50 mg capsule 50 mg.  Lancets (ACCU-CHEK SOFTCLIX LANCETS) misc Please use one lancet to test blood sugars twice a day.  1 Package 20       Allergies   Allergen Reactions    Latex, Natural Rubber Hives    Adhesive Rash     Some bandaids    Ciprofloxacin Rash    Codeine Nausea and Vomiting    Demerol [Meperidine] Nausea and Vomiting    Diclofenac Shortness of Breath and Palpitations    Erythromycin Rash    Levaquin [Levofloxacin] Rash    Loratadine Swelling    Morphine Nausea and Vomiting    Penicillin G Hives and Rash     Does fine with Keflex    Sulfa (Sulfonamide Antibiotics) Itching    Vicodin [Hydrocodone-Acetaminophen] Other (comments)     Metallic taste in mouth          PHYSICAL EXAMINATION  Unable to perform examination secondary to COVID-19. CONSTITUTIONAL: NAD, A&O x 3    ASSESSMENT   Diagnoses and all orders for this visit:    1. HNP (herniated nucleus pulposus) with myelopathy, cervical    2. Cervical spondylosis without myelopathy    3. Cervical spinal stenosis    4. Cervical neuritis    5. Lumbar neuritis    6. Lumbar post-laminectomy syndrome    7. Spondylolisthesis, acquired    8. Cubital tunnel syndrome, right    9. Carpal tunnel syndrome, bilateral        IMPRESSION AND PLAN:  The patient consented to the tele health visit and was aware that there would be a charge. During today's Doxy. Me TeleVisit patient had c/o low back pain radiating into BLE in a L4 distribution on the right and in a S1 distribution on the left to the greater toe. Multiple treatment options were discussed. I will try her on Gabitril 2 mg BID. The risks, benefits, and potential side effects of this medication were discussed. Patient understands and wishes to proceed. Patient advised to call the office if intolerant to new medication. I provided her refills of Tramadol. Pt appears to be neurologically intact. I will see the patient back in 1 month's time or earlier if needed. Written by Jessica Rosenthal, as dictated by Emilio Narvaez MD  I examined the patient, reviewed and agree with the note.

## 2021-01-13 ENCOUNTER — OFFICE VISIT (OUTPATIENT)
Dept: ORTHOPEDIC SURGERY | Age: 71
End: 2021-01-13
Payer: MEDICARE

## 2021-01-13 VITALS
OXYGEN SATURATION: 98 % | WEIGHT: 233.8 LBS | HEIGHT: 68 IN | DIASTOLIC BLOOD PRESSURE: 71 MMHG | TEMPERATURE: 97.2 F | HEART RATE: 103 BPM | SYSTOLIC BLOOD PRESSURE: 110 MMHG | RESPIRATION RATE: 16 BRPM | BODY MASS INDEX: 35.43 KG/M2

## 2021-01-13 DIAGNOSIS — M18.11 PRIMARY OSTEOARTHRITIS OF FIRST CARPOMETACARPAL JOINT OF RIGHT HAND: ICD-10-CM

## 2021-01-13 DIAGNOSIS — G56.02 LEFT CARPAL TUNNEL SYNDROME: ICD-10-CM

## 2021-01-13 DIAGNOSIS — M18.12 PRIMARY OSTEOARTHRITIS OF FIRST CARPOMETACARPAL JOINT OF LEFT HAND: ICD-10-CM

## 2021-01-13 DIAGNOSIS — G56.01 RIGHT CARPAL TUNNEL SYNDROME: ICD-10-CM

## 2021-01-13 DIAGNOSIS — G56.21 CUBITAL TUNNEL SYNDROME, RIGHT: Primary | ICD-10-CM

## 2021-01-13 PROCEDURE — G8536 NO DOC ELDER MAL SCRN: HCPCS | Performed by: ORTHOPAEDIC SURGERY

## 2021-01-13 PROCEDURE — 3017F COLORECTAL CA SCREEN DOC REV: CPT | Performed by: ORTHOPAEDIC SURGERY

## 2021-01-13 PROCEDURE — G9717 DOC PT DX DEP/BP F/U NT REQ: HCPCS | Performed by: ORTHOPAEDIC SURGERY

## 2021-01-13 PROCEDURE — G8427 DOCREV CUR MEDS BY ELIG CLIN: HCPCS | Performed by: ORTHOPAEDIC SURGERY

## 2021-01-13 PROCEDURE — G8417 CALC BMI ABV UP PARAM F/U: HCPCS | Performed by: ORTHOPAEDIC SURGERY

## 2021-01-13 PROCEDURE — G8399 PT W/DXA RESULTS DOCUMENT: HCPCS | Performed by: ORTHOPAEDIC SURGERY

## 2021-01-13 PROCEDURE — 99214 OFFICE O/P EST MOD 30 MIN: CPT | Performed by: ORTHOPAEDIC SURGERY

## 2021-01-13 PROCEDURE — 1090F PRES/ABSN URINE INCON ASSESS: CPT | Performed by: ORTHOPAEDIC SURGERY

## 2021-01-13 PROCEDURE — 1101F PT FALLS ASSESS-DOCD LE1/YR: CPT | Performed by: ORTHOPAEDIC SURGERY

## 2021-01-13 PROCEDURE — G9899 SCRN MAM PERF RSLTS DOC: HCPCS | Performed by: ORTHOPAEDIC SURGERY

## 2021-01-13 NOTE — PROGRESS NOTES
Katie Farooq is a 79 y.o. female right handed retiree. Worker's Compensation and legal considerations: not known. Vitals:    01/13/21 1109   BP: 110/71   Pulse: (!) 103   Resp: 16   Temp: 97.2 °F (36.2 °C)   TempSrc: Temporal   SpO2: 98%   Weight: 233 lb 12.8 oz (106.1 kg)   Height: 5' 8\" (1.727 m)   PainSc:   4   PainLoc: Hand           Chief Complaint   Patient presents with    Hand Pain     ramonita hand pain       HPI: Patient returns today regarding bilateral thumb CMC arthritis bilateral carpal tunnel syndrome and right cubital tunnel syndrome. Her injections last time did help however she would like to discuss surgery. Initial HPI: Patient comes in today with complaints of bilateral thumb base pain and bilateral numbness and tingling in the hands. She has recently had an EMG. Date of onset: Indeterminate    Injury: No    Prior Treatment:  Yes: Comment: Bilateral carpal tunnel and CMC joint injections.     Numbness/ Tingling: Yes: Comment: Bilateral hands right worse than left      ROS: Review of Systems - General ROS: negative  Psychological ROS: negative  ENT ROS: negative  Allergy and Immunology ROS: negative  Hematological and Lymphatic ROS: negative  Respiratory ROS: no cough, shortness of breath, or wheezing  Cardiovascular ROS: no chest pain or dyspnea on exertion  Gastrointestinal ROS: no abdominal pain, change in bowel habits, or black or bloody stools  Musculoskeletal ROS: positive for - pain in hand - bilateral  Neurological ROS: positive for - numbness/tingling  Dermatological ROS: negative    Past Medical History:   Diagnosis Date    Ankle fracture     Aortic stenosis     S/P AVR in 2016    Asthma     Carpal tunnel syndrome on both sides     Chronic pain     back pain    Diabetes ([facility] [address]     Diverticulitis     Fibromyalgia     H/O aortic valve replacement 03/2016    21 mm bovine pericardial bioprosthesis and epiaortic scanning      Hypertension     PT denies    Hypothyroidism     Lumbar post-laminectomy syndrome     Menopause     Obesity     Osteoarthritis     Psychotic disorder ([facility] [address]     Sciatica     Skin cancer 2013    right forearm. Past Surgical History:   Procedure Laterality Date    COLONOSCOPY      COLONOSCOPY N/A 9/15/2017    COLONOSCOPY performed by Dann Russell MD at [facility] Right 1995    lens  replaced.  HX CATARACT REMOVAL Left 02/2018    HX COLONOSCOPY  09/15/2017    dr. Alexandria Mejia  f/u 5 years.  HX HEENT  1990's    sinus surgery     HX HEENT Right 11/02/2017    laser for right eye cataracts.  HX LUMBAR FUSION  2004    L3-L4-L5    HX LUMBAR LAMINECTOMY  2002    HX MOHS PROCEDURES Left 1990    HX ORTHOPAEDIC      Rotator cuff Bilateral    HX SHOULDER REPLACEMENT Right 03/29/2017    reverse.  [place] CARDIAC SURG PROCEDURE UNLIST  03/2016    aortic valve replacement.  [place] CHEST SURGERY PROCEDURE UNLISTED  03/2016    Open Heart Surgery    [place] COLSC FLX W/RMVL OF TUMOR POLYP LESION SNARE TQ  07/09/2014 repeat in 3 years    Dr. Gurjit Crawford      Aortic valve replacement       Current Outpatient Medications   Medication Sig Dispense Refill    tiaGABine (GabitriL) 2 mg tablet Take 1 Tab by mouth two (2) times daily (with meals). 60 Tab 1    traMADoL (Ultram) 50 mg tablet Take 1 Tab by mouth every six (6) hours as needed for Pain for up to 60 days. Max Daily Amount: 200 mg. 120 Tab 1    metFORMIN (GLUCOPHAGE) 500 mg tablet TAKE 1 TABLET BY MOUTH ONCE DAILY WITH SUPPER 90 Tab 3    levothyroxine (SYNTHROID) 50 mcg tablet TAKE 1 TABLET BY MOUTH ONCE DAILY BEFORE BREAKFAST 90 Tab 0    FLUoxetine (PROzac) 10 mg capsule Take 1 Cap by mouth daily.  90 Cap 3    atorvastatin (LIPITOR) 20 mg tablet Take 1 tablet by mouth once daily 90 Tab 4    fluticasone propionate (FLONASE) 50 mcg/actuation nasal spray Use 1 spray(s) in each nostril twice daily 16 g 4    cyclobenzaprine (FLEXERIL) 10 mg tablet Take 1 Tab by mouth three (3) times daily as needed for Muscle Spasm(s). 30 Tab 0    clobetasoL (TEMOVATE) 0.05 % external solution APPLY TO SCALP AFTER SHAMPOO DAILY 50 mL 3    desonide (TRIDESILON) 0.05 % cream Apply  to affected area two (2) times a day. 15 g 0    glucose blood VI test strips (True Metrix Pro Test Strip) strip Test blood glucose 3 times a day 200 Strip 3    pantoprazole (PROTONIX) 20 mg tablet Take 1 Tab by mouth daily. 90 Tab 3    meloxicam (MOBIC) 15 mg tablet TAKE 1 TABLET BY MOUTH DAILY 90 Tab 3    meclizine (ANTIVERT) 25 mg tablet Take 1 Tab by mouth three (3) times daily as needed for Dizziness. 61 Tab 3    STIOLTO RESPIMAT 2.5-2.5 mcg/actuation inhaler Take  by inhalation. 1    docusate sodium (COLACE) 50 mg capsule 50 mg.      vitamin E acetate (VITAMIN E PO) Take  by mouth.  cyanocobalamin (VITAMIN B-12) 500 mcg tablet Take 500 mcg by mouth daily.  cetirizine (ZYRTEC) 10 mg tablet TAKE 1 TABLET BY MOUTH EVERY DAY. GENERIC FOR ZYRTEC 90 Tab 2    VENTOLIN HFA 90 mcg/actuation inhaler 2 Puffs by Aerosolization route every four (4) hours as needed. 0    B.infantis-B.ani-B.long-B.bifi (PROBIOTIC 4X) 10-15 mg TbEC Take  by mouth daily.  aspirin delayed-release 81 mg tablet Take 81 mg by mouth daily.  Cholecalciferol, Vitamin D3, (VITAMIN D3) 1,000 unit cap Take 1,000 Units by mouth daily.  Lancets (ACCU-CHEK SOFTCLIX LANCETS) misc Please use one lancet to test blood sugars twice a day. 1 Package 20    BLOOD-GLUCOSE METER (ACCU-CHEK MARCE MONITORING) by Does Not Apply route.          Allergies   Allergen Reactions    Latex, Natural Rubber Hives    Adhesive Rash     Some bandaids    Ciprofloxacin Rash    Codeine Nausea and Vomiting    Demerol [Meperidine] Nausea and Vomiting    Diclofenac Shortness of Breath and Palpitations    Erythromycin Rash    Levaquin [Levofloxacin] Rash    Loratadine Swelling    Morphine Nausea and Vomiting    Penicillin G Hives and Rash     Does fine with Keflex    Sulfa (Sulfonamide Antibiotics) Itching    Vicodin [Hydrocodone-Acetaminophen] Other (comments)     Metallic taste in mouth           PE:     Physical Exam  Vitals signs and nursing note reviewed. Constitutional:       General: She is not in acute distress. Appearance: Normal appearance. She is not ill-appearing, toxic-appearing or diaphoretic. HENT:      Head: Normocephalic and atraumatic. Nose: Nose normal.      Mouth/Throat:      Mouth: Mucous membranes are moist.   Eyes:      Extraocular Movements: Extraocular movements intact. Pupils: Pupils are equal, round, and reactive to light. Neck:      Musculoskeletal: Normal range of motion and neck supple. Cardiovascular:      Pulses: Normal pulses. Pulmonary:      Effort: Pulmonary effort is normal. No respiratory distress. Abdominal:      General: Abdomen is flat. There is no distension. Musculoskeletal: Normal range of motion. General: Tenderness present. No swelling, deformity or signs of injury. Right lower leg: No edema. Left lower leg: No edema. Skin:     General: Skin is warm and dry. Capillary Refill: Capillary refill takes less than 2 seconds. Findings: No bruising or erythema. Neurological:      General: No focal deficit present. Mental Status: She is alert and oriented to person, place, and time. Psychiatric:         Mood and Affect: Mood normal.         Behavior: Behavior normal.            NEUROVASCULAR    Examination L R Examination L R   Carpal Comp. + + Pronator Comp. - -   Carpal Tinel + + Pronator Tinel - -   Phalen's + + Pronator Stress - -   Cubital Comp. - + Guyon Comp. ? -   Cubital Tinel - + Guyon Tinel ? -   Elbow Hyperflexion - + Adson's - -   Spurling's - - SC Comp. - -   PCB Median abn - - SC Tinel - -   Radial Tinel - - IC Comp.  - -   Digital Tinel - - IC Tinel - -   Radial 2-Pt WNL WNL Ulnar 2-Pt WNL WNL     Radial Pulse: 2+  Capillary Refill: < 2 sec  Jamal: Not Performed  Digital Jamal: Not Performed    Hand:    Examination L Digit(s) R Digit(s)   1st CMC Tenderness +  +    1st CMC Grind +  +    Zaina Nodes -  -    Heberden Nodes -  -    A1 Pulley Tenderness -  -    Triggering -  -    UCL Instability -  -    RCL Instability -  -    Lateral Stress Pain -  -    Palmar Cords -  -    Tabletop test -  -    Garrod's Pads -  -     Strength       Pinch Strength         ROM: Full        NCV & EMG Findings:  Evaluation of the left median motor and the right median motor nerves showed prolonged distal onset latency (L4.5, R4.7 ms). The right ulnar motor nerve showed decreased conduction velocity (A Elbow-B Elbow, 42 m/s). The left median sensory and the right median sensory nerves showed prolonged distal peak latency (L4.1, R4.2 ms) and decreased conduction velocity (Wrist-2nd Digit, L34, R33 m/s). The left ulnar sensory nerve showed prolonged distal peak latency (3.9 ms) and decreased conduction velocity (Wrist-5th Digit, 36 m/s). The right ulnar sensory nerve showed prolonged distal peak latency (4.2 ms), reduced amplitude (9.8 µV), and decreased conduction velocity (Wrist-5th Digit, 33 m/s). All remaining nerves (as indicated in the following tables) were within normal limits. All left vs. right side differences were within normal limits.       All examined muscles (as indicated in the following table) showed no evidence of electrical instability.       INTERPRETATION  This is an abnormal electrodiagnostic examination. These findings may be consistent with:   1. Moderate median mononeuropathy at the wrist bilaterally, R>L (carpal tunnel syndrome)  2. Moderate ulnar mononeuropathy at the right elbow (cubital tunnel syndrome)  3.  Mild ulnar mononeuropathy at the left wrist.     There is no electrodiagnostic evidence of any cervical radiculopathy, brachial plexopathy, peripheral polyneuropathy, or any other mononeuropathy.        CLINICAL INTERPRETATION  Her electrodiagnostic findings of bilateral carpal tunnel and ulnar mononeuropathies may explain some of her bilateral hand symptoms. There is no electrodiagnostic evidence to explain more proximal symptoms. Imaging:     10/26/2020 plain films of bilateral wrists are significant for Eaton stage III-IV degenerative changes at the [facility] joint on the right and Eaton stage II degenerative changes at the [facility] joint on the left. There may also be evidence of radio scaphoid arthritis at the level of the styloid. This is bilateral.        ICD-10-CM ICD-9-CM    1. Cubital tunnel syndrome, right  G56.21 354.2 SCHEDULE SURGERY   2. Right carpal tunnel syndrome  G56.01 354.0 SCHEDULE SURGERY   3. Primary osteoarthritis of first carpometacarpal joint of right hand  M18.11 715.14 SCHEDULE SURGERY   4. Left carpal tunnel syndrome  G56.02 354.0    5. Primary osteoarthritis of first carpometacarpal joint of left hand  M18.12 715.14          Plan:     Schedule right cubital tunnel release, carpal tunnel release, and thumb trapeziectomy and suspension plasty. This procedure has been fully reviewed with the patient and written informed consent has been obtained. The patient was counseled at length about the risks of viviane Covid-19 during their perioperative period and any recovery window from their procedure. The patient was made aware that viviane Covid-19  may worsen their prognosis for recovering from their procedure and lend to a higher morbidity and/or mortality risk. All material risks, benefits, and reasonable alternatives including postponing the procedure were discussed. The patient does  wish to proceed with the procedure at this time. Follow-up and Dispositions    · Return for 2 weeks postop.           Plan was reviewed with patient, who verbalized agreement and understanding of the plan

## 2021-01-14 DIAGNOSIS — Z01.818 PREOP EXAMINATION: Primary | ICD-10-CM

## 2021-01-18 ENCOUNTER — DOCUMENTATION ONLY (OUTPATIENT)
Dept: ORTHOPEDIC SURGERY | Age: 71
End: 2021-01-18

## 2021-01-18 NOTE — PROGRESS NOTES
Patient was scheduled for RT CTR, CuTR, and thumb trapezium on 2/2 but cancelled for now because her room mate needs back surgery and she will need to help with rehab. Patient was advised to make an appt when she wanted to reschedule surgery.

## 2021-01-28 DIAGNOSIS — Z01.818 PREOP EXAMINATION: ICD-10-CM

## 2021-01-29 DIAGNOSIS — E03.9 ACQUIRED HYPOTHYROIDISM: ICD-10-CM

## 2021-01-31 DIAGNOSIS — L30.9 DERMATITIS: ICD-10-CM

## 2021-02-01 RX ORDER — DESONIDE 0.5 MG/G
CREAM TOPICAL
Qty: 15 G | Refills: 0 | Status: SHIPPED | OUTPATIENT
Start: 2021-02-01 | End: 2021-09-01 | Stop reason: SDUPTHER

## 2021-02-01 RX ORDER — LEVOTHYROXINE SODIUM 50 UG/1
TABLET ORAL
Qty: 90 TAB | Refills: 0 | Status: SHIPPED | OUTPATIENT
Start: 2021-02-01 | End: 2021-05-05

## 2021-02-09 NOTE — PROGRESS NOTES
[facility]  [address], [address]  Phone: [phone]  Fax: [phone]        PROGRESS NOTE    CONSENT:  Pursuant to the emergency declaration under the [address] and [facility] 1135 waiver authority and the [employer] Act, this Virtual Visit was conducted, with patient's consent, to reduce the patient's risk of exposure to COVID-19 and provide continuity of care for an established patient. Services were provided through a video synchronous discussion virtually to substitute for in-person appointment. ENCOUNTER : 16 minutes 8 seconds    HISTORY OF PRESENT ILLNESS:  The patient is a 79 y.o. female. Ms. El Webster is being seen at home by me via Doxy. Me TeleVisit at the [facility] office for follow up of low back pain radiating into BLE in a L4 distribution on the right and in a S1 distribution on the left to the greater toe and chronic neck pain which became progressive x 12/2019 radiating into BUE (L>R) to the hands. Previously, she was seen for low back pain radiating into BLE in a L4 distribution on the right and in a S1 distribution on the left to the greater toe and chronic neck pain which became progressive x 12/2019 radiating into BUE (L>R) to the hands. Pt was last evaluated by me 7/9/2020 with c/o low back pain radiating into BLE in a L4 distribution on the right and in a S1 distribution on the left to the greater toe and chronic neck pain which became progressive x 12/2019 radiating into BUE (L>R) to the hands. She was seen prior for low back and left hip pain extending into the LLE in a S1 distribution to an area below the knee. Initially she was seen for c/o low back pain into the BLE extending in roughly an L4 distribution in the LLE and in an L5 distribution to the foot in the RLE. Her pain is exacerbated with standing and walking intolerance.  Patient reports dropping objects x couple months. Pt completed MDP with good relief. She had a diagnosis of lumbar postlaminectomy syndrome with a previous history of L3 through S1 fusion in 2002 and 2004 by a physician in [place]. Pt denies h/o stomach ulcers, bleeding disorders, or current use of anticoagulants. PmHx of fibromyalgia, DM, aortic valve replacement, right shoulder replacement, left rotator cuff disease. Patient reports previously receiving a shoulder injection with temporary relief. Pt notes intolerance to FOSOMAX, LYRICA, and PREDNISONE combination. Subsequently, d/c Fosomax due to allergy and started Cymbalta 30 mg TID in place of Lyrica. Note dated 6/29/16 indicating patient was seen and underwent porcine aortic valve replacement by Dr. Piper Casanova on 3/18/16. Pt began cardiac rehab in 1/2017. Pt underwent right shoulder surgery on 3/29/17 by Dr. Milana Landon. Note from Dr. Jocelyn Terry, cardiologist dated 5/18/17 indicating patient was seen with h/o aortic stenosis, s/p aortic valve replacement 3/16 and was seen with moderate dysphemia with minimal exertion. CT revealed no evidence of pulmonary embolism, no obvious fluid overload. They were ordering further workup to look for functioning bioprosthetic aortic valve. Per patient, a cause for her SOB has still not been found. She has been scheduled to see a pulmonologist. She states she has been restricted to take her Meloxicam since her right shoulder surgery, which has caused her increase in low back pain. Upon review of our records, it was noted she previously failed TOPAMAX, NEURONTIN, and LYRICA. Pt was switched from Ultram ER back to Ultram immediate release. Pt underwent left SI joint injection on 3/15/18 with good relief. Note from Dr. Anayeli Lynn dated 11/12/19 indicated they stopped Lyrica and started her on Cymbalta 90 mg per day.  Note from Dr. Jocelyn Terry dated 12/19/19 indicating patient was seen with c/o history of aortic valve replacement, fibromyalgia, and DM. The patient has a history of DM and reports blood sugars are well controlled, consistently remaining below 200, average of 119. Note from Dr. Arsalan Escalera dated 8/7/2020: It would appear that her left shoulder may need replacement in her right shoulder may need revision. At this time with regards to her cervical spine I have nothing to offer her I do not think her pain is emanating from her spine. I would imagine it would be best for Dr. Rupesh Hernandez to take the lead in the situation and decide what further treatments for her shoulders are required. Given acute Rx of Flexeril 10 mg TID. Patient reports a benefit in sleeping while taking Flexeril and inquired about making it a new medication. Patient understands that it is not a long-term medication. Note from Dr. Tej Chung dated 10/26/2020 indicating patient was seen with c/o bilateral thumb base pain. Numbness and tingling in her hands. Evidence of CTS and cubital tunnel syndrome on the right side. Indicated she has OA of the first CMC joints bilaterally. Performed injections. Pt reports temporary relief with the injections. She is scheduled to f/u with Dr. Tej Chung next week to discuss surgery. Note from Clarisse Aldana NP dated 12/1/2020 indicating patient has h/o DM, fibromyalgia, and thyroid problems. Preliminary reading [id number] Spine plain films dated 3/2/2020 revealed: severe disc space narrowing at C4-5, moderate at C5-6, and the space between C6-7 was not well visualized. Small anterior osteophytes noted at C4, C5 and C6. C7 not visualized due to shoulder pathology. No acute pathology identified.  Lumbar spine CT w/o contrast dated 7/20/14  per report revealed L3 through L5 laminectomies with L3 through S1 dorsal hardware fusion. There was no evidence for fractures. There was advanced degenerative disc disease at L2-L3 at the junctional level with marked central canal stenosis. C Spine MRI dated 3/15/2020 films independently reviewed. Per report, at C3-4: anterolisthesis. 3.5 mm central disc extrusion extending 4 mm cephalad. Mild central stenosis and subtle ventral cord flattening. Advanced left facet arthropathy. At C4-5: mild posterior spondylotic ridge. Mild central stenosis and subtle ventral cord flattening. At C5-6: central to right posterolateral 2.5 mm broad-based disc protrusion with subtle ventral cord flattening and mild central stenosis. At C6-7: mild posterior spondylotic ridge. Mild to moderate central stenosis. At C7-T1: anterolisthesis. Mild posterior annular bulge. Mild central stenosis. Upper thoracic spondylosis including a 5 mm posterior disc extension at T3-4. At her last clinical appointment, I tried her on Gabitril 2 mg BID. I provided her refills of Tramadol     At today's video consultation, the patient reports pain location and distribution remains unchanged. She rates her pain 7/10, previously 5-8/10. The visit started with video consultation. Pt could not figure out how to turn on the microphone and the visit was converted to a phone call. Pt contacted my office on 1/24/2021 and reported intolerance to GABITRIL 2 mg BID secondary to dizziness. Her Gabitril was decreased to 2 mg qhs which she is tolerating with improvement in her BLE cramping. She continues on Cymbalta 30 mg daily through her PCP. She continues to treat with Tramadol. Pt denies change in bowel or bladder habits. Pt denies any signs of weakness. Pt denies dropping things or loss of balance. She has not contacted Dr. Julio César Bravo in regards to her left shoulder surgery. Note from Dr. Brett Gonzalez dated 1/13/2021 indicating patient was seen for bilateral thumb CMC arthritis, bilateral CTS and right cubital tunnel syndrome. Indicated injections last time did help but she wanted to discuss surgery. He set her up for surgery for CTS and right CMC arthritis.  reviewed. There is no height or weight on file to calculate BMI.     PCP: Tameka Butt MD      Past Medical History:   Diagnosis Date    Ankle fracture     Aortic stenosis     S/P AVR in 2016    Asthma     Carpal tunnel syndrome on both sides     Chronic pain     back pain    Diabetes [facility] [address]     Diverticulitis     Fibromyalgia     H/O aortic valve replacement 2016    21 mm bovine pericardial bioprosthesis and epiaortic scanning      Hypertension     PT denies    Hypothyroidism     Lumbar post-laminectomy syndrome     Menopause     Obesity     Osteoarthritis     Psychotic disorder ([facility] [address]     Sciatica     Skin cancer 2013    right forearm.          Social History     Socioeconomic History    Marital status:      Spouse name: Not on file    Number of children: Not on file    Years of education: Not on file    Highest education level: Not on file   Occupational History    Not on file   Social Needs    Financial resource strain: Not on file    Food insecurity     Worry: Not on file     Inability: Not on file    Transportation needs     Medical: Not on file     Non-medical: Not on file   Tobacco Use    Smoking status: Former Smoker     Years: 45.00     Types: Cigarettes     Quit date: 2011     Years since quittin.6    Smokeless tobacco: Never Used   Substance and Sexual Activity    Alcohol use: No    Drug use: No    Sexual activity: Never   Lifestyle    Physical activity     Days per week: Not on file     Minutes per session: Not on file    Stress: Not on file   Relationships    Social connections     Talks on phone: Not on file     Gets together: Not on file     Attends Judaism service: Not on file     Active member of club or organization: Not on file     Attends meetings of clubs or organizations: Not on file     Relationship status: Not on file    Intimate partner violence     Fear of current or ex partner: Not on file     Emotionally abused: Not on file     Physically abused: Not on file     Forced sexual activity: Not on file   Other Topics Concern    Not on file   Social History Narrative    Not on file       Current Outpatient Medications   Medication Sig Dispense Refill    levothyroxine (SYNTHROID) 50 mcg tablet TAKE 1 TABLET BY MOUTH ONCE DAILY BEFORE BREAKFAST 90 Tab 0    tiaGABine (GabitriL) 2 mg tablet Take 1 Tab by mouth two (2) times daily (with meals). 60 Tab 1    traMADoL (Ultram) 50 mg tablet Take 1 Tab by mouth every six (6) hours as needed for Pain for up to 60 days. Max Daily Amount: 200 mg. 120 Tab 1    metFORMIN (GLUCOPHAGE) 500 mg tablet TAKE 1 TABLET BY MOUTH ONCE DAILY WITH SUPPER 90 Tab 3    FLUoxetine (PROzac) 10 mg capsule Take 1 Cap by mouth daily. 90 Cap 3    atorvastatin (LIPITOR) 20 mg tablet Take 1 tablet by mouth once daily 90 Tab 4    fluticasone propionate (FLONASE) 50 mcg/actuation nasal spray Use 1 spray(s) in each nostril twice daily 16 g 4    clobetasoL (TEMOVATE) 0.05 % external solution APPLY TO SCALP AFTER SHAMPOO DAILY 50 mL 3    glucose blood VI test strips (True Metrix Pro Test Strip) strip Test blood glucose 3 times a day 200 Strip 3    pantoprazole (PROTONIX) 20 mg tablet Take 1 Tab by mouth daily. 90 Tab 3    meloxicam (MOBIC) 15 mg tablet TAKE 1 TABLET BY MOUTH DAILY 90 Tab 3    meclizine (ANTIVERT) 25 mg tablet Take 1 Tab by mouth three (3) times daily as needed for Dizziness. 61 Tab 3    STIOLTO RESPIMAT 2.5-2.5 mcg/actuation inhaler Take  by inhalation. 1    vitamin E acetate (VITAMIN E PO) Take  by mouth.  cyanocobalamin (VITAMIN B-12) 500 mcg tablet Take 500 mcg by mouth daily.  cetirizine (ZYRTEC) 10 mg tablet TAKE 1 TABLET BY MOUTH EVERY DAY. GENERIC FOR ZYRTEC 90 Tab 2    VENTOLIN HFA 90 mcg/actuation inhaler 2 Puffs by Aerosolization route every four (4) hours as needed. 0    B.infantis-B.ani-B.long-B.bifi (PROBIOTIC 4X) 10-15 mg TbEC Take  by mouth daily.  aspirin delayed-release 81 mg tablet Take 81 mg by mouth daily.  Cholecalciferol, Vitamin D3, (VITAMIN D3) 1,000 unit cap Take 1,000 Units by mouth daily.       Lancets (ACCU-CHEK SOFTCLIX LANCETS) misc Please use one lancet to test blood sugars twice a day. 1 Package 20    BLOOD-GLUCOSE METER (ACCU-CHEK MARCE MONITORING) by Does Not Apply route.  desonide (TRIDESILON) 0.05 % cream APPLY   EXTERNALLY TO AFFECTED AREA TWICE DAILY 15 g 0    cyclobenzaprine (FLEXERIL) 10 mg tablet Take 1 Tab by mouth three (3) times daily as needed for Muscle Spasm(s). 30 Tab 0    docusate sodium (COLACE) 50 mg capsule 50 mg. Allergies   Allergen Reactions    Latex, Natural Rubber Hives    Adhesive Rash     Some bandaids    Ciprofloxacin Rash    Codeine Nausea and Vomiting    Demerol [Meperidine] Nausea and Vomiting    Diclofenac Shortness of Breath and Palpitations    Erythromycin Rash    Levaquin [Levofloxacin] Rash    Loratadine Swelling    Morphine Nausea and Vomiting    Penicillin G Hives and Rash     Does fine with Keflex    Sulfa (Sulfonamide Antibiotics) Itching    Vicodin [Hydrocodone-Acetaminophen] Other (comments)     Metallic taste in mouth          PHYSICAL EXAMINATION  Unable to perform examination secondary to COVID-19. CONSTITUTIONAL: NAD, A&O x 3    ASSESSMENT   Diagnoses and all orders for this visit:    1. HNP (herniated nucleus pulposus) with myelopathy, cervical    2. Cervical spondylosis without myelopathy    3. Cervical spinal stenosis    4. Cervical neuritis    5. Lumbar neuritis    6. Lumbar post-laminectomy syndrome    7. Spondylolisthesis, acquired    8. Carpal tunnel syndrome, bilateral    9. Cubital tunnel syndrome, right        IMPRESSION AND PLAN:  The patient consented to the tele health visit and was aware that there would be a charge. During today's Doxy[employer] Me TeleVisit patient had c/o low back pain radiating into BLE in a L4 distribution on the right and in a S1 distribution on the left to the greater toe and chronic neck pain. Multiple treatment options were discussed. Patient wished to continue her current treatment.  She did not require refills of the Gabitril 2 mg qhs at this time. I provided her a 30 day supply refill of Tramadol. Pt appears to be neurologically intact. I will see the patient back in 1 month's time for a UDS and additional refills or earlier if needed. Written by Florencia Atwood, as dictated by Avril Fernandez MD  I examined the patient, reviewed and agree with the note.

## 2021-02-11 ENCOUNTER — VIRTUAL VISIT (OUTPATIENT)
Dept: ORTHOPEDIC SURGERY | Age: 71
End: 2021-02-11
Payer: MEDICARE

## 2021-02-11 DIAGNOSIS — M54.12 CERVICAL NEURITIS: ICD-10-CM

## 2021-02-11 DIAGNOSIS — G56.21 CUBITAL TUNNEL SYNDROME, RIGHT: ICD-10-CM

## 2021-02-11 DIAGNOSIS — M96.1 LUMBAR POST-LAMINECTOMY SYNDROME: ICD-10-CM

## 2021-02-11 DIAGNOSIS — M50.00 HNP (HERNIATED NUCLEUS PULPOSUS) WITH MYELOPATHY, CERVICAL: Primary | ICD-10-CM

## 2021-02-11 DIAGNOSIS — M43.10 SPONDYLOLISTHESIS, ACQUIRED: ICD-10-CM

## 2021-02-11 DIAGNOSIS — M48.02 CERVICAL SPINAL STENOSIS: ICD-10-CM

## 2021-02-11 DIAGNOSIS — M54.16 LUMBAR NEURITIS: ICD-10-CM

## 2021-02-11 DIAGNOSIS — G56.03 CARPAL TUNNEL SYNDROME, BILATERAL: ICD-10-CM

## 2021-02-11 DIAGNOSIS — M47.812 CERVICAL SPONDYLOSIS WITHOUT MYELOPATHY: ICD-10-CM

## 2021-02-11 PROCEDURE — G8399 PT W/DXA RESULTS DOCUMENT: HCPCS | Performed by: PHYSICAL MEDICINE & REHABILITATION

## 2021-02-11 PROCEDURE — G8536 NO DOC ELDER MAL SCRN: HCPCS | Performed by: PHYSICAL MEDICINE & REHABILITATION

## 2021-02-11 PROCEDURE — G8427 DOCREV CUR MEDS BY ELIG CLIN: HCPCS | Performed by: PHYSICAL MEDICINE & REHABILITATION

## 2021-02-11 PROCEDURE — G8417 CALC BMI ABV UP PARAM F/U: HCPCS | Performed by: PHYSICAL MEDICINE & REHABILITATION

## 2021-02-11 PROCEDURE — 99213 OFFICE O/P EST LOW 20 MIN: CPT | Performed by: PHYSICAL MEDICINE & REHABILITATION

## 2021-02-11 PROCEDURE — 3017F COLORECTAL CA SCREEN DOC REV: CPT | Performed by: PHYSICAL MEDICINE & REHABILITATION

## 2021-02-11 PROCEDURE — G9717 DOC PT DX DEP/BP F/U NT REQ: HCPCS | Performed by: PHYSICAL MEDICINE & REHABILITATION

## 2021-02-11 PROCEDURE — 1101F PT FALLS ASSESS-DOCD LE1/YR: CPT | Performed by: PHYSICAL MEDICINE & REHABILITATION

## 2021-02-11 PROCEDURE — G9899 SCRN MAM PERF RSLTS DOC: HCPCS | Performed by: PHYSICAL MEDICINE & REHABILITATION

## 2021-02-11 PROCEDURE — 1090F PRES/ABSN URINE INCON ASSESS: CPT | Performed by: PHYSICAL MEDICINE & REHABILITATION

## 2021-02-11 RX ORDER — TRAMADOL HYDROCHLORIDE 50 MG/1
50 TABLET ORAL
Qty: 120 TAB | Refills: 0 | Status: SHIPPED | OUTPATIENT
Start: 2021-02-11 | End: 2021-03-09

## 2021-02-22 ENCOUNTER — OFFICE VISIT (OUTPATIENT)
Dept: CARDIOLOGY CLINIC | Age: 71
End: 2021-02-22
Payer: MEDICARE

## 2021-02-22 VITALS
TEMPERATURE: 97.3 F | BODY MASS INDEX: 35.52 KG/M2 | DIASTOLIC BLOOD PRESSURE: 74 MMHG | OXYGEN SATURATION: 98 % | RESPIRATION RATE: 16 BRPM | WEIGHT: 234.4 LBS | HEIGHT: 68 IN | HEART RATE: 99 BPM | SYSTOLIC BLOOD PRESSURE: 125 MMHG

## 2021-02-22 DIAGNOSIS — I35.0 NONRHEUMATIC AORTIC VALVE STENOSIS: Primary | ICD-10-CM

## 2021-02-22 PROCEDURE — G9899 SCRN MAM PERF RSLTS DOC: HCPCS | Performed by: INTERNAL MEDICINE

## 2021-02-22 PROCEDURE — 99214 OFFICE O/P EST MOD 30 MIN: CPT | Performed by: INTERNAL MEDICINE

## 2021-02-22 PROCEDURE — G8428 CUR MEDS NOT DOCUMENT: HCPCS | Performed by: INTERNAL MEDICINE

## 2021-02-22 PROCEDURE — 3017F COLORECTAL CA SCREEN DOC REV: CPT | Performed by: INTERNAL MEDICINE

## 2021-02-22 PROCEDURE — 1090F PRES/ABSN URINE INCON ASSESS: CPT | Performed by: INTERNAL MEDICINE

## 2021-02-22 PROCEDURE — G8417 CALC BMI ABV UP PARAM F/U: HCPCS | Performed by: INTERNAL MEDICINE

## 2021-02-22 PROCEDURE — G8399 PT W/DXA RESULTS DOCUMENT: HCPCS | Performed by: INTERNAL MEDICINE

## 2021-02-22 PROCEDURE — 1101F PT FALLS ASSESS-DOCD LE1/YR: CPT | Performed by: INTERNAL MEDICINE

## 2021-02-22 PROCEDURE — G8536 NO DOC ELDER MAL SCRN: HCPCS | Performed by: INTERNAL MEDICINE

## 2021-02-22 PROCEDURE — G9717 DOC PT DX DEP/BP F/U NT REQ: HCPCS | Performed by: INTERNAL MEDICINE

## 2021-02-22 NOTE — PATIENT INSTRUCTIONS
Testing   Echo    Please call [facility] scheduling at [phone]  to schedule an appointment.    All testing is completed at [address] [facility], [address]    **call office 3-5 days after testing is completed for results**       Other Testing  Low risk for carpal tunnel surgery

## 2021-02-22 NOTE — LETTER
2/22/2021 Patient: Aashish Robles YOB: 1950 Date of Visit: 2/22/2021 Romero Dahl MD 
[address] [place] [id number] [address] [employer] Dear Romero Dahl MD, Thank you for referring Ms. Anil Mendes to [address] [place] [address] for evaluation. My notes for this consultation are attached. If you have questions, please do not hesitate to call me. I look forward to following your patient along with you. Sincerely, Jeremy Adams MD

## 2021-02-22 NOTE — PROGRESS NOTES
[facility]    Ms. Ryan Hall is a 79 y.o. female with history of Aortic stenosis s/p Aortic valve replacement (03/16), fibromyalgia, diabetes, obesity, hypertension, hypothyroidism and COPD. Mr. Ryan Hall is here today for follow up appointment. Dyspnea stable . She follows up with Dr. Hawk Costa for her asthma and COPD. Plan to have left carpal tunnel syndrome surgery for pain and numbness in outpatient setting in couple weeks  No chest pain or chest tightness to suggest angina  No symptoms to suggest fluid overload  Denies presyncope or syncope    Past Medical History:   Diagnosis Date    Ankle fracture     Aortic stenosis     S/P AVR in 2016    Asthma     Carpal tunnel syndrome on both sides     Chronic pain     back pain    Diabetes ([address]     Diverticulitis     Fibromyalgia     H/O aortic valve replacement 03/2016    21 mm bovine pericardial bioprosthesis and epiaortic scanning      Hypertension     PT denies    Hypothyroidism     Lumbar post-laminectomy syndrome     Menopause     Obesity     Osteoarthritis     Psychotic disorder ([address]     Sciatica     Skin cancer 2013    right forearm. Past Surgical History:   Procedure Laterality Date    COLONOSCOPY      COLONOSCOPY N/A 9/15/2017    COLONOSCOPY performed by Florentino Chicas MD at [address] Right 1995    lens  replaced.  HX CATARACT REMOVAL Left 02/2018    HX COLONOSCOPY  09/15/2017    dr. Sarah Torres  f/u 5 years.  HX HEENT  1990's    sinus surgery     HX HEENT Right 11/02/2017    laser for right eye cataracts.  HX LUMBAR FUSION  2004    L3-L4-L5    HX LUMBAR LAMINECTOMY  2002    HX MOHS PROCEDURES Left 1990    HX ORTHOPAEDIC      Rotator cuff Bilateral    HX SHOULDER REPLACEMENT Right 03/29/2017    reverse.  MI CARDIAC SURG PROCEDURE UNLIST  03/2016    aortic valve replacement.      MI CHEST SURGERY PROCEDURE UNLISTED 03/2016    Open Heart Surgery    SC COLSC FLX W/RMVL OF TUMOR POLYP LESION SNARE TQ  07/09/2014 repeat in 3 years    Dr. Nafisa Irwin      Aortic valve replacement       Current Outpatient Medications   Medication Sig    traMADoL (Ultram) 50 mg tablet Take 1 Tab by mouth every six (6) hours as needed for Pain for up to 60 days. Max Daily Amount: 200 mg. Indications: neuropathic pain, pain    levothyroxine (SYNTHROID) 50 mcg tablet TAKE 1 TABLET BY MOUTH ONCE DAILY BEFORE BREAKFAST    desonide (TRIDESILON) 0.05 % cream APPLY   EXTERNALLY TO AFFECTED AREA TWICE DAILY    tiaGABine (GabitriL) 2 mg tablet Take 1 Tab by mouth two (2) times daily (with meals).  metFORMIN (GLUCOPHAGE) 500 mg tablet TAKE 1 TABLET BY MOUTH ONCE DAILY WITH SUPPER    FLUoxetine (PROzac) 10 mg capsule Take 1 Cap by mouth daily.  atorvastatin (LIPITOR) 20 mg tablet Take 1 tablet by mouth once daily    fluticasone propionate (FLONASE) 50 mcg/actuation nasal spray Use 1 spray(s) in each nostril twice daily    cyclobenzaprine (FLEXERIL) 10 mg tablet Take 1 Tab by mouth three (3) times daily as needed for Muscle Spasm(s).  clobetasoL (TEMOVATE) 0.05 % external solution APPLY TO SCALP AFTER SHAMPOO DAILY    glucose blood VI test strips (True Metrix Pro Test Strip) strip Test blood glucose 3 times a day    pantoprazole (PROTONIX) 20 mg tablet Take 1 Tab by mouth daily.  meloxicam (MOBIC) 15 mg tablet TAKE 1 TABLET BY MOUTH DAILY    meclizine (ANTIVERT) 25 mg tablet Take 1 Tab by mouth three (3) times daily as needed for Dizziness.  STIOLTO RESPIMAT 2.5-2.5 mcg/actuation inhaler Take  by inhalation.  docusate sodium (COLACE) 50 mg capsule 50 mg.    vitamin E acetate (VITAMIN E PO) Take  by mouth.  cyanocobalamin (VITAMIN B-12) 500 mcg tablet Take 500 mcg by mouth daily.  cetirizine (ZYRTEC) 10 mg tablet TAKE 1 TABLET BY MOUTH EVERY DAY.  GENERIC FOR ZYRTEC    VENTOLIN HFA 90 mcg/actuation inhaler 2 Puffs by Aerosolization route every four (4) hours as needed.  B.infantis-B.ani-B.long-B.bifi (PROBIOTIC 4X) 10-15 mg TbEC Take  by mouth daily.  aspirin delayed-release 81 mg tablet Take 81 mg by mouth daily.  Cholecalciferol, Vitamin D3, (VITAMIN D3) 1,000 unit cap Take 1,000 Units by mouth daily.  Lancets (ACCU-CHEK SOFTCLIX LANCETS) misc Please use one lancet to test blood sugars twice a day.  BLOOD-GLUCOSE METER (ACCU-CHEK MARCE MONITORING) by Does Not Apply route. No current facility-administered medications for this visit. Allergies and Sensitivities:  Allergies   Allergen Reactions    Latex, Natural Rubber Hives    Adhesive Rash     Some bandaids    Ciprofloxacin Rash    Codeine Nausea and Vomiting    Demerol [Meperidine] Nausea and Vomiting    Diclofenac Shortness of Breath and Palpitations    Erythromycin Rash    Levaquin [Levofloxacin] Rash    Loratadine Swelling    Morphine Nausea and Vomiting    Penicillin G Hives and Rash     Does fine with Keflex    Sulfa (Sulfonamide Antibiotics) Itching    Vicodin [Hydrocodone-Acetaminophen] Other (comments)     Metallic taste in mouth       Family History:  Family History   Problem Relation Age of Onset    Hypertension Father     Heart Disease Father     Alcohol abuse Father        Social History:  Social History     Tobacco Use    Smoking status: Former Smoker     Years: 45.00     Types: Cigarettes     Quit date: 2011     Years since quittin.6    Smokeless tobacco: Never Used   Substance Use Topics    Alcohol use: No    Drug use: No     She  reports that she quit smoking about 9 years ago. Her smoking use included cigarettes. She quit after 45.00 years of use. She has never used smokeless tobacco.  She  reports no history of alcohol use. Review of Systems:  Cardiac symptoms as noted above in HPI. All others negative.   Denies skin rash,  photophobia, neck pain, hemoptysis, chronic cough, nausea, vomiting, hematuria, burning micturition, BRBPR, chronic headaches.    Physical Exam:  BP Readings from Last 3 Encounters:   01/13/21 110/71   10/26/20 117/76   10/08/20 118/66         Pulse Readings from Last 3 Encounters:   01/13/21 (!) 103   10/26/20 81   10/08/20 95          Wt Readings from Last 3 Encounters:   01/13/21 233 lb 12.8 oz (106.1 kg)   10/26/20 237 lb 9.6 oz (107.8 kg)   10/08/20 239 lb (108.4 kg)       Constitutional: Oriented to person, place, and time.   HENT: Head: Normocephalic and atraumatic.   Neck: No JVD present.   Cardiovascular: Regular rhythm.   No gallop or rubs appreciated. No significant murmur  Lung: Breath sounds normal. No respiratory distress. No ronchi or rales appreciated  Abdominal: No tenderness. No rebound and no guarding.   Musculoskeletal: No LE swelling    Review of Data  LABS:   Lab Results   Component Value Date/Time    Sodium 140 07/16/2020 11:36 AM    Potassium 4.5 07/16/2020 11:36 AM    Chloride 100 07/16/2020 11:36 AM    CO2 23 07/16/2020 11:36 AM    Glucose 106 (H) 07/16/2020 11:36 AM    BUN 14 07/16/2020 11:36 AM    Creatinine 1.01 (H) 07/16/2020 11:36 AM     Lipids Latest Ref Rng & Units 10/10/2019 11/16/2018 11/7/2017   Chol, Total 100 - 199 mg/dL 118 105 119   HDL >39 mg/dL 55 53 56   LDL 0 - 99 mg/dL 49 38 45   Trig 0 - 149 mg/dL 70 70 88   Some recent data might be hidden     Lab Results   Component Value Date/Time    ALT (SGPT) 25 07/16/2020 11:36 AM     Lab Results   Component Value Date/Time    Hemoglobin A1c 6.6 (H) 12/10/2020 01:48 PM     EKG  (04/16) Sinus rhythm at 96 bpm    STRESS TEST (2012)   No EKG or scintigraphic evidence of ischemia or infarction. Normal LVEF    ECHO (01/16)  SUMMARY:  Left ventricle: Systolic function was normal. Ejection fraction was estimated in the range of 55 % to 60 %. There were no regional wall motion  abnormalities. Doppler parameters were consistent with abnormal left ventricular relaxation (grade 1  diastolic dysfunction). Right ventricle: Systolic function was normal.  Aortic valve: The valve was trileaflet. Leaflets exhibited calcification and moderate- severely reduced cuspal separation. There was severe aortic  stenosis. There was no significant regurgitation. Valve peak gradient was 85 mmHg. Valve mean gradient was 50 mmHg. ELIS (02/16)  Left ventricle: Size was normal. Systolic function was normal.  Right ventricle: The size was normal.  Left atrium: Size was normal. No thrombus was identified. There was no  spontaneous echo contrast (\"smoke\"). Left atrial appendage: No thrombus was identified. There was no  spontaneous echo contrast (\"smoke\") in the appendage. Right atrium: Size was normal.  Mitral valve: There was mild to moderate multi jet regurgitation. No obvious mass, vegetation or thrombus noted. Aortic valve: Aortic valve assessment was performed using 2D  transesophageal as well as transthoracic doppler data. The valve was  trileaflet. Leaflets were thickened, calcific and stenotic. A mean  gradient across the valve was measured at 49mmHg with a calculated valve  area of 0.70 - 0.75 cm based on an LVOT diameter of 2.1 cm, an LVOT VTI of  21 cm, and an AV VTI of 101 cm. There was no regurgitation. Tricuspid valve: There was no regurgitation. No obvious mass, vegetation or thrombus noted. HOLTER (05/16)  Interpretation:  1. Rhythm is sinus. 2. PACs (1.3% burden). 3. Rare PVCs. ECHO (05/19)  Left Ventricle Normal cavity size and systolic function (ejection fraction normal). Mildly increased wall thickness. The estimated ejection fraction is 56 - 60%. Visually measured ejection fraction. No regional wall motion abnormality noted. There is inconclusive left ventricular diastolic function. Left Atrium Normal cavity size. Left Atrium volume index is 26 mL/m2. Right Ventricle Not well visualized. Right Atrium Normal cavity size. Aortic Valve No regurgitation.  Prosthetic aortic valve. Aortic valve peak gradient is 22 mmHg. Aortic valve mean gradient is 13 mmHg. Aortic valve area is 1.1 cm2. There is a unknown type of bioprosthetic valve present. The prosthetic valve is normal.   Mitral Valve Normal valve structure and no stenosis. Trace regurgitation. Tricuspid Valve Normal valve structure, no stenosis and no regurgitation. Pulmonic Valve Pulmonic valve not well visualized, but normal doppler findings. Aorta Normal aortic root. CARDIAC CATH: (01/16)  PRESSURE HEMODYNAMICS: Systemic aortic pressure was 131/68 mmHg. Left ventricular pressure was 160/2/12 mmHg. Based on the data, peak-to-peak gradient between LV and aortic was approximately 30 mmHg. Mean RA  7 mmHg,   RV  32/1/8 mmHg,  PA  27/9/16 mmHg,   PCWP 10 mm hg  Cardiac output by Ellis calculation was 4.3 L/min and cardiac index was 2.02 liters L/min/meter squared. Mixed venous saturation was 65%, PA saturation was 65%, and the radial artery saturation was 90%. No evidence of intracardiac shunting. CORONARY ANGIOGRAM  1. LM: No significant obstructive disease. 2. LAD: 10-20% luminal irregularities, otherwise normal. Three diagonal branches angiographically normal.  3. LCX: 20-30% luminal irregularities, otherwise no obstructive disease. 4. OM1: Large bifurcating vessel without any obstructive disease. 5. RCA: Proximal 20%, mid discrete 40%, otherwise angiographically normal. Dominant vessel. IMPRESSION & PLAN:  Ms. Gustavo Fuentes is a 79 y.o. female with Aortic stenosis, COPD, obesity, diabetes, fibromyalgia. Aortic stenosis:    Ms. Gustavo Fuentes [facility]. Aspirin will be continued lifelong. ECHO in 06/17 with mean gradient across valve ~ 10 mm Hg. Repeat echo in May 2019 with gradient 13 mmHg. Physiologic for prosthetic valve.    Continue same and observation  Continue ASA  We will repeat echo to check on her gradient    Diabetes:  Goal hemoglobin A1c less than 7 is recommended from a cardiovascular standpoint. Last hemoglobin A1c was 6.3. Continue same management per PCP    Hypertension:  BP today 120/74 mm Hg. Not on any meds. Continue same. Off BB because of low BP in past.     HLD: last LDL 49. On atorvastatin. Continue same. Preoperative evaluation:  Patient is scheduled for elective outpatient left carpal tunnel syndrome surgery in couple weeks  Currently she does not have any symptom that is concerning for unstable angina or decompensated heart failure. She has no evidence of fluid overload. In absence of any significant cardiac symptoms I do not believe she needs any further cardiac work-up. Close hemodynamic monitoring is advised and recommended  She will be at low-intermediate risk for any cardiac complication during carpal tunnel syndrome surgery. She is acceptable candidate for planned per surgery    This plan was discussed with patient who is in agreement. Thank you for allowing me to participate in patient care. Please feel free to call me if you have any question or concern.

## 2021-02-22 NOTE — PROGRESS NOTES
Luly Marie presents today for   Chief Complaint   Patient presents with   [address] preferred language for health care discussion is english/other. Personal Protective Equipment:   Personal Protective Equipment was used including: mask-surgical and hands-gloves. Patient was placed on no precaution(s). Patient was masked. Is someone accompanying this pt? No    Is the patient using any DME equipment during OV? No    Depression Screening:  3 most recent PHQ Screens 12/1/2020   Little interest or pleasure in doing things Not at all   Feeling down, depressed, irritable, or hopeless Not at all   Total Score PHQ 2 0       Learning Assessment:  Learning Assessment 4/22/2016   PRIMARY LEARNER Patient   HIGHEST LEVEL OF EDUCATION - PRIMARY LEARNER  -   PRIMARY LANGUAGE ENGLISH   LEARNER PREFERENCE PRIMARY LISTENING   ANSWERED BY patient   RELATIONSHIP SELF       Abuse Screening:  Abuse Screening Questionnaire 12/12/2019   Do you ever feel afraid of your partner? N   Are you in a relationship with someone who physically or mentally threatens you? N   Is it safe for you to go home? Y       Fall Risk  Fall Risk Assessment, last 12 mths 10/26/2020   Able to walk? Yes   Fall in past 12 months? No   Number of falls in past 12 months -   Fall with injury? -       Pt currently taking Anticoagulant therapy? No    Coordination of Care:  1. Have you been to the ER, urgent care clinic since your last visit? Hospitalized since your last visit? No    2. Have you seen or consulted any other health care providers outside of the [address] [place] [address] since your last visit? Include any pap smears or colon screening.  No

## 2021-03-03 ENCOUNTER — HOSPITAL ENCOUNTER (OUTPATIENT)
Dept: PREADMISSION TESTING | Age: 71
Discharge: HOME | End: 2021-03-03
Payer: MEDICARE

## 2021-03-03 ENCOUNTER — OFFICE VISIT (OUTPATIENT)
Dept: ORTHOPEDIC SURGERY | Age: 71
End: 2021-03-03
Payer: MEDICARE

## 2021-03-03 VITALS
BODY MASS INDEX: 35.61 KG/M2 | OXYGEN SATURATION: 99 % | WEIGHT: 235 LBS | HEART RATE: 99 BPM | HEIGHT: 68 IN | TEMPERATURE: 96.2 F

## 2021-03-03 DIAGNOSIS — M18.11 PRIMARY OSTEOARTHRITIS OF FIRST CARPOMETACARPAL JOINT OF RIGHT HAND: ICD-10-CM

## 2021-03-03 DIAGNOSIS — Z01.818 PREOP EXAMINATION: ICD-10-CM

## 2021-03-03 DIAGNOSIS — G56.22 ULNAR NEUROPATHY AT WRIST, LEFT: ICD-10-CM

## 2021-03-03 DIAGNOSIS — G56.02 LEFT CARPAL TUNNEL SYNDROME: Primary | ICD-10-CM

## 2021-03-03 DIAGNOSIS — G56.21 CUBITAL TUNNEL SYNDROME, RIGHT: ICD-10-CM

## 2021-03-03 DIAGNOSIS — G56.01 RIGHT CARPAL TUNNEL SYNDROME: ICD-10-CM

## 2021-03-03 DIAGNOSIS — M18.12 PRIMARY OSTEOARTHRITIS OF FIRST CARPOMETACARPAL JOINT OF LEFT HAND: ICD-10-CM

## 2021-03-03 LAB
ALBUMIN SERPL-MCNC: 3.9 G/DL (ref 3.4–5)
ALBUMIN/GLOB SERPL: 1 {RATIO} (ref 0.8–1.7)
ALP SERPL-CCNC: 96 U/L (ref 45–117)
ALT SERPL-CCNC: 26 U/L (ref 13–56)
ANION GAP SERPL CALC-SCNC: 8 MMOL/L (ref 3–18)
AST SERPL-CCNC: 19 U/L (ref 10–38)
BASOPHILS # BLD: 0 K/UL (ref 0–0.1)
BASOPHILS NFR BLD: 1 % (ref 0–2)
BILIRUB SERPL-MCNC: 0.6 MG/DL (ref 0.2–1)
BUN SERPL-MCNC: 16 MG/DL (ref 7–18)
BUN/CREAT SERPL: 15 (ref 12–20)
CALCIUM SERPL-MCNC: 9.7 MG/DL (ref 8.5–10.1)
CHLORIDE SERPL-SCNC: 110 MMOL/L (ref 100–111)
CO2 SERPL-SCNC: 26 MMOL/L (ref 21–32)
CREAT SERPL-MCNC: 1.09 MG/DL (ref 0.6–1.3)
DIFFERENTIAL METHOD BLD: NORMAL
EOSINOPHIL # BLD: 0.2 K/UL (ref 0–0.4)
EOSINOPHIL NFR BLD: 2 % (ref 0–5)
ERYTHROCYTE [DISTWIDTH] IN BLOOD BY AUTOMATED COUNT: 14.5 % (ref 11.6–14.5)
GLOBULIN SER CALC-MCNC: 3.8 G/DL (ref 2–4)
GLUCOSE SERPL-MCNC: 128 MG/DL (ref 74–99)
HCT VFR BLD AUTO: 40 % (ref 35–45)
HGB BLD-MCNC: 12.6 G/DL (ref 12–16)
LYMPHOCYTES # BLD: 1.7 K/UL (ref 0.9–3.6)
LYMPHOCYTES NFR BLD: 25 % (ref 21–52)
MCH RBC QN AUTO: 28.4 PG (ref 24–34)
MCHC RBC AUTO-ENTMCNC: 31.5 G/DL (ref 31–37)
MCV RBC AUTO: 90.1 FL (ref 74–97)
MONOCYTES # BLD: 0.7 K/UL (ref 0.05–1.2)
MONOCYTES NFR BLD: 9 % (ref 3–10)
NEUTS SEG # BLD: 4.5 K/UL (ref 1.8–8)
NEUTS SEG NFR BLD: 63 % (ref 40–73)
PLATELET # BLD AUTO: 274 K/UL (ref 135–420)
PMV BLD AUTO: 11.8 FL (ref 9.2–11.8)
POTASSIUM SERPL-SCNC: 4.6 MMOL/L (ref 3.5–5.5)
PROT SERPL-MCNC: 7.7 G/DL (ref 6.4–8.2)
RBC # BLD AUTO: 4.44 M/UL (ref 4.2–5.3)
SODIUM SERPL-SCNC: 144 MMOL/L (ref 136–145)
WBC # BLD AUTO: 7.1 K/UL (ref 4.6–13.2)

## 2021-03-03 PROCEDURE — G8427 DOCREV CUR MEDS BY ELIG CLIN: HCPCS | Performed by: ORTHOPAEDIC SURGERY

## 2021-03-03 PROCEDURE — 85025 COMPLETE CBC W/AUTO DIFF WBC: CPT

## 2021-03-03 PROCEDURE — G8536 NO DOC ELDER MAL SCRN: HCPCS | Performed by: ORTHOPAEDIC SURGERY

## 2021-03-03 PROCEDURE — 80053 COMPREHEN METABOLIC PANEL: CPT

## 2021-03-03 PROCEDURE — 93005 ELECTROCARDIOGRAM TRACING: CPT

## 2021-03-03 PROCEDURE — 36415 COLL VENOUS BLD VENIPUNCTURE: CPT

## 2021-03-03 PROCEDURE — 1101F PT FALLS ASSESS-DOCD LE1/YR: CPT | Performed by: ORTHOPAEDIC SURGERY

## 2021-03-03 PROCEDURE — 3017F COLORECTAL CA SCREEN DOC REV: CPT | Performed by: ORTHOPAEDIC SURGERY

## 2021-03-03 PROCEDURE — G9717 DOC PT DX DEP/BP F/U NT REQ: HCPCS | Performed by: ORTHOPAEDIC SURGERY

## 2021-03-03 PROCEDURE — G8399 PT W/DXA RESULTS DOCUMENT: HCPCS | Performed by: ORTHOPAEDIC SURGERY

## 2021-03-03 PROCEDURE — G8417 CALC BMI ABV UP PARAM F/U: HCPCS | Performed by: ORTHOPAEDIC SURGERY

## 2021-03-03 PROCEDURE — 20600 DRAIN/INJ JOINT/BURSA W/O US: CPT | Performed by: ORTHOPAEDIC SURGERY

## 2021-03-03 PROCEDURE — U0005 INFEC AGEN DETEC AMPLI PROBE: HCPCS

## 2021-03-03 PROCEDURE — 99214 OFFICE O/P EST MOD 30 MIN: CPT | Performed by: ORTHOPAEDIC SURGERY

## 2021-03-03 PROCEDURE — G9899 SCRN MAM PERF RSLTS DOC: HCPCS | Performed by: ORTHOPAEDIC SURGERY

## 2021-03-03 PROCEDURE — 1090F PRES/ABSN URINE INCON ASSESS: CPT | Performed by: ORTHOPAEDIC SURGERY

## 2021-03-03 NOTE — PROGRESS NOTES
Mike Mandujano is a 79 y.o. female right handed retiree. Worker's Compensation and legal considerations: not known. Vitals:    03/03/21 1259   Pulse: 99   Temp: (!) 96.2 °F (35.7 °C)   SpO2: 99%   Weight: 235 lb (106.6 kg)   Height: 5' 8\" (1.727 m)   PainSc:   4   PainLoc: Hand           Chief Complaint   Patient presents with    Hand Pain     left       HPI: Patient presents for follow-up and to reschedule her surgery. She was previously scheduled for right cubital tunnel, carpal tunnel, and CMC surgeries. She would like to do her left side first she will soon be caring for someone and this is a smaller surgery. 1/2021 HPI: Patient returns today regarding bilateral thumb CMC arthritis bilateral carpal tunnel syndrome and right cubital tunnel syndrome. Her injections last time did help however she would like to discuss surgery. Initial HPI: Patient comes in today with complaints of bilateral thumb base pain and bilateral numbness and tingling in the hands. She has recently had an EMG. Date of onset: Indeterminate    Injury: No    Prior Treatment:  Yes: Comment: Bilateral carpal tunnel and CMC joint injections.     Numbness/ Tingling: Yes: Comment: Bilateral hands right worse than left      ROS: Review of Systems - General ROS: negative  Psychological ROS: negative  ENT ROS: negative  Allergy and Immunology ROS: negative  Hematological and Lymphatic ROS: negative  Respiratory ROS: no cough, shortness of breath, or wheezing  Cardiovascular ROS: no chest pain or dyspnea on exertion  Gastrointestinal ROS: no abdominal pain, change in bowel habits, or black or bloody stools  Musculoskeletal ROS: positive for - pain in hand - bilateral  Neurological ROS: positive for - numbness/tingling  Dermatological ROS: negative    Past Medical History:   Diagnosis Date    Ankle fracture     Aortic stenosis     S/P AVR in 2016    Asthma     Carpal tunnel syndrome on both sides     Chronic pain     back pain    [facility] [address]     Diverticulitis     Fibromyalgia     H/O aortic valve replacement 03/2016    21 mm bovine pericardial bioprosthesis and epiaortic scanning      Hypertension     PT denies    Hypothyroidism     Lumbar post-laminectomy syndrome     Menopause     Obesity     Osteoarthritis     Psychotic disorder ([facility] [address]     Sciatica     Skin cancer 2013    right forearm. Past Surgical History:   Procedure Laterality Date    COLONOSCOPY      COLONOSCOPY N/A 9/15/2017    COLONOSCOPY performed by Miguel Montoya MD at [address] [facility]  Right 1995    lens  replaced.  HX CATARACT REMOVAL Left 02/2018    HX COLONOSCOPY  09/15/2017    dr. Dov Francois  f/u 5 years.  HX HEENT  1990's    sinus surgery     HX HEENT Right 11/02/2017    laser for right eye cataracts.  HX LUMBAR FUSION  2004    L3-L4-L5    HX LUMBAR LAMINECTOMY  2002    HX MOHS PROCEDURES Left 1990    HX ORTHOPAEDIC      Rotator cuff Bilateral    HX SHOULDER REPLACEMENT Right 03/29/2017    reverse.  SD CARDIAC SURG PROCEDURE UNLIST  03/2016    aortic valve replacement.  SD CHEST SURGERY PROCEDURE UNLISTED  03/2016    Open Heart Surgery    SD COLSC FLX W/RMVL OF TUMOR POLYP LESION SNARE TQ  07/09/2014 repeat in 3 years    Dr. Conor Mercado      Aortic valve replacement       Current Outpatient Medications   Medication Sig Dispense Refill    traMADoL (Ultram) 50 mg tablet Take 1 Tab by mouth every six (6) hours as needed for Pain for up to 60 days. Max Daily Amount: 200 mg. Indications: neuropathic pain, pain 120 Tab 0    levothyroxine (SYNTHROID) 50 mcg tablet TAKE 1 TABLET BY MOUTH ONCE DAILY BEFORE BREAKFAST 90 Tab 0    desonide (TRIDESILON) 0.05 % cream APPLY   EXTERNALLY TO AFFECTED AREA TWICE DAILY 15 g 0    tiaGABine (GabitriL) 2 mg tablet Take 1 Tab by mouth two (2) times daily (with meals).  60 Tab 1    metFORMIN (GLUCOPHAGE) 500 mg tablet TAKE 1 TABLET BY MOUTH ONCE DAILY WITH SUPPER 90 Tab 3    FLUoxetine (PROzac) 10 mg capsule Take 1 Cap by mouth daily. 90 Cap 3    atorvastatin (LIPITOR) 20 mg tablet Take 1 tablet by mouth once daily 90 Tab 4    fluticasone propionate (FLONASE) 50 mcg/actuation nasal spray Use 1 spray(s) in each nostril twice daily 16 g 4    clobetasoL (TEMOVATE) 0.05 % external solution APPLY TO SCALP AFTER SHAMPOO DAILY 50 mL 3    glucose blood VI test strips (True Metrix Pro Test Strip) strip Test blood glucose 3 times a day 200 Strip 3    pantoprazole (PROTONIX) 20 mg tablet Take 1 Tab by mouth daily. 90 Tab 3    meloxicam (MOBIC) 15 mg tablet TAKE 1 TABLET BY MOUTH DAILY 90 Tab 3    meclizine (ANTIVERT) 25 mg tablet Take 1 Tab by mouth three (3) times daily as needed for Dizziness. 61 Tab 3    STIOLTO RESPIMAT 2.5-2.5 mcg/actuation inhaler Take  by inhalation. 1    vitamin E acetate (VITAMIN E PO) Take  by mouth.  cyanocobalamin (VITAMIN B-12) 500 mcg tablet Take 500 mcg by mouth daily.  cetirizine (ZYRTEC) 10 mg tablet TAKE 1 TABLET BY MOUTH EVERY DAY. GENERIC FOR ZYRTEC 90 Tab 2    VENTOLIN HFA 90 mcg/actuation inhaler 2 Puffs by Aerosolization route every four (4) hours as needed. 0    B.infantis-B.ani-B.long-B.bifi (PROBIOTIC 4X) 10-15 mg TbEC Take  by mouth daily.  aspirin delayed-release 81 mg tablet Take 81 mg by mouth daily.  Cholecalciferol, Vitamin D3, (VITAMIN D3) 1,000 unit cap Take 1,000 Units by mouth daily.  Lancets (ACCU-CHEK SOFTCLIX LANCETS) misc Please use one lancet to test blood sugars twice a day. 1 Package 20    BLOOD-GLUCOSE METER (ACCU-CHEK MARCE MONITORING) by Does Not Apply route.  cyclobenzaprine (FLEXERIL) 10 mg tablet Take 1 Tab by mouth three (3) times daily as needed for Muscle Spasm(s). 30 Tab 0    docusate sodium (COLACE) 50 mg capsule 50 mg.          Allergies   Allergen Reactions    Latex, Natural Rubber Hives    Adhesive Rash     Some bandaids    Ciprofloxacin Rash    Codeine Nausea and Vomiting    Demerol [Meperidine] Nausea and Vomiting    Diclofenac Shortness of Breath and Palpitations    Erythromycin Rash    Levaquin [Levofloxacin] Rash    Loratadine Swelling    Morphine Nausea and Vomiting    Penicillin G Hives and Rash     Does fine with Keflex    Sulfa (Sulfonamide Antibiotics) Itching    Vicodin [Hydrocodone-Acetaminophen] Other (comments)     Metallic taste in mouth           PE:     Physical Exam  Vitals signs and nursing note reviewed. Constitutional:       General: She is not in acute distress. Appearance: Normal appearance. She is not ill-appearing, toxic-appearing or diaphoretic. HENT:      Head: Normocephalic and atraumatic. Nose: Nose normal.      Mouth/Throat:      Mouth: Mucous membranes are moist.   Eyes:      Extraocular Movements: Extraocular movements intact. Pupils: Pupils are equal, round, and reactive to light. Neck:      Musculoskeletal: Normal range of motion and neck supple. Cardiovascular:      Pulses: Normal pulses. Pulmonary:      Effort: Pulmonary effort is normal. No respiratory distress. Abdominal:      General: Abdomen is flat. There is no distension. Musculoskeletal: Normal range of motion. General: Tenderness present. No swelling, deformity or signs of injury. Right lower leg: No edema. Left lower leg: No edema. Skin:     General: Skin is warm and dry. Capillary Refill: Capillary refill takes less than 2 seconds. Findings: No bruising or erythema. Neurological:      General: No focal deficit present. Mental Status: She is alert and oriented to person, place, and time. Psychiatric:         Mood and Affect: Mood normal.         Behavior: Behavior normal.            NEUROVASCULAR    Examination L R Examination L R   Carpal Comp. + + Pronator Comp. - -   Carpal Tinel + + Pronator Tinel - -   Phalen's + + Pronator Stress - -   Cubital Comp. - + Guyon Comp.  ? - Cubital Tinel - + Guyon Tinel ? -   Elbow Hyperflexion - + Adson's - -   Spurling's - - SC Comp. - -   PCB Median abn - - SC Tinel - -   Radial Tinel - - IC Comp. - -   Digital Tinel - - IC Tinel - -   Radial 2-Pt WNL WNL Ulnar 2-Pt WNL WNL     Radial Pulse: 2+  Capillary Refill: < 2 sec  Jamal: Not Performed  Digital Jamal: Not Performed    Hand:    Examination L Digit(s) R Digit(s)   1st CMC Tenderness -  +    1st CMC Grind -  +    Zaina Nodes -  -    Heberden Nodes -  -    A1 Pulley Tenderness -  -    Triggering -  -    UCL Instability -  -    RCL Instability -  -    Lateral Stress Pain -  -    Palmar Cords -  -    Tabletop test -  -    Garrod's Pads -  -     Strength       Pinch Strength         ROM: Full        NCV & EMG Findings:  Evaluation of the left median motor and the right median motor nerves showed prolonged distal onset latency (L4.5, R4.7 ms). The right ulnar motor nerve showed decreased conduction velocity (A Elbow-B Elbow, 42 m/s). The left median sensory and the right median sensory nerves showed prolonged distal peak latency (L4.1, R4.2 ms) and decreased conduction velocity (Wrist-2nd Digit, L34, R33 m/s). The left ulnar sensory nerve showed prolonged distal peak latency (3.9 ms) and decreased conduction velocity (Wrist-5th Digit, 36 m/s). The right ulnar sensory nerve showed prolonged distal peak latency (4.2 ms), reduced amplitude (9.8 µV), and decreased conduction velocity (Wrist-5th Digit, 33 m/s). All remaining nerves (as indicated in the following tables) were within normal limits. All left vs. right side differences were within normal limits.       All examined muscles (as indicated in the following table) showed no evidence of electrical instability.       INTERPRETATION  This is an abnormal electrodiagnostic examination. These findings may be consistent with:   1. Moderate median mononeuropathy at the wrist bilaterally, R>L (carpal tunnel syndrome)  2.  Moderate ulnar mononeuropathy at the right elbow (cubital tunnel syndrome)  3. Mild ulnar mononeuropathy at the left wrist.     There is no electrodiagnostic evidence of any cervical radiculopathy, brachial plexopathy, peripheral polyneuropathy, or any other mononeuropathy.        CLINICAL INTERPRETATION  Her electrodiagnostic findings of bilateral carpal tunnel and ulnar mononeuropathies may explain some of her bilateral hand symptoms. There is no electrodiagnostic evidence to explain more proximal symptoms. Imaging:     10/26/2020 plain films of bilateral wrists are significant for Eaton stage III-IV degenerative changes at the [facility] joint on the right and Eaton stage II degenerative changes at the [facility] joint on the left. There may also be evidence of radio scaphoid arthritis at the level of the styloid. This is bilateral.        ICD-10-CM ICD-9-CM    1. Left carpal tunnel syndrome  G56.02 354.0 SCHEDULE SURGERY   2. Primary osteoarthritis of first carpometacarpal joint of right hand  M18.11 715.14 TN DRAIN/INJECT SMALL JOINT/BURSA      triamcinolone acetonide (KENALOG) 10 mg/mL injection 5 mg   3. Right carpal tunnel syndrome  G56.01 354.0    4. Cubital tunnel syndrome, right  G56.21 354.2    5. Primary osteoarthritis of first carpometacarpal joint of left hand  M18.12 715.14    6. Ulnar neuropathy at wrist, left  G56.22 354.2          Plan:     Schedule left carpal tunnel release    Right thumb CMC joint injection and continue brace wear. This procedure has been fully reviewed with the patient and written informed consent has been obtained. The patient was counseled at length about the risks of viviane Covid-19 during their perioperative period and any recovery window from their procedure. The patient was made aware that viviane Covid-19  may worsen their prognosis for recovering from their procedure and lend to a higher morbidity and/or mortality risk.   All material risks, benefits, and reasonable alternatives including postponing the procedure were discussed. The patient does  wish to proceed with the procedure at this time. Follow-up and Dispositions    · Return for 2 weeks postop. Plan was reviewed with patient, who verbalized agreement and understanding of the plan    [address] NOTE        Chart reviewed for the following:   Yonathan ESPARZA DO, have reviewed the History, Physical and updated the Allergic reactions for [address] performed immediately prior to start of procedure:   Lakhwinder ESPARZA DO, have performed the following reviews on [facility] prior to the start of the procedure:            * Patient was identified by name and date of birth   * Agreement on procedure being performed was verified  * Risks and Benefits explained to the patient  * Procedure site verified and marked as necessary  * Patient was positioned for comfort  * Consent was signed and verified     Time: 13:17      Date of procedure: 3/3/2021    Procedure performed by: Lakhwinder Cox DO    Provider assisted by: Madisyn Astudillo LPN    Patient assisted by: self    How tolerated by patient: tolerated the procedure well with no complications    Post Procedural Pain Scale: 0 - No Hurt    Comments: none    Procedure:  After consent was obtained, using sterile technique the joint was prepped. Local anesthetic used: 1% lidocaine. Kenalog 5 mg and was then injected and the needle withdrawn. The procedure was well tolerated. The patient is asked to continue to rest the area for a few more days before resuming regular activities. It may be more painful for the first 1-2 days. Watch for fever, or increased swelling or persistent pain in the joint. Call or return to clinic prn if such symptoms occur or there is failure to improve as anticipated.

## 2021-03-04 LAB
ATRIAL RATE: 90 BPM
CALCULATED P AXIS, ECG09: 51 DEGREES
CALCULATED R AXIS, ECG10: 2 DEGREES
CALCULATED T AXIS, ECG11: 39 DEGREES
DIAGNOSIS, 93000: NORMAL
P-R INTERVAL, ECG05: 148 MS
Q-T INTERVAL, ECG07: 346 MS
QRS DURATION, ECG06: 68 MS
QTC CALCULATION (BEZET), ECG08: 423 MS
SARS-COV-2, COV2NT: NOT DETECTED
VENTRICULAR RATE, ECG03: 90 BPM

## 2021-03-05 ENCOUNTER — HOSPITAL ENCOUNTER (OUTPATIENT)
Dept: NON INVASIVE DIAGNOSTICS | Age: 71
Discharge: HOME | End: 2021-03-05
Attending: INTERNAL MEDICINE
Payer: MEDICARE

## 2021-03-05 VITALS
SYSTOLIC BLOOD PRESSURE: 110 MMHG | WEIGHT: 235 LBS | HEIGHT: 68 IN | BODY MASS INDEX: 35.61 KG/M2 | DIASTOLIC BLOOD PRESSURE: 71 MMHG

## 2021-03-05 DIAGNOSIS — I35.0 NONRHEUMATIC AORTIC VALVE STENOSIS: ICD-10-CM

## 2021-03-05 LAB
ECHO AO ROOT DIAM: 3.32 CM
ECHO AV AREA VTI: 1.69 CM2
ECHO AV AREA/BSA VTI: 0.8 CM2/M2
ECHO AV MEAN GRADIENT: 13.38 MMHG
ECHO AV PEAK GRADIENT: 25.09 MMHG
ECHO AV PEAK VELOCITY: 250.43 CM/S
ECHO AV VTI: 50.52 CM
ECHO LA AREA 4C: 19.68 CM2
ECHO LA VOL 2C: 50.38 ML (ref 22–52)
ECHO LA VOL 4C: 56.2 ML (ref 22–52)
ECHO LA VOL BP: 58.95 ML (ref 22–52)
ECHO LA VOL/BSA BIPLANE: 26.93 ML/M2 (ref 16–28)
ECHO LA VOLUME INDEX A2C: 23.02 ML/M2 (ref 16–28)
ECHO LA VOLUME INDEX A4C: 25.67 ML/M2 (ref 16–28)
ECHO LV INTERNAL DIMENSION DIASTOLIC: 4.49 CM (ref 3.9–5.3)
ECHO LV INTERNAL DIMENSION SYSTOLIC: 2.94 CM
ECHO LV IVSD: 1.34 CM (ref 0.6–0.9)
ECHO LV MASS 2D: 199.8 G (ref 67–162)
ECHO LV MASS INDEX 2D: 91.3 G/M2 (ref 43–95)
ECHO LV POSTERIOR WALL DIASTOLIC: 1.08 CM (ref 0.6–0.9)
ECHO LVOT DIAM: 2.14 CM
ECHO LVOT PEAK GRADIENT: 5.9 MMHG
ECHO LVOT SV: 85.2 ML
ECHO LVOT VTI: 23.78 CM
ECHO MV A VELOCITY: 73.37 CM/S
ECHO MV E DECELERATION TIME (DT): 200.02 MS
ECHO MV E VELOCITY: 78.36 CM/S
ECHO MV E/A RATIO: 1.07
ECHO RV TAPSE: 2.93 CM (ref 1.5–2)
LVOT MG: 3.32 MMHG

## 2021-03-05 PROCEDURE — 93306 TTE W/DOPPLER COMPLETE: CPT

## 2021-03-06 RX ORDER — MELOXICAM 15 MG/1
TABLET ORAL
Qty: 90 TAB | Refills: 0 | Status: SHIPPED | OUTPATIENT
Start: 2021-03-06 | End: 2021-06-08 | Stop reason: SDUPTHER

## 2021-03-08 ENCOUNTER — ANESTHESIA EVENT (OUTPATIENT)
Dept: SURGERY | Age: 71
End: 2021-03-08
Payer: MEDICARE

## 2021-03-08 NOTE — H&P
Anirudh Yarbrough is a 79 y.o. female right handed retiree. Worker's Compensation and legal considerations: not known.         Vitals:     03/03/21 1259   Pulse: 99   Temp: (!) 96.2 °F (35.7 °C)   SpO2: 99%   Weight: 235 lb (106.6 kg)   Height: 5' 8\" (1.727 m)   PainSc:   4   PainLoc: Hand                    Chief Complaint   Patient presents with    Hand Pain       left         HPI: Patient presents for follow-up and to reschedule her surgery. She was previously scheduled for right cubital tunnel, carpal tunnel, and CMC surgeries. She would like to do her left side first she will soon be caring for someone and this is a smaller surgery.     1/2021 HPI: Patient returns today regarding bilateral thumb CMC arthritis bilateral carpal tunnel syndrome and right cubital tunnel syndrome. Her injections last time did help however she would like to discuss surgery.     Initial HPI: Patient comes in today with complaints of bilateral thumb base pain and bilateral numbness and tingling in the hands. She has recently had an EMG.      Date of onset: Indeterminate     Injury: No     Prior Treatment:  Yes: Comment: Bilateral carpal tunnel and CMC joint injections.     Numbness/ Tingling: Yes: Comment: Bilateral hands right worse than left        ROS: Review of Systems - General ROS: negative  Psychological ROS: negative  ENT ROS: negative  Allergy and Immunology ROS: negative  Hematological and Lymphatic ROS: negative  Respiratory ROS: no cough, shortness of breath, or wheezing  Cardiovascular ROS: no chest pain or dyspnea on exertion  Gastrointestinal ROS: no abdominal pain, change in bowel habits, or black or bloody stools  Musculoskeletal ROS: positive for - pain in hand - bilateral  Neurological ROS: positive for - numbness/tingling  Dermatological ROS: negative          Past Medical History:   Diagnosis Date    Ankle fracture      Aortic stenosis       S/P AVR in 2016    Asthma      Carpal tunnel syndrome on both sides    Chronic pain       back pain    Diabetes ([facility] [address]      Diverticulitis      Fibromyalgia      H/O aortic valve replacement 03/2016     21 mm bovine pericardial bioprosthesis and epiaortic scanning      Hypertension       PT denies    Hypothyroidism      Lumbar post-laminectomy syndrome      Menopause      Obesity      Osteoarthritis      Psychotic disorder ([facility] [address]      Sciatica      Skin cancer 2013     right forearm.          Surgical History         Past Surgical History:   Procedure Laterality Date    COLONOSCOPY        COLONOSCOPY N/A 9/15/2017     COLONOSCOPY performed by Hailey Santiago MD at [address] Right 1995     lens  replaced.  HX CATARACT REMOVAL Left 02/2018    HX COLONOSCOPY   09/15/2017      Doc Holes  f/u 5 years.  HX HEENT   1990's     sinus surgery     HX HEENT Right 11/02/2017     laser for right eye cataracts.  HX LUMBAR FUSION   2004     L3-L4-L5    HX LUMBAR LAMINECTOMY   2002    HX MOHS PROCEDURES Left 1990    HX ORTHOPAEDIC         Rotator cuff Bilateral    HX SHOULDER REPLACEMENT Right 03/29/2017     reverse.  AL CARDIAC SURG PROCEDURE UNLIST   03/2016     aortic valve replacement.  AL CHEST SURGERY PROCEDURE UNLISTED   03/2016     Open Heart Surgery    AL COLSC FLX W/RMVL OF TUMOR POLYP LESION SNARE TQ   07/09/2014 repeat in 3 years     Dr. Octavio Davis         Aortic valve replacement                   Current Outpatient Medications   Medication Sig Dispense Refill    traMADoL (Ultram) 50 mg tablet Take 1 Tab by mouth every six (6) hours as needed for Pain for up to 60 days. Max Daily Amount: 200 mg.  Indications: neuropathic pain, pain 120 Tab 0    levothyroxine (SYNTHROID) 50 mcg tablet TAKE 1 TABLET BY MOUTH ONCE DAILY BEFORE BREAKFAST 90 Tab 0    desonide (TRIDESILON) 0.05 % cream APPLY   EXTERNALLY TO AFFECTED AREA TWICE DAILY 15 g 0    tiaGABine (GabitriL) 2 mg tablet Take 1 Tab by mouth two (2) times daily (with meals). 60 Tab 1    metFORMIN (GLUCOPHAGE) 500 mg tablet TAKE 1 TABLET BY MOUTH ONCE DAILY WITH SUPPER 90 Tab 3    FLUoxetine (PROzac) 10 mg capsule Take 1 Cap by mouth daily. 90 Cap 3    atorvastatin (LIPITOR) 20 mg tablet Take 1 tablet by mouth once daily 90 Tab 4    fluticasone propionate (FLONASE) 50 mcg/actuation nasal spray Use 1 spray(s) in each nostril twice daily 16 g 4    clobetasoL (TEMOVATE) 0.05 % external solution APPLY TO SCALP AFTER SHAMPOO DAILY 50 mL 3    glucose blood VI test strips (True Metrix Pro Test Strip) strip Test blood glucose 3 times a day 200 Strip 3    pantoprazole (PROTONIX) 20 mg tablet Take 1 Tab by mouth daily. 90 Tab 3    meloxicam (MOBIC) 15 mg tablet TAKE 1 TABLET BY MOUTH DAILY 90 Tab 3    meclizine (ANTIVERT) 25 mg tablet Take 1 Tab by mouth three (3) times daily as needed for Dizziness. 61 Tab 3    STIOLTO RESPIMAT 2.5-2.5 mcg/actuation inhaler Take  by inhalation.   1    vitamin E acetate (VITAMIN E PO) Take  by mouth.        cyanocobalamin (VITAMIN B-12) 500 mcg tablet Take 500 mcg by mouth daily.        cetirizine (ZYRTEC) 10 mg tablet TAKE 1 TABLET BY MOUTH EVERY DAY. GENERIC FOR ZYRTEC 90 Tab 2    VENTOLIN HFA 90 mcg/actuation inhaler 2 Puffs by Aerosolization route every four (4) hours as needed.   0    B.infantis-B.ani-B.long-B.bifi (PROBIOTIC 4X) 10-15 mg TbEC Take  by mouth daily.        aspirin delayed-release 81 mg tablet Take 81 mg by mouth daily.        Cholecalciferol, Vitamin D3, (VITAMIN D3) 1,000 unit cap Take 1,000 Units by mouth daily.        Lancets (ACCU-CHEK SOFTCLIX LANCETS) misc Please use one lancet to test blood sugars twice a day. 1 Package 20    BLOOD-GLUCOSE METER (ACCU-CHEK MARCE MONITORING) by Does Not Apply route.        cyclobenzaprine (FLEXERIL) 10 mg tablet Take 1 Tab by mouth three (3) times daily as needed for Muscle Spasm(s).  30 Tab 0    docusate sodium (COLACE) 50 mg capsule 50 mg.                   Allergies   Allergen Reactions    Latex, Natural Rubber Hives    Adhesive Rash       Some bandaids    Ciprofloxacin Rash    Codeine Nausea and Vomiting    Demerol [Meperidine] Nausea and Vomiting    Diclofenac Shortness of Breath and Palpitations    Erythromycin Rash    Levaquin [Levofloxacin] Rash    Loratadine Swelling    Morphine Nausea and Vomiting    Penicillin G Hives and Rash       Does fine with Keflex    Sulfa (Sulfonamide Antibiotics) Itching    Vicodin [Hydrocodone-Acetaminophen] Other (comments)       Metallic taste in mouth               PE:      Physical Exam  Vitals signs and nursing note reviewed. Constitutional:       General: She is not in acute distress. Appearance: Normal appearance. She is not ill-appearing, toxic-appearing or diaphoretic. HENT:      Head: Normocephalic and atraumatic. Nose: Nose normal.      Mouth/Throat:      Mouth: Mucous membranes are moist.   Eyes:      Extraocular Movements: Extraocular movements intact. Pupils: Pupils are equal, round, and reactive to light. Neck:      Musculoskeletal: Normal range of motion and neck supple. Cardiovascular:      Pulses: Normal pulses. Pulmonary:      Effort: Pulmonary effort is normal. No respiratory distress. Abdominal:      General: Abdomen is flat. There is no distension. Musculoskeletal: Normal range of motion. General: Tenderness present. No swelling, deformity or signs of injury. Right lower leg: No edema. Left lower leg: No edema. Skin:     General: Skin is warm and dry. Capillary Refill: Capillary refill takes less than 2 seconds. Findings: No bruising or erythema. Neurological:      General: No focal deficit present. Mental Status: She is alert and oriented to person, place, and time.    Psychiatric:         Mood and Affect: Mood normal.         Behavior: Behavior normal.               NEUROVASCULAR     Examination L R Examination L R   Carpal Comp. + + Pronator Comp. - -   Carpal Tinel + + Pronator Tinel - -   Phalen's + + Pronator Stress - -   Cubital Comp. - + Guyon Comp. ? -   Cubital Tinel - + Guyon Tinel ? -   Elbow Hyperflexion - + Adson's - -   Spurling's - - SC Comp. - -   PCB Median abn - - SC Tinel - -   Radial Tinel - - IC Comp. - -   Digital Tinel - - IC Tinel - -   Radial 2-Pt WNL WNL Ulnar 2-Pt WNL WNL      Radial Pulse: 2+  Capillary Refill: < 2 sec  Jamal: Not Performed  Digital Jamal: Not Performed     Hand:     Examination L Digit(s) R Digit(s)   1st CMC Tenderness -   +     1st CMC Grind -   +     Zaina Nodes -   -     Heberden Nodes -   -     A1 Pulley Tenderness -   -     Triggering -   -     UCL Instability -   -     RCL Instability -   -     Lateral Stress Pain -   -     Palmar Cords -   -     Tabletop test -   -     Garrod's Pads -   -      Strength           Pinch Strength              ROM: Full           NCV & EMG Findings:  Evaluation of the left median motor and the right median motor nerves showed prolonged distal onset latency (L4.5, R4.7 ms).  The right ulnar motor nerve showed decreased conduction velocity (A Elbow-B Elbow, 42 m/s).  The left median sensory and the right median sensory nerves showed prolonged distal peak latency (L4.1, R4.2 ms) and decreased conduction velocity (Wrist-2nd Digit, L34, R33 m/s).  The left ulnar sensory nerve showed prolonged distal peak latency (3.9 ms) and decreased conduction velocity (Wrist-5th Digit, 36 m/s).  The right ulnar sensory nerve showed prolonged distal peak latency (4.2 ms), reduced amplitude (9.8 µV), and decreased conduction velocity (Wrist-5th Digit, 33 m/s).   All remaining nerves (as indicated in the following tables) were within normal limits.  All left vs. right side differences were within normal limits.       All examined muscles (as indicated in the following table) showed no evidence of electrical instability.       INTERPRETATION  This is an abnormal electrodiagnostic examination. These findings may be consistent with:   1. Moderate median mononeuropathy at the wrist bilaterally, R>L (carpal tunnel syndrome)  2. Moderate ulnar mononeuropathy at the right elbow (cubital tunnel syndrome)  3. Mild ulnar mononeuropathy at the left wrist.     There is no electrodiagnostic evidence of any cervical radiculopathy, brachial plexopathy, peripheral polyneuropathy, or any other mononeuropathy.        CLINICAL INTERPRETATION  Her electrodiagnostic findings of bilateral carpal tunnel and ulnar mononeuropathies may explain some of her bilateral hand symptoms. There is no electrodiagnostic evidence to explain more proximal symptoms.     Imaging:      10/26/2020 plain films of bilateral wrists are significant for Eaton stage III-IV degenerative changes at the [facility] joint on the right and Eaton stage II degenerative changes at the [facility] joint on the left. There may also be evidence of radio scaphoid arthritis at the level of the styloid. This is bilateral.            ICD-10-CM ICD-9-CM     1. Left carpal tunnel syndrome  G56.02 354.0 SCHEDULE SURGERY   2. Primary osteoarthritis of first carpometacarpal joint of right hand  M18.11 715.14 ND DRAIN/INJECT SMALL JOINT/BURSA         triamcinolone acetonide (KENALOG) 10 mg/mL injection 5 mg   3. Right carpal tunnel syndrome  G56.01 354. 0     4. Cubital tunnel syndrome, right  G56.21 354. 2     5. Primary osteoarthritis of first carpometacarpal joint of left hand  M18.12 715.14     6. Ulnar neuropathy at wrist, left  G56.22 354. 2              Plan:      Schedule left carpal tunnel release     This procedure has been fully reviewed with the patient and written informed consent has been obtained.     The patient was counseled at length about the risks of viviane Covid-19 during their perioperative period and any recovery window from their procedure.  The patient was made aware that viviane Covid-19  may worsen their prognosis for recovering from their procedure and lend to a higher morbidity and/or mortality risk.  All material risks, benefits, and reasonable alternatives including postponing the procedure were discussed.  The patient does  wish to proceed with the procedure at this time.        Follow-up and Dispositions    · Return for 2 weeks postop.            Plan was reviewed with patient, who verbalized agreement and understanding of the plan

## 2021-03-09 ENCOUNTER — HOSPITAL ENCOUNTER (OUTPATIENT)
Age: 71
Setting detail: OUTPATIENT SURGERY
Discharge: HOME | End: 2021-03-09
Attending: ORTHOPAEDIC SURGERY | Admitting: ORTHOPAEDIC SURGERY
Payer: MEDICARE

## 2021-03-09 ENCOUNTER — ANESTHESIA (OUTPATIENT)
Dept: SURGERY | Age: 71
End: 2021-03-09
Payer: MEDICARE

## 2021-03-09 ENCOUNTER — TELEPHONE (OUTPATIENT)
Dept: ORTHOPEDIC SURGERY | Age: 71
End: 2021-03-09

## 2021-03-09 VITALS
SYSTOLIC BLOOD PRESSURE: 116 MMHG | OXYGEN SATURATION: 97 % | HEIGHT: 68 IN | DIASTOLIC BLOOD PRESSURE: 73 MMHG | HEART RATE: 90 BPM | BODY MASS INDEX: 34.86 KG/M2 | WEIGHT: 230 LBS | RESPIRATION RATE: 20 BRPM | TEMPERATURE: 97.5 F

## 2021-03-09 DIAGNOSIS — Z98.890 S/P CARPAL TUNNEL RELEASE: Primary | ICD-10-CM

## 2021-03-09 DIAGNOSIS — G56.02 LEFT CARPAL TUNNEL SYNDROME: ICD-10-CM

## 2021-03-09 LAB
GLUCOSE BLD STRIP.AUTO-MCNC: 123 MG/DL (ref 70–110)
GLUCOSE BLD STRIP.AUTO-MCNC: 130 MG/DL (ref 70–110)

## 2021-03-09 PROCEDURE — 74011000258 HC RX REV CODE- 258: Performed by: ORTHOPAEDIC SURGERY

## 2021-03-09 PROCEDURE — 74011250636 HC RX REV CODE- 250/636: Performed by: NURSE ANESTHETIST, CERTIFIED REGISTERED

## 2021-03-09 PROCEDURE — 64721 CARPAL TUNNEL SURGERY: CPT | Performed by: ORTHOPAEDIC SURGERY

## 2021-03-09 PROCEDURE — 74011000250 HC RX REV CODE- 250: Performed by: ORTHOPAEDIC SURGERY

## 2021-03-09 PROCEDURE — 74011250636 HC RX REV CODE- 250/636

## 2021-03-09 PROCEDURE — 01810 ANES PX NRV MUSC F/ARM WRST: CPT | Performed by: NURSE ANESTHETIST, CERTIFIED REGISTERED

## 2021-03-09 PROCEDURE — 77030040922 HC BLNKT HYPOTHRM STRY -A: Performed by: ORTHOPAEDIC SURGERY

## 2021-03-09 PROCEDURE — 77030040356 HC CORD BPLR FRCP COVD -A: Performed by: ORTHOPAEDIC SURGERY

## 2021-03-09 PROCEDURE — 76210000063 HC OR PH I REC FIRST 0.5 HR: Performed by: ORTHOPAEDIC SURGERY

## 2021-03-09 PROCEDURE — 82962 GLUCOSE BLOOD TEST: CPT

## 2021-03-09 PROCEDURE — 77030040361 HC SLV COMPR DVT MDII -B: Performed by: ORTHOPAEDIC SURGERY

## 2021-03-09 PROCEDURE — 2709999900 HC NON-CHARGEABLE SUPPLY: Performed by: ORTHOPAEDIC SURGERY

## 2021-03-09 PROCEDURE — 76210000020 HC REC RM PH II FIRST 0.5 HR: Performed by: ORTHOPAEDIC SURGERY

## 2021-03-09 PROCEDURE — 76060000032 HC ANESTHESIA 0.5 TO 1 HR: Performed by: ORTHOPAEDIC SURGERY

## 2021-03-09 PROCEDURE — 77030006689 HC BLD OPHTH BVR BD -A: Performed by: ORTHOPAEDIC SURGERY

## 2021-03-09 PROCEDURE — 74011000250 HC RX REV CODE- 250

## 2021-03-09 PROCEDURE — 77030010813: Performed by: ORTHOPAEDIC SURGERY

## 2021-03-09 PROCEDURE — 01810 ANES PX NRV MUSC F/ARM WRST: CPT | Performed by: ANESTHESIOLOGY

## 2021-03-09 PROCEDURE — 76010000138 HC OR TIME 0.5 TO 1 HR: Performed by: ORTHOPAEDIC SURGERY

## 2021-03-09 PROCEDURE — 74011000250 HC RX REV CODE- 250: Performed by: NURSE ANESTHETIST, CERTIFIED REGISTERED

## 2021-03-09 PROCEDURE — 74011250637 HC RX REV CODE- 250/637: Performed by: NURSE ANESTHETIST, CERTIFIED REGISTERED

## 2021-03-09 PROCEDURE — 74011000258 HC RX REV CODE- 258

## 2021-03-09 RX ORDER — BUPIVACAINE HYDROCHLORIDE 2.5 MG/ML
INJECTION, SOLUTION EPIDURAL; INFILTRATION; INTRACAUDAL AS NEEDED
Status: DISCONTINUED | OUTPATIENT
Start: 2021-03-09 | End: 2021-03-09 | Stop reason: HOSPADM

## 2021-03-09 RX ORDER — SODIUM CHLORIDE, SODIUM LACTATE, POTASSIUM CHLORIDE, CALCIUM CHLORIDE 600; 310; 30; 20 MG/100ML; MG/100ML; MG/100ML; MG/100ML
25 INJECTION, SOLUTION INTRAVENOUS CONTINUOUS
Status: DISCONTINUED | OUTPATIENT
Start: 2021-03-09 | End: 2021-03-09 | Stop reason: HOSPADM

## 2021-03-09 RX ORDER — SODIUM CHLORIDE, SODIUM LACTATE, POTASSIUM CHLORIDE, CALCIUM CHLORIDE 600; 310; 30; 20 MG/100ML; MG/100ML; MG/100ML; MG/100ML
50 INJECTION, SOLUTION INTRAVENOUS CONTINUOUS
Status: DISCONTINUED | OUTPATIENT
Start: 2021-03-09 | End: 2021-03-09 | Stop reason: HOSPADM

## 2021-03-09 RX ORDER — INSULIN LISPRO 100 [IU]/ML
INJECTION, SOLUTION INTRAVENOUS; SUBCUTANEOUS ONCE
Status: DISCONTINUED | OUTPATIENT
Start: 2021-03-09 | End: 2021-03-09 | Stop reason: HOSPADM

## 2021-03-09 RX ORDER — SODIUM CHLORIDE 0.9 % (FLUSH) 0.9 %
5-40 SYRINGE (ML) INJECTION EVERY 8 HOURS
Status: DISCONTINUED | OUTPATIENT
Start: 2021-03-09 | End: 2021-03-09 | Stop reason: HOSPADM

## 2021-03-09 RX ORDER — PROPOFOL 10 MG/ML
VIAL (ML) INTRAVENOUS
Status: DISCONTINUED | OUTPATIENT
Start: 2021-03-09 | End: 2021-03-09 | Stop reason: HOSPADM

## 2021-03-09 RX ORDER — PREDNISONE 2.5 MG/1
TABLET ORAL
Qty: 10 TAB | Refills: 0 | Status: SHIPPED | OUTPATIENT
Start: 2021-03-09 | End: 2021-03-13

## 2021-03-09 RX ORDER — DEXTROSE 50 % IN WATER (D50W) INTRAVENOUS SYRINGE
25-50 AS NEEDED
Status: DISCONTINUED | OUTPATIENT
Start: 2021-03-09 | End: 2021-03-09 | Stop reason: HOSPADM

## 2021-03-09 RX ORDER — SODIUM CHLORIDE 0.9 % (FLUSH) 0.9 %
5-40 SYRINGE (ML) INJECTION AS NEEDED
Status: DISCONTINUED | OUTPATIENT
Start: 2021-03-09 | End: 2021-03-09 | Stop reason: HOSPADM

## 2021-03-09 RX ORDER — MAGNESIUM SULFATE 100 %
4 CRYSTALS MISCELLANEOUS AS NEEDED
Status: DISCONTINUED | OUTPATIENT
Start: 2021-03-09 | End: 2021-03-09 | Stop reason: HOSPADM

## 2021-03-09 RX ORDER — FENTANYL CITRATE 50 UG/ML
50 INJECTION, SOLUTION INTRAMUSCULAR; INTRAVENOUS AS NEEDED
Status: DISCONTINUED | OUTPATIENT
Start: 2021-03-09 | End: 2021-03-09 | Stop reason: HOSPADM

## 2021-03-09 RX ORDER — FAMOTIDINE 20 MG/1
20 TABLET, FILM COATED ORAL ONCE
Status: COMPLETED | OUTPATIENT
Start: 2021-03-09 | End: 2021-03-09

## 2021-03-09 RX ORDER — CEPHALEXIN 250 MG/1
500 CAPSULE ORAL 3 TIMES DAILY
Qty: 12 CAP | Refills: 0 | Status: SHIPPED | OUTPATIENT
Start: 2021-03-09 | End: 2022-04-07 | Stop reason: SDUPTHER

## 2021-03-09 RX ORDER — OXYCODONE AND ACETAMINOPHEN 5; 325 MG/1; MG/1
1 TABLET ORAL
Qty: 12 TAB | Refills: 0 | Status: SHIPPED | OUTPATIENT
Start: 2021-03-09 | End: 2021-03-09 | Stop reason: SDUPTHER

## 2021-03-09 RX ORDER — ONDANSETRON 2 MG/ML
INJECTION INTRAMUSCULAR; INTRAVENOUS AS NEEDED
Status: DISCONTINUED | OUTPATIENT
Start: 2021-03-09 | End: 2021-03-09 | Stop reason: HOSPADM

## 2021-03-09 RX ORDER — FENTANYL CITRATE 50 UG/ML
INJECTION, SOLUTION INTRAMUSCULAR; INTRAVENOUS AS NEEDED
Status: DISCONTINUED | OUTPATIENT
Start: 2021-03-09 | End: 2021-03-09 | Stop reason: HOSPADM

## 2021-03-09 RX ORDER — OXYCODONE AND ACETAMINOPHEN 5; 325 MG/1; MG/1
1 TABLET ORAL
Qty: 12 TAB | Refills: 0 | Status: SHIPPED | OUTPATIENT
Start: 2021-03-09 | End: 2021-03-12

## 2021-03-09 RX ORDER — HYDROCORTISONE SODIUM SUCCINATE 100 MG/2ML
25 INJECTION, POWDER, FOR SOLUTION INTRAMUSCULAR; INTRAVENOUS ONCE
Status: COMPLETED | OUTPATIENT
Start: 2021-03-09 | End: 2021-03-09

## 2021-03-09 RX ORDER — LIDOCAINE HYDROCHLORIDE 10 MG/ML
0.1 INJECTION, SOLUTION EPIDURAL; INFILTRATION; INTRACAUDAL; PERINEURAL AS NEEDED
Status: DISCONTINUED | OUTPATIENT
Start: 2021-03-09 | End: 2021-03-09 | Stop reason: HOSPADM

## 2021-03-09 RX ORDER — PROPOFOL 10 MG/ML
INJECTION, EMULSION INTRAVENOUS AS NEEDED
Status: DISCONTINUED | OUTPATIENT
Start: 2021-03-09 | End: 2021-03-09 | Stop reason: HOSPADM

## 2021-03-09 RX ORDER — ONDANSETRON 2 MG/ML
4 INJECTION INTRAMUSCULAR; INTRAVENOUS ONCE
Status: COMPLETED | OUTPATIENT
Start: 2021-03-09 | End: 2021-03-09

## 2021-03-09 RX ORDER — IPRATROPIUM BROMIDE AND ALBUTEROL SULFATE 2.5; .5 MG/3ML; MG/3ML
3 SOLUTION RESPIRATORY (INHALATION) ONCE
Status: COMPLETED | OUTPATIENT
Start: 2021-03-09 | End: 2021-03-09

## 2021-03-09 RX ORDER — LIDOCAINE HYDROCHLORIDE 20 MG/ML
INJECTION, SOLUTION EPIDURAL; INFILTRATION; INTRACAUDAL; PERINEURAL AS NEEDED
Status: DISCONTINUED | OUTPATIENT
Start: 2021-03-09 | End: 2021-03-09 | Stop reason: HOSPADM

## 2021-03-09 RX ORDER — CLINDAMYCIN HYDROCHLORIDE 300 MG/1
300 CAPSULE ORAL 3 TIMES DAILY
Qty: 12 CAP | Refills: 0 | Status: SHIPPED | OUTPATIENT
Start: 2021-03-09 | End: 2021-03-09

## 2021-03-09 RX ORDER — SODIUM CHLORIDE, SODIUM LACTATE, POTASSIUM CHLORIDE, CALCIUM CHLORIDE 600; 310; 30; 20 MG/100ML; MG/100ML; MG/100ML; MG/100ML
INJECTION, SOLUTION INTRAVENOUS
Status: DISCONTINUED | OUTPATIENT
Start: 2021-03-09 | End: 2021-03-09 | Stop reason: HOSPADM

## 2021-03-09 RX ADMIN — ONDANSETRON 4 MG: 2 INJECTION INTRAMUSCULAR; INTRAVENOUS at 08:46

## 2021-03-09 RX ADMIN — FENTANYL CITRATE 25 MCG: 50 INJECTION, SOLUTION INTRAMUSCULAR; INTRAVENOUS at 08:26

## 2021-03-09 RX ADMIN — PROPOFOL 30 MG: 10 INJECTION, EMULSION INTRAVENOUS at 08:41

## 2021-03-09 RX ADMIN — PROPOFOL 100 MCG/KG/MIN: 10 INJECTION, EMULSION INTRAVENOUS at 08:22

## 2021-03-09 RX ADMIN — HYDROCORTISONE SODIUM SUCCINATE 25 MG: 100 INJECTION, POWDER, FOR SOLUTION INTRAMUSCULAR; INTRAVENOUS at 07:44

## 2021-03-09 RX ADMIN — PROPOFOL 30 MG: 10 INJECTION, EMULSION INTRAVENOUS at 08:22

## 2021-03-09 RX ADMIN — SODIUM CHLORIDE, SODIUM LACTATE, POTASSIUM CHLORIDE, AND CALCIUM CHLORIDE: 600; 310; 30; 20 INJECTION, SOLUTION INTRAVENOUS at 07:43

## 2021-03-09 RX ADMIN — FENTANYL CITRATE 25 MCG: 50 INJECTION, SOLUTION INTRAMUSCULAR; INTRAVENOUS at 08:36

## 2021-03-09 RX ADMIN — LIDOCAINE HYDROCHLORIDE 40 MG: 20 INJECTION, SOLUTION EPIDURAL; INFILTRATION; INTRACAUDAL; PERINEURAL at 08:22

## 2021-03-09 RX ADMIN — DEXMEDETOMIDINE HYDROCHLORIDE 8 MCG: 100 INJECTION, SOLUTION, CONCENTRATE INTRAVENOUS at 08:31

## 2021-03-09 RX ADMIN — SODIUM CHLORIDE, POTASSIUM CHLORIDE, SODIUM LACTATE AND CALCIUM CHLORIDE 50 ML/HR: 600; 310; 30; 20 INJECTION, SOLUTION INTRAVENOUS at 07:30

## 2021-03-09 RX ADMIN — Medication 10 ML: at 07:41

## 2021-03-09 RX ADMIN — CLINDAMYCIN 900 MG: 150 INJECTION, SOLUTION INTRAMUSCULAR; INTRAVENOUS at 08:22

## 2021-03-09 RX ADMIN — PROPOFOL 50 MG: 10 INJECTION, EMULSION INTRAVENOUS at 08:30

## 2021-03-09 RX ADMIN — FAMOTIDINE 20 MG: 20 TABLET ORAL at 07:45

## 2021-03-09 RX ADMIN — ONDANSETRON 4 MG: 2 INJECTION INTRAMUSCULAR; INTRAVENOUS at 09:18

## 2021-03-09 RX ADMIN — DEXMEDETOMIDINE HYDROCHLORIDE 8 MCG: 100 INJECTION, SOLUTION, CONCENTRATE INTRAVENOUS at 08:41

## 2021-03-09 RX ADMIN — IPRATROPIUM BROMIDE AND ALBUTEROL SULFATE 3 ML: .5; 3 SOLUTION RESPIRATORY (INHALATION) at 07:45

## 2021-03-09 RX ADMIN — PROPOFOL 40 MG: 10 INJECTION, EMULSION INTRAVENOUS at 08:26

## 2021-03-09 NOTE — OP NOTES
Operative Report    Patient: Blanquita Singh MRN: [id number]  SSN: [id number]    YOB: 1950  Age: 79 y.o. Sex: female       Date of Surgery: 3/9/2021     Preoperative Diagnosis: Carpal tunnel syndrome of left wrist [G56.02]     Postoperative Diagnosis: Carpal tunnel syndrome of left wrist [G56.02]     Surgeon(s) and Role:     Chay Gomez, DO - Primary    Assistant Alexandra Borja    Anesthesia: MAC and local    Procedure: Procedure(s):  LEFT CARPAL TUNNEL RELEASE [id number]    Findings: Flattened median nerve    Procedure in Detail:     Indications for procedure of and outlined in the perioperative documentation most notably being refractory to conservative treatment. Informed consent was obtained from the patient. The risks and benefits of the procedure were discussed with the patient. They include but not limited to neurovascular injury, tendon injury, blood loss, infection, chronic pain, chronic stiffness, incomplete release of nerve, incomplete relief of symptoms, need for further surgery, recurrence of symptoms, hematoma, neuroma, seroma, complications from anesthesia including death, and the possibility of viviane Covid. After informed consent was obtained from the patient, she was taken back to the operative suite. A tourniquet was applied to the operative extremity and 10 mL of quarter percent Marcaine plain was injected into the carpal tunnel and the surrounding subcutaneous tissues. The extremity was prepped and draped in the normal sterile fashion. The arm was exsanguinated and the tourniquet was elevated to 250 mmHg. An incision was made over the carpal tunnel in line with the radial border of the fourth ray. The subcutaneous tissues were dissected and electrocautery was used for hemostasis. The palmar fascia was incised centrally in line with the incision.   A self-retaining retractor was placed and any visible palmaris brevis was elevated off the transverse carpal ligament. The ligament was incised centrally. Attention was then turned distally where the ligament was incised and released up to the level of the fat pad with motor branch was visualized. Attention was then turned proximally where the remainder of the palmar fascia was incised. The transverse carpal ligament proximally was incised and released up to the level of but not including the antebrachial fascia. The carpal tunnel was found to be completely released. The wound was copiously irrigated. The wound was closed with 4-0 Vicryl Rapide in interrupted fashion. The patient was placed into a sterile dressing and sent to recovery in stable condition. She was given appropriate wound care instructions, follow-up with me in the outpatient setting, and appropriate prescriptions. Estimated Blood Loss: Minimal    Tourniquet Time:   Total Tourniquet Time Documented:  Upper Arm (Left) - 14 minutes  Total: Upper Arm (Left) - 14 minutes        Implants: * No implants in log *            Specimens: * No specimens in log *        Drains: None                Complications: None    Counts: Sponge and needle counts were correct times two.     Signed By:  Elisa Montes DO     March 9, 2021

## 2021-03-09 NOTE — ANESTHESIA POSTPROCEDURE EVALUATION
Procedure(s):  LEFT CARPAL TUNNEL RELEASE/MAC W/ LOCAL. MAC    Anesthesia Post Evaluation      Multimodal analgesia: multimodal analgesia used between 6 hours prior to anesthesia start to PACU discharge  Patient location during evaluation: PACU  Patient participation: complete - patient participated  Level of consciousness: awake  Pain score: 2  Pain management: adequate  Airway patency: patent  Anesthetic complications: no  Cardiovascular status: acceptable  Respiratory status: acceptable  Hydration status: acceptable  Post anesthesia nausea and vomiting:  none  Final Post Anesthesia Temperature Assessment:  Normothermia (36.0-37.5 degrees C)      INITIAL Post-op Vital signs:   Vitals Value Taken Time   /63 03/09/21 0920   Temp 36.4 °C (97.5 °F) 03/09/21 0900   Pulse 91 03/09/21 0928   Resp 18 03/09/21 0928   SpO2 96 % 03/09/21 0928   Vitals shown include unvalidated device data.

## 2021-03-09 NOTE — DISCHARGE INSTRUCTIONS
Ice and elevate hand. Keep dressing clean and dry and do not remove. Cover for showering. Move fingers into a fist every hour while awake. May restart aspirin on Thursday, March 11. DISCHARGE SUMMARY from Nurse  PATIENT INSTRUCTIONS:  After general anesthesia or intravenous sedation, for 24 hours or while taking prescription Narcotics:  · Limit your activities  · Do not drive and operate hazardous machinery  · Do not make important personal or business decisions  · Do  not drink alcoholic beverages  · If you have not urinated within 8 hours after discharge, please contact your surgeon on call. Report the following to your surgeon:  · Excessive pain, swelling, redness or odor of or around the surgical area  · Temperature over 100.5  · Nausea and vomiting lasting longer than 4 hours or if unable to take medications  · Any signs of decreased circulation or nerve impairment to extremity: change in color, persistent  numbness, tingling, coldness or increase pain  · Any questions    What to do at Home:  Recommended activity: Activity as tolerated and no driving for today. *  Please give a list of your current medications to your Primary Care Provider. *  Please update this list whenever your medications are discontinued, doses are      changed, or new medications (including over-the-counter products) are added. *  Please carry medication information at all times in case of emergency situations. These are general instructions for a healthy lifestyle:    No smoking/ No tobacco products/ Avoid exposure to second hand smoke  Surgeon General's Warning:  Quitting smoking now greatly reduces serious risk to your health.     Obesity, smoking, and sedentary lifestyle greatly increases your risk for illness    A healthy diet, regular physical exercise & weight monitoring are important for maintaining a healthy lifestyle    You may be retaining fluid if you have a history of heart failure or if you experience any of the following symptoms:  Weight gain of 3 pounds or more overnight or 5 pounds in a week, increased swelling in our hands or feet or shortness of breath while lying flat in bed. Please call your doctor as soon as you notice any of these symptoms; do not wait until your next office visit. The discharge information has been reviewed with the patient. The patient verbalized understanding. Discharge medications reviewed with the patient and appropriate educational materials and side effects teaching were provided. ___________________________________________________________________________________________________________________________________    Patient Education        Carpal Tunnel Release: What to Expect at [facility] tunnel reduces the pressure on a nerve in the wrist. Your doctor cut a ligament that presses on the nerve. This lets the nerve pass freely through the tunnel without being squeezed. Your hand will hurt and may feel weak with some numbness. This usually goes away in a few days, but it may take several months. Your doctor may remove the large bandage, or he or she will tell you when and how to remove it yourself. In some cases, you may have a splint. If you have one, you will wear it for about 2 weeks. Your doctor will take out your stitches in 1 to 2 weeks. Your hand and wrist may feel worse than they used to feel. But the pain should start to go away. It usually takes 3 to 4 months to recover and up to 1 year before hand strength returns. How much strength returns will vary. The timing of your return to work depends on the type of surgery you had, whether the surgery was on your dominant hand (the hand you use most), and your work activities. If you had open surgery on your dominant hand and you do repeated actions at work, you may be able to go back to work in 10 to 8 weeks. Repeated motions include typing or assembly-line work.  If the surgery was on the other hand and you don't do repeated actions at work, you may be able to return to work in 9 to 14 days. If you had endoscopic surgery, you may be able to go back to work sooner than with open surgery. This care sheet gives you a general idea about how long it will take for you to recover. But each person recovers at a different pace. Follow the steps below to get better as quickly as possible. How can you care for yourself at home? Activity    · Rest when you feel tired. Getting enough sleep will help you recover.     · Try to walk each day. Start by walking a little more than you did the day before. Bit by bit, increase the amount you walk.     · For up to 2 weeks after surgery, avoid lifting things heavier than 1 to 2 pounds and using your hand. This includes doing repeated arm or hand movements, such as typing or using a computer mouse, washing windows, vacuuming, or chopping food. Do not use power tools, and avoid activities that cause vibration.     · You may do heavier tasks about 4 weeks after surgery. These include vacuuming, mowing the lawn, and gardening.     · You may shower 24 to 48 hours after surgery, if your doctor okays it. Keep your bandage dry by taping a sheet of plastic to cover it. If you have a splint, keep it dry. Your doctor will tell you if you can remove it when you shower. Be careful not to put the splint on too tight. Do not take a bath until the incision heals, or until your doctor tells you it is okay.     · You may drive when you are fully able to use your hand. Diet    · You can eat your normal diet. If your stomach is upset, try bland, low-fat foods like plain rice, broiled chicken, toast, and yogurt. Medicines    · Your doctor will tell you if and when you can restart your medicines. He or she will also give you instructions about taking any new medicines.     · If you take aspirin or some other blood thinner, ask your doctor if and when to start taking it again.  Make sure that you understand exactly what your doctor wants you to do.     · Take pain medicines exactly as directed. ? If the doctor gave you a prescription medicine for pain, take it as prescribed. ? If you are not taking a prescription pain medicine, take an over-the-counter medicine such as acetaminophen (Tylenol), ibuprofen (Advil, Motrin), or naproxen (Aleve). Read and follow all instructions on the label. ? Do not take two or more pain medicines at the same time unless the doctor told you to. Many pain medicines have acetaminophen, which is Tylenol. Too much acetaminophen (Tylenol) can be harmful.     · If you think your pain medicine is making you sick to your stomach:  ? Take your medicine after meals (unless your doctor has told you not to). ? Ask your doctor for a different pain medicine.     · If your doctor prescribed antibiotics, take them as directed. Do not stop taking them just because you feel better. You need to take the full course of antibiotics. Incision and splint care    · Keep your bandage dry. If it gets dirty, you may change it.     · If you have a splint, talk to your doctor about when you should wear it. Exercise    · You may need wrist and hand rehabilitation. This is a series of exercises you do after your surgery. This helps you get back your wrist's and hand's range of motion, strength, and . You will work with your doctor and physical or occupational therapist to plan this exercise program. To get the best results, you need to do the exercises correctly and as often and as long as your doctor tells you. Ice and elevation    · Put ice or a cold pack on your wrist for 10 to 20 minutes at a time. Try to do this every 1 to 2 hours for the next 3 days (when you are awake) or until the swelling goes down. Put a thin cloth between the ice and your skin.     · Prop up the sore wrist on a pillow when you ice it or anytime you sit or lie down during the next 3 days.  Try to keep it above the level of your heart. This will help reduce swelling.   Other instructions    · Avoid letting your hand hang down. This can cause swelling.   Follow-up care is a key part of your treatment and safety. Be sure to make and go to all appointments, and call your doctor if you are having problems. It's also a good idea to know your test results and keep a list of the medicines you take.  When should you call for help?   Call 911 anytime you think you may need emergency care. For example, call if:    · You passed out (lost consciousness).     · You have chest pain, are short of breath, or cough up blood.   Call your doctor now or seek immediate medical care if:    · You have pain that does not get better after you take pain medicine.     · Your hand is cool or pale or changes color.     · Your cast or splint feels too tight.     · You have tingling, weakness, or numbness in your hand or fingers.     · You are sick to your stomach or cannot drink fluids.     · You have loose stitches, or your incision comes open.     · You have signs of a blood clot in your leg (called a deep vein thrombosis), such as:  ? Pain in your calf, back of the knee, thigh, or groin.  ? Redness or swelling in your leg.     · You have signs of infection, such as:  ? Increased pain, swelling, warmth, or redness.  ? Red streaks leading from the incision.  ? Pus draining from the incision.  ? A fever.     · Bright red blood has soaked through the bandage over your incision.   Watch closely for any changes in your health, and be sure to contact your doctor if:    · You have a problem with your cast or splint.     · You do not get better as expected.   Where can you learn more?  Go to https://www.[employer].net/GoodHelpConnections  Enter N388 in the search box to learn more about \"Carpal Tunnel Release: What to Expect at Home.\"  Current as of: March 2, 2020               Content Version: 12.6  © 9888-7940 [employer].   Care instructions  adapted under license by [employer] (which disclaims liability or warranty for this information). If you have questions about a medical condition or this instruction, always ask your healthcare professional. [address] any warranty or liability for your use of this information.

## 2021-03-09 NOTE — BRIEF OP NOTE
Brief Postoperative Note    Patient: Denzel Higgins  YOB: 1950  MRN: [id number]    Date of Procedure: 3/9/2021     Pre-Op Diagnosis: Carpal tunnel syndrome of left wrist [G56.02]    Post-Op Diagnosis: Same as preoperative diagnosis. Procedure(s):  LEFT CARPAL TUNNEL RELEASE/MAC W/ LOCAL    Surgeon(s):   Marysol Fulton DO    Surgical Assistant: Surg Asst-1: Rosalio Alcala    Anesthesia: MAC     Estimated Blood Loss (mL): Minimal    Complications: None    Specimens: * No specimens in log *     Implants: * No implants in log *    Drains: * No LDAs found *    Findings: flattened median nerve    Electronically Signed by Eric Ledezma DO on 3/9/2021 at 8:55 AM

## 2021-03-09 NOTE — TELEPHONE ENCOUNTER
Claudia Castro from [address] on [address] [facility] called attempting to confirm medication allergies the patient has. He states the prescriptions oxyCODONE-acetaminophen (Percocet) 5-325 mg per tablet and clindamycin (Cleocin HCL) 300 mg capsule that were sent over for her today are flagged as allergies for her on their end. Please advise Claudia Castro back regarding this at [phone].

## 2021-03-09 NOTE — H&P
Update History & Physical    The Patient's History and Physical of March 3,   2021 was reviewed with the patient and I examined the patient. There was no change. The surgical site was confirmed by the patient and me. Plan:  The risk, benefits, expected outcome, and alternative to the recommended procedure have been discussed with the patient. Patient understands and wants to proceed with the procedure.     Electronically signed by Maik Garcia DO on 3/9/2021 at 7:29 AM

## 2021-03-09 NOTE — ANESTHESIA PREPROCEDURE EVALUATION
Relevant Problems   No relevant active problems       Anesthetic History   No history of anesthetic complications            Review of Systems / Medical History  Patient summary reviewed and pertinent labs reviewed    Pulmonary    COPD: moderate        Asthma     Comments: Ex smoker   Neuro/Psych         Psychiatric history    Comments: Chronic pain  Fibromyalgia  PTSD  OCD Cardiovascular    Hypertension  Valvular problems/murmurs        CAD and hyperlipidemia    Exercise tolerance: <4 METS  Comments: EF 55%  CINDY 1.7 CM2  S/P AVR   GI/Hepatic/Renal                Endo/Other    Diabetes: type 2  Hypothyroidism: well controlled  Obesity and arthritis     Other Findings              Physical Exam    Airway  Mallampati: III  TM Distance: > 6 cm  Neck ROM: normal range of motion   Mouth opening: Normal     Cardiovascular  Regular rate and rhythm,  S1 and S2 normal,  no murmur, click, rub, or gallop             Dental    Dentition: Upper partial plate, Poor dentition and Lower partial plate     Pulmonary      Decreased breath sounds           Abdominal  GI exam deferred       Other Findings            Anesthetic Plan    ASA: 4  Anesthesia type: MAC          Induction: Intravenous  Anesthetic plan and risks discussed with: Patient

## 2021-03-10 ENCOUNTER — TELEPHONE (OUTPATIENT)
Dept: CARDIOLOGY CLINIC | Age: 71
End: 2021-03-10

## 2021-03-10 NOTE — TELEPHONE ENCOUNTER
Contacted pt at [facility] number. Two patient Identifiers confirmed. Advised pt per Dr Candis Persaud. Pt verbalized understanding and request that results be faxed to Dr Lauren Tony.

## 2021-03-18 DIAGNOSIS — M48.02 CERVICAL SPINAL STENOSIS: ICD-10-CM

## 2021-03-18 DIAGNOSIS — M47.812 CERVICAL SPONDYLOSIS WITHOUT MYELOPATHY: ICD-10-CM

## 2021-03-18 DIAGNOSIS — M50.00 HNP (HERNIATED NUCLEUS PULPOSUS) WITH MYELOPATHY, CERVICAL: ICD-10-CM

## 2021-03-18 DIAGNOSIS — M96.1 LUMBAR POST-LAMINECTOMY SYNDROME: ICD-10-CM

## 2021-03-18 DIAGNOSIS — Z51.81 THERAPEUTIC DRUG MONITORING: ICD-10-CM

## 2021-03-18 DIAGNOSIS — M54.16 LUMBAR NEURITIS: ICD-10-CM

## 2021-03-22 ENCOUNTER — OFFICE VISIT (OUTPATIENT)
Dept: ORTHOPEDIC SURGERY | Age: 71
End: 2021-03-22
Payer: MEDICARE

## 2021-03-22 VITALS
HEART RATE: 89 BPM | RESPIRATION RATE: 16 BRPM | HEIGHT: 68 IN | OXYGEN SATURATION: 99 % | TEMPERATURE: 96.7 F | BODY MASS INDEX: 35.16 KG/M2 | WEIGHT: 232 LBS

## 2021-03-22 DIAGNOSIS — M18.11 PRIMARY OSTEOARTHRITIS OF FIRST CARPOMETACARPAL JOINT OF RIGHT HAND: ICD-10-CM

## 2021-03-22 DIAGNOSIS — G56.21 CUBITAL TUNNEL SYNDROME, RIGHT: ICD-10-CM

## 2021-03-22 DIAGNOSIS — M18.12 PRIMARY OSTEOARTHRITIS OF FIRST CARPOMETACARPAL JOINT OF LEFT HAND: ICD-10-CM

## 2021-03-22 DIAGNOSIS — Z98.890 S/P CARPAL TUNNEL RELEASE: ICD-10-CM

## 2021-03-22 DIAGNOSIS — G56.01 RIGHT CARPAL TUNNEL SYNDROME: ICD-10-CM

## 2021-03-22 DIAGNOSIS — G56.02 LEFT CARPAL TUNNEL SYNDROME: Primary | ICD-10-CM

## 2021-03-22 PROCEDURE — 99024 POSTOP FOLLOW-UP VISIT: CPT | Performed by: ORTHOPAEDIC SURGERY

## 2021-03-22 NOTE — PROGRESS NOTES
Rosalind Betts is a 79 y.o. female right handed retiree. Worker's Compensation and legal considerations: not known. Vitals:    03/22/21 1434   Pulse: 89   Resp: 16   Temp: (!) 96.7 °F (35.9 °C)   TempSrc: Temporal   SpO2: 99%   Weight: 232 lb (105.2 kg)   Height: 5' 8\" (1.727 m)   PainSc:   0 - No pain   PainLoc: Wrist           Chief Complaint   Patient presents with    Wrist Pain     left wrist post op       HPI: Patient presents today 2 weeks status post left carpal tunnel release. She reports that her numbness is completely resolved compared to preoperatively. She reports minimal pain at her incision site and minimal need for pain medicine. She would like to set up her right surgery as soon as possible. Preop HPI: Patient presents for follow-up and to reschedule her surgery. She was previously scheduled for right cubital tunnel, carpal tunnel, and CMC surgeries. She would like to do her left side first she will soon be caring for someone and this is a smaller surgery. 1/2021 HPI: Patient returns today regarding bilateral thumb CMC arthritis bilateral carpal tunnel syndrome and right cubital tunnel syndrome. Her injections last time did help however she would like to discuss surgery. Initial HPI: Patient comes in today with complaints of bilateral thumb base pain and bilateral numbness and tingling in the hands. She has recently had an EMG. Date of onset: Indeterminate    Injury: No    Prior Treatment:  Yes: Comment: Bilateral carpal tunnel and CMC joint injections.   Left carpal tunnel release    Numbness/ Tingling: Yes: Comment: Bilateral hands right worse than left      ROS: Review of Systems - General ROS: negative  Psychological ROS: negative  ENT ROS: negative  Allergy and Immunology ROS: negative  Hematological and Lymphatic ROS: negative  Respiratory ROS: no cough, shortness of breath, or wheezing  Cardiovascular ROS: no chest pain or dyspnea on exertion  Gastrointestinal ROS: no abdominal pain, change in bowel habits, or black or bloody stools  Musculoskeletal ROS: positive for - pain in hand - bilateral  Neurological ROS: positive for - numbness/tingling  Dermatological ROS: negative    Past Medical History:   Diagnosis Date    Ankle fracture     Aortic stenosis     S/P AVR in 2016    Asthma     Carpal tunnel syndrome on both sides     Chronic obstructive pulmonary disease (HCC)     Chronic pain     back pain    Diabetes ([address]     Diverticulitis     Fibromyalgia     H/O aortic valve replacement 03/2016    21 mm bovine pericardial bioprosthesis and epiaortic scanning      Hypertension     no meds now    Hypothyroidism     Lumbar post-laminectomy syndrome     Menopause     Obesity     Osteoarthritis     Psychotic disorder ([address]     Sciatica     Skin cancer 2013    right forearm. Past Surgical History:   Procedure Laterality Date    COLONOSCOPY      COLONOSCOPY N/A 9/15/2017    COLONOSCOPY performed by Maira Duncan MD at [address] [place] [address] Dr Right 1995    lens  replaced.  HX CATARACT REMOVAL Left 02/2018    HX COLONOSCOPY  09/15/2017    dr. Marilu Galeazzi  f/u 5 years.  HX HEENT  1990's    sinus surgery     HX HEENT Right 11/02/2017    laser for right eye cataracts.  HX LUMBAR FUSION  2004    L3-L4-L5    HX LUMBAR LAMINECTOMY  2002    HX MOHS PROCEDURES Left 1990    HX ORTHOPAEDIC      Rotator cuff Bilateral    HX SHOULDER REPLACEMENT Right 03/29/2017    reverse.  [place] CARDIAC SURG PROCEDURE UNLIST  03/2016    aortic valve replacement.      [place] CHEST SURGERY PROCEDURE UNLISTED  03/2016    Open Heart Surgery    [place] COLSC FLX W/RMVL OF TUMOR POLYP LESION SNARE TQ  07/09/2014 repeat in 3 years    Dr. Neelima Villafana  2016    Aortic valve replacement(bovine)       Current Outpatient Medications   Medication Sig Dispense Refill    meloxicam (MOBIC) 15 mg tablet Take 1 tablet by mouth once daily 90 Tab 0  levothyroxine (SYNTHROID) 50 mcg tablet TAKE 1 TABLET BY MOUTH ONCE DAILY BEFORE BREAKFAST 90 Tab 0    desonide (TRIDESILON) 0.05 % cream APPLY   EXTERNALLY TO AFFECTED AREA TWICE DAILY 15 g 0    tiaGABine (GabitriL) 2 mg tablet Take 1 Tab by mouth two (2) times daily (with meals). (Patient taking differently: Take 2 mg by mouth daily (with breakfast). Indications: for OCD) 60 Tab 1    metFORMIN (GLUCOPHAGE) 500 mg tablet TAKE 1 TABLET BY MOUTH ONCE DAILY WITH SUPPER 90 Tab 3    FLUoxetine (PROzac) 10 mg capsule Take 1 Cap by mouth daily. 90 Cap 3    atorvastatin (LIPITOR) 20 mg tablet Take 1 tablet by mouth once daily 90 Tab 4    fluticasone propionate (FLONASE) 50 mcg/actuation nasal spray Use 1 spray(s) in each nostril twice daily 16 g 4    cyclobenzaprine (FLEXERIL) 10 mg tablet Take 1 Tab by mouth three (3) times daily as needed for Muscle Spasm(s). 30 Tab 0    clobetasoL (TEMOVATE) 0.05 % external solution APPLY TO SCALP AFTER SHAMPOO DAILY 50 mL 3    glucose blood VI test strips (True Metrix Pro Test Strip) strip Test blood glucose 3 times a day 200 Strip 3    pantoprazole (PROTONIX) 20 mg tablet Take 1 Tab by mouth daily. 90 Tab 3    meclizine (ANTIVERT) 25 mg tablet Take 1 Tab by mouth three (3) times daily as needed for Dizziness. 61 Tab 3    STIOLTO RESPIMAT 2.5-2.5 mcg/actuation inhaler Take  by inhalation. 1    vitamin E acetate (VITAMIN E PO) Take  by mouth.  cyanocobalamin (VITAMIN B-12) 500 mcg tablet Take 500 mcg by mouth daily.  cetirizine (ZYRTEC) 10 mg tablet TAKE 1 TABLET BY MOUTH EVERY DAY. GENERIC FOR ZYRTEC 90 Tab 2    VENTOLIN HFA 90 mcg/actuation inhaler 2 Puffs by Aerosolization route every four (4) hours as needed. 0    B.infantis-B.ani-B.long-B.bifi (PROBIOTIC 4X) 10-15 mg TbEC Take  by mouth daily.  Cholecalciferol, Vitamin D3, (VITAMIN D3) 1,000 unit cap Take 1,000 Units by mouth daily.       Lancets (ACCU-CHEK SOFTCLIX LANCETS) misc Please use one lancet to test blood sugars twice a day. 1 Package 20    BLOOD-GLUCOSE METER (ACCU-CHEK MARCE MONITORING) by Does Not Apply route.  docusate sodium (COLACE) 50 mg capsule 50 mg. Allergies   Allergen Reactions    Latex, Natural Rubber Hives    Adhesive Rash     Some bandaids    Ciprofloxacin Rash    Codeine Nausea and Vomiting    Demerol [Meperidine] Nausea and Vomiting    Diclofenac Shortness of Breath and Palpitations    Erythromycin Rash    Levaquin [Levofloxacin] Rash    Loratadine Swelling    Morphine Nausea and Vomiting    Penicillin G Hives and Rash     Does fine with Keflex    Sulfa (Sulfonamide Antibiotics) Itching    Vicodin [Hydrocodone-Acetaminophen] Other (comments)     Metallic taste in mouth           PE:     Physical Exam  Vitals signs and nursing note reviewed. Constitutional:       General: She is not in acute distress. Appearance: Normal appearance. She is not ill-appearing. Neck:      Musculoskeletal: Normal range of motion and neck supple. Cardiovascular:      Pulses: Normal pulses. Pulmonary:      Effort: Pulmonary effort is normal. No respiratory distress. Abdominal:      General: Abdomen is flat. There is no distension. Musculoskeletal: Normal range of motion. General: No swelling, tenderness, deformity or signs of injury. Right lower leg: No edema. Left lower leg: No edema. Skin:     General: Skin is warm and dry. Capillary Refill: Capillary refill takes less than 2 seconds. Findings: No bruising or erythema. Neurological:      General: No focal deficit present. Mental Status: She is alert and oriented to person, place, and time. Psychiatric:         Mood and Affect: Mood normal.         Behavior: Behavior normal.          Left hand: Incision clean dry and intact with no drainage breakdown erythema or any other signs of infection. Sensation grossly intact distally and cap refill brisk.   Range of motion full.    NEUROVASCULAR    Examination L R Examination L R   Carpal Comp.  + Pronator Comp. - -   Carpal Tinel  + Pronator Tinel - -   Phalen's  + Pronator Stress - -   Cubital Comp. - + Guyon Comp.  -   Cubital Tinel - + Guyon Tinel  -   Elbow Hyperflexion - + Adson's - -   Spurling's - - SC Comp. - -   PCB Median abn - - SC Tinel - -   Radial Tinel - - IC Comp. - -   Digital Tinel - - IC Tinel - -   Radial 2-Pt WNL WNL Ulnar 2-Pt WNL WNL     Radial Pulse: 2+  Capillary Refill: < 2 sec  Jamal: Not Performed  Digital Jamal: Not Performed    Hand:    Examination L Digit(s) R Digit(s)   1st CMC Tenderness -  +    1st CMC Grind -  +    Zaina Nodes -  -    Heberden Nodes -  -    A1 Pulley Tenderness -  -    Triggering -  -    UCL Instability -  -    RCL Instability -  -    Lateral Stress Pain -  -    Palmar Cords -  -    Tabletop test -  -    Garrod's Pads -  -     Strength       Pinch Strength         ROM: Full        NCV & EMG Findings:  Evaluation of the left median motor and the right median motor nerves showed prolonged distal onset latency (L4.5, R4.7 ms).  The right ulnar motor nerve showed decreased conduction velocity (A Elbow-B Elbow, 42 m/s).  The left median sensory and the right median sensory nerves showed prolonged distal peak latency (L4.1, R4.2 ms) and decreased conduction velocity (Wrist-2nd Digit, L34, R33 m/s).  The left ulnar sensory nerve showed prolonged distal peak latency (3.9 ms) and decreased conduction velocity (Wrist-5th Digit, 36 m/s).  The right ulnar sensory nerve showed prolonged distal peak latency (4.2 ms), reduced amplitude (9.8 µV), and decreased conduction velocity (Wrist-5th Digit, 33 m/s).  All remaining nerves (as indicated in the following tables) were within normal limits.  All left vs. right side differences were within normal limits.       All examined muscles (as indicated in the following table) showed no evidence of electrical instability.       INTERPRETATION  This  is an abnormal electrodiagnostic examination. These findings may be consistent with:   1. Moderate median mononeuropathy at the wrist bilaterally, R>L (carpal tunnel syndrome)  2. Moderate ulnar mononeuropathy at the right elbow (cubital tunnel syndrome)  3. Mild ulnar mononeuropathy at the left wrist.     There is no electrodiagnostic evidence of any cervical radiculopathy, brachial plexopathy, peripheral polyneuropathy, or any other mononeuropathy.        CLINICAL INTERPRETATION  Her electrodiagnostic findings of bilateral carpal tunnel and ulnar mononeuropathies may explain some of her bilateral hand symptoms. There is no electrodiagnostic evidence to explain more proximal symptoms. Imaging:     10/26/2020 plain films of bilateral wrists are significant for Eaton stage III-IV degenerative changes at the [facility] joint on the right and Eaton stage II degenerative changes at the [facility] joint on the left. There may also be evidence of radio scaphoid arthritis at the level of the styloid. This is bilateral.        ICD-10-CM ICD-9-CM    1. Left carpal tunnel syndrome  G56.02 354.0    2. S/P carpal tunnel release  Z98.890 V45.89    3. Right carpal tunnel syndrome  G56.01 354.0    4. Primary osteoarthritis of first carpometacarpal joint of right hand  M18.11 715.14    5. Cubital tunnel syndrome, right  G56.21 354.2    6. Primary osteoarthritis of first carpometacarpal joint of left hand  M18.12 715.14          Plan:     Discussed range of motion exercises, and scar massage for the left side. We also discussed the need for surgery on the right side in the future which we will likely set up at her next visit. Follow-up and Dispositions    · Return in about 4 weeks (around 4/19/2021) for Reevaluation, wound check, and right-sided surgical discussion.           Plan was reviewed with patient, who verbalized agreement and understanding of the plan

## 2021-03-24 ENCOUNTER — PATIENT MESSAGE (OUTPATIENT)
Dept: FAMILY MEDICINE CLINIC | Age: 71
End: 2021-03-24

## 2021-03-24 DIAGNOSIS — M48.02 CERVICAL SPINAL STENOSIS: Primary | ICD-10-CM

## 2021-03-24 DIAGNOSIS — M54.16 LUMBAR NEURITIS: ICD-10-CM

## 2021-03-24 RX ORDER — TRAMADOL HYDROCHLORIDE 50 MG/1
50 TABLET ORAL
Qty: 120 TAB | Refills: 1 | Status: SHIPPED | OUTPATIENT
Start: 2021-04-05 | End: 2021-04-09

## 2021-03-24 NOTE — TELEPHONE ENCOUNTER
Patient called stating she needs a refill of rx Tramadol. She wants it sent to the [address] [place] [address] on file. Patient can be reached at [phone].

## 2021-03-24 NOTE — TELEPHONE ENCOUNTER
Signed, she needs to remember to let us know when she is prescribed other pain medications in the future.  Had sx with Dr Humberto Mckenna

## 2021-03-25 NOTE — TELEPHONE ENCOUNTER
Patient contacted and told that RX was sent to pharmacy and she stated that she did call to let us know that Dr Brittney Hanson had given her pain medication.  She also states that she has only taken 2 of those

## 2021-04-02 DIAGNOSIS — M96.1 LUMBAR POST-LAMINECTOMY SYNDROME: ICD-10-CM

## 2021-04-02 DIAGNOSIS — M48.02 CERVICAL SPINAL STENOSIS: ICD-10-CM

## 2021-04-02 DIAGNOSIS — M54.16 LUMBAR NEURITIS: ICD-10-CM

## 2021-04-02 DIAGNOSIS — G56.03 CARPAL TUNNEL SYNDROME, BILATERAL: ICD-10-CM

## 2021-04-02 DIAGNOSIS — M43.10 SPONDYLOLISTHESIS, ACQUIRED: ICD-10-CM

## 2021-04-02 DIAGNOSIS — M50.00 HNP (HERNIATED NUCLEUS PULPOSUS) WITH MYELOPATHY, CERVICAL: ICD-10-CM

## 2021-04-02 DIAGNOSIS — M47.812 CERVICAL SPONDYLOSIS WITHOUT MYELOPATHY: ICD-10-CM

## 2021-04-02 DIAGNOSIS — M54.12 CERVICAL NEURITIS: ICD-10-CM

## 2021-04-02 DIAGNOSIS — G56.21 CUBITAL TUNNEL SYNDROME, RIGHT: ICD-10-CM

## 2021-04-02 RX ORDER — TIAGABINE HYDROCHLORIDE 2 MG/1
TABLET, FILM COATED ORAL
Qty: 60 TAB | Refills: 0 | Status: SHIPPED | OUTPATIENT
Start: 2021-04-02 | End: 2021-06-07

## 2021-04-08 DIAGNOSIS — J45.40 MODERATE PERSISTENT ASTHMA WITHOUT COMPLICATION: Primary | ICD-10-CM

## 2021-04-09 ENCOUNTER — TELEPHONE (OUTPATIENT)
Dept: ORTHOPEDIC SURGERY | Age: 71
End: 2021-04-09

## 2021-04-09 DIAGNOSIS — M48.02 CERVICAL SPINAL STENOSIS: Primary | ICD-10-CM

## 2021-04-09 DIAGNOSIS — M54.16 LUMBAR NEURITIS: ICD-10-CM

## 2021-04-09 DIAGNOSIS — M50.00 HNP (HERNIATED NUCLEUS PULPOSUS) WITH MYELOPATHY, CERVICAL: ICD-10-CM

## 2021-04-09 RX ORDER — TRAMADOL HYDROCHLORIDE 50 MG/1
50 TABLET ORAL
Qty: 120 TAB | Refills: 1 | Status: SHIPPED | OUTPATIENT
Start: 2021-04-09 | End: 2021-06-08

## 2021-04-09 NOTE — TELEPHONE ENCOUNTER
Patient needs Tramadol prescription resent to the [facility] on [address] please.       Patient [phone]

## 2021-04-09 NOTE — TELEPHONE ENCOUNTER
Please CXL the RX in [address] [facility] from [place] [address], I called the pharmacy and CXL'd with pharmacist , patient notified

## 2021-04-28 ENCOUNTER — OFFICE VISIT (OUTPATIENT)
Dept: ORTHOPEDIC SURGERY | Age: 71
End: 2021-04-28
Payer: MEDICARE

## 2021-04-28 VITALS
RESPIRATION RATE: 15 BRPM | WEIGHT: 234.6 LBS | OXYGEN SATURATION: 99 % | HEART RATE: 98 BPM | HEIGHT: 68 IN | BODY MASS INDEX: 35.55 KG/M2 | TEMPERATURE: 97.3 F

## 2021-04-28 DIAGNOSIS — T81.89XA SUTURE GRANULOMA, INITIAL ENCOUNTER: ICD-10-CM

## 2021-04-28 DIAGNOSIS — Z98.890 S/P CARPAL TUNNEL RELEASE: ICD-10-CM

## 2021-04-28 DIAGNOSIS — G56.02 LEFT CARPAL TUNNEL SYNDROME: Primary | ICD-10-CM

## 2021-04-28 PROCEDURE — 99024 POSTOP FOLLOW-UP VISIT: CPT | Performed by: ORTHOPAEDIC SURGERY

## 2021-04-28 NOTE — PROGRESS NOTES
Beni Pham is a 70 y.o. female right handed retiree. Worker's Compensation and legal considerations: not known. Vitals:    04/28/21 1404   Pulse: 98   Resp: 15   Temp: 97.3 °F (36.3 °C)   SpO2: 99%   Weight: 234 lb 9.6 oz (106.4 kg)   Height: 5' 8\" (1.727 m)   PainSc:   6   PainLoc: Hand           Chief Complaint   Patient presents with    Hand Pain     left       HPI: Patient presents today 7 weeks status post left carpal tunnel release. She reports some continued soreness about her incision. She has been putting vitamin E oil on the incision. She is scheduled for a left shoulder surgery in June so she would like to put off her right side until after this. 2wk HPI: Patient presents today 2 weeks status post left carpal tunnel release. She reports that her numbness is completely resolved compared to preoperatively. She reports minimal pain at her incision site and minimal need for pain medicine. She would like to set up her right surgery as soon as possible. Preop HPI: Patient presents for follow-up and to reschedule her surgery. She was previously scheduled for right cubital tunnel, carpal tunnel, and CMC surgeries. She would like to do her left side first she will soon be caring for someone and this is a smaller surgery. 1/2021 HPI: Patient returns today regarding bilateral thumb CMC arthritis bilateral carpal tunnel syndrome and right cubital tunnel syndrome. Her injections last time did help however she would like to discuss surgery. Initial HPI: Patient comes in today with complaints of bilateral thumb base pain and bilateral numbness and tingling in the hands. She has recently had an EMG. Date of onset: Indeterminate    Injury: No    Prior Treatment:  Yes: Comment: Bilateral carpal tunnel and CMC joint injections.   Left carpal tunnel release    Numbness/ Tingling: Yes: Comment: Bilateral hands right worse than left      ROS: Review of Systems - General ROS: negative  Psychological ROS: negative  ENT ROS: negative  Allergy and Immunology ROS: negative  Hematological and Lymphatic ROS: negative  Respiratory ROS: no cough, shortness of breath, or wheezing  Cardiovascular ROS: no chest pain or dyspnea on exertion  Gastrointestinal ROS: no abdominal pain, change in bowel habits, or black or bloody stools  Musculoskeletal ROS: positive for - pain in hand - bilateral  Neurological ROS: positive for - numbness/tingling  Dermatological ROS: negative    Past Medical History:   Diagnosis Date    Ankle fracture     Aortic stenosis     S/P AVR in 2016    Asthma     Carpal tunnel syndrome on both sides     Chronic obstructive pulmonary disease (HCC)     Chronic pain     back pain    Diabetes ([address]     Diverticulitis     Fibromyalgia     H/O aortic valve replacement 03/2016    21 mm bovine pericardial bioprosthesis and epiaortic scanning      Hypertension     no meds now    Hypothyroidism     Lumbar post-laminectomy syndrome     Menopause     Obesity     Osteoarthritis     Psychotic disorder ([facility] [address]     Sciatica     Skin cancer 2013    right forearm. Past Surgical History:   Procedure Laterality Date    COLONOSCOPY      COLONOSCOPY N/A 9/15/2017    COLONOSCOPY performed by Celena Kayser, MD at [facility] Right 1995    lens  replaced.  HX CATARACT REMOVAL Left 02/2018    HX COLONOSCOPY  09/15/2017    dr. Vania Hernandez  f/u 5 years.  HX HEENT  1990's    sinus surgery     HX HEENT Right 11/02/2017    laser for right eye cataracts.  HX LUMBAR FUSION  2004    L3-L4-L5    HX LUMBAR LAMINECTOMY  2002    HX MOHS PROCEDURES Left 1990    HX ORTHOPAEDIC      Rotator cuff Bilateral    HX SHOULDER REPLACEMENT Right 03/29/2017    reverse.  MO CARDIAC SURG PROCEDURE UNLIST  03/2016    aortic valve replacement.      MO CHEST SURGERY PROCEDURE UNLISTED  03/2016    Open Heart Surgery    MO COLSC FLX W/RMVL OF TUMOR POLYP LESION KATHYA TQ  07/09/2014 repeat in 3 years    Dr. Edyta Smith  2016    Aortic valve replacement(bovine)       Current Outpatient Medications   Medication Sig Dispense Refill    traMADoL (ULTRAM) 50 mg tablet Take 1 Tab by mouth every six (6) hours as needed for Pain for up to 60 days. Max Daily Amount: 200 mg. 120 Tab 1    pantoprazole (PROTONIX) 20 mg tablet Take 1 tablet by mouth once daily 90 Tab 3    tiaGABine (GABITRIL) 2 mg tablet TAKE 1 TABLET BY MOUTH TWICE DAILY WITH MEALS 60 Tab 0    meloxicam (MOBIC) 15 mg tablet Take 1 tablet by mouth once daily 90 Tab 0    levothyroxine (SYNTHROID) 50 mcg tablet TAKE 1 TABLET BY MOUTH ONCE DAILY BEFORE BREAKFAST 90 Tab 0    desonide (TRIDESILON) 0.05 % cream APPLY   EXTERNALLY TO AFFECTED AREA TWICE DAILY 15 g 0    metFORMIN (GLUCOPHAGE) 500 mg tablet TAKE 1 TABLET BY MOUTH ONCE DAILY WITH SUPPER 90 Tab 3    FLUoxetine (PROzac) 10 mg capsule Take 1 Cap by mouth daily. 90 Cap 3    atorvastatin (LIPITOR) 20 mg tablet Take 1 tablet by mouth once daily 90 Tab 4    fluticasone propionate (FLONASE) 50 mcg/actuation nasal spray Use 1 spray(s) in each nostril twice daily 16 g 4    cyclobenzaprine (FLEXERIL) 10 mg tablet Take 1 Tab by mouth three (3) times daily as needed for Muscle Spasm(s). 30 Tab 0    clobetasoL (TEMOVATE) 0.05 % external solution APPLY TO SCALP AFTER SHAMPOO DAILY 50 mL 3    glucose blood VI test strips (True Metrix Pro Test Strip) strip Test blood glucose 3 times a day 200 Strip 3    meclizine (ANTIVERT) 25 mg tablet Take 1 Tab by mouth three (3) times daily as needed for Dizziness. 61 Tab 3    STIOLTO RESPIMAT 2.5-2.5 mcg/actuation inhaler Take  by inhalation. 1    docusate sodium (COLACE) 50 mg capsule 50 mg.      vitamin E acetate (VITAMIN E PO) Take  by mouth.  cyanocobalamin (VITAMIN B-12) 500 mcg tablet Take 500 mcg by mouth daily.       cetirizine (ZYRTEC) 10 mg tablet TAKE 1 TABLET BY MOUTH EVERY DAY. GENERIC FOR ZYRTEC 90 Tab 2    VENTOLIN HFA 90 mcg/actuation inhaler 2 Puffs by Aerosolization route every four (4) hours as needed. 0    B.infantis-B.ani-B.long-B.bifi (PROBIOTIC 4X) 10-15 mg TbEC Take  by mouth daily.  Cholecalciferol, Vitamin D3, (VITAMIN D3) 1,000 unit cap Take 1,000 Units by mouth daily.  Lancets (ACCU-CHEK SOFTCLIX LANCETS) misc Please use one lancet to test blood sugars twice a day. 1 Package 20    BLOOD-GLUCOSE METER (ACCU-CHEK MARCE MONITORING) by Does Not Apply route. Allergies   Allergen Reactions    Latex, Natural Rubber Hives    Adhesive Rash     Some bandaids    Ciprofloxacin Rash    Codeine Nausea and Vomiting    Demerol [Meperidine] Nausea and Vomiting    Diclofenac Shortness of Breath and Palpitations    Erythromycin Rash    Levaquin [Levofloxacin] Rash    Loratadine Swelling    Morphine Nausea and Vomiting    Penicillin G Hives and Rash     Does fine with Keflex    Sulfa (Sulfonamide Antibiotics) Itching    Vicodin [Hydrocodone-Acetaminophen] Other (comments)     Metallic taste in mouth           PE:     Physical Exam  Vitals signs and nursing note reviewed. Constitutional:       General: She is not in acute distress. Appearance: Normal appearance. She is not ill-appearing. Neck:      Musculoskeletal: Normal range of motion and neck supple. Cardiovascular:      Pulses: Normal pulses. Pulmonary:      Effort: Pulmonary effort is normal.   Musculoskeletal: Normal range of motion. General: Tenderness present. No swelling, deformity or signs of injury. Right lower leg: No edema. Left lower leg: No edema. Skin:     General: Skin is warm and dry. Capillary Refill: Capillary refill takes less than 2 seconds. Findings: No bruising or erythema. Neurological:      General: No focal deficit present. Mental Status: She is alert and oriented to person, place, and time.    Psychiatric:         Mood and Affect: Mood normal.         Behavior: Behavior normal.          Left hand: Incision healed with some hypertrophy and tenderness as well as evidence of possible suture granulomas in the incision. Sensation grossly intact distally and cap refill brisk. NEUROVASCULAR    Examination L R Examination L R   Carpal Comp.  + Pronator Comp. - -   Carpal Tinel  + Pronator Tinel - -   Phalen's  + Pronator Stress - -   Cubital Comp. - + Guyon Comp. -   Cubital Tinel - + Guyon Tinel  -   Elbow Hyperflexion - + Adson's - -   Spurling's - - SC Comp. - -   PCB Median abn - - SC Tinel - -   Radial Tinel - - IC Comp. - -   Digital Tinel - - IC Tinel - -   Radial 2-Pt WNL WNL Ulnar 2-Pt WNL WNL     Radial Pulse: 2+  Capillary Refill: < 2 sec  Jamal: Not Performed  Digital Jamal: Not Performed    Hand:    Examination L Digit(s) R Digit(s)   1st CMC Tenderness -  +    1st CMC Grind -  +    Zaina Nodes -  -    Heberden Nodes -  -    A1 Pulley Tenderness -  -    Triggering -  -    UCL Instability -  -    RCL Instability -  -    Lateral Stress Pain -  -    Palmar Cords -  -    Tabletop test -  -    Garrod's Pads -  -     Strength       Pinch Strength         ROM: Full        NCV & EMG Findings:  Evaluation of the left median motor and the right median motor nerves showed prolonged distal onset latency (L4.5, R4.7 ms). The right ulnar motor nerve showed decreased conduction velocity (A Elbow-B Elbow, 42 m/s). The left median sensory and the right median sensory nerves showed prolonged distal peak latency (L4.1, R4.2 ms) and decreased conduction velocity (Wrist-2nd Digit, L34, R33 m/s). The left ulnar sensory nerve showed prolonged distal peak latency (3.9 ms) and decreased conduction velocity (Wrist-5th Digit, 36 m/s). The right ulnar sensory nerve showed prolonged distal peak latency (4.2 ms), reduced amplitude (9.8 µV), and decreased conduction velocity (Wrist-5th Digit, 33 m/s).   All remaining nerves (as indicated in the following tables) were within normal limits. All left vs. right side differences were within normal limits.       All examined muscles (as indicated in the following table) showed no evidence of electrical instability.       INTERPRETATION  This is an abnormal electrodiagnostic examination. These findings may be consistent with:   1. Moderate median mononeuropathy at the wrist bilaterally, R>L (carpal tunnel syndrome)  2. Moderate ulnar mononeuropathy at the right elbow (cubital tunnel syndrome)  3. Mild ulnar mononeuropathy at the left wrist.     There is no electrodiagnostic evidence of any cervical radiculopathy, brachial plexopathy, peripheral polyneuropathy, or any other mononeuropathy.        CLINICAL INTERPRETATION  Her electrodiagnostic findings of bilateral carpal tunnel and ulnar mononeuropathies may explain some of her bilateral hand symptoms. There is no electrodiagnostic evidence to explain more proximal symptoms. Imaging:     10/26/2020 plain films of bilateral wrists are significant for Eaton stage III-IV degenerative changes at the [facility] joint on the right and Eaton stage II degenerative changes at the [facility] joint on the left. There may also be evidence of radio scaphoid arthritis at the level of the styloid. This is bilateral.        ICD-10-CM ICD-9-CM    1. Left carpal tunnel syndrome  G56.02 354.0    2. S/P carpal tunnel release  Z98.890 V45.89    3. Suture granuloma, initial encounter  T81.89XA 998.89          Plan:     Discussed the possibility of needing to go back to excise any remaining granuloma if it does not resolve. Additionally we will hold off any surgical treatment for the right side until she is ready and is healed from her left shoulder surgery. Follow-up and Dispositions    · Return if symptoms worsen or fail to improve.           Plan was reviewed with patient, who verbalized agreement and understanding of the plan

## 2021-05-03 ENCOUNTER — TELEPHONE (OUTPATIENT)
Dept: FAMILY MEDICINE CLINIC | Age: 71
End: 2021-05-03

## 2021-05-03 ENCOUNTER — OFFICE VISIT (OUTPATIENT)
Dept: ORTHOPEDIC SURGERY | Age: 71
End: 2021-05-03
Payer: MEDICARE

## 2021-05-03 VITALS
WEIGHT: 232 LBS | HEIGHT: 68 IN | HEART RATE: 93 BPM | OXYGEN SATURATION: 99 % | TEMPERATURE: 97.3 F | BODY MASS INDEX: 35.16 KG/M2

## 2021-05-03 DIAGNOSIS — Z01.818 PREOP EXAMINATION: Primary | ICD-10-CM

## 2021-05-03 DIAGNOSIS — M12.812 ROTATOR CUFF ARTHROPATHY OF LEFT SHOULDER: Primary | ICD-10-CM

## 2021-05-03 DIAGNOSIS — E03.9 ACQUIRED HYPOTHYROIDISM: ICD-10-CM

## 2021-05-03 DIAGNOSIS — Z01.818 PREOP EXAMINATION: ICD-10-CM

## 2021-05-03 PROCEDURE — 1090F PRES/ABSN URINE INCON ASSESS: CPT | Performed by: ORTHOPAEDIC SURGERY

## 2021-05-03 PROCEDURE — G8427 DOCREV CUR MEDS BY ELIG CLIN: HCPCS | Performed by: ORTHOPAEDIC SURGERY

## 2021-05-03 PROCEDURE — 73030 X-RAY EXAM OF SHOULDER: CPT | Performed by: ORTHOPAEDIC SURGERY

## 2021-05-03 PROCEDURE — 1101F PT FALLS ASSESS-DOCD LE1/YR: CPT | Performed by: ORTHOPAEDIC SURGERY

## 2021-05-03 PROCEDURE — G8536 NO DOC ELDER MAL SCRN: HCPCS | Performed by: ORTHOPAEDIC SURGERY

## 2021-05-03 PROCEDURE — 3017F COLORECTAL CA SCREEN DOC REV: CPT | Performed by: ORTHOPAEDIC SURGERY

## 2021-05-03 PROCEDURE — 99214 OFFICE O/P EST MOD 30 MIN: CPT | Performed by: ORTHOPAEDIC SURGERY

## 2021-05-03 PROCEDURE — G9717 DOC PT DX DEP/BP F/U NT REQ: HCPCS | Performed by: ORTHOPAEDIC SURGERY

## 2021-05-03 PROCEDURE — G8417 CALC BMI ABV UP PARAM F/U: HCPCS | Performed by: ORTHOPAEDIC SURGERY

## 2021-05-03 PROCEDURE — G8399 PT W/DXA RESULTS DOCUMENT: HCPCS | Performed by: ORTHOPAEDIC SURGERY

## 2021-05-03 PROCEDURE — G9899 SCRN MAM PERF RSLTS DOC: HCPCS | Performed by: ORTHOPAEDIC SURGERY

## 2021-05-03 NOTE — PATIENT INSTRUCTIONS
Dr. Alta Cohn Total and Reverse Total Shoulder Information    What is the surgery? - This is an inpatient procedure done at [facility] 49 will be completely asleep for procedure. Dr. Alta Cohn will make an incision in the front of your shoulder and replace the joint itself   - Total surgery takes about an hour and half   - You will then spend the night to receive prophylactic antibiotics and pain medication if needed. The goal is to send you home the following day and have you begin outpatient physical therapy about 3 days after you are discharged to home    What can you expect after surgery? - You will have a waterproof dressing on your shoulder following surgery. You will be able to shower 2 days after surgery but no soaking in a bath, hot tub, ocean or pool x 2 weeks to allow for full wound healing  - You will be in a sling for 4 weeks. You will wear this sling whenever you are active or up moving around. This sling is to keep you from moving your arm on your own. You are essentially one armed until you are out of your sling. This means no reaching, pulling, grabbing or lifting with the operative arm. It is ok for you to remove your sling when you are sitting in a chair or you are in bed  - Dr. Alta Cohn will start physical therapy for you a few days after you are discharged from the hospital. While you cannot move your arm we allow physical therapy to gently move your shoulder. We call this passive range of motion. The goal of this is to decrease your stiffness and in turn decrease your post operative pain. - Plan on being in physical therapy for 10-12 weeks    When can I return to work? - Most patients return to desk work only after 2 weeks. You are able to type but there is no overhead work or lifting  - You will start some gentle lifting up to 5-10lbs at about 8-10 weeks post operatively.  You will gradually increase how much you are able to lift after this point under the guidance of Dr. Alta Cohn, his physician assistant and physical therapy. - At 6 months you are able to do all activities as tolerated but it may take you a full 9-12 months to fully recover from your surgery    Not all shoulder replacements are the same. The specifics of your individual case will be discussed at length with you by Dr. Alhaji Garcia and his Physician Assistant. Lidia Mata  Surgical Coordinator  [address] [address] [email or web address]  P: [phone]  F: [phone]

## 2021-05-03 NOTE — PROGRESS NOTES
Dion Adams  1950   Chief Complaint   Patient presents with    Shoulder Pain     left, discuss SX        HISTORY OF PRESENT ILLNESS  Dion Adams is a 70 y.o. female who presents today for reevaluation of left shoulder. Patient rates pain as 7/10 today. Was last seen here for this in July 2020. Surgery was discussed at that time, she wants to revisit setting up surgery again. Pain has been present for awhile. Pain with certain movements, turning in certain directions. Had previous cortisone injection. Pt states the left shoulder pain started around February 2020. She reports a hx of a torn left rotator cuff years ago, had surgery to repair it. She notes hx of right shoulder replacement surgery around 2017 by Dr. Chris Bonilla. Pt presents today ambulating with a cane. Patient denies any fever, chills, chest pain, shortness of breath or calf pain. The remainder of the review of systems is negative. There are no new illness or injuries to report since last seen in the office. There are no changes to medications, allergies, family or social history. Pain Assessment  5/3/2021   Location of Pain Shoulder   Location Modifiers Left   Severity of Pain 7   Quality of Pain Sharp; Throbbing;Dull;Aching   Quality of Pain Comment -   Duration of Pain Persistent   Frequency of Pain Constant   Date Pain First Started -   Date Pain First Started Comment -   Aggravating Factors Other (Comment)   Aggravating Factors Comment any activity with arm   Limiting Behavior Yes   Relieving Factors Ice;Nothing   Relieving Factors Comment -   Result of Injury No   Work-Related Injury -   Type of Injury -     PHYSICAL EXAM:   Visit Vitals  Pulse 93   Temp 97.3 °F (36.3 °C) (Skin)   Ht 5' 8\" (1.727 m)   Wt 232 lb (105.2 kg)   SpO2 99%   BMI 35.28 kg/m²     The patient is a well-developed, well-nourished female   in no acute distress. The patient is alert and oriented times three. The patient is alert and oriented times three.  Mood and affect are normal.  LYMPHATIC: lymph nodes are not enlarged and are within normal limits  SKIN: normal in color and non tender to palpation. There are no bruises or abrasions noted. NEUROLOGICAL: Motor sensory exam is within normal limits. Reflexes are equal bilaterally. There is normal sensation to pinprick and light touch  MUSCULOSKELETAL:  Examination Left shoulder   Skin Intact   AC joint tenderness -   Biceps tenderness -   Forward flexion/Elevation ROM  120   Active abduction ROM  110   Glenohumeral abduction  80   External rotation ROM  30   Internal rotation ROM  30   Apprehension -   Sonnys Relocation -   Jerk -   Load and Shift -   Obriens -   Speeds -   Impingement sign  +   Supraspinatus/Empty Can  + 5   External Rotation Strength -, 5/5   Lift Off/Belly Press -, 5/5   Neurovascular Intact         IMAGING: XR of left shoulder with 3 views obtained in the office dated 5/03/2021 was reviewed and read by Dr. Braydon Polo: Marked degenerative arthritis glenohumeral joint with a large inferior spur        MRI of left shoulder dated 7/10/2020 was reviewed and read by Dr. Braydon Polo:   IMPRESSION:   1. Tendinosis of the left shoulder rotator cuff with small area of partial-thickness articular sided tearing involving the anterior fibers of the supraspinatus tendon.     > Normal rotator cuff muscle bulk and signal.  2. Advanced glenohumeral joint chondrosis and osteoarthritis with small joint effusion. 3. Prior acromioplasty without significant subacromial impingement morphology present. 4. Small quantity of fluid within the subacromial-subdeltoid bursa, as can be seen with symptoms referrable to bursitis. XR of left shoulder from Dr. Ayanna Bradford office dated 6/16/2020 was reviewed and read by Dr. Braydon Polo: Proximal migration of the humeral head with degenerative changes of the glenohumeral joint      IMPRESSION:      ICD-10-CM ICD-9-CM    1.  Rotator cuff arthropathy of left shoulder  M12.812 716.81 AMB POC XRAY, SHOULDER; COMPLETE, 2+        PLAN:   1 I discussed the risks and benefits and potential adverse outcomes of both operative vs non operative treatment of left cuff tear arthropathy with the patient and patient wishes to proceed with left reverse TSA. Risks of operative intervention include but not limited to bleeding, infection, deep vein thrombosis, pulmonary embolism, death, limb length discrepancy, reflexive sympathetic dystrophy, fat embolism syndrome,damage to blood vessels and nerves, malunion, non-union, delayed union, failure of hardware, post traumatic arthritis, stroke, heart attack, and death. Patient understands that infection may arise and may require numerous surgeries. The patient was counseled at length about the risks of viviane Covid-19 during their perioperative period and any recovery window from their procedure. The patient was made aware that viviane Covid-19  may worsen their prognosis for recovering from their procedure and lend to a higher morbidity and/or mortality risk. All material risks, benefits, and reasonable alternatives including postponing the procedure were discussed. The patient does  wish to proceed with the procedure at this time. History and physical exam to be preformed at a later date. Risk factors include: dm, htn, BMI>35  2. No ultrasound exam indicated today  3. No cortisone injection indicated today   4. No Physical/Occupational Therapy indicated today  5. No diagnostic test indicated today:   6. No durable medical equipment indicated today  7. No referral indicated today   8. No medications indicated today:   9. No Narcotic indicated today      RTC H&P       Scribed by Rosa Isela Quan [facility]) as dictated by Severino León MD    I, Dr. Severino León, confirm that all documentation is accurate.     Severino León M.D.   [facility]

## 2021-05-05 RX ORDER — LEVOTHYROXINE SODIUM 50 UG/1
TABLET ORAL
Qty: 90 TAB | Refills: 3 | Status: SHIPPED | OUTPATIENT
Start: 2021-05-05 | End: 2022-03-25

## 2021-05-05 NOTE — PROGRESS NOTES
[facility]  [address], [place]   Phone: [phone]  Fax: [phone]        PROGRESS NOTE      HISTORY OF PRESENT ILLNESS:  The patient is a 70 y.o. female and was seen today for follow up of low back pain radiating into BLE in a L4 distribution on the right and in a L5 distribution on the left to the greater toe and chronic neck pain which became progressive x 12/2019 radiating into BUE (L>R) to the hands. Previously, she was seen for low back pain radiating into BLE in a L4 distribution on the right and in a S1 distribution on the left to the greater toe and chronic neck pain which became progressive x 12/2019 radiating into BUE (L>R) to the hands. Pt was last evaluated by me 7/9/2020 with c/o low back pain radiating into BLE in a L4 distribution on the right and in a S1 distribution on the left to the greater toe and chronic neck pain which became progressive x 12/2019 radiating into BUE (L>R) to the hands. She was seen prior for low back and left hip pain extending into the LLE in a S1 distribution to an area below the knee. Initially she was seen for c/o low back pain into the BLE extending in roughly an L4 distribution in the LLE and in an L5 distribution to the foot in the RLE. Her pain is exacerbated with standing and walking intolerance. Patient reports dropping objects x couple months. Pt completed MDP with good relief. Pt was intolerant to the increased dose of GABITRIL 2 mg BID secondary to dizziness. She had a diagnosis of lumbar postlaminectomy syndrome with a previous history of L3 through S1 fusion in 2002 and 2004 by a physician in [place]. Pt denies h/o stomach ulcers, bleeding disorders, or current use of anticoagulants. PmHx of fibromyalgia, DM, aortic valve replacement, right shoulder replacement, left rotator cuff disease. Patient reports previously receiving a shoulder injection with temporary relief.  Pt notes intolerance to [facility], LYRICA, and PREDNISONE combination. Subsequently, d/c Fosomax due to allergy and started Cymbalta 30 mg TID in place of Lyrica. Note dated 6/29/16 indicating patient was seen and underwent porcine aortic valve replacement by Dr. Maria Esther Workman on 3/18/16. Pt began cardiac rehab in 1/2017. Pt underwent right shoulder surgery on 3/29/17 by Dr. Andreina Chavez. Note from Dr. Akshat Del Valle, cardiologist dated 5/18/17 indicating patient was seen with h/o aortic stenosis, s/p aortic valve replacement 3/16 and was seen with moderate dysphemia with minimal exertion. CT revealed no evidence of pulmonary embolism, no obvious fluid overload. They were ordering further workup to look for functioning bioprosthetic aortic valve. Per patient, a cause for her SOB has still not been found. She has been scheduled to see a pulmonologist. She states she has been restricted to take her Meloxicam since her right shoulder surgery, which has caused her increase in low back pain. Upon review of our records, it was noted she previously failed TOPAMAX, NEURONTIN, and LYRICA. Pt was switched from Ultram ER back to Ultram immediate release. Pt underwent left SI joint injection on 3/15/18 with good relief. Note from Dr. Ambar Lynn dated 11/12/19 indicated they stopped Lyrica and started her on Cymbalta 90 mg per day. Note from Dr. Akshat Del Valle dated 12/19/19 indicating patient was seen with c/o history of aortic valve replacement, fibromyalgia, and DM. The patient has a history of DM and reports blood sugars are well controlled, consistently remaining below 200, average of 119. Note from Dr. Massiel Noland dated 8/7/2020: It would appear that her left shoulder may need replacement in her right shoulder may need revision. At this time with regards to her cervical spine I have nothing to offer her I do not think her pain is emanating from her spine.  I would imagine it would be best for Dr. Royal Maxwell to take the lead in the situation and decide what further treatments for her shoulders are required. Given acute Rx of Flexeril 10 mg TID. Patient reports a benefit in sleeping while taking Flexeril and inquired about making it a new medication. Patient understands that it is not a long-term medication. Note from Dr. Linus Iyer 10/26/2020 indicating patient was seen with c/o bilateral thumb base pain. Numbness and tingling in her hands. Evidence of CTS and cubital tunnel syndrome on the right side. Indicated she has OA of the first CMC joints bilaterally. Performed injections. Pt reports temporary relief with the injections. She is scheduled to f/u with Dr. Elise Vivas next week to discuss surgery. Note from Alice Moralez NP dated 12/1/2020 indicating patient has h/o DM, fibromyalgia, and thyroid problems. Note from Dr. Elise Vivas dated 1/13/2021 indicating patient was seen for bilateral thumb CMC arthritis, bilateral CTS and right cubital tunnel syndrome. Indicated injections last time did help but she wanted to discuss surgery. He set her up for surgery for CTS and right CMC arthritis. Preliminary reading [id number] [id number] Spine plain films dated 3/2/2020 revealed: severe disc space narrowing at C4-5, moderate at C5-6, and the space between C6-7 was not well visualized. Small anterior osteophytes noted at C4, C5 and C6. C7 not visualized due to shoulder pathology. No acute pathology identified.  Lumbar spine CT w/o contrast dated 7/20/14  per report revealed L3 through L5 laminectomies with L3 through S1 dorsal hardware fusion. There was no evidence for fractures. There was advanced degenerative disc disease at L2-L3 at the junctional level with marked central canal stenosis. C Spine MRI dated 3/15/2020 films independently reviewed. Per report, at C3-4: anterolisthesis. 3.5 mm central disc extrusion extending 4 mm cephalad. Mild central stenosis and subtle ventral cord flattening. Advanced left facet arthropathy. At C4-5: mild posterior spondylotic ridge.  Mild central stenosis and subtle ventral cord flattening. At C5-6: central to right posterolateral 2.5 mm broad-based disc protrusion with subtle ventral cord flattening and mild central stenosis. At C6-7: mild posterior spondylotic ridge. Mild to moderate central stenosis. At C7-T1: anterolisthesis. Mild posterior annular bulge. Mild central stenosis. Upper thoracic spondylosis including a 5 mm posterior disc extension at T3-4. At her last clinical appointment, patient wished to continue her current treatment. She did not require refills of the Gabitril 2 mg qhs at the time. I provided her a 30 day supply refill of Tramadol.       The patient returns today and reports pain location and distribution remains unchanged. She rates her pain 6/10, previously 7/10. She continues on Gabitril 2 mg qhs and Tramadol. Pt denies change in bowel or bladder habits. UDS dated 2/25/2021 was consistent. Pt has left shoulder replacement scheduled on 6/16/2021 with Dr. Royal Maxwell. Note from Dr. Ivana Andersen dated 4/28/2021 indicating patient was seen with c/o 7 week's s/p left CTS release. Indicated she still had some soreness about the incision. Dx with suture granuloma. Note from Dr. Royal Maxwell dated 5/3/2021 indicating patient was seen for reevaluation left shoulder. Pain was 7/10. Previously discussed surgery. Pt has h/o torn left rotator cuff years ago. Pt had surgery to repair it. H/o right shoulder replacement.  reviewed and indicated pt received oxycodone from Dr. Ivana Andersen on 3/9/2021. Body mass index is 35.28 kg/m².     PCP: Valentino Ahuja MD      Past Medical History:   Diagnosis Date    Ankle fracture     Aortic stenosis     S/P AVR in 2016    Asthma     Carpal tunnel syndrome on both sides     Chronic obstructive pulmonary disease (HCC)     Chronic pain     back pain    Diabetes ([address]     Diverticulitis     Fibromyalgia     H/O aortic valve replacement 03/2016    21 mm bovine pericardial bioprosthesis and epiaortic scanning      Hypertension     no meds now    Hypothyroidism     Lumbar post-laminectomy syndrome     Menopause     Obesity     Osteoarthritis     [address]otic disorder [address] [address]     Sciatica     Skin cancer 2013    right forearm. Social History     Socioeconomic History    Marital status:      Spouse name: Not on file    Number of children: Not on file    Years of education: Not on file    Highest education level: Not on file   Occupational History    Not on file   Social Needs    Financial resource strain: Not on file    Food insecurity     Worry: Not on file     Inability: Not on file    Transportation needs     Medical: Not on file     Non-medical: Not on file   Tobacco Use    Smoking status: Former Smoker     Years: 45.00     Types: Cigarettes     Quit date: 2011     Years since quittin.8    Smokeless tobacco: Never Used   Substance and Sexual Activity    Alcohol use: No    Drug use: No    Sexual activity: Never   Lifestyle    Physical activity     Days per week: Not on file     Minutes per session: Not on file    Stress: Not on file   Relationships    Social connections     Talks on phone: Not on file     Gets together: Not on file     Attends [employer] witness service: Not on file     Active member of club or organization: Not on file     Attends meetings of clubs or organizations: Not on file     Relationship status: Not on file    Intimate partner violence     Fear of current or ex partner: Not on file     Emotionally abused: Not on file     Physically abused: Not on file     Forced sexual activity: Not on file   Other Topics Concern    Not on file   Social History Narrative    Not on file       Current Outpatient Medications   Medication Sig Dispense Refill    tiaGABine (GabitriL) 2 mg tablet Take 1 Tab by mouth nightly.  90 Tab 0    levothyroxine (SYNTHROID) 50 mcg tablet TAKE 1 TABLET BY MOUTH ONCE DAILY BEFORE BREAKFAST 90 Tab 3    traMADoL (ULTRAM) 50 mg tablet Take 1 Tab by mouth every six (6) hours as needed for Pain for up to 60 days. Max Daily Amount: 200 mg. (Patient taking differently: Take 50 mg by mouth four (4) times daily.) 120 Tab 1    pantoprazole (PROTONIX) 20 mg tablet Take 1 tablet by mouth once daily 90 Tab 3    tiaGABine (GABITRIL) 2 mg tablet TAKE 1 TABLET BY MOUTH TWICE DAILY WITH MEALS (Patient taking differently: daily.) 60 Tab 0    meloxicam (MOBIC) 15 mg tablet Take 1 tablet by mouth once daily 90 Tab 0    desonide (TRIDESILON) 0.05 % cream APPLY   EXTERNALLY TO AFFECTED AREA TWICE DAILY 15 g 0    metFORMIN (GLUCOPHAGE) 500 mg tablet TAKE 1 TABLET BY MOUTH ONCE DAILY WITH SUPPER 90 Tab 3    FLUoxetine (PROzac) 10 mg capsule Take 1 Cap by mouth daily. 90 Cap 3    atorvastatin (LIPITOR) 20 mg tablet Take 1 tablet by mouth once daily 90 Tab 4    fluticasone propionate (FLONASE) 50 mcg/actuation nasal spray Use 1 spray(s) in each nostril twice daily 16 g 4    clobetasoL (TEMOVATE) 0.05 % external solution APPLY TO SCALP AFTER SHAMPOO DAILY 50 mL 3    glucose blood VI test strips (True Metrix Pro Test Strip) strip Test blood glucose 3 times a day 200 Strip 3    meclizine (ANTIVERT) 25 mg tablet Take 1 Tab by mouth three (3) times daily as needed for Dizziness. 61 Tab 3    STIOLTO RESPIMAT 2.5-2.5 mcg/actuation inhaler Take  by inhalation. 1    vitamin E acetate (VITAMIN E PO) Take  by mouth daily.  cyanocobalamin (VITAMIN B-12) 500 mcg tablet Take 500 mcg by mouth daily.  cetirizine (ZYRTEC) 10 mg tablet TAKE 1 TABLET BY MOUTH EVERY DAY. GENERIC FOR ZYRTEC 90 Tab 2    VENTOLIN HFA 90 mcg/actuation inhaler 2 Puffs by Aerosolization route every four (4) hours as needed. 0    B.infantis-B.ani-B.long-B.bifi (PROBIOTIC 4X) 10-15 mg TbEC Take  by mouth daily.  Cholecalciferol, Vitamin D3, (VITAMIN D3) 1,000 unit cap Take 1,000 Units by mouth daily.  Lancets (ACCU-CHEK SOFTCLIX LANCETS) misc Please use one lancet to test blood sugars twice a day.  1 Package 20  BLOOD-GLUCOSE METER (ACCU-CHEK MARCE MONITORING) by Does Not Apply route.  cyclobenzaprine (FLEXERIL) 10 mg tablet Take 1 Tab by mouth three (3) times daily as needed for Muscle Spasm(s). 30 Tab 0    docusate sodium (COLACE) 50 mg capsule 50 mg. Allergies   Allergen Reactions    Latex, Natural Rubber Hives    Adhesive Rash     Some bandaids    Ciprofloxacin Rash    Codeine Nausea and Vomiting    Demerol [Meperidine] Nausea and Vomiting    Diclofenac Shortness of Breath and Palpitations    Erythromycin Rash    Levaquin [Levofloxacin] Rash    Loratadine Swelling    Morphine Nausea and Vomiting    Penicillin G Hives and Rash     Does fine with Keflex    Sulfa (Sulfonamide Antibiotics) Itching    Vicodin [Hydrocodone-Acetaminophen] Other (comments)     Metallic taste in mouth          PHYSICAL EXAMINATION    Visit Vitals  /78 (BP 1 Location: Right arm, BP Patient Position: Sitting)   Pulse 89   Temp 97.1 °F (36.2 °C) (Temporal)   Resp 15   Ht 5' 8\" (1.727 m)   Wt 232 lb (105.2 kg)   SpO2 98%   BMI 35.28 kg/m²       CONSTITUTIONAL: NAD, A&O x 3  SENSATION: Decreased sensation to light touch on the left lateral thigh. Otherwise, intact to light touch throughout  RANGE OF MOTION: The patient has full passive range of motion in all four extremities. MOTOR:  Straight Leg Raise: Negative, bilateral                 Hip Flex Knee Ext Knee Flex Ankle DF GTE Ankle PF Tone   Right +4/5 +4/5 +4/5 +4/5 +4/5 +4/5 +4/5   Left +4/5 +4/5 +4/5 +4/5 +4/5 +4/5 +4/5       ASSESSMENT   Diagnoses and all orders for this visit:    1. HNP (herniated nucleus pulposus) with myelopathy, cervical  -     tiaGABine (GabitriL) 2 mg tablet; Take 1 Tab by mouth nightly. -     traMADoL (ULTRAM) 50 mg tablet; Take 1 Tab by mouth every six (6) hours as needed for Pain for up to 3 days. Max Daily Amount: 200 mg.    2. Cervical spondylosis without myelopathy  -     tiaGABine (GabitriL) 2 mg tablet;  Take 1 Tab by mouth nightly. -     traMADoL (ULTRAM) 50 mg tablet; Take 1 Tab by mouth every six (6) hours as needed for Pain for up to 3 days. Max Daily Amount: 200 mg.    3. Cervical spinal stenosis  -     tiaGABine (GabitriL) 2 mg tablet; Take 1 Tab by mouth nightly. -     traMADoL (ULTRAM) 50 mg tablet; Take 1 Tab by mouth every six (6) hours as needed for Pain for up to 3 days. Max Daily Amount: 200 mg.    4. Cervical neuritis  -     tiaGABine (GabitriL) 2 mg tablet; Take 1 Tab by mouth nightly. -     traMADoL (ULTRAM) 50 mg tablet; Take 1 Tab by mouth every six (6) hours as needed for Pain for up to 3 days. Max Daily Amount: 200 mg.    5. Lumbar neuritis  -     tiaGABine (GabitriL) 2 mg tablet; Take 1 Tab by mouth nightly. -     traMADoL (ULTRAM) 50 mg tablet; Take 1 Tab by mouth every six (6) hours as needed for Pain for up to 3 days. Max Daily Amount: 200 mg.    6. Lumbar post-laminectomy syndrome  -     tiaGABine (GabitriL) 2 mg tablet; Take 1 Tab by mouth nightly. -     traMADoL (ULTRAM) 50 mg tablet; Take 1 Tab by mouth every six (6) hours as needed for Pain for up to 3 days. Max Daily Amount: 200 mg.    7. Spondylolisthesis, acquired  -     tiaGABine (GabitriL) 2 mg tablet; Take 1 Tab by mouth nightly. -     traMADoL (ULTRAM) 50 mg tablet; Take 1 Tab by mouth every six (6) hours as needed for Pain for up to 3 days. Max Daily Amount: 200 mg.    8. Cubital tunnel syndrome, right  -     tiaGABine (GabitriL) 2 mg tablet; Take 1 Tab by mouth nightly. -     traMADoL (ULTRAM) 50 mg tablet; Take 1 Tab by mouth every six (6) hours as needed for Pain for up to 3 days. Max Daily Amount: 200 mg.    9. Carpal tunnel syndrome, bilateral  -     tiaGABine (GabitriL) 2 mg tablet; Take 1 Tab by mouth nightly. -     traMADoL (ULTRAM) 50 mg tablet; Take 1 Tab by mouth every six (6) hours as needed for Pain for up to 3 days. Max Daily Amount: 200 mg.       IMPRESSION AND PLAN:  Patient returns to the office today with c/o low back pain radiating into BLE in a L4 distribution on the right and in a L5 distribution on the left to the greater toe. Multiple treatment options were discussed. Pt has upcoming surgery scheduled. I advised the pt not to take the Tramadol while taking post operative pain medications. I provided her refills of Gabitril 2 mg qhs and Tramadol 50 mg. Patient is neurologically intact. I will see the patient back in 3 month's time or earlier if needed. Written by Larry Goltz, as dictated by Mariela Owens MD  I examined the patient, reviewed and agree with the note.

## 2021-05-06 ENCOUNTER — OFFICE VISIT (OUTPATIENT)
Dept: ORTHOPEDIC SURGERY | Age: 71
End: 2021-05-06
Payer: MEDICARE

## 2021-05-06 VITALS
RESPIRATION RATE: 15 BRPM | WEIGHT: 232 LBS | DIASTOLIC BLOOD PRESSURE: 78 MMHG | OXYGEN SATURATION: 98 % | BODY MASS INDEX: 35.16 KG/M2 | TEMPERATURE: 97.1 F | SYSTOLIC BLOOD PRESSURE: 124 MMHG | HEART RATE: 89 BPM | HEIGHT: 68 IN

## 2021-05-06 DIAGNOSIS — M54.12 CERVICAL NEURITIS: ICD-10-CM

## 2021-05-06 DIAGNOSIS — G56.21 CUBITAL TUNNEL SYNDROME, RIGHT: ICD-10-CM

## 2021-05-06 DIAGNOSIS — M96.1 LUMBAR POST-LAMINECTOMY SYNDROME: ICD-10-CM

## 2021-05-06 DIAGNOSIS — M50.00 HNP (HERNIATED NUCLEUS PULPOSUS) WITH MYELOPATHY, CERVICAL: Primary | ICD-10-CM

## 2021-05-06 DIAGNOSIS — M54.16 LUMBAR NEURITIS: ICD-10-CM

## 2021-05-06 DIAGNOSIS — M48.02 CERVICAL SPINAL STENOSIS: ICD-10-CM

## 2021-05-06 DIAGNOSIS — M47.812 CERVICAL SPONDYLOSIS WITHOUT MYELOPATHY: ICD-10-CM

## 2021-05-06 DIAGNOSIS — G56.03 CARPAL TUNNEL SYNDROME, BILATERAL: ICD-10-CM

## 2021-05-06 DIAGNOSIS — M43.10 SPONDYLOLISTHESIS, ACQUIRED: ICD-10-CM

## 2021-05-06 PROCEDURE — 1101F PT FALLS ASSESS-DOCD LE1/YR: CPT | Performed by: PHYSICAL MEDICINE & REHABILITATION

## 2021-05-06 PROCEDURE — G8417 CALC BMI ABV UP PARAM F/U: HCPCS | Performed by: PHYSICAL MEDICINE & REHABILITATION

## 2021-05-06 PROCEDURE — 3017F COLORECTAL CA SCREEN DOC REV: CPT | Performed by: PHYSICAL MEDICINE & REHABILITATION

## 2021-05-06 PROCEDURE — G9899 SCRN MAM PERF RSLTS DOC: HCPCS | Performed by: PHYSICAL MEDICINE & REHABILITATION

## 2021-05-06 PROCEDURE — 99213 OFFICE O/P EST LOW 20 MIN: CPT | Performed by: PHYSICAL MEDICINE & REHABILITATION

## 2021-05-06 PROCEDURE — G8427 DOCREV CUR MEDS BY ELIG CLIN: HCPCS | Performed by: PHYSICAL MEDICINE & REHABILITATION

## 2021-05-06 PROCEDURE — 1090F PRES/ABSN URINE INCON ASSESS: CPT | Performed by: PHYSICAL MEDICINE & REHABILITATION

## 2021-05-06 PROCEDURE — G8399 PT W/DXA RESULTS DOCUMENT: HCPCS | Performed by: PHYSICAL MEDICINE & REHABILITATION

## 2021-05-06 PROCEDURE — G8536 NO DOC ELDER MAL SCRN: HCPCS | Performed by: PHYSICAL MEDICINE & REHABILITATION

## 2021-05-06 PROCEDURE — G9717 DOC PT DX DEP/BP F/U NT REQ: HCPCS | Performed by: PHYSICAL MEDICINE & REHABILITATION

## 2021-05-06 RX ORDER — TIAGABINE HYDROCHLORIDE 2 MG/1
2 TABLET, FILM COATED ORAL
Qty: 90 TAB | Refills: 0 | Status: SHIPPED | OUTPATIENT
Start: 2021-05-06 | End: 2022-03-03

## 2021-05-06 RX ORDER — TRAMADOL HYDROCHLORIDE 50 MG/1
50 TABLET ORAL
Qty: 120 TAB | Refills: 1 | Status: SHIPPED | OUTPATIENT
Start: 2021-06-01 | End: 2021-06-04

## 2021-05-06 NOTE — LETTER
5/6/2021 Patient: Pietro Wells YOB: 1950 Date of Visit: 5/6/2021 Angelique Larson MD 
[id number] [id number] [employer] Dear Angelique Larson MD, Thank you for referring Ms. Yumiko Higginbotham to [address] for evaluation. My notes for this consultation are attached. If you have questions, please do not hesitate to call me. I look forward to following your patient along with you. Sincerely, Marisela Alexis MD

## 2021-05-11 ENCOUNTER — OFFICE VISIT (OUTPATIENT)
Dept: CARDIOLOGY CLINIC | Age: 71
End: 2021-05-11
Payer: MEDICARE

## 2021-05-11 VITALS
HEIGHT: 68 IN | TEMPERATURE: 97.1 F | OXYGEN SATURATION: 98 % | WEIGHT: 233 LBS | SYSTOLIC BLOOD PRESSURE: 133 MMHG | BODY MASS INDEX: 35.31 KG/M2 | DIASTOLIC BLOOD PRESSURE: 85 MMHG | RESPIRATION RATE: 18 BRPM | HEART RATE: 98 BPM

## 2021-05-11 DIAGNOSIS — I35.0 NONRHEUMATIC AORTIC VALVE STENOSIS: ICD-10-CM

## 2021-05-11 DIAGNOSIS — Z01.818 PRE-OP EVALUATION: Primary | ICD-10-CM

## 2021-05-11 PROCEDURE — 99214 OFFICE O/P EST MOD 30 MIN: CPT | Performed by: INTERNAL MEDICINE

## 2021-05-11 PROCEDURE — 1090F PRES/ABSN URINE INCON ASSESS: CPT | Performed by: INTERNAL MEDICINE

## 2021-05-11 PROCEDURE — G9717 DOC PT DX DEP/BP F/U NT REQ: HCPCS | Performed by: INTERNAL MEDICINE

## 2021-05-11 PROCEDURE — G9899 SCRN MAM PERF RSLTS DOC: HCPCS | Performed by: INTERNAL MEDICINE

## 2021-05-11 PROCEDURE — 3017F COLORECTAL CA SCREEN DOC REV: CPT | Performed by: INTERNAL MEDICINE

## 2021-05-11 PROCEDURE — G8428 CUR MEDS NOT DOCUMENT: HCPCS | Performed by: INTERNAL MEDICINE

## 2021-05-11 PROCEDURE — 1101F PT FALLS ASSESS-DOCD LE1/YR: CPT | Performed by: INTERNAL MEDICINE

## 2021-05-11 PROCEDURE — G8536 NO DOC ELDER MAL SCRN: HCPCS | Performed by: INTERNAL MEDICINE

## 2021-05-11 PROCEDURE — G8417 CALC BMI ABV UP PARAM F/U: HCPCS | Performed by: INTERNAL MEDICINE

## 2021-05-11 PROCEDURE — G8399 PT W/DXA RESULTS DOCUMENT: HCPCS | Performed by: INTERNAL MEDICINE

## 2021-05-11 NOTE — LETTER
5/11/2021    Patient: Masoud Romero   YOB: 1950   Date of Visit: 5/11/2021     Olamide Baker MD  [id number] [facility]  [address] [place] [address]    Dear Olamide Baker MD,      Thank you for referring Ms. Akira Malik to [address] [facility] for evaluation. My notes for this consultation are attached. If you have questions, please do not hesitate to call me. I look forward to following your patient along with you.       Sincerely,    Amaury Matthew MD

## 2021-05-11 NOTE — PROGRESS NOTES
[facility]    Ms. Avery López is a 70 y.o. female with history of Aortic stenosis s/p Aortic valve replacement (03/16), fibromyalgia, diabetes, obesity, hypertension, hypothyroidism and COPD. Mr. Avery López is here today for follow up appointment. Dyspnea has remained stable over the years she follows up with Dr. Luisa Angelo for her asthma and COPD. Plan to have left shoulder surgery. No chest pain or chest tightness to suggest angina  No symptoms to suggest fluid overload  Denies presyncope or syncope    Past Medical History:   Diagnosis Date    Ankle fracture     Aortic stenosis     S/P AVR in 2016    Asthma     Carpal tunnel syndrome on both sides     Chronic obstructive pulmonary disease (HCC)     Chronic pain     back pain    Diabetes ([address]     Diverticulitis     Fibromyalgia     H/O aortic valve replacement 03/2016    21 mm bovine pericardial bioprosthesis and epiaortic scanning      Hypertension     no meds now    Hypothyroidism     Lumbar post-laminectomy syndrome     Menopause     Obesity     Osteoarthritis     Psychotic disorder ([address]     Sciatica     Skin cancer 2013    right forearm. Past Surgical History:   Procedure Laterality Date    COLONOSCOPY      COLONOSCOPY N/A 9/15/2017    COLONOSCOPY performed by Gregory Hardy MD at [address] [facility] [address] Right 1995    lens  replaced.  HX CATARACT REMOVAL Left 02/2018    HX COLONOSCOPY  09/15/2017    dr. Jesus Fletcher  f/u 5 years.  HX HEENT  1990's    sinus surgery     HX HEENT Right 11/02/2017    laser for right eye cataracts.  HX LUMBAR FUSION  2004    L3-L4-L5    HX LUMBAR LAMINECTOMY  2002    HX MOHS PROCEDURES Left 1990    HX ORTHOPAEDIC      Rotator cuff Bilateral    HX SHOULDER REPLACEMENT Right 03/29/2017    reverse.  [place] CARDIAC SURG PROCEDURE UNLIST  03/2016    aortic valve replacement.      [place] CHEST SURGERY PROCEDURE UNLISTED 03/2016    Open Heart Surgery    [place] COLSC FLX W/RMVL OF TUMOR POLYP LESION SNARE TQ  07/09/2014 repeat in 3 years    Dr. Mayito Alva  2016    Aortic valve replacement(bovine)       Current Outpatient Medications   Medication Sig    tiaGABine (GabitriL) 2 mg tablet Take 1 Tab by mouth nightly.  [START ON 6/1/2021] traMADoL (ULTRAM) 50 mg tablet Take 1 Tab by mouth every six (6) hours as needed for Pain for up to 3 days. Max Daily Amount: 200 mg.  levothyroxine (SYNTHROID) 50 mcg tablet TAKE 1 TABLET BY MOUTH ONCE DAILY BEFORE BREAKFAST    traMADoL (ULTRAM) 50 mg tablet Take 1 Tab by mouth every six (6) hours as needed for Pain for up to 60 days. Max Daily Amount: 200 mg. (Patient taking differently: Take 50 mg by mouth four (4) times daily.)    pantoprazole (PROTONIX) 20 mg tablet Take 1 tablet by mouth once daily    tiaGABine (GABITRIL) 2 mg tablet TAKE 1 TABLET BY MOUTH TWICE DAILY WITH MEALS (Patient taking differently: daily.)    meloxicam (MOBIC) 15 mg tablet Take 1 tablet by mouth once daily    desonide (TRIDESILON) 0.05 % cream APPLY   EXTERNALLY TO AFFECTED AREA TWICE DAILY    metFORMIN (GLUCOPHAGE) 500 mg tablet TAKE 1 TABLET BY MOUTH ONCE DAILY WITH SUPPER    FLUoxetine (PROzac) 10 mg capsule Take 1 Cap by mouth daily.  atorvastatin (LIPITOR) 20 mg tablet Take 1 tablet by mouth once daily    fluticasone propionate (FLONASE) 50 mcg/actuation nasal spray Use 1 spray(s) in each nostril twice daily    cyclobenzaprine (FLEXERIL) 10 mg tablet Take 1 Tab by mouth three (3) times daily as needed for Muscle Spasm(s).  clobetasoL (TEMOVATE) 0.05 % external solution APPLY TO SCALP AFTER SHAMPOO DAILY    glucose blood VI test strips (True Metrix Pro Test Strip) strip Test blood glucose 3 times a day    meclizine (ANTIVERT) 25 mg tablet Take 1 Tab by mouth three (3) times daily as needed for Dizziness.     STIOLTO RESPIMAT 2.5-2.5 mcg/actuation inhaler Take by inhalation.  docusate sodium (COLACE) 50 mg capsule 50 mg.    vitamin E acetate (VITAMIN E PO) Take  by mouth daily.  cyanocobalamin (VITAMIN B-12) 500 mcg tablet Take 500 mcg by mouth daily.  cetirizine (ZYRTEC) 10 mg tablet TAKE 1 TABLET BY MOUTH EVERY DAY. GENERIC FOR ZYRTEC    VENTOLIN HFA 90 mcg/actuation inhaler 2 Puffs by Aerosolization route every four (4) hours as needed.  B.infantis-B.ani-B.long-B.bifi (PROBIOTIC 4X) 10-15 mg TbEC Take  by mouth daily.  Cholecalciferol, Vitamin D3, (VITAMIN D3) 1,000 unit cap Take 1,000 Units by mouth daily.  Lancets (ACCU-CHEK SOFTCLIX LANCETS) misc Please use one lancet to test blood sugars twice a day.  BLOOD-GLUCOSE METER (ACCU-CHEK MARCE MONITORING) by Does Not Apply route. No current facility-administered medications for this visit. Allergies and Sensitivities:  Allergies   Allergen Reactions    Latex, Natural Rubber Hives    Adhesive Rash     Some bandaids    Ciprofloxacin Rash    Codeine Nausea and Vomiting    Demerol [Meperidine] Nausea and Vomiting    Diclofenac Shortness of Breath and Palpitations    Erythromycin Rash    Levaquin [Levofloxacin] Rash    Loratadine Swelling    Morphine Nausea and Vomiting    Penicillin G Hives and Rash     Does fine with Keflex    Sulfa (Sulfonamide Antibiotics) Itching    Vicodin [Hydrocodone-Acetaminophen] Other (comments)     Metallic taste in mouth       Family History:  Family History   Problem Relation Age of Onset    Hypertension Father     Heart Disease Father     Alcohol abuse Father        Social History:  Social History     Tobacco Use    Smoking status: Former Smoker     Years: 45.00     Types: Cigarettes     Quit date: 2011     Years since quittin.8    Smokeless tobacco: Never Used   Substance Use Topics    Alcohol use: No    Drug use: No     She  reports that she quit smoking about 9 years ago. Her smoking use included cigarettes.  She quit after 45.00 years of use. She has never used smokeless tobacco.  She  reports no history of alcohol use. Review of Systems:  Cardiac symptoms as noted above in HPI. All others negative. Denies skin rash,  photophobia, neck pain, hemoptysis, chronic cough, nausea, vomiting, hematuria, burning micturition, BRBPR, chronic headaches. Physical Exam:  BP Readings from Last 3 Encounters:   05/06/21 124/78   03/09/21 116/73   03/05/21 110/71         Pulse Readings from Last 3 Encounters:   05/06/21 89   05/03/21 93   04/28/21 98          Wt Readings from Last 3 Encounters:   05/06/21 105.2 kg (232 lb)   05/03/21 105.2 kg (232 lb)   04/28/21 106.4 kg (234 lb 9.6 oz)       Constitutional: Oriented to person, place, and time. HENT: Head: Normocephalic and atraumatic. Neck: No JVD present. Cardiovascular: Regular rhythm. No gallop or rubs appreciated. No significant murmur  Lung: Breath sounds normal. No respiratory distress. No ronchi or rales appreciated  Abdominal: No tenderness. No rebound and no guarding. Musculoskeletal: No LE swelling    Review of Data  LABS:   Lab Results   Component Value Date/Time    Sodium 144 03/03/2021 01:58 PM    Potassium 4.6 03/03/2021 01:58 PM    Chloride 110 03/03/2021 01:58 PM    CO2 26 03/03/2021 01:58 PM    Glucose 128 (H) 03/03/2021 01:58 PM    BUN 16 03/03/2021 01:58 PM    Creatinine 1.09 03/03/2021 01:58 PM     Lipids Latest Ref Rng & Units 10/10/2019 11/16/2018 11/7/2017   Chol, Total 100 - 199 mg/dL 118 105 119   HDL >39 mg/dL 55 53 56   LDL 0 - 99 mg/dL 49 38 45   Trig 0 - 149 mg/dL 70 70 88   Some recent data might be hidden     Lab Results   Component Value Date/Time    ALT (SGPT) 26 03/03/2021 01:58 PM     Lab Results   Component Value Date/Time    Hemoglobin A1c 6.6 (H) 12/10/2020 01:48 PM     EKG  (04/16) Sinus rhythm at 96 bpm    STRESS TEST (2012)   No EKG or scintigraphic evidence of ischemia or infarction.  Normal LVEF    ECHO (01/16)  SUMMARY:  Left ventricle: Systolic function was normal. Ejection fraction was estimated in the range of 55 % to 60 %. There were no regional wall motion  abnormalities. Doppler parameters were consistent with abnormal left ventricular relaxation (grade 1 diastolic dysfunction). Right ventricle: Systolic function was normal.  Aortic valve: The valve was trileaflet. Leaflets exhibited calcification and moderate- severely reduced cuspal separation. There was severe aortic  stenosis. There was no significant regurgitation. Valve peak gradient was 85 mmHg. Valve mean gradient was 50 mmHg. ELIS (02/16)  Left ventricle: Size was normal. Systolic function was normal.  Right ventricle: The size was normal.  Left atrium: Size was normal. No thrombus was identified. There was no  spontaneous echo contrast (\"smoke\"). Left atrial appendage: No thrombus was identified. There was no  spontaneous echo contrast (\"smoke\") in the appendage. Right atrium: Size was normal.  Mitral valve: There was mild to moderate multi jet regurgitation. No obvious mass, vegetation or thrombus noted. Aortic valve: Aortic valve assessment was performed using 2D  transesophageal as well as transthoracic doppler data. The valve was  trileaflet. Leaflets were thickened, calcific and stenotic. A mean  gradient across the valve was measured at 49mmHg with a calculated valve  area of 0.70 - 0.75 cm based on an LVOT diameter of 2.1 cm, an LVOT VTI of  21 cm, and an AV VTI of 101 cm. There was no regurgitation. Tricuspid valve: There was no regurgitation. No obvious mass, vegetation or thrombus noted. HOLTER (05/16)  Interpretation:  1. Rhythm is sinus. 2. PACs (1.3% burden). 3. Rare PVCs. ECHO (03/2021)  Left Ventricle Normal cavity size and systolic function (ejection fraction normal). Mild concentric hypertrophy. Wall motion: normal. The estimated EF is 55 - 60%. Visually measured ejection fraction. There is age-appropriate left ventricular diastolic function.    Wall Scoring The left ventricular wall motion is normal.            Left Atrium Normal cavity size. Left Atrium volume index is 27 mL/m2. Right Ventricle Normal cavity size and global systolic function. Right Atrium Normal cavity size. Interatrial Septum No interatrial shunt visualized on color doppler. Aortic Valve Normal valve structure, no stenosis and no regurgitation. Prosthetic aortic valve. Aortic valve peak gradient is 25 mmHg. Aortic valve mean gradient is 13 mmHg. Aortic valve area is 1.7 cm2. There is a bioprosthetic valve present. The prosthetic valve is normal.   Mitral Valve No stenosis. Mitral valve non-specific thickening. Mild mitral annular calcification. Trace regurgitation. Tricuspid Valve Normal valve structure, no stenosis and no regurgitation. Pulmonic Valve Pulmonic valve not well visualized, but normal doppler findings. No stenosis. CARDIAC CATH: (01/16)  PRESSURE HEMODYNAMICS: Systemic aortic pressure was 131/68 mmHg. Left ventricular pressure was 160/2/12 mmHg. Based on the data, peak-to-peak gradient between LV and aortic was approximately 30 mmHg. Mean RA  7 mmHg,   RV  32/1/8 mmHg,  PA  27/9/16 mmHg,   PCWP 10 mm hg  Cardiac output by Ellis calculation was 4.3 L/min and cardiac index was 2.02 liters L/min/meter squared. Mixed venous saturation was 65%, PA saturation was 65%, and the radial artery saturation was 90%. No evidence of intracardiac shunting. CORONARY ANGIOGRAM  1. LM: No significant obstructive disease. 2. LAD: 10-20% luminal irregularities, otherwise normal. Three diagonal branches angiographically normal.  3. LCX: 20-30% luminal irregularities, otherwise no obstructive disease. 4. OM1: Large bifurcating vessel without any obstructive disease. 5. RCA: Proximal 20%, mid discrete 40%, otherwise angiographically normal. Dominant vessel. IMPRESSION & PLAN:  Ms. Albert Mosquera is a 70 y.o. female with Aortic stenosis, COPD, obesity, diabetes, fibromyalgia.     Aortic stenosis:    Ms. Pauline Darling [facility]. Aspirin will be continued lifelong. ECHO in 06/17 with mean gradient across valve ~ 10 mm Hg. Repeat echo in May 2019 with gradient 13 mmHg. Physiologic for prosthetic valve. Echo in 04/2021 with mean gradient approximately 13 mmHg. Continue ASA    Diabetes:  Goal hemoglobin A1c less than 7 is recommended from a cardiovascular standpoint. Last hemoglobin A1c was 6.3. Continue same management per PCP    Hypertension:  BP today 133/84 mm Hg. Not on any meds. Continue same. Off BB because of low BP in past.     HLD: last LDL 49. On atorvastatin. Continue same. Preoperative evaluation:  Patient is scheduled for elective outpatient left shoulder surgery  Currently she does not have any symptom that is concerning for unstable angina or decompensated heart failure. She has no evidence of fluid overload. In absence of any significant cardiac symptoms I do not believe she needs any further cardiac work-up. Close hemodynamic monitoring is advised and recommended  She will be at low-intermediate risk for any cardiac complication   She is acceptable candidate for planned surgery    This plan was discussed with patient who is in agreement. Thank you for allowing me to participate in patient care. Please feel free to call me if you have any question or concern.

## 2021-05-11 NOTE — PROGRESS NOTES
Anita Calle presents today for   Chief Complaint   Patient presents with   [address] preferred language for health care discussion is english/other. Personal Protective Equipment:   Personal Protective Equipment was used including: mask-surgical and hands-gloves. Patient was placed on no precaution(s). Patient was masked. Precautions:   Patient currently on None  Patient currently roomed with door closed    Is someone accompanying this pt? no    Is the patient using any DME equipment during [address]? no    Depression Screening:  3 most recent PHQ Screens 5/6/2021   Little interest or pleasure in doing things Nearly every day   Feeling down, depressed, irritable, or hopeless Not at all   Total Score PHQ 2 3   Trouble falling or staying asleep, or sleeping too much Nearly every day   Feeling tired or having little energy More than half the days   Poor appetite, weight loss, or overeating Not at all   Feeling bad about yourself - or that you are a failure or have let yourself or your family down Several days   Trouble concentrating on things such as school, work, reading, or watching TV Not at all   Moving or speaking so slowly that other people could have noticed; or the opposite being so fidgety that others notice Not at all   Thoughts of being better off dead, or hurting yourself in some way Not at all   PHQ 9 Score 9   How difficult have these problems made it for you to do your work, take care of your home and get along with others Not difficult at all       Learning Assessment:  Learning Assessment 4/22/2016   PRIMARY LEARNER Patient   HIGHEST LEVEL OF EDUCATION - PRIMARY LEARNER  -   PRIMARY LANGUAGE ENGLISH   LEARNER PREFERENCE PRIMARY LISTENING   ANSWERED BY patient   RELATIONSHIP SELF       Abuse Screening:  Abuse Screening Questionnaire 12/12/2019   Do you ever feel afraid of your partner? N   Are you in a relationship with someone who physically or mentally threatens you?  Guillermo Pean Is it safe for you to go home? Y       Fall Risk  Fall Risk Assessment, last 12 mths 5/11/2021   Able to walk? Yes   Fall in past 12 months? 0   Do you feel unsteady? 0   Are you worried about falling 0   Number of falls in past 12 months -   Fall with injury? -       Pt currently taking Anticoagulant therapy? no    Coordination of Care:  1. Have you been to the ER, urgent care clinic since your last visit? Hospitalized since your last visit? no    2. Have you seen or consulted any other health care providers outside of the [address] [place] [address] since your last visit? Include any pap smears or colon screening.   yes

## 2021-05-20 NOTE — TELEPHONE ENCOUNTER
Pt. Stated she has been having diarrhea for the past 3 days and she has been having severe cramps. She stated OTC antidiarrheal medication is not working. Please advise.

## 2021-05-24 ENCOUNTER — VIRTUAL VISIT (OUTPATIENT)
Dept: FAMILY MEDICINE CLINIC | Age: 71
End: 2021-05-24
Payer: MEDICARE

## 2021-05-24 DIAGNOSIS — F33.41 RECURRENT MAJOR DEPRESSIVE DISORDER, IN PARTIAL REMISSION (HCC): ICD-10-CM

## 2021-05-24 DIAGNOSIS — I27.20 PULMONARY HYPERTENSION (HCC): ICD-10-CM

## 2021-05-24 DIAGNOSIS — J44.9 CHRONIC OBSTRUCTIVE PULMONARY DISEASE, UNSPECIFIED COPD TYPE (HCC): ICD-10-CM

## 2021-05-24 DIAGNOSIS — K59.1 FUNCTIONAL DIARRHEA: Primary | ICD-10-CM

## 2021-05-24 DIAGNOSIS — E11.65 TYPE 2 DIABETES MELLITUS WITH HYPERGLYCEMIA, WITHOUT LONG-TERM CURRENT USE OF INSULIN (HCC): ICD-10-CM

## 2021-05-24 DIAGNOSIS — E66.01 MORBID OBESITY, UNSPECIFIED OBESITY TYPE (HCC): ICD-10-CM

## 2021-05-24 DIAGNOSIS — I71.40 ABDOMINAL AORTIC ANEURYSM (AAA) WITHOUT RUPTURE: ICD-10-CM

## 2021-05-24 PROCEDURE — 99214 OFFICE O/P EST MOD 30 MIN: CPT | Performed by: FAMILY MEDICINE

## 2021-05-24 PROCEDURE — G9717 DOC PT DX DEP/BP F/U NT REQ: HCPCS | Performed by: FAMILY MEDICINE

## 2021-05-24 PROCEDURE — G8399 PT W/DXA RESULTS DOCUMENT: HCPCS | Performed by: FAMILY MEDICINE

## 2021-05-24 PROCEDURE — G9899 SCRN MAM PERF RSLTS DOC: HCPCS | Performed by: FAMILY MEDICINE

## 2021-05-24 PROCEDURE — 3046F HEMOGLOBIN A1C LEVEL >9.0%: CPT | Performed by: FAMILY MEDICINE

## 2021-05-24 PROCEDURE — 2022F DILAT RTA XM EVC RTNOPTHY: CPT | Performed by: FAMILY MEDICINE

## 2021-05-24 PROCEDURE — 1101F PT FALLS ASSESS-DOCD LE1/YR: CPT | Performed by: FAMILY MEDICINE

## 2021-05-24 PROCEDURE — 3017F COLORECTAL CA SCREEN DOC REV: CPT | Performed by: FAMILY MEDICINE

## 2021-05-24 PROCEDURE — G8427 DOCREV CUR MEDS BY ELIG CLIN: HCPCS | Performed by: FAMILY MEDICINE

## 2021-05-24 PROCEDURE — 1090F PRES/ABSN URINE INCON ASSESS: CPT | Performed by: FAMILY MEDICINE

## 2021-05-24 NOTE — PROGRESS NOTES
Beni Pham presents today for   Chief Complaint   Patient presents with    Diarrhea       Is someone accompanying this pt? na    Is the patient using any DME equipment during OV? na    Depression Screening:  3 most recent PHQ Screens 5/6/2021   Little interest or pleasure in doing things Nearly every day   Feeling down, depressed, irritable, or hopeless Not at all   Total Score PHQ 2 3   Trouble falling or staying asleep, or sleeping too much Nearly every day   Feeling tired or having little energy More than half the days   Poor appetite, weight loss, or overeating Not at all   Feeling bad about yourself - or that you are a failure or have let yourself or your family down Several days   Trouble concentrating on things such as school, work, reading, or watching TV Not at all   Moving or speaking so slowly that other people could have noticed; or the opposite being so fidgety that others notice Not at all   Thoughts of being better off dead, or hurting yourself in some way Not at all   PHQ 9 Score 9   How difficult have these problems made it for you to do your work, take care of your home and get along with others Not difficult at all       Learning Assessment:  Learning Assessment 4/22/2016   PRIMARY LEARNER Patient   HIGHEST LEVEL OF EDUCATION - PRIMARY LEARNER  -   PRIMARY LANGUAGE ENGLISH   LEARNER PREFERENCE PRIMARY LISTENING   ANSWERED BY patient   RELATIONSHIP SELF       Abuse Screening:  Abuse Screening Questionnaire 12/12/2019   Do you ever feel afraid of your partner? N   Are you in a relationship with someone who physically or mentally threatens you? N   Is it safe for you to go home? Y       Fall Risk  Fall Risk Assessment, last 12 mths 5/11/2021   Able to walk? Yes   Fall in past 12 months? 0   Do you feel unsteady? 0   Are you worried about falling 0   Number of falls in past 12 months -   Fall with injury? -       Health Maintenance reviewed and discussed and ordered per Provider.     Health Maintenance Due   Topic Date Due    COVID-19 Vaccine (1) Never done    Foot Exam Q1  11/16/2019    Eye Exam Retinal or Dilated  04/27/2020    MICROALBUMIN Q1  10/10/2020    Lipid Screen  10/10/2020   . Coordination of Care:  1. Have you been to the ER, urgent care clinic since your last visit? Hospitalized since your last visit? no    2. Have you seen or consulted any other health care providers outside of the [address] [place] [address] since your last visit? Include any pap smears or colon screening. no      Last  Checked na  Last UDS Checked na  Last Pain contract signed: na    Patients concerns today:  Diarrhea for 4 days, still having abd cramps.
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
## 2021-05-26 LAB
CHOLEST SERPL-MCNC: 113 MG/DL (ref 100–199)
EST. AVERAGE GLUCOSE BLD GHB EST-MCNC: 137 MG/DL
HBA1C MFR BLD: 6.4 % (ref 4.8–5.6)
HDLC SERPL-MCNC: 52 MG/DL
IMP & REVIEW OF LAB RESULTS: NORMAL
LDLC SERPL CALC-MCNC: 45 MG/DL (ref 0–99)
SPECIMEN STATUS REPORT, ROLRST: NORMAL
TRIGL SERPL-MCNC: 83 MG/DL (ref 0–149)
VLDLC SERPL CALC-MCNC: 16 MG/DL (ref 5–40)

## 2021-05-27 LAB
CREATININE, URINE: 78 MG/DL
MICROALB/CREAT RATIO, 140286: 15.4 (ref 0–30)
MICROALBUMIN,URINE RANDOM 140054: 12 MG/L (ref 0.1–17)

## 2021-06-08 ENCOUNTER — OFFICE VISIT (OUTPATIENT)
Dept: FAMILY MEDICINE CLINIC | Age: 71
End: 2021-06-08
Payer: MEDICARE

## 2021-06-08 VITALS
HEIGHT: 68 IN | RESPIRATION RATE: 20 BRPM | WEIGHT: 227.4 LBS | OXYGEN SATURATION: 97 % | HEART RATE: 93 BPM | BODY MASS INDEX: 34.46 KG/M2 | SYSTOLIC BLOOD PRESSURE: 116 MMHG | DIASTOLIC BLOOD PRESSURE: 70 MMHG | TEMPERATURE: 97.3 F

## 2021-06-08 DIAGNOSIS — R42 DIZZINESS: Primary | ICD-10-CM

## 2021-06-08 DIAGNOSIS — B34.9 VIRAL SYNDROME: ICD-10-CM

## 2021-06-08 DIAGNOSIS — F33.41 RECURRENT MAJOR DEPRESSIVE DISORDER, IN PARTIAL REMISSION (HCC): ICD-10-CM

## 2021-06-08 DIAGNOSIS — E11.65 TYPE 2 DIABETES MELLITUS WITH HYPERGLYCEMIA, WITHOUT LONG-TERM CURRENT USE OF INSULIN (HCC): ICD-10-CM

## 2021-06-08 DIAGNOSIS — M19.012 ARTHRITIS OF LEFT SHOULDER REGION: ICD-10-CM

## 2021-06-08 DIAGNOSIS — G56.03 CARPAL TUNNEL SYNDROME, BILATERAL: ICD-10-CM

## 2021-06-08 DIAGNOSIS — J44.9 CHRONIC OBSTRUCTIVE PULMONARY DISEASE, UNSPECIFIED COPD TYPE (HCC): ICD-10-CM

## 2021-06-08 DIAGNOSIS — R11.0 NAUSEA: ICD-10-CM

## 2021-06-08 DIAGNOSIS — H81.21 VESTIBULAR NEURONITIS OF RIGHT EAR: ICD-10-CM

## 2021-06-08 DIAGNOSIS — J30.1 SEASONAL ALLERGIC RHINITIS DUE TO POLLEN: ICD-10-CM

## 2021-06-08 PROCEDURE — 3044F HG A1C LEVEL LT 7.0%: CPT | Performed by: FAMILY MEDICINE

## 2021-06-08 PROCEDURE — G8399 PT W/DXA RESULTS DOCUMENT: HCPCS | Performed by: FAMILY MEDICINE

## 2021-06-08 PROCEDURE — G8536 NO DOC ELDER MAL SCRN: HCPCS | Performed by: FAMILY MEDICINE

## 2021-06-08 PROCEDURE — 96372 THER/PROPH/DIAG INJ SC/IM: CPT | Performed by: FAMILY MEDICINE

## 2021-06-08 PROCEDURE — G8417 CALC BMI ABV UP PARAM F/U: HCPCS | Performed by: FAMILY MEDICINE

## 2021-06-08 PROCEDURE — 3017F COLORECTAL CA SCREEN DOC REV: CPT | Performed by: FAMILY MEDICINE

## 2021-06-08 PROCEDURE — 99214 OFFICE O/P EST MOD 30 MIN: CPT | Performed by: FAMILY MEDICINE

## 2021-06-08 PROCEDURE — G9717 DOC PT DX DEP/BP F/U NT REQ: HCPCS | Performed by: FAMILY MEDICINE

## 2021-06-08 PROCEDURE — 1101F PT FALLS ASSESS-DOCD LE1/YR: CPT | Performed by: FAMILY MEDICINE

## 2021-06-08 PROCEDURE — G9899 SCRN MAM PERF RSLTS DOC: HCPCS | Performed by: FAMILY MEDICINE

## 2021-06-08 PROCEDURE — 1090F PRES/ABSN URINE INCON ASSESS: CPT | Performed by: FAMILY MEDICINE

## 2021-06-08 PROCEDURE — 2022F DILAT RTA XM EVC RTNOPTHY: CPT | Performed by: FAMILY MEDICINE

## 2021-06-08 PROCEDURE — G8427 DOCREV CUR MEDS BY ELIG CLIN: HCPCS | Performed by: FAMILY MEDICINE

## 2021-06-08 RX ORDER — MELOXICAM 15 MG/1
TABLET ORAL
Qty: 90 TABLET | Refills: 0 | Status: SHIPPED | OUTPATIENT
Start: 2021-06-08 | End: 2021-07-03

## 2021-06-08 RX ORDER — FLUTICASONE PROPIONATE 50 MCG
SPRAY, SUSPENSION (ML) NASAL
Qty: 16 G | Refills: 4 | Status: SHIPPED | OUTPATIENT
Start: 2021-06-08 | End: 2021-12-04

## 2021-06-08 RX ORDER — MECLIZINE HYDROCHLORIDE 25 MG/1
25 TABLET ORAL
Qty: 60 TABLET | Refills: 3 | Status: SHIPPED | OUTPATIENT
Start: 2021-06-08

## 2021-06-08 RX ORDER — PROMETHAZINE HYDROCHLORIDE 50 MG/ML
50 INJECTION, SOLUTION INTRAMUSCULAR; INTRAVENOUS ONCE
Qty: 1 VIAL | Refills: 0
Start: 2021-06-08 | End: 2021-06-08
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
## 2021-06-08 NOTE — PROGRESS NOTES
Depression Screening:  3 most recent PHQ Screens 5/6/2021   Little interest or pleasure in doing things Nearly every day   Feeling down, depressed, irritable, or hopeless Not at all   Total Score PHQ 2 3   Trouble falling or staying asleep, or sleeping too much Nearly every day   Feeling tired or having little energy More than half the days   Poor appetite, weight loss, or overeating Not at all   Feeling bad about yourself - or that you are a failure or have let yourself or your family down Several days   Trouble concentrating on things such as school, work, reading, or watching TV Not at all   Moving or speaking so slowly that other people could have noticed; or the opposite being so fidgety that others notice Not at all   Thoughts of being better off dead, or hurting yourself in some way Not at all   PHQ 9 Score 9   How difficult have these problems made it for you to do your work, take care of your home and get along with others Not difficult at all       Learning Assessment:  Learning Assessment 4/22/2016   PRIMARY LEARNER Patient   HIGHEST LEVEL OF EDUCATION - PRIMARY LEARNER  -   PRIMARY LANGUAGE ENGLISH   LEARNER PREFERENCE PRIMARY LISTENING   ANSWERED BY patient   RELATIONSHIP SELF       Abuse Screening:  Abuse Screening Questionnaire 12/12/2019   Do you ever feel afraid of your partner? N   Are you in a relationship with someone who physically or mentally threatens you? N   Is it safe for you to go home? Y       Fall Risk  Fall Risk Assessment, last 12 mths 6/8/2021   Able to walk?  Yes   Fall in past 12 months? 0   Do you feel unsteady? -   Are you worried about falling -   Number of falls in past 12 months -   Fall with injury? -       ADL  ADL Assessment 12/12/2019   Feeding yourself No Help Needed   Getting from bed to chair No Help Needed   Getting dressed No Help Needed   Bathing or showering No Help Needed   Walk across the room (includes cane/walker) Help Needed   Using the telphone No Help Needed Taking your medications No Help Needed   Preparing meals No Help Needed   Managing money (expenses/bills) No Help Needed   Moderately strenuous housework (laundry) No Help Needed   Shopping for personal items (toiletries/medicines) No Help Needed   Shopping for groceries No Help Needed   Driving No Help Needed   Climbing a flight of stairs Help Needed   Getting to places beyond walking distances Help Needed       Travel Screening:    Travel Screening     Question   Response    In the last month, have you been in contact with someone who was confirmed or suspected to have Coronavirus / COVID-19? No / Unsure    Have you had a COVID-19 viral test in the last 14 days? No    Do you have any of the following new or worsening symptoms? Have you traveled internationally or domestically in the last month? No      Travel History   Travel since 05/08/21     No documented travel since 05/08/21          Health Maintenance reviewed and discussed and ordered per Provider. Health Maintenance Due   Topic Date Due    Foot Exam Q1  11/16/2019    Eye Exam Retinal or Dilated  04/27/2020   . Beni Pham presents today for   Chief Complaint   Patient presents with    Dizziness     started tuesday    Medication Refill     meloxicam and flonase       Is someone accompanying this pt? no    Is the patient using any DME equipment during OV? Yes cane    Coordination of Care:  1. Have you been to the ER, urgent care clinic since your last visit? Hospitalized since your last visit? no    2. Have you seen or consulted any other health care providers outside of the [address] [place] [address] Prashant since your last visit? Include any pap smears or colon screening.  no

## 2021-06-18 DIAGNOSIS — J01.00 SUBACUTE MAXILLARY SINUSITIS: Primary | ICD-10-CM

## 2021-06-18 RX ORDER — CEPHALEXIN 500 MG/1
500 CAPSULE ORAL 4 TIMES DAILY
Qty: 40 CAPSULE | Refills: 0 | Status: SHIPPED | OUTPATIENT
Start: 2021-06-18 | End: 2021-06-28

## 2021-06-24 ENCOUNTER — HOSPITAL ENCOUNTER (OUTPATIENT)
Dept: PREADMISSION TESTING | Age: 71
Discharge: HOME | End: 2021-06-24
Payer: MEDICARE

## 2021-06-24 ENCOUNTER — HOSPITAL ENCOUNTER (OUTPATIENT)
Dept: GENERAL RADIOLOGY | Age: 71
Discharge: HOME | End: 2021-06-24
Payer: MEDICARE

## 2021-06-24 DIAGNOSIS — Z01.818 PREOP EXAMINATION: ICD-10-CM

## 2021-06-24 LAB
ALBUMIN SERPL-MCNC: 3.8 G/DL (ref 3.4–5)
ALBUMIN/GLOB SERPL: 1 {RATIO} (ref 0.8–1.7)
ALP SERPL-CCNC: 93 U/L (ref 45–117)
ALT SERPL-CCNC: 34 U/L (ref 13–56)
ANION GAP SERPL CALC-SCNC: 4 MMOL/L (ref 3–18)
APPEARANCE UR: CLEAR
AST SERPL-CCNC: 25 U/L (ref 10–38)
BASOPHILS # BLD: 0.1 K/UL (ref 0–0.1)
BASOPHILS NFR BLD: 1 % (ref 0–2)
BILIRUB SERPL-MCNC: 0.4 MG/DL (ref 0.2–1)
BILIRUB UR QL: NEGATIVE
BUN SERPL-MCNC: 17 MG/DL (ref 7–18)
BUN/CREAT SERPL: 16 (ref 12–20)
CALCIUM SERPL-MCNC: 9.8 MG/DL (ref 8.5–10.1)
CHLORIDE SERPL-SCNC: 108 MMOL/L (ref 100–111)
CO2 SERPL-SCNC: 29 MMOL/L (ref 21–32)
COLOR UR: YELLOW
CREAT SERPL-MCNC: 1.06 MG/DL (ref 0.6–1.3)
DIFFERENTIAL METHOD BLD: ABNORMAL
EOSINOPHIL # BLD: 0.3 K/UL (ref 0–0.4)
EOSINOPHIL NFR BLD: 4 % (ref 0–5)
ERYTHROCYTE [DISTWIDTH] IN BLOOD BY AUTOMATED COUNT: 14.8 % (ref 11.6–14.5)
EST. AVERAGE GLUCOSE BLD GHB EST-MCNC: 137 MG/DL
GLOBULIN SER CALC-MCNC: 3.8 G/DL (ref 2–4)
GLUCOSE SERPL-MCNC: 90 MG/DL (ref 74–99)
GLUCOSE UR STRIP.AUTO-MCNC: NEGATIVE MG/DL
HBA1C MFR BLD: 6.4 % (ref 4.2–5.6)
HCT VFR BLD AUTO: 40.3 % (ref 35–45)
HGB BLD-MCNC: 12.2 G/DL (ref 12–16)
HGB UR QL STRIP: NEGATIVE
INR PPP: 1 (ref 0.8–1.2)
KETONES UR QL STRIP.AUTO: NEGATIVE MG/DL
LEUKOCYTE ESTERASE UR QL STRIP.AUTO: NEGATIVE
LYMPHOCYTES # BLD: 1.7 K/UL (ref 0.9–3.6)
LYMPHOCYTES NFR BLD: 24 % (ref 21–52)
MCH RBC QN AUTO: 27.6 PG (ref 24–34)
MCHC RBC AUTO-ENTMCNC: 30.3 G/DL (ref 31–37)
MCV RBC AUTO: 91.2 FL (ref 74–97)
MONOCYTES # BLD: 0.6 K/UL (ref 0.05–1.2)
MONOCYTES NFR BLD: 9 % (ref 3–10)
NEUTS SEG # BLD: 4.3 K/UL (ref 1.8–8)
NEUTS SEG NFR BLD: 62 % (ref 40–73)
NITRITE UR QL STRIP.AUTO: NEGATIVE
PH UR STRIP: 5.5 [PH] (ref 5–8)
PLATELET # BLD AUTO: 284 K/UL (ref 135–420)
PMV BLD AUTO: 10.6 FL (ref 9.2–11.8)
POTASSIUM SERPL-SCNC: 4.8 MMOL/L (ref 3.5–5.5)
PROT SERPL-MCNC: 7.6 G/DL (ref 6.4–8.2)
PROT UR STRIP-MCNC: NEGATIVE MG/DL
PROTHROMBIN TIME: 13.1 SEC (ref 11.5–15.2)
RBC # BLD AUTO: 4.42 M/UL (ref 4.2–5.3)
SODIUM SERPL-SCNC: 141 MMOL/L (ref 136–145)
SP GR UR REFRACTOMETRY: 1.02 (ref 1–1.03)
UROBILINOGEN UR QL STRIP.AUTO: 0.2 EU/DL (ref 0.2–1)
WBC # BLD AUTO: 7 K/UL (ref 4.6–13.2)

## 2021-06-24 PROCEDURE — 85610 PROTHROMBIN TIME: CPT

## 2021-06-24 PROCEDURE — 93005 ELECTROCARDIOGRAM TRACING: CPT

## 2021-06-24 PROCEDURE — 36415 COLL VENOUS BLD VENIPUNCTURE: CPT

## 2021-06-24 PROCEDURE — 85025 COMPLETE CBC W/AUTO DIFF WBC: CPT

## 2021-06-24 PROCEDURE — 71046 X-RAY EXAM CHEST 2 VIEWS: CPT

## 2021-06-24 PROCEDURE — 83036 HEMOGLOBIN GLYCOSYLATED A1C: CPT

## 2021-06-24 PROCEDURE — 80053 COMPREHEN METABOLIC PANEL: CPT

## 2021-06-24 PROCEDURE — 86901 BLOOD TYPING SEROLOGIC RH(D): CPT

## 2021-06-24 PROCEDURE — 81003 URINALYSIS AUTO W/O SCOPE: CPT

## 2021-06-25 LAB
ATRIAL RATE: 91 BPM
CALCULATED P AXIS, ECG09: 42 DEGREES
CALCULATED R AXIS, ECG10: 6 DEGREES
CALCULATED T AXIS, ECG11: 46 DEGREES
DIAGNOSIS, 93000: NORMAL
P-R INTERVAL, ECG05: 152 MS
Q-T INTERVAL, ECG07: 332 MS
QRS DURATION, ECG06: 68 MS
QTC CALCULATION (BEZET), ECG08: 408 MS
VENTRICULAR RATE, ECG03: 91 BPM

## 2021-06-25 RX ORDER — TRAMADOL HYDROCHLORIDE 50 MG/1
50 TABLET ORAL
COMMUNITY
End: 2021-07-03

## 2021-06-25 NOTE — PERIOP NOTES
PRE-SURGICAL INSTRUCTIONS        Patient's Name:  Gabriele JAVIER Date:  6/25/2021              Surgery Date:  7/2/2021                1. Do NOT eat or drink anything, including candy, gum, or ice chips after midnight on 07/01/2021, unless you have specific instructions from your surgeon or anesthesia provider to do so.  2. You may brush your teeth before coming to the hospital.  3. No smoking 24 hours prior to the day of surgery. 4. No alcohol 24 hours prior to the day of surgery. 5. No recreational drugs for one week prior to the day of surgery. 6. Leave all valuables, including money/purse, at home. 7. Remove all jewelry, nail polish, acrylic nails, and makeup (including mascara); no lotions powders, deodorant, or perfume/cologne/after shave on the skin. 8. Glasses/contact lenses and dentures may be worn to the hospital.  They will be removed prior to surgery. 9. Call your doctor if symptoms of a cold or illness develop within 24-48 hours prior to your surgery. 10.  AN ADULT MUST DRIVE YOU HOME AFTER OUTPATIENT SURGERY. 11.  If you are having an outpatient procedure, please make arrangements for a responsible adult to be with you for 24 hours after your surgery. 12.  NO VISITORS in the hospital at this time for outpatient procedures. Exceptions may be made for surgical admissions, per nursing unit guidelines      Special Instructions:      Bring list of CURRENT medications. Bring inhaler. Bring any pertinent legal medical records. Take these medications the morning of surgery with a sip of water:  Synthroid  Follow physician instructions about stopping anticoagulants. On the day of surgery, come in the main entrance of [facility]. Let the  at the desk know you are there for surgery. A staff member will come escort you to the surgical area on the second floor.     If you have any questions or concerns, please do not hesitate to call:     (Prior to the day of surgery) [facility] department:  [phone]   (Day of surgery) Pre-Op department:  [phone]    These surgical instructions were reviewed with patient during the [facility] phone call.

## 2021-06-29 ENCOUNTER — OFFICE VISIT (OUTPATIENT)
Dept: ORTHOPEDIC SURGERY | Age: 71
End: 2021-06-29

## 2021-06-29 VITALS
SYSTOLIC BLOOD PRESSURE: 131 MMHG | HEIGHT: 68 IN | HEART RATE: 87 BPM | RESPIRATION RATE: 16 BRPM | DIASTOLIC BLOOD PRESSURE: 80 MMHG | WEIGHT: 232 LBS | BODY MASS INDEX: 35.16 KG/M2 | TEMPERATURE: 96.8 F | OXYGEN SATURATION: 94 %

## 2021-06-29 DIAGNOSIS — M12.812 ROTATOR CUFF ARTHROPATHY OF LEFT SHOULDER: Primary | ICD-10-CM

## 2021-06-29 RX ORDER — OXYCODONE HYDROCHLORIDE 5 MG/1
5 TABLET ORAL
Qty: 40 TABLET | Refills: 0 | Status: SHIPPED | OUTPATIENT
Start: 2021-06-29 | End: 2021-07-06

## 2021-06-29 RX ORDER — PREDNISONE 2.5 MG/1
7.5 TABLET ORAL DAILY
Qty: 10 TABLET | Refills: 0 | Status: SHIPPED | OUTPATIENT
Start: 2021-06-29 | End: 2021-08-27

## 2021-06-29 NOTE — PATIENT INSTRUCTIONS
Prednisone dosage: Take 7.5 mg on June 29, 7.5 mg on June 30, 5 mg on July 1, 5 mg July 2 prior to arriving to hospital      Dr. Jaz Manriquez Total and Reverse Total Shoulder Information    What is the surgery? - This is an inpatient procedure done at [facility] will be completely asleep for procedure. Dr. Jaz Manriquez will make an incision in the front of your shoulder and replace the joint itself   - Total surgery takes about an hour and half   - You will then spend the night to receive prophylactic antibiotics and pain medication if needed. The goal is to send you home the following day and have you begin outpatient physical therapy about 3 days after you are discharged to home    What can you expect after surgery? - You will have a waterproof dressing on your shoulder following surgery. You will be able to shower 2 days after surgery but no soaking in a bath, hot tub, ocean or pool x 2 weeks to allow for full wound healing  - You will be in a sling for 4 weeks. You will wear this sling whenever you are active or up moving around. This sling is to keep you from moving your arm on your own. You are essentially one armed until you are out of your sling. This means no reaching, pulling, grabbing or lifting with the operative arm. It is ok for you to remove your sling when you are sitting in a chair or you are in bed  - Dr. Jaz Manriquez will start physical therapy for you a few days after you are discharged from the hospital. While you cannot move your arm we allow physical therapy to gently move your shoulder. We call this passive range of motion. The goal of this is to decrease your stiffness and in turn decrease your post operative pain. - Plan on being in physical therapy for 10-12 weeks    When can I return to work? - Most patients return to desk work only after 2 weeks.  You are able to type but there is no overhead work or lifting  - You will start some gentle lifting up to 5-10lbs at about 8-10 weeks post operatively. You will gradually increase how much you are able to lift after this point under the guidance of Dr. Arslan Noel, his physician assistant and physical therapy. - At 6 months you are able to do all activities as tolerated but it may take you a full 9-12 months to fully recover from your surgery    Not all shoulder replacements are the same. The specifics of your individual case will be discussed at length with you by Dr. Arslan Noel and his Physician Assistant. [facility]  Surgical Coordinator  [address] [place] [address]. [place].  [address] [place] [address], [facility] [email or web address]  P: [phone]  F: [phone]

## 2021-06-29 NOTE — H&P
HISTORY AND PHYSICAL          Patient: Zane Love                MRN: [id number]       SSN: [id number]  YOB: 1950          AGE: 70 y.o. SEX: female      Patient scheduled for: left reverse total shoulder arthroplasty  Surgeon: Mars Alvarez MD    ANESTHESIA TYPE:  General    HISTORY:     The patient was seen in the office today for a preoperative history and physical for an upcoming above listed surgery. The patient is a pleasant 70 y.o. female who has a history of right shoulder pain. Pain has been present for awhile. Pain with certain movements, turning in certain directions. Had previous cortisone injection. Pt states the left shoulder pain started around February 2020. She reports a hx of a torn left rotator cuff years ago, had surgery to repair it. She notes hx of right shoulder replacement surgery around 2017 by Dr. Lourdes Templeton. Pt presents today ambulating with a cane. Pain level is a 7/10. Due to the current findings, affected activity of daily living and continued pain and discomfort, surgical intervention is indicated. The alternatives, risks, and complications, including but not limited to infection, blood loss, need for blood transfusion, neurovascular damage, erika-incisional numbness, subcutaneous hematoma, bone fracture, anesthetic complications, DVT, PE, death, RSD, postoperative stiffness and pain, possible surgical scar, delayed healing and nonhealing, reflexive sympathetic dystrophy, damage to blood vessels and nerves, need for more surgery, MI, and stroke,  failure of hardware, gait disturbances,have been discussed. The patient understands and wishes to proceed with surgery.      PAST MEDICAL HISTORY:     Past Medical History:   Diagnosis Date    Ankle fracture     Aortic stenosis     S/P AVR in 2016    Asthma     Carpal tunnel syndrome on both sides     Chronic obstructive pulmonary disease (HCC)     Chronic pain     back pain    COVID-19 vaccine series completed 04/29/2021    Diabetes [facility]     Diverticulitis     Fibromyalgia     H/O aortic valve replacement 03/2016    21 mm bovine pericardial bioprosthesis and epiaortic scanning      H/O: HTN (hypertension)     no meds now- sp valve replacement    Hypothyroidism     Lumbar post-laminectomy syndrome     Menopause     Obesity     Osteoarthritis     Psychotic disorder ([facility]     Pulmonary HTN ([facility] [address]     per pcp note cc- patient denies.  Sciatica     Skin cancer 2013    right forearm. CURRENT MEDICATIONS:     Current Outpatient Medications   Medication Sig Dispense Refill    predniSONE (DELTASONE) 2.5 mg tablet Take 3 Tablets by mouth daily. Take 7.5 mg on June 29, 7.5 mg on June 30, 5 mg on July 1, 5 mg July 2 prior to arriving to hospital 10 Tablet 0    oxyCODONE IR (ROXICODONE) 5 mg immediate release tablet Take 1 Tablet by mouth every four (4) hours as needed for Pain for up to 7 days. Max Daily Amount: 30 mg. DO NOT TAKE UNTIL AFTER SURGERY (for acute post operative pain) 40 Tablet 0    traMADoL (ULTRAM) 50 mg tablet Take 50 mg by mouth every six (6) hours as needed for Pain.  fluticasone propionate (FLONASE) 50 mcg/actuation nasal spray Use 1 spray(s) in each nostril twice daily 16 g 4    tiaGABine (GabitriL) 2 mg tablet Take 1 Tab by mouth nightly. 90 Tab 0    levothyroxine (SYNTHROID) 50 mcg tablet TAKE 1 TABLET BY MOUTH ONCE DAILY BEFORE BREAKFAST 90 Tab 3    pantoprazole (PROTONIX) 20 mg tablet Take 1 tablet by mouth once daily 90 Tab 3    desonide (TRIDESILON) 0.05 % cream APPLY   EXTERNALLY TO AFFECTED AREA TWICE DAILY 15 g 0    metFORMIN (GLUCOPHAGE) 500 mg tablet TAKE 1 TABLET BY MOUTH ONCE DAILY WITH SUPPER 90 Tab 3    FLUoxetine (PROzac) 10 mg capsule Take 1 Cap by mouth daily.  90 Cap 3    atorvastatin (LIPITOR) 20 mg tablet Take 1 tablet by mouth once daily 90 Tab 4    glucose blood VI test strips (True Metrix Pro Test Strip) strip Test blood glucose 3 times a day [address] [facility] 2.5-2.5 mcg/actuation inhaler Take 2 Puffs by inhalation daily. 1    cetirizine (ZYRTEC) 10 mg tablet TAKE 1 TABLET BY MOUTH EVERY DAY. GENERIC FOR ZYRTEC 90 Tab 2    VENTOLIN HFA 90 mcg/actuation inhaler 2 Puffs by Aerosolization route every four (4) hours as needed. 0    B.infantis-B.ani-B.long-B.bifi (PROBIOTIC 4X) 10-15 mg TbEC Take  by mouth daily.  Lancets (ACCU-CHEK SOFTCLIX LANCETS) misc Please use one lancet to test blood sugars twice a day. 1 Package 20    BLOOD-GLUCOSE METER (ACCU-CHEK MARCE MONITORING) by Does Not Apply route.  meclizine (ANTIVERT) 25 mg tablet Take 1 Tablet by mouth three (3) times daily as needed for Dizziness. 60 Tablet 3    meloxicam (MOBIC) 15 mg tablet Take 1 tablet by mouth once daily 90 Tablet 0    vitamin E acetate (VITAMIN E PO) Take  by mouth daily.  cyanocobalamin (VITAMIN B-12) 500 mcg tablet Take 500 mcg by mouth daily.  Cholecalciferol, Vitamin D3, (VITAMIN D3) 1,000 unit cap Take 1,000 Units by mouth daily. ALLERGIES:     Allergies   Allergen Reactions    Latex, Natural Rubber Hives    Adhesive Rash     Some bandaids    Ciprofloxacin Rash    Codeine Nausea and Vomiting    Demerol [Meperidine] Nausea and Vomiting    Diclofenac Shortness of Breath and Palpitations    Erythromycin Rash    Levaquin [Levofloxacin] Rash    Loratadine Swelling    Morphine Nausea and Vomiting    Penicillin G Hives and Rash     Does fine with Keflex    Sulfa (Sulfonamide Antibiotics) Itching    Vicodin [Hydrocodone-Acetaminophen] Other (comments)     Metallic taste in mouth         SURGICAL HISTORY:     Past Surgical History:   Procedure Laterality Date    COLONOSCOPY      COLONOSCOPY N/A 9/15/2017    COLONOSCOPY performed by Sumeet Hicks MD at [address] [place] [address] Left 03/2021    HX CATARACT REMOVAL Right 1995    lens  replaced.      HX CATARACT REMOVAL Left 02/2018    HX COLONOSCOPY 09/15/2017    dr. Crys Orozco  f/u 5 years.  HX HEENT  1990's    sinus surgery     HX HEENT Right 11/02/2017    laser for right eye cataracts.  HX LUMBAR FUSION  2004    L3-L4-L5    HX LUMBAR LAMINECTOMY  2002    HX MOHS PROCEDURES Left 1990    HX ORTHOPAEDIC      Rotator cuff Bilateral    HX SHOULDER REPLACEMENT Right 03/29/2017    reverse.  [place] CARDIAC SURG PROCEDURE UNLIST  03/2016    aortic valve replacement.  [place] CHEST SURGERY PROCEDURE UNLISTED  03/2016    Open Heart Surgery    [place] COLSC FLX W/RMVL OF TUMOR POLYP LESION SNARE TQ  07/09/2014 repeat in 3 years    Dr. Hero Pena  2016    Aortic valve replacement(bovine)       SOCIAL HISTORY:     Social History     Socioeconomic History    Marital status:      Spouse name: Not on file    Number of children: Not on file    Years of education: Not on file    Highest education level: Not on file   Tobacco Use    Smoking status: Former Smoker     Years: 45.00     Types: Cigarettes     Quit date: 7/1/2011     Years since quitting: 10.0    Smokeless tobacco: Never Used   Vaping Use    Vaping Use: Never used   Substance and Sexual Activity    Alcohol use: No    Drug use: No    Sexual activity: Never     Social Determinants of Health     Financial Resource Strain:     Difficulty of Paying Living Expenses:    Food Insecurity:     Worried About Running Out of Food in the Last Year:     [address] in the Last Year:    Transportation Needs:     Lack of Transportation (Medical):      Lack of Transportation (Non-Medical):    Physical Activity:     Days of Exercise per Week:     Minutes of Exercise per Session:    Stress:     Feeling of Stress :    Social Connections:     Frequency of Communication with Friends and Family:     Frequency of Social Gatherings with Friends and Family:     Attends [employer] Services:     Active Member of Clubs or Organizations:     Attends Club or Organization Meetings:     Marital Status:        FAMILY HISTORY:     Family History   Problem Relation Age of Onset    Hypertension Father     Heart Disease Father     Alcohol abuse Father        REVIEW OF SYSTEMS:     Negative for fevers, chills, chest pain, shortness of breath, weight loss, recent illness     General: Negative for fever and chills. No unexpected change in weight. Denies fatigue. No change in appetite. Skin: Negative for rash or itching. HEENT: Negative for congestion, sore throat, neck pain and neck stiffness. No change in vision or hearing. Hasn't noted any enlarged lymph nodes in the neck. Cardiovascular:  Negative for chest pain and palpitations. Has not noted pedal edema. Respiratory: Negative for cough, colds, sinus, hemoptysis, shortness of breath and wheezing. Gastrointestinal: Negative for nausea and vomiting, rectal bleeding, coffee ground emesis, abdominal pain, diarrhea and constipation. Genitourinary: Negative for dysuria, frequency urgency, or burning on micturition. No flank pain, no foul smelling urine, no difficulty with initiating urination. Hematological: Negative for bleeding or easy bruising. Musculoskeletal: Negative  for arthralgias, back pain or neck pain. Neurological: Negative for dizziness, seizures or syncopal episodes. Denies headaches. Endocrine: Denies excessive thirst.  No heat/cold intolerance. Psychiatric: Negative for depression or insomnia. PHYSICAL EXAMINATION:     VITALS:   Visit Vitals  /80 (BP 1 Location: Right upper arm, BP Patient Position: Sitting, BP Cuff Size: Large adult)   Pulse 87   Temp 96.8 °F (36 °C) (Temporal)   Resp 16   Ht 5' 8\" (1.727 m)   Wt 232 lb (105.2 kg)   SpO2 94%   BMI 35.28 kg/m²     GEN:  Well developed, well nourished 70 y.o. female in no acute distress. HEENT: Normocephalic and atraumatic. Eyes: Conjunctivae and EOM are normal.Pupils are equal, round, and reactive to light.  External ear normal appearance, external nose normal appearing. Mouth/Throat: Oropharynx is clear and moist, able to handle oral secretions w/out difficulty, airway patent  NECK: Supple. Normal ROM, No lymphadenopathy. Trachea is midline. No bruising, swelling or deformity  RESP: Clear to auscultation bilaterally. No wheezes, rales, rhonchi. Normal effort and breath sounds. No respiratory distress  CARDIO:  Normal rate, regular rhythm and normal heart sounds. No MGR. ABDOMEN: Soft, non-tender, non-distended, normoactive bowel sounds in all four quadrants. There is no tenderness. There is no rebound and no guarding. BACK: No CVA or spinal tenderness  BREAST:  Deferred  PELVIC:    Deferred   RECTAL:  Deferred   :           Deferred  EXTREMITIES: EXAMINATION OF: left shoulder  Examination Left shoulder   Skin Intact   AC joint tenderness -   Biceps tenderness -   Forward flexion/Elevation ROM  120   Active abduction ROM  110   Glenohumeral abduction  80   External rotation ROM  30   Internal rotation ROM  30   Apprehension -   Sonnys Relocation -   Jerk -   Load and Shift -   Obriens -   Speeds -   Impingement sign  +   Supraspinatus/Empty Can  + 5   External Rotation Strength -, 5/5   Lift Off/Belly Press -, 5/5   Neurovascular Intact           NEUROVASCULAR: Sensation intact to light touch and strength grossly intact and symmetrical. No nystagmus. Positive distal pulses and capillary refill. DVT ASSESSMENT:  There is not  calf tenderness. No evidence of DVT seen on physical exam.  MOTOR: In tact  PSYCH: Alert an oriented to person, place and time.  Mood, memory, affect, behavior and judgment normal       RADIOGRAPHS & DIAGNOSTIC STUDIES:     MRI/xray reveals :   IMAGING: XR of left shoulder with 3 views obtained in the office dated 5/03/2021 was reviewed and read by Dr. Fifi Ortega: Marked degenerative arthritis glenohumeral joint with a large inferior spur           MRI of left shoulder dated 7/10/2020 was reviewed and read by Dr. Fifi Ortega: IMPRESSION:   1. Tendinosis of the left shoulder rotator cuff with small area of partial-thickness articular sided tearing involving the anterior fibers of the supraspinatus tendon.     > Normal rotator cuff muscle bulk and signal.  2. Advanced glenohumeral joint chondrosis and osteoarthritis with small joint effusion. 3. Prior acromioplasty without significant subacromial impingement morphology present. 4. Small quantity of fluid within the subacromial-subdeltoid bursa, as can be seen with symptoms referrable to bursitis.          XR of left shoulder from Dr. Nadir Lee office dated 6/16/2020 was reviewed and read by Dr. Apple Negrete: Proximal migration of the humeral head with degenerative changes of the glenohumeral joint        LABS:       @  CBC:   Lab Results   Component Value Date/Time    WBC 7.0 06/24/2021 01:19 PM    RBC 4.42 06/24/2021 01:19 PM    HGB 12.2 06/24/2021 01:19 PM    HCT 40.3 06/24/2021 01:19 PM    PLATELET 669 59/28/8755 01:19 PM    and BMP:   Lab Results   Component Value Date/Time    Glucose 90 06/24/2021 01:19 PM    Sodium 141 06/24/2021 01:19 PM    Potassium 4.8 06/24/2021 01:19 PM    Chloride 108 06/24/2021 01:19 PM    CO2 29 06/24/2021 01:19 PM    BUN 17 06/24/2021 01:19 PM    Creatinine 1.06 06/24/2021 01:19 PM    Calcium 9.8 06/24/2021 01:19 PM   @    Preoperative labs were reviewed and are substantially within normal limits   Ekg, urinalysis and chest xray in chart. ASSESSMENT:       Encounter Diagnosis   Name Primary?  Rotator cuff arthropathy of left shoulder Yes       PLAN:     Again, the alternatives, risks, and complications, as well as expected outcome were discussed. The patient understands and agrees to proceed with left reverse total shoulder arthroplasty pending medical clearance. She has been cleared by cardiology and pulmonology.  The patient was counseled at length about the risks of viviane Covid-19 during their perioperative period and any recovery window from their procedure. The patient was made aware that viviane Covid-19  may worsen their prognosis for recovering from their procedure and lend to a higher morbidity and/or mortality risk. All material risks, benefits, and reasonable alternatives including postponing the procedure were discussed. The patient does  wish to proceed with the procedure at this time.    Patient given orders listed below:    Orders Placed This Encounter    [facility]    predniSONE (DELTASONE) 2.5 mg tablet    oxyCODONE IR (ROXICODONE) 5 mg immediate release tablet         Nicolás Stevenson PA-C  6/29/2021  1:58 PM

## 2021-06-30 ENCOUNTER — DOCUMENTATION ONLY (OUTPATIENT)
Dept: FAMILY MEDICINE CLINIC | Age: 71
End: 2021-06-30

## 2021-06-30 ENCOUNTER — OFFICE VISIT (OUTPATIENT)
Dept: FAMILY MEDICINE CLINIC | Age: 71
End: 2021-06-30
Payer: MEDICARE

## 2021-06-30 VITALS
HEART RATE: 89 BPM | HEIGHT: 68 IN | BODY MASS INDEX: 34.86 KG/M2 | WEIGHT: 230 LBS | DIASTOLIC BLOOD PRESSURE: 76 MMHG | TEMPERATURE: 97.6 F | RESPIRATION RATE: 16 BRPM | SYSTOLIC BLOOD PRESSURE: 138 MMHG

## 2021-06-30 DIAGNOSIS — Z01.818 PRE-OP EXAM: Primary | ICD-10-CM

## 2021-06-30 DIAGNOSIS — M19.012 ARTHRITIS OF LEFT SHOULDER REGION: ICD-10-CM

## 2021-06-30 DIAGNOSIS — F33.41 RECURRENT MAJOR DEPRESSIVE DISORDER, IN PARTIAL REMISSION (HCC): ICD-10-CM

## 2021-06-30 DIAGNOSIS — E11.65 TYPE 2 DIABETES MELLITUS WITH HYPERGLYCEMIA, WITHOUT LONG-TERM CURRENT USE OF INSULIN (HCC): ICD-10-CM

## 2021-06-30 DIAGNOSIS — J44.9 CHRONIC OBSTRUCTIVE PULMONARY DISEASE, UNSPECIFIED COPD TYPE (HCC): ICD-10-CM

## 2021-06-30 PROCEDURE — 2022F DILAT RTA XM EVC RTNOPTHY: CPT | Performed by: FAMILY MEDICINE

## 2021-06-30 PROCEDURE — G9717 DOC PT DX DEP/BP F/U NT REQ: HCPCS | Performed by: FAMILY MEDICINE

## 2021-06-30 PROCEDURE — G8417 CALC BMI ABV UP PARAM F/U: HCPCS | Performed by: FAMILY MEDICINE

## 2021-06-30 PROCEDURE — 1090F PRES/ABSN URINE INCON ASSESS: CPT | Performed by: FAMILY MEDICINE

## 2021-06-30 PROCEDURE — G9899 SCRN MAM PERF RSLTS DOC: HCPCS | Performed by: FAMILY MEDICINE

## 2021-06-30 PROCEDURE — G8399 PT W/DXA RESULTS DOCUMENT: HCPCS | Performed by: FAMILY MEDICINE

## 2021-06-30 PROCEDURE — 1101F PT FALLS ASSESS-DOCD LE1/YR: CPT | Performed by: FAMILY MEDICINE

## 2021-06-30 PROCEDURE — 3017F COLORECTAL CA SCREEN DOC REV: CPT | Performed by: FAMILY MEDICINE

## 2021-06-30 PROCEDURE — G8536 NO DOC ELDER MAL SCRN: HCPCS | Performed by: FAMILY MEDICINE

## 2021-06-30 PROCEDURE — 3044F HG A1C LEVEL LT 7.0%: CPT | Performed by: FAMILY MEDICINE

## 2021-06-30 PROCEDURE — 99214 OFFICE O/P EST MOD 30 MIN: CPT | Performed by: FAMILY MEDICINE

## 2021-06-30 PROCEDURE — G8427 DOCREV CUR MEDS BY ELIG CLIN: HCPCS | Performed by: FAMILY MEDICINE

## 2021-06-30 NOTE — PROGRESS NOTES
Pre-op clearance was completed per provider and faxed to [address] [place] [address] @ [phone]. Fax confirmation received has been sent to central scanning. Encounter closed.
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
## 2021-07-01 ENCOUNTER — ANESTHESIA EVENT (OUTPATIENT)
Dept: SURGERY | Age: 71
DRG: 483 | End: 2021-07-01
Payer: MEDICARE

## 2021-07-02 ENCOUNTER — TELEPHONE (OUTPATIENT)
Dept: ORTHOPEDIC SURGERY | Age: 71
End: 2021-07-02

## 2021-07-02 ENCOUNTER — ANESTHESIA (OUTPATIENT)
Dept: SURGERY | Age: 71
DRG: 483 | End: 2021-07-02
Payer: MEDICARE

## 2021-07-02 ENCOUNTER — HOSPITAL ENCOUNTER (INPATIENT)
Age: 71
LOS: 1 days | Discharge: HOME | DRG: 483 | End: 2021-07-03
Attending: ORTHOPAEDIC SURGERY | Admitting: ORTHOPAEDIC SURGERY
Payer: MEDICARE

## 2021-07-02 DIAGNOSIS — M75.102 LEFT ROTATOR CUFF TEAR ARTHROPATHY: ICD-10-CM

## 2021-07-02 DIAGNOSIS — M12.812 LEFT ROTATOR CUFF TEAR ARTHROPATHY: ICD-10-CM

## 2021-07-02 LAB
ABO + RH BLD: NORMAL
ABO + RH BLD: NORMAL
BLOOD BANK CMNT PATIENT-IMP: NORMAL
BLOOD GROUP ANTIBODIES SERPL: NORMAL
BLOOD GROUP ANTIBODIES SERPL: NORMAL
GLUCOSE BLD STRIP.AUTO-MCNC: 115 MG/DL (ref 70–110)
GLUCOSE BLD STRIP.AUTO-MCNC: 121 MG/DL (ref 70–110)
GLUCOSE BLD STRIP.AUTO-MCNC: 159 MG/DL (ref 70–110)
SPECIMEN EXP DATE BLD: NORMAL
SPECIMEN EXP DATE BLD: NORMAL

## 2021-07-02 PROCEDURE — 74011000250 HC RX REV CODE- 250: Performed by: NURSE ANESTHETIST, CERTIFIED REGISTERED

## 2021-07-02 PROCEDURE — 99100 ANES PT EXTEME AGE<1 YR&>70: CPT | Performed by: NURSE ANESTHETIST, CERTIFIED REGISTERED

## 2021-07-02 PROCEDURE — C1776 JOINT DEVICE (IMPLANTABLE): HCPCS | Performed by: ORTHOPAEDIC SURGERY

## 2021-07-02 PROCEDURE — 82962 GLUCOSE BLOOD TEST: CPT

## 2021-07-02 PROCEDURE — 77030003598 HC NDL MULT/FIRE ARTH -C: Performed by: ORTHOPAEDIC SURGERY

## 2021-07-02 PROCEDURE — 77030031367: Performed by: ORTHOPAEDIC SURGERY

## 2021-07-02 PROCEDURE — C9290 INJ, BUPIVACAINE LIPOSOME: HCPCS | Performed by: ORTHOPAEDIC SURGERY

## 2021-07-02 PROCEDURE — 23472 RECONSTRUCT SHOULDER JOINT: CPT | Performed by: PHYSICIAN ASSISTANT

## 2021-07-02 PROCEDURE — 99222 1ST HOSP IP/OBS MODERATE 55: CPT | Performed by: INTERNAL MEDICINE

## 2021-07-02 PROCEDURE — 77030013708 HC HNDPC SUC IRR PULS STRY –B: Performed by: ORTHOPAEDIC SURGERY

## 2021-07-02 PROCEDURE — 97530 THERAPEUTIC ACTIVITIES: CPT

## 2021-07-02 PROCEDURE — 77030003666 HC NDL SPINAL BD -A: Performed by: ORTHOPAEDIC SURGERY

## 2021-07-02 PROCEDURE — 74011250636 HC RX REV CODE- 250/636: Performed by: NURSE ANESTHETIST, CERTIFIED REGISTERED

## 2021-07-02 PROCEDURE — 77030026438 HC STYL ET INTUB CARD -A: Performed by: ANESTHESIOLOGY

## 2021-07-02 PROCEDURE — 77030002933 HC SUT MCRYL J&J -A: Performed by: ORTHOPAEDIC SURGERY

## 2021-07-02 PROCEDURE — 77030039266 HC ADH SKN EXOFIN S2SG -A: Performed by: ORTHOPAEDIC SURGERY

## 2021-07-02 PROCEDURE — 77030006835 HC BLD SAW SAG STRY -B: Performed by: ORTHOPAEDIC SURGERY

## 2021-07-02 PROCEDURE — 74011250636 HC RX REV CODE- 250/636: Performed by: ANESTHESIOLOGY

## 2021-07-02 PROCEDURE — 01638 ANES OPN/ARTHR TOT SHO RPLCM: CPT | Performed by: NURSE ANESTHETIST, CERTIFIED REGISTERED

## 2021-07-02 PROCEDURE — 74011250636 HC RX REV CODE- 250/636: Performed by: ORTHOPAEDIC SURGERY

## 2021-07-02 PROCEDURE — 74011250637 HC RX REV CODE- 250/637: Performed by: PHYSICIAN ASSISTANT

## 2021-07-02 PROCEDURE — 74011250637 HC RX REV CODE- 250/637: Performed by: NURSE ANESTHETIST, CERTIFIED REGISTERED

## 2021-07-02 PROCEDURE — 0RRK00Z REPLACEMENT OF LEFT SHOULDER JOINT WITH REVERSE BALL AND SOCKET SYNTHETIC SUBSTITUTE, OPEN APPROACH: ICD-10-PCS | Performed by: ORTHOPAEDIC SURGERY

## 2021-07-02 PROCEDURE — 76010000133 HC OR TIME 3 TO 3.5 HR: Performed by: ORTHOPAEDIC SURGERY

## 2021-07-02 PROCEDURE — 86901 BLOOD TYPING SEROLOGIC RH(D): CPT

## 2021-07-02 PROCEDURE — 2709999900 HC NON-CHARGEABLE SUPPLY: Performed by: ORTHOPAEDIC SURGERY

## 2021-07-02 PROCEDURE — 76210000000 HC OR PH I REC 2 TO 2.5 HR: Performed by: ORTHOPAEDIC SURGERY

## 2021-07-02 PROCEDURE — 65270000029 HC RM PRIVATE

## 2021-07-02 PROCEDURE — 01638 ANES OPN/ARTHR TOT SHO RPLCM: CPT | Performed by: ANESTHESIOLOGY

## 2021-07-02 PROCEDURE — 77030002922 HC SUT FBRWRE ARTH -B: Performed by: ORTHOPAEDIC SURGERY

## 2021-07-02 PROCEDURE — 77030013079 HC BLNKT BAIR HGGR 3M -A: Performed by: ANESTHESIOLOGY

## 2021-07-02 PROCEDURE — 76942 ECHO GUIDE FOR BIOPSY: CPT | Performed by: ANESTHESIOLOGY

## 2021-07-02 PROCEDURE — 94640 AIRWAY INHALATION TREATMENT: CPT

## 2021-07-02 PROCEDURE — 76060000037 HC ANESTHESIA 3 TO 3.5 HR: Performed by: ORTHOPAEDIC SURGERY

## 2021-07-02 PROCEDURE — 74011000258 HC RX REV CODE- 258: Performed by: ORTHOPAEDIC SURGERY

## 2021-07-02 PROCEDURE — 77030019605: Performed by: ORTHOPAEDIC SURGERY

## 2021-07-02 PROCEDURE — 23472 RECONSTRUCT SHOULDER JOINT: CPT | Performed by: ORTHOPAEDIC SURGERY

## 2021-07-02 PROCEDURE — 64450 NJX AA&/STRD OTHER PN/BRANCH: CPT | Performed by: ANESTHESIOLOGY

## 2021-07-02 PROCEDURE — 2709999900 HC NON-CHARGEABLE SUPPLY

## 2021-07-02 PROCEDURE — 94761 N-INVAS EAR/PLS OXIMETRY MLT: CPT

## 2021-07-02 PROCEDURE — C1713 ANCHOR/SCREW BN/BN,TIS/BN: HCPCS | Performed by: ORTHOPAEDIC SURGERY

## 2021-07-02 PROCEDURE — 97161 PT EVAL LOW COMPLEX 20 MIN: CPT

## 2021-07-02 PROCEDURE — 77030026091 HC NDL SUT TAPR HMRK -A: Performed by: ORTHOPAEDIC SURGERY

## 2021-07-02 PROCEDURE — 77030040922 HC BLNKT HYPOTHRM STRY -A: Performed by: ORTHOPAEDIC SURGERY

## 2021-07-02 PROCEDURE — 77030040361 HC SLV COMPR DVT MDII -B: Performed by: ORTHOPAEDIC SURGERY

## 2021-07-02 PROCEDURE — 77030008683 HC TU ET CUF COVD -A: Performed by: ANESTHESIOLOGY

## 2021-07-02 PROCEDURE — 77030012407 HC DRN WND BARD -B: Performed by: ORTHOPAEDIC SURGERY

## 2021-07-02 PROCEDURE — 77030031139 HC SUT VCRL2 J&J -A: Performed by: ORTHOPAEDIC SURGERY

## 2021-07-02 PROCEDURE — 99100 ANES PT EXTEME AGE<1 YR&>70: CPT | Performed by: ANESTHESIOLOGY

## 2021-07-02 PROCEDURE — 74011000250 HC RX REV CODE- 250: Performed by: INTERNAL MEDICINE

## 2021-07-02 PROCEDURE — 74011250636 HC RX REV CODE- 250/636: Performed by: PHYSICIAN ASSISTANT

## 2021-07-02 PROCEDURE — 77030020268 HC MISC GENERAL SUPPLY: Performed by: ORTHOPAEDIC SURGERY

## 2021-07-02 PROCEDURE — 77030010507 HC ADH SKN DERMBND J&J -B: Performed by: ORTHOPAEDIC SURGERY

## 2021-07-02 DEVICE — COMPONENT SHLDR REVERSED S4: Type: IMPLANTABLE DEVICE | Site: SHOULDER | Status: FUNCTIONAL

## 2021-07-02 DEVICE — SPHERE GLEN DIA36MM +4 BASEPLT 24MM SHLDR LAT TAPR MOD UNIV: Type: IMPLANTABLE DEVICE | Site: SHOULDER | Status: FUNCTIONAL

## 2021-07-02 DEVICE — SCREW BONE L16MM DIA4.5MM PERIPH NONLOCKING FOR MOD GLEN SYS: Type: IMPLANTABLE DEVICE | Site: SHOULDER | Status: FUNCTIONAL

## 2021-07-02 DEVICE — LINER HUM SM DIA36MM +6MM OFFSET SHLDR UHMWPE UNIVERS: Type: IMPLANTABLE DEVICE | Site: SHOULDER | Status: FUNCTIONAL

## 2021-07-02 DEVICE — BASEPLATE GLEN 24MM SHLDR MOD UNIVERS REVERS: Type: IMPLANTABLE DEVICE | Site: SHOULDER | Status: FUNCTIONAL

## 2021-07-02 DEVICE — SCREW BNE L40MM DIA55MM PERIPH LOK FOR MOD GLEN SYS UNIVERS: Type: IMPLANTABLE DEVICE | Site: SHOULDER | Status: FUNCTIONAL

## 2021-07-02 DEVICE — SCREW GLEN FIX 25MM SHLDR CTRL MOD UNIVERS REVERS: Type: IMPLANTABLE DEVICE | Site: SHOULDER | Status: FUNCTIONAL

## 2021-07-02 DEVICE — SCREW BNE L20MM DIA45MM PERIPH NONLOCKING FOR MOD GLEN SYS: Type: IMPLANTABLE DEVICE | Site: SHOULDER | Status: FUNCTIONAL

## 2021-07-02 DEVICE — STEM HUM SZ 10 SHLDR UNIVERS REVERS: Type: IMPLANTABLE DEVICE | Site: SHOULDER | Status: FUNCTIONAL

## 2021-07-02 DEVICE — CUP HUM OD36MM +2MM OFFSET L SHLDR SUT UNIVERS REVERS: Type: IMPLANTABLE DEVICE | Site: SHOULDER | Status: FUNCTIONAL

## 2021-07-02 DEVICE — SCREW BNE L28MM DIA5.5MM PERIPH LOK FOR MOD GLEN SYS: Type: IMPLANTABLE DEVICE | Site: SHOULDER | Status: FUNCTIONAL

## 2021-07-02 RX ORDER — FENTANYL CITRATE 50 UG/ML
50 INJECTION, SOLUTION INTRAMUSCULAR; INTRAVENOUS AS NEEDED
Status: DISCONTINUED | OUTPATIENT
Start: 2021-07-02 | End: 2021-07-02 | Stop reason: HOSPADM

## 2021-07-02 RX ORDER — PANTOPRAZOLE SODIUM 20 MG/1
20 TABLET, DELAYED RELEASE ORAL
Status: DISCONTINUED | OUTPATIENT
Start: 2021-07-02 | End: 2021-07-03 | Stop reason: HOSPADM

## 2021-07-02 RX ORDER — OXYCODONE HYDROCHLORIDE 5 MG/1
5-10 TABLET ORAL
Status: DISCONTINUED | OUTPATIENT
Start: 2021-07-02 | End: 2021-07-03 | Stop reason: HOSPADM

## 2021-07-02 RX ORDER — LANOLIN ALCOHOL/MO/W.PET/CERES
500 CREAM (GRAM) TOPICAL DAILY
Status: DISCONTINUED | OUTPATIENT
Start: 2021-07-03 | End: 2021-07-03 | Stop reason: HOSPADM

## 2021-07-02 RX ORDER — AMOXICILLIN 250 MG
1 CAPSULE ORAL DAILY
Status: DISCONTINUED | OUTPATIENT
Start: 2021-07-03 | End: 2021-07-03 | Stop reason: HOSPADM

## 2021-07-02 RX ORDER — ROPIVACAINE HYDROCHLORIDE 5 MG/ML
INJECTION, SOLUTION EPIDURAL; INFILTRATION; PERINEURAL
Status: COMPLETED | OUTPATIENT
Start: 2021-07-02 | End: 2021-07-02

## 2021-07-02 RX ORDER — SODIUM CHLORIDE, SODIUM LACTATE, POTASSIUM CHLORIDE, CALCIUM CHLORIDE 600; 310; 30; 20 MG/100ML; MG/100ML; MG/100ML; MG/100ML
75 INJECTION, SOLUTION INTRAVENOUS CONTINUOUS
Status: DISCONTINUED | OUTPATIENT
Start: 2021-07-02 | End: 2021-07-02 | Stop reason: HOSPADM

## 2021-07-02 RX ORDER — ONDANSETRON 2 MG/ML
4 INJECTION INTRAMUSCULAR; INTRAVENOUS ONCE
Status: COMPLETED | OUTPATIENT
Start: 2021-07-02 | End: 2021-07-02

## 2021-07-02 RX ORDER — ATORVASTATIN CALCIUM 20 MG/1
20 TABLET, FILM COATED ORAL DAILY
Status: DISCONTINUED | OUTPATIENT
Start: 2021-07-03 | End: 2021-07-03 | Stop reason: HOSPADM

## 2021-07-02 RX ORDER — NALOXONE HYDROCHLORIDE 0.4 MG/ML
1 INJECTION, SOLUTION INTRAMUSCULAR; INTRAVENOUS; SUBCUTANEOUS
Status: DISPENSED | OUTPATIENT
Start: 2021-07-02 | End: 2021-07-03

## 2021-07-02 RX ORDER — ARFORMOTEROL TARTRATE 15 UG/2ML
15 SOLUTION RESPIRATORY (INHALATION)
Status: DISCONTINUED | OUTPATIENT
Start: 2021-07-02 | End: 2021-07-03 | Stop reason: HOSPADM

## 2021-07-02 RX ORDER — ASPIRIN 81 MG/1
81 TABLET ORAL DAILY
Status: DISCONTINUED | OUTPATIENT
Start: 2021-07-03 | End: 2021-07-03 | Stop reason: HOSPADM

## 2021-07-02 RX ORDER — INSULIN LISPRO 100 [IU]/ML
INJECTION, SOLUTION INTRAVENOUS; SUBCUTANEOUS ONCE
Status: DISCONTINUED | OUTPATIENT
Start: 2021-07-02 | End: 2021-07-02 | Stop reason: HOSPADM

## 2021-07-02 RX ORDER — GABAPENTIN 300 MG/1
300 CAPSULE ORAL
Status: DISCONTINUED | OUTPATIENT
Start: 2021-07-02 | End: 2021-07-03 | Stop reason: HOSPADM

## 2021-07-02 RX ORDER — METFORMIN HYDROCHLORIDE 500 MG/1
500 TABLET ORAL
Status: DISCONTINUED | OUTPATIENT
Start: 2021-07-03 | End: 2021-07-03 | Stop reason: HOSPADM

## 2021-07-02 RX ORDER — LEVOTHYROXINE SODIUM 50 UG/1
50 TABLET ORAL
Status: DISCONTINUED | OUTPATIENT
Start: 2021-07-03 | End: 2021-07-03 | Stop reason: HOSPADM

## 2021-07-02 RX ORDER — LIDOCAINE HYDROCHLORIDE 10 MG/ML
0.1 INJECTION, SOLUTION EPIDURAL; INFILTRATION; INTRACAUDAL; PERINEURAL AS NEEDED
Status: DISCONTINUED | OUTPATIENT
Start: 2021-07-02 | End: 2021-07-02 | Stop reason: HOSPADM

## 2021-07-02 RX ORDER — ALBUTEROL SULFATE 90 UG/1
2 AEROSOL, METERED RESPIRATORY (INHALATION)
Status: DISCONTINUED | OUTPATIENT
Start: 2021-07-02 | End: 2021-07-02

## 2021-07-02 RX ORDER — ALBUTEROL SULFATE 0.83 MG/ML
2.5 SOLUTION RESPIRATORY (INHALATION)
Status: DISCONTINUED | OUTPATIENT
Start: 2021-07-02 | End: 2021-07-03 | Stop reason: HOSPADM

## 2021-07-02 RX ORDER — DEXTROSE 50 % IN WATER (D50W) INTRAVENOUS SYRINGE
25-50 AS NEEDED
Status: DISCONTINUED | OUTPATIENT
Start: 2021-07-02 | End: 2021-07-02 | Stop reason: HOSPADM

## 2021-07-02 RX ORDER — NEOSTIGMINE METHYLSULFATE 1 MG/ML
INJECTION, SOLUTION INTRAVENOUS AS NEEDED
Status: DISCONTINUED | OUTPATIENT
Start: 2021-07-02 | End: 2021-07-02 | Stop reason: HOSPADM

## 2021-07-02 RX ORDER — DEXTROSE 50 % IN WATER (D50W) INTRAVENOUS SYRINGE
25-50 AS NEEDED
Status: DISCONTINUED | OUTPATIENT
Start: 2021-07-02 | End: 2021-07-03 | Stop reason: HOSPADM

## 2021-07-02 RX ORDER — GLYCOPYRROLATE 0.2 MG/ML
INJECTION INTRAMUSCULAR; INTRAVENOUS AS NEEDED
Status: DISCONTINUED | OUTPATIENT
Start: 2021-07-02 | End: 2021-07-02 | Stop reason: HOSPADM

## 2021-07-02 RX ORDER — ROPIVACAINE HYDROCHLORIDE 5 MG/ML
30 INJECTION, SOLUTION EPIDURAL; INFILTRATION; PERINEURAL
Status: COMPLETED | OUTPATIENT
Start: 2021-07-02 | End: 2021-07-02

## 2021-07-02 RX ORDER — POLYETHYLENE GLYCOL 3350 17 G/17G
17 POWDER, FOR SOLUTION ORAL DAILY
Status: DISCONTINUED | OUTPATIENT
Start: 2021-07-03 | End: 2021-07-03 | Stop reason: HOSPADM

## 2021-07-02 RX ORDER — ACETAMINOPHEN 500 MG
1000 TABLET ORAL EVERY 8 HOURS
Status: DISCONTINUED | OUTPATIENT
Start: 2021-07-02 | End: 2021-07-03 | Stop reason: HOSPADM

## 2021-07-02 RX ORDER — SODIUM CHLORIDE, SODIUM LACTATE, POTASSIUM CHLORIDE, CALCIUM CHLORIDE 600; 310; 30; 20 MG/100ML; MG/100ML; MG/100ML; MG/100ML
125 INJECTION, SOLUTION INTRAVENOUS CONTINUOUS
Status: DISCONTINUED | OUTPATIENT
Start: 2021-07-02 | End: 2021-07-02 | Stop reason: HOSPADM

## 2021-07-02 RX ORDER — VANCOMYCIN/0.9 % SOD CHLORIDE 1.5G/250ML
1500 PLASTIC BAG, INJECTION (ML) INTRAVENOUS ONCE
Status: COMPLETED | OUTPATIENT
Start: 2021-07-02 | End: 2021-07-02

## 2021-07-02 RX ORDER — SUCCINYLCHOLINE CHLORIDE 20 MG/ML
INJECTION INTRAMUSCULAR; INTRAVENOUS AS NEEDED
Status: DISCONTINUED | OUTPATIENT
Start: 2021-07-02 | End: 2021-07-02 | Stop reason: HOSPADM

## 2021-07-02 RX ORDER — FENTANYL CITRATE 50 UG/ML
INJECTION, SOLUTION INTRAMUSCULAR; INTRAVENOUS
Status: COMPLETED | OUTPATIENT
Start: 2021-07-02 | End: 2021-07-02

## 2021-07-02 RX ORDER — INSULIN LISPRO 100 [IU]/ML
INJECTION, SOLUTION INTRAVENOUS; SUBCUTANEOUS
Status: DISCONTINUED | OUTPATIENT
Start: 2021-07-02 | End: 2021-07-03 | Stop reason: HOSPADM

## 2021-07-02 RX ORDER — FLUOXETINE 10 MG/1
10 CAPSULE ORAL DAILY
Status: DISCONTINUED | OUTPATIENT
Start: 2021-07-03 | End: 2021-07-03 | Stop reason: HOSPADM

## 2021-07-02 RX ORDER — PROPOFOL 10 MG/ML
INJECTION, EMULSION INTRAVENOUS AS NEEDED
Status: DISCONTINUED | OUTPATIENT
Start: 2021-07-02 | End: 2021-07-02 | Stop reason: HOSPADM

## 2021-07-02 RX ORDER — IPRATROPIUM BROMIDE AND ALBUTEROL SULFATE 2.5; .5 MG/3ML; MG/3ML
3 SOLUTION RESPIRATORY (INHALATION)
Status: DISCONTINUED | OUTPATIENT
Start: 2021-07-02 | End: 2021-07-02 | Stop reason: HOSPADM

## 2021-07-02 RX ORDER — GABAPENTIN 400 MG/1
400 CAPSULE ORAL ONCE
Status: COMPLETED | OUTPATIENT
Start: 2021-07-02 | End: 2021-07-02

## 2021-07-02 RX ORDER — CETIRIZINE HCL 10 MG
10 TABLET ORAL DAILY
Status: DISCONTINUED | OUTPATIENT
Start: 2021-07-03 | End: 2021-07-02

## 2021-07-02 RX ORDER — TIAGABINE HYDROCHLORIDE 4 MG/1
2 TABLET, FILM COATED ORAL
Status: DISCONTINUED | OUTPATIENT
Start: 2021-07-02 | End: 2021-07-03 | Stop reason: HOSPADM

## 2021-07-02 RX ORDER — FLUTICASONE PROPIONATE 50 MCG
2 SPRAY, SUSPENSION (ML) NASAL DAILY
Status: DISCONTINUED | OUTPATIENT
Start: 2021-07-03 | End: 2021-07-03 | Stop reason: HOSPADM

## 2021-07-02 RX ORDER — ONDANSETRON 2 MG/ML
INJECTION INTRAMUSCULAR; INTRAVENOUS AS NEEDED
Status: DISCONTINUED | OUTPATIENT
Start: 2021-07-02 | End: 2021-07-02 | Stop reason: HOSPADM

## 2021-07-02 RX ORDER — MIDAZOLAM HYDROCHLORIDE 1 MG/ML
INJECTION, SOLUTION INTRAMUSCULAR; INTRAVENOUS
Status: COMPLETED | OUTPATIENT
Start: 2021-07-02 | End: 2021-07-02

## 2021-07-02 RX ORDER — FAMOTIDINE 20 MG/1
20 TABLET, FILM COATED ORAL ONCE
Status: COMPLETED | OUTPATIENT
Start: 2021-07-02 | End: 2021-07-02

## 2021-07-02 RX ORDER — PREGABALIN 50 MG/1
50 CAPSULE ORAL ONCE
Status: DISCONTINUED | OUTPATIENT
Start: 2021-07-02 | End: 2021-07-02 | Stop reason: HOSPADM

## 2021-07-02 RX ORDER — ROCURONIUM BROMIDE 10 MG/ML
INJECTION, SOLUTION INTRAVENOUS AS NEEDED
Status: DISCONTINUED | OUTPATIENT
Start: 2021-07-02 | End: 2021-07-02 | Stop reason: HOSPADM

## 2021-07-02 RX ORDER — ASPIRIN 81 MG/1
81 TABLET ORAL DAILY
COMMUNITY

## 2021-07-02 RX ORDER — MIDAZOLAM HYDROCHLORIDE 1 MG/ML
1 INJECTION, SOLUTION INTRAMUSCULAR; INTRAVENOUS AS NEEDED
Status: DISCONTINUED | OUTPATIENT
Start: 2021-07-02 | End: 2021-07-02 | Stop reason: HOSPADM

## 2021-07-02 RX ORDER — IPRATROPIUM BROMIDE 0.5 MG/2.5ML
0.5 SOLUTION RESPIRATORY (INHALATION)
Status: DISCONTINUED | OUTPATIENT
Start: 2021-07-02 | End: 2021-07-03 | Stop reason: HOSPADM

## 2021-07-02 RX ORDER — MAGNESIUM SULFATE 100 %
4 CRYSTALS MISCELLANEOUS AS NEEDED
Status: DISCONTINUED | OUTPATIENT
Start: 2021-07-02 | End: 2021-07-02 | Stop reason: HOSPADM

## 2021-07-02 RX ORDER — MORPHINE SULFATE 2 MG/ML
1-2 INJECTION, SOLUTION INTRAMUSCULAR; INTRAVENOUS
Status: DISCONTINUED | OUTPATIENT
Start: 2021-07-02 | End: 2021-07-03 | Stop reason: HOSPADM

## 2021-07-02 RX ORDER — ROPIVACAINE HYDROCHLORIDE 5 MG/ML
30 INJECTION, SOLUTION EPIDURAL; INFILTRATION; PERINEURAL
Status: DISCONTINUED | OUTPATIENT
Start: 2021-07-02 | End: 2021-07-02

## 2021-07-02 RX ORDER — MELOXICAM 7.5 MG/1
15 TABLET ORAL DAILY
Status: DISCONTINUED | OUTPATIENT
Start: 2021-07-03 | End: 2021-07-03 | Stop reason: HOSPADM

## 2021-07-02 RX ORDER — VANCOMYCIN HYDROCHLORIDE
1250 ONCE
Status: COMPLETED | OUTPATIENT
Start: 2021-07-03 | End: 2021-07-03

## 2021-07-02 RX ORDER — ACETAMINOPHEN 500 MG
1000 TABLET ORAL ONCE
Status: COMPLETED | OUTPATIENT
Start: 2021-07-02 | End: 2021-07-02

## 2021-07-02 RX ORDER — HYDROCORTISONE SODIUM SUCCINATE 100 MG/2ML
25 INJECTION, POWDER, FOR SOLUTION INTRAMUSCULAR; INTRAVENOUS ONCE
Status: COMPLETED | OUTPATIENT
Start: 2021-07-02 | End: 2021-07-02

## 2021-07-02 RX ORDER — MAGNESIUM SULFATE 100 %
4 CRYSTALS MISCELLANEOUS AS NEEDED
Status: DISCONTINUED | OUTPATIENT
Start: 2021-07-02 | End: 2021-07-03 | Stop reason: HOSPADM

## 2021-07-02 RX ORDER — LIDOCAINE HYDROCHLORIDE 20 MG/ML
INJECTION, SOLUTION EPIDURAL; INFILTRATION; INTRACAUDAL; PERINEURAL AS NEEDED
Status: DISCONTINUED | OUTPATIENT
Start: 2021-07-02 | End: 2021-07-02 | Stop reason: HOSPADM

## 2021-07-02 RX ADMIN — Medication 2 MG: at 10:23

## 2021-07-02 RX ADMIN — ACETAMINOPHEN 1000 MG: 500 TABLET, FILM COATED ORAL at 23:09

## 2021-07-02 RX ADMIN — FENTANYL CITRATE 25 MCG: 50 INJECTION, SOLUTION INTRAMUSCULAR; INTRAVENOUS at 07:53

## 2021-07-02 RX ADMIN — ROCURONIUM BROMIDE 5 MG: 50 INJECTION INTRAVENOUS at 07:51

## 2021-07-02 RX ADMIN — SUCCINYLCHOLINE CHLORIDE 100 MG: 20 INJECTION, SOLUTION INTRAMUSCULAR; INTRAVENOUS at 07:51

## 2021-07-02 RX ADMIN — MIDAZOLAM 2 MG: 1 INJECTION INTRAMUSCULAR; INTRAVENOUS at 07:21

## 2021-07-02 RX ADMIN — IPRATROPIUM BROMIDE 0.5 MG: 0.5 SOLUTION RESPIRATORY (INHALATION) at 19:15

## 2021-07-02 RX ADMIN — MIDAZOLAM HYDROCHLORIDE 2 MG: 2 INJECTION, SOLUTION INTRAMUSCULAR; INTRAVENOUS at 07:28

## 2021-07-02 RX ADMIN — ACETAMINOPHEN 1000 MG: 500 TABLET, FILM COATED ORAL at 17:18

## 2021-07-02 RX ADMIN — GABAPENTIN 400 MG: 400 CAPSULE ORAL at 07:02

## 2021-07-02 RX ADMIN — ONDANSETRON 4 MG: 2 INJECTION INTRAMUSCULAR; INTRAVENOUS at 11:48

## 2021-07-02 RX ADMIN — ROPIVACAINE HYDROCHLORIDE 30 ML: 5 INJECTION, SOLUTION EPIDURAL; INFILTRATION; PERINEURAL at 07:23

## 2021-07-02 RX ADMIN — FENTANYL CITRATE 25 MCG: 50 INJECTION, SOLUTION INTRAMUSCULAR; INTRAVENOUS at 08:34

## 2021-07-02 RX ADMIN — FENTANYL CITRATE 50 MCG: 50 INJECTION, SOLUTION INTRAMUSCULAR; INTRAVENOUS at 11:48

## 2021-07-02 RX ADMIN — FENTANYL CITRATE 25 MCG: 50 INJECTION, SOLUTION INTRAMUSCULAR; INTRAVENOUS at 08:57

## 2021-07-02 RX ADMIN — FAMOTIDINE 20 MG: 20 TABLET, FILM COATED ORAL at 07:03

## 2021-07-02 RX ADMIN — LIDOCAINE HYDROCHLORIDE 2 ML: 10 INJECTION, SOLUTION EPIDURAL; INFILTRATION; INTRACAUDAL; PERINEURAL at 07:22

## 2021-07-02 RX ADMIN — HYDROCORTISONE SODIUM SUCCINATE 25 MG: 100 INJECTION, POWDER, FOR SOLUTION INTRAMUSCULAR; INTRAVENOUS at 07:12

## 2021-07-02 RX ADMIN — SODIUM CHLORIDE, SODIUM LACTATE, POTASSIUM CHLORIDE, AND CALCIUM CHLORIDE 75 ML/HR: 600; 310; 30; 20 INJECTION, SOLUTION INTRAVENOUS at 06:45

## 2021-07-02 RX ADMIN — FENTANYL CITRATE 100 MCG: 50 INJECTION, SOLUTION INTRAMUSCULAR; INTRAVENOUS at 07:21

## 2021-07-02 RX ADMIN — PROPOFOL 120 MG: 10 INJECTION, EMULSION INTRAVENOUS at 07:51

## 2021-07-02 RX ADMIN — VANCOMYCIN HYDROCHLORIDE 1500 MG: 10 INJECTION, POWDER, LYOPHILIZED, FOR SOLUTION INTRAVENOUS at 06:45

## 2021-07-02 RX ADMIN — GLYCOPYRROLATE 0.2 MG: 0.2 INJECTION INTRAMUSCULAR; INTRAVENOUS at 10:23

## 2021-07-02 RX ADMIN — FENTANYL CITRATE 25 MCG: 50 INJECTION, SOLUTION INTRAMUSCULAR; INTRAVENOUS at 08:37

## 2021-07-02 RX ADMIN — LIDOCAINE HYDROCHLORIDE 50 MG: 20 INJECTION, SOLUTION EPIDURAL; INFILTRATION; INTRACAUDAL; PERINEURAL at 07:51

## 2021-07-02 RX ADMIN — ARFORMOTEROL TARTRATE 15 MCG: 15 SOLUTION RESPIRATORY (INHALATION) at 19:15

## 2021-07-02 RX ADMIN — ROPIVACAINE HYDROCHLORIDE 20 ML: 5 INJECTION, SOLUTION EPIDURAL; INFILTRATION; PERINEURAL at 07:28

## 2021-07-02 RX ADMIN — FENTANYL CITRATE 100 MCG: 50 INJECTION, SOLUTION INTRAMUSCULAR; INTRAVENOUS at 07:28

## 2021-07-02 RX ADMIN — ACETAMINOPHEN 1000 MG: 500 TABLET ORAL at 07:02

## 2021-07-02 RX ADMIN — ROCURONIUM BROMIDE 15 MG: 50 INJECTION INTRAVENOUS at 08:35

## 2021-07-02 RX ADMIN — OXYCODONE HYDROCHLORIDE 5 MG: 5 TABLET ORAL at 20:16

## 2021-07-02 RX ADMIN — ONDANSETRON 4 MG: 2 INJECTION INTRAMUSCULAR; INTRAVENOUS at 08:20

## 2021-07-02 RX ADMIN — TIAGABINE HYDROCHLORIDE 2 MG: 4 TABLET, FILM COATED ORAL at 21:43

## 2021-07-02 RX ADMIN — VANCOMYCIN HYDROCHLORIDE 1250 MG: 10 INJECTION, POWDER, LYOPHILIZED, FOR SOLUTION INTRAVENOUS at 23:10

## 2021-07-02 RX ADMIN — GABAPENTIN 300 MG: 300 CAPSULE ORAL at 21:43

## 2021-07-02 RX ADMIN — PANTOPRAZOLE SODIUM 20 MG: 20 TABLET, DELAYED RELEASE ORAL at 17:18

## 2021-07-02 NOTE — PROGRESS NOTES
Ipratropium/arformoterol nebulizers was therapeutically interchanged for Stioloto per the P&T Committee approved Therapeutic Interchanges Policy.     Loraine Roberto, [facility], Pharmacist  7/2/2021 3:36 PM

## 2021-07-02 NOTE — PERIOP NOTES
Assumed care of pt from OR via bed. Attached to monitor. VSS. OR, MAR and anesthesia report appreciated. Will monitor. 1450  TRANSFER - OUT REPORT:    Verbal report given to ARTURO Rinaldi (name) on Arpita Alert  being transferred to [facility] (unit) for routine post - op       Report consisted of patients Situation, Background, Assessment and   Recommendations(SBAR). Information from the following report(s) SBAR, OR Summary, Intake/Output, MAR and Cardiac Rhythm sinus rhythm was reviewed with the receiving nurse. Lines:   Peripheral IV 07/02/21 Anterior;Right Wrist (Active)   Site Assessment Clean, dry, & intact 07/02/21 0633   Phlebitis Assessment 0 07/02/21 0633   Dressing Status Clean, dry, & intact;New;Occlusive 07/02/21 0633   Dressing Type Transparent 07/02/21 0633   Hub Color/Line Status Pink; Infusing 07/02/21 6921        Opportunity for questions and clarification was provided.       Patient transported with:   [employer]

## 2021-07-02 NOTE — PROGRESS NOTES
Albuterol nebulizer solution was therapeutically interchanged for albuterol inhaler per the P&T Committee approved Therapeutic Interchanges Policy.     Daniela Cuba, [facility], Pharmacist  7/2/2021 3:29 PM

## 2021-07-02 NOTE — DIABETES MGMT
GLYCEMIC CONTROL PLAN OF CARE    Assessment:  History:  S/P LEFT SHOULDER REVERSE TOTAL SHOULDER REPLACEMENT   Morning blood glucose:  115 mg/dl  IVF containing dextrose:  None   Steroids: none    Diet/TF: none currently  Recommendations:  When ready to eat, recommend ordering carbohydrate consistent diet  Blood glucose currently controlled. A1C 6.4%. Recommend ordering lispro corrective insulin coverage AC&HS as needed. Most recent BG values:      Results for Smooth Betts (MRN [id number]) as of 7/2/2021 16:01   Ref. Range 7/2/2021 06:31 7/2/2021 10:52   GLUCOSE,FAST - POC Latest Ref Range: 70 - 110 mg/dL 115 (H) 159 (H)     Within target range  mg/dl? Yes   Current A1C:  6.4% equivalent to an average blood glucose of 137 mg/dl over the past 2-3 months. Adequate glycemic control PTA? Yes   Current hospital diabetes medications:  None   Home diabetes medication:  Per pta med list  Metformin 500 mg daily with supper  Education:  ___ see diabetes education record            _x_ diabetes education not indicated at this time.     [facility] RN CDE  Ext [phone]

## 2021-07-02 NOTE — PROGRESS NOTES
Loratadine 10mg was therapeutically interchanged for cetirizine 10mg per the P&T Committee approved Therapeutic Interchanges Policy.     Gorge Finley [facility], Pharmacist  7/2/2021 3:25 PM

## 2021-07-02 NOTE — ANESTHESIA POSTPROCEDURE EVALUATION
Procedure(s):  LEFT SHOULDER REVERSE TOTAL SHOULDER REPLACEMENT. general    Anesthesia Post Evaluation      Multimodal analgesia: multimodal analgesia used between 6 hours prior to anesthesia start to PACU discharge  Patient location during evaluation: PACU  Patient participation: complete - patient participated  Level of consciousness: awake  Pain score: 3  Pain management: adequate  Airway patency: patent  Anesthetic complications: no  Cardiovascular status: acceptable  Respiratory status: acceptable  Hydration status: acceptable  Post anesthesia nausea and vomiting:  none  Final Post Anesthesia Temperature Assessment:  Normothermia (36.0-37.5 degrees C)      INITIAL Post-op Vital signs:   Vitals Value Taken Time   /71 07/02/21 1050   Temp 37.3 °C (99.2 °F) 07/02/21 1050   Pulse 101 07/02/21 1054   Resp 22 07/02/21 1054   SpO2 99 % 07/02/21 1054   Vitals shown include unvalidated device data.

## 2021-07-02 NOTE — ANESTHESIA PREPROCEDURE EVALUATION
Relevant Problems   NEUROLOGY   (+) OCD (obsessive compulsive disorder)   (+) PTSD (post-traumatic stress disorder)   (+) Panic disorder with agoraphobia   (+) Recurrent major depression (HCC)      CARDIOVASCULAR   (+) AS (aortic stenosis)   (+) Aortic stenosis      ENDOCRINE   (+) Diabetes (HCC)   (+) Severe obesity (HCC)   (+) Type 2 diabetes with nephropathy (HCC)       Anesthetic History   No history of anesthetic complications            Review of Systems / Medical History  Patient summary reviewed and pertinent labs reviewed    Pulmonary    COPD: moderate      Shortness of breath  Asthma        Neuro/Psych         Psychiatric history    Comments: Chronic pain  Anxiety  PTSD  OCD Cardiovascular    Hypertension  Valvular problems/murmurs        Hyperlipidemia    Exercise tolerance: <4 METS  Comments: S/P AVR 2016  EF 55%  PULM HTN   GI/Hepatic/Renal  Within defined limits              Endo/Other    Diabetes: well controlled, type 2  Hypothyroidism: well controlled  Obesity and arthritis     Other Findings              Physical Exam    Airway  Mallampati: II  TM Distance: > 6 cm  Neck ROM: normal range of motion   Mouth opening: Normal     Cardiovascular  Regular rate and rhythm,  S1 and S2 normal,  no murmur, click, rub, or gallop             Dental    Dentition: Full lower dentures and Full upper dentures     Pulmonary  Breath sounds clear to auscultation               Abdominal  GI exam deferred       Other Findings            Anesthetic Plan    ASA: 4  Anesthesia type: general      Post-op pain plan if not by surgeon: peripheral nerve block single    Induction: Intravenous  Anesthetic plan and risks discussed with: Patient

## 2021-07-02 NOTE — PROGRESS NOTES
Date of Surgery Update:  Jeannie Moe was seen and examined. History and physical has been reviewed. The patient has been examined. There have been no significant clinical changes since the completion of the originally dated History and Physical.    Signed By: Refugio Pino MD     July 2, 2021 7:41 AM       The above patient was independently seen and examined by myself. The case was then discussed and a proper diagnosis/plan was made. I agree with the above assessment.

## 2021-07-02 NOTE — ANESTHESIA PROCEDURE NOTES
Peripheral Block    Start time: 7/2/2021 7:17 AM  End time: 7/2/2021 7:27 AM  Performed by: Janette Jackson MD  Authorized by: Janette Jackson MD       Pre-procedure: Indications: at surgeon's request and post-op pain management    Preanesthetic Checklist: patient identified, risks and benefits discussed, site marked, timeout performed, anesthesia consent given and patient being monitored    Timeout Time: 07:17 EDT          Block Type:   Block Type:   Interscalene  Laterality:  Left  Monitoring:  Standard ASA monitoring, responsive to questions, oxygen, continuous pulse ox, frequent vital sign checks and heart rate  Injection Technique:  Single shot  Procedures: ultrasound guided and nerve stimulator    Patient Position: seated  Prep: chlorhexidine    Location:  Interscalene  Needle Type:  Stimuplex  Needle Gauge:  20 G  Needle Localization:  Anatomical landmarks, nerve stimulator and ultrasound guidance  Motor Response comment:   Motor Response: minimal motor response >0.4 mA   Medication Injected:  FentaNYL citrate (PF) injection sedation initial, 100 mcg  midazolam (VERSED) injection, 2 mg  ropivacaine (PF) (NAROPIN)(0.5%) 5 mg/mL injection, 20 mL  Med Admin Time: 7/2/2021 7:28 AM    Assessment:  Number of attempts:  1  Injection Assessment:  Incremental injection every 5 mL, negative aspiration for CSF, no paresthesia, ultrasound image on chart, local visualized surrounding nerve on ultrasound, negative aspiration for blood and no intravascular symptoms  Patient tolerance:  Patient tolerated the procedure well with no immediate complications

## 2021-07-02 NOTE — PROGRESS NOTES
conducted a pre-surgery visit with Malena Vasquez, who is a 70 y.o.,female. The  provided the following Interventions:  Initiated a relationship of care and support. Offered prayer and assurance of continued prayers on patient's behalf. Patient handed in a copy of her advance directive which was charted and placed in file. Plan:  Chaplains will continue to follow and will provide pastoral care on an as needed/requested basis.  recommends bedside caregivers page  on duty if patient shows signs of acute spiritual or emotional distress.     Reiseñor 3   Board Certified [address] [place] [address]   [phone]

## 2021-07-02 NOTE — CONSULTS
Date of Admission: 7/2/2021  Date of Consult: 7/2/2021  Requesting Physician: Dr. Mag Richardson  Reason for Consult: Medical management following left shoulder arthroplasty      Assessment:   Status post left shoulder reverse arthroplasty  COPD: Stable without exacerbation  Diabetes mellitus type 2: Hemoglobin A1c is 6.4  Hypertension  Hypothyroidism  Aortic stenosis status post bioprosthetic valve replacement March 2016  Obesity with BMI of 34.6    Plan:   Sliding scale insulin, Accu-Cheks, diabetic diet   -May resume home diabetes medication upon discharge  Continue aspirin, Lipitor  Continue Brovana, Atrovent, Flonase    I have independently examined the patient and reviewed all lab studies and imgaing as well as review of nursing notes and physican notes since admission and pertinent records from prior admissions. Case was discussed with the referring physician. Shant Wilkinson D.O. Subjective:    Patient is a 70 y. o.female who is being evaluated for medical management following left shoulder arthroplasty. Ms. Keara Valerio is a pleasant 66-year-old female with a history of diabetes mellitus type 2, fibromyalgia, aortic valve replacement, hypothyroidism, hypotension who is being admitted after undergoing left shoulder reversal total shoulder arthroplasty earlier this morning.       Past Medical History:   Diagnosis Date    Ankle fracture     Aortic stenosis     S/P AVR in 2016    Asthma     Carpal tunnel syndrome on both sides     Chronic obstructive pulmonary disease (HCC)     Chronic pain     back pain    COVID-19 vaccine series completed 04/29/2021    Diabetes ([address]     Diverticulitis     Fibromyalgia     H/O aortic valve replacement 03/2016    21 mm bovine pericardial bioprosthesis and epiaortic scanning      H/O: HTN (hypertension)     no meds now- sp valve replacement    Hypothyroidism     Lumbar post-laminectomy syndrome     Menopause     Obesity     Osteoarthritis     [facility] disorder [facility] [address]     Pulmonary HTN ([facility] [address]     per pcp note cc- patient denies.  Sciatica     Skin cancer 2013    right forearm. Past Surgical History:   Procedure Laterality Date    COLONOSCOPY      COLONOSCOPY N/A 9/15/2017    COLONOSCOPY performed by Sonya Reed MD at [address] Left 03/2021    HX CATARACT REMOVAL Right 1995    lens  replaced.  HX CATARACT REMOVAL Left 02/2018    HX COLONOSCOPY  09/15/2017    dr. Trina Mead  f/u 5 years.  HX HEENT  1990's    sinus surgery     HX HEENT Right 11/02/2017    laser for right eye cataracts.  HX LUMBAR FUSION  2004    L3-L4-L5    HX LUMBAR LAMINECTOMY  2002    HX MOHS PROCEDURES Left 1990    HX ORTHOPAEDIC      Rotator cuff Bilateral    HX SHOULDER REPLACEMENT Right 03/29/2017    reverse.  [place] CARDIAC SURG PROCEDURE UNLIST  03/2016    aortic valve replacement.      [place] CHEST SURGERY PROCEDURE UNLISTED  03/2016    Open Heart Surgery    [place] COLSC FLX W/RMVL OF TUMOR POLYP LESION SNARE TQ  07/09/2014 repeat in 3 years    Dr. Toma Coleman  2016    Aortic valve replacement(bovine)     Family History   Problem Relation Age of Onset    Hypertension Father     Heart Disease Father     Alcohol abuse Father      Medications reviewed as below:   Current Facility-Administered Medications   Medication Dose Route Frequency Provider Last Rate Last Admin    [START ON 7/3/2021] aspirin delayed-release tablet 81 mg  81 mg Oral DAILY [place], [place]        [START ON 7/3/2021] atorvastatin (LIPITOR) tablet 20 mg  20 mg Oral DAILY [place], [place]        [START ON 7/3/2021] cyanocobalamin (VITAMIN B12) tablet 500 mcg  500 mcg Oral DAILY [place], [place]        [START ON 7/3/2021] FLUoxetine (PROzac) capsule 10 mg  10 mg Oral DAILY [place], [place]        [START ON 7/3/2021] fluticasone propionate (FLONASE) 50 mcg/actuation nasal spray 2 Spray  2 Spray Both Nostrils DAILY Estefani Tam        [START ON 7/3/2021] levothyroxine (SYNTHROID) tablet 50 mcg  50 mcg Oral [address] [place], [place]        [START ON 7/3/2021] meloxicam [facility]) tablet 15 mg  15 mg Oral DAILY Estefani Tam        [START ON 7/3/2021] metFORMIN (GLUCOPHAGE) tablet 500 mg  500 mg Oral DAILY WITH BREAKFAST Estefani Tam        oxyCODONE IR (ROXICODONE) tablet 5-10 mg  5-10 mg Oral Q4H PRN Estefani Tam        pantoprazole (PROTONIX) tablet 20 mg  20 mg Oral ACB&D Estefani Tam   20 mg at 07/02/21 1718    tiaGABine (GABITRIL) tablet 2 mg  2 mg Oral QHS Sheyla Sandoval [place]        acetaminophen (TYLENOL) tablet 1,000 mg  1,000 mg Oral Q8H Estefani Tam   1,000 mg at 07/02/21 1718    gabapentin (NEURONTIN) capsule 300 mg  300 mg Oral QHS Sheyla Sandoval PA        naloxone [facility]) injection 1 mg  1 mg IntraVENous ONCE PRN Estefani Tam        [START ON 7/3/2021] polyethylene glycol (MIRALAX) packet 17 g  17 g Oral DAILY Estefani Tam        [START ON 7/3/2021] senna-docusate (PERICOLACE) 8.6-50 mg per tablet 1 Tablet  1 Tablet Oral DAILY Estefani Tam        morphine injection 1-2 mg  1-2 mg IntraVENous Q4H PRN Estefani Franz        albuterol (PROVENTIL VENTOLIN) nebulizer solution 2.5 mg  2.5 mg Nebulization Q4H PRN Yogesh Beal MD        arformoteroL (BROVANA) neb solution 15 mcg  15 mcg Nebulization BID RT Yogesh Beal MD        And    ipratropium (ATROVENT) 0.02 % nebulizer solution 0.5 mg  0.5 mg Nebulization Q8H RT Yogesh Beal MD       [facility] Derl Peabody ON 7/3/2021] vancomycin (VANCOCIN) 1250 mg in  ml infusion  1,250 mg IntraVENous Lena Dasilva MD        insulin lispro (HUMALOG) injection   SubCUTAneous AC&HS Tom Brown MD        glucose chewable tablet 16 g  4 Tablet Oral PRN Tom Brown MD        glucagon (GLUCAGEN) injection 1 mg  1 mg IntraMUSCular PRN Kvng, Kunal Prescott MD        dextrose (D50W) injection syrg 12.5-25 g  25-50 mL IntraVENous PRN Aubrey Lovell MD         Allergies   Allergen Reactions    Latex, Natural Rubber Hives    Adhesive Rash     Some bandaids    Ciprofloxacin Rash    Codeine Nausea and Vomiting    Demerol [Meperidine] Nausea and Vomiting    Diclofenac Shortness of Breath and Palpitations    Erythromycin Rash    Levaquin [Levofloxacin] Rash    Loratadine Swelling    Morphine Nausea and Vomiting    Penicillin G Hives and Rash     Does fine with Keflex    Sulfa (Sulfonamide Antibiotics) Itching    Vicodin [Hydrocodone-Acetaminophen] Other (comments)     Metallic taste in mouth     Social History     Socioeconomic History    Marital status:      Spouse name: Not on file    Number of children: Not on file    Years of education: Not on file    Highest education level: Not on file   Occupational History    Not on file   Tobacco Use    Smoking status: Former Smoker     Years: 45.00     Types: Cigarettes     Quit date: 7/1/2011     Years since quitting: 10.0    Smokeless tobacco: Never Used   Vaping Use    Vaping Use: Never used   Substance and Sexual Activity    Alcohol use: No    Drug use: No    Sexual activity: Never   Other Topics Concern    Not on file   Social History Narrative    Not on file     Social Determinants of Health     Financial Resource Strain:     Difficulty of Paying Living Expenses:    Food Insecurity:     Worried About Running Out of Food in the Last Year:     Ran Out of Food in the Last Year:    Transportation Needs:     Lack of Transportation (Medical):      Lack of Transportation (Non-Medical):    Physical Activity:     Days of Exercise per Week:     Minutes of Exercise per Session:    Stress:     Feeling of Stress :    Social Connections:     Frequency of Communication with Friends and Family:     Frequency of Social Gatherings with Friends and Family:     Attends Nondenominational Services:     Active Member of Clubs or Organizations:     Attends Club or Organization Meetings:     Marital Status:    Intimate Partner Violence:     Fear of Current or Ex-Partner:     Emotionally Abused:     Physically Abused:     Sexually Abused:         Review of Systems    Negative Unless BOLDED    General: fevers, chills, myalgias, arthralgias, unexplained weight loss, malaise, fatigue. HEENT:  headaches,sinus pain or presure, recent URI, recent dental procedures;  tinnitus, hearing loss , visual changes, catarats, dizziness or blurred vision  PUlMONARY:  cough , shortness of breath, sputum production, hx of asthma or COPD. previous treatement for TB or PPD. Cardiovascular: chest pain, previous CAD/MI, vavlular heart disease,  murmurs  GI:   nausea, vomiting, diarrhea, abdominal pain, prior C.diff  :  urinary frequency, dysuria, hematuria, bladder incontinence. Neurologic:  seizures, syncope or prior CVA/TIA, confusion, memory impairment, neuropathy  Musculoskeletal:  myalgias arthralgias, joint pain/ swelling,  back pain  Skin:  Purities,  recurrent cellulitis,  chronic stasis ulcer, diabetic foot ulcers  Endocrine: polyuria, polydipsia, hair loss, weight gain  Psych: Denies depression or treatment by a psychiatrist/psycologist  Heme-Onc: prior DVT, easy bruising, fatigue, malignancy        Objective:        Visit Vitals  /60 (BP 1 Location: Right upper arm, BP Patient Position: At rest)   Pulse 71   Temp 98.7 °F (37.1 °C)   Resp 12   Ht 5' 8\" (1.727 m)   Wt 103.3 kg (227 lb 12.8 oz)   SpO2 96%   BMI 34.64 kg/m²     Temp (24hrs), Av.8 °F (37.1 °C), Min:98.5 °F (36.9 °C), Max:99.2 °F (37.3 °C)        General:   awake alert and oriented   Skin:   no rashes or skin lesions noted on limited exam   HEENT:  Normocephalic, atraumatic, PERRL, EOMI, no scleral icterus or pallor; no conjunctival hemmohage;  nasal and oral mucous are moist and without evidence of lesions. No thrush.  Dentition good. Neck supple, no bruits. Lymph Nodes:   no cervical, axillary or inguinal adenopathy   Lungs:   non-labored, bilaterally clear to aspiration- no crackles wheezes rales or rhonchi   Heart:  RRR, s1 and s2; no murmurs rubs or gallops, no edema, + pedal pulses   Abdomen:  soft, non-distended, active bowel sounds, no hepatomegaly, no splenomegaly. Non-tender   Genitourinary:  deferred   Extremities:   no clubbing, cyanosis; no joint effusions or swelling; Full ROM of all large joints to the upper and lower extremities; muscle mass appropriate for age; surgical dressings in place to the left shoulder with sling   Neurologic:  No gross focal sensory abnormalities; 5/5 muscle strength to upper and lower extremities. Speech appropirate. Cranial nerves intact   Psychiatric:   appropriate and interactive. Labs: Results:   Chemistry No results for input(s): GLU, NA, K, CL, CO2, BUN, CREA, CA, AGAP, BUCR, TBIL, AP, TP, ALB, GLOB, AGRAT in the last 72 hours. No lab exists for component: GPT   CBC w/Diff No results for input(s): WBC, RBC, HGB, HCT, PLT, GRANS, LYMPH, EOS, HGBEXT, HCTEXT, PLTEXT in the last 72 hours. No results found for: SDES Lab Results   Component Value Date/Time    Culture result: NO GROWTH 1 DAY 03/14/2016 12:20 PM          Imaging:      All imaging reviewed from Admission to present as per radiology interpretation in [facility]

## 2021-07-02 NOTE — TELEPHONE ENCOUNTER
\"Nimco\" from [facility] called in to request a verbal \"diet order \" for the patient. \"Nimco\" is requesting a call back at [phone].  No fax number was provided

## 2021-07-02 NOTE — OP NOTES
Operative Report    Patient: Chacho Zambrano MRN: [id number]  SSN: [id number]    YOB: 1950  Age: 70 y.o. Sex: female       Date of Surgery: 7/2/2021     Preoperative Diagnosis: Rotator cuff arthropathy of left shoulder [M12.812]     Postoperative Diagnosis: Rotator cuff arthropathy of left shoulder [M12.812]     Surgeon(s) and Role:     Teofilo Fowler MD - Primary  Assistant LILY Sin instrumental in assisting all facets of the procedure including closure therefore medically necessary for the success of the procedure. Anesthesia: General     Procedure: Procedure(s):  LEFT SHOULDER REVERSE TOTAL SHOULDER REPLACEMENT     Procedure in Detail: Patient was taken to the operating room to some general trach anesthesia with anesthesia staff placed in a beachchair position with the left arm prepped with ChloraPrep solution draped free sterile field. Deltopectoral approach was used subcutaneous tissue dissected bluntly to the level of the deltopectoral interval which was developed protecting the cephalic vein and retracting it medially. Clavipectoral fascia was incised on the superior third of the pectoralis tendon was released. Biceps tendon at that level was then tenodesed to the pectoralis tendon with FiberWire suture. The inferior circumflex vessels were ligated and the conjoined tendon was retracted medially and the deltoid laterally protecting musculocutaneous nerve and axillary nerves at all times. A subscapularis tenotomy was then performed exposing the the form degenerated humeral head. Head was dislocated and the canal was again accessed through the top with rongeurs and was reamed 5mm to 6 mm. The guide was attached to the 6 mm reamer a cutting guide was placed at 20 degrees of retroversion. The humeral head was then cut and attention was then placed to the glenoid.   The capsule was released and removed  from the subscapularis exposing the glenoid. Baseplate guide was then placed 24 mm just the center chest inferior to the equator of the glenoid. The glenosphere measurement yessi to be 36 the depth of the guidepin was 25 mm. Concentric reaming was then performed and a 25 mm tap used after the central peg hole was drilled 25 mm x 24 mm baseplate with a 25 mm center screw was then placed and then secured with 2 locking screws and 2 nonlocking screws. 36 mm lateralized glenosphere was then placed and secured with a screw. Attention was then placed in the proximal humerus where was reamed to 8 mm and then broaches from 5 mm to 10 mm at 20 degrees of retroversion. 10 mm was appropriate fit for which the suture cup reamer was then used with a +2 left. The trials were placed in +6 cup thought to be excellent fit. Trials were removed the area was irrigated profusely into the suture holes were split were placed in the bicipital groove. The 10 mm humeral head with a 36+2 left suture cup was then prepared with 4 sutures in the collar and 2 sutures in the stem. It was impacted into place 20 degrees of retroversion and small 3606 humeral insert was impacted into place. The shoulder was reduced and then 4 sutures with essentially 8 strands of suture limbs were passed through the subscapularis tendon then tied in a suture bridge configuration to the 4 strands through the bicipital groove effecting a very stable repair of the subscapularis. Irrigation was then used the deltopectoral interval was closed with 0 Vicryl subcutaneous tissues with 2-0 Vicryl and the skin with 4-0 Monocryl. Sterile dressings were applied patient tolerated procedure well was taken to recovery room without problems      Estimated Blood Loss: Less than 100 cc    Tourniquet Time: * No tourniquets in log *      Implants:   Implant Name Type Inv.  Item Serial No.  Lot No. LRB No. Used Action   SCREW NICOLAS FIX 25MM SHLDR CTRL MOD UNIVERS REVERS - [id number]  SCREW NICOLAS FIX 25MM SHLDR CTRL MOD UNIVERS REVERS  [facility] [facility] [id number] Left 1 Implanted   BASEPLATE NICOLAS 88OG SHLDR MOD UNIVERS REVERS - [id number]  BASEPLATE NICOLAS 03XU SHLDR MOD UNIVERS REVERS  [facility] [facility] [id number] Left 1 Implanted   SCREW BNE L28MM DIA5. 5MM PERIPH JOHN FOR MOD NICOLAS SYS - [id number]  SCREW BNE L28MM DIA5. 5MM PERIPH JOHN FOR MOD NICOLAS SYS  [address] [facility] [facility] [id number] Left 1 Implanted   SCREW BNE L40MM [id number] PERIPH JOHN FOR MOD NICOLAS SYS UNIVERS - [id number]  SCREW BNE L40MM [id number] PERIPH JOHN FOR MOD NICOLAS SYS UNIVERS  [facility] [facility] [id number] Left 1 Implanted   SCREW BNE L20MM [id number] PERIPH NONLOCKING FOR MOD NICOLAS SYS - [id number]  SCREW BNE L20MM [id number] PERIPH NONLOCKING FOR MOD NICOLAS SYS  [employer] [facility] [id number] Left 1 Implanted   SCREW BONE L16MM DIA4. 5MM PERIPH NONLOCKING FOR MOD NICOLAS SYS - [id number]  SCREW BONE L16MM DIA4. 5MM PERIPH NONLOCKING FOR MOD NICOLAS SYS  [facility] [facility] [id number] Left 1 Implanted   SPHERE NICOLAS [id number] +4 BASEPLT 24MM SHLDR LAT TAPR [facility] UNIV - [id number]  SPHERE NICOLAS [id number] +4 BASEPLT 24MM SHLDR LAT TAPR [facility] UNIV  [employer] INCWD [id number] Left 1 Implanted   STEM HUM SZ 10 SHLDR [facility] REVERS - [id number]  STEM HUM SZ 10 SHLDR [facility] REVERS  [facility] [facility] [id number] Left 1 Implanted   CUP HUM OD36MM +2MM OFFSET L SHLDR SUT UNIVERS REVERS - [id number]  CUP HUM OD36MM +2MM OFFSET L SHLDR SUT [facility] REVERS  [employer] [id number] Left 1 Implanted   LINER HUM SM [id number] +6MM OFFSET SHLDR [facility] UNIVERS - [id number]  LINER HUM SM [id number] +6MM OFFSET SHLDR [facility] UNIVERS  [employer] [id number] Left 1 Implanted               Specimens: * No specimens in log *        Drains: None                Complications: None    Counts: Sponge and needle counts were correct times two.     Signed By:  Refugio Pino MD     July 2, 2021

## 2021-07-02 NOTE — PROGRESS NOTES
Problem: Mobility Impaired (Adult and Pediatric)  Goal: *Acute Goals and Plan of Care (Insert Text)  Description: Physical Therapy Goals  Initiated 7/2/2021 and to be accomplished within 7 day(s)  1. Patient will move from supine to sit and sit to supine  in bed with modified independence. 2.  Patient will transfer from bed to chair and chair to bed with modified independence using the least restrictive device. 3.  Patient will perform sit to stand with modified independence. 4.  Patient will ambulate with modified independence for 300 feet with the least restrictive device. 5.  Patient will ascend/descend 5 stairs with B handrail(s) with modified independence. PLOF: pt lives with 2 roommates in a 2-story home living on the first floor, 5 CELE. She was Grady with use of a SPC. Outcome: Progressing Towards Goal     PHYSICAL THERAPY EVALUATION    Patient: Annetta Rico (14 y.o. female)  Date: 7/2/2021  Primary Diagnosis: Rotator cuff arthropathy of left shoulder [M12.812]  Left rotator cuff tear arthropathy [M75.102, M12.812]  Procedure(s) (LRB):  LEFT SHOULDER REVERSE TOTAL SHOULDER REPLACEMENT (Left) Day of Surgery   Precautions:  Fall, Other (comment), NWB (L TSA)    ASSESSMENT :  Patient is 69 yo female admitted to hospital for L TSA and presents today POD 0 and agreeable to therapy and was semi-reclined in bed in PACU upon arrival. Patient was educated on role of therpay and shoulder precautions including no active use of shoulder and sling wear. Patient educated on components of sling and on donning/doffing and good understanding. A&Ox4. Patient educated on importance of continued elbow flex/ext, wrist flex/ext and ulnar and radial deviation, as well as active finger and hand movement to prevent edema and weakness. Patient then transferred to sitting at EOB with modAx1 and additional time, sling adjusted for appropriate fit with education and objective LE assessment performed.  Patient c/o dizziness, demonstrated increased sway at times, Edu pt on use of UE on bed for balance. Pt had one slight LOB posteriorly while sitting, Franca for correcting. STS deferred today 2/2 dizziness not resolving. Pt was assisted back supine with modA and was left semi-reclined with pillow under L LE for support. Patient encouraged to continue mobilization with staff assist and educated not to get up without assist; oriented to call bell use. RN made aware of pts performance. Patient is NOT safe to return home at this time as she has not demonstrated safety with all mobility. Recommend d/c home once ready. Patient will benefit from skilled intervention to address the above impairments. Patient's rehabilitation potential is considered to be Good  Factors which may influence rehabilitation potential include:   []         None noted  []         Mental ability/status  []         Medical condition  [x]         Home/family situation and support systems  [x]         Safety awareness  [x]         Pain tolerance/management  []         Other:      PLAN :  Recommendations and Planned Interventions:   [x]           Bed Mobility Training             [x]    Neuromuscular Re-Education  [x]           Transfer Training                   []    Orthotic/Prosthetic Training  [x]           Gait Training                          []    Modalities  [x]           Therapeutic Exercises           []    Edema Management/Control  [x]           Therapeutic Activities            [x]    Family Training/Education  [x]           Patient Education  []           Other (comment):    Frequency/Duration: Patient will be followed by physical therapy 1-2 times per day/4-7 days per week to address goals. Discharge Recommendations: Home Health  Further Equipment Recommendations for Discharge: N/A     SUBJECTIVE:   Patient stated I feel so dizzy.     OBJECTIVE DATA SUMMARY:     Past Medical History:   Diagnosis Date    Ankle fracture     Aortic stenosis S/P AVR in 2016    Asthma     Carpal tunnel syndrome on both sides     Chronic obstructive pulmonary disease (HCC)     Chronic pain     back pain    COVID-19 vaccine series completed 04/29/2021    Diabetes [facility] [address]     Diverticulitis     Fibromyalgia     H/O aortic valve replacement 03/2016    21 mm bovine pericardial bioprosthesis and epiaortic scanning      H/O: HTN (hypertension)     no meds now- sp valve replacement    Hypothyroidism     Lumbar post-laminectomy syndrome     Menopause     Obesity     Osteoarthritis     Psychotic disorder ([address]     Pulmonary HTN ([address]     per pcp note cc- patient denies.  Sciatica     Skin cancer 2013    right forearm. Past Surgical History:   Procedure Laterality Date    COLONOSCOPY      COLONOSCOPY N/A 9/15/2017    COLONOSCOPY performed by Loreto Lane MD at [address] [place] [address] Left 03/2021    HX CATARACT REMOVAL Right 1995    lens  replaced.  HX CATARACT REMOVAL Left 02/2018    HX COLONOSCOPY  09/15/2017    dr. Simran Vicente  f/u 5 years.  HX HEENT  1990's    sinus surgery     HX HEENT Right 11/02/2017    laser for right eye cataracts.  HX LUMBAR FUSION  2004    L3-L4-L5    HX LUMBAR LAMINECTOMY  2002    HX MOHS PROCEDURES Left 1990    HX ORTHOPAEDIC      Rotator cuff Bilateral    HX SHOULDER REPLACEMENT Right 03/29/2017    reverse.  [place] CARDIAC SUR[place] PROCEDURE UNLIST  03/2016    aortic valve replacement.      [place] CHEST SURGERY PROCEDURE UNLISTED  03/2016    Open Heart Surgery    [place] COL[place] FLX W/RMVL OF TUMOR POLYP LESION SNARE TQ  07/09/2014 repeat in 3 years    Dr. Chrystal Prader  2016    Aortic valve replacement(bovine)     Barriers to Learning/Limitations: None  Compensate with: N/A  Home Situation:  Home Situation  Home Environment: Private residence  # Steps to Enter: 5  One/Two Story Residence: Two story, live on 1st floor  Living Alone: No  Support Systems: Friends \ neighbors  Patient Expects to be Discharged to[de-identified] [place]  Current DME Used/Available at Home: Madelaine Smith, rolling, Cane, straight  Critical Behavior:  Neurologic State: Alert  Orientation Level: Oriented X4  Cognition: Follows commands  Safety/Judgement: Fall prevention  Psychosocial  Patient Behaviors: Calm; Cooperative    Strength:    Strength: Generally decreased, functional       Tone & Sensation:   Tone: Normal    Sensation: Intact    Range Of Motion:  AROM: Generally decreased, functional    Functional Mobility:  Bed Mobility:  Rolling: Moderate assistance; Additional time;Assist x1  Supine to Sit: Moderate assistance; Additional time     Scooting: Contact guard assistance    Balance:   Sitting: Impaired; With support  Sitting - Static: Fair (occasional)  Sitting - Dynamic: Fair (occasional)    Therapeutic Exercises:   Pt edu on AROM of fingers, wrist, elbow. Pain:  Pain level pre-treatment: 0/10   Pain level post-treatment: 0/10   Pain Intervention(s) : Medication (see MAR); Rest, Ice, Repositioning  Response to intervention: Nurse notified, See doc flow    Activity Tolerance:   Pt tolerated mobility fair, limited by dizziness  Please refer to the flowsheet for vital signs taken during this treatment. After treatment:   []         Patient left in no apparent distress sitting up in chair  [x]         Patient left in no apparent distress in bed  [x]         Call bell left within reach  [x]         Nursing notified  []         Caregiver present  []         Bed alarm activated  [x]         SCDs applied    COMMUNICATION/EDUCATION:   [x]         Role of Physical Therapy in the acute care setting. [x]         Fall prevention education was provided and the patient/caregiver indicated understanding. [x]         Patient/family have participated as able in goal setting and plan of care. []         Patient/family agree to work toward stated goals and plan of care.   []         Patient understands intent and goals of therapy, but is neutral about his/her participation. []         Patient is unable to participate in goal setting/plan of care: ongoing with therapy staff.  []         Other:     Thank you for this referral.  Jaz Justice   Time Calculation: 24 mins      Eval Complexity: History: LOW Complexity : Zero comorbidities / personal factors that will impact the outcome / POCExam:LOW Complexity : 1-2 Standardized tests and measures addressing body structure, function, activity limitation and / or participation in recreation  Presentation: LOW Complexity : Stable, uncomplicated  Clinical Decision Making:Low Complexity    Overall Complexity:LOW

## 2021-07-02 NOTE — BRIEF OP NOTE
Brief Postoperative Note    Patient: Galilea [place]  YOB: 1950  MRN: [id number]    Date of Procedure: 7/2/2021     Pre-Op Diagnosis: Rotator cuff arthropathy of left shoulder [M12.812]    Post-Op Diagnosis: Same as preoperative diagnosis. Procedure(s):  LEFT SHOULDER REVERSE TOTAL SHOULDER arthroplasty    Surgeon(s):  Eric Burger MD    Surgical Assistant: Physician Assistant: JONNA Centeno  Surg Asst-1: Sharad Krishnamurthy    Anesthesia: General     Estimated Blood Loss (mL): 849 mL    Complications: None    Specimens: * No specimens in log *     Implants:   Implant Name Type Inv. Item Serial No.  Lot No. LRB No. Used Action   SCREW NICOLAS FIX 25MM SHLDR CTRL MOD UNIVERS REVERS - [id number]  SCREW NICOLAS FIX 25MM SHLDR CTRL MOD UNIVERS REVERS  [employer] [id number] Left 1 Implanted   BASEPLATE NICOLAS 59XR SHLDR MOD UNIVERS REVERS - [id number]  BASEPLATE NICOLAS 24XR SHLDR MOD UNIVERS REVERS  [employer] [id number] Left 1 Implanted   SCREW BNE L28MM DIA5. 5MM PERIPH JOHN FOR MOD NICOLAS SYS - [id number]  SCREW BNE L28MM DIA5. 5MM PERIPH JOHN FOR MOD NICOLAS SYS  [address] INC[employer] Chair GroupWD [id number] Left 1 Implanted   SCREW BNE L40MM [id number] PERIPH JOHN FOR MOD NICOLAS SYS UNIVERS - [id number]  SCREW BNE L40MM [id number] PERIPH JOHN FOR MOD NICOLAS SYS UNIVERS  [employer] [id number] Left 1 Implanted   SCREW BNE L20MM [id number] PERIPH NONLOCKING FOR MOD NIOCLAS SYS - [id number]  SCREW BNE L20MM [id number] PERIPH NONLOCKING FOR MOD NICOLAS SYS  [employer] [id number] Left 1 Implanted   SCREW BONE L16MM DIA4. 5MM PERIPH NONLOCKING FOR MOD NICOLAS SYS - [id number]  SCREW BONE L16MM DIA4. 5MM PERIPH NONLOCKING FOR MOD NICOLAS SYS  [employer] [id number] Left 1 Implanted   SPHERE NICOLAS [id number] +4 BASEPLT 24MM SHLDR LAT TAPR MOD UNIV - [id number]  SPHERE NICOLAS [id number] +4 BASEPLT 24MM SHLDR LAT TAPR MOD UNIV  [employer]WD [id number] Left 1 Implanted   STEM HUM SZ 10 SHLDR [id number]  STEM HUM SZ 10 SHACER [facility]  [employer]WD [id number] Left 1 Implanted   CUP HUM OD36MM +2MM OFFSET L SHLDR [facility] - [id number]  CUP HUM OD36MM +2MM OFFSET L SHLDR [facility]  [employer]WD [id number] Left 1 Implanted   LINER HUM SM LTH10HT +6MM OFFSET SHLDR [facility] - [id number]  LINER HUM SM [id number] +6MM OFFSET SHLDR [facility]  [employer]_ [id number] Left 1 Implanted       Drains: * No LDAs found *    Findings: left cuff tear arthropathy    Electronically Signed by JONNA Vazquez on 7/2/2021 at 10:54 AM

## 2021-07-02 NOTE — ACP (ADVANCE CARE PLANNING)
Advance Care Planning   Advance Care Planning Inpatient Note  [address] [place] [address]   Spiritual Care Department    Today's Date: 7/2/2021  Unit: [facility]    Received request from patient. Upon review of chart and communication with care team, patient's decision making abilities are not in question. Patient was/were present in the room during visit. Goals of ACP Conversation:  Discuss Advance Care planning documents    Health Care Decision Makers:      Click here to complete [address] including selection of the Healthcare Decision Maker Relationship (ie \"Primary\")     Today we:  Updated [address] (P  atient Wishes) on file:  Healthcare Power of /Advance Directive appointment of Health care agent  Living Will/ Advance Directive  Anatomical Gift/Organ donation     Assessment:    Patient presented us with a copy of her advance directive form.  Copied and charted document    Interventions:  Reviewed ACP document(s) on record for proper execution and need for updating  Returned original document(s) to patient, as well as copies for distribution to appointed agents     Outcomes/Plan:  Original Advance Directive form and copies given to patient     Electronically signed by Lorenzo Nevarez, [employer]    on 7/2/2021 at 9:24 AM

## 2021-07-03 VITALS
WEIGHT: 227.8 LBS | TEMPERATURE: 97.9 F | DIASTOLIC BLOOD PRESSURE: 72 MMHG | RESPIRATION RATE: 17 BRPM | HEIGHT: 68 IN | OXYGEN SATURATION: 100 % | BODY MASS INDEX: 34.53 KG/M2 | HEART RATE: 96 BPM | SYSTOLIC BLOOD PRESSURE: 110 MMHG

## 2021-07-03 LAB
ERYTHROCYTE [DISTWIDTH] IN BLOOD BY AUTOMATED COUNT: 15.2 % (ref 11.6–14.5)
GLUCOSE BLD STRIP.AUTO-MCNC: 172 MG/DL (ref 70–110)
HCT VFR BLD AUTO: 32.4 % (ref 35–45)
HGB BLD-MCNC: 9.7 G/DL (ref 12–16)
MCH RBC QN AUTO: 27.8 PG (ref 24–34)
MCHC RBC AUTO-ENTMCNC: 29.9 G/DL (ref 31–37)
MCV RBC AUTO: 92.8 FL (ref 74–97)
PLATELET # BLD AUTO: 234 K/UL (ref 135–420)
PMV BLD AUTO: 11.4 FL (ref 9.2–11.8)
RBC # BLD AUTO: 3.49 M/UL (ref 4.2–5.3)
WBC # BLD AUTO: 8.7 K/UL (ref 4.6–13.2)

## 2021-07-03 PROCEDURE — 97535 SELF CARE MNGMENT TRAINING: CPT

## 2021-07-03 PROCEDURE — 85027 COMPLETE CBC AUTOMATED: CPT

## 2021-07-03 PROCEDURE — 97530 THERAPEUTIC ACTIVITIES: CPT

## 2021-07-03 PROCEDURE — 36415 COLL VENOUS BLD VENIPUNCTURE: CPT

## 2021-07-03 PROCEDURE — 99024 POSTOP FOLLOW-UP VISIT: CPT | Performed by: ORTHOPAEDIC SURGERY

## 2021-07-03 PROCEDURE — 82962 GLUCOSE BLOOD TEST: CPT

## 2021-07-03 PROCEDURE — 74011250637 HC RX REV CODE- 250/637: Performed by: PHYSICIAN ASSISTANT

## 2021-07-03 PROCEDURE — 74011000250 HC RX REV CODE- 250: Performed by: INTERNAL MEDICINE

## 2021-07-03 PROCEDURE — 97116 GAIT TRAINING THERAPY: CPT

## 2021-07-03 PROCEDURE — 94640 AIRWAY INHALATION TREATMENT: CPT

## 2021-07-03 PROCEDURE — 97165 OT EVAL LOW COMPLEX 30 MIN: CPT

## 2021-07-03 PROCEDURE — 94761 N-INVAS EAR/PLS OXIMETRY MLT: CPT

## 2021-07-03 RX ADMIN — METFORMIN HYDROCHLORIDE 500 MG: 500 TABLET ORAL at 09:58

## 2021-07-03 RX ADMIN — ACETAMINOPHEN 1000 MG: 500 TABLET, FILM COATED ORAL at 09:58

## 2021-07-03 RX ADMIN — ARFORMOTEROL TARTRATE 15 MCG: 15 SOLUTION RESPIRATORY (INHALATION) at 08:09

## 2021-07-03 RX ADMIN — Medication 81 MG: at 09:58

## 2021-07-03 RX ADMIN — MELOXICAM 15 MG: 7.5 TABLET ORAL at 09:58

## 2021-07-03 RX ADMIN — LEVOTHYROXINE SODIUM 50 MCG: 0.05 TABLET ORAL at 06:10

## 2021-07-03 RX ADMIN — OXYCODONE HYDROCHLORIDE 5 MG: 5 TABLET ORAL at 10:12

## 2021-07-03 RX ADMIN — OXYCODONE HYDROCHLORIDE 5 MG: 5 TABLET ORAL at 06:10

## 2021-07-03 RX ADMIN — CYANOCOBALAMIN TAB 500 MCG 500 MCG: 500 TAB at 09:58

## 2021-07-03 RX ADMIN — PANTOPRAZOLE SODIUM 20 MG: 20 TABLET, DELAYED RELEASE ORAL at 09:58

## 2021-07-03 RX ADMIN — FLUOXETINE 10 MG: 10 CAPSULE ORAL at 09:58

## 2021-07-03 RX ADMIN — DOCUSATE SODIUM 50MG AND SENNOSIDES 8.6MG 1 TABLET: 8.6; 5 TABLET, FILM COATED ORAL at 09:58

## 2021-07-03 RX ADMIN — ATORVASTATIN CALCIUM 20 MG: 20 TABLET, FILM COATED ORAL at 09:58

## 2021-07-03 RX ADMIN — IPRATROPIUM BROMIDE 0.5 MG: 0.5 SOLUTION RESPIRATORY (INHALATION) at 08:09

## 2021-07-03 NOTE — PROGRESS NOTES
Problem: Self Care Deficits Care Plan (Adult)  Goal: *Acute Goals and Plan of Care (Insert Text)  Description: Occupational Therapy Goals  Initiated 7/3/2021 within 7 day(s). 1.  Patient will perform grooming with supervision/set-up standing for 5 min. 2.  Patient will perform upper body dressing with minimal assistance/contact guard assist.  3.  Patient will perform lower body dressing with supervision/set-up. 4.  Patient will perform toilet transfers with modified independence. 5.  Patient will perform all aspects of toileting with supervision/set-up. 6.  Patient will participate in L upper extremity therapeutic exercise/activities with supervision/set-up for 8 minutes within precautions. 7.  Patient will utilize energy conservation techniques during functional activities with verbal cues. Prior Level of Function: Pt reports being Mod Ind for ADLs and functional mobility with cane. Pt lives with roommates one of which she has been friends with for many years. Outcome: Progressing Towards Goal  OCCUPATIONAL THERAPY EVALUATION    Patient: Arpita Philip (35 y.o. female)  Date: 7/3/2021  Primary Diagnosis: Rotator cuff arthropathy of left shoulder [M12.812]  Left rotator cuff tear arthropathy [M75.102, M12.812]  Procedure(s) (LRB):  LEFT SHOULDER REVERSE TOTAL SHOULDER REPLACEMENT (Left) 1 Day Post-Op   Precautions:   Fall, Other (comment), NWB (L TSA)  PROM operated shoulder, no external rotation, AROM elbow and wrist, ok to remove sling while in bed    ASSESSMENT :  Pt is in the recliner upon entry finishing working with L/PTA. Pt is agreeable to participate in OT evaluation and on assessment/education on ADLs within L shd precautions. Based on the objective data described below, the patient presents with increased pain in L shd with PROM (no ER), and pain during L elbow AROM, shoulder precautions, limiting her participation and independence with ADLs.  Pt required increased assistance for most ADLs due to shoulder precautions, was educated on adaptive strategies for UB/LB ADLs pt demonstrated fair understanding. Per pt her roommate will be assisting her with all ADLs until she is able to actively move L shd. P    Patient will benefit from skilled intervention to address the above impairments. Patient's rehabilitation potential is considered to be Fair  Factors which may influence rehabilitation potential include:   []             None noted  []             Mental ability/status  []             Medical condition  []             Home/family situation and support systems  []             Safety awareness  [x]             Pain tolerance/management  []             Other:      PLAN :  Recommendations and Planned Interventions:   [x]               Self Care Training                  [x]      Therapeutic Activities  [x]               Functional Mobility Training   []      Cognitive Retraining  [x]               Therapeutic Exercises           [x]      Endurance Activities  [x]               Balance Training                    []      Neuromuscular Re-Education  []               Visual/Perceptual Training     [x]      Home Safety Training  [x]               Patient Education                   [x]      Family Training/Education  []               Other (comment):    Frequency/Duration: Patient will be followed by occupational therapy 1-2 times per day/4-7 days per week to address goals. Discharge Recommendations: Home with assistance   Further Equipment Recommendations for Discharge: Pt has necessary equipment     SUBJECTIVE:   Patient stated Mary Alice Hall had my right shoulder done 4 years ago, so I know what to do with this one.     OBJECTIVE DATA SUMMARY:     Past Medical History:   Diagnosis Date    Ankle fracture     Aortic stenosis     S/P AVR in 2016    Asthma     Carpal tunnel syndrome on both sides     Chronic obstructive pulmonary disease (HCC)     Chronic pain     back pain    COVID-19 vaccine series completed 04/29/2021    Diabetes ([facility] [address]     Diverticulitis     Fibromyalgia     H/O aortic valve replacement 03/2016    21 mm bovine pericardial bioprosthesis and epiaortic scanning      H/O: HTN (hypertension)     no meds now- sp valve replacement    Hypothyroidism     Lumbar post-laminectomy syndrome     Menopause     Obesity     Osteoarthritis     Psychotic disorder ([facility] [address]     Pulmonary HTN ([facility] [address]     per pcp note cc- patient denies. Sciatica     Skin cancer 2013    right forearm. Past Surgical History:   Procedure Laterality Date    COLONOSCOPY      COLONOSCOPY N/A 9/15/2017    COLONOSCOPY performed by Naida Orozco MD at [address] [place] [address] Left 03/2021    HX CATARACT REMOVAL Right 1995    lens  replaced. HX CATARACT REMOVAL Left 02/2018    HX COLONOSCOPY  09/15/2017    dr. Katiana Rodriguez  f/u 5 years. HX HEENT  1990's    sinus surgery     HX HEENT Right 11/02/2017    laser for right eye cataracts. HX LUMBAR FUSION  2004    L3-L4-L5    HX LUMBAR LAMINECTOMY  2002    HX MOHS PROCEDURES Left 1990    HX ORTHOPAEDIC      Rotator cuff Bilateral    HX SHOULDER REPLACEMENT Right 03/29/2017    reverse. [place] CARDIAC SURG PROCEDURE UNLIST  03/2016    aortic valve replacement.      [place] CHEST SURGERY PROCEDURE UNLISTED  03/2016    Open Heart Surgery    [place] COLSC FLX W/RMVL OF TUMOR POLYP LESION SNARE TQ  07/09/2014 repeat in 3 years    Dr. Luz Mckinney  2016    Aortic valve replacement(bovine)     Barriers to Learning/Limitations: None  Compensate with: visual, verbal, tactile, kinesthetic cues/model    Home Situation:   Home Situation  Home Environment: Private residence  # Steps to Enter: 5  One/Two Story Residence: Two story, live on 1st floor  Living Alone: No  Support Systems: Friends \ neighbors  Patient Expects to be Discharged to[de-identified] [employer]  Current DME Used/Available at Home: Hoy Streeter, rollator, Cane, straight  Tub or Shower Type: Tub/Shower combination  [x] Right hand dominant   []  Left hand dominant    Cognitive/Behavioral Status:  Neurologic State: Alert  Orientation Level: Oriented X4  Cognition: Follows commands  Safety/Judgement: Awareness of environment; Fall prevention    Skin: visible skin intact  Edema: not noted    Vision/Perceptual:       Acuity: Able to read clock/calendar on wall without difficulty    Corrective Lenses: Glasses    Coordination: BUE  Coordination: Within functional limits  Fine Motor Skills-Upper: Left Intact; Right Intact    Gross Motor Skills-Upper: Right Intact    Balance:  Sitting: Intact  Standing: Intact; With support    Strength: BUE  Strength: Generally decreased, functional (Proximal LUE not tested)   Tone & Sensation: BUE  Tone: Normal  Sensation: Intact (occasional tingling due to bilateral CTS)   Range of Motion: BUE  AROM: Generally decreased, functional (L shd AROM not tested 2/2 precautions)  PROM:  (LUE shd flex to 30 deg with pain, abd to 40 deg)   Functional Mobility and Transfers for ADLs:  Bed Mobility:  Rolling: Contact guard assistance  Supine to Sit: Contact guard assistance     Scooting: Stand-by assistance  Transfers:  Sit to Stand: Supervision  Stand to Sit: Supervision   Toilet Transfer : Supervision    ADL Assessment:   Feeding: Setup  Oral Facial Hygiene/Grooming: Contact guard assistance  Bathing: Minimum assistance  Upper Body Dressing: Moderate assistance  Lower Body Dressing: Moderate assistance  Toileting: Minimum assistance   ADL Intervention:     Cognitive Retraining  Safety/Judgement: Awareness of environment; Fall prevention  Pain:  Pain level pre-treatment: 0/10   Pain level post-treatment: 0/10     Activity Tolerance:   Fair  Please refer to the flowsheet for vital signs taken during this treatment.   After treatment:   [x] Patient left in no apparent distress sitting up in chair  [] Patient left in no apparent distress in bed  [x] Call bell left within reach  [x] Nursing notified  [] Caregiver present  [] Bed alarm activated    COMMUNICATION/EDUCATION:   [x] Role of Occupational Therapy in the acute care setting  [x] Home safety education was provided and the patient/caregiver indicated understanding. [x] Patient/family have participated as able in goal setting and plan of care. [x] Patient/family agree to work toward stated goals and plan of care. [] Patient understands intent and goals of therapy, but is neutral about his/her participation. [] Patient is unable to participate in goal setting and plan of care. Thank you for this referral.  Ravindra Landis, OTR/L  Time Calculation: 38 mins    Eval Complexity: History: LOW Complexity : Brief history review ; Examination: LOW Complexity : 1-3 performance deficits relating to physical, cognitive , or psychosocial skils that result in activity limitations and / or participation restrictions ;    Decision Making:LOW Complexity : No comorbidities that affect functional and no verbal or physical assistance needed to complete eval tasks

## 2021-07-03 NOTE — PROGRESS NOTES
Problem: Mobility Impaired (Adult and Pediatric)  Goal: *Acute Goals and Plan of Care (Insert Text)  Description: Physical Therapy Goals  Initiated 7/2/2021 and to be accomplished within 7 day(s)  1. Patient will move from supine to sit and sit to supine  in bed with modified independence. 2.  Patient will transfer from bed to chair and chair to bed with modified independence using the least restrictive device. 3.  Patient will perform sit to stand with modified independence. 4.  Patient will ambulate with modified independence for 300 feet with the least restrictive device. 5.  Patient will ascend/descend 5 stairs with B handrail(s) with modified independence. PLOF: pt lives with 2 roommates in a 2-story home living on the first floor, 5 CELE. She was Grady with use of a SPC. Outcome: Progressing Towards Goal   PHYSICAL THERAPY TREATMENT    Patient: Vini Coates (41 y.o. female)  Date: 7/3/2021  Diagnosis: Rotator cuff arthropathy of left shoulder [M12.812]  Left rotator cuff tear arthropathy [M75.102, M12.812] <principal problem not specified>  Procedure(s) (LRB):  LEFT SHOULDER REVERSE TOTAL SHOULDER REPLACEMENT (Left) 1 Day Post-Op  Precautions: Fall, Other (comment), NWB (L TSA)    ASSESSMENT:  Pt progressed well today during treatment, demonstrating improved safety and stability with all mobility tasks performed. Pt ambulated with support of SPC (PLOF) and performed 5 steps using one hand railing to simulate steps to enter/exit home. Pt did demonstrate one LOB req support of hand railing for correction during descent, thus recommended use of hand railing and assistance from friend at home. Pt demonstrated good recall and competence of all shoulder precautions through out therapy, requiring no cueing during mobility training. Pt positioned in recliner safely, provided all needs with shoulder supported properly, and left in care of OT.  From a PT standpoint, pt is safe for home mobility with assistance for added safety. Nursing notified. Progression toward goals: good   [x]      Improving appropriately and progressing toward goals  []      Improving slowly and progressing toward goals  []      Not making progress toward goals and plan of care will be adjusted     PLAN:  Patient continues to benefit from skilled intervention to address the above impairments. Continue treatment per established plan of care. Discharge Recommendations:  Outpatient  Further Equipment Recommendations for Discharge:  None     SUBJECTIVE:   Patient stated My shoulder feels fine, it's my neck that hurts the most and its not from the strap of my sling.     OBJECTIVE DATA SUMMARY:   Critical Behavior:  Neurologic State: Alert  Orientation Level: Oriented X4  Cognition: Follows commands  Safety/Judgement: Fall prevention  Functional Mobility Training:  Bed Mobility:  Rolling: Contact guard assistance  Supine to Sit: Contact guard assistance  Scooting: Stand-by assistance  Transfers:  Sit to Stand: Stand-by assistance;Supervision  Stand to Sit: Supervision  Balance:  Sitting: Intact  Standing: Intact; With support  Ambulation/Gait Training:  Distance (ft): 250 Feet (ft)  Assistive Device: Cane, straight  Ambulation - Level of Assistance: Stand-by assistance  Gait Abnormalities: Decreased step clearance  Base of Support: Center of gravity altered; Widened  Speed/Caty: Pace decreased (<100 feet/min)  Step Length: Right shortened;Left shortened  Stairs:  Number of Stairs Trained: 5  Stairs - Level of Assistance: Stand-by assistance  Rail Use: Right   Therapeutic Exercises:       EXERCISE   Sets   Reps   Active Active Assist   Passive Self ROM   Comments   Wrist AROM all directions  1 10  [x] [] [] []    Pro/sup 1 10  [x] [] [] []    Elbow flex/ext 1 10 [x] [] [] []                                                                                        Pain:  Pain level pre-treatment: 0/10  Pain level post-treatment: 0/10 Activity Tolerance:   Good, required one seated rest break post stair training with quick recovery  Please refer to the flowsheet for vital signs taken during this treatment. After treatment:   [x] Patient left in no apparent distress sitting up in chair  [] Patient left in no apparent distress in bed  [x] Call bell left within reach  [x] Nursing notified  [x] OT present  [] Bed alarm activated  [] SCDs applied      COMMUNICATION/EDUCATION:   [x]         Role of Physical Therapy in the acute care setting. [x]         Fall prevention education was provided and the patient/caregiver indicated understanding. [x]         Patient/family have participated as able in working toward goals and plan of care. [x]         Patient/family agree to work toward stated goals and plan of care. []         Patient understands intent and goals of therapy, but is neutral about his/her participation.   []         Patient is unable to participate in stated goals/plan of care: ongoing with therapy staff.  []         Other:        Hiral Merino PTA   Time Calculation: 30 mins

## 2021-07-03 NOTE — ROUTINE PROCESS
Bedside and Verbal shift change report given to Tanika Arenas RN   (oncoming nurse) by Mayte Ramsay RN  (offgoing nurse). Report included the following information SBAR, Intake/Output and MAR.

## 2021-07-03 NOTE — DISCHARGE INSTRUCTIONS
Dr. Rudolph Chavez Total Shoulder Postoperative Information    How to manage your pain after your Surgery:  After your surgery it is expected that you will have some pain. In efforts to reduce the amounts of side effects from pain medications we would like you to follow the below medication regimen after surgery:  1. Take 1000mg of Tylenol by mouth every 8 hours x 5 days. This is 4 tabs of regular strength Tylenol or 2 tabs of Extra Strength Tylenol  2. We would like you to take a NSAID medication for the first 3 days following surgery. In efforts to avoid additional prescription costs we recommend one of the following over the counter medications. 600mg of Ibuprofen every 8 hours x 3 days    OR   500mg of Naproxen (Aleve) every 12 hours x 3 days  If you have a prescription for Meloxicam or Celebrex already you may resume this as previously prescribed instead of the Over the Counter Medications. DO NOT TAKE ANY OF THESE IF YOU HAVE CHRONIC RENAL FAILURE, ARE TAKING A BLOOD THINNER, HAVE A HISTORY OF A GASTRIC BLEED OR ARE ALLERGIC TO ASPIRIN. IT IS OK TO TAKE IF YOU HAVE HISTORY OF GASTRIC BYPASS SURGERY. 3. Then ONLY IF YOUR PAIN IS UNCONTROLLED you may take your Narcotic Pain medication as prescribed. THIS IS THE PRESCRIPTION THAT WAS GIVEN TO YOU IN THE OFFICE. You should place an ice bag over your shoulder to help with pain and reduce swelling. Your shoulder may be sore and develop bruising over the next several days. The bruising should resolve and no special care will be needed. Leave your bandage on until we see you in the office next week. It is safe to take a shower when you get home    You will be given a sling to use. Continue to wear this while you are active or up and moving around. OK to take off if you are sedentary. No moving the arm away from your body on your own, reaching or carrying with the operated arm. There has been an operation inside and around the shoulder joint. Complete healing may take weeks or several months. If you have a high temperature, unexpected pain, redness or swelling in your shoulder please contact my office immediately. Please call to make an appointment to see me in my office in 1 week. Dr. Susan Conde office number [phone]    Dr. Malu Hughes Total and Reverse Total Shoulder Information    What is the surgery? - This is an inpatient procedure done at [facility] 49 will be completely asleep for procedure. Dr. Malu Hughes will make an incision in the front of your shoulder and replace the joint itself   - Total surgery takes about an hour and half   - You will then spend the night to receive prophylactic antibiotics and pain medication if needed. The goal is to send you home the following day and have you begin outpatient physical therapy about 3 days after you are discharged to home    What can you expect after surgery? - You will have a waterproof dressing on your shoulder following surgery. You will be able to shower 2 days after surgery but no soaking in a bath, hot tub, ocean or pool x 2 weeks to allow for full wound healing  - You will be in a sling for 4 weeks. You will wear this sling whenever you are active or up moving around. This sling is to keep you from moving your arm on your own. You are essentially one armed until you are out of your sling. This means no reaching, pulling, grabbing or lifting with the operative arm. It is ok for you to remove your sling when you are sitting in a chair or you are in bed  - Dr. Malu Hughes will start physical therapy for you a few days after you are discharged from the hospital. While you cannot move your arm we allow physical therapy to gently move your shoulder. We call this passive range of motion. The goal of this is to decrease your stiffness and in turn decrease your post operative pain. - Plan on being in physical therapy for 10-12 weeks    When can I return to work?   - Most patients return to desk work only after 2 weeks. You are able to type but there is no overhead work or lifting  - You will start some gentle lifting up to 5-10lbs at about 8-10 weeks post operatively. You will gradually increase how much you are able to lift after this point under the guidance of Dr. Nelia Goodman, his physician assistant and physical therapy. - At 6 months you are able to do all activities as tolerated but it may take you a full 9-12 months to fully recover from your surgery    Not all shoulder replacements are the same. The specifics of your individual case will be discussed at length with you by Dr. Nelia Goodman and his Physician Assistant. Charline Gonzalez  Surgical Coordinator  [address] [place] [address]. [place], [address] [email or web address]  P: [phone]  F: [phone]          DISCHARGE SUMMARY from Nurse    PATIENT INSTRUCTIONS:    After general anesthesia or intravenous sedation, for 24 hours or while taking prescription Narcotics:  · Limit your activities  · Do not drive and operate hazardous machinery  · Do not make important personal or business decisions  · Do  not drink alcoholic beverages  · If you have not urinated within 8 hours after discharge, please contact your surgeon on call. Report the following to your surgeon:  · Excessive pain, swelling, redness or odor of or around the surgical area  · Temperature over 100.5  · Nausea and vomiting lasting longer than 4 hours or if unable to take medications  · Any signs of decreased circulation or nerve impairment to extremity: change in color, persistent  numbness, tingling, coldness or increase pain  · Any questions    What to do at Home:  Recommended activity: See Above instructions from surgeon      If you experience any of the following symptoms Shortness of breathe, Chest Pains, excessive Bleeding, please follow up with [facility] ED    *  Please give a list of your current medications to your Primary Care Provider.     *  Please update this list whenever your medications are discontinued, doses are      changed, or new medications (including over-the-counter products) are added. *  Please carry medication information at all times in case of emergency situations. These are general instructions for a healthy lifestyle:    No smoking/ No tobacco products/ Avoid exposure to second hand smoke  Surgeon General's Warning:  Quitting smoking now greatly reduces serious risk to your health. Obesity, smoking, and sedentary lifestyle greatly increases your risk for illness    A healthy diet, regular physical exercise & weight monitoring are important for maintaining a healthy lifestyle    You may be retaining fluid if you have a history of heart failure or if you experience any of the following symptoms:  Weight gain of 3 pounds or more overnight or 5 pounds in a week, increased swelling in our hands or feet or shortness of breath while lying flat in bed. Please call your doctor as soon as you notice any of these symptoms; do not wait until your next office visit. The discharge information has been reviewed with the patient. The patient verbalized understanding. Discharge medications reviewed with the patient and appropriate educational materials and side effects teaching were provided.   ___________________________________________________________________________________________________________________________________

## 2021-07-03 NOTE — PROGRESS NOTES
PROGRESS NOTE      Patient: Christian Eaton  MRN: [id number]  SSN: [id number]   YOB: 1950  Age: 70 y.o. Sex: female     Admit Date: 7/2/2021 LOS:  LOS: 1 day      POD # 1 S/P Procedure(s):  LEFT SHOULDER REVERSE TOTAL SHOULDER REPLACEMENT        ASSESSMENT/PLAN     Christian Eaton IS A 70 y.o. old who is s/p above surgery. She her pain is currently controlled. She is lying supine, is a ice pack on her left shoulder. But she states overall her pain is tolerable. For discharge today, later this afternoon. To home. Follow-up with  Dr. Leland Crain, 1 week. 1. Orthopaedic:  A. Pain Meds : Roxicodone   B.  DVT Prophylaxis: SCD's,   C. IV AB: Antibiotics: vancomycin as her preop Medication  D. Weight Bear: WBAT // use sling  E.  Pabloan's following patient: Consultants following patients: [address] [place] [address] on Orthopaedic service with consultations from IM seeing her for her medical issues  F. PT/OT/ Intervention/ Consults:    PT/OT : [x]PT see physical therapy yesterday/ [] OT ordered // Jyoti Callahan disposition: Home, with Evy Zarate    []  []  [] C/Cj Olguin       Visit Vitals  /65 (BP 1 Location: Right upper arm, BP Patient Position: At rest)   Pulse 83   Temp 97.5 °F (36.4 °C)   Resp 16   Ht 5' 8\" (1.727 m)   Wt 227 lb 12.8 oz (103.3 kg)   SpO2 95%   BMI 34.64 kg/m²       Appearance: Alert, well appearing and pleasant patient who is in no distress, oriented to person, place/time, and who follows commands. This patient is accompanied in the examination room by her  self. no dementia  Psychiatric: Affect and mood are appropriate.    Respiratory: Breathing is unlabored without accessory chest muscle use  Peripheral Vascular: Normal Pulses to each hand and foot    EXTREMITY LUE    DRESSINGS: Clean,dry , intact    INCISIONS:  dressings covering the incision  not assessed)  healing well    Extremities:        No embolic phenomena to the toes or hands              No significant edema to the foot and or toes.                 Pulses in tact to the left and right foot             Lower extremities are warm and appear well perfused         DVT: No evidence of DVT seen on examination at this time            Moves lower extremities well (ankle DF/PF bilateral)         Sling in place to LUE      LABS AND MEDICATION REVIEW      CMP: No results found for: NA, K, CL, CO2, AGAP, GLU, BUN, CREA, GFRAA, GFRNA, CA, MG, PHOS, ALB, TBIL, TP, ALB, GLOB, AGRAT, ALT  CBC:   Lab Results   Component Value Date/Time    WBC 8.7 07/03/2021 01:01 AM    HGB 9.7 (L) 07/03/2021 01:01 AM    HCT 32.4 (L) 07/03/2021 01:01 AM     07/03/2021 01:01 AM     COAGS: No results found for: APTT, PTP, INR, INREXT, INREXT     Current Facility-Administered Medications   Medication Dose Route Frequency    aspirin delayed-release tablet 81 mg  81 mg Oral DAILY    atorvastatin (LIPITOR) tablet 20 mg  20 mg Oral DAILY    cyanocobalamin (VITAMIN B12) tablet 500 mcg  500 mcg Oral DAILY    FLUoxetine (PROzac) capsule 10 mg  10 mg Oral DAILY    fluticasone propionate (FLONASE) 50 mcg/actuation nasal spray 2 Spray  2 Spray Both Nostrils DAILY    levothyroxine (SYNTHROID) tablet 50 mcg  50 mcg Oral 6am    meloxicam (MOBIC) tablet 15 mg  15 mg Oral DAILY    metFORMIN (GLUCOPHAGE) tablet 500 mg  500 mg Oral DAILY WITH BREAKFAST    oxyCODONE IR (ROXICODONE) tablet 5-10 mg  5-10 mg Oral Q4H PRN    pantoprazole (PROTONIX) tablet 20 mg  20 mg Oral ACB&D    tiaGABine (GABITRIL) tablet 2 mg  2 mg Oral QHS    acetaminophen (TYLENOL) tablet 1,000 mg  1,000 mg Oral Q8H    gabapentin (NEURONTIN) capsule 300 mg  300 mg Oral QHS    naloxone (NARCAN) injection 1 mg  1 mg IntraVENous ONCE PRN    polyethylene glycol (MIRALAX) packet 17 g  17 g Oral DAILY    senna-docusate (PERICOLACE) 8.6-50 mg per tablet 1 Tablet  1 Tablet Oral DAILY    morphine injection 1-2 mg  1-2 mg IntraVENous Q4H PRN    albuterol (PROVENTIL VENTOLIN) nebulizer solution 2.5 mg  2.5 mg Nebulization Q4H PRN    arformoteroL (BROVANA) neb solution 15 mcg  15 mcg Nebulization BID RT    And    ipratropium (ATROVENT) 0.02 % nebulizer solution 0.5 mg  0.5 mg Nebulization Q8H RT    insulin lispro (HUMALOG) injection   SubCUTAneous AC&HS    glucose chewable tablet 16 g  4 Tablet Oral PRN    glucagon (GLUCAGEN) injection 1 mg  1 mg IntraMUSCular PRN    dextrose (D50W) injection syrg 12.5-25 g  25-50 mL IntraVENous PRN          REVIEW OF SYSTEMS : 7/3/2021  ALL BELOW ARE Negative except : SEE HPI        Past Medical History:   Diagnosis Date    Ankle fracture     Aortic stenosis     S/P AVR in 2016    Asthma     Carpal tunnel syndrome on both sides     Chronic obstructive pulmonary disease (HCC)     Chronic pain     back pain    COVID-19 vaccine series completed 04/29/2021    Diabetes ([address]     Diverticulitis     Fibromyalgia     H/O aortic valve replacement 03/2016    21 mm bovine pericardial bioprosthesis and epiaortic scanning      H/O: HTN (hypertension)     no meds now- sp valve replacement    Hypothyroidism     Lumbar post-laminectomy syndrome     Menopause     Obesity     Osteoarthritis     Psychotic disorder ([address]     Pulmonary HTN ([address]     per pcp note cc- patient denies.  Sciatica     Skin cancer 2013    right forearm. Past Surgical History:   Procedure Laterality Date    COLONOSCOPY      COLONOSCOPY N/A 9/15/2017    COLONOSCOPY performed by Gladis Natarajan MD at [address] [place] [address] Left 03/2021    HX CATARACT REMOVAL Right 1995    lens  replaced.  HX CATARACT REMOVAL Left 02/2018    HX COLONOSCOPY  09/15/2017    dr. Vásquez Cons  f/u 5 years.  HX HEENT  1990's    sinus surgery     HX HEENT Right 11/02/2017    laser for right eye cataracts.      HX LUMBAR FUSION  2004    L3-L4-L5    HX LUMBAR LAMINECTOMY  2002  HX MOHS PROCEDURES Left 1990    HX ORTHOPAEDIC      Rotator cuff Bilateral    HX SHOULDER REPLACEMENT Right 03/29/2017    reverse.  MS CARDIAC SURG PROCEDURE UNLIST  03/2016    aortic valve replacement.  MS CHEST SURGERY PROCEDURE UNLISTED  03/2016    Open Heart Surgery    MS COLSC FLX W/RMVL OF TUMOR POLYP LESION SNARE TQ  07/09/2014 repeat in 3 years    Dr. Caroline San  2016    Aortic valve replacement(bovine)      Social History     Socioeconomic History    Marital status:      Spouse name: Not on file    Number of children: Not on file    Years of education: Not on file    Highest education level: Not on file   Occupational History    Not on file   Tobacco Use    Smoking status: Former Smoker     Years: 45.00     Types: Cigarettes     Quit date: 7/1/2011     Years since quitting: 10.0    Smokeless tobacco: Never Used   Vaping Use    Vaping Use: Never used   Substance and Sexual Activity    Alcohol use: No    Drug use: No    Sexual activity: Never   Other Topics Concern    Not on file   Social History Narrative    Not on file     Social Determinants of Health     Financial Resource Strain:     Difficulty of Paying Living Expenses:    Food Insecurity:     Worried About [address] [employer] in the Last Year:     [address] [employer] in the Last Year:    Transportation Needs:     Lack of Transportation (Medical):      Lack of Transportation (Non-Medical):    Physical Activity:     Days of Exercise per Week:     Minutes of Exercise per Session:    Stress:     Feeling of Stress :    Social Connections:     Frequency of Communication with Friends and Family:     Frequency of Social Gatherings with Friends and Family:     Attends Jain Services:     Active Member of Clubs or Organizations:     Attends Club or Organization Meetings:     Marital Status:    Intimate Partner Violence:     Fear of Current or Ex-Partner:     Emotionally Abused:     Physically Abused:     Sexually Abused:       Family History   Problem Relation Age of Onset    Hypertension Father     Heart Disease Father     Alcohol abuse Father      Prior to Admission medications    Medication Sig Start Date End Date Taking? Authorizing Provider   aspirin delayed-release 81 mg tablet Take 81 mg by mouth daily. Yes Provider, Historical   predniSONE (DELTASONE) 2.5 mg tablet Take 3 Tablets by mouth daily. Take 7.5 mg on June 29, 7.5 mg on June 30, 5 mg on July 1, 5 mg July 2 prior to arriving to hospital 6/29/21  Yes JONNA Elizabeth   traMADoL (ULTRAM) 50 mg tablet Take 50 mg by mouth every six (6) hours as needed for Pain. Yes Provider, Historical   meloxicam (MOBIC) 15 mg tablet Take 1 tablet by mouth once daily 6/8/21  Yes Jabari Patel MD   fluticasone propionate (FLONASE) 50 mcg/actuation nasal spray Use 1 spray(s) in each nostril twice daily 6/8/21  Yes Jabari Patel MD   tiaGABine (GabitriL) 2 mg tablet Take 1 Tab by mouth nightly. 5/6/21  Yes Vance Joe MD   levothyroxine (SYNTHROID) 50 mcg tablet TAKE 1 TABLET BY MOUTH ONCE DAILY BEFORE BREAKFAST 5/5/21  Yes Jabari Patel MD   pantoprazole (PROTONIX) 20 mg tablet Take 1 tablet by mouth once daily 4/2/21  Yes Jabari Patel MD   desonide (TRIDESILON) 0.05 % cream APPLY   EXTERNALLY TO AFFECTED AREA TWICE DAILY 2/1/21  Yes Jabari Patel MD   metFORMIN (GLUCOPHAGE) 500 mg tablet TAKE 1 TABLET BY MOUTH ONCE DAILY WITH SUPPER 12/30/20  Yes Jabari Patel MD   FLUoxetine (PROzac) 10 mg capsule Take 1 Cap by mouth daily. 11/12/20  Yes Jabari Patel MD   atorvastatin (LIPITOR) 20 mg tablet Take 1 tablet by mouth once daily 11/3/20  Yes Jabari Patel MD   glucose blood VI test strips (True Metrix Pro Test Strip) strip Test blood glucose 3 times a day 6/5/20  Yes Kyle Bermudez MD   STIOLTO RESPIMAT 2.5-2.5 mcg/actuation inhaler Take 2 Puffs by inhalation daily.  5/29/19  Yes Provider, Historical vitamin E acetate (VITAMIN E PO) Take  by mouth daily. Yes Provider, Historical   cetirizine (ZYRTEC) 10 mg tablet TAKE 1 TABLET BY MOUTH EVERY DAY. GENERIC FOR ZYRTEC 2/8/18  Yes Pablo Garza, DO   VENTOLIN HFA 90 mcg/actuation inhaler 2 Puffs by Aerosolization route every four (4) hours as needed. 9/5/17  Yes Provider, Historical   B.infantis-B.ani-B.long-B.bifi (PROBIOTIC 4X) 10-15 mg TbEC Take  by mouth daily. Yes Provider, Historical   Cholecalciferol, Vitamin D3, (VITAMIN D3) 1,000 unit cap Take 1,000 Units by mouth daily. Yes Provider, Historical   Lancets (ACCU-CHEK SOFTCLIX LANCETS) misc Please use one lancet to test blood sugars twice a day. 9/4/14  Yes Pablo Garza, DO   BLOOD-GLUCOSE METER (ACCU-CHEK MARCE MONITORING) by Does Not Apply route. Yes Provider, Historical   oxyCODONE IR (ROXICODONE) 5 mg immediate release tablet Take 1 Tablet by mouth every four (4) hours as needed for Pain for up to 7 days. Max Daily Amount: 30 mg. DO NOT TAKE UNTIL AFTER SURGERY (for acute post operative pain)  Patient not taking: Reported on 7/2/2021 6/29/21 7/6/21  JONNA Chavira   meclizine (ANTIVERT) 25 mg tablet Take 1 Tablet by mouth three (3) times daily as needed for Dizziness. Patient not taking: Reported on 7/2/2021 6/8/21   Darrell Cool MD   cyanocobalamin (VITAMIN B-12) 500 mcg tablet Take 500 mcg by mouth daily.     Provider, Historical     Current Facility-Administered Medications   Medication Dose Route Frequency    aspirin delayed-release tablet 81 mg  81 mg Oral DAILY    atorvastatin (LIPITOR) tablet 20 mg  20 mg Oral DAILY    cyanocobalamin (VITAMIN B12) tablet 500 mcg  500 mcg Oral DAILY    FLUoxetine (PROzac) capsule 10 mg  10 mg Oral DAILY    fluticasone propionate (FLONASE) 50 mcg/actuation nasal spray 2 Spray  2 Spray Both Nostrils DAILY    levothyroxine (SYNTHROID) tablet 50 mcg  50 mcg Oral 6am    meloxicam (MOBIC) tablet 15 mg  15 mg Oral DAILY    metFORMIN (GLUCOPHAGE) tablet 500 mg  500 mg Oral DAILY WITH BREAKFAST    oxyCODONE IR (ROXICODONE) tablet 5-10 mg  5-10 mg Oral Q4H PRN    pantoprazole (PROTONIX) tablet 20 mg  20 mg Oral ACB&D    tiaGABine (GABITRIL) tablet 2 mg  2 mg Oral QHS    acetaminophen (TYLENOL) tablet 1,000 mg  1,000 mg Oral Q8H    gabapentin (NEURONTIN) capsule 300 mg  300 mg Oral QHS    naloxone (NARCAN) injection 1 mg  1 mg IntraVENous ONCE PRN    polyethylene glycol (MIRALAX) packet 17 g  17 g Oral DAILY    senna-docusate (PERICOLACE) 8.6-50 mg per tablet 1 Tablet  1 Tablet Oral DAILY    morphine injection 1-2 mg  1-2 mg IntraVENous Q4H PRN    albuterol (PROVENTIL VENTOLIN) nebulizer solution 2.5 mg  2.5 mg Nebulization Q4H PRN    arformoteroL (BROVANA) neb solution 15 mcg  15 mcg Nebulization BID RT    And    ipratropium (ATROVENT) 0.02 % nebulizer solution 0.5 mg  0.5 mg Nebulization Q8H RT    insulin lispro (HUMALOG) injection   SubCUTAneous AC&HS    glucose chewable tablet 16 g  4 Tablet Oral PRN    glucagon (GLUCAGEN) injection 1 mg  1 mg IntraMUSCular PRN    dextrose (D50W) injection syrg 12.5-25 g  25-50 mL IntraVENous PRN     Allergies   Allergen Reactions    Latex, Natural Rubber Hives    Adhesive Rash     Some bandaids    Ciprofloxacin Rash    Codeine Nausea and Vomiting    Demerol [Meperidine] Nausea and Vomiting    Diclofenac Shortness of Breath and Palpitations    Erythromycin Rash    Levaquin [Levofloxacin] Rash    Loratadine Swelling    Morphine Nausea and Vomiting    Penicillin G Hives and Rash     Does fine with Keflex    Sulfa (Sulfonamide Antibiotics) Itching    Vicodin [Hydrocodone-Acetaminophen] Other (comments)     Metallic taste in mouth           Khari Moore MD  7/3/2021  6:57 AM

## 2021-07-03 NOTE — PROGRESS NOTES
Problem: Patient Education: Go to Patient Education Activity  Goal: Patient/Family Education  Outcome: Progressing Towards Goal     Problem: Falls - Risk of  Goal: *Absence of Falls  Description: Document Chip Armor Fall Risk and appropriate interventions in the flowsheet. Outcome: Progressing Towards Goal  Note: Fall Risk Interventions:  Mobility Interventions: Patient to call before getting OOB         Medication Interventions: Patient to call before getting OOB    Elimination Interventions: Call light in reach, Patient to call for help with toileting needs    History of Falls Interventions: Consult care management for discharge planning         Problem: Patient Education: Go to Patient Education Activity  Goal: Patient/Family Education  Outcome: Progressing Towards Goal     Problem: Pain  Goal: *Control of Pain  Outcome: Progressing Towards Goal  Goal: *PALLIATIVE CARE:  Alleviation of Pain  Outcome: Progressing Towards Goal     Problem: Pressure Injury - Risk of  Goal: *Prevention of pressure injury  Description: Document Joey Scale and appropriate interventions in the flowsheet. Outcome: Progressing Towards Goal  Note: Pressure Injury Interventions:       Moisture Interventions: Absorbent underpads    Activity Interventions: Increase time out of bed, Pressure redistribution bed/mattress(bed type)    Mobility Interventions: HOB 30 degrees or less, Pressure redistribution bed/mattress (bed type)    Nutrition Interventions: Document food/fluid/supplement intake                     Problem: Patient Education: Go to Patient Education Activity  Goal: Patient/Family Education  Outcome: Progressing Towards Goal     Problem: Impaired Skin Integrity/Pressure Injury Treatment  Goal: *Improvement of Existing Pressure Injury  Outcome: Progressing Towards Goal  Goal: *Prevention of pressure injury  Description: Document Joey Scale and appropriate interventions in the flowsheet.   Outcome: Progressing Towards Goal  Note: Pressure Injury Interventions:       Moisture Interventions: Absorbent underpads    Activity Interventions: Increase time out of bed, Pressure redistribution bed/mattress(bed type)    Mobility Interventions: HOB 30 degrees or less, Pressure redistribution bed/mattress (bed type)    Nutrition Interventions: Document food/fluid/supplement intake                     Problem: Patient Education: Go to Patient Education Activity  Goal: Patient/Family Education  Outcome: Progressing Towards Goal

## 2021-07-03 NOTE — PROGRESS NOTES
Discharge order noted for today. Orders reviewed. No needs identified at this time.  remains available if needed. Pt to be transported home by her family.     Carroll Steven RN BSN  Care Manager  [phone]

## 2021-07-03 NOTE — ROUTINE PROCESS
10mg of Oxycodone pulled      Pt requested 5mg  Will return the 5mg     Pt is pending d/c  informed family to come at noon

## 2021-07-03 NOTE — ROUTINE PROCESS
Bedside shift change report given to [address] (oncoming nurse) by Rosaline Kaplan RN (offgoing nurse). Report included the following information SBAR, Kardex, Intake/Output and MAR.
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
## 2021-07-07 ENCOUNTER — HOSPITAL ENCOUNTER (OUTPATIENT)
Dept: PHYSICAL THERAPY | Age: 71
End: 2021-07-07
Payer: MEDICARE

## 2021-07-08 NOTE — PROGRESS NOTES
Scarlet Ken  1950     HISTORY OF PRESENT ILLNESS  Scarlet Ken is a 70 y.o. female who presents today for evaluation s/p Left reverse total shoulder arthroplasty on 7/2/21. Patient has not been going to PT.she starts next week. Describes pain as a 5/10. Has been taking roxicodone and tylenol for pain. Still has night pain. Patient denies any fever, chills, chest pain, shortness of breath or calf pain. The remainder of the review of systems is negative. There are no new illness or injuries to report since last seen in the office. No changes in medications, allergies, social or family history. PHYSICAL EXAM:   Visit Vitals  Pulse 90   Resp 15   Ht 5' 8\" (1.727 m)   SpO2 99%   BMI 34.64 kg/m²      The patient is a well-developed, well-nourished female in no acute distress. The patient is alert and oriented times three. The patient appears to be well groomed. Mood and affect are normal.  ORTHOPEDIC EXAM of left shoulder:  Inspection: swelling present,  Bruising present  Incision, clean, dry, intact, sutures in place  Passive glenohumeral abduction 0-30 degrees  Stability: Stable  Strength: n/a  2+ distal pulses    IMAGING: 3 view xray images of Left shoulder on 7/9/2021 read and reviewed by myself reveal reverse total shoulder arthroplasty in excellent position     IMPRESSION:  S/P Left reverse total shoulder arthroplasty  Encounter Diagnosis   Name Primary?  Rotator cuff arthropathy of left shoulder Yes      PLAN:   Incisions cleaned. Surgery was discussed at length today. Patient to continue with PROM and PT. Continue wearing sling. Stressed to patient that nothing causes an increase in pain.   RTC 3 weeks    [facility]

## 2021-07-09 ENCOUNTER — OFFICE VISIT (OUTPATIENT)
Dept: ORTHOPEDIC SURGERY | Age: 71
End: 2021-07-09
Payer: MEDICARE

## 2021-07-09 VITALS — OXYGEN SATURATION: 99 % | BODY MASS INDEX: 34.64 KG/M2 | RESPIRATION RATE: 15 BRPM | HEART RATE: 90 BPM | HEIGHT: 68 IN

## 2021-07-09 DIAGNOSIS — M12.812 ROTATOR CUFF ARTHROPATHY OF LEFT SHOULDER: Primary | ICD-10-CM

## 2021-07-09 PROCEDURE — 99024 POSTOP FOLLOW-UP VISIT: CPT | Performed by: PHYSICIAN ASSISTANT

## 2021-07-09 PROCEDURE — 73030 X-RAY EXAM OF SHOULDER: CPT | Performed by: PHYSICIAN ASSISTANT

## 2021-07-13 ENCOUNTER — HOSPITAL ENCOUNTER (OUTPATIENT)
Dept: PHYSICAL THERAPY | Age: 71
Discharge: HOME | End: 2021-07-13
Payer: MEDICARE

## 2021-07-13 ENCOUNTER — TELEPHONE (OUTPATIENT)
Dept: ORTHOPEDIC SURGERY | Age: 71
End: 2021-07-13

## 2021-07-13 PROCEDURE — 97161 PT EVAL LOW COMPLEX 20 MIN: CPT

## 2021-07-13 NOTE — PROGRESS NOTES
[address] [place] [address] PHYSICAL THERAPY  [address] [place] [address] Татьяна Lazo, [address] - Phone: [phone]  Fax: [phone] / [facility]  Patient Name: Guero Martinez : 1950   Medical   Diagnosis: Left shoulder pain [M25.512]  Status post arthroscopy of left shoulder [Z98.890] Treatment Diagnosis: S/P left Reverse TSA   Onset Date: 21     [facility]   Prior Hospitalization: See medical history Provider #: [id number]   Prior Level of Function: Chronic pain, retired/disability. Comorbidities: Fibromyalgia, Osteoporosis, Diabetes, Depression, Thyroid dysfunction, latex allergy, COPD, Asthma   Medications: Verified on Patient Summary List   The Plan of Care and following information is based on the information from the initial evaluation.   ===========================================================================================  Assessment / key information: Patient is a 70 y.o. female presenting with left sling for S/P Reverse TSA, DOS 21. She reports recent history of fever prohibited her from attending PT immediately after her surgery. She has since had a f/u to address fever and been cleared of infection. She currently demonstrates the following objective measures:                  PROM                                                 PROM   Shoulder Right  Shoulder Left Right   Flexion 142 Flexion 38 147   Extension 20 Extension 0 23   Scaption/ Scaption/ABD 17 119   ER @ 0 Degrees 19 ER @ 0 Degrees -20 from neutral 21   Elbow Extension 0 Elbow extension -30 0   Pt  will benefit from physical therapist management to address her impairments (listed below),  educate her, and improve her level of function.  Thanks for your referral.   ===========================================================================================  Eval Complexity: History: HIGH Complexity :3+ comorbidities / personal factors will impact the outcome/ POC Exam:HIGH Complexity : 4+ Standardized tests and measures addressing body structure, function, activity limitation and / or participation in recreation  Presentation: LOW Complexity : Stable, uncomplicated  Clinical Decision Making:HIGH Complexity : FOTO score of 1- 25 Overall Complexity:LOW   Problem List: pain affecting function, decrease ROM, decrease strength, decrease ADL/ functional abilitiies, decrease activity tolerance, decrease flexibility/ joint mobility and decrease transfer abilities  FOTO score: 4 indicating 96% functional disability  Treatment Plan may include any combination of the following: Therapeutic exercise, Therapeutic activities, Neuromuscular re-education, Physical agent/modality, Manual therapy, Patient education, Self Care training, Functional mobility training and Home safety training  Patient / Family readiness to learn indicated by: asking questions, trying to perform skills and interest  Persons(s) to be included in education: patient (P)  Barriers to Learning/Limitations: yes;  physical  Measures taken: Treatment modified as necessary   Patient Goal (s): \"Use my arm again\"   Patient self reported health status: fair  Rehabilitation Potential: good   Short Term Goals: To be accomplished in  4  weeks:  1. Patient to be adherent to HEP to facilitate pain control with ADL's.  2. Patient to demonstrate left elbow PROM extension to < 5 degrees from extension to prevent contracture and allow for forward reach as protocol allows. 3. Patient to demonstrate left shoulder PROM flexion to > 90 degrees to prepare for AAROM/AROM in next stages of protocol. 4. Patient to demonstrate left PROM ER to neutral to prevent contracture.  Long Term Goals: To be accomplished in  8  weeks:  1. Patient to be Safe and Independent with HEP to self-manage/prevent symptoms after DC.   2. Patient to increase FS FOTO score to > 40 to indicate increased functional independence. 3. Patient to report return to sleeping in bed to allow for greater comfort and uninterrupted sleep. 4. Patient to demonstrate > 140 degrees AROM flexion to facilitate OH reach. 5. Patient to report minimal difficulty donning/doffing UE clothing. Frequency / Duration:   Patient to be seen  2-3  times per week for 4-8  weeks:  Patient / Caregiver education and instruction: activity modification and exercises. We reviewed our facility's Patient Personal Responsibilities (PPR) form, particularly in regards to compliance towards her appointment time, our attendance policy, and her home exercise program. Patient was informed of possible discharge for non-compliance to our attendance policy per PPR form. We also discussed her POC as deemed appropriate by the treating therapist and physician. Patient verbalized understanding that she must show objective and functional improvement in an appropriate time frame. Patient verbalized understanding that should progress or compliance be lacking, we will contact the referring physician for further consultation to address and attempt to establish alternate treatment strategies as necessary and/or possibly discharge. Therapist Signature: Inez Nolan \"BJ\" Jose Davis, DPT, Cert. MDT, Cert. DN, Cert. SMT, Dip. Osteopractic Date: 0/16/7434   Certification Period: 7/13/21-8/3/21 Time: 12:26 PM   ===========================================================================================  I certify that the above Physical Therapy Services are being furnished while the patient is under my care. I agree with the treatment plan and certify that this therapy is necessary. Physician Signature:        Date:       Time:                                         Estefani Brennan   Please sign and return to [employer] or you may fax the signed copy to [phone]. Thank you.

## 2021-07-13 NOTE — TELEPHONE ENCOUNTER
Please send over [facility] total shoulder protocol. They should have this. Sling x 4 weeks.  Prom until cleared

## 2021-07-13 NOTE — PROGRESS NOTES
PHYSICAL THERAPY - DAILY TREATMENT NOTE  Patient Name: Indio Partida        Date: 2021  : 1950   [x]  Patient  Verified  Visit #:   1     Insurance: Payor: BLUE CROSS MEDICARE / Plan: Diana N Dionisio Russell HMO / Product Type: Managed Care Medicare /      In time:   11:45          Out time:   12:10 Total Treatment Time (min):   25   Medicare Time Tracking (below)   Total Timed Codes (min):  0 1:1 Treatment Time:  0     SUBJECTIVE    Pain Level (on 0 to 10 scale):  6   10   Medication Changes/New allergies or changes in medical history, any new surgeries or procedures? [x]  No    []  Yes   If yes, update Summary List:    Subjective Functional Status/Changes:  []  No changes reported   HISTORY    Present Symptoms: Left shoulder pain and discomfort    Present since: 21  [] Improving []  Unchanging []  Worsening          Commenced as a result of: Reverse TSA on 21. Was unable to attend PT last week due to running a fever. Went to see PA, cleared of infection. or []  No apparent reason    Symptoms at onset: shoulder pain and discomfort    Constant symptoms: Sore/ache    Intermittent symptoms: none    What produces or worsens: n/a    What stops or reduces: medication, rest, ice, sleeping in recliner. Continued use makes the pain:  [] Better [x]  Worse []  No effect     Disturbed night: [] No    [x] Yes    Pain at rest: [] No    [x] Yes       Treatments this episode: per above    Previous episodes: right reverse TSA    Previous treatment: n/a    Spinal history: See list    Paraesthesia: [] No    [x] Yes     General Health:  [] Good []  Fair [x]  Poor     Imaging:   [] Yes [x]  No         Work:  Mechanical Stresses: retired/disability.      Leisure: Mechanical Stresses: sedentary    Functional disability from present episode: restricted to sling    Summary:  [] Acute []  Sub-acute []  Chronic     [] Trauma []  Insidious onset        OBJECTIVE      Physical Therapy Evaluation - Shoulder    Posture: [] Poor    [x] Fair    [] Good        Neurological (upper):  Myotome Level Muscle Test Nerve Reflex Sensation   C5 Deltoid (C5&C6) Axillary N/A Acromion to Lateral Epicondyle    Biceps (C5&C6) Musculocutaneous Biceps Tendon Purest patch at the axillary nerve at the middle deltoid    Rhomboid C5 Dorsal Scapular      Supraspinatus & Infraspinatus (C5&C6) Suprascapular      Teres Minor (C5&C6) Axillary     C6 Wrist Extensor Group (C6&C7) Radial Brachioradialis Lateral Forearm and the \"6\" with the fingers    ECRL/ECRB Radial      Supinator Deep Branch Radial or PIN      ECU (C6&C7) Posterior Interosseus     C7 Triceps (C7&C8) Radial Triceps Middle Finger    Extensor Indicis C6/7/8 Posterior Interosseus      FCR C7 Median     C8 Abductor Policis Brevis (Q9&P0) Median Nerve Recurrent Branch N/A Medial Forearm    Flexor Digitorum Superficialis C8 Median     T1 Dorsal Interossei T1 Ulnar Nerve N/A Medial Upper Arm    Abductor Digiti Quinti T1 Ulnar Nerve       Notable Deficits from above: unablem    ROM:  [] Unable to assess at this time                                           AROM                                                              PROM   Left Right  Left Right   Flexion  142 Flexion 38 147   Extension  20 Extension 0 23   Scaption/ABD  115 Scaption/ABD 17 119   ER @ 0 Degrees  19 ER @ 0 Degrees -20 from neutral 21   ER @ 90 Degrees  NT Elbow extension -30 0   IR @ 90 Degrees   IR @ 90 Degrees NT NT   IR Hand Behind Back   IR Hand Behind Back NT NT     End Feel / Painful Arc: firm    Strength:   [x] Unable to assess at this time                                                                            L (1-5) R (1-5) Pain   Flexors   [] Yes   [] No   Abductors   [] Yes   [] No   External Rotators   [] Yes   [] No   Internal Rotators   [] Yes   [] No   Elbow Flexion   [] Yes   [] No   Elbow Extension   [] Yes   [] No     Other Tests / Comments:       Other Objective/Functional Measures:    See above     Post Treatment Pain Level (on 0 to 10) scale:   6  / 10     ASSESSMENT    Assessment/Changes in Function:   SEE POC      []  See Progress Note/Recertification   Patient will continue to benefit from skilled PT services to modify and progress therapeutic interventions, address functional mobility deficits, address ROM deficits, address strength deficits, analyze and address soft tissue restrictions, analyze and cue movement patterns, analyze and modify body mechanics/ergonomics and instruct in home and community integration to attain remaining goals. Progress toward goals / Updated goals:    n/a     PLAN     []  Upgrade activities as tolerated YES Continue plan of care   []  Discharge due to :    []  Other: Initiate POC     Therapist: Kemi Kendall \"BJ\" JAMES Soto, Cert. MDT, Cert. DN, Cert.  SMT    Date: 7/13/2021 Time: 11:55 AM     Future Appointments   Date Time Provider Ada Fischer   8/4/2021  1:30 PM Mariela Montalvo, [address] [facility] BS AMB   8/5/2021 10:40 AM Benito Knight MD [facility] BS AMB   9/30/2021  1:15 PM [facility] STEREO BX RM 1 [address]   11/11/2021  1:30 PM Jaron Washington MD [facility] BS AMB

## 2021-07-13 NOTE — TELEPHONE ENCOUNTER
[facility] [employer] called in to request the \"protocol\" for the patient's physical therapy order.     Their contact number is [phone] fax number is [phone]

## 2021-07-14 ENCOUNTER — HOSPITAL ENCOUNTER (OUTPATIENT)
Dept: PHYSICAL THERAPY | Age: 71
Discharge: HOME | End: 2021-07-14
Payer: MEDICARE

## 2021-07-14 PROCEDURE — 97530 THERAPEUTIC ACTIVITIES: CPT

## 2021-07-14 PROCEDURE — 97110 THERAPEUTIC EXERCISES: CPT

## 2021-07-14 PROCEDURE — 97112 NEUROMUSCULAR REEDUCATION: CPT

## 2021-07-14 NOTE — PROGRESS NOTES
PHYSICAL THERAPY - DAILY TREATMENT NOTE    Patient Name: Gabriele Edmond        Date: 2021  : 1950   YES Patient  Verified  Visit #:   2     Insurance: Payor: BLUE CROSS MEDICARE / Plan: Diana N Dionisio Russell HMO / Product Type: Managed Care Medicare /      In time: 11:03 Out time: 11:51   Total Treatment Time: 48     Medicare/BCBS Time Tracking (below)   Total Timed Codes (min):  48 1:1 Treatment Time:  38     TREATMENT AREA = Left shoulder pain [M25.512]  Status post arthroscopy of left shoulder [Z98.890]    SUBJECTIVE    Pain Level (on 0 to 10 scale):  6  / 10   Medication Changes/New allergies or changes in medical history, any new surgeries or procedures? NO    If yes, update Summary List   Subjective Functional Status/Changes:  []  No changes reported     \"I just took my medicine so it hasn't kicked in yet. Neck (UT) is really sore. \"          OBJECTIVE     Therapeutic Procedures:  Min Procedure Specifics + Rationale   n/a [x]  Patient Education (performed throughout session) [x] Review HEP    [] Progressed/Changed HEP based on:   [] proper performance and advancement of Therex/TA   [] reduction in pain level    [] increased functional capacity       [] change in directional preference   15 [x] Therapeutic Exercise   [x]  See Flowsheet   Rationale: increase ROM and increase strength to improve the patients ability to participate in ADL's    15 [x] Therapeutic Activity   [x]  See Flowsheet  PROM per protocol guidelines  Rationale:  To improve safety, proprioception, coordination, and efficiency with tasks   8 [x] Neuromuscular Re-ed   [x]  See Flowsheet  Repeated C/s movements per MDT  Rationale: increase ROM, increase strength, improve coordination, improve balance and increase proprioception  to improve the patients ability to safely participate in ADL's     Modality rationale: decrease inflammation, decrease pain, increase tissue extensibility and increase muscle contraction/control to improve the patients ability to perform ADL's with greater ease     Min Type Additional Details   10 [x]  Cold Pack   [] pre-JCARLOS      [x] post-JCARLOS  []  Ice massage Location:left shoulder    [] supine              [] prone  [] legs elevated    [] legs flat   [] sitting   [x] Skin assessment post-treatment:  [x]intact [x]redness- no adverse reaction       []redness - adverse reaction:     Other Objective/Functional Measures:    Initiated therex/HEP  CHINEDU MANCUSO  re: protocol that had not been sent. [place] stated \"PROM only x 4 weeks\"     Post Treatment Pain Level (on 0 to 10) scale:   \"frozen\"  / 10     ASSESSMENT    Assessment/Changes in Function:       No significant complaints of pain with therex          Patient will continue to benefit from skilled PT services to modify and progress therapeutic interventions, address functional mobility deficits, address ROM deficits, address strength deficits, analyze and address soft tissue restrictions, analyze and cue movement patterns, analyze and modify body mechanics/ergonomics, assess and modify postural abnormalities, address imbalance/dizziness and instruct in home and community integration  to attain remaining goals   Progress toward goals / Updated goals:    1st session since initial eval, no significant progress noted in return to function. PLAN    [x]  Upgrade activities as tolerated  [x]  Update interventions per flow sheet YES Continue plan of care   []  Discharge due to :    []  Other:      Therapist: Elodia Avina \"BJ\" JAMES Soto, Cert. MDT, Cert. DN, Cert. SMT, Dip.  Osteopractic    Date: 7/14/2021 Time: 11:07 AM     Future Appointments   Date Time Provider Ada Fischer   7/19/2021  8:45 AM Soumya Mustafa, PT [address]   7/20/2021  8:45 AM Soumya Mustafa, PT [address]   7/22/2021  8:45 AM Nely [address]   7/28/2021  1:15 PM Nely [address]   7/30/2021 12:30 PM [address] [place] [address]   8/3/2021 11:00 AM Viet Bone, PT [facility]   8/4/2021  1:30 PM DearJONNA Coelho [facility] BS AMB   8/5/2021 10:40 AM Jagruti Lainez MD [facility] BS AMB   8/6/2021 11:00 AM Kelli Plants [facility]   8/10/2021 11:00 AM Viet Bone, PT [facility]   8/13/2021 11:00 AM Kelli Plants [facility]   8/17/2021 11:00 AM Viet Bone, PT [facility]   8/20/2021 11:00 AM Kelli Plants [facility]   8/24/2021 11:00 AM Viet Bone, PT [facility]   8/27/2021 11:00 AM Viet Bone, PT [facility]   8/31/2021 11:00 AM Viet Bone, PT [facility]   9/30/2021  1:15 PM [address] SHAE STEREO BX RM [address]   11/11/2021  1:30 PM Isidro Pickering MD [facility] BS AMB

## 2021-07-15 DIAGNOSIS — G47.01 INSOMNIA SECONDARY TO CHRONIC PAIN: Primary | ICD-10-CM

## 2021-07-15 DIAGNOSIS — G89.29 INSOMNIA SECONDARY TO CHRONIC PAIN: Primary | ICD-10-CM

## 2021-07-15 RX ORDER — ZOLPIDEM TARTRATE 5 MG/1
5 TABLET ORAL
Qty: 20 TABLET | Refills: 0 | Status: SHIPPED | OUTPATIENT
Start: 2021-07-15 | End: 2022-01-28 | Stop reason: SDUPTHER

## 2021-07-19 ENCOUNTER — HOSPITAL ENCOUNTER (OUTPATIENT)
Dept: PHYSICAL THERAPY | Age: 71
Discharge: HOME | End: 2021-07-19
Payer: MEDICARE

## 2021-07-19 ENCOUNTER — TELEPHONE (OUTPATIENT)
Dept: ORTHOPEDIC SURGERY | Age: 71
End: 2021-07-19

## 2021-07-19 PROCEDURE — 97112 NEUROMUSCULAR REEDUCATION: CPT

## 2021-07-19 PROCEDURE — 97110 THERAPEUTIC EXERCISES: CPT

## 2021-07-19 PROCEDURE — 97530 THERAPEUTIC ACTIVITIES: CPT

## 2021-07-19 NOTE — PROGRESS NOTES
PHYSICAL THERAPY - DAILY TREATMENT NOTE    Patient Name: Lilia Meckel        Date: 2021  : 1950   YES Patient  Verified  Visit #:   3     Insurance: Payor: BLUE CROSS MEDICARE / Plan: Diana N Dionisio Russell HMO / Product Type: Managed Care Medicare /      In time: 8:48 Out time: 9:36   Total Treatment Time: 48     Medicare/BCBS Time Tracking (below)   Total Timed Codes (min):  48 1:1 Treatment Time:  38     TREATMENT AREA = Left shoulder pain [M25.512]  Status post arthroscopy of left shoulder [Z98.890]    SUBJECTIVE    Pain Level (on 0 to 10 scale):  5  / 10   Medication Changes/New allergies or changes in medical history, any new surgeries or procedures? NO    If yes, update Summary List   Subjective Functional Status/Changes:  [x]  No changes reported     \"It's sore but in a good way. It was high this morning because I forgot to take any tylenol last night because I fell asleep. I took it this morning and it's on the way down \"          OBJECTIVE     Therapeutic Procedures:  Min Procedure Specifics + Rationale   n/a [x]  Patient Education (performed throughout session) [x] Review HEP    [] Progressed/Changed HEP based on:   [] proper performance and advancement of Therex/TA   [] reduction in pain level    [] increased functional capacity       [] change in directional preference   15 [x] Therapeutic Exercise   [x]  See Flowsheet   Rationale: increase ROM and increase strength to improve the patients ability to participate in ADL's    15 [x] Therapeutic Activity   [x]  See Flowsheet  PROM to left shoulder per protocol guidelines  Rationale: To improve safety, proprioception, coordination, and efficiency with tasks   8 [x] Neuromuscular Re-ed   [x]  See Flowsheet  Repeated movements per MDT protocol.   Rationale: increase ROM, increase strength, improve coordination, improve balance and increase proprioception  to improve the patients ability to safely participate in ADL's     Modality rationale: decrease inflammation, decrease pain, increase tissue extensibility and increase muscle contraction/control to improve the patients ability to perform ADL's with greater ease     Min Type Additional Details   10 [x]  Cold Pack   [] pre-JCARLOS      [x] post-JCARLOS  []  Ice massage Location:left shoulder    [x] supine              [] prone  [x] legs elevated    [] legs flat   [] sitting   [x] Skin assessment post-treatment:  [x]intact [x]redness- no adverse reaction       []redness - adverse reaction:     Other Objective/Functional Measures:    Performed repeated retractions between therex, in particular pendulums. Post Treatment Pain Level (on 0 to 10) scale:   4 / 10     ASSESSMENT    Assessment/Changes in Function:       Improved tolerance to lying supine. Patient will continue to benefit from skilled PT services to modify and progress therapeutic interventions, address functional mobility deficits, address ROM deficits, address strength deficits, analyze and address soft tissue restrictions, analyze and cue movement patterns, analyze and modify body mechanics/ergonomics and instruct in home and community integration  to attain remaining goals   Progress toward goals / Updated goals:    Compliant to precautions     PLAN    [x]  Upgrade activities as tolerated  [x]  Update interventions per flow sheet YES Continue plan of care   []  Discharge due to :    []  Other:      Therapist: Brooke Mayo \"BJ\" [address] [place] [address] Drive, DPT, Cert. MDT, Cert. DN, Cert. SMT, Dip.  Osteopractic    Date: 7/19/2021 Time: 8:43 AM     Future Appointments   Date Time Provider Ada Fischer   7/19/2021  8:45 AM Lori Menezes PT [facility]   7/20/2021  8:45 AM Lori Menezes PT [facility]   7/22/2021  8:45 AM Assunta Russian [facility]   7/28/2021  1:15 PM Assunta Russian [facility]   7/30/2021 12:30 PM Assunta Russian [facility]   8/3/2021 11:00 AM Lori Menezes PT [facility]   8/4/2021  1:30 PM Ron HUFF PA [facility] BS AMB   8/5/2021 10:40 AM Elyssa Blum MD [facility] BS AMB   8/6/2021 11:00 [facility]   8/10/2021 11:00 AM Hollice Quiet, PT [facility]   8/13/2021 11:00 AM [facility]   8/17/2021 11:00 AM Hollice Quiet, PT [facility]   8/20/2021 11:00 AM [facility]   8/24/2021 11:00 AM Hollice Quiet, PT [facility]   8/27/2021 11:00 AM Hollice Quiet, PT [facility]   8/31/2021 11:00 AM Hollice Quiet, PT [facility]   9/30/2021  1:15 PM [facility] SHAE STEREO BX RM 1 [address]   11/11/2021  1:30 PM Jose Armando Gilliland MD [facility] BS AMB

## 2021-07-19 NOTE — TELEPHONE ENCOUNTER
Please call patient and let her know this is something our surgery scheduler takes care of. Please have Dennis Martinez give her an update on this.

## 2021-07-19 NOTE — TELEPHONE ENCOUNTER
Patient calling in regards to multiple letters received from her insurance company stating approval for procedure @ [facility]. Patient states referral was sent by Hiwot Rawls and was never discussed. Requesting call back.      Patient: Hiwot Rawls  [facility] #: [phone]

## 2021-07-20 ENCOUNTER — HOSPITAL ENCOUNTER (OUTPATIENT)
Dept: PHYSICAL THERAPY | Age: 71
Discharge: HOME | End: 2021-07-20
Payer: MEDICARE

## 2021-07-20 PROCEDURE — 97112 NEUROMUSCULAR REEDUCATION: CPT

## 2021-07-20 PROCEDURE — 97110 THERAPEUTIC EXERCISES: CPT

## 2021-07-20 PROCEDURE — 97530 THERAPEUTIC ACTIVITIES: CPT

## 2021-07-20 NOTE — PROGRESS NOTES
PHYSICAL THERAPY - DAILY TREATMENT NOTE    Patient Name: Reanna Reina        Date: 2021  : 1950   YES Patient  Verified  Visit #:   4     Insurance: Payor: BLUE CROSS MEDICARE / Plan: [address] Russell HMO / Product Type: Managed Care Medicare /      In time: 8:43 Out time: 9:31   Total Treatment Time: 48     Medicare/BCBS Time Tracking (below)   Total Timed Codes (min):  48 1:1 Treatment Time:  38     TREATMENT AREA = Left shoulder pain [M25.512]  Status post arthroscopy of left shoulder [Z98.890]    SUBJECTIVE    Pain Level (on 0 to 10 scale):  5  / 10   Medication Changes/New allergies or changes in medical history, any new surgeries or procedures? NO    If yes, update Summary List   Subjective Functional Status/Changes:  []  No changes reported     \"I ate ice cream last night and I'm lactose intolerant. I took my lactose pills before but I'm still feeling it this morning for some reason. \"   \"I was able to do my laundry\"       OBJECTIVE     Therapeutic Procedures:  Min Procedure Specifics + Rationale   n/a [x]  Patient Education (performed throughout session) [x] Review HEP    [] Progressed/Changed HEP based on:   [] proper performance and advancement of Therex/TA   [] reduction in pain level    [] increased functional capacity       [] change in directional preference   12 [x] Therapeutic Exercise   [x]  See Flowsheet   Rationale: increase ROM and increase strength to improve the patients ability to participate in ADL's    18 [x] Therapeutic Activity   [x]  See Flowsheet  PROM per protocol  Rationale:  To improve safety, proprioception, coordination, and efficiency with tasks   8 [x] Neuromuscular Re-ed   [x]  See Flowsheet  Rationale: increase ROM, increase strength, improve coordination, improve balance and increase proprioception  to improve the patients ability to safely participate in ADL's     Modality rationale: decrease inflammation, decrease pain, increase tissue extensibility and increase muscle contraction/control to improve the patients ability to perform ADL's with greater ease     Min Type Additional Details   10 [x]  Cold Pack   [] pre-JCARLOS      [x] post-JCARLOS  []  Ice massage Location:left shoulder    [] supine              [] prone  [] legs elevated    [] legs flat   [] sitting   [x] Skin assessment post-treatment:  [x]intact [x]redness- no adverse reaction       []redness - adverse reaction:     Other Objective/Functional Measures:    Increased reps/sets/resistance per flow sheet. Post Treatment Pain Level (on 0 to 10) scale:   2  / 10     ASSESSMENT    Assessment/Changes in Function:       Improving in elbow ROM        Patient will continue to benefit from skilled PT services to modify and progress therapeutic interventions, address functional mobility deficits, address ROM deficits, address strength deficits, analyze and address soft tissue restrictions, analyze and cue movement patterns, analyze and modify body mechanics/ergonomics and instruct in home and community integration  to attain remaining goals   Progress toward goals / Updated goals:    Continued compliance to HEP and precautions     PLAN    [x]  Upgrade activities as tolerated  [x]  Update interventions per flow sheet YES Continue plan of care   []  Discharge due to :    []  Other:      Therapist: Claudine Lr \"BJ\" Rodolfo Pineda, JAMES, Cert. MDT, Cert. DN, Cert. SMT, Dip.  Osteopractic    Date: 7/20/2021 Time: 8:46 AM     Future Appointments   Date Time Provider Ada Fischer   7/22/2021  8:45 AM Rudolm Bailer [facility]   7/28/2021  1:15 PM Rudolm Bailer [facility]   7/30/2021 12:30 PM Rudolm Bailer [facility]   8/3/2021 11:00 AM Remy Rodriguez PT [facility]   8/4/2021  1:30 PM JONNA Sexton [facility] BS AMB   8/5/2021 10:40 AM Rosalva Pacheco MD [facility] BS AMB   8/6/2021 11:00 AM Rudolm Bailer [facility]   8/10/2021 11:00 AM Remy Rodriguez PT [facility]   8/13/2021 11:00 AM [address] [facility]   8/17/2021 11:00 AM Remy Rodriguez, PT [facility]   8/20/2021 11:00 AM Sandoval Villalba [facility]   8/24/2021 11:00 AM Remy Rodriguez PT [facility]   8/27/2021 11:00 AM Remy Rodriguez PT [facility]   8/31/2021 11:00 AM Remy Rodriguez PT [facility]   9/30/2021  1:15 PM [address] [facility] STEREO BX RM [address]   11/11/2021  1:30 PM Luisa Dennis MD [facility] BS AMB

## 2021-07-22 ENCOUNTER — HOSPITAL ENCOUNTER (OUTPATIENT)
Dept: PHYSICAL THERAPY | Age: 71
Discharge: HOME | End: 2021-07-22
Payer: MEDICARE

## 2021-07-22 PROCEDURE — 97110 THERAPEUTIC EXERCISES: CPT

## 2021-07-22 PROCEDURE — 97530 THERAPEUTIC ACTIVITIES: CPT

## 2021-07-22 NOTE — PROGRESS NOTES
PHYSICAL THERAPY - DAILY TREATMENT NOTE    Patient Name: Merced Arteaga        Date: 2021  : 1950   YES Patient  Verified  Visit #:   5     Insurance: Payor: BLUE CROSS MEDICARE / Plan: [address] Dionisio  HMO / Product Type: Managed Care Medicare /      In time: 256 Out time: 163   Total Treatment Time: 45     Medicare/BCBS Time Tracking (below)   Total Timed Codes (min):  35 1:1 Treatment Time:  35     TREATMENT AREA =  Left shoulder pain [M25.512]  Status post arthroscopy of left shoulder [Z98.890]    SUBJECTIVE    Pain Level (on 0 to 10 scale):  5  / 10   Medication Changes/New allergies or changes in medical history, any new surgeries or procedures? NO    If yes, update Summary List   Subjective Functional Status/Changes:  []  No changes reported     Pt denies any adverse effects from last session. Compliance with HEP  Yes [] No []         OBJECTIVE  Therapeutic Procedures:  Min Procedure Specifics + Rationale   19(19) [x] Therapeutic Exercise    See Flowsheet   Rationale: increase ROM and increase strength to improve the patients ability to participate in ADL's        8 [x] Therapeutic Activity See Flow Sheet  Rationale: To improve safety, proprioception, coordination, and efficiency with tasks such as stair negotiation, transfers, bed mobility, and lifting mechanics   8 [x] Neuromuscular Re-Education See Flow Sheet  Rationale: To improve stability over various surfaces to decrease falls risk. Improve core control and posture control in various positions to maximize level of function. Modality rationale: decrease inflammation, decrease pain, increase tissue extensibility and increase muscle contraction/control to improve the patients ability to perform ADL's with greater ease      Min Type Additional Details   10 [x]? Cold Pack   []? pre-JCARLOS      [x]? post-JCARLOS  []? Ice massage Location:left shoulder    []?  supine              []? prone  []? legs elevated    []? legs flat   []? sitting   [x]? Skin assessment post-treatment:  [x]? intact [x]? redness- no adverse reaction       []? redness - adverse reaction:           min Patient Education:  YES Reviewed HEP         Other Objective/Functional Measures:    See flowsheet for more details    Patient Education regarding the following during session:  1.purpose pendulums and how to perform passively. Performed pendulums with right forearm on table secondary to pre-existing right wrist p! VC and demos required throughout session for proper form and increased safety         Post Treatment Pain Level (on 0 to 10) scale:   3  / 10     ASSESSMENT    Assessment/Changes in Function:     Tolerating PROM well with no reports of pain during     []  See Progress Note/Recertification   Patient will continue to benefit from skilled PT services to analyze,, cue,, progress,, modify,, demonstrate,, instruct, and address, movement patterns,, therapeutic interventions,, postural abnormalities,, soft tissue restrictions,, ROM,, strength,, functional mobility,, body mechanics/ergonomics, and home and community integration, to attain remaining goals. Progress toward goals / Updated goals:     Addressed PROM goals with PROM exercises and pendulums     PLAN    [x]  Upgrade activities as tolerated YES Continue plan of care   []  Discharge due to :    []  Other:      Therapist: Roro Nathan DPT    Date: 7/22/2021 Time: 8:45 AM     Future Appointments   Date Time Provider Ada Fischer   7/28/2021  1:15 PM Meliza Presume [facility]   7/30/2021 12:30 PM Meliza Presume [facility]   8/3/2021 11:00 AM Lissette Earl PT [facility]   8/4/2021  1:30 PM JONNA Ivy BS AMB   8/5/2021 10:40 AM MD JAY Ng BS AMB   8/6/2021 11:00 AM Meliza Presume [facility]   8/10/2021 11:00 AM Lissette Earl PT [facility]   8/13/2021 11:00 AM Meliza Cornelius [facility]   8/17/2021 11:00 AM Lissette Earl PT [facility]   8/20/2021 11:00 AM Laurel November [facility]   8/24/2021 11:00 AM Emelina Reagan, PT [facility]   8/27/2021 11:00 AM Emelina Reagan PT [facility]   8/31/2021 11:00 AM Emelina Reagan PT [facility]   9/30/2021  1:15 PM [facility] BX RM 1 [address]   11/11/2021  1:30 PM Nalini Lanier MD [facility] BS AMB

## 2021-07-28 ENCOUNTER — HOSPITAL ENCOUNTER (OUTPATIENT)
Dept: PHYSICAL THERAPY | Age: 71
Discharge: HOME | End: 2021-07-28
Payer: MEDICARE

## 2021-07-28 PROCEDURE — 97110 THERAPEUTIC EXERCISES: CPT

## 2021-07-28 PROCEDURE — 97112 NEUROMUSCULAR REEDUCATION: CPT

## 2021-07-28 PROCEDURE — 97530 THERAPEUTIC ACTIVITIES: CPT

## 2021-07-28 PROCEDURE — 97016 VASOPNEUMATIC DEVICE THERAPY: CPT

## 2021-07-28 NOTE — PROGRESS NOTES
PHYSICAL THERAPY - DAILY TREATMENT NOTE    Patient Name: Salvador Gomez        Date: 2021  : 1950   YES Patient  Verified  Visit #:   6     Insurance: Payor: Jhoan Cobos / Plan: [address] HMO / Product Type: Managed Care Medicare /      In time:  Out time: 140   Total Treatment Time: 48     Medicare/[facility] Time Tracking (below)   Total Timed Codes (min):  38 1:1 Treatment Time:  38     TREATMENT AREA =  Left shoulder pain [M25.512]  Status post arthroscopy of left shoulder [Z98.890]    SUBJECTIVE    Pain Level (on 0 to 10 scale):  4  / 10   Medication Changes/New allergies or changes in medical history, any new surgeries or procedures? NO    If yes, update Summary List   Subjective Functional Status/Changes:  []  No changes reported     Pt reports no adverse effect from last session. Compliance with HEP  Yes [x] No []         OBJECTIVE  Therapeutic Procedures:  Min Procedure Specifics + Rationale   18(18) [x] Therapeutic Exercise    See Flowsheet   Rationale: increase ROM and increase strength to improve the patients ability to participate in ADL's      10 [x] Therapeutic Activity See Flow Sheet  Rationale: To improve safety, proprioception, coordination, and efficiency with tasks such as stair negotiation, transfers, bed mobility, and lifting mechanics   10 [x] Neuromuscular Re-Education See Flow Sheet  Rationale: To improve stability over various surfaces to decrease falls risk. Improve core control and posture control in various positions to maximize level of function.            Modality rationale: decrease inflammation, decrease pain, increase tissue extensibility and increase muscle contraction/control to improve the patients ability to perform ADL's with greater ease   Time: 10' Additional Details    [x]  Vasopneumatic Device, lowest press/temp    Left axilla : 47cm                          Vasopnuematic compression justification:  Per left girth measures taken and listed above the edema is considered significant and having an impact on the patient's strength, self care and ADLs      Location  [] Right Knee [x]Left Shoulder  [] Left Knee []Right Ankle  [] Right Shoulder [] Left Ankle    Skin assessment post-treatment:  intact no adverse reaction          min Patient Education:  YES Reviewed HEP         Other Objective/Functional Measures:    See flowsheet for more details      min VC and demos required throughout session for proper form and increased safety         Post Treatment Pain Level (on 0 to 10) scale:   3  / 10     ASSESSMENT    Assessment/Changes in Function:     Decreased apprehension during PROM and will advance exercises per protocol next session. []  See Progress Note/Recertification   Patient will continue to benefit from skilled PT services to analyze,, cue,, progress,, modify,, demonstrate,, instruct, and address, movement patterns,, therapeutic interventions,, postural abnormalities,, soft tissue restrictions,, ROM,, strength,, functional mobility,, body mechanics/ergonomics, and home and community integration, to attain remaining goals. Progress toward goals / Updated goals:    1. Patient to be adherent to HEP to facilitate pain control with ADL's.  2. Patient to demonstrate left elbow PROM extension to < 5 degrees from extension to prevent contracture and allow for forward reach as protocol allows. 3. Patient to demonstrate left shoulder PROM flexion to > 90 degrees to prepare for AAROM/AROM in next stages of protocol. Addressed c PROM  4. Patient to demonstrate left PROM ER to neutral to prevent contracture.       PLAN    [x]  Upgrade activities as tolerated YES Continue plan of care   []  Discharge due to :    []  Other:      Therapist: Allegra Hill DPT    Date: 7/28/2021 Time: 12:35 PM     Future Appointments   Date Time Provider Ada Fischer   7/28/2021  1:15 PM [address] [facility]   7/30/2021 12:30 PM [facility]   8/3/2021 11:00 AM Antonette Azar, PT [facility]   8/4/2021  1:30 PM JONNA Kenyon [facility] BS AMB   8/5/2021 10:40 AM Bryanna Lopez MD [facility] BS AMB   8/6/2021 11:00 AM [facility]   8/10/2021 11:00 AM Antonette Azar, PT [facility]   8/13/2021 11:00 AM [facility]   8/17/2021 11:00 AM Antonette Azar, PT [facility]   8/20/2021 11:00 AM [facility]   8/24/2021 11:00 AM Antonette Azar, PT [facility]   8/27/2021 11:00 AM Antonette Azar PT [facility]   8/31/2021 11:00 AM Antonette Azar, PT [facility]   9/30/2021  1:15 PM [facility] SHAE STEREO BX RM [address] [facility]   11/11/2021  1:30 PM Cooper Torres MD [facility] BS AMB

## 2021-07-30 ENCOUNTER — APPOINTMENT (OUTPATIENT)
Dept: PHYSICAL THERAPY | Age: 71
End: 2021-07-30
Payer: MEDICARE

## 2021-08-03 ENCOUNTER — HOSPITAL ENCOUNTER (OUTPATIENT)
Dept: PHYSICAL THERAPY | Age: 71
Discharge: HOME | End: 2021-08-03
Payer: MEDICARE

## 2021-08-03 PROCEDURE — 97014 ELECTRIC STIMULATION THERAPY: CPT

## 2021-08-03 PROCEDURE — 97530 THERAPEUTIC ACTIVITIES: CPT

## 2021-08-03 PROCEDURE — 97110 THERAPEUTIC EXERCISES: CPT

## 2021-08-03 PROCEDURE — 97112 NEUROMUSCULAR REEDUCATION: CPT

## 2021-08-03 NOTE — PROGRESS NOTES
PHYSICAL THERAPY - DAILY TREATMENT NOTE    Patient Name: Julianna Ochoa        Date: 8/3/2021  : 1950   YES Patient  Verified  Visit #:     Insurance: Payor: BLUE CROSS MEDICARE / Plan: [address] [employer] Russell HMO / Product Type: Managed Care Medicare /      In time: 10:57 Out time: 11:52   Total Treatment Time: 55     Medicare/BCBS Time Tracking (below)   Total Timed Codes (min):  55 1:1 Treatment Time:  40     TREATMENT AREA = Left shoulder pain [M25.512]  Status post arthroscopy of left shoulder [Z98.890]    SUBJECTIVE    Pain Level (on 0 to 10 scale):  4  / 10   Medication Changes/New allergies or changes in medical history, any new surgeries or procedures? NO    If yes, update Summary List   Subjective Functional Status/Changes:  []  No changes reported     \"Just the normal pain. I see Brooklynn Boykin tomorrow, hopefully they'll let me out of the sling and drive. The incision doesn't hurt anymore and the rash has gone away. \"          OBJECTIVE     Therapeutic Procedures:  Min Procedure Specifics + Rationale   n/a [x]  Patient Education (performed throughout session) [x] Review HEP    [] Progressed/Changed HEP based on:   [] proper performance and advancement of Therex/TA   [] reduction in pain level    [] increased functional capacity       [] change in directional preference   15 [x] Therapeutic Exercise   [x]  See Flowsheet   Rationale: increase ROM and increase strength to improve the patients ability to participate in ADL's    15 [x] Therapeutic Activity   [x]  See Flowsheet  PROM per protocol  Rationale:  To improve safety, proprioception, coordination, and efficiency with tasks   10 [x] Neuromuscular Re-ed   [x]  See Flowsheet  Rationale: increase ROM, increase strength, improve coordination, improve balance and increase proprioception  to improve the patients ability to safely participate in ADL's     Modality rationale: decrease inflammation, decrease pain, increase tissue extensibility and increase muscle contraction/control to improve the patients ability to perform ADL's with greater ease     Min Type Additional Details   15 [x] E-Stim Type:IFC  Attended? NO Location: Left shoulder  [x] supine              [] prone  [x] legs elevated   [] legs flat  [] sitting   [] sidelying  [] with heat  [x] with ice  [] Vasopneumatic Device    [x] Skin assessment post-treatment:  [x]intact [x]redness- no adverse reaction       []redness - adverse reaction:     Other Objective/Functional Measures:    See PN     Post Treatment Pain Level (on 0 to 10) scale:   4  / 10     ASSESSMENT    Assessment/Changes in Function:       See PN         Patient will continue to benefit from skilled PT services to modify and progress therapeutic interventions, address functional mobility deficits, address ROM deficits, address strength deficits, analyze and address soft tissue restrictions, analyze and cue movement patterns and instruct in home and community integration  to attain remaining goals   Progress toward goals / Updated goals:    See PN     PLAN    [x]  Upgrade activities as tolerated  [x]  Update interventions per flow sheet YES Continue plan of care   []  Discharge due to :    []  Other:      Therapist: Thanh Cornejo \"BJ\" Ramiro Strauss, DPT, Cert. MDT, Cert. DN, Cert. SMT, Dip.  Osteopractic    Date: 8/3/2021 Time: 11:00 AM     Future Appointments   Date Time Provider Ada Fischer   8/4/2021  1:30 PM Kaleb Jangma [facility] BS AMB   8/5/2021 10:40 AM Emely Serna MD [facility] BS AMB   8/6/2021 11:00 AM Mekhi Colonel [facility]   8/10/2021 11:00 AM Pola Yeh PT [facility]   8/13/2021 11:00 AM Mekhi Colonel [facility]   8/17/2021 11:00 AM Pola Yeh PT [facility]   8/20/2021 11:00 AM Mekhi Colonel [facility]   8/24/2021 11:00 AM Pola eYh, PT [facility]   8/27/2021 11:00 AM Pola Yeh, PT [facility]   8/31/2021 11:00 AM Pola Yeh, PT [facility]   9/30/2021  1:15 PM [address] [facility] 1 [address]   11/11/2021  1:30 PM Hardy Ridley MD [facility] BS AMB

## 2021-08-03 NOTE — PROGRESS NOTES
[facility] PHYSICAL THERAPY  [address] [place] [address] Kenia Lazo, [address] - Phone: [phone]  Fax: [phone]  PROGRESS NOTE  Patient Name: Tyrone Farley : 1950   Treatment/Medical Diagnosis: Left shoulder pain [M25.512]  Status post arthroscopy of left shoulder [Z98.890]   [facility]     Date of Initial Visit: 21 Attended Visits: 7 Missed Visits: 0     SUMMARY OF TREATMENT  Patient's POC has consisted of therex, therapeutic activities, manual therapy prn, modalities prn, pt. education, and a comprehensive HEP. Treatment strategies used to address functional mobility deficits, ROM deficits, strength deficits, analyze and address soft tissue restrictions, analyze and cue movement patterns, analyze and modify body mechanics/ergonomics, assess and modify postural abnormalities and instruct in home and community integration. CURRENT STATUS  Patient averages 4/10 pain on VAS. ROM has improved as follows:   PROM Flexion = 97 degrees, Abduction = 90 degrees, ER (at neutral) to 0 degrees  Pain along incision as well as rash in the surrounding area were contraindication towards US and estim. Trial of estim performed today to alleviate pain as skin integrity allowed for it. Patient responded well, able to reduce resting pain. Goal/Measure of Progress Goal Met? 1. Patient to be adherent to HEP to facilitate pain control with ADL's.  2. Patient to demonstrate left elbow PROM extension to < 5 degrees from extension to prevent contracture and allow for forward reach as protocol allows. 3. Patient to demonstrate left shoulder PROM flexion to > 90 degrees to prepare for AAROM/AROM in next stages of protocol. 4. Patient to demonstrate left PROM ER to neutral to prevent contracture. MET    MET        MET        MET     New Goals to be achieved in __4__  weeks:  1.  Patient to be Safe and Independent with HEP to self-manage/prevent symptoms after DC. 2. Patient to increase FS FOTO score to > 40 to indicate increased functional independence. 3. Patient to report return to sleeping in bed to allow for greater comfort and uninterrupted sleep. 4. Patient to demonstrate > 140 degrees AROM flexion to facilitate OH reach. 5. Patient to report minimal difficulty donning/doffing UE clothing. Frequency / Duration:   Patient to be seen  2  times per week for 4  weeks:    RECOMMENDATIONS  Continue and progress functional therex/therapeutic activity as able, utilizing manual therapy and modalities prn. Progress patient towards independent HEP to facilitate self-management of symptoms and progress gains after PT. If you have any questions/comments please contact us directly at [phone]. Thank you for allowing us to assist in the care of your patient. Therapist Signature: Sola Mcneill \"JAZMINE\" Kimmy Oscar, DPT, Cert. MDT, Cert. DN, Cert. SMT, Dip. Osteopractic Date: 2/1/9080   Certification Period: 7/13/21-10/12/21     Reporting Period 7/13/21-8/3/21   Time: 11:10 AM   NOTE TO PHYSICIAN:  PLEASE COMPLETE THE ORDERS BELOW AND FAX TO   [facility] Physical Therapy: [phone]. If you are unable to process this request in 24 hours please contact our office: [phone].    ___ I have read the above report and request that my patient continue as recommended.   ___ I have read the above report and request that my patient continue therapy with the following changes/special instructions:_________________________________________________________   ___ I have read the above report and request that my patient be discharged from therapy.      Physician Signature:        Date:       Time:                                        Estefani Chavira

## 2021-08-04 ENCOUNTER — OFFICE VISIT (OUTPATIENT)
Dept: ORTHOPEDIC SURGERY | Age: 71
End: 2021-08-04
Payer: MEDICARE

## 2021-08-04 VITALS
OXYGEN SATURATION: 99 % | HEART RATE: 90 BPM | RESPIRATION RATE: 15 BRPM | WEIGHT: 224 LBS | BODY MASS INDEX: 33.95 KG/M2 | HEIGHT: 68 IN

## 2021-08-04 DIAGNOSIS — M12.812 ROTATOR CUFF ARTHROPATHY OF LEFT SHOULDER: Primary | ICD-10-CM

## 2021-08-04 PROCEDURE — 99024 POSTOP FOLLOW-UP VISIT: CPT | Performed by: PHYSICIAN ASSISTANT

## 2021-08-04 RX ORDER — HYDROCORTISONE 25 MG/G
2 CREAM TOPICAL 2 TIMES DAILY
Qty: 30 G | Refills: 0 | Status: SHIPPED | OUTPATIENT
Start: 2021-08-04 | End: 2021-09-01 | Stop reason: ALTCHOICE

## 2021-08-04 NOTE — PROGRESS NOTES
Merced Arteaga  1950     HISTORY OF PRESENT ILLNESS  Merced Arteaga is a 70 y.o. female who presents today for evaluation s/p Left reverse total shoulder arthroplasty on 7/2/21. Patient has been going to PT. Describes pain as a 5/10. Overall is improving post-operatively. Has been taking Tylenol and Tramadol. Patient denies any fever, chills, chest pain, shortness of breath or calf pain. The remainder of the review of systems is negative. There are no new illness or injuries to report since last seen in the office. No changes in medications, allergies, social or family history. PHYSICAL EXAM:   Visit Vitals  Pulse 90   Resp 15   Ht 5' 8\" (1.727 m)   Wt 224 lb (101.6 kg)   SpO2 99%   BMI 34.06 kg/m²      The patient is a well-developed, well-nourished female in no acute distress. The patient is alert and oriented times three. The patient appears to be well groomed. Mood and affect are normal.  ORTHOPEDIC EXAM of left shoulder:  Inspection: swelling present,  Bruising not present  Incision well healed  Passive glenohumeral abduction 0-90 degrees  Stability: Stable  Strength: n/a  2+ distal pulses      IMPRESSION:  S/P Left reverse total shoulder arthroplasty  Encounter Diagnosis   Name Primary?  Rotator cuff arthropathy of left shoulder Yes          PLAN:   Patient to continue with PROM and AROM in PT. D/c wearing sling and can start reaching and moving the arm. Stressed to patient that nothing causes an increase in pain. Continue massaging and desensitizing the shoulder area. Prescribed hydrocortisone cream for the rashes on the arms.      RTC 4 weeks    Scribed by Mulugeta Velázquez [address]) as dictated by ZI Rapp PA-C [facility] [phone] [facility] Specialist

## 2021-08-04 NOTE — PROGRESS NOTES
[facility]  [address], [address]  Phone: [phone]  Fax: [phone]        PROGRESS NOTE    CONSENT:  Pursuant to the emergency declaration under the [employer] and [facility], 1135 waiver authority and the [employer] Act, this Virtual Visit was conducted, with patient's consent, to reduce the patient's risk of exposure to COVID-19 and provide continuity of care for an established patient. Services were unable to be provided through a video synchronous discussion virtually to substitute for in-person appointment. Subsequently, the patient was consulted through a telephone discussion. ENCOUNTER DURATION (minutes): 9 minutes 54 seconds    Ms. Lorene Duenas is being consulted at home by me via telephone at the [facility] office    HISTORY OF PRESENT ILLNESS:  The patient is a 70 y.o. female. Ms. Lorene Duenas is consulted by me via Telephone at the [facility] office for follow up of low back pain radiating into BLE in a L4 distribution on the right and in a L5 distribution on the left to the greater toe and chronic neck pain which became progressive x 12/2019 radiating into BUE (L>R) to the hands. Previously, she was seen for low back pain radiating into BLE in a L4 distribution on the right and in a S1 distribution on the left to the greater toe and chronic neck pain which became progressive x 12/2019 radiating into BUE (L>R) to the hands. Pt was last evaluated by me 7/9/2020 with c/o low back pain radiating into BLE in a L4 distribution on the right and in a S1 distribution on the left to the greater toe and chronic neck pain which became progressive x 12/2019 radiating into BUE (L>R) to the hands.  She was seen prior for low back and left hip pain extending into the LLE in a S1 distribution to an area below the knee. Initially she was seen for c/o low back pain into the BLE extending in roughly an L4 distribution in the LLE and in an L5 distribution to the foot in the RLE. Her pain is exacerbated with standing and walking intolerance. Patient reports dropping objects x couple months. Pt completed MDP with good relief. Pt was intolerant to the increased dose of GABITRIL 2 mg BID secondary to dizziness. She had a diagnosis of lumbar postlaminectomy syndrome with a previous history of L3 through S1 fusion in 2002 and 2004 by a physician in [place]. Pt denies h/o stomach ulcers, bleeding disorders, or current use of anticoagulants. PmHx of fibromyalgia, DM, aortic valve replacement, right shoulder replacement, left rotator cuff disease. Patient reports previously receiving a shoulder injection with temporary relief. Pt notes intolerance to FOSOMAX, LYRICA, and PREDNISONE combination. Subsequently, d/c Fosomax due to allergy and started Cymbalta 30 mg TID in place of Lyrica. Note dated 6/29/16 indicating patient was seen and underwent porcine aortic valve replacement by Dr. Monica Beltran on 3/18/16. Pt began cardiac rehab in 1/2017. Pt underwent right shoulder surgery on 3/29/17 by Dr. Libby Cisneros. Note from Dr. Lokesh Kee, cardiologist dated 5/18/17 indicating patient was seen with h/o aortic stenosis, s/p aortic valve replacement 3/16 and was seen with moderate dysphemia with minimal exertion. CT revealed no evidence of pulmonary embolism, no obvious fluid overload. They were ordering further workup to look for functioning bioprosthetic aortic valve. Per patient, a cause for her SOB has still not been found. She has been scheduled to see a pulmonologist. She states she has been restricted to take her Meloxicam since her right shoulder surgery, which has caused her increase in low back pain. Upon review of our records, it was noted she previously failed TOPAMAX, NEURONTIN, and LYRICA. Pt was switched from Ultram ER back to Ultram immediate release.  Pt underwent left SI joint injection on 3/15/18 with good relief. Note from Dr. Ricky Lynn dated 11/12/19 indicated they stopped Lyrica and started her on Cymbalta 90 mg per day. Note from Dr. Coel Pace dated 12/19/19 indicating patient was seen with c/o history of aortic valve replacement, fibromyalgia, and DM. The patient has a history of DM and reports blood sugars are well controlled, consistently remaining below 200, average of 119. Note from Dr. Juliette Calvillo dated 8/7/2020: It would appear that her left shoulder may need replacement in her right shoulder may need revision. At this time with regards to her cervical spine I have nothing to offer her I do not think her pain is emanating from her spine. I would imagine it would be best for Dr. Ric Gilmore to take the lead in the situation and decide what further treatments for her shoulders are required. Given acute Rx of Flexeril 10 mg TID. Patient reports a benefit in sleeping while taking Flexeril and inquired about making it a new medication. Patient understands that it is not a long-term medication. Note from Dr. Josephine Gaucher 10/26/2020 indicating patient was seen with c/o bilateral thumb base pain. Numbness and tingling in her hands. Evidence of CTS and cubital tunnel syndrome on the right side. Indicated she has OA of the first CMC joints bilaterally. Performed injections. Pt reports temporary relief with the injections. She is scheduled to f/u with Dr. Calix Daily next week to discuss surgery. Note from Alice Smith, KATELYN dated 12/1/2020 indicating patient has h/o DM, fibromyalgia, and thyroid problems. Note from Dr. Josephine Gaucher 1/13/2021 indicating patient was seen for bilateral thumb CMC arthritis, bilateral CTS and right cubital tunnel syndrome. Indicated injections last time did help but she wanted to discuss surgery. He set her up for surgery for CTS and right CMC arthritis. Note from Dr. Calix Daily dated 4/28/2021 indicating patient was seen with c/o 7 week's s/p left CTS release.  Indicated she still had some soreness about the incision. Dx with suture granuloma. Note from Dr. Rain Showers dated 5/3/2021 indicating patient was seen for reevaluation left shoulder. Pain was 7/10. Previously discussed surgery. Pt has h/o torn left rotator cuff years ago. Pt had surgery to repair it. H/o right shoulder replacement. UDS dated 2/25/2021 was consistent. Preliminary reading [address] films dated 3/2/2020 revealed: severe disc space narrowing at C4-5, moderate at C5-6, and the space between C6-7 was not well visualized. Small anterior osteophytes noted at C4, C5 and C6. C7 not visualized due to shoulder pathology. No acute pathology identified.  Lumbar spine CT w/o contrast dated 7/20/14  per report revealed L3 through L5 laminectomies with L3 through S1 dorsal hardware fusion. There was no evidence for fractures. There was advanced degenerative disc disease at L2-L3 at the junctional level with marked central canal stenosis. C Spine MRI dated 3/15/2020 films independently reviewed. Per report, at C3-4: anterolisthesis. 3.5 mm central disc extrusion extending 4 mm cephalad. Mild central stenosis and subtle ventral cord flattening. Advanced left facet arthropathy. At C4-5: mild posterior spondylotic ridge. Mild central stenosis and subtle ventral cord flattening. At C5-6: central to right posterolateral 2.5 mm broad-based disc protrusion with subtle ventral cord flattening and mild central stenosis. At C6-7: mild posterior spondylotic ridge. Mild to moderate central stenosis. At C7-T1: anterolisthesis. Mild posterior annular bulge. Mild central stenosis. Upper thoracic spondylosis including a 5 mm posterior disc extension at T3-4. At her last clinical appointment, pt had an upcoming surgery scheduled. I had advised the pt not to take the Tramadol while taking post operative pain medications.  I provided her refills of Gabitril 2 mg qhs and Tramadol 50 mg.           At today's telephone consultation, the patient c/o pain location and distribution remains unchanged. She rates her pain 3/10, previously 6/10. Pt underwent a left shoulder replacement on 7/2/2021 with Dr. Ric Gilmore. She originallly had it scheduled for 6/28/2021. Her appointment had been delayed secondary to vertigo. Pt is currently in PT for her left shoulder. She reports she discontinued Oxycodone 5 days ago. She continues on Gabitril 2 mg qhs and Tramadol.  reviewed and oxycodone post-operative for her shoulder. There is no height or weight on file to calculate BMI. PCP: El Hunt MD      Past Medical History:   Diagnosis Date    Ankle fracture     Aortic stenosis     S/P AVR in 2016    Asthma     Carpal tunnel syndrome on both sides     Chronic obstructive pulmonary disease (HCC)     Chronic pain     back pain    COVID-19 vaccine series completed 04/29/2021    Diabetes ([facility]     Diverticulitis     Fibromyalgia     H/O aortic valve replacement 03/2016    21 mm bovine pericardial bioprosthesis and epiaortic scanning      H/O: HTN (hypertension)     no meds now- sp valve replacement    Hypothyroidism     Lumbar post-laminectomy syndrome     Menopause     Obesity     Osteoarthritis     Psychotic disorder ([facility]     Pulmonary HTN ([facility]     per pcp note cc- patient denies.  Sciatica     Skin cancer 2013    right forearm.          Social History     Socioeconomic History    Marital status:      Spouse name: Not on file    Number of children: Not on file    Years of education: Not on file    Highest education level: Not on file   Occupational History    Not on file   Tobacco Use    Smoking status: Former Smoker     Years: 45.00     Types: Cigarettes     Quit date: 7/1/2011     Years since quitting: 10.1    Smokeless tobacco: Never Used   Vaping Use    Vaping Use: Never used   Substance and Sexual Activity    Alcohol use: No    Drug use: No    Sexual activity: Never   Other Topics Concern    Not on file   Social History Narrative    Not on file     Social Determinants of Health     Financial Resource Strain:     Difficulty of Paying Living Expenses:    Food Insecurity:     Worried About Running Out of Food in the Last Year:     [address] in the Last Year:    Transportation Needs:     Lack of Transportation (Medical):  Lack of Transportation (Non-Medical):    Physical Activity:     Days of Exercise per Week:     Minutes of Exercise per Session:    Stress:     Feeling of Stress :    Social Connections:     Frequency of Communication with Friends and Family:     Frequency of Social Gatherings with Friends and Family:     Attends [employer] Services:     Active Member of Clubs or Organizations:     Attends Club or Organization Meetings:     Marital Status:    Intimate Partner Violence:     Fear of Current or Ex-Partner:     Emotionally Abused:     Physically Abused:     Sexually Abused:        Current Outpatient Medications   Medication Sig Dispense Refill    traMADoL (ULTRAM) 50 mg tablet TAKE 1 TABLET BY MOUTH EVERY 6 HOURS AS NEEDED FOR PAIN FOR UP TO 60 DAYS. MAX DAILY AMOUNT 200MG.  DULoxetine (CYMBALTA) 30 mg capsule TAKE 1 CAPSULE BY MOUTH THREE TIMES DAILY      hydrocortisone (HYTONE) 2.5 % topical cream Apply 2 g to affected area two (2) times a day. use thin layer 30 g 0    zolpidem (AMBIEN) 5 mg tablet Take 1 Tablet by mouth nightly as needed for Sleep. Max Daily Amount: 5 mg. 20 Tablet 0    aspirin delayed-release 81 mg tablet Take 81 mg by mouth daily.  meclizine (ANTIVERT) 25 mg tablet Take 1 Tablet by mouth three (3) times daily as needed for Dizziness. 60 Tablet 3    fluticasone propionate (FLONASE) 50 mcg/actuation nasal spray Use 1 spray(s) in each nostril twice daily 16 g 4    tiaGABine (GabitriL) 2 mg tablet Take 1 Tab by mouth nightly.  90 Tab 0    levothyroxine (SYNTHROID) 50 mcg tablet TAKE 1 TABLET BY MOUTH ONCE DAILY BEFORE BREAKFAST 90 Tab 3    pantoprazole (PROTONIX) 20 mg tablet Take 1 tablet by mouth once daily 90 Tab 3    desonide (TRIDESILON) 0.05 % cream APPLY   EXTERNALLY TO AFFECTED AREA TWICE DAILY 15 g 0    metFORMIN (GLUCOPHAGE) 500 mg tablet TAKE 1 TABLET BY MOUTH ONCE DAILY WITH SUPPER 90 Tab 3    FLUoxetine (PROzac) 10 mg capsule Take 1 Cap by mouth daily. 90 Cap 3    atorvastatin (LIPITOR) 20 mg tablet Take 1 tablet by mouth once daily 90 Tab 4    glucose blood VI test strips (True Metrix Pro Test Strip) strip Test blood glucose 3 times a day 200 Strip 3    STIOLTO RESPIMAT 2.5-2.5 mcg/actuation inhaler Take 2 Puffs by inhalation daily. 1    vitamin E acetate (VITAMIN E PO) Take  by mouth daily.  cyanocobalamin (VITAMIN B-12) 500 mcg tablet Take 500 mcg by mouth daily.  cetirizine (ZYRTEC) 10 mg tablet TAKE 1 TABLET BY MOUTH EVERY DAY. GENERIC FOR ZYRTEC 90 Tab 2    VENTOLIN HFA 90 mcg/actuation inhaler 2 Puffs by Aerosolization route every four (4) hours as needed. 0    B.infantis-B.ani-B.long-B.bifi (PROBIOTIC 4X) 10-15 mg TbEC Take  by mouth daily.  Cholecalciferol, Vitamin D3, (VITAMIN D3) 1,000 unit cap Take 1,000 Units by mouth daily.  Lancets (ACCU-CHEK SOFTCLIX LANCETS) misc Please use one lancet to test blood sugars twice a day. 1 Package 20    BLOOD-GLUCOSE METER (ACCU-CHEK MARCE MONITORING) by Does Not Apply route.  predniSONE (DELTASONE) 2.5 mg tablet Take 3 Tablets by mouth daily.  Take 7.5 mg on June 29, 7.5 mg on June 30, 5 mg on July 1, 5 mg July 2 prior to arriving to hospital (Patient not taking: Reported on 8/5/2021) 10 Tablet 0       Allergies   Allergen Reactions    Latex, Natural Rubber Hives    Adhesive Rash     Some bandaids    Ciprofloxacin Rash    Codeine Nausea and Vomiting    Demerol [Meperidine] Nausea and Vomiting    Diclofenac Shortness of Breath and Palpitations    Erythromycin Rash    Levaquin [Levofloxacin] Rash    Loratadine Swelling    Morphine Nausea and Vomiting    Penicillin G Hives and Rash     Does fine with Keflex    Sulfa (Sulfonamide Antibiotics) Itching    Vicodin [Hydrocodone-Acetaminophen] Other (comments)     Metallic taste in mouth          PHYSICAL EXAMINATION  Unable to perform examination secondary to COVID-19. CONSTITUTIONAL: NAD, A&O x 3    ASSESSMENT   Diagnoses and all orders for this visit:    1. HNP (herniated nucleus pulposus) with myelopathy, cervical  -     traMADoL (ULTRAM) 50 mg tablet  -     tiaGABine (GabitriL) 2 mg tablet; Take 1 Tablet by mouth nightly. 2. Cervical spondylosis without myelopathy  -     traMADoL (ULTRAM) 50 mg tablet  -     tiaGABine (GabitriL) 2 mg tablet; Take 1 Tablet by mouth nightly. 3. Cervical neuritis  -     traMADoL (ULTRAM) 50 mg tablet  -     tiaGABine (GabitriL) 2 mg tablet; Take 1 Tablet by mouth nightly. 4. Lumbar neuritis  -     traMADoL (ULTRAM) 50 mg tablet  -     tiaGABine (GabitriL) 2 mg tablet; Take 1 Tablet by mouth nightly. 5. Cervical spinal stenosis  -     traMADoL (ULTRAM) 50 mg tablet  -     tiaGABine (GabitriL) 2 mg tablet; Take 1 Tablet by mouth nightly. 6. Lumbar post-laminectomy syndrome  -     traMADoL (ULTRAM) 50 mg tablet  -     tiaGABine (GabitriL) 2 mg tablet; Take 1 Tablet by mouth nightly. 7. Spondylolisthesis, acquired  -     traMADoL (ULTRAM) 50 mg tablet  -     tiaGABine (GabitriL) 2 mg tablet; Take 1 Tablet by mouth nightly. 8. Carpal tunnel syndrome, bilateral      IMPRESSION AND PLAN:  The patient consented to the tele health visit and was aware that there would be a charge. During today's telephone consulation patient had c/o low back pain radiating into BLE in a L4 distribution on the right and in a L5 distribution on the left to the greater toe. Multiple treatment options were discussed. It is too soon to evaluate treatment as she had recent left shoulder surgery on 7/2/2021. Patient wished to continue her current treatment.   I provided her refills of Gabitril 2 mg qhs and Tramadol. Pt appears to be neurologically intact. I will see the patient back in 3 month's time or earlier if needed. Written by Ethel Andrea, as dictated by Sunni Party, MD  I examined the patient, reviewed and agree with the note.

## 2021-08-04 NOTE — PATIENT INSTRUCTIONS
You may remove your sling now  You may then begin to move your arm on your own now. Continue with no lifting, pushing or pulling until you are seen again in 1 month. Remember nothing causes an increase in pain including physical therapy.

## 2021-08-05 ENCOUNTER — VIRTUAL VISIT (OUTPATIENT)
Dept: ORTHOPEDIC SURGERY | Age: 71
End: 2021-08-05
Payer: MEDICARE

## 2021-08-05 DIAGNOSIS — M43.10 SPONDYLOLISTHESIS, ACQUIRED: ICD-10-CM

## 2021-08-05 DIAGNOSIS — F11.29 OPIOID DEPENDENCE WITH UNSPECIFIED OPIOID-INDUCED DISORDER (HCC): ICD-10-CM

## 2021-08-05 DIAGNOSIS — M50.00 HNP (HERNIATED NUCLEUS PULPOSUS) WITH MYELOPATHY, CERVICAL: Primary | ICD-10-CM

## 2021-08-05 DIAGNOSIS — M47.812 CERVICAL SPONDYLOSIS WITHOUT MYELOPATHY: ICD-10-CM

## 2021-08-05 DIAGNOSIS — M96.1 LUMBAR POST-LAMINECTOMY SYNDROME: ICD-10-CM

## 2021-08-05 DIAGNOSIS — M54.16 LUMBAR NEURITIS: ICD-10-CM

## 2021-08-05 DIAGNOSIS — F11.20 OPIOID DEPENDENCE, UNCOMPLICATED (HCC): ICD-10-CM

## 2021-08-05 DIAGNOSIS — F11.99 OPIOID USE, UNSPECIFIED WITH UNSPECIFIED OPIOID-INDUCED DISORDER (HCC): ICD-10-CM

## 2021-08-05 DIAGNOSIS — M48.02 CERVICAL SPINAL STENOSIS: ICD-10-CM

## 2021-08-05 DIAGNOSIS — G56.03 CARPAL TUNNEL SYNDROME, BILATERAL: ICD-10-CM

## 2021-08-05 DIAGNOSIS — M54.12 CERVICAL NEURITIS: ICD-10-CM

## 2021-08-05 PROCEDURE — G8536 NO DOC ELDER MAL SCRN: HCPCS | Performed by: PHYSICAL MEDICINE & REHABILITATION

## 2021-08-05 PROCEDURE — G9899 SCRN MAM PERF RSLTS DOC: HCPCS | Performed by: PHYSICAL MEDICINE & REHABILITATION

## 2021-08-05 PROCEDURE — G9717 DOC PT DX DEP/BP F/U NT REQ: HCPCS | Performed by: PHYSICAL MEDICINE & REHABILITATION

## 2021-08-05 PROCEDURE — 1101F PT FALLS ASSESS-DOCD LE1/YR: CPT | Performed by: PHYSICAL MEDICINE & REHABILITATION

## 2021-08-05 PROCEDURE — 3017F COLORECTAL CA SCREEN DOC REV: CPT | Performed by: PHYSICAL MEDICINE & REHABILITATION

## 2021-08-05 PROCEDURE — G8399 PT W/DXA RESULTS DOCUMENT: HCPCS | Performed by: PHYSICAL MEDICINE & REHABILITATION

## 2021-08-05 PROCEDURE — 99441 PR PHYS/QHP TELEPHONE EVALUATION 5-10 MIN: CPT | Performed by: PHYSICAL MEDICINE & REHABILITATION

## 2021-08-05 PROCEDURE — G8417 CALC BMI ABV UP PARAM F/U: HCPCS | Performed by: PHYSICAL MEDICINE & REHABILITATION

## 2021-08-05 PROCEDURE — 1090F PRES/ABSN URINE INCON ASSESS: CPT | Performed by: PHYSICAL MEDICINE & REHABILITATION

## 2021-08-05 PROCEDURE — G8427 DOCREV CUR MEDS BY ELIG CLIN: HCPCS | Performed by: PHYSICAL MEDICINE & REHABILITATION

## 2021-08-05 RX ORDER — TIAGABINE HYDROCHLORIDE 2 MG/1
2 TABLET, FILM COATED ORAL
Qty: 90 TABLET | Refills: 0 | Status: SHIPPED | OUTPATIENT
Start: 2021-08-05 | End: 2021-11-04 | Stop reason: SDUPTHER

## 2021-08-05 RX ORDER — TRAMADOL HYDROCHLORIDE 50 MG/1
TABLET ORAL
Status: CANCELLED | OUTPATIENT
Start: 2021-08-05

## 2021-08-05 RX ORDER — DULOXETIN HYDROCHLORIDE 30 MG/1
CAPSULE, DELAYED RELEASE ORAL
COMMUNITY
Start: 2021-07-31 | End: 2021-11-02

## 2021-08-05 RX ORDER — TRAMADOL HYDROCHLORIDE 50 MG/1
TABLET ORAL
COMMUNITY
Start: 2021-07-06 | End: 2021-08-05 | Stop reason: SDUPTHER

## 2021-08-05 RX ORDER — TRAMADOL HYDROCHLORIDE 50 MG/1
50 TABLET ORAL
Qty: 120 TABLET | Refills: 2 | Status: SHIPPED | OUTPATIENT
Start: 2021-08-05 | End: 2021-11-03

## 2021-08-06 ENCOUNTER — HOSPITAL ENCOUNTER (OUTPATIENT)
Dept: PHYSICAL THERAPY | Age: 71
Discharge: HOME | End: 2021-08-06
Payer: MEDICARE

## 2021-08-06 PROCEDURE — 97110 THERAPEUTIC EXERCISES: CPT

## 2021-08-06 PROCEDURE — 97014 ELECTRIC STIMULATION THERAPY: CPT

## 2021-08-06 PROCEDURE — 97112 NEUROMUSCULAR REEDUCATION: CPT

## 2021-08-06 PROCEDURE — 97530 THERAPEUTIC ACTIVITIES: CPT

## 2021-08-06 NOTE — PROGRESS NOTES
PHYSICAL THERAPY - DAILY TREATMENT NOTE    Patient Name: Jamie Jiang        Date: 2021  : 1950   YES Patient  Verified  Visit #:     Insurance: Payor: BLUE CROSS MEDICARE / Plan: Diana N Dionisio  HMO / Product Type: Managed Care Medicare /      In time: 1100 Out time: 2813   Total Treatment Time: 55     Medicare/BCBS Time Tracking (below)   Total Timed Codes (min):  40 1:1 Treatment Time:  40     TREATMENT AREA =  Left shoulder pain [M25.512]  Status post arthroscopy of left shoulder [Z98.890]    SUBJECTIVE    Pain Level (on 0 to 10 scale):   10   Medication Changes/New allergies or changes in medical history, any new surgeries or procedures? NO    If yes, update Summary List   Subjective Functional Status/Changes:  []  No changes reported     Pt reports MD RUVALCABA sling and gave her an updated HEP       OBJECTIVE  Therapeutic Procedures:  Min Procedure Specifics + Rationale   20(20) [x] Therapeutic Exercise    See Flowsheet   Rationale: increase ROM and increase strength to improve the patients ability to participate in ADL's      10 [x] Therapeutic Activity See Flow Sheet  Rationale: To improve safety, proprioception, coordination, and efficiency with tasks such as stair negotiation, transfers, bed mobility, and lifting mechanics   10 [x] Neuromuscular Re-Education See Flow Sheet  Rationale: To improve stability over various surfaces to decrease falls risk. Improve core control and posture control in various positions to maximize level of function.            Modality rationale: decrease inflammation, decrease pain, increase tissue extensibility and increase muscle contraction/control to improve the patients ability to perform ADL's with greater ease   Time: 15 Additional Details    [x] E-Stim:       [] Att                 [x] Unatt                         [x] IFC                [] TENS                           [] EDN: Location: Left Shoulder  [x] supine [] prone  [x] legs elevatedv   [] legs flat  []w/heat  [x]w/ice  []w/US     Skin assessment post-treatment:  intact no adverse reaction          min Patient Education:  YES Reviewed HEP         Other Objective/Functional Measures:    See flowsheet for more details    Left Shoulder AROM  Flexion: 45 deg  Extension: 40 deg  Abduction: 58 deg      min VC and demos required throughout session for proper form and increased safety         Post Treatment Pain Level (on 0 to 10) scale:   2  / 10     ASSESSMENT    Assessment/Changes in Function:     Adjusted POC per updated referral, updated HEP, provided information to purchase pulley system. []  See Progress Note/Recertification   Patient will continue to benefit from skilled PT services to analyze,, cue,, progress,, modify,, demonstrate,, instruct, and address, movement patterns,, therapeutic interventions,, postural abnormalities,, soft tissue restrictions,, ROM,, strength,, functional mobility,, body mechanics/ergonomics, and home and community integration, to attain remaining goals.    Progress toward goals / Updated goals:    1st session since progress note, no notable progress yet     PLAN    [x]  Upgrade activities as tolerated YES Continue plan of care   []  Discharge due to :    []  Other:      Therapist: Elizabethann Heimlich, DPT    Date: 8/6/2021 Time: 10:58 AM     Future Appointments   Date Time Provider Ada Fischer   8/6/2021 11:00 AM Lucille Meyer [facility]   8/10/2021 11:00 AM Avril Palmer, PT [facility]   8/13/2021 11:00 [facility]   8/17/2021 11:00 AM Avril Palmer PT [facility]   8/20/2021 11:00 AM Lucille Meyer [facility]   8/24/2021 11:00 AM Avril Palmer PT [facility]   8/27/2021 11:00 AM Avril Palmer PT [facility]   8/31/2021 11:00 AM Avril Palmer PT [facility]   9/1/2021  1:00 PM JONNA Olsen [facility] BS AMB   9/30/2021  1:15 PM [facility] SHAE MUÑOZ BX RM [address]   11/4/2021 10:40 AM Jo Mcmanus MD [facility] BS AMB   11/11/2021  1:30 PM Nalini Lanier MD [facility] BS AMB

## 2021-08-10 ENCOUNTER — OFFICE VISIT (OUTPATIENT)
Dept: URBAN - METROPOLITAN AREA CLINIC 60 | Facility: CLINIC | Age: 71
End: 2021-08-10
Payer: MEDICARE

## 2021-08-10 ENCOUNTER — HOSPITAL ENCOUNTER (OUTPATIENT)
Dept: PHYSICAL THERAPY | Age: 71
End: 2021-08-10
Payer: MEDICARE

## 2021-08-10 DIAGNOSIS — H52.13 MYOPIA, BILATERAL: ICD-10-CM

## 2021-08-10 DIAGNOSIS — H35.371 PUCKERING OF MACULA, RIGHT EYE: ICD-10-CM

## 2021-08-10 DIAGNOSIS — H04.123 TEAR FILM INSUFFICIENCY OF BILATERAL LACRIMAL GLANDS: ICD-10-CM

## 2021-08-10 DIAGNOSIS — E11.9 TYPE 2 DIABETES MELLITUS W/O COMPLICATION: Primary | ICD-10-CM

## 2021-08-10 DIAGNOSIS — H43.813 VITREOUS DEGENERATION, BILATERAL: ICD-10-CM

## 2021-08-10 PROCEDURE — 92134 CPTRZ OPH DX IMG PST SGM RTA: CPT | Performed by: OPTOMETRIST

## 2021-08-10 PROCEDURE — 92014 COMPRE OPH EXAM EST PT 1/>: CPT | Performed by: OPTOMETRIST

## 2021-08-10 RX ORDER — PROPYLENE GLYCOL 0.06 MG/ML
0.6 % SOLUTION/ DROPS OPHTHALMIC
Qty: 0 | Refills: 0 | Status: ACTIVE
Start: 2021-08-10

## 2021-08-10 ASSESSMENT — INTRAOCULAR PRESSURE
OD: 16
OS: 19

## 2021-08-10 ASSESSMENT — VISUAL ACUITY
OS: 20/25
OD: 20/25

## 2021-08-10 NOTE — IMPRESSION/PLAN
Impression: Neoplasm of uncertain behavior of skin; LLL Plan: Patient educated on findings. Reviewed last external photos taken. Lesion has resolved.

## 2021-08-10 NOTE — IMPRESSION/PLAN
Impression: Puckering of macula, right eye: H35.371. Plan: Discussed diagnosis with patient. OCT(MAC) done today to document any changes for future visits. ERM not affecting vision. No need for surgical intervention at this time. Patient to return to clinic as soon as possible if a change in vision is noticed. Will reevaluate in 1 year.

## 2021-08-10 NOTE — IMPRESSION/PLAN
Impression: Open angle with borderline findings, low risk, bilateral; secondary to [address] 74 asymmetry Plan: IOP is stable. Patient educated on findings. Reviewed last testing done. No need for drop treatment at this time.

## 2021-08-10 NOTE — IMPRESSION/PLAN
Impression: Type 2 diabetes mellitus w/o complication: B28.0. Plan: No evidence of diabetic retinopathy or diabetic macular edema. Discussed ocular and systemic benefits of blood sugar control. Stressed importance of yearly diabetic eye exams.

## 2021-08-10 NOTE — IMPRESSION/PLAN
Impression: Tear film insufficiency of bilateral lacrimal glands: H04.123. Plan: Diagnosis discussed with patient. Recommend patient use artificial tears 4 times a day. Sample of Systane Complete given. Patient to be consistent with tears for at least one week to notice a difference in symptoms.

## 2021-08-13 ENCOUNTER — APPOINTMENT (OUTPATIENT)
Dept: PHYSICAL THERAPY | Age: 71
End: 2021-08-13
Payer: MEDICARE

## 2021-08-18 ENCOUNTER — APPOINTMENT (OUTPATIENT)
Dept: PHYSICAL THERAPY | Age: 71
End: 2021-08-18
Payer: MEDICARE

## 2021-08-19 ENCOUNTER — HOSPITAL ENCOUNTER (OUTPATIENT)
Dept: PHYSICAL THERAPY | Age: 71
Discharge: HOME | End: 2021-08-19
Payer: MEDICARE

## 2021-08-19 PROCEDURE — 97530 THERAPEUTIC ACTIVITIES: CPT

## 2021-08-19 PROCEDURE — 97112 NEUROMUSCULAR REEDUCATION: CPT

## 2021-08-19 PROCEDURE — 97110 THERAPEUTIC EXERCISES: CPT

## 2021-08-19 PROCEDURE — 97014 ELECTRIC STIMULATION THERAPY: CPT

## 2021-08-19 NOTE — PROGRESS NOTES
PHYSICAL THERAPY - DAILY TREATMENT NOTE    Patient Name: Earnestine Antonio        Date: 2021  : 1950   YES Patient  Verified  Visit #:     Insurance: Payor: BLUE CROSS MEDICARE / Plan: [facility] [place]  HMO / Product Type: Managed Care Medicare /      In time: 200 Out time: 300   Total Treatment Time: 60     Medicare/BCBS Time Tracking (below)   Total Timed Codes (min):  45 1:1 Treatment Time:  45     TREATMENT AREA =  Left shoulder pain [M25.512]  Status post arthroscopy of left shoulder [Z98.890]    SUBJECTIVE    Pain Level (on 0 to 10 scale):  5  / 10   Medication Changes/New allergies or changes in medical history, any new surgeries or procedures? NO    If yes, update Summary List   Subjective Functional Status/Changes:  []  No changes reported     Pt reports that she is having complications from discontinuing prednisone. She states she will follow up from dermatologist.       Compliance with HEP  Yes [] No []         OBJECTIVE  Therapeutic Procedures:  Min Procedure Specifics + Rationale   25(25) [x] Therapeutic Exercise    See Flowsheet   Rationale: increase ROM and increase strength to improve the patients ability to participate in ADL's      10 [x] Therapeutic Activity See Flow Sheet  Rationale: To improve safety, proprioception, coordination, and efficiency with tasks such as stair negotiation, transfers, bed mobility, and lifting mechanics   10 [x] Neuromuscular Re-Education See Flow Sheet  Rationale: To improve stability over various surfaces to decrease falls risk.  Improve core control and posture control in various positions to maximize level of function.               Modality rationale: decrease inflammation, decrease pain, increase tissue extensibility and increase muscle contraction/control to improve the patients ability to perform ADL's with greater ease   Time: 15 Additional Details     [x]? E-Stim:       []? Att                 [x]? Unatt                         [x]?  IFC                []? TENS                           []? EDN: Location: Left Shoulder  [x]?  supine              []? prone  [x]?  legs elevatedv   []? legs flat  []?w/heat  [x]?w/ice  []?w/US      Skin assessment post-treatment:  intact no adverse reaction              min Patient Education:  YES Reviewed HEP         Other Objective/Functional Measures:    See flowsheet for more details      Mod/min VC and demos required throughout session for proper form and increased safety         Post Treatment Pain Level (on 0 to 10) scale:   2-3  / 10     ASSESSMENT    Assessment/Changes in Function:     Fair tolerance to movement with frequent rest provided. []  See Progress Note/Recertification   Patient will continue to benefit from skilled PT services to analyze,, cue,, progress,, modify,, demonstrate,, instruct, and address, movement patterns,, therapeutic interventions,, postural abnormalities,, soft tissue restrictions,, ROM,, strength,, functional mobility,, body mechanics/ergonomics, and home and community integration, to attain remaining goals.    Progress toward goals / Updated goals:    Slow progress today secondary to pain     PLAN    [x]  Upgrade activities as tolerated YES Continue plan of care   []  Discharge due to :    []  Other:      Therapist: Nuria Mohr DPT    Date: 8/19/2021 Time: 2:13 PM     Future Appointments   Date Time Provider Ada Fischer   8/20/2021 11:00 AM Julia Jaimes [facility]   8/24/2021 11:00 AM Joseph Phillips PT [facility]   8/27/2021 11:00 AM Joseph Phillips PT [facility]   8/31/2021 11:00 AM Joseph Phillips PT [facility]   9/2/2021 10:15 AM JONNA Olmstead BS AMB   9/30/2021  1:15 PM [facility] [address] SHAE STEREO BX RM [address] [place] [address]   11/4/2021 10:45 AM Andre Mora MD [facility] BS AMB   11/11/2021  1:30 PM Narciso Freeman MD [facility] BS AMB

## 2021-08-20 ENCOUNTER — APPOINTMENT (OUTPATIENT)
Dept: PHYSICAL THERAPY | Age: 71
End: 2021-08-20
Payer: MEDICARE

## 2021-08-24 ENCOUNTER — HOSPITAL ENCOUNTER (OUTPATIENT)
Dept: PHYSICAL THERAPY | Age: 71
Discharge: HOME | End: 2021-08-24
Payer: MEDICARE

## 2021-08-24 PROCEDURE — 97110 THERAPEUTIC EXERCISES: CPT

## 2021-08-24 PROCEDURE — 97014 ELECTRIC STIMULATION THERAPY: CPT

## 2021-08-24 PROCEDURE — 97530 THERAPEUTIC ACTIVITIES: CPT

## 2021-08-24 PROCEDURE — 97112 NEUROMUSCULAR REEDUCATION: CPT

## 2021-08-24 NOTE — PROGRESS NOTES
PHYSICAL THERAPY - DAILY TREATMENT NOTE    Patient Name: Jeannie Moe        Date: 2021  : 1950   YES Patient  Verified  Visit #:   10   of   12+  Insurance: Payor: BLUE CROSS MEDICARE / Plan: [facility] HMO / Product Type: Managed Care Medicare /      In time: 10:55 Out time: 11:55   Total Treatment Time: 60     Medicare/BCBS Time Tracking (below)   Total Timed Codes (min):  60 1:1 Treatment Time:  40     TREATMENT AREA = Left shoulder pain [M25.512]  Status post arthroscopy of left shoulder [Z98.890]    SUBJECTIVE    Pain Level (on 0 to 10 scale):  5  / 10   Medication Changes/New allergies or changes in medical history, any new surgeries or procedures? NO    If yes, update Summary List   Subjective Functional Status/Changes:  []  No changes reported     \"I've been having a hard time catching my breath today. I see the pulmonologist thursday \"          OBJECTIVE     Therapeutic Procedures:  Min Procedure Specifics + Rationale   n/a [x]  Patient Education (performed throughout session) [x] Review HEP    [] Progressed/Changed HEP based on:   [] proper performance and advancement of Therex/TA   [] reduction in pain level    [] increased functional capacity       [] change in directional preference   15 [x] Therapeutic Exercise   [x]  See Flowsheet   Rationale: increase ROM and increase strength to improve the patients ability to participate in ADL's    15 [x] Therapeutic Activity   [x]  See Flowsheet  Rationale:  To improve safety, proprioception, coordination, and efficiency with tasks   15 [x] Neuromuscular Re-ed   [x]  See Flowsheet    Rationale: increase ROM, increase strength, improve coordination, improve balance and increase proprioception  to improve the patients ability to safely participate in ADL's     Modality rationale: decrease inflammation, decrease pain, increase tissue extensibility and increase muscle contraction/control to improve the patients ability to perform ADL's with greater ease     Min Type Additional Details   15 [x] E-Stim Type:IFC  Attended? NO Location: Left shoulder  [x] supine              [] prone  [x] legs elevated   [] legs flat  [] sitting   [] sidelying - [] left [] right  [] with heat  [x] with ice  [] Vasopneumatic Device    [x] Skin assessment post-treatment:  [x]intact [x]redness- no adverse reaction       []redness - adverse reaction:     Other Objective/Functional Measures:    Increased reps/sets/resistance per flow sheet. Post Treatment Pain Level (on 0 to 10) scale:   2  / 10     ASSESSMENT    Assessment/Changes in Function:       Provided increase in rest due to SoB         Patient will continue to benefit from skilled PT services to modify and progress therapeutic interventions, address functional mobility deficits, address ROM deficits, address strength deficits, analyze and address soft tissue restrictions, analyze and cue movement patterns, analyze and modify body mechanics/ergonomics and instruct in home and community integration  to attain remaining goals   Progress toward goals / Updated goals:    Limited progress in strength      PLAN    [x]  Upgrade activities as tolerated  [x]  Update interventions per flow sheet YES Continue plan of care   []  Discharge due to :    []  Other:      Therapist: Aaron Wallace \"BJ\" Hernesto Brown, DPT, Cert. MDT, Cert. DN, Cert. SMT, Dip.  Osteopractic    Date: 8/24/2021 Time: 10:59 AM     Future Appointments   Date Time Provider Ada Fischer   8/24/2021 11:00 AM Tahmina Slipper, PT [facility]   8/27/2021 11:00 AM Tahmina Slipper, PT [facility]   8/31/2021 11:00 AM Tahmina Slipper, PT [facility]   9/2/2021 10:15 AM JONNA Desouza [facility] BS AMB   9/30/2021  1:15 PM [facility] SHAE STEREO BX RM [address]   11/4/2021 10:45 AM Concha House MD [facility] BS AMB   11/11/2021  1:30 PM Myke Almonte MD [facility] BS AMB

## 2021-08-27 ENCOUNTER — APPOINTMENT (OUTPATIENT)
Dept: PHYSICAL THERAPY | Age: 71
End: 2021-08-27
Payer: MEDICARE

## 2021-08-27 ENCOUNTER — VIRTUAL VISIT (OUTPATIENT)
Dept: FAMILY MEDICINE CLINIC | Age: 71
End: 2021-08-27
Payer: MEDICARE

## 2021-08-27 DIAGNOSIS — E11.65 TYPE 2 DIABETES MELLITUS WITH HYPERGLYCEMIA, WITHOUT LONG-TERM CURRENT USE OF INSULIN (HCC): ICD-10-CM

## 2021-08-27 DIAGNOSIS — M12.812 ROTATOR CUFF ARTHROPATHY OF LEFT SHOULDER: ICD-10-CM

## 2021-08-27 DIAGNOSIS — J44.9 CHRONIC OBSTRUCTIVE PULMONARY DISEASE, UNSPECIFIED COPD TYPE (HCC): ICD-10-CM

## 2021-08-27 DIAGNOSIS — F33.41 RECURRENT MAJOR DEPRESSIVE DISORDER, IN PARTIAL REMISSION (HCC): ICD-10-CM

## 2021-08-27 DIAGNOSIS — L30.9 DERMATITIS: Primary | ICD-10-CM

## 2021-08-27 PROCEDURE — 1101F PT FALLS ASSESS-DOCD LE1/YR: CPT | Performed by: FAMILY MEDICINE

## 2021-08-27 PROCEDURE — 3017F COLORECTAL CA SCREEN DOC REV: CPT | Performed by: FAMILY MEDICINE

## 2021-08-27 PROCEDURE — 1090F PRES/ABSN URINE INCON ASSESS: CPT | Performed by: FAMILY MEDICINE

## 2021-08-27 PROCEDURE — G8427 DOCREV CUR MEDS BY ELIG CLIN: HCPCS | Performed by: FAMILY MEDICINE

## 2021-08-27 PROCEDURE — 2022F DILAT RTA XM EVC RTNOPTHY: CPT | Performed by: FAMILY MEDICINE

## 2021-08-27 PROCEDURE — G9717 DOC PT DX DEP/BP F/U NT REQ: HCPCS | Performed by: FAMILY MEDICINE

## 2021-08-27 PROCEDURE — 3044F HG A1C LEVEL LT 7.0%: CPT | Performed by: FAMILY MEDICINE

## 2021-08-27 PROCEDURE — G8399 PT W/DXA RESULTS DOCUMENT: HCPCS | Performed by: FAMILY MEDICINE

## 2021-08-27 PROCEDURE — G9899 SCRN MAM PERF RSLTS DOC: HCPCS | Performed by: FAMILY MEDICINE

## 2021-08-27 PROCEDURE — 99214 OFFICE O/P EST MOD 30 MIN: CPT | Performed by: FAMILY MEDICINE

## 2021-08-27 RX ORDER — PREDNISONE 20 MG/1
20 TABLET ORAL DAILY
Qty: 5 TABLET | Refills: 0 | Status: SHIPPED | OUTPATIENT
Start: 2021-08-27 | End: 2021-09-01

## 2021-08-27 RX ORDER — PREDNISONE 20 MG/1
20 TABLET ORAL DAILY
Qty: 5 TABLET | Refills: 0 | Status: SHIPPED | OUTPATIENT
Start: 2021-08-27 | End: 2021-08-27 | Stop reason: SDUPTHER

## 2021-08-27 NOTE — PROGRESS NOTES
Keturah Lai presents today for   Chief Complaint   Patient presents with    Rash     arms and legs       Is someone accompanying this pt? na    Is the patient using any DME equipment during OV? na    Depression Screening:  3 most recent PHQ Screens 5/6/2021   Little interest or pleasure in doing things Nearly every day   Feeling down, depressed, irritable, or hopeless Not at all   Total Score PHQ 2 3   Trouble falling or staying asleep, or sleeping too much Nearly every day   Feeling tired or having little energy More than half the days   Poor appetite, weight loss, or overeating Not at all   Feeling bad about yourself - or that you are a failure or have let yourself or your family down Several days   Trouble concentrating on things such as school, work, reading, or watching TV Not at all   Moving or speaking so slowly that other people could have noticed; or the opposite being so fidgety that others notice Not at all   Thoughts of being better off dead, or hurting yourself in some way Not at all   PHQ 9 Score 9   How difficult have these problems made it for you to do your work, take care of your home and get along with others Not difficult at all       Learning Assessment:  Learning Assessment 4/22/2016   PRIMARY LEARNER Patient   HIGHEST LEVEL OF EDUCATION - PRIMARY LEARNER  -   PRIMARY LANGUAGE ENGLISH   LEARNER PREFERENCE PRIMARY LISTENING   ANSWERED BY patient   RELATIONSHIP SELF       Abuse Screening:  Abuse Screening Questionnaire 12/12/2019   Do you ever feel afraid of your partner? N   Are you in a relationship with someone who physically or mentally threatens you? N   Is it safe for you to go home? Y       Fall Risk  Fall Risk Assessment, last 12 mths 8/4/2021   Able to walk? Yes   Fall in past 12 months? 0   Do you feel unsteady?  0   Are you worried about falling 0   Number of falls in past 12 months -   Fall with injury? -       Health Maintenance reviewed and discussed and ordered per Provider. Health Maintenance Due   Topic Date Due    Eye Exam Retinal or Dilated  04/27/2020    Medicare Yearly Exam  07/14/2021   . Coordination of Care:  1. Have you been to the ER, urgent care clinic since your last visit? Hospitalized since your last visit? no    2. Have you seen or consulted any other health care providers outside of the [address] [place] [address] since your last visit? Include any pap smears or colon screening.  no      Last  Checked na  Last UDS Checked na  Last Pain contract signed: na    Patients concerns today:  Rash on arms and legs

## 2021-08-27 NOTE — PROGRESS NOTES
Gabriele Edmond is a 70 y.o.  female and presents with    Chief Complaint   Patient presents with    Rash     arms and legs     Gabriele Edmond, who was evaluated through a synchronous (real-time) audio-video encounter, and/or her healthcare decision maker, is aware that it is a billable service, with coverage as determined by her insurance carrier. She provided verbal consent to proceed: Yes, and patient identification was verified. This visit was conducted pursuant to the emergency declaration under the [facility], [address] [place] [address] authority and the [employer] Act. A caregiver was present when appropriate. Ability to conduct physical exam was limited. The patient was located in a state where the provider was credentialed to provide care. --Mina Chao MD on 8/27/2021 at 8:21 AM    Subjective:  Rash  Patient complains of rash involving the armsbilateral. Rash started 2 weeks ago. Appearance of rash at onset: Color of lesion(s): pink. Rash has changed over time Initial distribution: armsleft. Discomfort associated with rash: pruritic. Associated symptoms: no associated symptoms. Denies: fever, cough, congestion, sore throat, headache, abdominal pain, nausea, vomiting. Patient has not had previous evaluation of rash. Patient has had previous treatment. Response to treatment: initial improvement with benadryl and caladryl but this was short lasting. Patient has not had contacts with similar rash. Patient has not identified precipitant. Patient has not had new exposures (soaps, lotions, laundry detergents, foods, medications, plants, insects or animals. )  Anxiety Review:  Patient is seen for sleep disturbance. Current treatment includes ambien and cymbalta. Ongoing symptoms include: insomnia but improved with ambien.    Patient denies: palpitations, sweating, chest pain, shortness of breath, dizziness, paresthesias, racing thoughts, psychomotor agitation, feelings of losing control, difficulty concentrating, suicidal ideation. Reported side effects from the treatment: none. Diabetes  She has been taking medication as prescribed.  She is following up after laboratory testing.  She denies increased thirst or urination.       Osteoarthritis and Chronic Pain:  Patient has osteoarthritis, primarily affecting the neck and back.  She is going to have left arm done for carpal tunnel.    Symptoms onset: problem is longstanding. Rheumatological ROS: ongoing significant pain in neck and back which is stable and controlled by PRN meds. Response to treatment plan: symptoms have progressed to a point and plateaued. .   Asthma Review:  The patient is being seen for follow up of asthma, not currently in exacerbation. she completed antibiotics.  Asthma symptoms occur: less than 2x/week. Mathieu Loera is using stiolto prescribed by dr. Yaquelin Carrion. Wheezing when present is described as moderate and easily relieved with rescue bronchodilator. Current limitations in activity from asthma: exercising.  Number of days of school or work missed in the last month: N/A. Frequency of use of quick-relief meds: < once per week. Regimen compliance: The patient reports adherence to this regimen. Patient does not smoke cigarettes.  Quit 10 years ago      ROS  General ROS: negative for - chills, fatigue or fever; + weight gain  Psychological ROS: positive for - depression associated with COVID 19 and being home bound  Ophthalmic ROS: positive for - uses glasses  ENT ROS: positive for - headaches intermittently  Endocrine ROS: negative for - temperature intolerance or unexpected weight changes  Respiratory ROS: no cough, improved shortness of breath; no wheezing;   Cardiovascular ROS: no chest pain or dyspnea on exertion; intermittent tachycardia; she has h/o aortic valve replacement and is followed by Dr. Naina Diaz.   Gastrointestinal ROS: see HPI  Genito-Urinary ROS: no dysuria, trouble voiding, or hematuria  Neurological ROS: no TIA or stroke symptoms  Dermatological ROS: see HPI  All other systems reviewed and are negative. Objective: There were no vitals filed for this visit. alert, well appearing, and in no distress, oriented to person, place, and time and obese  Mental status - normal mood, behavior, speech, dress, motor activity, and thought processes  Chest - normal work of breathing  Neurological - cranial nerves II through XII intact  Skin - hives  LABS     TESTS      Assessment/Plan:    1. Rotator cuff arthropathy of left shoulder  Continue physical therapy    2. Dermatitis  Start prednisone; pt give instructions to increase metformin dosing to twice a day while taking prednisone  - predniSONE (DELTASONE) 20 mg tablet; Take 20 mg by mouth daily for 5 days. Dispense: 5 Tablet; Refill: 0    3. Recurrent major depressive disorder, in partial remission (HCC)  Mood improved and stable with current treatment    4. Type 2 diabetes mellitus with hyperglycemia, without long-term current use of insulin (HCC)  Goal hgb a1c <7; at goal with current treatment    5. Chronic obstructive pulmonary disease, unspecified COPD type ([facility] [address]  Continue inhaled therapy      Lab review: labs reviewed, I note that glycosylated hemoglobin mildly abnormal but acceptable      I have discussed the diagnosis with the patient and the intended plan as seen in the above orders. I have discussed medication side effects and warnings with the patient as well. I have reviewed the plan of care with the patient, accepted their input and they are in agreement with the treatment goals.

## 2021-08-31 ENCOUNTER — APPOINTMENT (OUTPATIENT)
Dept: PHYSICAL THERAPY | Age: 71
End: 2021-08-31
Payer: MEDICARE

## 2021-09-01 ENCOUNTER — HOSPITAL ENCOUNTER (OUTPATIENT)
Dept: PHYSICAL THERAPY | Age: 71
Discharge: HOME | End: 2021-09-01
Payer: MEDICARE

## 2021-09-01 ENCOUNTER — VIRTUAL VISIT (OUTPATIENT)
Dept: FAMILY MEDICINE CLINIC | Age: 71
End: 2021-09-01
Payer: MEDICARE

## 2021-09-01 DIAGNOSIS — F33.41 RECURRENT MAJOR DEPRESSIVE DISORDER, IN PARTIAL REMISSION (HCC): ICD-10-CM

## 2021-09-01 DIAGNOSIS — D64.9 NORMOCYTIC ANEMIA: ICD-10-CM

## 2021-09-01 DIAGNOSIS — L30.9 DERMATITIS: Primary | ICD-10-CM

## 2021-09-01 DIAGNOSIS — L50.9 HIVES: ICD-10-CM

## 2021-09-01 DIAGNOSIS — J30.1 SEASONAL ALLERGIC RHINITIS DUE TO POLLEN: ICD-10-CM

## 2021-09-01 DIAGNOSIS — F51.04 PSYCHOPHYSIOLOGIC INSOMNIA: ICD-10-CM

## 2021-09-01 PROCEDURE — 97530 THERAPEUTIC ACTIVITIES: CPT

## 2021-09-01 PROCEDURE — G8427 DOCREV CUR MEDS BY ELIG CLIN: HCPCS | Performed by: FAMILY MEDICINE

## 2021-09-01 PROCEDURE — G8399 PT W/DXA RESULTS DOCUMENT: HCPCS | Performed by: FAMILY MEDICINE

## 2021-09-01 PROCEDURE — 99214 OFFICE O/P EST MOD 30 MIN: CPT | Performed by: FAMILY MEDICINE

## 2021-09-01 PROCEDURE — G9717 DOC PT DX DEP/BP F/U NT REQ: HCPCS | Performed by: FAMILY MEDICINE

## 2021-09-01 PROCEDURE — 3017F COLORECTAL CA SCREEN DOC REV: CPT | Performed by: FAMILY MEDICINE

## 2021-09-01 PROCEDURE — 97014 ELECTRIC STIMULATION THERAPY: CPT

## 2021-09-01 PROCEDURE — G9899 SCRN MAM PERF RSLTS DOC: HCPCS | Performed by: FAMILY MEDICINE

## 2021-09-01 PROCEDURE — 97112 NEUROMUSCULAR REEDUCATION: CPT

## 2021-09-01 PROCEDURE — 97110 THERAPEUTIC EXERCISES: CPT

## 2021-09-01 PROCEDURE — 1090F PRES/ABSN URINE INCON ASSESS: CPT | Performed by: FAMILY MEDICINE

## 2021-09-01 PROCEDURE — 1101F PT FALLS ASSESS-DOCD LE1/YR: CPT | Performed by: FAMILY MEDICINE

## 2021-09-01 RX ORDER — DESONIDE 0.5 MG/G
CREAM TOPICAL
Qty: 15 G | Refills: 1 | Status: SHIPPED | OUTPATIENT
Start: 2021-09-01

## 2021-09-01 NOTE — PROGRESS NOTES
[facility] PHYSICAL THERAPY  [address] [place] [address] Katherine Lazo, [address] - Phone: [phone]  Fax: [phone]  PROGRESS NOTE  Patient Name: Salvador Gomez : 1950   Treatment/Medical Diagnosis: Left shoulder pain [M25.512]  Status post arthroscopy of left shoulder [Z98.890]   Referral Source: [place], [place]     Date of Initial Visit: 21 Attended Visits: 11 Missed Visits: 5     SUMMARY OF TREATMENT  Patient's POC has consisted of therex, therapeutic activities, manual therapy prn, modalities prn, pt. education, and a comprehensive HEP. Treatment strategies used to address functional mobility deficits, ROM deficits, strength deficits, analyze and address soft tissue restrictions, analyze and cue movement patterns, analyze and modify body mechanics/ergonomics, assess and modify postural abnormalities and instruct in home and community integration. CURRENT STATUS  Patient was only able to attend 3 sessions since her last PN on 8/3/21. She has had adverse reactions to medications resulting in a rash,SOB, and illness, preventing her from attending consistently. She reports recent exacerbation of pain to her shouldre as well when she and her roommate attempted to move her recliner. Miscommunication between her and her roommate resulted in her left UE bearing the brunt of the weight. She also reports poor sleep due to her medication and pain. Current ROM are as follows:                                                Left Shoulder AROM PROM Strength   Flexion 130 142 3   Scaption/ABD 90 121 3-   ER @ 0 Degrees 48 59 3-   ER @ 90 Degrees 58 64 3-           Goal/Measure of Progress Goal Met? 1. Patient to be Safe and Independent with HEP to self-manage/prevent symptoms after DC. 2. Patient to increase FS FOTO score to > 40 to indicate increased functional independence.    3. Patient to report return to sleeping in bed to allow for greater comfort and uninterrupted sleep. 4. Patient to demonstrate > 140 degrees AROM flexion to facilitate OH reach. 5. Patient to report minimal difficulty donning/doffing UE clothing.  NO      NO      Partially met (poor sleep due to medication)    NO    NO     Goals to be achieved in __4__  weeks:  1. Patient to be Safe and Independent with HEP to self-manage/prevent symptoms after DC. 2. Patient to increase FS FOTO score to > 40 to indicate increased functional independence. 3. Patient to report return to sleeping in bed to allow for greater comfort and uninterrupted sleep. 4. Patient to demonstrate > 140 degrees AROM flexion to facilitate OH reach. 5. Patient to report minimal difficulty donning/doffing UE clothing.   Frequency / Duration:   Patient to be seen  2  times per week for 4  weeks:    RECOMMENDATIONS  Resume care as per physician protocol. Patient reports intent to f/u with PCP re: recent medication reactions limiting her progression of care. If you have any questions/comments please contact us directly at [phone]. Thank you for allowing us to assist in the care of your patient. Therapist Signature: Soo Faustin \"BJ\" Qing Gotti, JAMES, Cert. MDT, Cert. DN, Cert. SMT, Dip. Osteopractic Date: 0/4/3478   Certification Period: 7/13/21-10/12/21     Reporting Period 8/3/21-9/1/21   Time: 11:52 AM   NOTE TO PHYSICIAN:  PLEASE COMPLETE THE ORDERS BELOW AND FAX TO   [facility]: [phone]. If you are unable to process this request in 24 hours please contact our office: [phone].    ___ I have read the above report and request that my patient continue as recommended.   ___ I have read the above report and request that my patient continue therapy with the following changes/special instructions:_________________________________________________________   ___ I have read the above report and request that my patient be discharged from therapy.      Physician Signature:        Date:       Time: [place], [place]

## 2021-09-01 NOTE — PROGRESS NOTES
Piotr Aguilar is a 70 y.o.  female and presents with    Chief Complaint   Patient presents with    Rash     Subjective:  She is following up for rash which had spread from her arm behind her leg; she completed course of prednisone. She has started using ammonium lactate and this has improved her symptoms. She denies fever or chills; no other members of her household has the symptoms. Anxiety Review:  Patient is seen for sleep disturbance. Current treatment includes ambien and cymbalta. Ongoing symptoms include: insomnia but improved with ambien. Patient denies: palpitations, sweating, chest pain, shortness of breath, dizziness, paresthesias, racing thoughts, psychomotor agitation, feelings of losing control, difficulty concentrating, suicidal ideation. Reported side effects from the treatment: none.     Diabetes  She has been taking medication as prescribed.  She is following up after laboratory testing.  She denies increased thirst or urination.       Osteoarthritis and Chronic Pain:  Patient has osteoarthritis, primarily affecting the neck and back.  She is going to have left arm done for carpal tunnel.    Symptoms onset: problem is longstanding. Rheumatological ROS: ongoing significant pain in neck and back which is stable and controlled by PRN meds. Response to treatment plan: symptoms have progressed to a point and plateaued. .   Asthma Review:  The patient is being seen for follow up of asthma, not currently in exacerbation. she completed antibiotics.  Asthma symptoms occur: less than 2x/week. Rebecca Sher is using stiolto prescribed by dr. Maria De Jesus Lewis. Wheezing when present is described as moderate and easily relieved with rescue bronchodilator. Current limitations in activity from asthma: exercising.  Number of days of school or work missed in the last month: N/A. Frequency of use of quick-relief meds: < once per week.    Regimen compliance: The patient reports adherence to this regimen. Patient does not smoke cigarettes.  Quit 10 years ago      ROS  General ROS: negative for - chills, fatigue or fever; + weight gain  Psychological ROS: positive for - depression associated with COVID 19 and being home bound  Ophthalmic ROS: positive for - uses glasses  ENT ROS: positive for - headaches intermittently  Endocrine ROS: negative for - temperature intolerance or unexpected weight changes  Respiratory ROS: no cough, improved shortness of breath; no wheezing;   Cardiovascular ROS: no chest pain or dyspnea on exertion; intermittent tachycardia; she has h/o aortic valve replacement and is followed by Dr. Isadora Medina. Gastrointestinal ROS: see HPI  Genito-Urinary ROS: no dysuria, trouble voiding, or hematuria  Neurological ROS: no TIA or stroke symptoms  Dermatological ROS: see HPI  All other systems reviewed and are negative.        Objective: There were no vitals filed for this visit.     alert, well appearing, and in no distress, oriented to person, place, and time and obese  Mental status - normal mood, behavior, speech, dress, motor activity, and thought processes  Chest - normal work of breathing  Neurological - cranial nerves II through XII intact  Skin - hives    LABS     TESTS      Assessment/Plan:    1. Dermatitis  Completed course of prednisone; ammonium lactate effective    2. Hives  Continue antihistamine    3. Recurrent major depressive disorder, in partial remission (HCC)  Mood improved    4. Seasonal allergic rhinitis due to pollen  Continue antihistamine    5. Psychophysiologic insomnia  Using ambien as needed      Lab review: labs reviewed, I note that hemogram abnormal low hgb      I have discussed the diagnosis with the patient and the intended plan as seen in the above orders. I have discussed medication side effects and warnings with the patient as well. I have reviewed the plan of care with the patient, accepted their input and they are in agreement with the treatment goals.

## 2021-09-01 NOTE — PROGRESS NOTES
Malena Vasquez presents today for   Chief Complaint   Patient presents with    Rash       Is someone accompanying this pt? na    Is the patient using any DME equipment during OV? na    Depression Screening:  3 most recent PHQ Screens 5/6/2021   Little interest or pleasure in doing things Nearly every day   Feeling down, depressed, irritable, or hopeless Not at all   Total Score PHQ 2 3   Trouble falling or staying asleep, or sleeping too much Nearly every day   Feeling tired or having little energy More than half the days   Poor appetite, weight loss, or overeating Not at all   Feeling bad about yourself - or that you are a failure or have let yourself or your family down Several days   Trouble concentrating on things such as school, work, reading, or watching TV Not at all   Moving or speaking so slowly that other people could have noticed; or the opposite being so fidgety that others notice Not at all   Thoughts of being better off dead, or hurting yourself in some way Not at all   PHQ 9 Score 9   How difficult have these problems made it for you to do your work, take care of your home and get along with others Not difficult at all       Learning Assessment:  Learning Assessment 4/22/2016   PRIMARY LEARNER Patient   HIGHEST LEVEL OF EDUCATION - PRIMARY LEARNER  -   PRIMARY LANGUAGE ENGLISH   LEARNER PREFERENCE PRIMARY LISTENING   ANSWERED BY patient   RELATIONSHIP SELF       Abuse Screening:  Abuse Screening Questionnaire 12/12/2019   Do you ever feel afraid of your partner? N   Are you in a relationship with someone who physically or mentally threatens you? N   Is it safe for you to go home? Y       Fall Risk  Fall Risk Assessment, last 12 mths 8/4/2021   Able to walk? Yes   Fall in past 12 months? 0   Do you feel unsteady? 0   Are you worried about falling 0   Number of falls in past 12 months -   Fall with injury? -       Health Maintenance reviewed and discussed and ordered per Provider.     Health Maintenance Due   Topic Date Due    Eye Exam Retinal or Dilated  04/27/2020    Medicare Yearly Exam  07/14/2021    Flu Vaccine (1) 09/01/2021   . Coordination of Care:  1. Have you been to the ER, urgent care clinic since your last visit? Hospitalized since your last visit? no    2. Have you seen or consulted any other health care providers outside of the [address] [place] [address] since your last visit? Include any pap smears or colon screening.  no      Last  Checked na  Last UDS Checked na  Last Pain contract signed: na    Patients concerns today:  rash

## 2021-09-09 ENCOUNTER — APPOINTMENT (OUTPATIENT)
Dept: PHYSICAL THERAPY | Age: 71
End: 2021-09-09
Payer: MEDICARE

## 2021-09-09 ENCOUNTER — OFFICE VISIT (OUTPATIENT)
Dept: FAMILY MEDICINE CLINIC | Age: 71
End: 2021-09-09
Payer: MEDICARE

## 2021-09-09 VITALS
BODY MASS INDEX: 34.25 KG/M2 | RESPIRATION RATE: 16 BRPM | OXYGEN SATURATION: 98 % | TEMPERATURE: 97.6 F | DIASTOLIC BLOOD PRESSURE: 82 MMHG | HEART RATE: 82 BPM | SYSTOLIC BLOOD PRESSURE: 126 MMHG | WEIGHT: 226 LBS | HEIGHT: 68 IN

## 2021-09-09 DIAGNOSIS — Z00.00 MEDICARE ANNUAL WELLNESS VISIT, SUBSEQUENT: Primary | ICD-10-CM
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
## 2021-09-09 DIAGNOSIS — E11.65 TYPE 2 DIABETES MELLITUS WITH HYPERGLYCEMIA, WITHOUT LONG-TERM CURRENT USE OF INSULIN (HCC): ICD-10-CM

## 2021-09-09 DIAGNOSIS — Z71.89 ADVANCE CARE PLANNING: ICD-10-CM

## 2021-09-09 DIAGNOSIS — J30.1 SEASONAL ALLERGIC RHINITIS DUE TO POLLEN: ICD-10-CM

## 2021-09-09 DIAGNOSIS — Z23 NEEDS FLU SHOT: ICD-10-CM

## 2021-09-09 DIAGNOSIS — F33.41 RECURRENT MAJOR DEPRESSIVE DISORDER, IN PARTIAL REMISSION (HCC): ICD-10-CM

## 2021-09-09 DIAGNOSIS — Z98.890 HISTORY OF REPAIR OF LEFT ROTATOR CUFF: ICD-10-CM

## 2021-09-09 DIAGNOSIS — F51.04 PSYCHOPHYSIOLOGIC INSOMNIA: ICD-10-CM

## 2021-09-09 DIAGNOSIS — J44.9 CHRONIC OBSTRUCTIVE PULMONARY DISEASE, UNSPECIFIED COPD TYPE (HCC): ICD-10-CM

## 2021-09-09 PROCEDURE — 3017F COLORECTAL CA SCREEN DOC REV: CPT | Performed by: FAMILY MEDICINE

## 2021-09-09 PROCEDURE — G8399 PT W/DXA RESULTS DOCUMENT: HCPCS | Performed by: FAMILY MEDICINE

## 2021-09-09 PROCEDURE — G8417 CALC BMI ABV UP PARAM F/U: HCPCS | Performed by: FAMILY MEDICINE

## 2021-09-09 PROCEDURE — 1090F PRES/ABSN URINE INCON ASSESS: CPT | Performed by: FAMILY MEDICINE

## 2021-09-09 PROCEDURE — 99497 ADVNCD CARE PLAN 30 MIN: CPT | Performed by: FAMILY MEDICINE

## 2021-09-09 PROCEDURE — G9717 DOC PT DX DEP/BP F/U NT REQ: HCPCS | Performed by: FAMILY MEDICINE

## 2021-09-09 PROCEDURE — 1101F PT FALLS ASSESS-DOCD LE1/YR: CPT | Performed by: FAMILY MEDICINE

## 2021-09-09 PROCEDURE — 2022F DILAT RTA XM EVC RTNOPTHY: CPT | Performed by: FAMILY MEDICINE

## 2021-09-09 PROCEDURE — G8427 DOCREV CUR MEDS BY ELIG CLIN: HCPCS | Performed by: FAMILY MEDICINE

## 2021-09-09 PROCEDURE — G0439 PPPS, SUBSEQ VISIT: HCPCS | Performed by: FAMILY MEDICINE

## 2021-09-09 PROCEDURE — G9899 SCRN MAM PERF RSLTS DOC: HCPCS | Performed by: FAMILY MEDICINE

## 2021-09-09 PROCEDURE — G8536 NO DOC ELDER MAL SCRN: HCPCS | Performed by: FAMILY MEDICINE

## 2021-09-09 PROCEDURE — 99214 OFFICE O/P EST MOD 30 MIN: CPT | Performed by: FAMILY MEDICINE

## 2021-09-09 PROCEDURE — 90694 VACC AIIV4 NO PRSRV 0.5ML IM: CPT | Performed by: FAMILY MEDICINE

## 2021-09-09 PROCEDURE — 3044F HG A1C LEVEL LT 7.0%: CPT | Performed by: FAMILY MEDICINE

## 2021-09-09 NOTE — PROGRESS NOTES
Julianna Ochoa presents today for   Chief Complaint   Patient presents with    Annual Wellness Visit       Is someone accompanying this pt? no    Is the patient using any DME equipment during OV? Yes a cane    Depression Screening:  3 most recent PHQ Screens 5/6/2021   Little interest or pleasure in doing things Nearly every day   Feeling down, depressed, irritable, or hopeless Not at all   Total Score PHQ 2 3   Trouble falling or staying asleep, or sleeping too much Nearly every day   Feeling tired or having little energy More than half the days   Poor appetite, weight loss, or overeating Not at all   Feeling bad about yourself - or that you are a failure or have let yourself or your family down Several days   Trouble concentrating on things such as school, work, reading, or watching TV Not at all   Moving or speaking so slowly that other people could have noticed; or the opposite being so fidgety that others notice Not at all   Thoughts of being better off dead, or hurting yourself in some way Not at all   PHQ 9 Score 9   How difficult have these problems made it for you to do your work, take care of your home and get along with others Not difficult at all       Learning Assessment:  Learning Assessment 4/22/2016   PRIMARY LEARNER Patient   HIGHEST LEVEL OF EDUCATION - PRIMARY LEARNER  -   PRIMARY LANGUAGE ENGLISH   LEARNER PREFERENCE PRIMARY LISTENING   ANSWERED BY patient   RELATIONSHIP SELF       Abuse Screening:  Abuse Screening Questionnaire 12/12/2019   Do you ever feel afraid of your partner? N   Are you in a relationship with someone who physically or mentally threatens you? N   Is it safe for you to go home? Y       Fall Risk  Fall Risk Assessment, last 12 mths 9/9/2021   Able to walk? Yes   Fall in past 12 months? 0   Do you feel unsteady?  0   Are you worried about falling 0   Number of falls in past 12 months -   Fall with injury? -       Health Maintenance reviewed and discussed and ordered per Provider. Health Maintenance Due   Topic Date Due    Eye Exam Retinal or Dilated  04/27/2020    Medicare Yearly Exam  07/14/2021    Flu Vaccine (1) 09/01/2021   . Coordination of Care:  1. Have you been to the ER, urgent care clinic since your last visit? Hospitalized since your last visit? no    2. Have you seen or consulted any other health care providers outside of the [address] [place] [address] since your last visit? Include any pap smears or colon screening.  no      Last  Checked na  Last UDS Checked na  Last Pain contract signed: na    Annual Medicare Wellness Exam  Rash

## 2021-09-09 NOTE — PROGRESS NOTES
(AWV) The Medicare Annual Wellness Exam PROGRESS NOTE    This is a Medicare Annual Wellness Exam (AWV)     I have reviewed the patient's medical history in detail and updated the computerized patient record. Annetta Rico is a 70 y.o.  female and presents for an annual wellness exam     ROS   General ROS: negative for - chills, fatigue or fever; + weight gain  Psychological ROS: positive for - depression associated with COVID 19 and being home bound  Ophthalmic ROS: positive for - uses glasses  ENT ROS: positive for - headaches intermittently  Endocrine ROS: negative for - temperature intolerance or unexpected weight changes  Respiratory ROS: no cough, improved shortness of breath; no wheezing;   Cardiovascular ROS: no chest pain or dyspnea on exertion; intermittent tachycardia; she has h/o aortic valve replacement and is followed by Dr. Jose Malagon. Gastrointestinal ROS: see HPI  Genito-Urinary ROS: no dysuria, trouble voiding, or hematuria  Neurological ROS: no TIA or stroke symptoms  Dermatological ROS: see HPI  All other systems reviewed and are negative. History     Past Medical History:   Diagnosis Date    Ankle fracture     Aortic stenosis     S/P AVR in 2016    Asthma     Carpal tunnel syndrome on both sides     Chronic obstructive pulmonary disease (HCC)     Chronic pain     back pain    COVID-19 vaccine series completed 04/29/2021    Diabetes ([address]     Diverticulitis     Fibromyalgia     H/O aortic valve replacement 03/2016    21 mm bovine pericardial bioprosthesis and epiaortic scanning      H/O: HTN (hypertension)     no meds now- sp valve replacement    Hypothyroidism     Lumbar post-laminectomy syndrome     Menopause     Obesity     Osteoarthritis     Psychotic disorder ([address]     Pulmonary HTN ([address]     per pcp note cc- patient denies.  Sciatica     Skin cancer 2013    right forearm.        Past Surgical History:   Procedure Laterality Date    COLONOSCOPY      COLONOSCOPY N/A 9/15/2017    COLONOSCOPY performed by Farooq Dan MD at [address] HX ARTHROPLASTY Left 07/2021    HX CARPAL TUNNEL RELEASE Left 03/2021    HX CATARACT REMOVAL Right 1995    lens  replaced.  HX CATARACT REMOVAL Left 02/2018    HX COLONOSCOPY  09/15/2017    dr. Kathryn Arriola  f/u 5 years.  HX HEENT  1990's    sinus surgery     HX HEENT Right 11/02/2017    laser for right eye cataracts.  HX LUMBAR FUSION  2004    L3-L4-L5    HX LUMBAR LAMINECTOMY  2002    HX MOHS PROCEDURES Left 1990    HX ORTHOPAEDIC      Rotator cuff Bilateral    HX SHOULDER REPLACEMENT Right 03/29/2017    reverse.  IN CARDIAC SURG PROCEDURE UNLIST  03/2016    aortic valve replacement.  IN CHEST SURGERY PROCEDURE UNLISTED  03/2016    Open Heart Surgery    IN COLSC FLX W/RMVL OF TUMOR POLYP LESION SNARE TQ  07/09/2014 repeat in 3 years    Dr. Brigitte Fallon  2016    Aortic valve replacement(bovine)     Current Outpatient Medications   Medication Sig Dispense Refill    desonide (TRIDESILON) 0.05 % cream APPLY   EXTERNALLY TO AFFECTED AREA TWICE DAILY 15 g 1    DULoxetine (CYMBALTA) 30 mg capsule TAKE 1 CAPSULE BY MOUTH THREE TIMES DAILY      tiaGABine (GabitriL) 2 mg tablet Take 1 Tablet by mouth nightly. 90 Tablet 0    traMADoL (ULTRAM) 50 mg tablet Take 1 Tablet by mouth four (4) times daily as needed for Pain for up to 90 days. Max Daily Amount: 200 mg. Indications: neuropathic pain, pain 120 Tablet 2    zolpidem (AMBIEN) 5 mg tablet Take 1 Tablet by mouth nightly as needed for Sleep. Max Daily Amount: 5 mg. 20 Tablet 0    aspirin delayed-release 81 mg tablet Take 81 mg by mouth daily.  meclizine (ANTIVERT) 25 mg tablet Take 1 Tablet by mouth three (3) times daily as needed for Dizziness.  60 Tablet 3    fluticasone propionate (FLONASE) 50 mcg/actuation nasal spray Use 1 spray(s) in each nostril twice daily 16 g 4    tiaGABine (GabitriL) 2 mg tablet Take 1 Tab by mouth nightly. 90 Tab 0    levothyroxine (SYNTHROID) 50 mcg tablet TAKE 1 TABLET BY MOUTH ONCE DAILY BEFORE BREAKFAST 90 Tab 3    pantoprazole (PROTONIX) 20 mg tablet Take 1 tablet by mouth once daily 90 Tab 3    metFORMIN (GLUCOPHAGE) 500 mg tablet TAKE 1 TABLET BY MOUTH ONCE DAILY WITH SUPPER 90 Tab 3    FLUoxetine (PROzac) 10 mg capsule Take 1 Cap by mouth daily. 90 Cap 3    atorvastatin (LIPITOR) 20 mg tablet Take 1 tablet by mouth once daily 90 Tab 4    glucose blood VI test strips (True Metrix Pro Test Strip) strip Test blood glucose 3 times a day 200 Strip 3    STIOLTO RESPIMAT 2.5-2.5 mcg/actuation inhaler Take 2 Puffs by inhalation daily. 1    vitamin E acetate (VITAMIN E PO) Take  by mouth daily.  cyanocobalamin (VITAMIN B-12) 500 mcg tablet Take 500 mcg by mouth daily.  cetirizine (ZYRTEC) 10 mg tablet TAKE 1 TABLET BY MOUTH EVERY DAY. GENERIC FOR ZYRTEC 90 Tab 2    VENTOLIN HFA 90 mcg/actuation inhaler 2 Puffs by Aerosolization route every four (4) hours as needed. 0    B.infantis-B.ani-B.long-B.bifi (PROBIOTIC 4X) 10-15 mg TbEC Take  by mouth daily.  Cholecalciferol, Vitamin D3, (VITAMIN D3) 1,000 unit cap Take 1,000 Units by mouth daily.  Lancets (ACCU-CHEK SOFTCLIX LANCETS) misc Please use one lancet to test blood sugars twice a day. 1 Package 20    BLOOD-GLUCOSE METER (ACCU-CHEK MARCE MONITORING) by Does Not Apply route.        Allergies   Allergen Reactions    Latex, Natural Rubber Hives    Adhesive Rash     Some bandaids    Ciprofloxacin Rash    Codeine Nausea and Vomiting    Demerol [Meperidine] Nausea and Vomiting    Diclofenac Shortness of Breath and Palpitations    Erythromycin Rash    Levaquin [Levofloxacin] Rash    Loratadine Swelling    Morphine Nausea and Vomiting    Penicillin G Hives and Rash     Does fine with Keflex    Sulfa (Sulfonamide Antibiotics) Itching    Vicodin [Hydrocodone-Acetaminophen] Other (comments)     Metallic taste in mouth     Family History   Problem Relation Age of Onset    Hypertension Father     Heart Disease Father     Alcohol abuse Father      Social History     Tobacco Use    Smoking status: Former Smoker     Years: 45.00     Types: Cigarettes     Quit date: 7/1/2011     Years since quitting: 10.2    Smokeless tobacco: Never Used   Substance Use Topics    Alcohol use: No     Patient Active Problem List   Diagnosis Code    Tachycardia R00.0    Diabetes ([facility] E11.9    Pulmonary hypertension (HCC) I27.20    Osteoarthritis M19.90    Fibromyalgia M79.7    PTSD (post-traumatic stress disorder) F43.10    OCD (obsessive compulsive disorder) F42.9    Panic disorder with agoraphobia F40.01    Recurrent major depression ([facility] F33.9    Aortic stenosis I35.0    Hyperlipidemia LDL goal <100 E78.5    Postlaminectomy syndrome M96.1    Lumbar neuritis M54.16    AS (aortic stenosis) I35.0    Long term (current) use of anticoagulants Z79.01    Ankle impingement syndrome M25.879    Radiculopathy, lumbar region M54.16    Lumbar spinal stenosis M48.061    Lumbar postlaminectomy syndrome M96.1    Type 2 diabetes with nephropathy ([facility]) E11.21    Severe obesity ([facility]) E66.01    Left carpal tunnel syndrome G56.02    Left rotator cuff tear arthropathy M75.102, M12.812    Opioid use, unspecified with unspecified opioid-induced disorder F11.99    Opioid dependence with unspecified opioid-induced disorder F11.29    Opioid dependence, uncomplicated E96.17       Health Maintenance History  Immunizations reviewed, dtap up to date , pneumovax up to date, flu due, zoster  Up to date  Colonoscopy: up to date,   Eye exam: due  Mammo up to date  Dexascan up to date  covid up to date    Depression Risk Factor Screening:      Diagnosed with depression    Alcohol Risk Factor Screening:     Women:  On any occasion during the past 3 months, have you had more than 3 drinks containing alcohol? no   Do you average more than 7 drinks per week? no    Functional Ability and Level of Safety:     Hearing Loss    Hearing is good. Activities of Daily Living   Self-care. Requires assistance with: no ADLs    Fall Risk   Secondary diagnoses (15 pts)  Score: 15    Abuse Screen   Patient is not abused    Examination   Physical Examination  Vitals:    09/09/21 0831   BP: 126/82   Pulse: 82   Resp: 16   Temp: 97.6 °F (36.4 °C)   TempSrc: Temporal   SpO2: 98%   Weight: 226 lb (102.5 kg)   Height: 5' 8\" (1.727 m)   PainSc:   0 - No pain      Body mass index is 34.36 kg/m². Evaluation of Cognitive Function:  Mood/affect:anxious  Appearance: well kempt  Family member/caregiver input: n/a    alert, well appearing, and in no distress, oriented to person, place, and time and obese    Patient Care Team:  Gordy Duran MD as PCP - General (Family Medicine)  Gordyjonathan Duran MD as PCP - [facility]  Melquiades Barnhart MD (Inactive) (Colon and Rectal Surgery)  Mirian Mitchell MD (Cardiology)  Damon Stephenson MD (Cardiothoracic Surgery)  Harman Foster MD (Pulmonary Disease)    End-of-life planning  Advanced Directive in the case than an injury or illness causes the patient to be unable to make health care decisions    Health Care Directive or Living Will: yes    Advice/Referrals/Counselling/Plan:   Education and counseling provided:  End-of-Life planning (with patient's consent)  Influenza Vaccine  Medical nutrition therapy for individuals with diabetes or renal disease  covid vaccine  Include in education list (weight loss, physical activity, smoking cessation, fall prevention, and nutrition)    ICD-10-CM ICD-9-CM    1. Medicare annual wellness visit, subsequent  Z00.00 V70.0    2. Advance care planning  Z71.89 V65.49    3. Bedbug bite, initial encounter  W57. XXXA 919.4 pramoxine-hc-chloroxylenol 1-1-0.1 % lotion   4. Recurrent major depressive disorder, in partial remission ([facility]  F33.41 296.35    5.  Seasonal allergic rhinitis due to pollen  J30.1 477.0    6. Psychophysiologic insomnia  F51.04 307.42    7. Type 2 diabetes mellitus with hyperglycemia, without long-term current use of insulin ([facility])  E11.65 250.00      790.29    8. Chronic obstructive pulmonary disease, unspecified COPD type ([facility]  J44.9 496    9. Needs flu shot  Z23 V04.81 INFLUENZA VIRUS VACCINE, HIGH DOSE SEASONAL, PRESERVATIVE FREE      ADMIN INFLUENZA VIRUS VAC   10. History of repair of left rotator cuff  Z98.890 V15.29    . Brief written plan, checklist    I have discussed the diagnosis with the patient and the intended plan as seen in the above orders. The patient has received an after-visit summary and questions were answered concerning future plans. I have discussed medication side effects and warnings with the patient as well. I have reviewed the plan of care with the patient, accepted their input and they are in agreement with the treatment goals. ____________________________________________________________    Problem Assessment    for treatment of   Chief Complaint   Patient presents with    Annual Wellness Visit         SUBJECTIVE    Well Adult Physical   Patient here for a comprehensive physical exam.The patient reports problems - bites on arms and she has blood spots on her sheets; she has seen a bug that is oval shape and brown. Do you take any herbs or supplements that were not prescribed by a doctor? no Are you taking calcium supplements? no Are you taking aspirin daily? no    She is following up for rash which had spread from her arm behind her leg; she completed course of prednisone. She has started using ammonium lactate and this has improved her symptoms. She denies fever or chills; no other members of her household has the symptoms. Anxiety Review:  Patient is seen for sleep disturbance. Current treatment includes ambien and cymbalta. Ongoing symptoms include: insomnia but improved with ambien.    Patient denies: palpitations, sweating, chest pain, shortness of breath, dizziness, paresthesias, racing thoughts, psychomotor agitation, feelings of losing control, difficulty concentrating, suicidal ideation. Reported side effects from the treatment: none.     Diabetes  She has been taking medication as prescribed.  She is following up after laboratory testing.  She denies increased thirst or urination.       Osteoarthritis and Chronic Pain:  Patient has osteoarthritis, primarily affecting the neck and back.  She is going to have left arm done for carpal tunnel.    Symptoms onset: problem is longstanding. Rheumatological ROS: ongoing significant pain in neck and back which is stable and controlled by PRN meds. Response to treatment plan: symptoms have progressed to a point and plateaued. .   Asthma Review:  The patient is being seen for follow up of asthma, not currently in exacerbation. she completed antibiotics.  Asthma symptoms occur: less than 2x/week. Lindsay Martinez is using stiolto prescribed by dr. Miguel Ángel Vernon. Wheezing when present is described as moderate and easily relieved with rescue bronchodilator. Current limitations in activity from asthma: exercising.  Number of days of school or work missed in the last month: N/A. Frequency of use of quick-relief meds: < once per week. Regimen compliance: The patient reports adherence to this regimen. Patient does not smoke cigarettes.  Quit 10 years ago       Visit Vitals  /82 (BP 1 Location: Right arm, BP Patient Position: Sitting, BP Cuff Size: Adult)   Pulse 82   Temp 97.6 °F (36.4 °C) (Temporal)   Resp 16   Ht 5' 8\" (1.727 m)   Wt 226 lb (102.5 kg)   SpO2 98%   BMI 34.36 kg/m²     General:  Alert, cooperative, no distress, appears stated age. obese   Head:  Normocephalic, without obvious abnormality, atraumatic. Eyes:  Conjunctivae/corneas clear. PERRL, EOMs intact. Ears:  Normal TMs and external ear canals both ears. Nose: Nares normal. Septum midline.  Mucosa normal. No drainage or sinus tenderness. Throat: Lips, mucosa, and tongue normal. Teeth and gums normal.   Neck: Supple, symmetrical, trachea midline, no adenopathy, thyroid: no enlargement/tenderness/nodules, no carotid bruit and no JVD. Back:   Symmetric, no curvature. ROM normal. No CVA tenderness. Lungs:   Clear to auscultation bilaterally. Chest wall:  No tenderness or deformity. Heart:  Regular rate and rhythm, S1, S2 normal, no murmur, click, rub or gallop. Breast Exam:  No tenderness, masses, or nipple abnormality. Abdomen:   Soft, non-tender. Bowel sounds normal. No masses,  No organomegaly. Genitalia:  deferred   Rectal:  deferred   Extremities: Extremities normal, atraumatic, no cyanosis or edema. Pulses: 2+ and symmetric all extremities. Skin: Skin color, texture, turgor normal. No rashes or lesions. Lymph nodes: Cervical, supraclavicular, and axillary nodes normal.   Neurologic: CNII-XII intact. Normal strength, sensation and reflexes throughout. LABS   hgb a1c 6.4    TESTS      Assessment/Plan:    1. Medicare annual wellness visit, subsequent  Reviewed preventive recommendations    2. Advance care planning  See ACP    3. Bedbug bite, initial encounter  Recommend topical treatment; recommend home treatment by   - pramoxine-hc-chloroxylenol 1-1-0.1 % lotion; Apply  to affected area two (2) times a day. Dispense: 60 mL; Refill: 1    4. Recurrent major depressive disorder, in partial remission (HCC)  Mood exacerbated by current condition    5. Seasonal allergic rhinitis due to pollen  Continue treatment    6. Psychophysiologic insomnia  Sleep aid continued    7. Type 2 diabetes mellitus with hyperglycemia, without long-term current use of insulin ([facility])  Goal hgb a1c <7; well controlled with current treatment    8. Chronic obstructive pulmonary disease, unspecified COPD type ([facility]  Continue inhaled therapy    9.  Needs flu shot    - INFLUENZA VIRUS VACCINE, HIGH DOSE SEASONAL, PRESERVATIVE FREE  - ADMIN INFLUENZA VIRUS VAC    10.  History of repair of left rotator cuff  F/u with orthopedic surgeon as scheduled    Lab review: labs reviewed, I note that hemogram mildly abnormal but acceptable

## 2021-09-10 LAB
BASOPHILS # BLD AUTO: 0.1 X10E3/UL (ref 0–0.2)
BASOPHILS NFR BLD AUTO: 1 %
EOSINOPHIL # BLD AUTO: 0.3 X10E3/UL (ref 0–0.4)
EOSINOPHIL NFR BLD AUTO: 5 %
ERYTHROCYTE [DISTWIDTH] IN BLOOD BY AUTOMATED COUNT: 13.4 % (ref 11.7–15.4)
HCT VFR BLD AUTO: 33.5 % (ref 34–46.6)
HGB BLD-MCNC: 10.5 G/DL (ref 11.1–15.9)
IMM GRANULOCYTES # BLD AUTO: 0 X10E3/UL (ref 0–0.1)
IMM GRANULOCYTES NFR BLD AUTO: 0 %
LYMPHOCYTES # BLD AUTO: 1.9 X10E3/UL (ref 0.7–3.1)
LYMPHOCYTES NFR BLD AUTO: 27 %
MCH RBC QN AUTO: 27 PG (ref 26.6–33)
MCHC RBC AUTO-ENTMCNC: 31.3 G/DL (ref 31.5–35.7)
MCV RBC AUTO: 86 FL (ref 79–97)
MONOCYTES # BLD AUTO: 0.8 X10E3/UL (ref 0.1–0.9)
MONOCYTES NFR BLD AUTO: 11 %
NEUTROPHILS # BLD AUTO: 3.9 X10E3/UL (ref 1.4–7)
NEUTROPHILS NFR BLD AUTO: 56 %
PLATELET # BLD AUTO: 291 X10E3/UL (ref 150–450)
RBC # BLD AUTO: 3.89 X10E6/UL (ref 3.77–5.28)
SPECIMEN STATUS REPORT, ROLRST: NORMAL
WBC # BLD AUTO: 7 X10E3/UL (ref 3.4–10.8)

## 2021-09-13 ENCOUNTER — OFFICE VISIT (OUTPATIENT)
Dept: ORTHOPEDIC SURGERY | Age: 71
End: 2021-09-13
Payer: MEDICARE

## 2021-09-13 VITALS
TEMPERATURE: 96.8 F | HEART RATE: 93 BPM | WEIGHT: 226 LBS | HEIGHT: 68 IN | BODY MASS INDEX: 34.25 KG/M2 | OXYGEN SATURATION: 96 %

## 2021-09-13 DIAGNOSIS — Z96.612 S/P REVERSE TOTAL SHOULDER ARTHROPLASTY, LEFT: Primary | ICD-10-CM

## 2021-09-13 PROCEDURE — 99024 POSTOP FOLLOW-UP VISIT: CPT | Performed by: ORTHOPAEDIC SURGERY

## 2021-09-13 NOTE — PROGRESS NOTES
Balbina Toscano  1950     HISTORY OF PRESENT ILLNESS  Balbina Toscano is a 70 y.o. female who presents today for evaluation s/p Left reverse total shoulder arthroplasty on 7/2/21. Patient has been going to PT. Describes pain as a 3/10. Overall is improving post-operatively. Has been taking Tylenol and Tramadol. Patient denies any fever, chills, chest pain, shortness of breath or calf pain. The remainder of the review of systems is negative. There are no new illness or injuries to report since last seen in the office. No changes in medications, allergies, social or family history. PHYSICAL EXAM:   Visit Vitals  Pulse 93   Temp 96.8 °F (36 °C) (Temporal)   Ht 5' 8\" (1.727 m)   Wt 226 lb (102.5 kg)   SpO2 96%   BMI 34.36 kg/m²      The patient is a well-developed, well-nourished female in no acute distress. The patient is alert and oriented times three. The patient appears to be well groomed. Mood and affect are normal.  ORTHOPEDIC EXAM of left shoulder:  Inspection: swelling present,  Bruising not present  Incision well healed  Passive glenohumeral abduction 0-90 degrees  Stability: Stable  Strength: n/a  2+ distal pulses      IMPRESSION:  S/P Left reverse total shoulder arthroplasty  Encounter Diagnosis   Name Primary?  S/P reverse total shoulder arthroplasty, left Yes          PLAN: Patient to continue with PROM and AROM in PT. D/c wearing sling and can start reaching and moving the arm. Stressed to patient that nothing causes an increase in pain. Continue massaging and desensitizing the shoulder area. RTC 4 weeks    Scribed by [facility]) as dictated by Kenzie Lazaro MD    I, Dr. Kenzie Lazaro, confirm that all documentation is accurate.     Kenzie Lazaro M.D.   [facility]

## 2021-09-21 ENCOUNTER — APPOINTMENT (OUTPATIENT)
Dept: PHYSICAL THERAPY | Age: 71
End: 2021-09-21
Payer: MEDICARE

## 2021-09-26 NOTE — ACP (ADVANCE CARE PLANNING)
Advance Care Planning     Advance Care Planning (ACP) Physician/NP/PA Conversation      Date of Conversation: 9/9/2021  Conducted with: Patient with Decision Making Capacity    Healthcare Decision Maker:     Primary Decision Maker: Elena Garcias - [phone]    Secondary Decision Maker: Alison ribeiro up - Friend - [phone]    Secondary Decision Maker: Alonzo Dodge - Chapo - [phone]  Click here to complete [employer] including selection of the Healthcare Decision Maker Relationship (ie \"Primary\")      Today we documented Decision Maker(s) consistent with ACP documents on file. Care Preferences:    Hospitalization: \"If your health worsens and it becomes clear that your chance of recovery is unlikely, what would be your preference regarding hospitalization? \"  The patient would prefer hospitalization. Ventilation: \"If you were unable to breathe on your own and your chance of recovery was unlikely, what would be your preference about the use of a ventilator (breathing machine) if it was available to you? \"   The patient would desire the use of a ventilator. Resuscitation: \"In the event your heart stopped as a result of an underlying serious health condition, would you want attempts to be made to restart your heart, or would you prefer a natural death? \"   Yes, attempt to resuscitate.     Additional topics discussed: treatment goals, benefit/burden of treatment options, ventilation preferences, hospitalization preferences and resuscitation preferences    Conversation Outcomes / Follow-Up Plan:   ACP in process - information provided, considering goals and options  Reviewed DNR/DNI and patient elects Full Code (Attempt Resuscitation)     Length of Voluntary ACP Conversation in minutes:  16 minutes    Raulito Mccray MD

## 2021-09-29 ENCOUNTER — HOSPITAL ENCOUNTER (OUTPATIENT)
Dept: PHYSICAL THERAPY | Age: 71
Discharge: HOME | End: 2021-09-29
Payer: MEDICARE

## 2021-09-29 PROCEDURE — 97530 THERAPEUTIC ACTIVITIES: CPT

## 2021-09-29 PROCEDURE — 97016 VASOPNEUMATIC DEVICE THERAPY: CPT

## 2021-09-29 PROCEDURE — 97110 THERAPEUTIC EXERCISES: CPT

## 2021-09-29 NOTE — PROGRESS NOTES
PHYSICAL THERAPY - DAILY TREATMENT NOTE    Patient Name: Pilo Mitchell        Date: 2021  : 1950   YES Patient  Verified  Visit #:     Insurance: Payor: [employer] MEDICARE / Plan: [facility] / Product Type: Managed Care Medicare /      In time: 12:20 Out time: 1:17   Total Treatment Time: 57     Medicare/BCBS Time Tracking (below)   Total Timed Codes (min):  57 1:1 Treatment Time:  42     TREATMENT AREA = Left shoulder pain [M25.512]  Status post arthroscopy of left shoulder [Z98.890]    SUBJECTIVE    Pain Level (on 0 to 10 scale):  4  / 10   Medication Changes/New allergies or changes in medical history, any new surgeries or procedures? NO    If yes, update Summary List   Subjective Functional Status/Changes:  []  No changes reported     \"It's been sore. My home life has been crazy. My roommate broke her ankle but is finally getting surgery tomorrow. My sister moved to [place] but we're trying to get her to [place]. \"   \"While I was gone I went to [place] to see the doctor. He walked in, didn't talk to me and just looked at the chart. Then he walked out. I drove all the way to [place] for a 2 minute appointment. \"       OBJECTIVE     Therapeutic Procedures:  Min Procedure Specifics + Rationale   n/a [x]  Patient Education (performed throughout session) [x] Review HEP    [] Progressed/Changed HEP based on:   [] proper performance and advancement of Therex/TA   [] reduction in pain level    [] increased functional capacity       [] change in directional preference   32 [x] Therapeutic Exercise   [x]  See Flowsheet   Rationale: increase ROM and increase strength to improve the patients ability to participate in ADL's    10 [x] Therapeutic Activity   [x]  See Flowsheet  Re-assessment  Rationale:  To improve safety, proprioception, coordination, and efficiency with tasks       Modality rationale: decrease inflammation, decrease pain, increase tissue extensibility and increase muscle contraction/control to improve the patients ability to perform ADL's with greater ease       Min Type Additional Details   15 [x]  Vasopneumatic Device  [] pre-JCARLOS      [x] post-JCARLOS  [x]  Vasopnuematic compression justification:  Per girth measures taken and listed above the edema is considered significant and having an impact on the patient's strength, self care and ADLs  If using vaso (only need to measure limb vaso being performed on)      pre-treatment girth :       post-treatment girth :       measured at (landmark location) :   Location:right shoulder    [x] supine             [] prone  [] legs elevated  [] legs flat  [] sitting              [] sidelying - [] left [] right   [x] Skin assessment post-treatment:  [x]intact [x]redness- no adverse reaction       []redness - adverse reaction:       Other Objective/Functional Measures:  See PN     Post Treatment Pain Level (on 0 to 10) scale:   0  / 10     ASSESSMENT    Assessment/Changes in Function:       See PN         Patient will continue to benefit from skilled PT services to modify and progress therapeutic interventions, address functional mobility deficits, address ROM deficits, address strength deficits, analyze and address soft tissue restrictions, analyze and cue movement patterns, analyze and modify body mechanics/ergonomics, assess and modify postural abnormalities and instruct in home and community integration  to attain remaining goals   Progress toward goals / Updated goals:    See PN     PLAN    [x]  Upgrade activities as tolerated  [x]  Update interventions per flow sheet YES Continue plan of care   []  Discharge due to :    []  Other:      Therapist: Holley Santoyo \"BJ\" Bertha Pandey, DPT, Cert. MDT, Cert. DN, Cert. SMT, Dip.  Osteopractic    Date: 9/29/2021 Time: 12:36 PM     Future Appointments   Date Time Provider Ada Fischer   10/11/2021  1:20 PM Madelyn Kelley MD [facility] BS AMB   10/18/2021 11:30 AM [address] SHAE STEREO [facility] [address] [facility] 11/4/2021 10:45 AM Sebastian Larios MD [facility] BS AMB   11/11/2021  1:30 PM Virginia Hearn MD [facility] BS AMB

## 2021-09-30 NOTE — PROGRESS NOTES
[facility] PHYSICAL THERAPY  [address] [place] [address] [place], [address] - Phone: [phone]  Fax: [phone]  PROGRESS NOTE  Patient Name: Rosalind Betts : 1950   Treatment/Medical Diagnosis: Left shoulder pain [M25.512]  Status post arthroscopy of left shoulder [Z98.890]   Referral Source: [place]     Date of Initial Visit: 21 Attended Visits: 12 Missed Visits: 5 Plus   4 Weeks     SUMMARY OF TREATMENT  Patient did not attend therapy between 21-21 due to social circumstances with her home and family life. She reports attempted compliance towards her HEP, but has not been able to progress in her ROM or strength and is now behind in her protocol. Current ROM are as follows:                                                 Left Shoulder AROM PROM Strength   Flexion 120 139 3   Scaption/ABD 95 123 3-   ER @ 0 Degrees 50 59 3-   ER @ 90 Degrees 61 64 3-     Previously set goals since last PN not met   Goals to be achieved in __4__  weeks:  1. Patient to be Safe and Independent with HEP to self-manage/prevent symptoms after DC. 2. Patient to increase FS FOTO score to > 40 to indicate increased functional independence. 3. Patient to report return to sleeping in bed to allow for greater comfort and uninterrupted sleep. 4. Patient to demonstrate > 140 degrees AROM flexion to facilitate OH reach. 5. Patient to report minimal difficulty donning/doffing UE clothing.    Frequency / Duration:   Patient to be seen  2  times per week for 4  weeks:    RECOMMENDATIONS  Resume therapy and progress AROM/Strengthening to improve functional capacity for ADL's     If you have any questions/comments please contact us directly at [phone]. Thank you for allowing us to assist in the care of your patient. Therapist Signature: Edmond Greenberg \"BJ\" Josue Goyal, JAMES, Cert. MDT, Cert. DN, Cert. SMT, Dip.  Osteopractic Date:    Certification Period: 9/30/21-10/30/21     Reporting Period 9/1/21-9/29/21   Time: 8:07 AM   NOTE TO PHYSICIAN:  PLEASE COMPLETE THE ORDERS BELOW AND FAX TO   [facility] Physical Therapy: [phone]. If you are unable to process this request in 24 hours please contact our office: [phone].    ___ I have read the above report and request that my patient continue as recommended.   ___ I have read the above report and request that my patient continue therapy with the following changes/special instructions:_________________________________________________________   ___ I have read the above report and request that my patient be discharged from therapy.      Physician Signature:        Date:       Time:                                        Estefani Aceves

## 2021-10-06 ENCOUNTER — APPOINTMENT (OUTPATIENT)
Dept: PHYSICAL THERAPY | Age: 71
End: 2021-10-06
Payer: MEDICARE

## 2021-10-08 ENCOUNTER — HOSPITAL ENCOUNTER (OUTPATIENT)
Dept: PHYSICAL THERAPY | Age: 71
End: 2021-10-08
Payer: MEDICARE

## 2021-10-11 ENCOUNTER — OFFICE VISIT (OUTPATIENT)
Dept: ORTHOPEDIC SURGERY | Age: 71
End: 2021-10-11
Payer: MEDICARE

## 2021-10-11 VITALS
BODY MASS INDEX: 33.49 KG/M2 | OXYGEN SATURATION: 100 % | HEIGHT: 68 IN | WEIGHT: 221 LBS | TEMPERATURE: 97.1 F | HEART RATE: 92 BPM

## 2021-10-11 DIAGNOSIS — M84.322A: ICD-10-CM

## 2021-10-11 DIAGNOSIS — M12.812 ROTATOR CUFF ARTHROPATHY OF LEFT SHOULDER: Primary | ICD-10-CM

## 2021-10-11 PROCEDURE — 3017F COLORECTAL CA SCREEN DOC REV: CPT | Performed by: ORTHOPAEDIC SURGERY

## 2021-10-11 PROCEDURE — G8427 DOCREV CUR MEDS BY ELIG CLIN: HCPCS | Performed by: ORTHOPAEDIC SURGERY

## 2021-10-11 PROCEDURE — G9717 DOC PT DX DEP/BP F/U NT REQ: HCPCS | Performed by: ORTHOPAEDIC SURGERY

## 2021-10-11 PROCEDURE — G8417 CALC BMI ABV UP PARAM F/U: HCPCS | Performed by: ORTHOPAEDIC SURGERY

## 2021-10-11 PROCEDURE — 1090F PRES/ABSN URINE INCON ASSESS: CPT | Performed by: ORTHOPAEDIC SURGERY

## 2021-10-11 PROCEDURE — 1101F PT FALLS ASSESS-DOCD LE1/YR: CPT | Performed by: ORTHOPAEDIC SURGERY

## 2021-10-11 PROCEDURE — G9899 SCRN MAM PERF RSLTS DOC: HCPCS | Performed by: ORTHOPAEDIC SURGERY

## 2021-10-11 PROCEDURE — 99214 OFFICE O/P EST MOD 30 MIN: CPT | Performed by: ORTHOPAEDIC SURGERY

## 2021-10-11 PROCEDURE — 73030 X-RAY EXAM OF SHOULDER: CPT | Performed by: ORTHOPAEDIC SURGERY

## 2021-10-11 PROCEDURE — G8399 PT W/DXA RESULTS DOCUMENT: HCPCS | Performed by: ORTHOPAEDIC SURGERY

## 2021-10-11 PROCEDURE — G8536 NO DOC ELDER MAL SCRN: HCPCS | Performed by: ORTHOPAEDIC SURGERY

## 2021-10-13 ENCOUNTER — APPOINTMENT (OUTPATIENT)
Dept: PHYSICAL THERAPY | Age: 71
End: 2021-10-13
Payer: MEDICARE

## 2021-10-13 NOTE — PROGRESS NOTES
Patient out for eye procedure today. Spoke with patient regarding admission.  Advised patient that he will be contacted when bed is available by [facility] with admission details.  All questions answered and patient verbalized understanding.

## 2021-10-15 ENCOUNTER — APPOINTMENT (OUTPATIENT)
Dept: PHYSICAL THERAPY | Age: 71
End: 2021-10-15
Payer: MEDICARE

## 2021-10-19 ENCOUNTER — APPOINTMENT (OUTPATIENT)
Dept: PHYSICAL THERAPY | Age: 71
End: 2021-10-19
Payer: MEDICARE

## 2021-10-19 ENCOUNTER — HOSPITAL ENCOUNTER (OUTPATIENT)
Dept: CT IMAGING | Age: 71
Discharge: HOME | End: 2021-10-19
Attending: ORTHOPAEDIC SURGERY
Payer: MEDICARE

## 2021-10-19 DIAGNOSIS — M84.322A: ICD-10-CM

## 2021-10-19 PROCEDURE — 73200 CT UPPER EXTREMITY W/O DYE: CPT

## 2021-10-21 ENCOUNTER — APPOINTMENT (OUTPATIENT)
Dept: PHYSICAL THERAPY | Age: 71
End: 2021-10-21
Payer: MEDICARE

## 2021-10-26 ENCOUNTER — OFFICE VISIT (OUTPATIENT)
Dept: ORTHOPEDIC SURGERY | Age: 71
End: 2021-10-26
Payer: MEDICARE

## 2021-10-26 VITALS
OXYGEN SATURATION: 98 % | WEIGHT: 221 LBS | BODY MASS INDEX: 33.49 KG/M2 | TEMPERATURE: 98 F | HEIGHT: 68 IN | HEART RATE: 88 BPM

## 2021-10-26 DIAGNOSIS — M12.812 ROTATOR CUFF ARTHROPATHY OF LEFT SHOULDER: Primary | ICD-10-CM

## 2021-10-26 PROCEDURE — G8417 CALC BMI ABV UP PARAM F/U: HCPCS | Performed by: ORTHOPAEDIC SURGERY

## 2021-10-26 PROCEDURE — 99213 OFFICE O/P EST LOW 20 MIN: CPT | Performed by: ORTHOPAEDIC SURGERY

## 2021-10-26 PROCEDURE — G9899 SCRN MAM PERF RSLTS DOC: HCPCS | Performed by: ORTHOPAEDIC SURGERY

## 2021-10-26 PROCEDURE — G9717 DOC PT DX DEP/BP F/U NT REQ: HCPCS | Performed by: ORTHOPAEDIC SURGERY

## 2021-10-26 PROCEDURE — 1101F PT FALLS ASSESS-DOCD LE1/YR: CPT | Performed by: ORTHOPAEDIC SURGERY

## 2021-10-26 PROCEDURE — G8427 DOCREV CUR MEDS BY ELIG CLIN: HCPCS | Performed by: ORTHOPAEDIC SURGERY

## 2021-10-26 PROCEDURE — 1090F PRES/ABSN URINE INCON ASSESS: CPT | Performed by: ORTHOPAEDIC SURGERY

## 2021-10-26 PROCEDURE — G8399 PT W/DXA RESULTS DOCUMENT: HCPCS | Performed by: ORTHOPAEDIC SURGERY

## 2021-10-26 PROCEDURE — G8536 NO DOC ELDER MAL SCRN: HCPCS | Performed by: ORTHOPAEDIC SURGERY

## 2021-10-26 PROCEDURE — 3017F COLORECTAL CA SCREEN DOC REV: CPT | Performed by: ORTHOPAEDIC SURGERY

## 2021-10-26 RX ORDER — GABAPENTIN 100 MG/1
100 CAPSULE ORAL 3 TIMES DAILY
Qty: 30 CAPSULE | Refills: 1 | Status: SHIPPED | OUTPATIENT
Start: 2021-10-26 | End: 2022-01-28 | Stop reason: SINTOL

## 2021-10-26 NOTE — PROGRESS NOTES
Pilo Mitchell  1950   Chief Complaint   Patient presents with    Shoulder Pain     left shoulder ct results        HISTORY OF PRESENT ILLNESS  Pilo Mitchell is a 70 y.o. female who presents today for reevaluation s/p Left reverse total shoulder arthroplasty on 7/2/21. Patient has been going to PT. Describes pain as a 5/10. Hurt her shoulder in the being of October doing HEP. She had a shooting pain that has been constant since onset. Pain in throughout the biceps region into the shoulder. Pain with certain movements. Describes a numbness through the biceps region. Presents today ambulating with a cane. Patient denies any fever, chills, chest pain, shortness of breath or calf pain. The remainder of the review of systems is negative. There are no new illness or injuries to report since last seen in the office. There are no changes to medications, allergies, family or social history. PHYSICAL EXAM:   Visit Vitals  Pulse 88   Temp 98 °F (36.7 °C) (Temporal)   Ht 5' 8\" (1.727 m)   Wt 221 lb (100.2 kg)   SpO2 98%   BMI 33.60 kg/m²     The patient is a well-developed, well-nourished female   in no acute distress. The patient is alert and oriented times three. The patient is alert and oriented times three. Mood and affect are normal.  LYMPHATIC: lymph nodes are not enlarged and are within normal limits  SKIN: normal in color and non tender to palpation. There are no bruises or abrasions noted. NEUROLOGICAL: Motor sensory exam is within normal limits. Reflexes are equal bilaterally. There is normal sensation to pinprick and light touch  ORTHOPEDIC EXAM of left shoulder:  Inspection: swelling present,  Bruising not present  Incision well healed  Passive glenohumeral abduction 0-90 degrees  Stability: Stable  Strength: n/a  2+ distal pulses  Tender over acromion         IMAGING: CT of the left shoulder dated 10/19/2021 was reviewed and read by Dr. Ruy Joel:   IMPRESSION  1.  Intact left shoulder total reverse arthroplasty without evidence for fracture  or hardware loosening. 2. No joint effusion, periarticular collection or mass. XR of the left shoulder with 3 views obtained in the office dated 10/11/2021 was reviewed and read by Dr. Katelynn Arteaga: Surgical equipment in excellent position     IMPRESSION:      ICD-10-CM ICD-9-CM    1. Rotator cuff arthropathy of left shoulder  M12.812 716.81 gabapentin (NEURONTIN) 100 mg capsule      REFERRAL TO PHYSICAL THERAPY        PLAN:   1. Patient presents today with left shoulder pain despite the MRI suggesting the anchors are in place. Due to her nerve related pain, I would like her to start with gabapentin and continue with PT. Return in 4 weeks. Risk factors include: BMI>30, dm  2. No ultrasound exam indicated today  3. No cortisone injection indicated today   4. Yes Physical/Occupational Therapy indicated today  5. No diagnostic test indicated today:   6. No durable medical equipment indicated today  7. No referral indicated today   8. Yes medications indicated today: GABAPENTIN  9. No Narcotic indicated today    RTC 4 weeks       Scribed by Liya Riveraa) as dictated by Federico Frederick MD    I, Dr. Federico Frederick, confirm that all documentation is accurate.     Federico Frederick M.D.   [facility]

## 2021-11-01 ENCOUNTER — TELEPHONE (OUTPATIENT)
Dept: ORTHOPEDIC SURGERY | Age: 71
End: 2021-11-01

## 2021-11-01 NOTE — TELEPHONE ENCOUNTER
Patient called stating she started her Gabapentin on Thursday or Friday, and took it for two days. She stated she was extremely dizzy, nauseous, and her gait was off. She stated at that time she didn't realize Gabapentin was the same as Neurontin, and she had previously tried Neurontin and could not take it. She stated she is going to continue taking extra strength Tylenol with Tramadol. Patient can be reached at [phone]. Tympanic membrane normal       Nose: Nose normal       Mouth/Throat:      Mouth: Mucous membranes are moist       Pharynx: Oropharynx is clear  Eyes:      Pupils: Pupils are equal, round, and reactive to light  Cardiovascular:      Rate and Rhythm: Regular rhythm  Heart sounds: Normal heart sounds  No murmur heard  Pulmonary:      Effort: Pulmonary effort is normal       Breath sounds: Normal breath sounds  Abdominal:      General: Abdomen is flat  Bowel sounds are normal    Musculoskeletal:      Cervical back: Normal range of motion and neck supple  Skin:     General: Skin is warm  Capillary Refill: Capillary refill takes less than 2 seconds  Turgor: Normal    Neurological:      General: No focal deficit present  Mental Status: He is alert             Nasal congested

## 2021-11-02 ENCOUNTER — APPOINTMENT (OUTPATIENT)
Dept: PHYSICAL THERAPY | Age: 71
End: 2021-11-02
Payer: MEDICARE

## 2021-11-03 NOTE — PROGRESS NOTES
[facility]  [address], [address]  Phone: [phone]  Fax: [phone]        PROGRESS NOTE      HISTORY OF PRESENT ILLNESS:  The patient is a 70 y.o. female and was seen today for follow up of low back pain radiating into BLE in a L4 distribution on the right and in a L5 distribution on the left to the greater toe and chronic neck pain which became progressive x 12/2019 radiating into BUE (L>R) to the hands. Previously, she was seen for low back pain radiating into BLE in a L4 distribution on the right and in a S1 distribution on the left to the greater toe and chronic neck pain which became progressive x 12/2019 radiating into BUE (L>R) to the hands. Pt was last evaluated by me 7/9/2020 with c/o low back pain radiating into BLE in a L4 distribution on the right and in a S1 distribution on the left to the greater toe and chronic neck pain which became progressive x 12/2019 radiating into BUE (L>R) to the hands. She was seen prior for low back and left hip pain extending into the LLE in a S1 distribution to an area below the knee. Initially she was seen for c/o low back pain into the BLE extending in roughly an L4 distribution in the LLE and in an L5 distribution to the foot in the RLE. Her pain is exacerbated with standing and walking intolerance. Patient reports dropping objects x couple months. Pt completed MDP with good relief. Pt was intolerant to the increased dose of GABITRIL 2 mg BID secondary to dizziness. She had a diagnosis of lumbar postlaminectomy syndrome with a previous history of L3 through S1 fusion in 2002 and 2004 by a physician in [place]. Pt denies h/o stomach ulcers, bleeding disorders, or current use of anticoagulants. PmHx of fibromyalgia, DM, aortic valve replacement, bilateral shoulder replacement, left rotator cuff disease. Patient reports previously receiving a shoulder injection with temporary relief.  Pt notes intolerance to FOSOMAX, LYRICA, and PREDNISONE combination. Subsequently, d/c Fosomax due to allergy and started Cymbalta 30 mg TID in place of Lyrica. Note dated 6/29/16 indicating patient was seen and underwent porcine aortic valve replacement by Dr. Piper Casanova on 3/18/16. Pt began cardiac rehab in 1/2017. Pt underwent right shoulder surgery on 3/29/17 by Dr. Milana Landon.  Pt underwent a left shoulder replacement on 7/2/2021 with Dr. Nayla Montez. Note from Dr. Jocelyn Terry, cardiologist dated 5/18/17 indicating patient was seen with h/o aortic stenosis, s/p aortic valve replacement 3/16 and was seen with moderate dysphemia with minimal exertion. CT revealed no evidence of pulmonary embolism, no obvious fluid overload. They were ordering further workup to look for functioning bioprosthetic aortic valve. Per patient, a cause for her SOB has still not been found. She has been scheduled to see a pulmonologist. She states she has been restricted to take her Meloxicam since her right shoulder surgery, which has caused her increase in low back pain. Upon review of our records, it was noted she previously failed TOPAMAX, NEURONTIN, and LYRICA. Pt was switched from Ultram ER back to Ultram immediate release. Pt underwent left SI joint injection on 3/15/18 with good relief. Note from Dr. Anayeli Lynn dated 11/12/19 indicated they stopped Lyrica and started her on Cymbalta 90 mg per day. Note from Dr. Jocelyn Terry dated 12/19/19 indicating patient was seen with c/o history of aortic valve replacement, fibromyalgia, and DM. The patient has a history of DM and reports blood sugars are well controlled, consistently remaining below 200, average of 119. Note from Dr. Clive Woody dated 8/7/2020: It would appear that her left shoulder may need replacement in her right shoulder may need revision. At this time with regards to her cervical spine I have nothing to offer her I do not think her pain is emanating from her spine.  I would imagine it would be best for Dr. Osvaldo Calzada to take the lead in the situation and decide what further treatments for her shoulders are required. Given acute Rx of Flexeril 10 mg TID. Patient reports a benefit in sleeping while taking Flexeril and inquired about making it a new medication. Patient understands that it is not a long-term medication. Note from Dr. Jake Scott 10/26/2020 indicating patient was seen with c/o bilateral thumb base pain. Numbness and tingling in her hands. Evidence of CTS and cubital tunnel syndrome on the right side. Indicated she has OA of the first CMC joints bilaterally. Performed injections. Pt reports temporary relief with the injections. She is scheduled to f/u with Dr. Bernice Sever next week to discuss surgery. Note from Alice Nichole NP dated 12/1/2020 indicating patient has h/o DM, fibromyalgia, and thyroid problems. Note from Dr. Jake Scott 1/13/2021 indicating patient was seen for bilateral thumb CMC arthritis, bilateral CTS and right cubital tunnel syndrome. Indicated injections last time did help but she wanted to discuss surgery. He set her up for surgery for CTS and right CMC arthritis. Note from Dr. Jake Scott 4/28/2021 indicating patient was seen with c/o 7 week's s/p left CTS release. Indicated she still had some soreness about the incision. Dx with suture granuloma. Note from Dr. Laquita Mcfadden 5/3/2021 indicating patient was seen for reevaluation left shoulder. Pain was 7/10. Previously discussed surgery. Pt has h/o torn left rotator cuff years ago. Pt had surgery to repair it. H/o right shoulder replacement. UDS dated 2/25/2021 was consistent. Preliminary reading [address] films dated 3/2/2020 revealed: severe disc space narrowing at C4-5, moderate at C5-6, and the space between C6-7 was not well visualized.  Small anterior osteophytes noted at C4, C5 and C6. C7 not visualized due to shoulder pathology. No acute pathology identified.  Lumbar spine CT w/o contrast dated 7/20/14  per report revealed L3 through L5 laminectomies with L3 through S1 dorsal hardware fusion. There was no evidence for fractures. There was advanced degenerative disc disease at L2-L3 at the junctional level with marked central canal stenosis. C Spine MRI dated 3/15/2020 films independently reviewed. Per report, at C3-4: anterolisthesis. 3.5 mm central disc extrusion extending 4 mm cephalad. Mild central stenosis and subtle ventral cord flattening. Advanced left facet arthropathy. At C4-5: mild posterior spondylotic ridge. Mild central stenosis and subtle ventral cord flattening. At C5-6: central to right posterolateral 2.5 mm broad-based disc protrusion with subtle ventral cord flattening and mild central stenosis. At C6-7: mild posterior spondylotic ridge. Mild to moderate central stenosis. At C7-T1: anterolisthesis. Mild posterior annular bulge. Mild central stenosis. Upper thoracic spondylosis including a 5 mm posterior disc extension at T3-4. At her last clinical appointment, It was too soon to evaluate treatment as she had recent left shoulder surgery on 7/2/2021. Patient wished to continue her current treatment. I provided her refills of Gabitril 2 mg qhs and Tramadol 50 mg.        The patient returns today and reports pain location and distribution remains unchanged. She rates her pain 5/10, previously 3/10. Pt reports BLE cramps x several months. Pt had been restarted on Neurontin 100 mg through Dr. Rachell Calvin despite previously failing the medication. She reported intolerance to the medication secondary to dizziness and nausea. Pt states she had d/c Gabitril x 3 days while taking Neurontin but restarting taking the medication. She continues taking Tramadol 50 mg and Gabitril 2 mg qhs. She continues with left shoulder physical therapy. Pt denies change in bowel or bladder habits. Pt denies any falls or loss of balance. The patient has a history of DM and reports blood sugars are well controlled, consistently remaining below 200. UDS obtained in office today.  reviewed and indicated she is receiving Neurontin through Dr. Brittani Le. Body mass index is 33.75 kg/m². PCP: Eliza Castleman, MD      Past Medical History:   Diagnosis Date    Ankle fracture     Aortic stenosis     S/P AVR in 2016    Asthma     Carpal tunnel syndrome on both sides     Chronic obstructive pulmonary disease (HCC)     Chronic pain     back pain    COVID-19 vaccine series completed 04/29/2021    Diabetes ([address]     Diverticulitis     Fibromyalgia     H/O aortic valve replacement 03/2016    21 mm bovine pericardial bioprosthesis and epiaortic scanning      H/O: HTN (hypertension)     no meds now- sp valve replacement    Hypothyroidism     Lumbar post-laminectomy syndrome     Menopause     Obesity     Osteoarthritis     Psychotic disorder ([address]     Pulmonary HTN ([address]     per pcp note cc- patient denies.  Sciatica     Skin cancer 2013    right forearm. Social History     Socioeconomic History    Marital status:      Spouse name: Not on file    Number of children: Not on file    Years of education: Not on file    Highest education level: Not on file   Occupational History    Not on file   Tobacco Use    Smoking status: Former Smoker     Years: 45.00     Types: Cigarettes     Quit date: 7/1/2011     Years since quitting: 10.3    Smokeless tobacco: Never Used   Vaping Use    Vaping Use: Never used   Substance and Sexual Activity    Alcohol use: No    Drug use: No    Sexual activity: Never   Other Topics Concern    Not on file   Social History Narrative    Not on file     Social Determinants of Health     Financial Resource Strain:     Difficulty of Paying Living Expenses: Not on file   Food Insecurity:     Worried About [address] in the Last Year: Not on file    [address] in the Last Year: Not on file   Transportation Needs:     Lack of Transportation (Medical):  Not on file    Lack of Transportation (Non-Medical): Not on file   Physical Activity:     Days of Exercise per Week: Not on file    Minutes of Exercise per Session: Not on file   Stress:     Feeling of Stress : Not on file   Social Connections:     Frequency of Communication with Friends and Family: Not on file    Frequency of Social Gatherings with Friends and Family: Not on file    Attends Yazidism Services: Not on file    Active Member of [address] [place] [address] or Organizations: Not on file    Attends Club or Organization Meetings: Not on file    Marital Status: Not on file   Intimate Partner Violence:     Fear of Current or Ex-Partner: Not on file    Emotionally Abused: Not on file    Physically Abused: Not on file    Sexually Abused: Not on file   Housing Stability:     Unable to Pay for Housing in the Last Year: Not on file    Number of [place] in the Last Year: Not on file    Unstable Housing in the Last Year: Not on file       Current Outpatient Medications   Medication Sig Dispense Refill    traMADoL (ULTRAM) 50 mg tablet Take 50 mg by mouth every six (6) hours as needed for Pain.  acetaminophen (Tylenol Extra Strength) 500 mg tablet Take 500 mg by mouth every six (6) hours as needed for Pain.  FLUoxetine (PROzac) 10 mg capsule Take 1 capsule by mouth once daily 90 Capsule 3    DULoxetine (CYMBALTA) 30 mg capsule TAKE 1 CAPSULE BY MOUTH THREE TIMES DAILY 90 Capsule 3    meloxicam (MOBIC) 15 mg tablet Take 1 tablet by mouth once daily 90 Tablet 3    metFORMIN (GLUCOPHAGE) 500 mg tablet TAKE 1 TABLET BY MOUTH ONCE DAILY WITH SUPPER 90 Tablet 0    pramoxine-hc-chloroxylenol 1-1-0.1 % lotion Apply  to affected area two (2) times a day. 60 mL 1    desonide (TRIDESILON) 0.05 % cream APPLY   EXTERNALLY TO AFFECTED AREA TWICE DAILY 15 g 1    tiaGABine (GabitriL) 2 mg tablet Take 1 Tablet by mouth nightly. 90 Tablet 0    zolpidem (AMBIEN) 5 mg tablet Take 1 Tablet by mouth nightly as needed for Sleep.  Max Daily Amount: 5 mg. 20 Tablet 0    aspirin delayed-release 81 mg tablet Take 81 mg by mouth daily.  meclizine (ANTIVERT) 25 mg tablet Take 1 Tablet by mouth three (3) times daily as needed for Dizziness. 60 Tablet 3    fluticasone propionate (FLONASE) 50 mcg/actuation nasal spray Use 1 spray(s) in each nostril twice daily 16 g 4    levothyroxine (SYNTHROID) 50 mcg tablet TAKE 1 TABLET BY MOUTH ONCE DAILY BEFORE BREAKFAST 90 Tab 3    pantoprazole (PROTONIX) 20 mg tablet Take 1 tablet by mouth once daily 90 Tab 3    atorvastatin (LIPITOR) 20 mg tablet Take 1 tablet by mouth once daily 90 Tab 4    glucose blood VI test strips (True Metrix Pro Test Strip) strip Test blood glucose 3 times a day 200 Strip 3    STIOLTO RESPIMAT 2.5-2.5 mcg/actuation inhaler Take 2 Puffs by inhalation daily. 1    vitamin E acetate (VITAMIN E PO) Take  by mouth daily.  cyanocobalamin (VITAMIN B-12) 500 mcg tablet Take 500 mcg by mouth daily.  cetirizine (ZYRTEC) 10 mg tablet TAKE 1 TABLET BY MOUTH EVERY DAY. GENERIC FOR ZYRTEC 90 Tab 2    VENTOLIN HFA 90 mcg/actuation inhaler 2 Puffs by Aerosolization route every four (4) hours as needed. 0    B.infantis-B.ani-B.long-B.bifi (PROBIOTIC 4X) 10-15 mg TbEC Take  by mouth daily.  Cholecalciferol, Vitamin D3, (VITAMIN D3) 1,000 unit cap Take 1,000 Units by mouth daily.  Lancets (ACCU-CHEK SOFTCLIX LANCETS) misc Please use one lancet to test blood sugars twice a day. 1 Package 20    BLOOD-GLUCOSE METER (ACCU-CHEK MARCE MONITORING) by Does Not Apply route.  gabapentin (NEURONTIN) 100 mg capsule Take 1 Capsule by mouth three (3) times daily. Max Daily Amount: 300 mg. (Patient not taking: Reported on 11/4/2021) 30 Capsule 1    tiaGABine (GabitriL) 2 mg tablet Take 1 Tab by mouth nightly.  90 Tab 0       Allergies   Allergen Reactions    Latex, Natural Rubber Hives    Adhesive Rash     Some bandaids    Ciprofloxacin Rash    Codeine Nausea and Vomiting    Demerol [Meperidine] Nausea and Vomiting    Diclofenac Shortness of Breath and Palpitations    Erythromycin Rash    Gabapentin Vertigo    Levaquin [Levofloxacin] Rash    Loratadine Swelling    Morphine Nausea and Vomiting    Penicillin G Hives and Rash     Does fine with Keflex    Sulfa (Sulfonamide Antibiotics) Itching    Vicodin [Hydrocodone-Acetaminophen] Other (comments)     Metallic taste in mouth          PHYSICAL EXAMINATION    Visit Vitals  /85 (BP 1 Location: Right arm, BP Patient Position: Sitting, BP Cuff Size: Adult)   Pulse 90   Temp (!) 95.6 °F (35.3 °C) (Tympanic)   Resp 16   Ht 5' 8\" (1.727 m)   Wt 222 lb (100.7 kg)   SpO2 98%   BMI 33.75 kg/m²       CONSTITUTIONAL: NAD, A&O x 3  SENSATION: Decreased sensation to light touch in a  L4 distribution on the RLE and L5 on the LLE. Otherwise, intact to light touch throughout  RANGE OF MOTION: The patient has full passive range of motion in all four extremities. MOTOR:  Straight Leg Raise: Negative, bilateral    3/5 strength of left shoulder secondary to shoulder replacement               Hip Flex Knee Ext Knee Flex Ankle DF GTE Ankle PF Tone   Right +4/5 +4/5 +4/5 +4/5 +4/5 +4/5 +4/5   Left +4/5 +4/5 +4/5 +4/5 +4/5 +4/5 +4/5       ASSESSMENT   Diagnoses and all orders for this visit:    1. Lumbar neuritis    2. Lumbar post-laminectomy syndrome    3. Spondylolisthesis, acquired    4. Cervical spinal stenosis    5. Cervical neuritis    6. Cervical spondylosis without myelopathy    7. Carpal tunnel syndrome, bilateral    8. HNP (herniated nucleus pulposus) with myelopathy, cervical      IMPRESSION AND PLAN:  Patient returns to the office today with c/o low back pain radiating into BLE in a L4 distribution on the right and in a L5 distribution on the left to the greater toe. Multiple treatment options were discussed. Her pain complaints are hold steady. Patient wished to continue her current treatment.  I will provide her with refills of Gabitril 2 mg qhs and 1 refill of Tramadol. UDS and pain agreement signed. Additional refills of Tramadol 50 mg will be provided if consistent. She should continue with her left shoulder physical therapy. Patient is neurologically intact. I will see the patient back in 3 month's time or earlier if needed. Written by Alexandr Gold, as dictated by Eliel Perez MD  I examined the patient, reviewed and agree with the note.

## 2021-11-04 ENCOUNTER — OFFICE VISIT (OUTPATIENT)
Dept: ORTHOPEDIC SURGERY | Age: 71
End: 2021-11-04
Payer: MEDICARE

## 2021-11-04 ENCOUNTER — HOSPITAL ENCOUNTER (OUTPATIENT)
Dept: PHYSICAL THERAPY | Age: 71
Discharge: HOME | End: 2021-11-04
Payer: MEDICARE

## 2021-11-04 VITALS
HEIGHT: 68 IN | DIASTOLIC BLOOD PRESSURE: 85 MMHG | RESPIRATION RATE: 16 BRPM | BODY MASS INDEX: 33.65 KG/M2 | WEIGHT: 222 LBS | TEMPERATURE: 95.6 F | OXYGEN SATURATION: 98 % | HEART RATE: 90 BPM | SYSTOLIC BLOOD PRESSURE: 132 MMHG

## 2021-11-04 DIAGNOSIS — M48.02 CERVICAL SPINAL STENOSIS: ICD-10-CM

## 2021-11-04 DIAGNOSIS — Z51.81 THERAPEUTIC DRUG MONITORING: ICD-10-CM

## 2021-11-04 DIAGNOSIS — M54.12 CERVICAL NEURITIS: ICD-10-CM

## 2021-11-04 DIAGNOSIS — M50.00 HNP (HERNIATED NUCLEUS PULPOSUS) WITH MYELOPATHY, CERVICAL: ICD-10-CM

## 2021-11-04 DIAGNOSIS — M47.812 CERVICAL SPONDYLOSIS WITHOUT MYELOPATHY: ICD-10-CM

## 2021-11-04 DIAGNOSIS — M54.16 LUMBAR NEURITIS: Primary | ICD-10-CM

## 2021-11-04 DIAGNOSIS — M96.1 LUMBAR POST-LAMINECTOMY SYNDROME: ICD-10-CM

## 2021-11-04 DIAGNOSIS — M43.10 SPONDYLOLISTHESIS, ACQUIRED: ICD-10-CM

## 2021-11-04 DIAGNOSIS — G56.03 CARPAL TUNNEL SYNDROME, BILATERAL: ICD-10-CM

## 2021-11-04 PROCEDURE — G8399 PT W/DXA RESULTS DOCUMENT: HCPCS | Performed by: PHYSICAL MEDICINE & REHABILITATION

## 2021-11-04 PROCEDURE — G8417 CALC BMI ABV UP PARAM F/U: HCPCS | Performed by: PHYSICAL MEDICINE & REHABILITATION

## 2021-11-04 PROCEDURE — 97112 NEUROMUSCULAR REEDUCATION: CPT

## 2021-11-04 PROCEDURE — 1101F PT FALLS ASSESS-DOCD LE1/YR: CPT | Performed by: PHYSICAL MEDICINE & REHABILITATION

## 2021-11-04 PROCEDURE — 97110 THERAPEUTIC EXERCISES: CPT

## 2021-11-04 PROCEDURE — 99213 OFFICE O/P EST LOW 20 MIN: CPT | Performed by: PHYSICAL MEDICINE & REHABILITATION

## 2021-11-04 PROCEDURE — G8427 DOCREV CUR MEDS BY ELIG CLIN: HCPCS | Performed by: PHYSICAL MEDICINE & REHABILITATION

## 2021-11-04 PROCEDURE — G9899 SCRN MAM PERF RSLTS DOC: HCPCS | Performed by: PHYSICAL MEDICINE & REHABILITATION

## 2021-11-04 PROCEDURE — 1090F PRES/ABSN URINE INCON ASSESS: CPT | Performed by: PHYSICAL MEDICINE & REHABILITATION

## 2021-11-04 PROCEDURE — 3017F COLORECTAL CA SCREEN DOC REV: CPT | Performed by: PHYSICAL MEDICINE & REHABILITATION

## 2021-11-04 PROCEDURE — G9717 DOC PT DX DEP/BP F/U NT REQ: HCPCS | Performed by: PHYSICAL MEDICINE & REHABILITATION

## 2021-11-04 PROCEDURE — 97530 THERAPEUTIC ACTIVITIES: CPT

## 2021-11-04 PROCEDURE — G8536 NO DOC ELDER MAL SCRN: HCPCS | Performed by: PHYSICAL MEDICINE & REHABILITATION

## 2021-11-04 RX ORDER — TRAMADOL HYDROCHLORIDE 50 MG/1
50 TABLET ORAL
Status: CANCELLED | OUTPATIENT
Start: 2021-11-04

## 2021-11-04 RX ORDER — ACETAMINOPHEN 500 MG/1
500 TABLET, FILM COATED ORAL
COMMUNITY

## 2021-11-04 RX ORDER — TIAGABINE HYDROCHLORIDE 2 MG/1
2 TABLET, FILM COATED ORAL
Qty: 90 TABLET | Refills: 0 | Status: SHIPPED | OUTPATIENT
Start: 2021-11-04 | End: 2021-12-01

## 2021-11-04 RX ORDER — TRAMADOL HYDROCHLORIDE 50 MG/1
50 TABLET ORAL
Qty: 120 TABLET | Refills: 0 | Status: SHIPPED | OUTPATIENT
Start: 2021-11-04 | End: 2021-11-29 | Stop reason: SDUPTHER

## 2021-11-04 RX ORDER — TRAMADOL HYDROCHLORIDE 50 MG/1
50 TABLET ORAL
COMMUNITY
End: 2021-11-04 | Stop reason: SDUPTHER

## 2021-11-04 NOTE — PROGRESS NOTES
PHYSICAL THERAPY - DAILY TREATMENT NOTE    Patient Name: Radha Becerra        Date: 2021  : 1950   YES Patient  Verified  Visit #:     Insurance: Payor: [employer] MEDICARE / Plan: [facility] / Product Type: Managed Care Medicare /      In time: 110 Out time: 150   Total Treatment Time: 40     Medicare/BCBS Time Tracking (below)   Total Timed Codes (min):  40 1:1 Treatment Time:  40     TREATMENT AREA =  Left shoulder pain [M25.512]  Status post arthroscopy of left shoulder [Z98.890]    SUBJECTIVE    Pain Level (on 0 to 10 scale):  5  / 10   Medication Changes/New allergies or changes in medical history, any new surgeries or procedures? NO    If yes, update Summary List   Subjective Functional Status/Changes:  []  No changes reported     See PN         OBJECTIVE  Therapeutic Procedures:  Min Procedure Specifics + Rationale   26(26) [x] Therapeutic Exercise    See Flowsheet   Rationale: increase ROM and increase strength to improve the patients ability to participate in ADL's      8 [x] Therapeutic Activity See Flow Sheet  Rationale: To improve safety, proprioception, coordination, and efficiency with tasks such as stair negotiation, transfers, bed mobility, and lifting mechanics   8 [x] Neuromuscular Re-Education See Flow Sheet  Rationale: To improve stability over various surfaces to decrease falls risk. Improve core control and posture control in various positions to maximize level of function.         min Patient Education:  YES Reviewed HEP         Other Objective/Functional Measures:    See flowsheet for more details    Patient Education regarding the following during session:  1.progress made                                             Left Shoulder AROM PROM Strength   Flexion 70 150 2   Scaption/ABD 72 125 2   ER @ 90 Degrees 48 52 3-   Extension 30 NT    IR NT NT       VC and demos required throughout session for proper form and increased safety         Post Treatment Pain Level (on 0 to 10) scale:   5  / 10     ASSESSMENT    Assessment/Changes in Function:     Patient did not attend therapy between 9/1/21-9/29/21 due to social circumstances with her home and family life. She only completed 1 session and then has another lapse between 9/29/2021-11/4/2021. She attributes this second lapse in care to potential stress fracture. She reports stress fracture was ruled out and she was informed she may have nerve damage. Since 7/13/2021 she has only completed a total of 12 follow up sessions. Due to her limited attendance, she continues to be severely behind in her protocol. Her AROM was limited compared to her last progress note. Shoulder Flexion AROM decreased to 70 degrees from 120 degrees. However shoulder flexion PROM increased by 11 degrees. Due to insurance authorization, she will transition to HEP only by the end of month. []  See Progress Note/Recertification   Patient will continue to benefit from skilled PT services to analyze,, cue,, progress,, modify,, demonstrate,, instruct, and address, movement patterns,, therapeutic interventions,, postural abnormalities,, soft tissue restrictions,, ROM,, strength,, functional mobility,, body mechanics/ergonomics, and home and community integration, to attain remaining goals. Progress toward goals / Updated goals:            Goal/Measure of Progress Goal Met? 1. Patient to be Safe and Independent with HEP to self-manage/prevent symptoms after DC. Status at last Eval: NA Current Status: VC Needed NOT MET   2. Patient to increase FS FOTO score to > 40 to indicate increased functional independence. Status at last Eval: 4/100 Current Status: 47/100 MET   3. Patient to report return to sleeping in bed to allow for greater comfort and uninterrupted sleep. Status at last Eval: (poor sleep due to medication) Current Status: Infrequent sleep disturbance NOT MET      4.   Patient to demonstrate > 140 degrees AROM flexion to facilitate OH reach. Status at last Eval: 120 deg Current Status: 70 degree NOT MET   5. Patient to report minimal difficulty donning/doffing UE clothing.    Status at last Eval: NA Current Status: Minimal Difficulty MET         PLAN    [x]  Upgrade activities as tolerated YES Continue plan of care   []  Discharge due to :    []  Other:      Therapist: Amy Whipple DPT    Date: 11/4/2021 Time: 1:18 PM     Future Appointments   Date Time Provider Ada Fischer   11/8/2021  1:15 PM Marta Fan PT [facility]   11/10/2021  1:15 PM Marta Fan PT [facility]   11/11/2021  1:30 PM Simran Garvin MD [facility] BS AMB   11/29/2021  1:20 PM Mirlande Blair MD [facility] BS AMB   12/10/2021  4:00 PM [facility] SHAE STEREO [address] [facility]   2/3/2022 11:00 AM River Blum MD [facility] BS AMB

## 2021-11-04 NOTE — LETTER
11/4/2021    Patient: Blanquita Singh   YOB: 1950   Date of Visit: 11/4/2021     Colby Larsen, [address]  [address] [facility]    Dear Colby Larsen MD,      Thank you for referring Ms. Abby Chavez to [address] [facility] for evaluation. My notes for this consultation are attached. If you have questions, please do not hesitate to call me. I look forward to following your patient along with you.       Sincerely,    Larry Tinajero MD

## 2021-11-04 NOTE — PROGRESS NOTES
Chief Complaint   Patient presents with    Follow-up       Pt preferred language for health care discussion is english.     Is someone accompanying this pt? no    Is the patient using any DME equipment during [address]? no    Depression Screening:  3 most recent [facility] Screens 5/6/2021 12/1/2020 12/12/2019 8/17/2018 1/20/2017 1/6/2017 1/12/2016   Little interest or pleasure in doing things Nearly every day Not at all Not at all Not at all More than half the days Not at all Not at all   Feeling down, depressed, irritable, or hopeless Not at all Not at all Not at all Not at all - Not at all Not at all   Total Score PHQ 2 3 0 0 0 - 0 0   Trouble falling or staying asleep, or sleeping too much Nearly every day - - - - - -   Feeling tired or having little energy More than half the days - - - - - -   Poor appetite, weight loss, or overeating Not at all - - - - - -   Feeling bad about yourself - or that you are a failure or have let yourself or your family down Several days - - - - - -   Trouble concentrating on things such as school, work, reading, or watching TV Not at all - - - - - -   Moving or speaking so slowly that other people could have noticed; or the opposite being so fidgety that others notice Not at all - - - - - -   Thoughts of being better off dead, or hurting yourself in some way Not at all - - - - - -   PHQ 9 Score 9 - - - - - -   How difficult have these problems made it for you to do your work, take care of your home and get along with others Not difficult at all - - - - - -       Learning Assessment:  Learning Assessment 4/22/2016 3/14/2016 1/12/2015 1/28/2014   PRIMARY LEARNER Patient Patient Patient Patient   HIGHEST LEVEL OF EDUCATION - PRIMARY LEARNER  - - - SOME COLLEGE   PRIMARY LANGUAGE ENGLISH ENGLISH ENGLISH ENGLISH   LEARNER PREFERENCE PRIMARY LISTENING OTHER (COMMENT) DEMONSTRATION DEMONSTRATION   ANSWERED BY patient patient patient patient   RELATIONSHIP SELF SELF SELF SELF       Abuse Screening:  Abuse Screening Questionnaire 12/12/2019 11/12/2019 8/22/2019 8/17/2018 1/6/2017 1/12/2016 1/12/2015   Do you ever feel afraid of your partner? N N N N N N N   Are you in a relationship with someone who physically or mentally threatens you? N N N N N N N   Is it safe for you to go home? [place]       Fall Risk  Fall Risk Assessment, last 12 mths 11/4/2021   Able to walk? Yes   Fall in past 12 months? 1   Do you feel unsteady? 1   Are you worried about falling 0   Is the gait abnormal? 0   Number of falls in past 12 months 2   Fall with injury? 0           Advance Directive:  1. Do you have an advance directive in place? Patient Reply:no    2. If not, would you like material regarding how to put one in place? Patient Reply: no    2. Per patient no changes to their ACP contact no. Coordination of Care:  1. Have you been to the ER, urgent care clinic since your last visit? Hospitalized since your last visit? no    2. Have you seen or consulted any other health care providers outside of the [address] [place] [address] since your last visit? Include any pap smears or colon screening.  no

## 2021-11-04 NOTE — PROGRESS NOTES
[facility] PHYSICAL THERAPY  [address] [place] [address] Joy Lazo, [address] - Phone: [phone]  Fax: [phone]  Re-Certification/CONTINUED PLAN OF CARE for PHYSICAL THERAPY          Patient Name: Sorin Elkins : 1950   Treatment/Medical Diagnosis: Left shoulder pain [M25.512]  Status post arthroscopy of left shoulder [Z98.890]   Referral Source: JONNA Deluca Start of Care [facility]): 2021   Prior Hospitalization: See Medical History Provider #:  [id number]   Medications: Verified on Patient Summary List   Visits from [facility]: 12 Missed Visits: 9   GOALS               Goal/Measure of Progress Goal Met? 1.  Patient to be Safe and Independent with HEP to self-manage/prevent symptoms after DC. Status at last Eval: NA Current Status: VC Needed NOT MET   2.  Patient to increase FS FOTO score to > 40 to indicate increased functional independence. Status at last Eval: 4/100 Current Status: 47/100 MET   3.  Patient to report return to sleeping in bed to allow for greater comfort and uninterrupted sleep. Status at last Eval: (poor sleep due to medication) Current Status: Infrequent sleep disturbance NOT MET              4.  Patient to demonstrate > 140 degrees AROM flexion to facilitate OH reach. Status at last Eval: 120 deg Current Status: 70 degree NOT MET   5.  Patient to report minimal difficulty donning/doffing UE clothing. Status at last Eval: NA Current Status: Minimal Difficulty MET      SUMMARY OF TREATMENT  Patient's POC has consisted of therex, therapeutic activities, manual therapy prn, modalities prn, pt. education, and a comprehensive HEP.  Treatment strategies used to address functional mobility deficits, ROM deficits, strength deficits, analyze and address soft tissue restrictions, analyze and cue movement patterns, analyze and modify body mechanics/ergonomics, assess and modify postural abnormalities and instruct in home and community integration. CURRENT STATUS  Patient did not attend therapy between 9/1/21-9/29/21 due to social circumstances with her home and family life. She only completed 1 session and then has another lapse between 9/29/2021-11/4/2021. She attributes this second lapse in care to potential stress fracture. She reports stress fracture was ruled out and she was informed she may have nerve damage. Since 7/13/2021 she has only completed a total of 12 follow up sessions. Due to her limited attendance, she continues to be severely behind in her protocol. Her AROM was limited compared to her last progress note. Shoulder Flexion AROM decreased to 70 degrees from 120 degrees. However shoulder flexion PROM increased by 11 degrees. Due to insurance authorization, she will transition to HEP only by the end of month.        New Goals to be achieved in   1-3  weeks:  1. Patient to be Safe and Independent with HEP to self-manage/prevent symptoms after DC. 2. Patient to report return to sleeping in bed to allow for greater comfort and uninterrupted sleep. 3. Patient to demonstrate > 140 degrees AROM flexion to facilitate OH reach. Frequency / Duration:   Patient to be seen   1-2   times per week for   1-3    weeks:    RECOMMENDATIONS: Continue and progress functional therex/therapeutic activity as able, utilizing manual therapy and modalities prn. Progress patient towards independent HEP to facilitate self-management of symptoms and progress gains after PT. If you have any questions/comments please contact us directly at [phone]. Thank you for allowing us to assist in the care of your patient. Therapist Signature: Alcario Hodgkin, DPT Date: 12/8/8344   Recertification Period:  Reporting Period: 11/4/2021 - 11/30/2021 7/13/2021 -11/4/2021 Time: 1:25 PM   NOTE TO PHYSICIAN:  PLEASE COMPLETE THE ORDERS BELOW AND FAX TO   [facility] Physical Therapy: [phone].   If you are unable to process this request in 24 hours please contact our office: [phone].    ___ I have read the above report and request that my patient continue as recommended.   ___ I have read the above report and request that my patient continue therapy with the following changes/special instructions: ________________________________________________   ___ I have read the above report and request that my patient be discharged from therapy.      Physician Signature:        Date:       Time:    Estefani Tate

## 2021-11-08 ENCOUNTER — APPOINTMENT (OUTPATIENT)
Dept: PHYSICAL THERAPY | Age: 71
End: 2021-11-08
Payer: MEDICARE

## 2021-11-10 ENCOUNTER — HOSPITAL ENCOUNTER (OUTPATIENT)
Dept: PHYSICAL THERAPY | Age: 71
Discharge: HOME | End: 2021-11-10
Payer: MEDICARE

## 2021-11-10 PROCEDURE — 97110 THERAPEUTIC EXERCISES: CPT

## 2021-11-10 PROCEDURE — 97530 THERAPEUTIC ACTIVITIES: CPT

## 2021-11-10 PROCEDURE — 97112 NEUROMUSCULAR REEDUCATION: CPT

## 2021-11-10 NOTE — PROGRESS NOTES
PHYSICAL THERAPY - DAILY TREATMENT NOTE    Patient Name: Anirudh Yarbrough        Date: 11/10/2021  : 1950   YES Patient  Verified  Visit #:      18  Insurance: Payor: [employer] MEDICARE / [facility] Product Type: Managed Care Medicare /      In time: 1:10 Out time: 2:00   Total Treatment Time: 50     Medicare/BCBS Time Tracking (below)   Total Timed Codes (min):  50 1:1 Treatment Time:  40     TREATMENT AREA = Left shoulder pain [M25.512]  Status post arthroscopy of left shoulder [Z98.890]    SUBJECTIVE    Pain Level (on 0 to 10 scale):  5  / 10   Medication Changes/New allergies or changes in medical history, any new surgeries or procedures? NO    If yes, update Summary List   Subjective Functional Status/Changes:  []  No changes reported     \"That covid booster shot really took me out. I got it last Friday and didn't get better until today. I'm still feeling some of it. \"          OBJECTIVE     Therapeutic Procedures:  Min Procedure Specifics + Rationale   n/a [x]  Patient Education (performed throughout session) [x] Review HEP    [] Progressed/Changed HEP based on:   [] proper performance and advancement of Therex/TA   [] reduction in pain level    [] increased functional capacity       [] change in directional preference   14 [x] Therapeutic Exercise   [x]  See Flowsheet   Rationale: increase ROM and increase strength to improve the patients ability to participate in ADL's    14 [x] Therapeutic Activity   [x]  See Flowsheet  Rationale:  To improve safety, proprioception, coordination, and efficiency with tasks   8 [x] Neuromuscular Re-ed   [x]  See Flowsheet  Rationale: increase ROM, increase strength, improve coordination, improve balance and increase proprioception  to improve the patients ability to safely participate in ADL's       Modality rationale: decrease inflammation, decrease pain, increase tissue extensibility and increase muscle contraction/control to improve the patients ability to perform ADL's with greater ease       Min Type Additional Details   10 [x]  Cold Pack   [] pre-JCARLOS      [x] post-JCARLOS  []  Ice massage Location:shoulder    [] supine             [] prone  [] legs elevated  [] legs flat  [] sitting              [] sidelying - [] left [] right   [x] Skin assessment post-treatment:  [x]intact [x]redness- no adverse reaction       []redness - adverse reaction:       Other Objective/Functional Measures: Added t-band therex     Post Treatment Pain Level (on 0 to 10) scale:   5  / 10     ASSESSMENT    Assessment/Changes in Function:       ROM in lying > standing         Patient will continue to benefit from skilled PT services to modify and progress therapeutic interventions, address functional mobility deficits, address ROM deficits, address strength deficits, analyze and address soft tissue restrictions, analyze and cue movement patterns and instruct in home and community integration  to attain remaining goals   Progress toward goals / Updated goals:    1st session since progress note. No significant progress observed       PLAN    [x]  Upgrade activities as tolerated  [x]  Update interventions per flow sheet YES Continue plan of care   []  Discharge due to :    []  Other:      Therapist: Hayden Nicole \"BJ\" Brittany, DPT, Cert. MDT, Cert. DN, Cert. SMT, Dip.  Osteopractic    Date: 11/10/2021 Time: 1:19 PM     Future Appointments   Date Time Provider Ada Fischer   11/11/2021  1:30 PM Yefri Diane MD [facility] BS AMB   11/12/2021 11:00 AM Jerad Rome, PT [facility]   11/29/2021  1:20 PM Fifi Torrez MD [facility] BS AMB   12/10/2021  4:00 PM [facility] [address] SHAE STEREO BX RM [address] [facility] [address]   2/3/2022 11:00 AM Isak Martinez MD [facility] BS AMB

## 2021-11-16 ENCOUNTER — APPOINTMENT (OUTPATIENT)
Dept: PHYSICAL THERAPY | Age: 71
End: 2021-11-16
Payer: MEDICARE

## 2021-11-22 ENCOUNTER — HOSPITAL ENCOUNTER (OUTPATIENT)
Dept: PHYSICAL THERAPY | Age: 71
Discharge: HOME | End: 2021-11-22
Payer: MEDICARE

## 2021-11-22 PROCEDURE — 97140 MANUAL THERAPY 1/> REGIONS: CPT

## 2021-11-22 PROCEDURE — 97110 THERAPEUTIC EXERCISES: CPT

## 2021-11-22 NOTE — PROGRESS NOTES
PHYSICAL THERAPY - DAILY TREATMENT NOTE    Patient Name: Tyra Good        Date: 2021  : 1950   YES Patient  Verified  Visit #:   14   of   18  Insurance: Payor: CCC[employer] MEDICARE / Plan: MILADY ERICKSON [facility] / Product Type: Managed Care Medicare /      In time: 2:00 Out time: 3:03   Total Treatment Time: 63     Medicare/BCBS Time Tracking (below)   Total Timed Codes (min):  63 1:1 Treatment Time:  53     TREATMENT AREA = Left shoulder pain [M25.512]  Status post arthroscopy of left shoulder [Z98.890]    SUBJECTIVE    Pain Level (on 0 to 10 scale):  5  / 10   Medication Changes/New allergies or changes in medical history, any new surgeries or procedures? NO    If yes, update Summary List   Subjective Functional Status/Changes:  []  No changes reported     \"I feel like I can reach a bit better. I finally got my sister to move from [place] to [place] in a new home. \"          OBJECTIVE     Therapeutic Procedures:  Min Procedure Specifics + Rationale   n/a [x]  Patient Education (performed throughout session) [x] Review HEP    [] Progressed/Changed HEP based on:   [] proper performance and advancement of Therex/TA   [] reduction in pain level    [] increased functional capacity       [] change in directional preference   38 [x] Therapeutic Exercise   [x]  See Flowsheet   Rationale: increase ROM and increase strength to improve the patients ability to participate in ADL's    15 [x]  Manual Therapy       [] IASTM -   [x] STM to periscapular mm; [address] G3 GH Distraction; G3-4 inferior/posterior mobs;  PROM all planes; Scapular PNF; Shoulder PNF Patterns; Rhythmic Stabilization.    Rationale: decrease pain, increase ROM, increase tissue extensibility, decrease trigger points and increase postural awareness to attain functional use and participation with ADL's  [x] Skin assessment post-treatment:  [x]intact [x]redness- no adverse reaction   []redness - adverse  The manual therapy interventions were performed at a separate and distinct time from the therapeutic activities interventions. Modality rationale: decrease inflammation, decrease pain, increase tissue extensibility and increase muscle contraction/control to improve the patients ability to perform ADL's with greater ease       Min Type Additional Details   10 [x]  Cold Pack   [] pre-JCARLOS      [x] post-JCARLOS  []  Ice massage Location:left shoulder    [] supine             [] prone  [] legs elevated  [] legs flat  [] sitting              [] sidelying - [] left [] right   [x] Skin assessment post-treatment:  [x]intact [x]redness- no adverse reaction       []redness - adverse reaction:       Other Objective/Functional Measures:    Transitioned rail wash to wall wash      Post Treatment Pain Level (on 0 to 10) scale:   3-4  / 10     ASSESSMENT    Assessment/Changes in Function:       Increased performance of elevation on finger ladder and wall         Patient will continue to benefit from skilled PT services to modify and progress therapeutic interventions, address functional mobility deficits, address ROM deficits, address strength deficits, analyze and address soft tissue restrictions, analyze and cue movement patterns, analyze and modify body mechanics/ergonomics and instruct in home and community integration  to attain remaining goals   Progress toward goals / Updated goals:    Progressing slowly since return to therapy due to intermittent attendance. PLAN    [x]  Upgrade activities as tolerated  [x]  Update interventions per flow sheet YES Continue plan of care   []  Discharge due to :    []  Other:      Therapist: Tabatha Chandra \"JAZMINE\" JAMES Soto, Cert. MDT, Cert. DN, Cert. SMT, Dip.  Osteopractic    Date: 11/22/2021 Time: 3:28 PM     Future Appointments   Date Time Provider Ada Fischer   11/24/2021 11:45 AM Anne Grimaldo PT [facility]   11/29/2021  1:20 PM Charisma Armstrong MD [facility] BS AMB   11/30/2021  1:15 PM Anne Grimaldo PT [facility] 12/2/2021  1:15 PM Dee Ang, PT [id number]   12/10/2021  4:00 PM [address] SHAE STEREO [facility] [address]   1/4/2022  2:15 PM Helen Clayton MD [facility] BS AMB   2/3/2022 11:00 AM Kendy Blum MD [facility] BS AMB

## 2021-11-24 ENCOUNTER — APPOINTMENT (OUTPATIENT)
Dept: PHYSICAL THERAPY | Age: 71
End: 2021-11-24
Payer: MEDICARE

## 2021-11-29 ENCOUNTER — OFFICE VISIT (OUTPATIENT)
Dept: ORTHOPEDIC SURGERY | Age: 71
End: 2021-11-29
Payer: MEDICARE

## 2021-11-29 VITALS
HEART RATE: 101 BPM | BODY MASS INDEX: 33.8 KG/M2 | OXYGEN SATURATION: 99 % | HEIGHT: 68 IN | TEMPERATURE: 97.5 F | WEIGHT: 223 LBS

## 2021-11-29 DIAGNOSIS — Z96.612 STATUS POST REVERSE TOTAL REPLACEMENT OF LEFT SHOULDER: Primary | ICD-10-CM

## 2021-11-29 DIAGNOSIS — M50.00 HNP (HERNIATED NUCLEUS PULPOSUS) WITH MYELOPATHY, CERVICAL: ICD-10-CM

## 2021-11-29 DIAGNOSIS — M43.10 SPONDYLOLISTHESIS, ACQUIRED: ICD-10-CM

## 2021-11-29 DIAGNOSIS — M54.16 LUMBAR NEURITIS: ICD-10-CM

## 2021-11-29 DIAGNOSIS — M96.1 LUMBAR POST-LAMINECTOMY SYNDROME: ICD-10-CM

## 2021-11-29 DIAGNOSIS — M54.12 CERVICAL NEURITIS: ICD-10-CM

## 2021-11-29 DIAGNOSIS — M47.812 CERVICAL SPONDYLOSIS WITHOUT MYELOPATHY: ICD-10-CM

## 2021-11-29 DIAGNOSIS — M48.02 CERVICAL SPINAL STENOSIS: ICD-10-CM

## 2021-11-29 PROCEDURE — 3017F COLORECTAL CA SCREEN DOC REV: CPT | Performed by: ORTHOPAEDIC SURGERY

## 2021-11-29 PROCEDURE — 99212 OFFICE O/P EST SF 10 MIN: CPT | Performed by: ORTHOPAEDIC SURGERY

## 2021-11-29 PROCEDURE — G8417 CALC BMI ABV UP PARAM F/U: HCPCS | Performed by: ORTHOPAEDIC SURGERY

## 2021-11-29 PROCEDURE — G9899 SCRN MAM PERF RSLTS DOC: HCPCS | Performed by: ORTHOPAEDIC SURGERY

## 2021-11-29 PROCEDURE — G8399 PT W/DXA RESULTS DOCUMENT: HCPCS | Performed by: ORTHOPAEDIC SURGERY

## 2021-11-29 PROCEDURE — 1101F PT FALLS ASSESS-DOCD LE1/YR: CPT | Performed by: ORTHOPAEDIC SURGERY

## 2021-11-29 PROCEDURE — G8427 DOCREV CUR MEDS BY ELIG CLIN: HCPCS | Performed by: ORTHOPAEDIC SURGERY

## 2021-11-29 PROCEDURE — 1090F PRES/ABSN URINE INCON ASSESS: CPT | Performed by: ORTHOPAEDIC SURGERY

## 2021-11-29 PROCEDURE — G8536 NO DOC ELDER MAL SCRN: HCPCS | Performed by: ORTHOPAEDIC SURGERY

## 2021-11-29 PROCEDURE — G9717 DOC PT DX DEP/BP F/U NT REQ: HCPCS | Performed by: ORTHOPAEDIC SURGERY

## 2021-11-29 RX ORDER — TRAMADOL HYDROCHLORIDE 50 MG/1
50 TABLET ORAL
Qty: 120 TABLET | Refills: 0 | Status: SHIPPED | OUTPATIENT
Start: 2021-11-29 | End: 2021-12-23 | Stop reason: SDUPTHER

## 2021-11-29 NOTE — PROGRESS NOTES
Alberto Armstrong  1950   Chief Complaint   Patient presents with    Shoulder Pain     left shoulder        HISTORY OF PRESENT ILLNESS  Alberto Armstrong is a 70 y.o. female who presents today for reevaluation s/p Left reverse total shoulder arthroplasty on 7/2/21. Patient has been going to PT. Describes pain as a 4/10. Hurt her shoulder in the being of October doing HEP. Has pain with certain movements. Pain in throughout the biceps region into the shoulder. Describes a numbness through the biceps region. Presents today ambulating with a cane. Was not able to take gabapentin due to getting nauseous. Has been taking Mobic and Tramadol. Patient denies any fever, chills, chest pain, shortness of breath or calf pain. The remainder of the review of systems is negative. There are no new illness or injuries to report since last seen in the office. There are no changes to medications, allergies, family or social history. PHYSICAL EXAM:   Visit Vitals  Pulse (!) 101   Temp 97.5 °F (36.4 °C) (Temporal)   Ht 5' 8\" (1.727 m)   Wt 223 lb (101.2 kg)   SpO2 99%   BMI 33.91 kg/m²     The patient is a well-developed, well-nourished female   in no acute distress. The patient is alert and oriented times three. The patient is alert and oriented times three. Mood and affect are normal.  LYMPHATIC: lymph nodes are not enlarged and are within normal limits  SKIN: normal in color and non tender to palpation. There are no bruises or abrasions noted. NEUROLOGICAL: Motor sensory exam is within normal limits. Reflexes are equal bilaterally.  There is normal sensation to pinprick and light touch  ORTHOPEDIC EXAM of left shoulder:  Inspection: swelling present,  Bruising not present  Incision well healed  Passive glenohumeral abduction 0-90 degrees  Stability: Stable  Strength: n/a  2+ distal pulses  Tender over acromion         IMAGING: CT of the left shoulder dated 10/19/2021 was reviewed and read by Dr. Julio César Bravo:   IMPRESSION  1. Intact left shoulder total reverse arthroplasty without evidence for fracture  or hardware loosening. 2. No joint effusion, periarticular collection or mass. XR of the left shoulder with 3 views obtained in the office dated 10/11/2021 was reviewed and read by Dr. Beata Borja: Surgical equipment in excellent position     IMPRESSION:      ICD-10-CM ICD-9-CM    1. Status post reverse total replacement of left shoulder  Z96.612 V43.61         PLAN:   1. Patient presents today with left shoulder pain despite the MRI suggesting the anchors are in place. Continue with PT to help with mobility. Return in 6 weeks. Risk factors include: BMI>30, dm  2. No ultrasound exam indicated today  3. No cortisone injection indicated today   4. Yes Physical/Occupational Therapy indicated today  5. No diagnostic test indicated today:   6. No durable medical equipment indicated today  7. No referral indicated today   8. No medications indicated today  9. No Narcotic indicated today    RTC 6 weeks       Scribed by Rashard Macias [facility]) as dictated by Anette Philippe MD    I, Dr. Anette Philippe, confirm that all documentation is accurate.     Anette Philippe M.D.   [facility]

## 2021-11-29 NOTE — TELEPHONE ENCOUNTER
Patient called to request refill for traMADoL (ULTRAM) 50 mg tablet       Confirmed pharmacy: [address] [facility],[address] #: [phone]

## 2021-11-30 ENCOUNTER — HOSPITAL ENCOUNTER (OUTPATIENT)
Dept: PHYSICAL THERAPY | Age: 71
Discharge: HOME | End: 2021-11-30
Payer: MEDICARE

## 2021-11-30 PROCEDURE — 97110 THERAPEUTIC EXERCISES: CPT

## 2021-11-30 PROCEDURE — 97140 MANUAL THERAPY 1/> REGIONS: CPT

## 2021-11-30 PROCEDURE — 97112 NEUROMUSCULAR REEDUCATION: CPT

## 2021-11-30 PROCEDURE — 97530 THERAPEUTIC ACTIVITIES: CPT

## 2021-11-30 NOTE — PROGRESS NOTES
PHYSICAL THERAPY - DAILY TREATMENT NOTE    Patient Name: Mike Mandujano        Date: 2021  : 1950   YES Patient  Verified  Visit #:   15   of   18  Insurance: Payor: [employer] MEDICARE / [facility] / Product Type: Managed Care Medicare /      In time: 1:12 Out time: 215   Total Treatment Time: 63     Medicare/BCBS Time Tracking (below)   Total Timed Codes (min):  63 1:1 Treatment Time:  53     TREATMENT AREA = Left shoulder pain [M25.512]  Status post arthroscopy of left shoulder [Z98.890]    SUBJECTIVE    Pain Level (on 0 to 10 scale):  6  / 10   Medication Changes/New allergies or changes in medical history, any new surgeries or procedures? NO    If yes, update Summary List   Subjective Functional Status/Changes:  []  No changes reported     \"I saw my surgeon the other day. He looked at the CT scan and said everything looks ok, he just thinks it's scar tissue. Sapna Rao to keep going with therapy but didn't say how long. I made the mistake of reaching behind me to make sure the door was closed and felt it immediately. \"          OBJECTIVE     Therapeutic Procedures:  Min Procedure Specifics + Rationale   n/a [x]  Patient Education (performed throughout session) [x] Review HEP    [] Progressed/Changed HEP based on:   [] proper performance and advancement of Therex/TA   [] reduction in pain level    [] increased functional capacity       [] change in directional preference   23 [x] Therapeutic Exercise   [x]  See Flowsheet   Rationale: increase ROM and increase strength to improve the patients ability to participate in ADL's    10 [x]  Manual Therapy       [] IASTM -   [x] STM to periscapular mm; [facility] G3 GH Distraction; G3-4 inferior/posterior mobs;  PROM all planes; Scapular PNF; Shoulder PNF Patterns; Rhythmic Stabilization.    Rationale: decrease pain, increase ROM, increase tissue extensibility, decrease trigger points and increase postural awareness to attain functional use and participation with ADL's  [x] Skin assessment post-treatment:  [x]intact [x]redness- no adverse reaction   []redness - adverse  The manual therapy interventions were performed at a separate and distinct time from the therapeutic activities interventions. 10 [x] Therapeutic Activity   [x]  See Flowsheet  Rationale: To improve safety, proprioception, coordination, and efficiency with tasks   10 [x] Neuromuscular Re-ed   [x]  See Flowsheet  Rationale: increase ROM, increase strength, improve coordination, improve balance and increase proprioception  to improve the patients ability to safely participate in ADL's       Modality rationale: decrease inflammation, decrease pain, increase tissue extensibility and increase muscle contraction/control to improve the patients ability to perform ADL's with greater ease       Min Type Additional Details   10 [x]  Cold Pack   [] pre-JCARLOS      [x] post-JCARLOS  []  Ice massage Location:left shoulder    [x] supine             [] prone  [x] legs elevated  [] legs flat  [] sitting              [] sidelying - [] left [] right   [x] Skin assessment post-treatment:  [x]intact [x]redness- no adverse reaction       []redness - adverse reaction:       Other Objective/Functional Measures:    Increased reps/sets/resistance per flow sheet. Post Treatment Pain Level (on 0 to 10) scale:   4  / 10     ASSESSMENT    Assessment/Changes in Function:       Performed/participated in treatment well without complaints aside from muscular fatigue/deconditioning, indicating (+) response to current course of treatment to further improve functional status.             Patient will continue to benefit from skilled PT services to modify and progress therapeutic interventions, address functional mobility deficits, address ROM deficits, address strength deficits, analyze and address soft tissue restrictions, analyze and cue movement patterns, analyze and modify body mechanics/ergonomics and instruct in home and community integration  to attain remaining goals   Progress toward goals / Updated goals: Will re-assess NV     PLAN    [x]  Upgrade activities as tolerated  [x]  Update interventions per flow sheet YES Continue plan of care   []  Discharge due to :    []  Other:      Therapist: Edmond Greenberg \"BJ\" Brittany, DPT, Cert. MDT, Cert. DN, Cert. SMT, Dip.  Osteopractic    Date: 11/30/2021 Time: 1:34 PM     Future Appointments   Date Time Provider Ada Fischer   12/2/2021  1:15 PM Alvaro Kline, PT [facility]   12/10/2021  4:00 PM [address] SHAE STEREO BX RM [address]   1/4/2022  2:15 PM Huyen Zazueta MD [facility] BS AMB   1/10/2022  1:50 PM Sejal Vences MD [facility] BS AMB   2/3/2022 11:00 AM Elijah Blum MD [facility] BS AMB

## 2021-12-01 ENCOUNTER — TELEPHONE (OUTPATIENT)
Dept: CARDIOLOGY CLINIC | Age: 71
End: 2021-12-01

## 2021-12-01 DIAGNOSIS — M54.12 CERVICAL NEURITIS: ICD-10-CM

## 2021-12-01 DIAGNOSIS — M47.812 CERVICAL SPONDYLOSIS WITHOUT MYELOPATHY: ICD-10-CM

## 2021-12-01 DIAGNOSIS — J30.1 SEASONAL ALLERGIC RHINITIS DUE TO POLLEN: ICD-10-CM

## 2021-12-01 DIAGNOSIS — M50.00 HNP (HERNIATED NUCLEUS PULPOSUS) WITH MYELOPATHY, CERVICAL: ICD-10-CM

## 2021-12-01 DIAGNOSIS — M43.10 SPONDYLOLISTHESIS, ACQUIRED: ICD-10-CM

## 2021-12-01 DIAGNOSIS — M96.1 LUMBAR POST-LAMINECTOMY SYNDROME: ICD-10-CM

## 2021-12-01 DIAGNOSIS — M54.16 LUMBAR NEURITIS: ICD-10-CM

## 2021-12-01 DIAGNOSIS — M48.02 CERVICAL SPINAL STENOSIS: ICD-10-CM

## 2021-12-01 RX ORDER — TIAGABINE HYDROCHLORIDE 2 MG/1
2 TABLET, FILM COATED ORAL
Qty: 90 TABLET | Refills: 0 | Status: SHIPPED | OUTPATIENT
Start: 2021-12-01 | End: 2022-03-03

## 2021-12-01 NOTE — TELEPHONE ENCOUNTER
Contacted pt at [facility] number. Two patient Identifiers confirmed. Advised pt per Dr Frances Muniz. Pt verbalized understanding.

## 2021-12-01 NOTE — TELEPHONE ENCOUNTER
Ms. Jennie Rand wants to know if she should continue taking her baby aspirin. Has seen reports on the news that has her concerned.

## 2021-12-02 ENCOUNTER — HOSPITAL ENCOUNTER (OUTPATIENT)
Dept: PHYSICAL THERAPY | Age: 71
Discharge: HOME | End: 2021-12-02
Payer: MEDICARE

## 2021-12-02 PROCEDURE — 97140 MANUAL THERAPY 1/> REGIONS: CPT

## 2021-12-02 PROCEDURE — 97110 THERAPEUTIC EXERCISES: CPT

## 2021-12-02 NOTE — PROGRESS NOTES
PHYSICAL THERAPY - DAILY TREATMENT NOTE    Patient Name: Pilo Mitchell        Date: 2021  : 1950   YES Patient  Verified  Visit #:   16   of   18  Insurance: Payor: [facility] MEDICARE / Plan: MILADY ERICKSON [facility] / Product Type: Managed Care Medicare /      In time: 1:15 Out time: 1:55   Total Treatment Time: 40     Medicare/BCBS Time Tracking (below)   Total Timed Codes (min):  40 1:1 Treatment Time:  40     TREATMENT AREA = Left shoulder pain [M25.512]  Status post arthroscopy of left shoulder [Z98.890]    SUBJECTIVE    Pain Level (on 0 to 10 scale):   10   Medication Changes/New allergies or changes in medical history, any new surgeries or procedures? NO    If yes, update Summary List   Subjective Functional Status/Changes:  []  No changes reported     \"I feel like I improved a lot. \"          OBJECTIVE     Therapeutic Procedures:  Min Procedure Specifics + Rationale   n/a [x]  Patient Education (performed throughout session) [x] Review HEP    [] Progressed/Changed HEP based on:   [] proper performance and advancement of Therex/TA   [] reduction in pain level    [] increased functional capacity       [] change in directional preference   30 [x] Therapeutic Exercise   [x]  See Flowsheet   Rationale: increase ROM and increase strength to improve the patients ability to participate in ADL's    10 [x]  Manual Therapy       [] IASTM -   [x] STM to periscapular mm; [facility] G3 GH Distraction; G3-4 inferior/posterior mobs;  PROM all planes; Scapular PNF; Shoulder PNF Patterns; Rhythmic Stabilization.    Rationale: decrease pain, increase ROM, increase tissue extensibility, decrease trigger points and increase postural awareness to attain functional use and participation with ADL's  [x] Skin assessment post-treatment:  [x]intact [x]redness- no adverse reaction   []redness - adverse  The manual therapy interventions were performed at a separate and distinct time from the therapeutic activities interventions. [x] Skin assessment post-treatment:  [x]intact [x]redness- no adverse reaction       []redness - adverse reaction:       Other Objective/Functional Measures:    See PN     Post Treatment Pain Level (on 0 to 10) scale:   3-4  / 10     ASSESSMENT    Assessment/Changes in Function:       See PN         Patient will continue to benefit from skilled PT services to modify and progress therapeutic interventions, address functional mobility deficits, address ROM deficits, address strength deficits, analyze and address soft tissue restrictions, analyze and cue movement patterns, analyze and modify body mechanics/ergonomics and instruct in home and community integration  to attain remaining goals   Progress toward goals / Updated goals:    See PN     PLAN    [x]  Upgrade activities as tolerated  [x]  Update interventions per flow sheet YES Continue plan of care   []  Discharge due to :    []  Other:      Therapist: Ebony Lowry \"BJ\" Serene Jacobs, DPT, Cert. MDT, Cert. DN, Cert. SMT, Dip.  Osteopractic    Date: 12/2/2021 Time: 1:13 PM     Future Appointments   Date Time Provider Ada Fischer   12/2/2021  1:15 PM Dee Ang PT [facility]   12/7/2021  1:15 PM Dee Ang PT [facility]   12/10/2021  4:00 PM [facility] SHAE STEREO BX RM [address]   1/4/2022  2:15 PM Helen Clayton MD [facility] BS AMB   1/10/2022  1:50 PM Nato Nichole MD [facility] BS AMB   2/3/2022 11:00 AM Kendy Blum MD [facility] BS AMB

## 2021-12-02 NOTE — PROGRESS NOTES
[facility] PHYSICAL THERAPY  [address] [place] [address] Edward Lazo, [address] - Phone: [phone]  Fax: [phone]  PROGRESS NOTE  Patient Name: Alvarez Muniz : 1950   Treatment/Medical Diagnosis: Left shoulder pain [M25.512]  Status post arthroscopy of left shoulder [Z98.890]   Referral Source: [place], [place]     Date of Initial Visit: 21 Attended Visits: 17 Missed Visits: 11     SUMMARY OF TREATMENT  Patient's POC has consisted of therex, therapeutic activities, manual therapy prn, modalities prn, pt. education, and a comprehensive HEP. Treatment strategies used to address functional mobility deficits, ROM deficits, strength deficits, analyze and address soft tissue restrictions, analyze and cue movement patterns, analyze and modify body mechanics/ergonomics, assess and modify postural abnormalities and instruct in home and community integration. CURRENT STATUS  AROM Flexion = 140 degrees (increased 70 degrees), ujnuogudd=433 degrees, Scaption = 122 degrees. ER@ Neutral=60 degrees, [email or web address] degrees. Patient's ability to sleep in bed has not been attempted as she reports her elderly/ill dog who normally sleeps in bed with her is unable to get in bed and she cannot lift and place the dog with her. As a result she sleeps in a recliner, but does not have sleep disturbance from her shoulder. Goal/Measure of Progress Goal Met? 1. Patient to be Safe and Independent with HEP to self-manage/prevent symptoms after DC. 2. Patient to report return to sleeping in bed to allow for greater comfort and uninterrupted sleep. 3. Patient to demonstrate > 140 degrees AROM flexion to facilitate OH reach.   Ongoing      NO      MET     New Goals to be achieved in __4__  weeks:  1. Patient to be Safe and Independent with HEP to self-manage/prevent symptoms after DC.    2. Patient to demonstrate > 130 degrees AROM scaption to facilitate ease in donning/doffing UE clothing. Frequency / Duration:   Patient to be seen  1-2  times per week for 4  weeks:    RECOMMENDATIONS    If you have any questions/comments please contact us directly at [phone]. Thank you for allowing us to assist in the care of your patient. Therapist Signature: Fiordaliza Griffin \"JAZMINE\" Mesha Garcia DPT, Cert. MDT, Cert. DN, Cert. SMT, Dip. Osteopractic Date: 67/2/0660   Certification Period: 12/2/21-1/2/22     Reporting Period 11/4/21-12/2/21   Time: 1:35 PM   NOTE TO PHYSICIAN:  PLEASE COMPLETE THE ORDERS BELOW AND FAX TO   [facility]: [phone]. If you are unable to process this request in 24 hours please contact our office: [phone].    ___ I have read the above report and request that my patient continue as recommended.   ___ I have read the above report and request that my patient continue therapy with the following changes/special instructions:_________________________________________________________   ___ I have read the above report and request that my patient be discharged from therapy.      Physician Signature:        Date:       Time:                                        Estefani Izquierdo

## 2021-12-04 DIAGNOSIS — M96.1 LUMBAR POST-LAMINECTOMY SYNDROME: ICD-10-CM

## 2021-12-04 DIAGNOSIS — M43.10 SPONDYLOLISTHESIS, ACQUIRED: ICD-10-CM

## 2021-12-04 DIAGNOSIS — Z51.81 THERAPEUTIC DRUG MONITORING: ICD-10-CM

## 2021-12-04 DIAGNOSIS — M54.16 LUMBAR NEURITIS: ICD-10-CM

## 2021-12-04 RX ORDER — FLUTICASONE PROPIONATE 50 MCG
SPRAY, SUSPENSION (ML) NASAL
Qty: 16 G | Refills: 0 | Status: SHIPPED | OUTPATIENT
Start: 2021-12-04 | End: 2022-01-03

## 2021-12-07 ENCOUNTER — APPOINTMENT (OUTPATIENT)
Dept: PHYSICAL THERAPY | Age: 71
End: 2021-12-07
Payer: MEDICARE

## 2021-12-09 ENCOUNTER — HOSPITAL ENCOUNTER (OUTPATIENT)
Dept: PHYSICAL THERAPY | Age: 71
Discharge: HOME | End: 2021-12-09
Payer: MEDICARE

## 2021-12-09 PROCEDURE — 97140 MANUAL THERAPY 1/> REGIONS: CPT

## 2021-12-09 PROCEDURE — 97110 THERAPEUTIC EXERCISES: CPT

## 2021-12-09 NOTE — PROGRESS NOTES
PHYSICAL THERAPY - DAILY TREATMENT NOTE    Patient Name: Fanny More        Date: 2021  : 1950   YES Patient  Verified  Visit #:     Insurance: Payor: [facility] MEDICARE / Plan: MILADY ERICKSON [facility] / Product Type: Managed Care Medicare /      In time: 12:20 Out time: 1:23   Total Treatment Time: 63     Medicare/BCBS Time Tracking (below)   Total Timed Codes (min):  63 1:1 Treatment Time:  53     TREATMENT AREA = Left shoulder pain [M25.512]  Status post arthroscopy of left shoulder [Z98.890]    SUBJECTIVE    Pain Level (on 0 to 10 scale):  5  / 10   Medication Changes/New allergies or changes in medical history, any new surgeries or procedures? NO    If yes, update Summary List   Subjective Functional Status/Changes:  []  No changes reported     \"My right shoulder is the one giving me the fits today. \"          OBJECTIVE     Therapeutic Procedures:  Min Procedure Specifics + Rationale   n/a [x]  Patient Education (performed throughout session) [x] Review HEP    [] Progressed/Changed HEP based on:   [] proper performance and advancement of Therex/TA   [] reduction in pain level    [] increased functional capacity       [] change in directional preference   38 [x] Therapeutic Exercise   [x]  See Flowsheet   Rationale: increase ROM and increase strength to improve the patients ability to participate in ADL's    15 [x]  Manual Therapy       [] IASTM -   [x] STM to periscapular mm; PROM all planes; Scapular PNF; Shoulder PNF Patterns; Rhythmic Stabilization. Rationale: decrease pain, increase ROM, increase tissue extensibility, decrease trigger points and increase postural awareness to attain functional use and participation with ADL's  [x] Skin assessment post-treatment:  [x]intact [x]redness- no adverse reaction   []redness - adverse  The manual therapy interventions were performed at a separate and distinct time from the therapeutic activities interventions.          Modality rationale: decrease inflammation, decrease pain, increase tissue extensibility and increase muscle contraction/control to improve the patients ability to perform ADL's with greater ease       Min Type Additional Details   10 [x]  Cold Pack   [] pre-JCARLOS      [x] post-JCARLOS  []  Ice massage Location:left shoulder    [x] supine             [] prone  [x] legs elevated  [] legs flat  [] sitting              [] sidelying - [] left [] right   [x] Skin assessment post-treatment:  [x]intact [x]redness- no adverse reaction       []redness - adverse reaction:       Other Objective/Functional Measures:    Increased reps/sets/resistance per flow sheet.  c     Post Treatment Pain Level (on 0 to 10) scale:   2=3  / 10     ASSESSMENT    Assessment/Changes in Function:       Continuing to improve AROM scaption          Patient will continue to benefit from skilled PT services to modify and progress therapeutic interventions, address functional mobility deficits, address ROM deficits, address strength deficits, analyze and address soft tissue restrictions, analyze and cue movement patterns, analyze and modify body mechanics/ergonomics and instruct in home and community integration  to attain remaining goals   Progress toward goals / Updated goals:    performing therex well without complaints or VC's     PLAN    [x]  Upgrade activities as tolerated  [x]  Update interventions per flow sheet YES Continue plan of care   []  Discharge due to :    []  Other:      Therapist: Edmond Greenberg \"BJ\" Josue Goyal, DPT, Cert. MDT, Cert. DN, Cert. SMT, Dip.  Osteopractic    Date: 12/9/2021 Time: 12:34 PM     Future Appointments   Date Time Provider Ada Fischer   12/10/2021  4:00 PM [facility] SHAE STEREO BX RM [address] [facility]   12/13/2021  1:15 PM Alvaro Kline PT [facility]   12/15/2021  2:45 PM Alvaro Kline PT [facility]   12/28/2021  2:00 PM Alvaro Kline PT [facility]   1/4/2022  2:15 PM Huyen Zazueta MD [facility] BS AMB   1/10/2022  1:50 PM Sejal Vences MD [facility] AMB   2/3/2022 11:00 AM Pezzella, Laveda Collet, MD [facility] BS AMB

## 2021-12-13 ENCOUNTER — HOSPITAL ENCOUNTER (OUTPATIENT)
Dept: PHYSICAL THERAPY | Age: 71
Discharge: HOME | End: 2021-12-13
Payer: MEDICARE

## 2021-12-13 PROCEDURE — 97110 THERAPEUTIC EXERCISES: CPT

## 2021-12-13 PROCEDURE — 97140 MANUAL THERAPY 1/> REGIONS: CPT

## 2021-12-13 NOTE — PROGRESS NOTES
PHYSICAL THERAPY - DAILY TREATMENT NOTE    Patient Name: Katie Farooq        Date: 2021  : 1950   YES Patient  Verified  Visit #:      32  Insurance: Payor: [facility] MEDICARE / Plan: MILADY ERICKSON [facility] / Product Type: Managed Care Medicare /      In time: 1:10 Out time: 2:13   Total Treatment Time: 63     Medicare/BCBS Time Tracking (below)   Total Timed Codes (min):  63 1:1 Treatment Time:  53     TREATMENT AREA = Left shoulder pain [M25.512]  Status post arthroscopy of left shoulder [Z98.890]    SUBJECTIVE    Pain Level (on 0 to 10 scale):  6  / 10   Medication Changes/New allergies or changes in medical history, any new surgeries or procedures? NO    If yes, update Summary List   Subjective Functional Status/Changes:  []  No changes reported     \"I had the hardest time over the weekend. I had to constantly ice it. \"          OBJECTIVE     Therapeutic Procedures:  Min Procedure Specifics + Rationale   n/a [x]  Patient Education (performed throughout session) [x] Review HEP    [] Progressed/Changed HEP based on:   [] proper performance and advancement of Therex/TA   [] reduction in pain level    [] increased functional capacity       [] change in directional preference   38 [x] Therapeutic Exercise   [x]  See Flowsheet   Rationale: increase ROM and increase strength to improve the patients ability to participate in ADL's    15 [x]  Manual Therapy       [] IASTM -   [x] STM to periscapular mm; [address] G3 GH Distraction; G3-4 inferior/posterior mobs;  PROM all planes; Scapular PNF; Shoulder PNF Patterns; Rhythmic Stabilization.    Rationale: decrease pain, increase ROM, increase tissue extensibility, decrease trigger points and increase postural awareness to attain functional use and participation with ADL's  [x] Skin assessment post-treatment:  [x]intact [x]redness- no adverse reaction   []redness - adverse  The manual therapy interventions were performed at a separate and distinct time from the therapeutic activities interventions. Modality rationale: decrease inflammation, decrease pain, increase tissue extensibility and increase muscle contraction/control to improve the patients ability to perform ADL's with greater ease       Min Type Additional Details   10 [x]  Cold Pack   [] pre-JCARLOS      [x] post-JCARLOS  []  Ice massage Location:shoulder    [] supine             [] prone  [] legs elevated  [] legs flat  [] sitting              [] sidelying - [] left [] right   [x] Skin assessment post-treatment:  [x]intact [x]redness- no adverse reaction       []redness - adverse reaction:       Other Objective/Functional Measures:    Increased reps/sets/resistance per flow sheet. Post Treatment Pain Level (on 0 to 10) scale:   4  / 10     ASSESSMENT    Assessment/Changes in Function:       Able to increase reps despite recent exacerbation. Patient will continue to benefit from skilled PT services to modify and progress therapeutic interventions, address functional mobility deficits, address ROM deficits, address strength deficits, analyze and address soft tissue restrictions, analyze and cue movement patterns, analyze and modify body mechanics/ergonomics and instruct in home and community integration  to attain remaining goals   Progress toward goals / Updated goals:    Continued increase in strength     PLAN    [x]  Upgrade activities as tolerated  [x]  Update interventions per flow sheet YES Continue plan of care   []  Discharge due to :    []  Other:      Therapist: Rocío Wei \"BJ\" [address], DPT, Cert. MDT, Cert. DN, Cert. SMT, Dip.  Osteopractic    Date: 12/13/2021 Time: 1:42 PM     Future Appointments   Date Time Provider Ada Fischer   12/15/2021  2:45 PM Antonio Hendrickson PT [facility]   12/28/2021  2:00 PM Antonio Hendrickson PT [facility]   1/4/2022  2:15 PM MD Vanessa Garcia [address] BS AMB   1/10/2022  1:50 PM Talha Renee MD [facility] BS AMB   2/3/2022 11:00 AM Nahomy Ernst MD [facility] BS AMB

## 2021-12-15 ENCOUNTER — HOSPITAL ENCOUNTER (OUTPATIENT)
Dept: PHYSICAL THERAPY | Age: 71
Discharge: HOME | End: 2021-12-15
Payer: MEDICARE

## 2021-12-15 PROCEDURE — 97140 MANUAL THERAPY 1/> REGIONS: CPT

## 2021-12-15 PROCEDURE — 97110 THERAPEUTIC EXERCISES: CPT

## 2021-12-15 NOTE — PROGRESS NOTES
PHYSICAL THERAPY - DAILY TREATMENT NOTE    Patient Name: Christos Vargas        Date: 12/15/2021  : 1950   YES Patient  Verified  Visit #:   -  Insurance: Payor: [facility] MEDICARE / Plan: MILADY ERICKSON [facility] / Product Type: Managed Care Medicare /      In time: 2:40 Out time: 3:30   Total Treatment Time: 50     Medicare/BCBS Time Tracking (below)   Total Timed Codes (min):  40 1:1 Treatment Time:  40     TREATMENT AREA = Left shoulder pain [M25.512]  Status post arthroscopy of left shoulder [Z98.890]    SUBJECTIVE    Pain Level (on 0 to 10 scale):  6  / 10   Medication Changes/New allergies or changes in medical history, any new surgeries or procedures? NO    If yes, update Summary List   Subjective Functional Status/Changes:  []  No changes reported     \"Monday night I had the hardest time sleeping. I didn't sleep until 9 am the next morning. My wrists are giving me fits right now. \"   \"My gait has been off. I feel like I'm swaying. I think it's been my sinuses. \"  \"I'm driving to [place], [place] this weekend for my grandson's birthday. \"       OBJECTIVE     Therapeutic Procedures:  Min Procedure Specifics + Rationale   n/a [x]  Patient Education (performed throughout session) [x] Review HEP    [] Progressed/Changed HEP based on:   [] proper performance and advancement of Therex/TA   [] reduction in pain level    [] increased functional capacity       [] change in directional preference   25 [x] Therapeutic Exercise   [x]  See Flowsheet   Rationale: increase ROM and increase strength to improve the patients ability to participate in ADL's    15 [x]  Manual Therapy       [] IASTM -   [x] STM to periscapular mm; [address] G3 GH Distraction; G3-4 inferior/posterior mobs;  PROM all planes; Scapular PNF; Shoulder PNF Patterns; Rhythmic Stabilization.    Rationale: decrease pain, increase ROM, increase tissue extensibility, decrease trigger points and increase postural awareness to attain functional use and participation with ADL's  [x] Skin assessment post-treatment:  [x]intact [x]redness- no adverse reaction   []redness - adverse  The manual therapy interventions were performed at a separate and distinct time from the therapeutic activities interventions. Modality rationale: decrease inflammation, decrease pain, increase tissue extensibility and increase muscle contraction/control to improve the patients ability to perform ADL's with greater ease       Min Type Additional Details   10 [x]  Cold Pack   [] pre-JCARLOS      [x] post-JCARLOS  []  Ice massage Location:shoulder    [] supine             [] prone  [] legs elevated  [] legs flat  [] sitting              [] sidelying - [] left [] right   [x] Skin assessment post-treatment:  [x]intact [x]redness- no adverse reaction       []redness - adverse reaction:       Other Objective/Functional Measures:    Increased reps/sets/resistance per flow sheet. Post Treatment Pain Level (on 0 to 10) scale:   4 / 10     ASSESSMENT    Assessment/Changes in Function:       Despite increased pain patient able to perform therex without significant complaints         Patient will continue to benefit from skilled PT services to modify and progress therapeutic interventions, address functional mobility deficits, address ROM deficits, address strength deficits, analyze and address soft tissue restrictions, analyze and cue movement patterns, analyze and modify body mechanics/ergonomics and instruct in home and community integration  to attain remaining goals   Progress toward goals / Updated goals:    Progressing AROM flexion and scaption     PLAN    [x]  Upgrade activities as tolerated  [x]  Update interventions per flow sheet YES Continue plan of care   []  Discharge due to :    []  Other:      Therapist: Erika Ma \"BJ\" Andrez aFy DPT, Cert. MDT, Cert. DN, Cert. SMT, Dip.  Osteopractic    Date: 12/15/2021 Time: 2:54 PM     Future Appointments   Date Time Provider Ada Fischer 12/28/2021  2:00 PM Kaia Herrera, PT [facility]   1/3/2022  2:00 PM Kaia Herrera, PT [facility]   1/4/2022  2:15 PM Mars Jackson MD [address] BS AMB   1/5/2022  2:00 PM Kaia Herrera, PT [facility]   1/10/2022  1:50 PM Farzaneh Webster MD [facility] AMB   1/11/2022  2:00 PM Kaia Herrera, PT [facility]   1/13/2022  2:00 PM Kaia Herrera, PT [facility]   1/17/2022  2:00 PM Kaia Herrera, PT [facility]   1/19/2022  2:00 PM Kaia Herrera, PT [facility]   1/24/2022  2:00 PM Kaia Herrera, PT [facility]   1/26/2022  2:00 PM Kaia Herrera, PT [facility]   1/31/2022  2:00 PM Kaia Herrera, PT [facility]   2/3/2022 11:00 AM Harris Wolff MD [facility] AMB

## 2021-12-23 DIAGNOSIS — M43.10 SPONDYLOLISTHESIS, ACQUIRED: ICD-10-CM

## 2021-12-23 DIAGNOSIS — M47.812 CERVICAL SPONDYLOSIS WITHOUT MYELOPATHY: ICD-10-CM

## 2021-12-23 DIAGNOSIS — M54.12 CERVICAL NEURITIS: ICD-10-CM

## 2021-12-23 DIAGNOSIS — M96.1 LUMBAR POST-LAMINECTOMY SYNDROME: ICD-10-CM

## 2021-12-23 DIAGNOSIS — M48.02 CERVICAL SPINAL STENOSIS: ICD-10-CM

## 2021-12-23 DIAGNOSIS — M54.16 LUMBAR NEURITIS: ICD-10-CM

## 2021-12-23 DIAGNOSIS — M50.00 HNP (HERNIATED NUCLEUS PULPOSUS) WITH MYELOPATHY, CERVICAL: ICD-10-CM

## 2021-12-23 RX ORDER — TRAMADOL HYDROCHLORIDE 50 MG/1
50 TABLET ORAL
Qty: 120 TABLET | Refills: 0 | Status: SHIPPED | OUTPATIENT
Start: 2021-12-23 | End: 2021-12-30

## 2021-12-23 NOTE — TELEPHONE ENCOUNTER
Pt left a voice message requesting a refill for Ultram 50 mg to be sent to the pharmacy on file for the January refill. Please review. BCN: [phone]    Last visit 11/29/2021 MD Isis Dumont  Next appointment 01/10/2022 MD Isis Dumont & 02/03/2022 MD Villa Ast   Previous refill encounter(s)   11/29/2021 Ultram #120     No access to      Requested Prescriptions     Pending Prescriptions Disp Refills    traMADoL (ULTRAM) 50 mg tablet 120 Tablet 0     Sig: Take 1 Tablet by mouth every six (6) hours as needed for Pain for up to 30 days. Max Daily Amount: 200 mg.

## 2021-12-28 ENCOUNTER — APPOINTMENT (OUTPATIENT)
Dept: PHYSICAL THERAPY | Age: 71
End: 2021-12-28
Payer: MEDICARE

## 2021-12-30 DIAGNOSIS — M43.10 SPONDYLOLISTHESIS, ACQUIRED: ICD-10-CM

## 2021-12-30 DIAGNOSIS — M50.00 HNP (HERNIATED NUCLEUS PULPOSUS) WITH MYELOPATHY, CERVICAL: ICD-10-CM

## 2021-12-30 DIAGNOSIS — J30.1 SEASONAL ALLERGIC RHINITIS DUE TO POLLEN: ICD-10-CM

## 2021-12-30 DIAGNOSIS — M96.1 LUMBAR POST-LAMINECTOMY SYNDROME: ICD-10-CM

## 2021-12-30 DIAGNOSIS — M54.12 CERVICAL NEURITIS: ICD-10-CM

## 2021-12-30 DIAGNOSIS — M54.16 LUMBAR NEURITIS: ICD-10-CM

## 2021-12-30 DIAGNOSIS — M48.02 CERVICAL SPINAL STENOSIS: ICD-10-CM

## 2021-12-30 DIAGNOSIS — M47.812 CERVICAL SPONDYLOSIS WITHOUT MYELOPATHY: ICD-10-CM

## 2021-12-30 RX ORDER — TRAMADOL HYDROCHLORIDE 50 MG/1
TABLET ORAL
Qty: 120 TABLET | Refills: 1 | Status: SHIPPED | OUTPATIENT
Start: 2022-01-03 | End: 2022-02-02

## 2022-01-03 ENCOUNTER — APPOINTMENT (OUTPATIENT)
Dept: PHYSICAL THERAPY | Age: 72
End: 2022-01-03

## 2022-01-03 RX ORDER — FLUTICASONE PROPIONATE 50 MCG
SPRAY, SUSPENSION (ML) NASAL
Qty: 16 G | Refills: 0 | Status: SHIPPED | OUTPATIENT
Start: 2022-01-03 | End: 2022-02-02

## 2022-01-05 ENCOUNTER — APPOINTMENT (OUTPATIENT)
Dept: PHYSICAL THERAPY | Age: 72
End: 2022-01-05

## 2022-01-11 ENCOUNTER — APPOINTMENT (OUTPATIENT)
Dept: PHYSICAL THERAPY | Age: 72
End: 2022-01-11

## 2022-01-13 ENCOUNTER — APPOINTMENT (OUTPATIENT)
Dept: PHYSICAL THERAPY | Age: 72
End: 2022-01-13

## 2022-01-17 ENCOUNTER — APPOINTMENT (OUTPATIENT)
Dept: PHYSICAL THERAPY | Age: 72
End: 2022-01-17

## 2022-01-19 ENCOUNTER — APPOINTMENT (OUTPATIENT)
Dept: PHYSICAL THERAPY | Age: 72
End: 2022-01-19

## 2022-01-21 DIAGNOSIS — Z78.0 ENCOUNTER FOR OSTEOPOROSIS SCREENING IN ASYMPTOMATIC POSTMENOPAUSAL PATIENT: Primary | ICD-10-CM

## 2022-01-21 DIAGNOSIS — Z13.820 ENCOUNTER FOR OSTEOPOROSIS SCREENING IN ASYMPTOMATIC POSTMENOPAUSAL PATIENT: Primary | ICD-10-CM

## 2022-01-21 DIAGNOSIS — Z13.820 ENCOUNTER FOR OSTEOPOROSIS SCREENING IN ASYMPTOMATIC POSTMENOPAUSAL PATIENT: ICD-10-CM

## 2022-01-21 DIAGNOSIS — Z78.0 ENCOUNTER FOR OSTEOPOROSIS SCREENING IN ASYMPTOMATIC POSTMENOPAUSAL PATIENT: ICD-10-CM

## 2022-01-24 ENCOUNTER — TELEPHONE (OUTPATIENT)
Dept: FAMILY MEDICINE CLINIC | Age: 72
End: 2022-01-24

## 2022-01-24 ENCOUNTER — APPOINTMENT (OUTPATIENT)
Dept: PHYSICAL THERAPY | Age: 72
End: 2022-01-24

## 2022-01-26 ENCOUNTER — APPOINTMENT (OUTPATIENT)
Dept: PHYSICAL THERAPY | Age: 72
End: 2022-01-26

## 2022-01-28 ENCOUNTER — VIRTUAL VISIT (OUTPATIENT)
Dept: FAMILY MEDICINE CLINIC | Age: 72
End: 2022-01-28
Payer: MEDICARE

## 2022-01-28 DIAGNOSIS — F51.04 PSYCHOPHYSIOLOGIC INSOMNIA: ICD-10-CM

## 2022-01-28 DIAGNOSIS — Z86.16 HISTORY OF 2019 NOVEL CORONAVIRUS DISEASE (COVID-19): ICD-10-CM

## 2022-01-28 DIAGNOSIS — J44.9 CHRONIC OBSTRUCTIVE PULMONARY DISEASE, UNSPECIFIED COPD TYPE (HCC): ICD-10-CM

## 2022-01-28 DIAGNOSIS — I27.20 PULMONARY HYPERTENSION (HCC): ICD-10-CM

## 2022-01-28 DIAGNOSIS — F11.20 OPIOID DEPENDENCE, UNCOMPLICATED (HCC): ICD-10-CM

## 2022-01-28 DIAGNOSIS — G89.29 INSOMNIA SECONDARY TO CHRONIC PAIN: ICD-10-CM

## 2022-01-28 DIAGNOSIS — E11.65 TYPE 2 DIABETES MELLITUS WITH HYPERGLYCEMIA, WITHOUT LONG-TERM CURRENT USE OF INSULIN (HCC): ICD-10-CM

## 2022-01-28 DIAGNOSIS — I71.40 ABDOMINAL AORTIC ANEURYSM (AAA) WITHOUT RUPTURE: ICD-10-CM

## 2022-01-28 DIAGNOSIS — F43.21 GRIEVING: Primary | ICD-10-CM

## 2022-01-28 DIAGNOSIS — F33.41 RECURRENT MAJOR DEPRESSIVE DISORDER, IN PARTIAL REMISSION (HCC): ICD-10-CM

## 2022-01-28 DIAGNOSIS — G47.01 INSOMNIA SECONDARY TO CHRONIC PAIN: ICD-10-CM

## 2022-01-28 PROCEDURE — G8427 DOCREV CUR MEDS BY ELIG CLIN: HCPCS | Performed by: FAMILY MEDICINE

## 2022-01-28 PROCEDURE — 2022F DILAT RTA XM EVC RTNOPTHY: CPT | Performed by: FAMILY MEDICINE

## 2022-01-28 PROCEDURE — 1090F PRES/ABSN URINE INCON ASSESS: CPT | Performed by: FAMILY MEDICINE

## 2022-01-28 PROCEDURE — G8399 PT W/DXA RESULTS DOCUMENT: HCPCS | Performed by: FAMILY MEDICINE

## 2022-01-28 PROCEDURE — G9717 DOC PT DX DEP/BP F/U NT REQ: HCPCS | Performed by: FAMILY MEDICINE

## 2022-01-28 PROCEDURE — 1101F PT FALLS ASSESS-DOCD LE1/YR: CPT | Performed by: FAMILY MEDICINE

## 2022-01-28 PROCEDURE — G9899 SCRN MAM PERF RSLTS DOC: HCPCS | Performed by: FAMILY MEDICINE

## 2022-01-28 PROCEDURE — 3046F HEMOGLOBIN A1C LEVEL >9.0%: CPT | Performed by: FAMILY MEDICINE

## 2022-01-28 PROCEDURE — 99214 OFFICE O/P EST MOD 30 MIN: CPT | Performed by: FAMILY MEDICINE

## 2022-01-28 PROCEDURE — 3017F COLORECTAL CA SCREEN DOC REV: CPT | Performed by: FAMILY MEDICINE

## 2022-01-28 PROCEDURE — G8756 NO BP MEASURE DOC: HCPCS | Performed by: FAMILY MEDICINE

## 2022-01-28 RX ORDER — ZOLPIDEM TARTRATE 5 MG/1
5 TABLET ORAL
Qty: 30 TABLET | Refills: 2 | Status: SHIPPED | OUTPATIENT
Start: 2022-01-28 | End: 2022-02-25

## 2022-01-28 NOTE — PROGRESS NOTES
Neal Payne is a 70 y.o.  female and presents with    Chief Complaint   Patient presents with    Insomnia    Grief Counseling     Neal Payne, who was evaluated through a synchronous (real-time) audio-video encounter, and/or her healthcare decision maker, is aware that it is a billable service, which includes applicable co-pays, with coverage as determined by her insurance carrier. She provided verbal consent to proceed and patient identification was verified. This visit was conducted pursuant to the emergency declaration under the [facility], [address] [place] [address] and the [employer] General Act. A caregiver was present when appropriate. Ability to conduct physical exam was limited. The patient was located at home in a state where the provider was licensed to provide care. --Toni Camacho MD on 1/28/2022 at 12:35 PM    Subjective:  She is following up for insomnia; she feels depressed and has been tearful after losing her dog 1 month ago. She had the dog for > 10 years; she felt that the dog gave her purpose and gave her something to do. She has difficulty sleeping. She had several symptoms of covid including nasal congestion, sore throat, loss of sense of smell, cough. Her roommate was exposed to covid at work. ROS   General ROS: negative for - chills, fatigue or fever; + weight gain  Psychological ROS: positive for - depression associated with COVID 19 and being home bound  Ophthalmic ROS: positive for - uses glasses  ENT ROS: positive for - headaches intermittently  Endocrine ROS: negative for - temperature intolerance or unexpected weight changes  Respiratory ROS: no cough, improved shortness of breath; no wheezing;   Cardiovascular ROS: no chest pain or dyspnea on exertion; intermittent tachycardia; she has h/o aortic valve replacement and is followed by Dr. Roselia Maza.   Gastrointestinal ROS: see HPI  Genito-Urinary ROS: no dysuria, trouble voiding, or hematuria  Neurological ROS: no TIA or stroke symptoms  Dermatological ROS: see HPI  All other systems reviewed and are negative. Objective: There were no vitals filed for this visit. alert, well appearing, and in no distress, oriented to person, place, and time and obese and tearful  Mental status - depressed mood  Chest - normal work of breathing  Neurological - cranial nerves II through XII intact    TESTS      Assessment/Plan:    1. Chronic obstructive pulmonary disease, unspecified COPD type ([facility]  Continue inhaled therapy    2. Pulmonary hypertension (HCC)  Goal <130/80; continue medications    3. Opioid dependence, uncomplicated ([facility])  Followed in pain management    4. Recurrent major depressive disorder, in partial remission ([facility]  Depression exacerbated by death of dog    5. Type 2 diabetes mellitus with hyperglycemia, without long-term current use of insulin ([facility])  Goal hgb a1c <7; assess efficacy of treatment  - HEMOGLOBIN A1C WITH EAG; Future    6. Abdominal aortic aneurysm (AAA) without rupture ([facility])  Continue to monitor    7. Grieving  Sleep aid prescribed to aid in side effect of grieving  - zolpidem (AMBIEN) 5 mg tablet; Take 1 Tablet by mouth nightly as needed for Sleep. Max Daily Amount: 5 mg. Dispense: 30 Tablet; Refill: 2    8. Insomnia secondary to chronic pain    - zolpidem (AMBIEN) 5 mg tablet; Take 1 Tablet by mouth nightly as needed for Sleep. Max Daily Amount: 5 mg. Dispense: 30 Tablet; Refill: 2    9. Psychophysiologic insomnia    - zolpidem (AMBIEN) 5 mg tablet; Take 1 Tablet by mouth nightly as needed for Sleep. Max Daily Amount: 5 mg. Dispense: 30 Tablet; Refill: 2    10. History of 2019 novel coronavirus disease (COVID-19)        Lab review: orders written for new lab studies as appropriate; see orders      I have discussed the diagnosis with the patient and the intended plan as seen in the above orders.  I have discussed medication side effects and warnings with the patient as well. I have reviewed the plan of care with the patient, accepted their input and they are in agreement with the treatment goals.

## 2022-01-28 NOTE — PROGRESS NOTES
Isis Lucero presents today for   Chief Complaint   Patient presents with    Insomnia    Grief Counseling       Is someone accompanying this pt? na    Is the patient using any DME equipment during OV? na    Depression Screening:  3 most recent PHQ Screens 5/6/2021   Little interest or pleasure in doing things Nearly every day   Feeling down, depressed, irritable, or hopeless Not at all   Total Score PHQ 2 3   Trouble falling or staying asleep, or sleeping too much Nearly every day   Feeling tired or having little energy More than half the days   Poor appetite, weight loss, or overeating Not at all   Feeling bad about yourself - or that you are a failure or have let yourself or your family down Several days   Trouble concentrating on things such as school, work, reading, or watching TV Not at all   Moving or speaking so slowly that other people could have noticed; or the opposite being so fidgety that others notice Not at all   Thoughts of being better off dead, or hurting yourself in some way Not at all   PHQ 9 Score 9   How difficult have these problems made it for you to do your work, take care of your home and get along with others Not difficult at all       Learning Assessment:  Learning Assessment 4/22/2016   PRIMARY LEARNER Patient   HIGHEST LEVEL OF EDUCATION - PRIMARY LEARNER  -   PRIMARY LANGUAGE ENGLISH   LEARNER PREFERENCE PRIMARY LISTENING   ANSWERED BY patient   RELATIONSHIP SELF       Abuse Screening:  Abuse Screening Questionnaire 12/12/2019   Do you ever feel afraid of your partner? N   Are you in a relationship with someone who physically or mentally threatens you? N   Is it safe for you to go home? Y       Fall Risk  Fall Risk Assessment, last 12 mths 11/4/2021   Able to walk? Yes   Fall in past 12 months? 1   Do you feel unsteady? 1   Are you worried about falling 0   Is the gait abnormal? 0   Number of falls in past 12 months 2   Fall with injury?  0       Health Maintenance reviewed and discussed and ordered per Provider. Health Maintenance Due   Topic Date Due    Eye Exam Retinal or Dilated  04/27/2020    COVID-19 Vaccine (3 - Booster for Pfizer series) 09/29/2021   . Coordination of Care:  1. Have you been to the ER, urgent care clinic since your last visit? Hospitalized since your last visit? no    2. Have you seen or consulted any other health care providers outside of the [address] [place] [address] since your last visit? Include any pap smears or colon screening.  no      Last  Checked na  Last UDS Checked na  Last Pain contract signed: na    Patients concerns today:  insomnia

## 2022-02-07 ENCOUNTER — APPOINTMENT (OUTPATIENT)
Dept: PHYSICAL THERAPY | Age: 72
End: 2022-02-07

## 2022-02-10 ENCOUNTER — APPOINTMENT (OUTPATIENT)
Dept: PHYSICAL THERAPY | Age: 72
End: 2022-02-10

## 2022-02-15 ENCOUNTER — OFFICE VISIT (OUTPATIENT)
Dept: CARDIOLOGY CLINIC | Age: 72
End: 2022-02-15
Payer: MEDICARE

## 2022-02-15 VITALS
OXYGEN SATURATION: 99 % | WEIGHT: 221 LBS | SYSTOLIC BLOOD PRESSURE: 146 MMHG | DIASTOLIC BLOOD PRESSURE: 76 MMHG | RESPIRATION RATE: 20 BRPM | BODY MASS INDEX: 33.6 KG/M2 | HEART RATE: 109 BPM | TEMPERATURE: 97.3 F

## 2022-02-15 DIAGNOSIS — I35.0 NONRHEUMATIC AORTIC VALVE STENOSIS: Primary | ICD-10-CM

## 2022-02-15 DIAGNOSIS — I10 ESSENTIAL HYPERTENSION WITH GOAL BLOOD PRESSURE LESS THAN 140/90: ICD-10-CM

## 2022-02-15 DIAGNOSIS — E78.00 PURE HYPERCHOLESTEROLEMIA: ICD-10-CM

## 2022-02-15 DIAGNOSIS — E66.01 MORBID OBESITY, UNSPECIFIED OBESITY TYPE (HCC): ICD-10-CM

## 2022-02-15 PROCEDURE — 3017F COLORECTAL CA SCREEN DOC REV: CPT | Performed by: INTERNAL MEDICINE

## 2022-02-15 PROCEDURE — G8536 NO DOC ELDER MAL SCRN: HCPCS | Performed by: INTERNAL MEDICINE

## 2022-02-15 PROCEDURE — G9717 DOC PT DX DEP/BP F/U NT REQ: HCPCS | Performed by: INTERNAL MEDICINE

## 2022-02-15 PROCEDURE — G8754 DIAS BP LESS 90: HCPCS | Performed by: INTERNAL MEDICINE

## 2022-02-15 PROCEDURE — 1101F PT FALLS ASSESS-DOCD LE1/YR: CPT | Performed by: INTERNAL MEDICINE

## 2022-02-15 PROCEDURE — 1090F PRES/ABSN URINE INCON ASSESS: CPT | Performed by: INTERNAL MEDICINE

## 2022-02-15 PROCEDURE — G9899 SCRN MAM PERF RSLTS DOC: HCPCS | Performed by: INTERNAL MEDICINE

## 2022-02-15 PROCEDURE — G8428 CUR MEDS NOT DOCUMENT: HCPCS | Performed by: INTERNAL MEDICINE

## 2022-02-15 PROCEDURE — G8399 PT W/DXA RESULTS DOCUMENT: HCPCS | Performed by: INTERNAL MEDICINE

## 2022-02-15 PROCEDURE — G8753 SYS BP > OR = 140: HCPCS | Performed by: INTERNAL MEDICINE

## 2022-02-15 PROCEDURE — 99214 OFFICE O/P EST MOD 30 MIN: CPT | Performed by: INTERNAL MEDICINE

## 2022-02-15 PROCEDURE — G8417 CALC BMI ABV UP PARAM F/U: HCPCS | Performed by: INTERNAL MEDICINE

## 2022-02-15 RX ORDER — BUDESONIDE, GLYCOPYRROLATE, AND FORMOTEROL FUMARATE 160; 9; 4.8 UG/1; UG/1; UG/1
AEROSOL, METERED RESPIRATORY (INHALATION)
COMMUNITY
End: 2022-02-25

## 2022-02-15 NOTE — PROGRESS NOTES
[facility] PHYSICAL THERAPY  [address] [place] [address] Christopher Lazo, [address] - Phone: [phone]  Fax: [phone]      Dear JONNA Milan,   Per your referral, Ramsey Dueñas, 1950, was evaluated and treated for the diagnosis of Left shoulder pain [M25.512]  Status post arthroscopy of left shoulder [Z98.890]. The patient has not been seen in person since 12/15/21. She has missed more than 15 sessions since then, citing illness/transportation/insurance and adverse reactions to medication as reasons for missing appointments. Due to the inability to further her care from non-compliance to our attendance policy and POC, we are discharging the patient from physical therapy at this time. We appreciate the kind referral and would willingly work with this patient again, should she be able to arrange regular attendance and is appropriate for further treatment. Your patient's health is our primary concern. If you have any questions/comments please contact us directly at [phone]. Thank you for allowing us to assist in the care of your patient. Therapist Signature: Félix Sr DPT, Cert. MDT, Cert. DN, Cert. SMT, Dip. Osteopractic Date: 8/04/3191   Certification Period 12/2/21-1/2/22 Time: 9:13 AM   Reporting Period 12/15/21-2/15/22     NOTE TO PHYSICIAN:  PLEASE COMPLETE THE ORDERS BELOW AND FAX TO   [facility] Physical Therapy: [phone]  If you are unable to process this request in 24 hours please contact our office: [phone]    ___ I have read the above report and request that my patient continue as recommended.   ___ I have read the above report and request that my patient continue therapy with the following changes/special instructions:_________________________________________________________   ___ I have read the above report and request that my patient be discharged from therapy.      Physician Signature:        Date:       Time: [place], [place]

## 2022-02-15 NOTE — PROGRESS NOTES
Identified pt with two pt identifiers(name and ). Reviewed record in preparation for visit and have obtained necessary documentation. Neal Payne presents today for   Chief Complaint   Patient presents with    Follow-up     6m               Neal Payne preferred language for health care discussion is english/other. Personal Protective Equipment:   Personal Protective Equipment was used including: mask-surgical and hands-gloves. Patient was placed on no precaution(s). Patient was masked. Precautions:   Patient currently on None  Patient currently roomed with door closed. Is someone accompanying this pt? no    Is the patient using any DME equipment during [address]? no    Depression Screening:  3 most recent PHQ Screens 2/15/2022   Little interest or pleasure in doing things Not at all   Feeling down, depressed, irritable, or hopeless Not at all   Total Score PHQ 2 0   Trouble falling or staying asleep, or sleeping too much -   Feeling tired or having little energy -   Poor appetite, weight loss, or overeating -   Feeling bad about yourself - or that you are a failure or have let yourself or your family down -   Trouble concentrating on things such as school, work, reading, or watching TV -   Moving or speaking so slowly that other people could have noticed; or the opposite being so fidgety that others notice -   Thoughts of being better off dead, or hurting yourself in some way -   PHQ 9 Score -   How difficult have these problems made it for you to do your work, take care of your home and get along with others -       Learning Assessment:  Learning Assessment 2016   PRIMARY LEARNER Patient   HIGHEST LEVEL OF EDUCATION - PRIMARY LEARNER  -   PRIMARY LANGUAGE ENGLISH   LEARNER PREFERENCE PRIMARY LISTENING   ANSWERED BY patient   RELATIONSHIP SELF       Abuse Screening:  Abuse Screening Questionnaire 2019   Do you ever feel afraid of your partner?  N   Are you in a relationship with someone who physically or mentally threatens you? N   Is it safe for you to go home? Y       Fall Risk  Fall Risk Assessment, last 12 mths 11/4/2021   Able to walk? Yes   Fall in past 12 months? 1   Do you feel unsteady? 1   Are you worried about falling 0   Is the gait abnormal? 0   Number of falls in past 12 months 2   Fall with injury? 0       Pt currently taking Anticoagulant therapy? no  Pt currently taking Antiplatelet therapy? no    Coordination of Care:  1. Have you been to the ER, urgent care clinic since your last visit? Hospitalized since your last visit? no    2. Have you seen or consulted any other health care providers outside of the [address] [place] [address] since your last visit? Include any pap smears or colon screening. yes      Please see Red banners under Allergies and Med Rec to remove outside inquires. All correct information has been verified with patient and added to chart.      Medication's patient's would liked removed has been marked not taking to be removed per Verbal order and read back per Amrit Becerril MD

## 2022-02-15 NOTE — LETTER
2/15/2022    Patient: Girma Mccoy   YOB: 1950   Date of Visit: 2/15/2022     Ekaterina Swartz MD  [id number] [facility]  [address] [place] [address] [address]  [address] [facility] [address]    Dear Ekaterina Swartz MD,      Thank you for referring Ms. Brandt Hernandez to [facility] for evaluation. My notes for this consultation are attached. If you have questions, please do not hesitate to call me. I look forward to following your patient along with you.       Sincerely,    Tutu Mendoza MD

## 2022-02-15 NOTE — PROGRESS NOTES
[facility]    Ms. Reyes Thornton is a 70 y.o. female with history of Aortic stenosis s/p Aortic valve replacement (03/16), fibromyalgia, diabetes, obesity, hypertension, hypothyroidism and COPD. Mr. Reyes Thornton is here today for follow up appointment. Dyspnea has remained stable over the years she follows up with Dr. Eliana Cooper for her asthma and COPD. Left shoulder surgery went well according to patient. She is going through rehabilitation  Unfortunately patient is suffering through some depression because of loss of the pet dog she had for 14 years. She is discussing this with primary physician  No specific cardiac complaint at this time    Past Medical History:   Diagnosis Date    Ankle fracture     Aortic stenosis     S/P AVR in 2016    Asthma     Carpal tunnel syndrome on both sides     Chronic obstructive pulmonary disease (HCC)     Chronic pain     back pain    COVID-19 vaccine series completed 04/29/2021    Diabetes ([address]     Diverticulitis     Fibromyalgia     H/O aortic valve replacement 03/2016    21 mm bovine pericardial bioprosthesis and epiaortic scanning      H/O: HTN (hypertension)     no meds now- sp valve replacement    Hypothyroidism     Lumbar post-laminectomy syndrome     Menopause     Obesity     Osteoarthritis     Psychotic disorder ([address]     Pulmonary HTN ([address]     per pcp note cc- patient denies.  Sciatica     Skin cancer 2013    right forearm. Past Surgical History:   Procedure Laterality Date    COLONOSCOPY      COLONOSCOPY N/A 9/15/2017    COLONOSCOPY performed by Wanda Navarrete MD at [address] HX ARTHROPLASTY Left 07/2021    HX CARPAL TUNNEL RELEASE Left 03/2021    HX CATARACT REMOVAL Right 1995    lens  replaced.  HX CATARACT REMOVAL Left 02/2018    HX COLONOSCOPY  09/15/2017    dr. Reshma Nieves  f/u 5 years.      HX HEENT  1990's    sinus surgery     HX HEENT Right 11/02/2017 laser for right eye cataracts.  HX LUMBAR FUSION  2004    L3-L4-L5    HX LUMBAR LAMINECTOMY  2002    HX MOHS PROCEDURES Left 1990    HX ORTHOPAEDIC      Rotator cuff Bilateral    HX SHOULDER REPLACEMENT Right 03/29/2017    reverse.  CO CARDIAC SURG PROCEDURE UNLIST  03/2016    aortic valve replacement.  CO CHEST SURGERY PROCEDURE UNLISTED  03/2016    Open Heart Surgery    CO COLSC FLX W/RMVL OF TUMOR POLYP LESION SNARE TQ  07/09/2014 repeat in 3 years    Dr. Sammi Condon  2016    Aortic valve replacement(bovine)       Current Outpatient Medications   Medication Sig    fluticasone propionate (FLONASE) 50 mcg/actuation nasal spray Use 1 spray(s) in each nostril twice daily    atorvastatin (LIPITOR) 20 mg tablet Take 1 tablet by mouth once daily    zolpidem (AMBIEN) 5 mg tablet Take 1 Tablet by mouth nightly as needed for Sleep. Max Daily Amount: 5 mg.  tiaGABine (GABITRIL) 2 mg tablet Take 1 tablet by mouth nightly    acetaminophen (Tylenol Extra Strength) 500 mg tablet Take 500 mg by mouth every six (6) hours as needed for Pain.  FLUoxetine (PROzac) 10 mg capsule Take 1 capsule by mouth once daily    DULoxetine (CYMBALTA) 30 mg capsule TAKE 1 CAPSULE BY MOUTH THREE TIMES DAILY    meloxicam (MOBIC) 15 mg tablet Take 1 tablet by mouth once daily    metFORMIN (GLUCOPHAGE) 500 mg tablet TAKE 1 TABLET BY MOUTH ONCE DAILY WITH SUPPER    pramoxine-hc-chloroxylenol 1-1-0.1 % lotion Apply  to affected area two (2) times a day.  desonide (TRIDESILON) 0.05 % cream APPLY   EXTERNALLY TO AFFECTED AREA TWICE DAILY    aspirin delayed-release 81 mg tablet Take 81 mg by mouth daily.  meclizine (ANTIVERT) 25 mg tablet Take 1 Tablet by mouth three (3) times daily as needed for Dizziness.  tiaGABine (GabitriL) 2 mg tablet Take 1 Tab by mouth nightly.     levothyroxine (SYNTHROID) 50 mcg tablet TAKE 1 TABLET BY MOUTH ONCE DAILY BEFORE BREAKFAST    pantoprazole (PROTONIX) 20 mg tablet Take 1 tablet by mouth once daily    glucose blood VI test strips (True Metrix Pro Test Strip) strip Test blood glucose 3 times a day    STIOLTO RESPIMAT 2.5-2.5 mcg/actuation inhaler Take 2 Puffs by inhalation daily.  vitamin E acetate (VITAMIN E PO) Take  by mouth daily.  cyanocobalamin (VITAMIN B-12) 500 mcg tablet Take 500 mcg by mouth daily.  cetirizine (ZYRTEC) 10 mg tablet TAKE 1 TABLET BY MOUTH EVERY DAY. GENERIC FOR ZYRTEC    VENTOLIN HFA 90 mcg/actuation inhaler 2 Puffs by Aerosolization route every four (4) hours as needed.  B.infantis-B.ani-B.long-B.bifi (PROBIOTIC 4X) 10-15 mg TbEC Take  by mouth daily.  Cholecalciferol, Vitamin D3, (VITAMIN D3) 1,000 unit cap Take 1,000 Units by mouth daily.  Lancets (ACCU-CHEK SOFTCLIX LANCETS) misc Please use one lancet to test blood sugars twice a day.  BLOOD-GLUCOSE METER (ACCU-CHEK MARCE MONITORING) by Does Not Apply route. No current facility-administered medications for this visit.        Allergies and Sensitivities:  Allergies   Allergen Reactions    Latex, Natural Rubber Hives    Adhesive Rash     Some bandaids    Ciprofloxacin Rash    Codeine Nausea and Vomiting    Demerol [Meperidine] Nausea and Vomiting    Diclofenac Shortness of Breath and Palpitations    Erythromycin Rash    Gabapentin Vertigo    Levaquin [Levofloxacin] Rash    Loratadine Swelling    Morphine Nausea and Vomiting    Penicillin G Hives and Rash     Does fine with Keflex    Sulfa (Sulfonamide Antibiotics) Itching    Vicodin [Hydrocodone-Acetaminophen] Other (comments)     Metallic taste in mouth       Family History:  Family History   Problem Relation Age of Onset    Hypertension Father     Heart Disease Father     Alcohol abuse Father        Social History:  Social History     Tobacco Use    Smoking status: Former Smoker     Years: 45.00     Types: Cigarettes     Quit date: 7/1/2011     Years since quitting: 10.6    Smokeless tobacco: Never Used   Vaping Use    Vaping Use: Never used   Substance Use Topics    Alcohol use: No    Drug use: No     She  reports that she quit smoking about 10 years ago. Her smoking use included cigarettes. She quit after 45.00 years of use. She has never used smokeless tobacco.  She  reports no history of alcohol use. Review of Systems:  Cardiac symptoms as noted above in HPI. All others negative. Denies skin rash,  photophobia, neck pain, hemoptysis, chronic cough, nausea, vomiting, hematuria, burning micturition, BRBPR, chronic headaches. Physical Exam:  BP Readings from Last 3 Encounters:   02/15/22 (!) 146/76   11/04/21 132/85   09/09/21 126/82         Pulse Readings from Last 3 Encounters:   02/15/22 (!) 109   11/29/21 (!) 101   11/04/21 90          Wt Readings from Last 3 Encounters:   02/15/22 100.2 kg (221 lb)   11/29/21 101.2 kg (223 lb)   11/04/21 100.7 kg (222 lb)       Constitutional: Oriented to person, place, and time. HENT: Head: Normocephalic and atraumatic. Neck: No JVD present. Cardiovascular: Regular rhythm. No gallop or rubs appreciated. No significant murmur  Lung: Breath sounds normal. No respiratory distress. No ronchi or rales appreciated  Abdominal: No tenderness. No rebound and no guarding.    Musculoskeletal: No LE swelling    Review of Data  LABS:   Lab Results   Component Value Date/Time    Sodium 141 06/24/2021 01:19 PM    Potassium 4.8 06/24/2021 01:19 PM    Chloride 108 06/24/2021 01:19 PM    CO2 29 06/24/2021 01:19 PM    Glucose 90 06/24/2021 01:19 PM    BUN 17 06/24/2021 01:19 PM    Creatinine 1.06 06/24/2021 01:19 PM     Lipids Latest Ref Rng & Units 5/25/2021 10/10/2019 11/16/2018   Chol, Total 100 - 199 mg/dL 113 118 105   HDL >39 mg/dL 52 55 53   LDL 0 - 99 mg/dL 45 49 38   Trig 0 - 149 mg/dL 83 70 70   Some recent data might be hidden     Lab Results   Component Value Date/Time    ALT (SGPT) 34 06/24/2021 01:19 PM     Lab Results   Component Value Date/Time    Hemoglobin A1c 6.4 (H) 06/24/2021 12:52 PM     EKG  (04/16) Sinus rhythm at 96 bpm    STRESS TEST (2012)   No EKG or scintigraphic evidence of ischemia or infarction. Normal LVEF    ECHO (01/16)  SUMMARY:  Left ventricle: Systolic function was normal. Ejection fraction was estimated in the range of 55 % to 60 %. There were no regional wall motion  abnormalities. Doppler parameters were consistent with abnormal left ventricular relaxation (grade 1 diastolic dysfunction). Right ventricle: Systolic function was normal.  Aortic valve: The valve was trileaflet. Leaflets exhibited calcification and moderate- severely reduced cuspal separation. There was severe aortic  stenosis. There was no significant regurgitation. Valve peak gradient was 85 mmHg. Valve mean gradient was 50 mmHg. HOLTER (05/16)  Interpretation:  1. Rhythm is sinus. 2. PACs (1.3% burden). 3. Rare PVCs. ECHO (03/2021)  Left Ventricle Normal cavity size and systolic function (ejection fraction normal). Mild concentric hypertrophy. Wall motion: normal. The estimated EF is 55 - 60%. Visually measured ejection fraction. There is age-appropriate left ventricular diastolic function. Wall Scoring The left ventricular wall motion is normal.            Left Atrium Normal cavity size. Left Atrium volume index is 27 mL/m2. Right Ventricle Normal cavity size and global systolic function. Right Atrium Normal cavity size. Interatrial Septum No interatrial shunt visualized on color doppler. Aortic Valve Normal valve structure, no stenosis and no regurgitation. Prosthetic aortic valve. Aortic valve peak gradient is 25 mmHg. Aortic valve mean gradient is 13 mmHg. Aortic valve area is 1.7 cm2. There is a bioprosthetic valve present. The prosthetic valve is normal.   Mitral Valve No stenosis. Mitral valve non-specific thickening. Mild mitral annular calcification. Trace regurgitation.    Tricuspid Valve Normal valve structure, no stenosis and no regurgitation. Pulmonic Valve Pulmonic valve not well visualized, but normal doppler findings. No stenosis. CARDIAC CATH: (01/16)  PRESSURE HEMODYNAMICS: Systemic aortic pressure was 131/68 mmHg. Left ventricular pressure was 160/2/12 mmHg. Based on the data, peak-to-peak gradient between LV and aortic was approximately 30 mmHg. Mean RA  7 mmHg,   RV  32/1/8 mmHg,  PA  27/9/16 mmHg,   PCWP 10 mm hg  Cardiac output by Ellis calculation was 4.3 L/min and cardiac index was 2.02 liters L/min/meter squared. Mixed venous saturation was 65%, PA saturation was 65%, and the radial artery saturation was 90%. No evidence of intracardiac shunting. CORONARY ANGIOGRAM  1. LM: No significant obstructive disease. 2. LAD: 10-20% luminal irregularities, otherwise normal. Three diagonal branches angiographically normal.  3. LCX: 20-30% luminal irregularities, otherwise no obstructive disease. 4. OM1: Large bifurcating vessel without any obstructive disease. 5. RCA: Proximal 20%, mid discrete 40%, otherwise angiographically normal. Dominant vessel. IMPRESSION & PLAN:  Ms. Oksana Padilla is a 70 y.o. female with Aortic stenosis, COPD, obesity, diabetes, fibromyalgia. Aortic stenosis:    Ms. Oksana Padilla [facility]. Aspirin will be continued lifelong. ECHO in 06/17 with mean gradient across valve ~ 10 mm Hg. Repeat echo in May 2019 with gradient 13 mmHg. Physiologic for prosthetic valve. Echo in 04/2021 with mean gradient approximately 13 mmHg. Continue ASA  Lifelong antibiotic prophylaxis is required before certain procedure. Diabetes:  Goal hemoglobin A1c less than 7 is recommended from a cardiovascular standpoint. Last hemoglobin A1c was 6.4. Continue same management per PCP    Hypertension:  BP today 146/76 mm Hg.   Blood pressure usually is on the lower side however patient is currently emotional and anxious because of loss of her pet dog. Would advise to check blood pressure at home regularly    HLD: last LDL 49. On atorvastatin. Continue same. This plan was discussed with patient who is in agreement. Thank you for allowing me to participate in patient care. Please feel free to call me if you have any question or concern.

## 2022-02-16 ENCOUNTER — APPOINTMENT (OUTPATIENT)
Dept: PHYSICAL THERAPY | Age: 72
End: 2022-02-16

## 2022-02-18 ENCOUNTER — APPOINTMENT (OUTPATIENT)
Dept: PHYSICAL THERAPY | Age: 72
End: 2022-02-18

## 2022-02-21 ENCOUNTER — APPOINTMENT (OUTPATIENT)
Dept: PHYSICAL THERAPY | Age: 72
End: 2022-02-21

## 2022-02-22 ENCOUNTER — OFFICE VISIT (OUTPATIENT)
Dept: ORTHOPEDIC SURGERY | Age: 72
End: 2022-02-22
Payer: MEDICARE

## 2022-02-22 VITALS — HEART RATE: 61 BPM | BODY MASS INDEX: 33.49 KG/M2 | WEIGHT: 221 LBS | HEIGHT: 68 IN | OXYGEN SATURATION: 99 %

## 2022-02-22 DIAGNOSIS — M65.812 SYNOVITIS OF LEFT SHOULDER: Primary | ICD-10-CM

## 2022-02-22 PROCEDURE — G9717 DOC PT DX DEP/BP F/U NT REQ: HCPCS | Performed by: PHYSICIAN ASSISTANT

## 2022-02-22 PROCEDURE — 3017F COLORECTAL CA SCREEN DOC REV: CPT | Performed by: PHYSICIAN ASSISTANT

## 2022-02-22 PROCEDURE — G9899 SCRN MAM PERF RSLTS DOC: HCPCS | Performed by: PHYSICIAN ASSISTANT

## 2022-02-22 PROCEDURE — G8399 PT W/DXA RESULTS DOCUMENT: HCPCS | Performed by: PHYSICIAN ASSISTANT

## 2022-02-22 PROCEDURE — 1090F PRES/ABSN URINE INCON ASSESS: CPT | Performed by: PHYSICIAN ASSISTANT

## 2022-02-22 PROCEDURE — 99214 OFFICE O/P EST MOD 30 MIN: CPT | Performed by: PHYSICIAN ASSISTANT

## 2022-02-22 PROCEDURE — G8417 CALC BMI ABV UP PARAM F/U: HCPCS | Performed by: PHYSICIAN ASSISTANT

## 2022-02-22 PROCEDURE — G8427 DOCREV CUR MEDS BY ELIG CLIN: HCPCS | Performed by: PHYSICIAN ASSISTANT

## 2022-02-22 PROCEDURE — G8536 NO DOC ELDER MAL SCRN: HCPCS | Performed by: PHYSICIAN ASSISTANT

## 2022-02-22 PROCEDURE — G8756 NO BP MEASURE DOC: HCPCS | Performed by: PHYSICIAN ASSISTANT

## 2022-02-22 PROCEDURE — 1101F PT FALLS ASSESS-DOCD LE1/YR: CPT | Performed by: PHYSICIAN ASSISTANT

## 2022-02-22 NOTE — PROGRESS NOTES
Abby Ellis  1950   Chief Complaint   Patient presents with    Shoulder Pain     left shoulder        HISTORY OF PRESENT ILLNESS  Abby Ellis is a 70 y.o. female who presents today for reevaluation s/p Left reverse total shoulder arthroplasty on 7/2/21. Patient was released from PT due to missing appointments from personal issues. She had pain with PT and HEP. Describes pain as a 7/10. Initially hurt her shoulder in the being of October doing HEP. Has pain with certain movements. Pain and numbness throughout the biceps region into the shoulder. Presents today ambulating with a cane and wearing a sling. Was not able to take gabapentin, mobic and tramadol due to uneasy stomach. Patient denies any fever, chills, chest pain, shortness of breath or calf pain. The remainder of the review of systems is negative. There are no new illness or injuries to report since last seen in the office. There are no changes to medications, allergies, family or social history. PHYSICAL EXAM:   Visit Vitals  Pulse 61   Ht 5' 8\" (1.727 m)   Wt 221 lb (100.2 kg)   SpO2 99%   BMI 33.60 kg/m²     The patient is a well-developed, well-nourished female   in no acute distress. The patient is alert and oriented times three. The patient is alert and oriented times three. Mood and affect are normal.  LYMPHATIC: lymph nodes are not enlarged and are within normal limits  SKIN: normal in color and non tender to palpation. There are no bruises or abrasions noted. NEUROLOGICAL: Motor sensory exam is within normal limits. Reflexes are equal bilaterally.  There is normal sensation to pinprick and light touch  ORTHOPEDIC EXAM of left shoulder:  Inspection: swelling present,  Bruising not present  Incision well healed  Passive glenohumeral abduction 0-80 degrees, limited internal and external rotation  Stability: Stable  Strength: n/a  2+ distal pulses  Tender over acromion       IMAGING: CT of the left shoulder dated 10/19/2021 was reviewed and read by Dr. Kayla Luciano:   IMPRESSION  1. Intact left shoulder total reverse arthroplasty without evidence for fracture  or hardware loosening. 2. No joint effusion, periarticular collection or mass. XR of the left shoulder with 3 views obtained in the office dated 10/11/2021 was reviewed and read by Dr. Kayla Luciano: Surgical equipment in excellent position     IMPRESSION:      ICD-10-CM ICD-9-CM    1. Synovitis of left shoulder  M65.812 726.10         PLAN:   1. I discussed the risks and benefits and potential adverse outcomes of both operative vs non operative treatment of synovitis of the left shoulder with the patient and patient wishes to proceed with left shoulder open partial synovectomy. Risks of operative intervention include but not limited to bleeding, infection, deep vein thrombosis, pulmonary embolism, death, limb length discrepancy, reflexive sympathetic dystrophy, fat embolism syndrome,damage to blood vessels and nerves, malunion, non-union, delayed union, failure of hardware, post traumatic arthritis, stroke, heart attack, and death. Patient understands that infection may arise and may require numerous surgeries. The patient was counseled at length about the risks of viviane Covid-19 during their perioperative period and any recovery window from their procedure. The patient was made aware that viviane Covid-19  may worsen their prognosis for recovering from their procedure and lend to a higher morbidity and/or mortality risk. All material risks, benefits, and reasonable alternatives including postponing the procedure were discussed. The patient does  wish to proceed with the procedure at this time. History and physical exam to be preformed at a later date. Risk factors include: BMI>30, dm  2. No ultrasound exam indicated today  3. No cortisone injection indicated today   4. No Physical/Occupational Therapy indicated today  5.  No diagnostic test indicated today: 6. No durable medical equipment indicated today  7. No referral indicated today   8. No medications indicated today  9.  No Narcotic indicated today    RTC H&P       Scribed by Shiraz Diallo [facility]) as dictated by ZI Maher PA-C [phone]

## 2022-02-23 ENCOUNTER — APPOINTMENT (OUTPATIENT)
Dept: PHYSICAL THERAPY | Age: 72
End: 2022-02-23

## 2022-02-23 ENCOUNTER — HOSPITAL ENCOUNTER (OUTPATIENT)
Dept: LAB | Age: 72
Discharge: HOME | End: 2022-02-23

## 2022-02-23 LAB — XX-LABCORP SPECIMEN COL,LCBCF: NORMAL

## 2022-02-23 PROCEDURE — 99001 SPECIMEN HANDLING PT-LAB: CPT

## 2022-02-24 DIAGNOSIS — Z01.818 PREOP EXAMINATION: Primary | ICD-10-CM

## 2022-02-24 LAB
EST. AVERAGE GLUCOSE BLD GHB EST-MCNC: 140 MG/DL
HBA1C MFR BLD: 6.5 % (ref 4.8–5.6)

## 2022-02-25 ENCOUNTER — HOSPITAL ENCOUNTER (OUTPATIENT)
Dept: LAB | Age: 72
Discharge: HOME | End: 2022-02-25

## 2022-02-25 ENCOUNTER — TRANSCRIBE ORDER (OUTPATIENT)
Dept: LAB | Age: 72
End: 2022-02-25

## 2022-02-25 ENCOUNTER — OFFICE VISIT (OUTPATIENT)
Dept: FAMILY MEDICINE CLINIC | Age: 72
End: 2022-02-25
Payer: MEDICARE

## 2022-02-25 ENCOUNTER — HOSPITAL ENCOUNTER (OUTPATIENT)
Dept: LAB | Age: 72
Discharge: HOME | End: 2022-02-25
Payer: MEDICARE

## 2022-02-25 VITALS
BODY MASS INDEX: 33.95 KG/M2 | DIASTOLIC BLOOD PRESSURE: 84 MMHG | HEIGHT: 68 IN | WEIGHT: 224 LBS | OXYGEN SATURATION: 98 % | TEMPERATURE: 97.4 F | SYSTOLIC BLOOD PRESSURE: 132 MMHG | HEART RATE: 84 BPM | RESPIRATION RATE: 16 BRPM

## 2022-02-25 DIAGNOSIS — J30.1 SEASONAL ALLERGIC RHINITIS DUE TO POLLEN: ICD-10-CM

## 2022-02-25 DIAGNOSIS — Z01.818 PRE-OP TESTING: ICD-10-CM

## 2022-02-25 DIAGNOSIS — M19.012 PRIMARY OSTEOARTHRITIS OF LEFT SHOULDER: ICD-10-CM

## 2022-02-25 DIAGNOSIS — Z01.818 PRE-OP EXAM: Primary | ICD-10-CM

## 2022-02-25 DIAGNOSIS — Z01.818 PREOP EXAMINATION: Primary | ICD-10-CM

## 2022-02-25 DIAGNOSIS — F43.21 GRIEVING: ICD-10-CM

## 2022-02-25 DIAGNOSIS — E11.65 TYPE 2 DIABETES MELLITUS WITH HYPERGLYCEMIA, WITHOUT LONG-TERM CURRENT USE OF INSULIN (HCC): ICD-10-CM

## 2022-02-25 DIAGNOSIS — J44.9 CHRONIC OBSTRUCTIVE PULMONARY DISEASE, UNSPECIFIED COPD TYPE (HCC): ICD-10-CM

## 2022-02-25 DIAGNOSIS — Z86.16 HISTORY OF 2019 NOVEL CORONAVIRUS DISEASE (COVID-19): ICD-10-CM

## 2022-02-25 DIAGNOSIS — R94.31 ABNORMAL EKG: ICD-10-CM

## 2022-02-25 DIAGNOSIS — Z01.818 PRE-OP TESTING: Primary | ICD-10-CM

## 2022-02-25 LAB
ATRIAL RATE: 91 BPM
CALCULATED P AXIS, ECG09: 39 DEGREES
CALCULATED R AXIS, ECG10: 33 DEGREES
CALCULATED T AXIS, ECG11: 54 DEGREES
DIAGNOSIS, 93000: NORMAL
P-R INTERVAL, ECG05: 138 MS
Q-T INTERVAL, ECG07: 328 MS
QRS DURATION, ECG06: 56 MS
QTC CALCULATION (BEZET), ECG08: 403 MS
VENTRICULAR RATE, ECG03: 91 BPM
XX-LABCORP SPECIMEN COL,LCBCF: NORMAL

## 2022-02-25 PROCEDURE — 3017F COLORECTAL CA SCREEN DOC REV: CPT | Performed by: FAMILY MEDICINE

## 2022-02-25 PROCEDURE — G8536 NO DOC ELDER MAL SCRN: HCPCS | Performed by: FAMILY MEDICINE

## 2022-02-25 PROCEDURE — G8399 PT W/DXA RESULTS DOCUMENT: HCPCS | Performed by: FAMILY MEDICINE

## 2022-02-25 PROCEDURE — G9899 SCRN MAM PERF RSLTS DOC: HCPCS | Performed by: FAMILY MEDICINE

## 2022-02-25 PROCEDURE — 99214 OFFICE O/P EST MOD 30 MIN: CPT | Performed by: FAMILY MEDICINE

## 2022-02-25 PROCEDURE — 3044F HG A1C LEVEL LT 7.0%: CPT | Performed by: FAMILY MEDICINE

## 2022-02-25 PROCEDURE — 1101F PT FALLS ASSESS-DOCD LE1/YR: CPT | Performed by: FAMILY MEDICINE

## 2022-02-25 PROCEDURE — G8754 DIAS BP LESS 90: HCPCS | Performed by: FAMILY MEDICINE

## 2022-02-25 PROCEDURE — 99001 SPECIMEN HANDLING PT-LAB: CPT

## 2022-02-25 PROCEDURE — G8752 SYS BP LESS 140: HCPCS | Performed by: FAMILY MEDICINE

## 2022-02-25 PROCEDURE — G9717 DOC PT DX DEP/BP F/U NT REQ: HCPCS | Performed by: FAMILY MEDICINE

## 2022-02-25 PROCEDURE — 93005 ELECTROCARDIOGRAM TRACING: CPT

## 2022-02-25 PROCEDURE — G8427 DOCREV CUR MEDS BY ELIG CLIN: HCPCS | Performed by: FAMILY MEDICINE

## 2022-02-25 PROCEDURE — 1090F PRES/ABSN URINE INCON ASSESS: CPT | Performed by: FAMILY MEDICINE

## 2022-02-25 PROCEDURE — 2022F DILAT RTA XM EVC RTNOPTHY: CPT | Performed by: FAMILY MEDICINE

## 2022-02-25 PROCEDURE — G8417 CALC BMI ABV UP PARAM F/U: HCPCS | Performed by: FAMILY MEDICINE

## 2022-02-25 RX ORDER — TRAMADOL HYDROCHLORIDE 50 MG/1
50 TABLET ORAL
COMMUNITY
End: 2022-03-03 | Stop reason: SDUPTHER

## 2022-02-25 NOTE — PROGRESS NOTES
Rustam Miller is a 70 y.o.  female and presents with    Chief Complaint   Patient presents with    Pre-op Exam     left shoulder           Subjective:  Pre op Physical   Patient here for a pre op physical exam.The patient reports problems - left shoulder arthritis with arthroscopic surgery scheduled. Do you take any herbs or supplements that were not prescribed by a doctor? no Are you taking calcium supplements? no Are you taking aspirin daily? yes    She is doing better with sleep and depression associated with grieving for her dog. Anxiety Review:  Patient is seen for sleep disturbance. Current treatment includes ambien and cymbalta. Ongoing symptoms include: insomnia but improved with ambien. Patient denies: palpitations, sweating, chest pain, shortness of breath, dizziness, paresthesias, racing thoughts, psychomotor agitation, feelings of losing control, difficulty concentrating, suicidal ideation. Reported side effects from the treatment: none.     Diabetes  She has been taking medication as prescribed.  She is following up after laboratory testing.  She denies increased thirst or urination.       Osteoarthritis and Chronic Pain:  Patient has osteoarthritis, primarily affecting the neck and back.  She is going to have left arm done for carpal tunnel.    Symptoms onset: problem is longstanding. Rheumatological ROS: ongoing significant pain in neck and back which is stable and controlled by PRN meds. Response to treatment plan: symptoms have progressed to a point and plateaued. .   Asthma Review:  The patient is being seen for follow up of asthma, not currently in exacerbation. she completed antibiotics.  Asthma symptoms occur: less than 2x/week. Karli Maria is using stiolto prescribed by dr. Austen Montez. Wheezing when present is described as moderate and easily relieved with rescue bronchodilator.   Current limitations in activity from asthma: exercising.  Number of days of school or work missed in the last month: N/A. Frequency of use of quick-relief meds: < once per week. Regimen compliance: The patient reports adherence to this regimen. Patient does not smoke cigarettes.  Quit 10 years ago     ROS   General ROS: negative for - chills, fatigue or fever; + weight gain  Psychological ROS: positive for - depression associated with COVID 19 and being home bound  Ophthalmic ROS: positive for - uses glasses  ENT ROS: positive for - headaches intermittently  Endocrine ROS: negative for - temperature intolerance or unexpected weight changes  Respiratory ROS: no cough, improved shortness of breath after stopping breztri; no wheezing;   Cardiovascular ROS: no chest pain or dyspnea on exertion; intermittent tachycardia; she has h/o aortic valve replacement and is followed by Dr. Yessica Cavanaugh. Gastrointestinal ROS: see HPI  Genito-Urinary ROS: no dysuria, trouble voiding, or hematuria  Neurological ROS: no TIA or stroke symptoms  Dermatological ROS: no lesions or rashes    All other systems reviewed and are negative. Objective:  Vitals:    02/25/22 0948   BP: 132/84   Pulse: 84   Resp: 16   Temp: 97.4 °F (36.3 °C)   TempSrc: Temporal   SpO2: 98%   Weight: 224 lb (101.6 kg)   Height: 5' 8\" (1.727 m)   PainSc:  10 - Worst pain ever   PainLoc: Generalized       alert, well appearing, and in no distress, oriented to person, place, and time and obese  Mental status - normal mood, behavior, speech, dress, motor activity, and thought processes  Chest - clear to auscultation, no wheezes, rales or rhonchi, symmetric air entry  Heart - normal rate, regular rhythm, normal S1, S2, no murmurs, rubs, clicks or gallops  Neurological - cranial nerves II through XII intact    LABS   hgb a1c6.5  TESTS  EKG  Sinus rhythm with premature atrial complexes with aberrant conduction   Anterior infarct (cited on or before 25-FEB-2022)     Assessment/Plan:    1. Pre-op exam  Reviewed labs and ekg; pt has been asymptomatic.   EKG reviewed with  Owen Cruz, pt's cardiologist; he clears patient for surgery; pt is cleared    2. History of 2019 novel coronavirus disease (COVID-19)  No residual symptoms    3. Primary osteoarthritis of left shoulder  Surgery pending    4. Type 2 diabetes mellitus with hyperglycemia, without long-term current use of insulin ([facility])  Goal hgb a1c <7; well controlled    5. Grieving  Mood improved    6. Seasonal allergic rhinitis due to pollen  Continue treatment    7. Chronic obstructive pulmonary disease, unspecified COPD type ([facility] [address]  Continue inhaled therapy      Lab review: labs reviewed, I note that hemogram normal, basic metabolic panel normal      I have discussed the diagnosis with the patient and the intended plan as seen in the above orders. The patient has received an after-visit summary and questions were answered concerning future plans. I have discussed medication side effects and warnings with the patient as well. I have reviewed the plan of care with the patient, accepted their input and they are in agreement with the treatment goals.

## 2022-02-25 NOTE — PROGRESS NOTES
Abby Ellis presents today for   Chief Complaint   Patient presents with    Pre-op Exam     left shoulder       Is someone accompanying this pt? no    Is the patient using any DME equipment during OV? no    Depression Screening:  3 most recent PHQ Screens 2/25/2022   Little interest or pleasure in doing things Not at all   Feeling down, depressed, irritable, or hopeless Not at all   Total Score PHQ 2 0   Trouble falling or staying asleep, or sleeping too much -   Feeling tired or having little energy -   Poor appetite, weight loss, or overeating -   Feeling bad about yourself - or that you are a failure or have let yourself or your family down -   Trouble concentrating on things such as school, work, reading, or watching TV -   Moving or speaking so slowly that other people could have noticed; or the opposite being so fidgety that others notice -   Thoughts of being better off dead, or hurting yourself in some way -   PHQ 9 Score -   How difficult have these problems made it for you to do your work, take care of your home and get along with others -       Learning Assessment:  Learning Assessment 4/22/2016   PRIMARY LEARNER Patient   HIGHEST LEVEL OF EDUCATION - PRIMARY LEARNER  -   PRIMARY LANGUAGE ENGLISH   LEARNER PREFERENCE PRIMARY LISTENING   ANSWERED BY patient   RELATIONSHIP SELF       Abuse Screening:  Abuse Screening Questionnaire 12/12/2019   Do you ever feel afraid of your partner? N   Are you in a relationship with someone who physically or mentally threatens you? N   Is it safe for you to go home? Y       Fall Risk  Fall Risk Assessment, last 12 mths 2/25/2022   Able to walk? Yes   Fall in past 12 months? 0   Do you feel unsteady? 0   Are you worried about falling 0   Is the gait abnormal? -   Number of falls in past 12 months -   Fall with injury? -       Health Maintenance reviewed and discussed and ordered per Provider.     Health Maintenance Due   Topic Date Due    Eye Exam Retinal or Dilated 04/27/2020    COVID-19 Vaccine (3 - Booster for Pfizer series) 09/29/2021   . Coordination of Care:  1. Have you been to the ER, urgent care clinic since your last visit? Hospitalized since your last visit? no    2. Have you seen or consulted any other health care providers outside of the [address] [place] [address] since your last visit? Include any pap smears or colon screening.  no      Last  Checked na  Last UDS Checked na  Last Pain contract signed: na    Pre-op exam

## 2022-02-28 ENCOUNTER — HOSPITAL ENCOUNTER (OUTPATIENT)
Dept: LAB | Age: 72
Discharge: HOME | End: 2022-02-28

## 2022-02-28 ENCOUNTER — TELEPHONE (OUTPATIENT)
Dept: CARDIOLOGY CLINIC | Age: 72
End: 2022-02-28

## 2022-02-28 LAB — XX-LABCORP SPECIMEN COL,LCBCF: NORMAL

## 2022-02-28 PROCEDURE — 99001 SPECIMEN HANDLING PT-LAB: CPT

## 2022-02-28 RX ORDER — DULOXETIN HYDROCHLORIDE 30 MG/1
CAPSULE, DELAYED RELEASE ORAL
Qty: 90 CAPSULE | Refills: 0 | Status: SHIPPED | OUTPATIENT
Start: 2022-02-28 | End: 2022-03-25

## 2022-02-28 NOTE — TELEPHONE ENCOUNTER
Patient was seen on 02/15 and is scheduled to have shoulder surgery this Friday.  Dr. Peetr Forrester office did an ekg on Friday and it came back abnormal and would like for Sg Hilton to look at     Anshul Encarnacion surgical coordinator  Phone: [phone]

## 2022-03-01 ENCOUNTER — OFFICE VISIT (OUTPATIENT)
Dept: ORTHOPEDIC SURGERY | Age: 72
End: 2022-03-01

## 2022-03-01 VITALS
SYSTOLIC BLOOD PRESSURE: 128 MMHG | HEART RATE: 103 BPM | DIASTOLIC BLOOD PRESSURE: 62 MMHG | BODY MASS INDEX: 33.95 KG/M2 | HEIGHT: 68 IN | OXYGEN SATURATION: 98 % | WEIGHT: 224 LBS

## 2022-03-01 DIAGNOSIS — M65.812 SYNOVITIS OF LEFT SHOULDER: Primary | ICD-10-CM

## 2022-03-01 RX ORDER — TIOTROPIUM BROMIDE AND OLODATEROL 3.124; 2.736 UG/1; UG/1
2 SPRAY, METERED RESPIRATORY (INHALATION) DAILY
COMMUNITY

## 2022-03-01 RX ORDER — OXYCODONE AND ACETAMINOPHEN 5; 325 MG/1; MG/1
1 TABLET ORAL
Qty: 40 TABLET | Refills: 0 | Status: SHIPPED | OUTPATIENT
Start: 2022-03-01 | End: 2022-03-08

## 2022-03-01 NOTE — PERIOP NOTES
PRE-SURGICAL INSTRUCTIONS        Patient's Name:  Quintin Pino      [id number] Date:  3/1/2022            Covid Testing Date and Time:    Surgery Date:  3/4/2022                1. Do NOT eat or drink anything, including candy, gum, or ice chips after midnight on 3/3/2022, unless you have specific instructions from your surgeon or anesthesia provider to do so.  2. You may brush your teeth before coming to the hospital.  3. No smoking 24 hours prior to the day of surgery. 4. No alcohol 24 hours prior to the day of surgery. 5. No recreational drugs for one week prior to the day of surgery. 6. Leave all valuables, including money/purse, at home. 7. Remove all jewelry, nail polish, acrylic nails, and makeup (including mascara); no lotions powders, deodorant, or perfume/cologne/after shave on the skin. 8. Follow instruction for Hibiclens washes and CHG wipes from surgeon's office. 9. Glasses/contact lenses and dentures may be worn to the hospital.  They will be removed prior to surgery. 10. Call your doctor if symptoms of a cold or illness develop within 24-48 hours prior to your surgery. 11.  If you are having an outpatient procedure, please make arrangements for a responsible ADULT TO [address] [place] [address] and stay with you for 24 hours after your surgery. 12. ONE VISITOR in the hospital at this time for outpatient procedures. Exceptions may be made for surgical admissions, per nursing unit guidelines      Special Instructions:      Bring list of CURRENT medications. Bring inhaler. Bring any pertinent legal medical records. Take these medications the morning of surgery with a sip of water: medications as directed by physician. Follow physician instructions about oral hyperglycemic medications  Follow physician instructions about stopping anticoagulants. On the day of surgery, come in the main entrance of [facility]. Let the  at the desk know you are there for surgery. A staff member will come escort you to the surgical area on the second floor. If you have any questions or concerns, please do not hesitate to call:     (Prior to the day of surgery) PAT department:  [phone]   (Day of surgery) Pre-Op department:  [phone]    These surgical instructions were reviewed with patient during the PAT phone call.

## 2022-03-01 NOTE — TELEPHONE ENCOUNTER
Verbal order and read back per Bonnie Villalobos MD  Issue resolved. I have consulted with Dr Sukumar Samano.

## 2022-03-01 NOTE — H&P
HISTORY AND PHYSICAL          Patient: Austin Badillo                MRN: [id number]       SSN: [id number]  YOB: 1950          AGE: 70 y.o. SEX: female      Patient scheduled for: Left shoulder open partial synovectomy with cultures  Surgeon: Mari Bautista MD    ANESTHESIA TYPE:  General    HISTORY:     The patient was seen in the office today for a preoperative history and physical for an upcoming above listed surgery. The patient is a pleasant 70 y.o. female who has a history of left shoulder discomfort. s/p Left reverse total shoulder arthroplasty on 7/2/21. Patient was released from PT due to missing appointments from personal issues. She had pain with PT and HEP. Describes pain as a 7/10. Initially hurt her shoulder in the being of October doing HEP. Has pain with certain movements. Pain and numbness throughout the biceps region into the shoulder. Presents today ambulating with a cane and wearing a sling. Was not able to take gabapentin, mobic and tramadol due to uneasy stomach. Due to the current findings, affected activity of daily living and continued pain and discomfort, surgical intervention is indicated. The alternatives, risks, and complications, including but not limited to infection, blood loss, need for blood transfusion, neurovascular damage, erika-incisional numbness, subcutaneous hematoma, bone fracture, anesthetic complications, DVT, PE, death, RSD, postoperative stiffness and pain, possible surgical scar, delayed healing and nonhealing, reflexive sympathetic dystrophy, damage to blood vessels and nerves, need for more surgery, MI, and stroke,  failure of hardware, gait disturbances,have been discussed. The patient understands and wishes to proceed with surgery.      PAST MEDICAL HISTORY:     Past Medical History:   Diagnosis Date    Ankle fracture     Aortic stenosis     S/P AVR in 2016    Asthma     Carpal tunnel syndrome on both sides     Chronic obstructive pulmonary disease (HCC)     Chronic pain     back pain    COVID-19 vaccine series completed 04/29/2021    Diabetes [facility]     Diverticulitis     Fibromyalgia     H/O aortic valve replacement 03/2016    21 mm bovine pericardial bioprosthesis and epiaortic scanning      H/O: HTN (hypertension)     no meds now- sp valve replacement    History of COVID-19 01/2022    Home test. Results not saved    Hypothyroidism     Lumbar post-laminectomy syndrome     Menopause     Obesity     Osteoarthritis     Psychotic disorder ([facility] [address] [facility]     Pulmonary HTN ([facility] [address] [facility]     per pcp note cc- patient denies.  Sciatica     Skin cancer 2013    right forearm. CURRENT MEDICATIONS:     Current Outpatient Medications   Medication Sig Dispense Refill    tiotropium-olodateroL (Stiolto Respimat) 2.5-2.5 mcg/actuation inhaler Take 2 Puffs by inhalation daily.  DULoxetine (CYMBALTA) 30 mg capsule TAKE 1 CAPSULE BY MOUTH THREE TIMES DAILY 90 Capsule 0    traMADoL (ULTRAM) 50 mg tablet Take 50 mg by mouth every six (6) hours as needed for Pain.  fluticasone propionate (FLONASE) 50 mcg/actuation nasal spray Use 1 spray(s) in each nostril twice daily 16 g 3    atorvastatin (LIPITOR) 20 mg tablet Take 1 tablet by mouth once daily 90 Tablet 3    acetaminophen (Tylenol Extra Strength) 500 mg tablet Take 500 mg by mouth every six (6) hours as needed for Pain.  FLUoxetine (PROzac) 10 mg capsule Take 1 capsule by mouth once daily 90 Capsule 3    meloxicam (MOBIC) 15 mg tablet Take 1 tablet by mouth once daily 90 Tablet 3    metFORMIN (GLUCOPHAGE) 500 mg tablet TAKE 1 TABLET BY MOUTH ONCE DAILY WITH SUPPER 90 Tablet 0    meclizine (ANTIVERT) 25 mg tablet Take 1 Tablet by mouth three (3) times daily as needed for Dizziness. 60 Tablet 3    tiaGABine (GabitriL) 2 mg tablet Take 1 Tab by mouth nightly.  90 Tab 0    levothyroxine (SYNTHROID) 50 mcg tablet TAKE 1 TABLET BY MOUTH ONCE DAILY BEFORE BREAKFAST 90 Tab 3  pantoprazole (PROTONIX) 20 mg tablet Take 1 tablet by mouth once daily 90 Tab 3    glucose blood VI test strips (True Metrix Pro Test Strip) strip Test blood glucose 3 times a day 200 Strip 3    vitamin E acetate (VITAMIN E PO) Take  by mouth daily.  cyanocobalamin (VITAMIN B-12) 500 mcg tablet Take 500 mcg by mouth daily.  cetirizine (ZYRTEC) 10 mg tablet TAKE 1 TABLET BY MOUTH EVERY DAY. GENERIC FOR ZYRTEC 90 Tab 2    VENTOLIN HFA 90 mcg/actuation inhaler 2 Puffs by Aerosolization route every four (4) hours as needed. 0    B.infantis-B.ani-B.long-B.bifi (PROBIOTIC 4X) 10-15 mg TbEC Take  by mouth daily.  Cholecalciferol, Vitamin D3, (VITAMIN D3) 1,000 unit cap Take 1,000 Units by mouth daily.  Lancets (ACCU-CHEK SOFTCLIX LANCETS) misc Please use one lancet to test blood sugars twice a day. 1 Package 20    BLOOD-GLUCOSE METER (ACCU-CHEK MARCE MONITORING) by Does Not Apply route.  tiaGABine (GABITRIL) 2 mg tablet Take 1 tablet by mouth nightly (Patient not taking: Reported on 3/1/2022) 90 Tablet 0    pramoxine-hc-chloroxylenol 1-1-0.1 % lotion Apply  to affected area two (2) times a day. (Patient not taking: Reported on 3/1/2022) 60 mL 1    desonide (TRIDESILON) 0.05 % cream APPLY   EXTERNALLY TO AFFECTED AREA TWICE DAILY (Patient not taking: Reported on 3/1/2022) 15 g 1    aspirin delayed-release 81 mg tablet Take 81 mg by mouth daily.          ALLERGIES:     Allergies   Allergen Reactions    Latex, Natural Rubber Hives    Adhesive Rash     Some bandaids    Ciprofloxacin Rash    Codeine Nausea and Vomiting    Demerol [Meperidine] Nausea and Vomiting    Diclofenac Shortness of Breath and Palpitations    Erythromycin Rash    Gabapentin Vertigo    Levaquin [Levofloxacin] Rash    Loratadine Swelling    Morphine Nausea and Vomiting    Penicillin G Hives and Rash     Does fine with Keflex    Sulfa (Sulfonamide Antibiotics) Itching    Vicodin [Hydrocodone-Acetaminophen] Other (comments)     Metallic taste in mouth         SURGICAL HISTORY:     Past Surgical History:   Procedure Laterality Date    COLONOSCOPY      COLONOSCOPY N/A 9/15/2017    COLONOSCOPY performed by Betty Shay MD at [address] HX ARTHROPLASTY Left 07/2021    HX CARPAL TUNNEL RELEASE Left 03/2021    HX CATARACT REMOVAL Right 1995    lens  replaced.  HX CATARACT REMOVAL Left 02/2018    HX COLONOSCOPY  09/15/2017    dr. Soumya Small  f/u 5 years.  HX HEENT  1990's    sinus surgery     HX HEENT Right 11/02/2017    laser for right eye cataracts.  HX LUMBAR FUSION  2004    L3-L4-L5    HX LUMBAR LAMINECTOMY  2002    HX MOHS PROCEDURES Left 1990    HX ORTHOPAEDIC       x3    HX SHOULDER REPLACEMENT Right 03/29/2017    reverse.  HX SHOULDER REPLACEMENT Left 07/02/2021    ME CARDIAC SURG PROCEDURE UNLIST  03/2016    aortic valve replacement.  ME CHEST SURGERY PROCEDURE UNLISTED  03/2016    Open Heart Surgery    ME COLSC FLX W/RMVL OF TUMOR POLYP LESION SNARE TQ  07/09/2014 repeat in 3 years    Dr. Wing Becerra  2016    Aortic valve replacement(bovine)       SOCIAL HISTORY:     Social History     Socioeconomic History    Marital status:    Tobacco Use    Smoking status: Former Smoker     Years: 45.00     Types: Cigarettes     Quit date: 7/1/2011     Years since quitting: 10.6    Smokeless tobacco: Never Used   Vaping Use    Vaping Use: Never used   Substance and Sexual Activity    Alcohol use: No    Drug use: Not Currently     Types: Marijuana, Cocaine    Sexual activity: Never       FAMILY HISTORY:     Family History   Problem Relation Age of Onset    Hypertension Father     Heart Disease Father     Alcohol abuse Father        REVIEW OF SYSTEMS:     Negative for fevers, chills, chest pain, shortness of breath, weight loss, recent illness     General: Negative for fever and chills. No unexpected change in weight. Denies fatigue.  No change in appetite. Skin: Negative for rash or itching. HEENT: Negative for congestion, sore throat, neck pain and neck stiffness. No change in vision or hearing. Hasn't noted any enlarged lymph nodes in the neck. Cardiovascular:  Negative for chest pain and palpitations. Has not noted pedal edema. Respiratory: Negative for cough, colds, sinus, hemoptysis, shortness of breath and wheezing. Gastrointestinal: Negative for nausea and vomiting, rectal bleeding, coffee ground emesis, abdominal pain, diarrhea and constipation. Genitourinary: Negative for dysuria, frequency urgency, or burning on micturition. No flank pain, no foul smelling urine, no difficulty with initiating urination. Hematological: Negative for bleeding or easy bruising. Musculoskeletal: Negative  for arthralgias, back pain or neck pain. Neurological: Negative for dizziness, seizures or syncopal episodes. Denies headaches. Endocrine: Denies excessive thirst.  No heat/cold intolerance. Psychiatric: Negative for depression or insomnia. PHYSICAL EXAMINATION:     VITALS:   Visit Vitals  Ht 5' 8\" (1.727 m)   Wt 224 lb (101.6 kg)   BMI 34.06 kg/m²     GEN:  Well developed, well nourished 70 y.o. female in no acute distress. HEENT: Normocephalic and atraumatic. Eyes: Conjunctivae and EOM are normal.Pupils are equal, round, and reactive to light. External ear normal appearance, external nose normal appearing. Mouth/Throat: Oropharynx is clear and moist, able to handle oral secretions w/out difficulty, airway patent  NECK: Supple. Normal ROM, No lymphadenopathy. Trachea is midline. No bruising, swelling or deformity  RESP: Clear to auscultation bilaterally. No wheezes, rales, rhonchi. Normal effort and breath sounds. No respiratory distress  CARDIO:  Normal rate, regular rhythm and normal heart sounds. No MGR. ABDOMEN: Soft, non-tender, non-distended, normoactive bowel sounds in all four quadrants. There is no tenderness.  There is no rebound and no guarding. BACK: No CVA or spinal tenderness  BREAST:  Deferred  PELVIC:    Deferred   RECTAL:  Deferred   :           Deferred  EXTREMITIES: EXAMINATION OF: left shoulder  ORTHOPEDIC EXAM of left shoulder:  Inspection: swelling present,  Bruising not present  Incision well healed  Passive glenohumeral abduction 0-80 degrees, limited internal and external rotation  Stability: Stable  Strength: n/a  2+ distal pulses  Tender over acromion   NEUROVASCULAR: Sensation intact to light touch and strength grossly intact and symmetrical. No nystagmus. Positive distal pulses and capillary refill. DVT ASSESSMENT:  There is not  calf tenderness. No evidence of DVT seen on physical exam.  MOTOR: In tact  PSYCH: Alert an oriented to person, place and time. Mood, memory, affect, behavior and judgment normal       RADIOGRAPHS & DIAGNOSTIC STUDIES:     MRI/xray reveals :  CT of the left shoulder dated 10/19/2021 was reviewed and read by Dr. Joslyn Mike:   IMPRESSION  1. Intact left shoulder total reverse arthroplasty without evidence for fracture  or hardware loosening. 2. No joint effusion, periarticular collection or mass.         XR of the left shoulder with 3 views obtained in the office dated 10/11/2021 was reviewed and read by Dr. Joslyn Mike: Surgical equipment in excellent position       LABS:     Labs and ekg scanned in chart    ASSESSMENT:     Synovitis left shoulder  @DX@    PLAN:     Again, the alternatives, risks, and complications, as well as expected outcome were discussed. The patient understands and agrees to proceed with left shoulder open partial synovectomy with cultures. The patient was counseled at length about the risks of viviane Covid-19 during their perioperative period and any recovery window from their procedure. The patient was made aware that viviane Covid-19  may worsen their prognosis for recovering from their procedure and lend to a higher morbidity and/or mortality risk.   All material risks, benefits, and reasonable alternatives including postponing the procedure were discussed. The patient does  wish to proceed with the procedure at this time.    Patient given orders listed below:    Orders Placed This Encounter    tiotropium-olodateroL (Stiolto Respimat) 2.5-2.5 mcg/actuation inhaler         Sneha Waterman PA-C  3/1/2022  9:31 AM

## 2022-03-02 NOTE — PROGRESS NOTES
[facility]  [address], [address]  Phone: [phone]  Fax: [phone]        PROGRESS NOTE      HISTORY OF PRESENT ILLNESS:  The patient is a 70 y.o. female and was seen today for follow up of low back pain radiating into BLE in a L4 distribution on the right and in a L5 distribution on the left to the greater toe and chronic neck pain which became progressive x 12/2019 radiating into BUE (L>R) to the hands. She additionally c/o BLE cramps x several months. Previously, she was seen for low back pain radiating into BLE in a L4 distribution on the right and in a S1 distribution on the left to the greater toe and chronic neck pain which became progressive x 12/2019 radiating into BUE (L>R) to the hands. Pt was last evaluated by me 7/9/2020 with c/o low back pain radiating into BLE in a L4 distribution on the right and in a S1 distribution on the left to the greater toe and chronic neck pain which became progressive x 12/2019 radiating into BUE (L>R) to the hands. She was seen prior for low back and left hip pain extending into the LLE in a S1 distribution to an area below the knee. Initially she was seen for c/o low back pain into the BLE extending in roughly an L4 distribution in the LLE and in an L5 distribution to the foot in the RLE. Her pain is exacerbated with standing and walking intolerance. Patient reports dropping objects x couple months. Pt completed MDP with good relief. Pt was intolerant to the increased dose of GABITRIL 2 mg BID secondary to dizziness. Pt reports intolerance to NEURONTIN 100 mg TID secondary to nausea and dizziness. She had a diagnosis of lumbar postlaminectomy syndrome with a previous history of L3 through S1 fusion in 2002 and 2004 by a physician in [place]. Pt denies h/o stomach ulcers, bleeding disorders, or current use of anticoagulants.  PmHx of fibromyalgia, DM, aortic valve replacement, bilateral shoulder replacement, left rotator cuff disease. Patient reports previously receiving a shoulder injection with temporary relief. Pt notes intolerance to FOSOMAX, LYRICA, and PREDNISONE combination. Subsequently, d/c Fosomax due to allergy and started Cymbalta 30 mg TID in place of Lyrica. Note dated 6/29/16 indicating patient was seen and underwent porcine aortic valve replacement by Dr. Keny Nunez on 3/18/16. Pt began cardiac rehab in 1/2017. Pt underwent right shoulder surgery on 3/29/17 by Dr. Jeremy Valentino shoulder replacement on 7/2/2021 with Dr. Doc Mcallister. Note from Dr. Rafita Ahn, cardiologist dated 5/18/17 indicating patient was seen with h/o aortic stenosis, s/p aortic valve replacement 3/16 and was seen with moderate dysphemia with minimal exertion. CT revealed no evidence of pulmonary embolism, no obvious fluid overload. They were ordering further workup to look for functioning bioprosthetic aortic valve. Per patient, a cause for her SOB has still not been found. She has been scheduled to see a pulmonologist. She states she has been restricted to take her Meloxicam since her right shoulder surgery, which has caused her increase in low back pain. Upon review of our records, it was noted she previously failed TOPAMAX, NEURONTIN, and LYRICA. Pt was switched from Ultram ER back to Ultram immediate release. Pt underwent left SI joint injection on 3/15/18 with good relief. Note from Dr. Yue Lynn dated 11/12/19 indicated they stopped Lyrica and started her on Cymbalta 90 mg per day. Note from Dr. Rafita Ahn dated 12/19/19 indicating patient was seen with c/o history of aortic valve replacement, fibromyalgia, and DM. The patient has a history of DM and reports blood sugars are well controlled, consistently remaining below 200, average of 119. Note from Dr. Luis Gudino dated 8/7/2020: It would appear that her left shoulder may need replacement in her right shoulder may need revision.  At this time with regards to her cervical spine I have nothing to offer her I do not think her pain is emanating from her spine. I would imagine it would be best for Dr. Vasile Hendrickson to take the lead in the situation and decide what further treatments for her shoulders are required. Given acute Rx of Flexeril 10 mg TID. Patient reports a benefit in sleeping while taking Flexeril and inquired about making it a new medication. Patient understands that it is not a long-term medication. Note from Dr. Davida Bruno 10/26/2020 indicating patient was seen with c/o bilateral thumb base pain. Numbness and tingling in her hands. Evidence of CTS and cubital tunnel syndrome on the right side. Indicated she has OA of the first CMC joints bilaterally. Performed injections. Pt reports temporary relief with the injections. She is scheduled to f/u with Dr. David Allen next week to discuss surgery. Note from Alice Valencia, NP dated 12/1/2020 indicating patient has h/o DM, fibromyalgia, and thyroid problems. Note from Dr. Davida Bruno 1/13/2021 indicating patient was seen for bilateral thumb CMC arthritis, bilateral CTS and right cubital tunnel syndrome. Indicated injections last time did help but she wanted to discuss surgery. He set her up for surgery for CTS and right CMC arthritis. Note from Dr. Davida Bruno 4/28/2021 indicating patient was seen with c/o 7 week's s/p left CTS release. Indicated she still had some soreness about the incision. Dx with suture granuloma. Note from Dr. Benna Essex 5/3/2021 indicating patient was seen for reevaluation left shoulder. Pain was 7/10. Previously discussed surgery. Pt has h/o torn left rotator cuff years ago. Pt had surgery to repair it. H/o right shoulder replacement. UDS dated 2/25/2021 was consistent. Preliminary reading [address] films dated 3/2/2020 revealed: severe disc space narrowing at C4-5, moderate at C5-6, and the space between C6-7 was not well visualized.  Small anterior osteophytes noted at C4, C5 and C6. C7 not visualized due to shoulder pathology. No acute pathology identified.  Lumbar spine CT w/o contrast dated 7/20/14  per report revealed L3 through L5 laminectomies with L3 through S1 dorsal hardware fusion. There was no evidence for fractures. There was advanced degenerative disc disease at L2-L3 at the junctional level with marked central canal stenosis. C Spine MRI dated 3/15/2020 films independently reviewed. Per report, at C3-4: anterolisthesis. 3.5 mm central disc extrusion extending 4 mm cephalad. Mild central stenosis and subtle ventral cord flattening. Advanced left facet arthropathy. At C4-5: mild posterior spondylotic ridge. Mild central stenosis and subtle ventral cord flattening. At C5-6: central to right posterolateral 2.5 mm broad-based disc protrusion with subtle ventral cord flattening and mild central stenosis. At C6-7: mild posterior spondylotic ridge. Mild to moderate central stenosis. At C7-T1: anterolisthesis. Mild posterior annular bulge. Mild central stenosis. Upper thoracic spondylosis including a 5 mm posterior disc extension at T3-4. At her last clinical appointment,  her pain complaints are hold steady. Patient wished to continue her current treatment. I provided her with refills of Gabitril 2 mg qhs and 1 refill of Tramadol. UDS and pain agreement signed. Additional refills of Tramadol 50 mg will be provided if consistent. She should continue with her left shoulder physical therapy.          The patient returns today and reports pain location and distribution remains unchanged. She rates her pain 6/10, previously 5/10. She continues taking Tramadol 50 mg and Gabitril 2 mg qhs. Pt is scheduled for left shoulder manipulation done by Dr. Scottie Echols tomorrow. The patient has a history of DM and reports blood sugars are well controlled, consistently remaining below 200. Pt denies change in bowel or bladder habits.         reviewed and indicated recent prescription for Oxycodone through [facility] [address] [place], [place] . Body mass index is 34.06 kg/m². PCP: Soraya Cabral MD      Past Medical History:   Diagnosis Date    Ankle fracture     Aortic stenosis     S/P AVR in 2016    Asthma     Carpal tunnel syndrome on both sides     Chronic obstructive pulmonary disease (HCC)     Chronic pain     back pain    COVID-19 vaccine series completed 04/29/2021    Diabetes ([address]     Diverticulitis     Fibromyalgia     H/O aortic valve replacement 03/2016    21 mm bovine pericardial bioprosthesis and epiaortic scanning      H/O: HTN (hypertension)     no meds now- sp valve replacement    History of COVID-19 01/2022    Home test. Results not saved    Hypertension     no medication    Hypothyroidism     Lumbar post-laminectomy syndrome     Menopause     Obesity     Osteoarthritis     Psychotic disorder ([address]     Pulmonary HTN [facility]     per pcp note cc- patient denies.  Sciatica     Skin cancer 2013    right forearm.          Social History     Socioeconomic History    Marital status:      Spouse name: Not on file    Number of children: Not on file    Years of education: Not on file    Highest education level: Not on file   Occupational History    Not on file   Tobacco Use    Smoking status: Former Smoker     Years: 45.00     Types: Cigarettes     Quit date: 7/1/2011     Years since quitting: 10.6    Smokeless tobacco: Never Used   Vaping Use    Vaping Use: Never used   Substance and Sexual Activity    Alcohol use: No    Drug use: Not Currently     Types: Marijuana, Cocaine    Sexual activity: Never   Other Topics Concern    Not on file   Social History Narrative    Not on file     Social Determinants of Health     Financial Resource Strain:     Difficulty of Paying Living Expenses: Not on file   Food Insecurity:     Worried About [address] in the Last Year: Not on file    [address] in the Last Year: Not on file   Transportation Needs:     Lack of Transportation (Medical): Not on file    Lack of Transportation (Non-Medical): Not on file   Physical Activity:     Days of Exercise per Week: Not on file    Minutes of Exercise per Session: Not on file   Stress:     Feeling of Stress : Not on file   Social Connections:     Frequency of Communication with Friends and Family: Not on file    Frequency of Social Gatherings with Friends and Family: Not on file    Attends Yazidi Services: Not on file    Active Member of [address] [place] [address] [employer] or Organizations: Not on file    Attends Club or Organization Meetings: Not on file    Marital Status: Not on file   Intimate Partner Violence:     Fear of Current or Ex-Partner: Not on file    Emotionally Abused: Not on file    Physically Abused: Not on file    Sexually Abused: Not on file   Housing Stability:     Unable to Pay for Housing in the Last Year: Not on file    Number of [place] in the Last Year: Not on file    Unstable Housing in the Last Year: Not on file       Current Outpatient Medications   Medication Sig Dispense Refill    tiotropium-olodateroL (Stiolto Respimat) 2.5-2.5 mcg/actuation inhaler Take 2 Puffs by inhalation daily.  oxyCODONE-acetaminophen (Percocet) 5-325 mg per tablet Take 1 Tablet by mouth every four (4) hours as needed for Pain for up to 7 days. Max Daily Amount: 6 Tablets. 40 Tablet 0    DULoxetine (CYMBALTA) 30 mg capsule TAKE 1 CAPSULE BY MOUTH THREE TIMES DAILY 90 Capsule 0    traMADoL (ULTRAM) 50 mg tablet Take 50 mg by mouth every six (6) hours as needed for Pain.  fluticasone propionate (FLONASE) 50 mcg/actuation nasal spray Use 1 spray(s) in each nostril twice daily 16 g 3    atorvastatin (LIPITOR) 20 mg tablet Take 1 tablet by mouth once daily 90 Tablet 3    acetaminophen (Tylenol Extra Strength) 500 mg tablet Take 500 mg by mouth every six (6) hours as needed for Pain.       FLUoxetine (PROzac) 10 mg capsule Take 1 capsule by mouth once daily 90 Capsule 3    meloxicam (MOBIC) 15 mg tablet Take 1 tablet by mouth once daily 90 Tablet 3    metFORMIN (GLUCOPHAGE) 500 mg tablet TAKE 1 TABLET BY MOUTH ONCE DAILY WITH SUPPER 90 Tablet 0    pramoxine-hc-chloroxylenol 1-1-0.1 % lotion Apply  to affected area two (2) times a day. 60 mL 1    desonide (TRIDESILON) 0.05 % cream APPLY   EXTERNALLY TO AFFECTED AREA TWICE DAILY 15 g 1    aspirin delayed-release 81 mg tablet Take 81 mg by mouth daily.  meclizine (ANTIVERT) 25 mg tablet Take 1 Tablet by mouth three (3) times daily as needed for Dizziness. 60 Tablet 3    levothyroxine (SYNTHROID) 50 mcg tablet TAKE 1 TABLET BY MOUTH ONCE DAILY BEFORE BREAKFAST 90 Tab 3    pantoprazole (PROTONIX) 20 mg tablet Take 1 tablet by mouth once daily 90 Tab 3    glucose blood VI test strips (True Metrix Pro Test Strip) strip Test blood glucose 3 times a day 200 Strip 3    vitamin E acetate (VITAMIN E PO) Take  by mouth daily.  cyanocobalamin (VITAMIN B-12) 500 mcg tablet Take 500 mcg by mouth daily.  cetirizine (ZYRTEC) 10 mg tablet TAKE 1 TABLET BY MOUTH EVERY DAY. GENERIC FOR ZYRTEC 90 Tab 2    VENTOLIN HFA 90 mcg/actuation inhaler 2 Puffs by Aerosolization route every four (4) hours as needed. 0    B.infantis-B.ani-B.long-B.bifi (PROBIOTIC 4X) 10-15 mg TbEC Take  by mouth daily.  Cholecalciferol, Vitamin D3, (VITAMIN D3) 1,000 unit cap Take 1,000 Units by mouth daily.  Lancets (ACCU-CHEK SOFTCLIX LANCETS) misc Please use one lancet to test blood sugars twice a day. 1 Package 20    BLOOD-GLUCOSE METER (ACCU-CHEK MARCE MONITORING) by Does Not Apply route.  Narcan 4 mg/actuation nasal spray Give 1 spray into 1 nostril. Give additional doses using a new nasal spray with each dose, if patient does not respond or relapses into respiratory depression. Call 911. Additional doses may be given every 2 to 3 minutes until medical assistance arrives.  (Patient not taking: Reported on 3/3/2022)         Allergies   Allergen Reactions    Latex, Natural Rubber Hives    Adhesive Rash     Some bandaids    Ciprofloxacin Rash    Codeine Nausea and Vomiting    Demerol [Meperidine] Nausea and Vomiting    Diclofenac Shortness of Breath and Palpitations    Erythromycin Rash    Gabapentin Vertigo    Levaquin [Levofloxacin] Rash    Loratadine Swelling    Morphine Nausea and Vomiting    Penicillin G Hives and Rash     Does fine with Keflex    Sulfa (Sulfonamide Antibiotics) Itching    Vicodin [Hydrocodone-Acetaminophen] Other (comments)     Metallic taste in mouth          PHYSICAL EXAMINATION    Visit Vitals  Pulse 92   Temp 98.3 °F (36.8 °C) (Temporal)   Ht 5' 8\" (1.727 m)   Wt 224 lb (101.6 kg)   SpO2 98%   BMI 34.06 kg/m²       CONSTITUTIONAL: NAD, A&O x 3  SENSATION: Intact to light touch throughout  RANGE OF MOTION: The patient has full passive range of motion in all four extremities. MOTOR:  Straight Leg Raise: Negative, bilateral    Ambulates with a single point cane               Hip Flex Knee Ext Knee Flex Ankle DF GTE Ankle PF Tone   Right +4/5 +4/5 +4/5 +4/5 +4/5 +4/5 +4/5   Left +4/5 +4/5 +4/5 +4/5 +4/5 +4/5 +4/5       ASSESSMENT   Diagnoses and all orders for this visit:    1. Lumbar neuritis    2. Lumbar post-laminectomy syndrome    3. Cervical spinal stenosis    4. Cervical neuritis    5. HNP (herniated nucleus pulposus) with myelopathy, cervical      IMPRESSION AND PLAN:  Patient returns to the office today with c/o low back pain radiating into BLE in a L4 distribution on the right and in a L5 distribution on the left to the greater toe and neck pain . Multiple treatment options were discussed. I provided her refills of Gabitril 2 mg qhs and Tramadol 50 mg. She should continue with her shoulder surgery as planned. Patient is neurologically intact. I will see the patient back in 3 month's time or earlier if needed.       Written by Maura Baron, as dictated by Praveena Strauss MD  I examined the patient, reviewed and agree with the note.

## 2022-03-03 ENCOUNTER — ANESTHESIA EVENT (OUTPATIENT)
Dept: SURGERY | Age: 72
End: 2022-03-03
Payer: MEDICARE

## 2022-03-03 ENCOUNTER — OFFICE VISIT (OUTPATIENT)
Dept: ORTHOPEDIC SURGERY | Age: 72
End: 2022-03-03
Payer: MEDICARE

## 2022-03-03 VITALS
HEIGHT: 68 IN | WEIGHT: 224 LBS | BODY MASS INDEX: 33.95 KG/M2 | TEMPERATURE: 98.3 F | HEART RATE: 92 BPM | OXYGEN SATURATION: 98 %

## 2022-03-03 DIAGNOSIS — M54.16 LUMBAR NEURITIS: Primary | ICD-10-CM

## 2022-03-03 DIAGNOSIS — M96.1 LUMBAR POST-LAMINECTOMY SYNDROME: ICD-10-CM

## 2022-03-03 DIAGNOSIS — M48.02 CERVICAL SPINAL STENOSIS: ICD-10-CM

## 2022-03-03 DIAGNOSIS — M50.00 HNP (HERNIATED NUCLEUS PULPOSUS) WITH MYELOPATHY, CERVICAL: ICD-10-CM

## 2022-03-03 DIAGNOSIS — M54.12 CERVICAL NEURITIS: ICD-10-CM

## 2022-03-03 PROCEDURE — 1101F PT FALLS ASSESS-DOCD LE1/YR: CPT | Performed by: PHYSICAL MEDICINE & REHABILITATION

## 2022-03-03 PROCEDURE — G9899 SCRN MAM PERF RSLTS DOC: HCPCS | Performed by: PHYSICAL MEDICINE & REHABILITATION

## 2022-03-03 PROCEDURE — G8536 NO DOC ELDER MAL SCRN: HCPCS | Performed by: PHYSICAL MEDICINE & REHABILITATION

## 2022-03-03 PROCEDURE — G8427 DOCREV CUR MEDS BY ELIG CLIN: HCPCS | Performed by: PHYSICAL MEDICINE & REHABILITATION

## 2022-03-03 PROCEDURE — 1090F PRES/ABSN URINE INCON ASSESS: CPT | Performed by: PHYSICAL MEDICINE & REHABILITATION

## 2022-03-03 PROCEDURE — 99213 OFFICE O/P EST LOW 20 MIN: CPT | Performed by: PHYSICAL MEDICINE & REHABILITATION

## 2022-03-03 PROCEDURE — G9717 DOC PT DX DEP/BP F/U NT REQ: HCPCS | Performed by: PHYSICAL MEDICINE & REHABILITATION

## 2022-03-03 PROCEDURE — G8756 NO BP MEASURE DOC: HCPCS | Performed by: PHYSICAL MEDICINE & REHABILITATION

## 2022-03-03 PROCEDURE — G8417 CALC BMI ABV UP PARAM F/U: HCPCS | Performed by: PHYSICAL MEDICINE & REHABILITATION

## 2022-03-03 PROCEDURE — 3017F COLORECTAL CA SCREEN DOC REV: CPT | Performed by: PHYSICAL MEDICINE & REHABILITATION

## 2022-03-03 PROCEDURE — G8399 PT W/DXA RESULTS DOCUMENT: HCPCS | Performed by: PHYSICAL MEDICINE & REHABILITATION

## 2022-03-03 RX ORDER — TIAGABINE HYDROCHLORIDE 2 MG/1
2 TABLET, FILM COATED ORAL
Qty: 90 TABLET | Refills: 0 | Status: SHIPPED | OUTPATIENT
Start: 2022-03-03 | End: 2022-07-01 | Stop reason: SDUPTHER

## 2022-03-03 RX ORDER — TRAMADOL HYDROCHLORIDE 50 MG/1
50 TABLET ORAL
Qty: 120 TABLET | Refills: 2 | Status: SHIPPED | OUTPATIENT
Start: 2022-03-03 | End: 2022-06-01

## 2022-03-03 RX ORDER — NALOXONE HYDROCHLORIDE 4 MG/.1ML
SPRAY NASAL
COMMUNITY
Start: 2022-03-01

## 2022-03-03 NOTE — LETTER
3/3/2022    Patient: Lashae Banuelos   YOB: 1950   Date of Visit: 3/3/2022     Joleen Fallon MD  [id number] [facility]  [address] [place] [address] [address]  [address] [facility] [address]    Dear Joleen Fallon MD,      Thank you for referring Ms. Katrina Garcia to [address] [facility] for evaluation. My notes for this consultation are attached. If you have questions, please do not hesitate to call me. I look forward to following your patient along with you.       Sincerely,    Anaya Anguiano MD

## 2022-03-04 ENCOUNTER — ANESTHESIA (OUTPATIENT)
Dept: SURGERY | Age: 72
End: 2022-03-04
Payer: MEDICARE

## 2022-03-04 ENCOUNTER — HOSPITAL ENCOUNTER (OUTPATIENT)
Age: 72
Setting detail: OUTPATIENT SURGERY
Discharge: HOME | End: 2022-03-04
Attending: ORTHOPAEDIC SURGERY | Admitting: ORTHOPAEDIC SURGERY
Payer: MEDICARE

## 2022-03-04 VITALS
OXYGEN SATURATION: 95 % | HEART RATE: 84 BPM | WEIGHT: 228 LBS | HEIGHT: 68 IN | TEMPERATURE: 97.1 F | BODY MASS INDEX: 34.56 KG/M2 | DIASTOLIC BLOOD PRESSURE: 72 MMHG | RESPIRATION RATE: 16 BRPM | SYSTOLIC BLOOD PRESSURE: 111 MMHG

## 2022-03-04 DIAGNOSIS — M25.512 LEFT SHOULDER PAIN, UNSPECIFIED CHRONICITY: ICD-10-CM

## 2022-03-04 DIAGNOSIS — T84.84XA PAINFUL ORTHOPAEDIC HARDWARE (HCC): Primary | ICD-10-CM

## 2022-03-04 DIAGNOSIS — M65.812 SYNOVITIS OF LEFT SHOULDER: ICD-10-CM

## 2022-03-04 LAB
AMPHET UR QL SCN: NEGATIVE
BARBITURATES UR QL SCN: NEGATIVE
BENZODIAZ UR QL: NEGATIVE
CANNABINOIDS UR QL SCN: NEGATIVE
COCAINE UR QL SCN: NEGATIVE
GLUCOSE BLD STRIP.AUTO-MCNC: 105 MG/DL (ref 70–110)
GLUCOSE BLD STRIP.AUTO-MCNC: 123 MG/DL (ref 70–110)
HDSCOM,HDSCOM: NORMAL
METHADONE UR QL: NEGATIVE
OPIATES UR QL: NEGATIVE
PCP UR QL: NEGATIVE

## 2022-03-04 PROCEDURE — 64450 NJX AA&/STRD OTHER PN/BRANCH: CPT | Performed by: STUDENT IN AN ORGANIZED HEALTH CARE EDUCATION/TRAINING PROGRAM

## 2022-03-04 PROCEDURE — 77030031139 HC SUT VCRL2 J&J -A: Performed by: ORTHOPAEDIC SURGERY

## 2022-03-04 PROCEDURE — 29820 SHO ARTHRS SRG PRTL SYNVCT: CPT | Performed by: PHYSICIAN ASSISTANT

## 2022-03-04 PROCEDURE — 77030002922 HC SUT FBRWRE ARTH -B: Performed by: ORTHOPAEDIC SURGERY

## 2022-03-04 PROCEDURE — 74011250637 HC RX REV CODE- 250/637: Performed by: PHYSICIAN ASSISTANT

## 2022-03-04 PROCEDURE — 77030040922 HC BLNKT HYPOTHRM STRY -A: Performed by: ORTHOPAEDIC SURGERY

## 2022-03-04 PROCEDURE — 77030040361 HC SLV COMPR DVT MDII -B: Performed by: ORTHOPAEDIC SURGERY

## 2022-03-04 PROCEDURE — 80307 DRUG TEST PRSMV CHEM ANLYZR: CPT

## 2022-03-04 PROCEDURE — 77030006891 HC BLD SHV RESECT STRY -B: Performed by: ORTHOPAEDIC SURGERY

## 2022-03-04 PROCEDURE — 77030013079 HC BLNKT BAIR HGGR 3M -A: Performed by: STUDENT IN AN ORGANIZED HEALTH CARE EDUCATION/TRAINING PROGRAM

## 2022-03-04 PROCEDURE — 77030026438 HC STYL ET INTUB CARD -A: Performed by: NURSE ANESTHETIST, CERTIFIED REGISTERED

## 2022-03-04 PROCEDURE — 01630 ANES OPN/ARTHR PX SHO JT NOS: CPT | Performed by: NURSE ANESTHETIST, CERTIFIED REGISTERED

## 2022-03-04 PROCEDURE — 74011250636 HC RX REV CODE- 250/636: Performed by: NURSE ANESTHETIST, CERTIFIED REGISTERED

## 2022-03-04 PROCEDURE — 77030017964 HC PRB COAG4 STRY -C: Performed by: ORTHOPAEDIC SURGERY

## 2022-03-04 PROCEDURE — 77030003598 HC NDL MULT/FIRE ARTH -C: Performed by: ORTHOPAEDIC SURGERY

## 2022-03-04 PROCEDURE — 77030008462 HC STPLR SKN PROX J&J -A: Performed by: ORTHOPAEDIC SURGERY

## 2022-03-04 PROCEDURE — 87205 SMEAR GRAM STAIN: CPT

## 2022-03-04 PROCEDURE — 74011000250 HC RX REV CODE- 250: Performed by: NURSE ANESTHETIST, CERTIFIED REGISTERED

## 2022-03-04 PROCEDURE — 87116 MYCOBACTERIA CULTURE: CPT

## 2022-03-04 PROCEDURE — 77030002919 HC SUT FBRTAPE ARTH -B: Performed by: ORTHOPAEDIC SURGERY

## 2022-03-04 PROCEDURE — 76010000149 HC OR TIME 1 TO 1.5 HR: Performed by: ORTHOPAEDIC SURGERY

## 2022-03-04 PROCEDURE — 87075 CULTR BACTERIA EXCEPT BLOOD: CPT

## 2022-03-04 PROCEDURE — 77030002966 HC SUT PDS J&J -A: Performed by: ORTHOPAEDIC SURGERY

## 2022-03-04 PROCEDURE — 77030011265 HC ELECTRD BLD HEX COVD -A: Performed by: ORTHOPAEDIC SURGERY

## 2022-03-04 PROCEDURE — 76942 ECHO GUIDE FOR BIOPSY: CPT | Performed by: STUDENT IN AN ORGANIZED HEALTH CARE EDUCATION/TRAINING PROGRAM

## 2022-03-04 PROCEDURE — 87070 CULTURE OTHR SPECIMN AEROBIC: CPT

## 2022-03-04 PROCEDURE — 99100 ANES PT EXTEME AGE<1 YR&>70: CPT | Performed by: NURSE ANESTHETIST, CERTIFIED REGISTERED

## 2022-03-04 PROCEDURE — 99100 ANES PT EXTEME AGE<1 YR&>70: CPT | Performed by: STUDENT IN AN ORGANIZED HEALTH CARE EDUCATION/TRAINING PROGRAM

## 2022-03-04 PROCEDURE — 74011000258 HC RX REV CODE- 258: Performed by: NURSE ANESTHETIST, CERTIFIED REGISTERED

## 2022-03-04 PROCEDURE — 74011250636 HC RX REV CODE- 250/636: Performed by: PHYSICIAN ASSISTANT

## 2022-03-04 PROCEDURE — 01630 ANES OPN/ARTHR PX SHO JT NOS: CPT | Performed by: STUDENT IN AN ORGANIZED HEALTH CARE EDUCATION/TRAINING PROGRAM

## 2022-03-04 PROCEDURE — 77030010427: Performed by: ORTHOPAEDIC SURGERY

## 2022-03-04 PROCEDURE — 74011000258 HC RX REV CODE- 258: Performed by: ORTHOPAEDIC SURGERY

## 2022-03-04 PROCEDURE — 77030039266 HC ADH SKN EXOFIN S2SG -A: Performed by: ORTHOPAEDIC SURGERY

## 2022-03-04 PROCEDURE — 77030008683 HC TU ET CUF COVD -A: Performed by: NURSE ANESTHETIST, CERTIFIED REGISTERED

## 2022-03-04 PROCEDURE — L3650 SO 8 ABD RESTRAINT PRE OTS: HCPCS | Performed by: ORTHOPAEDIC SURGERY

## 2022-03-04 PROCEDURE — 87176 TISSUE HOMOGENIZATION CULTR: CPT

## 2022-03-04 PROCEDURE — 77030004453 HC BUR SHV STRY -B: Performed by: ORTHOPAEDIC SURGERY

## 2022-03-04 PROCEDURE — 87102 FUNGUS ISOLATION CULTURE: CPT

## 2022-03-04 PROCEDURE — 77030006788 HC BLD SAW OSC STRY -B: Performed by: ORTHOPAEDIC SURGERY

## 2022-03-04 PROCEDURE — 29820 SHO ARTHRS SRG PRTL SYNVCT: CPT | Performed by: ORTHOPAEDIC SURGERY

## 2022-03-04 PROCEDURE — 77030004434 HC BUR RND STRY -B: Performed by: ORTHOPAEDIC SURGERY

## 2022-03-04 PROCEDURE — 82962 GLUCOSE BLOOD TEST: CPT

## 2022-03-04 PROCEDURE — 2709999900 HC NON-CHARGEABLE SUPPLY: Performed by: ORTHOPAEDIC SURGERY

## 2022-03-04 PROCEDURE — 77030033005 HC TBNG ARTHSC PMP STRY -B: Performed by: ORTHOPAEDIC SURGERY

## 2022-03-04 PROCEDURE — 77030003601 HC NDL NRV BLK BBMI -A: Performed by: ORTHOPAEDIC SURGERY

## 2022-03-04 PROCEDURE — 88305 TISSUE EXAM BY PATHOLOGIST: CPT

## 2022-03-04 PROCEDURE — 74011250637 HC RX REV CODE- 250/637: Performed by: NURSE ANESTHETIST, CERTIFIED REGISTERED

## 2022-03-04 PROCEDURE — 77030002912 HC SUT ETHBND J&J -A: Performed by: ORTHOPAEDIC SURGERY

## 2022-03-04 PROCEDURE — 76210000006 HC OR PH I REC 0.5 TO 1 HR: Performed by: ORTHOPAEDIC SURGERY

## 2022-03-04 PROCEDURE — 76060000033 HC ANESTHESIA 1 TO 1.5 HR: Performed by: ORTHOPAEDIC SURGERY

## 2022-03-04 PROCEDURE — 77030002933 HC SUT MCRYL J&J -A: Performed by: ORTHOPAEDIC SURGERY

## 2022-03-04 PROCEDURE — 74011250636 HC RX REV CODE- 250/636: Performed by: ORTHOPAEDIC SURGERY

## 2022-03-04 PROCEDURE — 76210000022 HC REC RM PH II 1.5 TO 2 HR: Performed by: ORTHOPAEDIC SURGERY

## 2022-03-04 PROCEDURE — 74011250636 HC RX REV CODE- 250/636: Performed by: STUDENT IN AN ORGANIZED HEALTH CARE EDUCATION/TRAINING PROGRAM

## 2022-03-04 RX ORDER — LIDOCAINE HYDROCHLORIDE 20 MG/ML
INJECTION, SOLUTION EPIDURAL; INFILTRATION; INTRACAUDAL; PERINEURAL AS NEEDED
Status: DISCONTINUED | OUTPATIENT
Start: 2022-03-04 | End: 2022-03-04 | Stop reason: HOSPADM

## 2022-03-04 RX ORDER — ONDANSETRON 2 MG/ML
INJECTION INTRAMUSCULAR; INTRAVENOUS AS NEEDED
Status: DISCONTINUED | OUTPATIENT
Start: 2022-03-04 | End: 2022-03-04 | Stop reason: HOSPADM

## 2022-03-04 RX ORDER — PROPOFOL 10 MG/ML
INJECTION, EMULSION INTRAVENOUS AS NEEDED
Status: DISCONTINUED | OUTPATIENT
Start: 2022-03-04 | End: 2022-03-04 | Stop reason: HOSPADM

## 2022-03-04 RX ORDER — FAMOTIDINE 20 MG/1
20 TABLET, FILM COATED ORAL ONCE
Status: COMPLETED | OUTPATIENT
Start: 2022-03-04 | End: 2022-03-04

## 2022-03-04 RX ORDER — SUCCINYLCHOLINE CHLORIDE 20 MG/ML
INJECTION INTRAMUSCULAR; INTRAVENOUS AS NEEDED
Status: DISCONTINUED | OUTPATIENT
Start: 2022-03-04 | End: 2022-03-04 | Stop reason: HOSPADM

## 2022-03-04 RX ORDER — FENTANYL CITRATE 50 UG/ML
INJECTION, SOLUTION INTRAMUSCULAR; INTRAVENOUS AS NEEDED
Status: DISCONTINUED | OUTPATIENT
Start: 2022-03-04 | End: 2022-03-04 | Stop reason: HOSPADM

## 2022-03-04 RX ORDER — SODIUM CHLORIDE, SODIUM LACTATE, POTASSIUM CHLORIDE, CALCIUM CHLORIDE 600; 310; 30; 20 MG/100ML; MG/100ML; MG/100ML; MG/100ML
25 INJECTION, SOLUTION INTRAVENOUS CONTINUOUS
Status: DISCONTINUED | OUTPATIENT
Start: 2022-03-04 | End: 2022-03-04 | Stop reason: HOSPADM

## 2022-03-04 RX ORDER — GABAPENTIN 400 MG/1
400 CAPSULE ORAL ONCE
Status: COMPLETED | OUTPATIENT
Start: 2022-03-04 | End: 2022-03-04

## 2022-03-04 RX ORDER — MAGNESIUM SULFATE 100 %
4 CRYSTALS MISCELLANEOUS AS NEEDED
Status: DISCONTINUED | OUTPATIENT
Start: 2022-03-04 | End: 2022-03-04 | Stop reason: HOSPADM

## 2022-03-04 RX ORDER — INSULIN LISPRO 100 [IU]/ML
INJECTION, SOLUTION INTRAVENOUS; SUBCUTANEOUS ONCE
Status: DISCONTINUED | OUTPATIENT
Start: 2022-03-04 | End: 2022-03-04 | Stop reason: HOSPADM

## 2022-03-04 RX ORDER — FENTANYL CITRATE 50 UG/ML
50 INJECTION, SOLUTION INTRAMUSCULAR; INTRAVENOUS
Status: DISCONTINUED | OUTPATIENT
Start: 2022-03-04 | End: 2022-03-04 | Stop reason: HOSPADM

## 2022-03-04 RX ORDER — PHENYLEPHRINE HCL IN 0.9% NACL 1 MG/10 ML
SYRINGE (ML) INTRAVENOUS AS NEEDED
Status: DISCONTINUED | OUTPATIENT
Start: 2022-03-04 | End: 2022-03-04 | Stop reason: HOSPADM

## 2022-03-04 RX ORDER — ONDANSETRON 2 MG/ML
4 INJECTION INTRAMUSCULAR; INTRAVENOUS ONCE
Status: DISCONTINUED | OUTPATIENT
Start: 2022-03-04 | End: 2022-03-04 | Stop reason: HOSPADM

## 2022-03-04 RX ORDER — FENTANYL CITRATE 50 UG/ML
100 INJECTION, SOLUTION INTRAMUSCULAR; INTRAVENOUS ONCE
Status: COMPLETED | OUTPATIENT
Start: 2022-03-04 | End: 2022-03-04

## 2022-03-04 RX ORDER — ROCURONIUM BROMIDE 10 MG/ML
INJECTION, SOLUTION INTRAVENOUS AS NEEDED
Status: DISCONTINUED | OUTPATIENT
Start: 2022-03-04 | End: 2022-03-04 | Stop reason: HOSPADM

## 2022-03-04 RX ORDER — LIDOCAINE HYDROCHLORIDE 10 MG/ML
0.1 INJECTION, SOLUTION EPIDURAL; INFILTRATION; INTRACAUDAL; PERINEURAL AS NEEDED
Status: DISCONTINUED | OUTPATIENT
Start: 2022-03-04 | End: 2022-03-04 | Stop reason: HOSPADM

## 2022-03-04 RX ORDER — ROPIVACAINE HYDROCHLORIDE 5 MG/ML
INJECTION, SOLUTION EPIDURAL; INFILTRATION; PERINEURAL
Status: COMPLETED | OUTPATIENT
Start: 2022-03-04 | End: 2022-03-04

## 2022-03-04 RX ORDER — MIDAZOLAM HYDROCHLORIDE 1 MG/ML
2 INJECTION, SOLUTION INTRAMUSCULAR; INTRAVENOUS ONCE
Status: COMPLETED | OUTPATIENT
Start: 2022-03-04 | End: 2022-03-04

## 2022-03-04 RX ORDER — ROPIVACAINE HYDROCHLORIDE 5 MG/ML
30 INJECTION, SOLUTION EPIDURAL; INFILTRATION; PERINEURAL
Status: COMPLETED | OUTPATIENT
Start: 2022-03-04 | End: 2022-03-04

## 2022-03-04 RX ORDER — ACETAMINOPHEN 500 MG
1000 TABLET ORAL ONCE
Status: COMPLETED | OUTPATIENT
Start: 2022-03-04 | End: 2022-03-04

## 2022-03-04 RX ADMIN — FENTANYL CITRATE 25 MCG: 50 INJECTION, SOLUTION INTRAMUSCULAR; INTRAVENOUS at 08:41

## 2022-03-04 RX ADMIN — MIDAZOLAM 2 MG: 1 INJECTION INTRAMUSCULAR; INTRAVENOUS at 07:29

## 2022-03-04 RX ADMIN — ONDANSETRON 4 MG: 2 INJECTION INTRAMUSCULAR; INTRAVENOUS at 08:26

## 2022-03-04 RX ADMIN — Medication 100 MCG: at 07:58

## 2022-03-04 RX ADMIN — SODIUM CHLORIDE, POTASSIUM CHLORIDE, SODIUM LACTATE AND CALCIUM CHLORIDE 25 ML/HR: 600; 310; 30; 20 INJECTION, SOLUTION INTRAVENOUS at 06:20

## 2022-03-04 RX ADMIN — FENTANYL CITRATE 25 MCG: 50 INJECTION, SOLUTION INTRAMUSCULAR; INTRAVENOUS at 08:12

## 2022-03-04 RX ADMIN — PROPOFOL 120 MG: 10 INJECTION, EMULSION INTRAVENOUS at 07:43

## 2022-03-04 RX ADMIN — ROPIVACAINE HYDROCHLORIDE 20 ML: 5 INJECTION, SOLUTION EPIDURAL; INFILTRATION; PERINEURAL at 07:31

## 2022-03-04 RX ADMIN — SUCCINYLCHOLINE CHLORIDE 100 MG: 20 INJECTION, SOLUTION INTRAMUSCULAR; INTRAVENOUS at 07:43

## 2022-03-04 RX ADMIN — DEXMEDETOMIDINE HYDROCHLORIDE 6 MCG: 100 INJECTION, SOLUTION, CONCENTRATE INTRAVENOUS at 07:48

## 2022-03-04 RX ADMIN — ACETAMINOPHEN 1000 MG: 500 TABLET ORAL at 06:28

## 2022-03-04 RX ADMIN — ROCURONIUM BROMIDE 5 MG: 50 INJECTION INTRAVENOUS at 07:42

## 2022-03-04 RX ADMIN — FENTANYL CITRATE 100 MCG: 50 INJECTION INTRAMUSCULAR; INTRAVENOUS at 07:28

## 2022-03-04 RX ADMIN — ROPIVACAINE HYDROCHLORIDE 30 ML: 5 INJECTION, SOLUTION EPIDURAL; INFILTRATION; PERINEURAL at 07:31

## 2022-03-04 RX ADMIN — FENTANYL CITRATE 50 MCG: 50 INJECTION, SOLUTION INTRAMUSCULAR; INTRAVENOUS at 07:42

## 2022-03-04 RX ADMIN — VANCOMYCIN HYDROCHLORIDE 1000 MG: 1 INJECTION, POWDER, LYOPHILIZED, FOR SOLUTION INTRAVENOUS at 06:29

## 2022-03-04 RX ADMIN — LIDOCAINE HYDROCHLORIDE 2 ML: 10 INJECTION, SOLUTION EPIDURAL; INFILTRATION; INTRACAUDAL; PERINEURAL at 07:29

## 2022-03-04 RX ADMIN — FAMOTIDINE 20 MG: 20 TABLET, FILM COATED ORAL at 06:28

## 2022-03-04 RX ADMIN — LIDOCAINE HYDROCHLORIDE 60 MG: 20 INJECTION, SOLUTION EPIDURAL; INFILTRATION; INTRACAUDAL; PERINEURAL at 07:42

## 2022-03-04 RX ADMIN — GABAPENTIN 400 MG: 400 CAPSULE ORAL at 06:28

## 2022-03-04 NOTE — OP NOTES
Operative Report    Patient: Lashae Banuelos MRN: [id number]  SSN: [id number]    YOB: 1950  Age: 70 y.o. Sex: female       Date of Surgery: 3/4/2022     Preoperative Diagnosis: Synovitis of left shoulder [M65.812]     Postoperative Diagnosis: Synovitis of left shoulder [M65.812]   Loosening glenoid component  Surgeon(s) and Role:     Richa Cowart MD - Primary  Elmer Velasco instrumental in assisting all facets of procedure for medically necessary for the success of the procedure  Anesthesia: General     Procedure: Procedure(s):  LEFT SHOULDER ARTHROSCOPIC PARTIAL SYNOVECTOMY synovial biopsy    Procedure in Detail: Patient was taken to the operating room to some general trach anesthesia with anesthesia staff. Placed in a lateral decubitus position left arm was prepped with ChloraPrep solution draped free sterile field. Posterior portal was used arthroscopy portal anterior portal was used as a work portal upon incision superficial cultures were made or taken from anterior portal and posterior portal once the arthroscope was in place in the glenohumeral joint the joint was cultured as well. Synovitis present throughout which through the anterior portal was taken to stable base using a shaver a synovial biopsy was then multiple pieces were taken both for culture and for permanent examination. Upon palpating the glenoid component there was a slight movement at the glenoid component suggesting loosening of this component. That point decision will made to proceed with a staged procedure as we will wait for the cultures to be back. Fluid was suctioned out the portals were closed subcutaneous tissues with 3-0 Vicryl and the skin with 4-0 Monocryl.   Sterile dressings were applied patient tolerated procedure well was taken to recovery room without problems       Estimated Blood Loss: Minimal    Tourniquet Time: * No tourniquets in log *      Implants: * No implants in log *            Specimens:   ID Type Source Tests Collected by Time Destination   1 : LEFT synovial tissue Preservative Shoulder  Arlin Price MD 3/4/2022 0820 Pathology   1 : LEFT posterior superficial skin  Tissue Shoulder CULTURE, ANAEROBIC AND AEROBIC, CULTURE, Giorgi Xiao MD 3/4/2022 0805 Microbiology   2 : LEFT anterior superficial skin Tissue Shoulder CULTURE, FUNGUS, CULTURE, ANAEROBIC AND AEROBIC Arlin Price MD 3/4/2022 9856 Microbiology   3 : LEFT glenohumeral joint anterior Wound Shoulder CULTURE, ANAEROBIC AND AEROBIC, CULTURE, Giorgi Xiao MD 3/4/2022 0815 Microbiology   4 : LEFT synovial tissue Wound Shoulder CULTURE, FUNGUS, CULTURE, ANAEROBIC AND AEROBIC Arlin Price MD 3/4/2022 7399 Microbiology           Drains: None                Complications: None    Counts: Sponge and needle counts were correct times two.     Signed By:  Carlos Edwards MD     March 4, 2022

## 2022-03-04 NOTE — PROGRESS NOTES
conducted a pre-surgery visit with Maeve Shepard, who is a 70 y.o.,female. The  provided the following Interventions:  Initiated a relationship of care and support. Offered prayer and assurance of continued prayers on patient's behalf. There is an advance directive on file here. Plan:  Chaplains will continue to follow and will provide pastoral care on an as needed/requested basis.  recommends bedside caregivers page  on duty if patient shows signs of acute spiritual or emotional distress.     [address]   Board Certified [address] [place] [address]   [phone]

## 2022-03-04 NOTE — ANESTHESIA POSTPROCEDURE EVALUATION
Procedure(s):  LEFT SHOULDER ARTHROSCOPIC PARTIAL SYNOVECTOMY. general, regional    Anesthesia Post Evaluation      Multimodal analgesia: multimodal analgesia used between 6 hours prior to anesthesia start to PACU discharge  Patient location during evaluation: PACU  Patient participation: complete - patient participated  Level of consciousness: sleepy but conscious  Pain management: adequate  Airway patency: patent  Anesthetic complications: no  Cardiovascular status: acceptable  Respiratory status: acceptable  Hydration status: acceptable  Post anesthesia nausea and vomiting:  controlled  Final Post Anesthesia Temperature Assessment:  Normothermia (36.0-37.5 degrees C)      INITIAL Post-op Vital signs:   Vitals Value Taken Time   /61 03/04/22 0935   Temp 36.4 °C (97.6 °F) 03/04/22 0846   Pulse 85 03/04/22 0937   Resp 14 03/04/22 0937   SpO2 93 % 03/04/22 0919   Vitals shown include unvalidated device data.

## 2022-03-04 NOTE — BRIEF OP NOTE
Brief Postoperative Note    Patient: Alondra Robles  YOB: 1950  MRN: [id number]    Date of Procedure: 3/4/2022     Pre-Op Diagnosis: Synovitis of left shoulder [M65.812]    Post-Op Diagnosis: Same as preoperative diagnosis.  and loosening of hardware      Procedure(s):  LEFT SHOULDER ARTHROSCOPIC PARTIAL SYNOVECTOMY    Surgeon(s):  Regis Barthel, MD    Surgical Assistant: Physician Assistant: JONNA Leung  Surg Asst-1: Jarrett JOHNSON    Anesthesia: General     Estimated Blood Loss (mL): Minimal    Complications: None    Specimens:   ID Type Source Tests Collected by Time Destination   1 : LEFT synovial tissue Preservative Shoulder  Regis Barthel, MD 3/4/2022 0820 Pathology   1 : LEFT posterior superficial skin  Tissue Shoulder CULTURE, ANAEROBIC AND AEROBIC, CULTURE, Demetrius Goodman MD 3/4/2022 0805 Microbiology   2 : LEFT anterior superficial skin Tissue Shoulder CULTURE, FUNGUS, CULTURE, ANAEROBIC AND AEROBIC Regis Barthel, MD 3/4/2022 2307 Microbiology   3 : LEFT glenohumeral joint anterior Wound Shoulder CULTURE, ANAEROBIC AND AEROBIC, CULTURE, Demetrius Goodman MD 3/4/2022 0815 Microbiology   4 : LEFT synovial tissue Wound Shoulder CULTURE, FUNGUS, CULTURE, ANAEROBIC AND AEROBIC Regis Barthel, MD 3/4/2022 4604 Microbiology        Implants: * No implants in log *    Drains: * No LDAs found *    Findings: loosening of glenoid component    Electronically Signed by JONNA Rey on 3/4/2022 at 8:45 AM

## 2022-03-04 NOTE — ANESTHESIA PROCEDURE NOTES
Peripheral Block    Start time: 3/4/2022 7:28 AM  End time: 3/4/2022 7:33 AM  Performed by: Gabriela Padilla MD  Authorized by: Gabriela Padilla MD       Pre-procedure: Indications: at surgeon's request and post-op pain management    Preanesthetic Checklist: patient identified, risks and benefits discussed, site marked, timeout performed, anesthesia consent given and patient being monitored    Timeout Time: 07:30 EST          Block Type:   Block Type:   Interscalene  Laterality:  Left  Monitoring:  Standard ASA monitoring, frequent vital sign checks, heart rate, continuous pulse ox, oxygen and responsive to questions  Injection Technique:  Single shot  Procedures: ultrasound guided    Patient Position: seated  Prep: chlorhexidine    Location:  Interscalene  Needle Type:  Pencan  Needle Gauge:  19 G  Needle Localization:  Ultrasound guidance  Medication Injected:  Ropivacaine (PF) (NAROPIN)(0.5%) 5 mg/mL injection, 20 mL  Med Admin Time: 3/4/2022 7:31 AM    Assessment:  Number of attempts:  1  Injection Assessment:  Incremental injection every 5 mL, no paresthesia, ultrasound image on chart, local visualized surrounding nerve on ultrasound, negative aspiration for blood and no intravascular symptoms  Patient tolerance:  Patient tolerated the procedure well with no immediate complications  Surgeon arrived 6560

## 2022-03-04 NOTE — PROGRESS NOTES
Date of Surgery Update:  Ovidio Mccloud was seen and examined. History and physical has been reviewed. The patient has been examined. There have been no significant clinical changes since the completion of the originally dated History and Physical.    Signed By: Salo Stratton MD     March 4, 2022 7:27 AM       The above patient was independently seen and examined by myself. The case was then discussed and a proper diagnosis/plan was made. I agree with the above assessment.

## 2022-03-04 NOTE — DISCHARGE INSTRUCTIONS
During your surgery we realized that your hardware appeared to be loosening. This would make sense why you are having so much discomfort. We are going to get a CT scan. Please look for a phone call from central scheduling to get this scheduled. You do not need to go to physical therapy at this point. I will see you in the office to discuss this further next week    Dr. Michelle Bennett  Postoperative Information      You will be given a prescription for pain medication. It may be taken every 4-6 hours as needed for pain for the first 4-5 days. A bandage was placed on your incision after surgery. You need to keep this incision clean and dry. Please keep your dressing on. You may shower 2 days after surgery. Please wear your sling for comfort only. It is okay to move the arm on your own. We recommend avoiding lifting pushing and pulling with the arm. The operated area may be sore and develop bruising over the next several days. You should expect swelling in the area. You may elevate the operated extremity and apply an icepack on top of the dressing to help minimize the swelling. If you have a high temperature, unexpected pain, redness or swelling, or any drainage around your operated area, please call my office immediately. Please make an appointment to return to my office in one week. Dr. Liang East office number [phone]      DISCHARGE SUMMARY from Nurse    PATIENT INSTRUCTIONS:    After general anesthesia or intravenous sedation, for 24 hours or while taking prescription Narcotics:  · Limit your activities  · Do not drive and operate hazardous machinery  · Do not make important personal or business decisions  · Do  not drink alcoholic beverages  · If you have not urinated within 8 hours after discharge, please contact your surgeon on call.     Report the following to your surgeon:  · Excessive pain, swelling, redness or odor of or around the surgical area  · Temperature over 100.5  · Nausea and vomiting lasting longer than 4 hours or if unable to take medications  · Any signs of decreased circulation or nerve impairment to extremity: change in color, persistent  numbness, tingling, coldness or increase pain  · Any questions    What to do at Home:      *  Please give a list of your current medications to your Primary Care Provider. *  Please update this list whenever your medications are discontinued, doses are      changed, or new medications (including over-the-counter products) are added. *  Please carry medication information at all times in case of emergency situations. These are general instructions for a healthy lifestyle:    No smoking/ No tobacco products/ Avoid exposure to second hand smoke  Surgeon General's Warning:  Quitting smoking now greatly reduces serious risk to your health. Obesity, smoking, and sedentary lifestyle greatly increases your risk for illness    A healthy diet, regular physical exercise & weight monitoring are important for maintaining a healthy lifestyle    You may be retaining fluid if you have a history of heart failure or if you experience any of the following symptoms:  Weight gain of 3 pounds or more overnight or 5 pounds in a week, increased swelling in our hands or feet or shortness of breath while lying flat in bed. Please call your doctor as soon as you notice any of these symptoms; do not wait until your next office visit. The discharge information has been reviewed with the {PATIENT PARENT GUARDIAN:75076}. The {PATIENT PARENT GUARDIAN:94494} verbalized understanding. Discharge medications reviewed with the {[facility] meds reviewed [facility]:51352} and appropriate educational materials and side effects teaching were provided.   ___________________________________________________________________________________________________________________________________

## 2022-03-04 NOTE — ANESTHESIA PREPROCEDURE EVALUATION
Relevant Problems   NEUROLOGY   (+) OCD (obsessive compulsive disorder)   (+) PTSD (post-traumatic stress disorder)   (+) Panic disorder with agoraphobia   (+) Recurrent major depression (HCC)      CARDIOVASCULAR   (+) AS (aortic stenosis)   (+) Aortic stenosis      ENDOCRINE   (+) Diabetes (HCC)   (+) Severe obesity (HCC)   (+) Type 2 diabetes with nephropathy (HCC)       Anesthetic History   No history of anesthetic complications            Review of Systems / Medical History  Patient summary reviewed and nursing notes reviewed    Pulmonary    COPD: moderate        Asthma : well controlled       Neuro/Psych         Psychiatric history     Cardiovascular    Hypertension: well controlled                Comments: AS, s/p AVR 2016   GI/Hepatic/Renal  Within defined limits              Endo/Other    Diabetes: well controlled, type 2  Hypothyroidism: well controlled  Morbid obesity and arthritis     Other Findings          Relevant Problems   NEUROLOGY   (+) OCD (obsessive compulsive disorder)   (+) PTSD (post-traumatic stress disorder)   (+) Panic disorder with agoraphobia   (+) Recurrent major depression (HCC)      CARDIOVASCULAR   (+) AS (aortic stenosis)   (+) Aortic stenosis      ENDOCRINE   (+) Diabetes (HCC)   (+) Severe obesity (HCC)   (+) Type 2 diabetes with nephropathy (HCC)       Anesthetic History   No history of anesthetic complications            Review of Systems / Medical History  Patient summary reviewed and pertinent labs reviewed    Pulmonary    COPD: moderate      Shortness of breath  Asthma        Neuro/Psych         Psychiatric history    Comments: Chronic pain  Anxiety  PTSD  OCD Cardiovascular    Hypertension  Valvular problems/murmurs        Hyperlipidemia    Exercise tolerance: <4 METS  Comments: S/P AVR 2016  EF 55%  PULM HTN   GI/Hepatic/Renal  Within defined limits              Endo/Other    Diabetes: well controlled, type 2  Hypothyroidism: well controlled  Obesity and arthritis     Other Findings              Physical Exam    Airway  Mallampati: II  TM Distance: > 6 cm  Neck ROM: normal range of motion   Mouth opening: Normal     Cardiovascular  Regular rate and rhythm,  S1 and S2 normal,  no murmur, click, rub, or gallop             Dental    Dentition: Full lower dentures and Full upper dentures     Pulmonary  Breath sounds clear to auscultation               Abdominal  GI exam deferred       Other Findings            Anesthetic Plan    ASA: 4  Anesthesia type: general      Post-op pain plan if not by surgeon: peripheral nerve block single    Induction: Intravenous  Anesthetic plan and risks discussed with: Patient              Physical Exam    Airway  Mallampati: IV  TM Distance: 4 - 6 cm  Neck ROM: normal range of motion   Mouth opening: Normal     Cardiovascular    Rhythm: regular  Rate: normal         Dental  No notable dental hx    Comments: Missing all upper  Bottom, some missing, none loose   Pulmonary  Breath sounds clear to auscultation               Abdominal  GI exam deferred       Other Findings            Anesthetic Plan    ASA: 3  Anesthesia type: general and regional - interscalene block          Induction: Intravenous  Anesthetic plan and risks discussed with: Patient

## 2022-03-07 DIAGNOSIS — Z01.818 PREOP EXAMINATION: ICD-10-CM

## 2022-03-07 NOTE — NURSE NAVIGATOR
03/07/2022 @ 1105 Post op call  Call placed to patient, ID verified x 2. Patient is s/p left shoulder arthroscopy with Dr. Tri Doan , [address] 03/4/2022. Patient denies chest pain ,shortness of breath, nausea or vomiting. Patient is up ambulating ad seraifn. Pain is well controlled with icing. Patient states that she has only had to take 3 of her pain medication. Dressing is described as clean, dry and intact. Patient reports no longer wearing her sling as she was getting a rash under her arm. Patient educated regarding the importance of drying really well under that arm after showering. Patient has scheduled her CT scan that was requested, but it is in conflict with her post op appointment. She has already reached out to the office to see if an appointment with Miguel Mcfarland is available earlier in the day. Patient has no questions at this time. She will follow up with Dr. Manav Clarke on 03/15/2022.

## 2022-03-08 LAB
BACTERIA SPEC CULT: NORMAL
GRAM STN SPEC: NORMAL
SERVICE CMNT-IMP: NORMAL

## 2022-03-10 ENCOUNTER — TELEPHONE (OUTPATIENT)
Dept: ORTHOPEDIC SURGERY | Age: 72
End: 2022-03-10

## 2022-03-10 NOTE — TELEPHONE ENCOUNTER
Patient called to let Tiffany Vail know that she has stopped taking the Percocet medication as of yesterday 3/9/22. Patient said that she is back on the Tramadol medication. Patient just wanted  to know this . Patient did not request a call back. Patient tel. [phone]. Note : patient last seen on 03/03/2022 for low back.

## 2022-03-15 ENCOUNTER — HOSPITAL ENCOUNTER (OUTPATIENT)
Dept: CT IMAGING | Age: 72
Discharge: HOME | End: 2022-03-15
Attending: PHYSICIAN ASSISTANT
Payer: MEDICARE

## 2022-03-15 DIAGNOSIS — T84.84XA PAINFUL ORTHOPAEDIC HARDWARE (HCC): ICD-10-CM

## 2022-03-15 PROCEDURE — 73200 CT UPPER EXTREMITY W/O DYE: CPT

## 2022-03-16 ENCOUNTER — APPOINTMENT (OUTPATIENT)
Dept: PHYSICAL THERAPY | Age: 72
End: 2022-03-16
Attending: PHYSICIAN ASSISTANT

## 2022-03-16 ENCOUNTER — TELEPHONE (OUTPATIENT)
Dept: ORTHOPEDIC SURGERY | Age: 72
End: 2022-03-16

## 2022-03-16 ENCOUNTER — OFFICE VISIT (OUTPATIENT)
Dept: ORTHOPEDIC SURGERY | Age: 72
End: 2022-03-16
Payer: MEDICARE

## 2022-03-16 VITALS
HEART RATE: 94 BPM | WEIGHT: 224 LBS | TEMPERATURE: 97 F | BODY MASS INDEX: 33.95 KG/M2 | OXYGEN SATURATION: 100 % | HEIGHT: 68 IN

## 2022-03-16 DIAGNOSIS — M65.812 SYNOVITIS OF LEFT SHOULDER: Primary | ICD-10-CM

## 2022-03-16 DIAGNOSIS — T84.498A FAILED ORTHOPEDIC IMPLANT, INITIAL ENCOUNTER (HCC): ICD-10-CM

## 2022-03-16 PROCEDURE — 99024 POSTOP FOLLOW-UP VISIT: CPT | Performed by: PHYSICIAN ASSISTANT

## 2022-03-16 NOTE — TELEPHONE ENCOUNTER
Spoke with Dr. Frances Smith after her visit today.  He would like to see her next week instead on 4/6 to discuss her CT scan

## 2022-03-16 NOTE — PROGRESS NOTES
Raúl Giang  1950     HISTORY OF PRESENT ILLNESS  Raúl Giang is a 70 y.o. female who presents today for evaluation s/p Left shoulder arthroscopic partial synovectomy on 3/4/2022 following a reverse total shoulder arthroplasty on 7/2/21 Patient has not been going to PT. Describes pain as a 4/10. Has been taking percocet for pain. Notes she has not had to take percocet in a couple of days. Still has night pain. Patient denies any fever, chills, chest pain, shortness of breath or calf pain. There are no new illness or injuries to report since last seen in the office. PHYSICAL EXAM:   Visit Vitals  Pulse 94   Temp 97 °F (36.1 °C) (Temporal)   Ht 5' 8\" (1.727 m)   Wt 224 lb (101.6 kg)   SpO2 100%   BMI 34.06 kg/m²      The patient is a well-developed, well-nourished female in no acute distress. The patient is alert and oriented times three. The patient appears to be well groomed.  Mood and affect are normal.  ORTHOPEDIC EXAM of left shoulder:  Inspection: swelling not present,  Bruising not present  Incision, clean, dry, intact, sutures in place  Passive glenohumeral abduction 0-50 degrees  Stability: Stable  Strength: n/a  2+ distal pulses      Labs:   Results     Procedure Component Value Units Date/Time    CULTURE, FUNGUS [[id number]] Collected: 03/04/22 0919    Order Status: Completed Specimen: Tissue Updated: 03/14/22 0957     Special Requests: LF SYNOVIAL TISSUE     Culture result:       NO FUNGUS ISOLATED 10 DAYS          CULTURE, FUNGUS [[id number]] Collected: 03/04/22 0915    Order Status: Completed Specimen: Wound Drainage Updated: 03/14/22 0955     Special Requests: LF GLENOHUMERAL JOINT ANT     Culture result:       NO FUNGUS ISOLATED 10 DAYS          CULTURE, FUNGUS [[id number]] Collected: 03/04/22 0910    Order Status: Completed Specimen: Wound Drainage Updated: 03/14/22 0956     Special Requests: LF ANT SUPERF SKIN     Culture result:       NO FUNGUS ISOLATED 10 DAYS          CULTURE, FUNGUS [[id number]] Collected: 03/04/22 0905    Order Status: Completed Specimen: Wound Drainage Updated: 03/14/22 0956     Special Requests: LF POST SUPERFICIAL SKIN     Culture result:       NO FUNGUS ISOLATED 10 DAYS          CULTURE, TISSUE W Mariam Flower [[id number]] Collected: 03/04/22 0819    Order Status: Completed Specimen: Tissue Updated: 03/08/22 0745     Special Requests: LF SYNOVIAL TISSUE     GRAM STAIN NO WBC'S SEEN         NO ORGANISMS SEEN        Culture result: NO GROWTH 4 DAYS       CULTURE, ANAEROBIC [[id number]] Collected: 03/04/22 0819    Order Status: Completed Specimen: Tissue Updated: 03/08/22 0746     Special Requests: LF SYNOVIAL TISSUE     Culture result: NO GROWTH 4 DAYS       AFB CULTURE + SMEAR W/RFLX ID FROM CULTURE [[id number]] Collected: 03/04/22 0819    Order Status: Completed Specimen: Miscellaneous sample Updated: 03/06/22 0737     Source LF SYNOVIAL TISSUE     AFB Specimen processing Concentration     Acid Fast Smear Negative        Comment: (NOTE)  Performed At: [facility]  [employer] [address] [id number]  John Roberto MD [id number]          Acid Fast Culture PENDING    AFB CULTURE + SMEAR W/RFLX ID FROM CULTURE [[id number]] Collected: 03/04/22 0815    Order Status: Completed Specimen: Miscellaneous sample Updated: 03/06/22 0737     Source LF GLENOHUMERAL JOINT ANT     AFB Specimen processing Concentration     Acid Fast Smear Negative        Comment: (NOTE)  Performed At: [facility]  [employer] [address] [id number]  John Roberto MD [id number]          Acid Fast Culture PENDING    CULTURE, ANAEROBIC [[id number]] Collected: 03/04/22 0815    Order Status: Completed Specimen: Wound Drainage Updated: 03/08/22 0757     Special Requests: LF GLENOHUMERAL JOINT ANT     Culture result: NO GROWTH 4 DAYS       CULTURE, [facility] STAIN [[id number]] Collected: 03/04/22 0815    Order Status: Completed Specimen: Wound Drainage Updated: 03/08/22 0757 Special Requests: LF GLENOHUMERAL JOINT ANT     GRAM STAIN NO WBC'S SEEN         NO ORGANISMS SEEN        Culture result: NO GROWTH 4 DAYS       CULTURE, Terrie Kaba [[id number]] Collected: 03/04/22 0810    Order Status: Completed Specimen: Wound Drainage Updated: 03/08/22 0745     Special Requests: LF ANT SUPERF SKIN     GRAM STAIN NO WBC'S SEEN         NO ORGANISMS SEEN        Culture result: NO GROWTH 4 DAYS       AFB CULTURE + SMEAR W/RFLX ID FROM CULTURE [[id number]] Collected: 03/04/22 0810    Order Status: Completed Specimen: Miscellaneous sample Updated: 03/06/22 0737     Source LF ANT SUPERF SKIN     AFB Specimen processing Concentration     Acid Fast Smear Negative        Comment: (NOTE)  Performed At: [facility] [address] [place] [id number]  Claudia Aguero MD [id number]          Acid Fast Culture PENDING    CULTURE, ANAEROBIC [[id number]] Collected: 03/04/22 0810    Order Status: Completed Specimen: Wound Drainage Updated: 03/08/22 1527     Special Requests: LF ANT SUPERF SKIN     Culture result: NO GROWTH 4 DAYS       AFB CULTURE + SMEAR W/RFLX ID FROM CULTURE [[id number]] Collected: 03/04/22 0805    Order Status: Completed Specimen: Miscellaneous sample Updated: 03/06/22 0737     Source LF POSTERIOR SUPERFICIAL SKIN     Comment: CORRECTED ON 03/04 AT 1007: PREVIOUSLY REPORTED AS SHOULDER        AFB Specimen processing Concentration     Acid Fast Smear Negative        Comment: (NOTE)  Performed At: [facility] [address] [place] [id number]  Claudia Aguero MD [id number]          Acid Fast Culture PENDING    CULTURE, Devoria [place] STAIN [[id number]] Collected: 03/04/22 0805    Order Status: Completed Specimen: Wound from Joint, Shoulder Updated: 03/08/22 0744     Special Requests: LF POSTERIOR SUPERFICIAL SKIN     GRAM STAIN RARE WBCS SEEN         NO ORGANISMS SEEN        Culture result: NO GROWTH 4 DAYS       CULTURE, ANAEROBIC [[id number]] Collected: 03/04/22 0805    Order Status: Completed Specimen: Wound Drainage Updated: 03/08/22 0744     Special Requests: LF POST SUPERF SKIN     Culture result: NO GROWTH 4 DAYS       CULTURE, FUNGUS [[id number]] Collected: 03/04/22 0615    Order Status: Canceled Specimen: Joint Fluid     CULTURE, Estevan Reas STAIN [[id number]]     Order Status: Canceled Specimen: Wound from Joint, Shoulder           IMPRESSION:  S/P Left shoulder arthroscopic partial synovectomy on 3/4/2022  Encounter Diagnoses   Name Primary?  Synovitis of left shoulder Yes    Failed orthopedic implant, initial encounter [facility])         PLAN:   Surgery discussed at length today. Discussed that patient will ultimately need a reverse total shoulder revision. Might need antibiotic spacer. Currently no growth on cultures. It appears that lab stopped cultures after 4 days instead of the 21 days as requested. Continue wearing sling for comfort. Continue with home exercise program to help with mobility.      RTC with Dr. German Torrez in 2-3 weeks      Scribed by Severino Mayorga) as dictated by [facility] [address] and Spine Specialist

## 2022-03-18 PROBLEM — F11.99 OPIOID USE, UNSPECIFIED WITH UNSPECIFIED OPIOID-INDUCED DISORDER (HCC): Status: ACTIVE | Noted: 2021-08-05

## 2022-03-18 PROBLEM — M54.16 RADICULOPATHY, LUMBAR REGION: Status: ACTIVE | Noted: 2017-02-09

## 2022-03-18 PROBLEM — G56.02 LEFT CARPAL TUNNEL SYNDROME: Status: ACTIVE | Noted: 2021-03-09

## 2022-03-18 PROBLEM — M48.061 LUMBAR SPINAL STENOSIS: Status: ACTIVE | Noted: 2017-05-04

## 2022-03-18 PROBLEM — F11.20 OPIOID DEPENDENCE, UNCOMPLICATED (HCC): Status: ACTIVE | Noted: 2021-08-05

## 2022-03-19 PROBLEM — E11.21 TYPE 2 DIABETES WITH NEPHROPATHY (HCC): Status: ACTIVE | Noted: 2018-03-06

## 2022-03-19 PROBLEM — F11.29 OPIOID DEPENDENCE WITH UNSPECIFIED OPIOID-INDUCED DISORDER (HCC): Status: ACTIVE | Noted: 2021-08-05

## 2022-03-19 PROBLEM — E66.01 SEVERE OBESITY (HCC): Status: ACTIVE | Noted: 2019-02-14

## 2022-03-19 PROBLEM — M12.812 LEFT ROTATOR CUFF TEAR ARTHROPATHY: Status: ACTIVE | Noted: 2021-07-02

## 2022-03-19 PROBLEM — M75.102 LEFT ROTATOR CUFF TEAR ARTHROPATHY: Status: ACTIVE | Noted: 2021-07-02

## 2022-03-19 PROBLEM — M96.1 LUMBAR POSTLAMINECTOMY SYNDROME: Status: ACTIVE | Noted: 2017-08-10

## 2022-03-22 ENCOUNTER — OFFICE VISIT (OUTPATIENT)
Dept: ORTHOPEDIC SURGERY | Age: 72
End: 2022-03-22
Payer: MEDICARE

## 2022-03-22 VITALS — OXYGEN SATURATION: 99 % | HEART RATE: 96 BPM | BODY MASS INDEX: 33.95 KG/M2 | WEIGHT: 224 LBS | HEIGHT: 68 IN

## 2022-03-22 DIAGNOSIS — M84.322A: Primary | ICD-10-CM

## 2022-03-22 PROCEDURE — 99024 POSTOP FOLLOW-UP VISIT: CPT | Performed by: ORTHOPAEDIC SURGERY

## 2022-03-22 NOTE — PROGRESS NOTES
Sebastian Sebastian  1950   Chief Complaint   Patient presents with    Shoulder Pain     left , ct results        HISTORY OF PRESENT ILLNESS  Sebastian Sebastian is a 70 y.o. female who presents today for reevaluation and CT review of left shoulder. Patient rates pain as 5/10 today. She is s/p Left shoulder arthroscopic partial synovectomy on 3/4/2022 following a reverse total shoulder arthroplasty on 7/2/21. She has not been going to PT. She took a fall and notes an increase of pain in the shoulder. Presents today ambulating with a walker. Patient denies any fever, chills, chest pain, shortness of breath or calf pain. The remainder of the review of systems is negative. There are no new illness or injuries to report since last seen in the office. There are no changes to medications, allergies, family or social history. PHYSICAL EXAM:   Visit Vitals  Pulse 96   Ht 5' 8\" (1.727 m)   Wt 224 lb (101.6 kg)   SpO2 99%   BMI 34.06 kg/m²     The patient is a well-developed, well-nourished female   in no acute distress. The patient is alert and oriented times three. The patient is alert and oriented times three. Mood and affect are normal.  LYMPHATIC: lymph nodes are not enlarged and are within normal limits  SKIN: normal in color and non tender to palpation. There are no bruises or abrasions noted. NEUROLOGICAL: Motor sensory exam is within normal limits. Reflexes are equal bilaterally. There is normal sensation to pinprick and light touch  MUSCULOSKELETAL:  ORTHOPEDIC EXAM of left shoulder:  Inspection: swelling not present,  Bruising not present  Incision, clean, dry, intact, sutures in place  Passive glenohumeral abduction 0-50 degrees  Stability: Stable  Strength: n/a  2+ distal pulses    PROCEDURE: none    IMAGING: CT of the left shoulder dated 3/15/2022 was reviewed and read by Dr. Analisa Bravo:   IMPRESSION  1. Reverse total left shoulder arthroplasty. No CT evidence of hardware  loosening.   2.  Acute nondisplaced fracture through the scapular spine. 3.  Chronic inferiorly displaced fracture through the coracoid base. 4.  Ectatic ascending thoracic aorta, measuring approximately 4.0 cm diameter. IMPRESSION:  No diagnosis found. PLAN:   1. She is s/p Left shoulder arthroscopic partial synovectomy on 3/4/2022 following a reverse total shoulder arthroplasty on 7/2/21. She now has a nondisplaced fracture of the scapular spine. I advised to start using pain as a guide and limit activities. I am hopeful the healing will progress with limited activities. Return in 4 weeks. Risk factors include: htn, dm   2. No ultrasound exam indicated today  3. No cortisone injection indicated today   4. No Physical/Occupational Therapy indicated today  5. No diagnostic test indicated today:   6. No durable medical equipment indicated today  7. No referral indicated today   8. No medications indicated today:   9. No Narcotic indicated today       RTC 4 weeks       Scribed by Tye Andrew) as dictated by Colton Olson MD    I, Dr. Colton Olson, confirm that all documentation is accurate.     Colton Olson M.D.   [facility] and Spine Specialist

## 2022-03-24 DIAGNOSIS — K21.9 GASTROESOPHAGEAL REFLUX DISEASE WITHOUT ESOPHAGITIS: ICD-10-CM

## 2022-03-24 DIAGNOSIS — E03.9 ACQUIRED HYPOTHYROIDISM: ICD-10-CM

## 2022-03-24 DIAGNOSIS — E11.65 UNCONTROLLED TYPE 2 DIABETES MELLITUS WITH HYPERGLYCEMIA (HCC): ICD-10-CM

## 2022-03-25 RX ORDER — LEVOTHYROXINE SODIUM 50 UG/1
TABLET ORAL
Qty: 90 TABLET | Refills: 0 | Status: SHIPPED | OUTPATIENT
Start: 2022-03-25 | End: 2022-04-29

## 2022-03-25 RX ORDER — METFORMIN HYDROCHLORIDE 500 MG/1
TABLET ORAL
Qty: 90 TABLET | Refills: 0 | Status: SHIPPED | OUTPATIENT
Start: 2022-03-25 | End: 2022-07-01 | Stop reason: SDUPTHER

## 2022-03-25 RX ORDER — DULOXETIN HYDROCHLORIDE 30 MG/1
CAPSULE, DELAYED RELEASE ORAL
Qty: 90 CAPSULE | Refills: 0 | Status: SHIPPED | OUTPATIENT
Start: 2022-03-25 | End: 2022-05-03 | Stop reason: SDUPTHER

## 2022-03-25 RX ORDER — PANTOPRAZOLE SODIUM 20 MG/1
TABLET, DELAYED RELEASE ORAL
Qty: 90 TABLET | Refills: 0 | Status: SHIPPED | OUTPATIENT
Start: 2022-03-25 | End: 2022-07-01 | Stop reason: SDUPTHER

## 2022-04-04 LAB
BACTERIA SPEC CULT: NORMAL
SERVICE CMNT-IMP: NORMAL

## 2022-04-04 NOTE — TELEPHONE ENCOUNTER
Outgoing call to the patient, today 4/4/22  Confirming surgery with Dr. Craig  Surgery date - Wednesday, 4/6/22  Arrival time - 0930 am  Surgery time - 1130 am  Patient confirms and understands.  Denies any questions at this time.  No covid screening needed.  Patient had a positive result on 1/24/22    As of now, only one visitor can accompany patient to surgery.       Per lab there was not enough specimen for the test. PTH order will need to be reordered.

## 2022-04-05 ENCOUNTER — PATIENT MESSAGE (OUTPATIENT)
Dept: FAMILY MEDICINE CLINIC | Age: 72
End: 2022-04-05

## 2022-04-07 DIAGNOSIS — J01.00 ACUTE NON-RECURRENT MAXILLARY SINUSITIS: Primary | ICD-10-CM

## 2022-04-07 RX ORDER — CEPHALEXIN 500 MG/1
500 CAPSULE ORAL 3 TIMES DAILY
Qty: 12 CAPSULE | Refills: 0 | Status: SHIPPED | OUTPATIENT
Start: 2022-04-07 | End: 2022-04-11

## 2022-04-12 ENCOUNTER — TELEPHONE (OUTPATIENT)
Dept: CARDIOLOGY CLINIC | Age: 72
End: 2022-04-12

## 2022-04-12 NOTE — TELEPHONE ENCOUNTER
Patient was in hospital over the weekend for tachycardia and states that she cannot walk 10 feet without taking a break because she is out of breath. Informed javier was not here this week and is completely booked until may. Patient has appt in may and says she will continue to call to see if there are openings . Patient would also like to speak to a nurse.

## 2022-04-18 LAB
ACID FAST STN SPEC: NEGATIVE
MYCOBACTERIUM SPEC QL CULT: NEGATIVE
SPECIMEN PREPARATION: NORMAL
SPECIMEN SOURCE: NORMAL

## 2022-04-21 ENCOUNTER — OFFICE VISIT (OUTPATIENT)
Dept: CARDIOLOGY CLINIC | Age: 72
End: 2022-04-21
Payer: MEDICARE

## 2022-04-21 ENCOUNTER — HOSPITAL ENCOUNTER (OUTPATIENT)
Dept: LAB | Age: 72
Discharge: HOME | End: 2022-04-21

## 2022-04-21 VITALS
BODY MASS INDEX: 33.54 KG/M2 | WEIGHT: 220.6 LBS | SYSTOLIC BLOOD PRESSURE: 126 MMHG | TEMPERATURE: 97 F | DIASTOLIC BLOOD PRESSURE: 76 MMHG | HEART RATE: 99 BPM | OXYGEN SATURATION: 99 %

## 2022-04-21 DIAGNOSIS — R00.0 TACHYCARDIA: Primary | ICD-10-CM

## 2022-04-21 DIAGNOSIS — E66.01 MORBID OBESITY, UNSPECIFIED OBESITY TYPE (HCC): ICD-10-CM

## 2022-04-21 DIAGNOSIS — E78.00 PURE HYPERCHOLESTEROLEMIA: ICD-10-CM

## 2022-04-21 DIAGNOSIS — I10 ESSENTIAL HYPERTENSION WITH GOAL BLOOD PRESSURE LESS THAN 140/90: ICD-10-CM

## 2022-04-21 DIAGNOSIS — I35.0 NONRHEUMATIC AORTIC VALVE STENOSIS: ICD-10-CM

## 2022-04-21 LAB — XX-LABCORP SPECIMEN COL,LCBCF: NORMAL

## 2022-04-21 PROCEDURE — 1101F PT FALLS ASSESS-DOCD LE1/YR: CPT | Performed by: INTERNAL MEDICINE

## 2022-04-21 PROCEDURE — G8536 NO DOC ELDER MAL SCRN: HCPCS | Performed by: INTERNAL MEDICINE

## 2022-04-21 PROCEDURE — G8399 PT W/DXA RESULTS DOCUMENT: HCPCS | Performed by: INTERNAL MEDICINE

## 2022-04-21 PROCEDURE — 3017F COLORECTAL CA SCREEN DOC REV: CPT | Performed by: INTERNAL MEDICINE

## 2022-04-21 PROCEDURE — 99001 SPECIMEN HANDLING PT-LAB: CPT

## 2022-04-21 PROCEDURE — G8417 CALC BMI ABV UP PARAM F/U: HCPCS | Performed by: INTERNAL MEDICINE

## 2022-04-21 PROCEDURE — 99214 OFFICE O/P EST MOD 30 MIN: CPT | Performed by: INTERNAL MEDICINE

## 2022-04-21 PROCEDURE — 1090F PRES/ABSN URINE INCON ASSESS: CPT | Performed by: INTERNAL MEDICINE

## 2022-04-21 PROCEDURE — 93000 ELECTROCARDIOGRAM COMPLETE: CPT | Performed by: INTERNAL MEDICINE

## 2022-04-21 PROCEDURE — G9899 SCRN MAM PERF RSLTS DOC: HCPCS | Performed by: INTERNAL MEDICINE

## 2022-04-21 PROCEDURE — G8754 DIAS BP LESS 90: HCPCS | Performed by: INTERNAL MEDICINE

## 2022-04-21 PROCEDURE — G8428 CUR MEDS NOT DOCUMENT: HCPCS | Performed by: INTERNAL MEDICINE

## 2022-04-21 PROCEDURE — G9717 DOC PT DX DEP/BP F/U NT REQ: HCPCS | Performed by: INTERNAL MEDICINE

## 2022-04-21 PROCEDURE — G8752 SYS BP LESS 140: HCPCS | Performed by: INTERNAL MEDICINE

## 2022-04-21 NOTE — PROGRESS NOTES
[facility]    Ms. Edythe Simmonds is a 67 y.o. female with history of Aortic stenosis s/p Aortic valve replacement (03/16), fibromyalgia, diabetes, obesity, hypertension, hypothyroidism and COPD. Mr. Edythe Simmonds is here today for follow up appointment. Dyspnea has remained stable over the years she follows up with Dr. La Galindo for her asthma and COPD. Patient made an appointment today because for last couple weeks patient has been experiencing tachycardia with heart rate as high as 150 bpm.  She also went to emergency department however she did not have any tachycardia documented on EKG at that time. She feels like minimal exertional activity keeps her heart rate as high as 150 bpm.  This is relatively new    Past Medical History:   Diagnosis Date    Ankle fracture     Aortic stenosis     S/P AVR in 2016    Asthma     Carpal tunnel syndrome on both sides     Chronic obstructive pulmonary disease (HCC)     Chronic pain     back pain    COVID-19 vaccine series completed 04/29/2021    Diabetes ([address]     Diverticulitis     Fibromyalgia     H/O aortic valve replacement 03/2016    21 mm bovine pericardial bioprosthesis and epiaortic scanning      H/O: HTN (hypertension)     no meds now- sp valve replacement    History of COVID-19 01/2022    Home test. Results not saved    Hypertension     no medication    Hypothyroidism     Lumbar post-laminectomy syndrome     Menopause     Obesity     Osteoarthritis     Psychotic disorder ([address]     Pulmonary HTN ([address]     per pcp note cc- patient denies.  Sciatica     Skin cancer 2013    right forearm. Past Surgical History:   Procedure Laterality Date    COLONOSCOPY      COLONOSCOPY N/A 9/15/2017    COLONOSCOPY performed by Lio Saleem MD at [address] HX ARTHROPLASTY Left 07/2021    HX CARPAL TUNNEL RELEASE Left 03/2021    HX CATARACT REMOVAL Right 1995    lens  replaced.      HX CATARACT REMOVAL Left 02/2018    HX COLONOSCOPY  09/15/2017    dr. Franky Pizarro  f/u 5 years.  HX HEENT  1990's    sinus surgery     HX HEENT Right 11/02/2017    laser for right eye cataracts.  HX LUMBAR FUSION  2004    L3-L4-L5    HX LUMBAR LAMINECTOMY  2002    HX MOHS PROCEDURES Left 1990    HX ORTHOPAEDIC       x3    HX SHOULDER REPLACEMENT Right 03/29/2017    reverse.  HX SHOULDER REPLACEMENT Left 07/02/2021    [place] CARDIAC SURG PROCEDURE UNLIST  03/2016    aortic valve replacement.  [place] CHEST SURGERY PROCEDURE UNLISTED  03/2016    Open Heart Surgery    [place] COLSC FLX W/RMVL OF TUMOR POLYP LESION SNARE TQ  07/09/2014 repeat in 3 years    Dr. Victorina Gonzales  2016    Aortic valve replacement(bovine)       Current Outpatient Medications   Medication Sig    pantoprazole (PROTONIX) 20 mg tablet Take 1 tablet by mouth once daily    atorvastatin (LIPITOR) 20 mg tablet Take 1 tablet by mouth once daily    aspirin delayed-release 81 mg tablet Take 81 mg by mouth daily.  metFORMIN (GLUCOPHAGE) 500 mg tablet TAKE 1 TABLET BY MOUTH ONCE DAILY WITH SUPPER    DULoxetine (CYMBALTA) 30 mg capsule TAKE 1 CAPSULE BY MOUTH THREE TIMES DAILY    Euthyrox 50 mcg tablet TAKE 1 TABLET BY MOUTH ONCE DAILY BEFORE BREAKFAST    Narcan 4 mg/actuation nasal spray Give 1 spray into 1 nostril. Give additional doses using a new nasal spray with each dose, if patient does not respond or relapses into respiratory depression. Call 911. Additional doses may be given every 2 to 3 minutes until medical assistance arrives.  traMADoL (ULTRAM) 50 mg tablet Take 1 Tablet by mouth every six (6) hours as needed for Pain for up to 90 days. Max Daily Amount: 200 mg.  tiaGABine (GabitriL) 2 mg tablet Take 1 Tablet by mouth nightly.  tiotropium-olodateroL (Stiolto Respimat) 2.5-2.5 mcg/actuation inhaler Take 2 Puffs by inhalation daily.     fluticasone propionate (FLONASE) 50 mcg/actuation nasal spray Use 1 spray(s) in each nostril twice daily    acetaminophen (Tylenol Extra Strength) 500 mg tablet Take 500 mg by mouth every six (6) hours as needed for Pain.  FLUoxetine (PROzac) 10 mg capsule Take 1 capsule by mouth once daily    meloxicam (MOBIC) 15 mg tablet Take 1 tablet by mouth once daily    pramoxine-hc-chloroxylenol 1-1-0.1 % lotion Apply  to affected area two (2) times a day.  desonide (TRIDESILON) 0.05 % cream APPLY   EXTERNALLY TO AFFECTED AREA TWICE DAILY    meclizine (ANTIVERT) 25 mg tablet Take 1 Tablet by mouth three (3) times daily as needed for Dizziness.  glucose blood VI test strips (True Metrix Pro Test Strip) strip Test blood glucose 3 times a day    vitamin E acetate (VITAMIN E PO) Take  by mouth daily.  cyanocobalamin (VITAMIN B-12) 500 mcg tablet Take 500 mcg by mouth daily.  cetirizine (ZYRTEC) 10 mg tablet TAKE 1 TABLET BY MOUTH EVERY DAY. GENERIC FOR ZYRTEC    VENTOLIN HFA 90 mcg/actuation inhaler 2 Puffs by Aerosolization route every four (4) hours as needed.  B.infantis-B.ani-B.long-B.bifi (PROBIOTIC 4X) 10-15 mg TbEC Take  by mouth daily.  Cholecalciferol, Vitamin D3, (VITAMIN D3) 1,000 unit cap Take 1,000 Units by mouth daily.  Lancets (ACCU-CHEK SOFTCLIX LANCETS) misc Please use one lancet to test blood sugars twice a day.  BLOOD-GLUCOSE METER (ACCU-CHEK MARCE MONITORING) by Does Not Apply route. No current facility-administered medications for this visit.        Allergies and Sensitivities:  Allergies   Allergen Reactions    Latex, Natural Rubber Hives    Adhesive Rash     Some bandaids    Ciprofloxacin Rash    Codeine Nausea and Vomiting    Demerol [Meperidine] Nausea and Vomiting    Diclofenac Shortness of Breath and Palpitations    Erythromycin Rash    Gabapentin Vertigo    Levaquin [Levofloxacin] Rash    Loratadine Swelling    Morphine Nausea and Vomiting    Penicillin G Hives and Rash     Does fine with Keflex    Sulfa (Sulfonamide Antibiotics) Itching    Vicodin [Hydrocodone-Acetaminophen] Other (comments)     Metallic taste in mouth       Family History:  Family History   Problem Relation Age of Onset    Hypertension Father     Heart Disease Father     Alcohol abuse Father        Social History:  Social History     Tobacco Use    Smoking status: Former Smoker     Years: 45.00     Types: Cigarettes     Quit date: 7/1/2011     Years since quitting: 10.8    Smokeless tobacco: Never Used   Vaping Use    Vaping Use: Never used   Substance Use Topics    Alcohol use: No    Drug use: Not Currently     Types: Marijuana, Cocaine     She  reports that she quit smoking about 10 years ago. Her smoking use included cigarettes. She quit after 45.00 years of use. She has never used smokeless tobacco.  She  reports no history of alcohol use. Review of Systems:  Cardiac symptoms as noted above in HPI. All others negative. Denies skin rash,  photophobia, neck pain, hemoptysis, chronic cough, nausea, vomiting, hematuria, burning micturition, BRBPR, chronic headaches. Physical Exam:  BP Readings from Last 3 Encounters:   04/21/22 126/76   03/04/22 111/72   03/01/22 128/62         Pulse Readings from Last 3 Encounters:   04/21/22 99   03/22/22 96   03/16/22 94          Wt Readings from Last 3 Encounters:   04/21/22 100.1 kg (220 lb 9.6 oz)   03/22/22 101.6 kg (224 lb)   03/16/22 101.6 kg (224 lb)       Constitutional: Oriented to person, place, and time. HENT: Head: Normocephalic and atraumatic. Neck: No JVD present. Cardiovascular: Regular rhythm. No gallop or rubs appreciated. No significant murmur  Lung: Breath sounds normal. No respiratory distress. No ronchi or rales appreciated  Abdominal: No tenderness. No rebound and no guarding.    Musculoskeletal: No LE swelling    Review of Data  LABS:   Lab Results   Component Value Date/Time    Sodium 141 06/24/2021 01:19 PM    Potassium 4.8 06/24/2021 01:19 PM    Chloride 108 06/24/2021 01:19 PM    CO2 29 06/24/2021 01:19 PM    Glucose 90 06/24/2021 01:19 PM    BUN 17 06/24/2021 01:19 PM    Creatinine 1.06 06/24/2021 01:19 PM     Lipids Latest Ref Rng & Units 5/25/2021 10/10/2019   Chol, Total 100 - 199 mg/dL 113 118   HDL >39 mg/dL 52 55   LDL 0 - 99 mg/dL 45 49   Trig 0 - 149 mg/dL 83 70   Some recent data might be hidden     Lab Results   Component Value Date/Time    ALT (SGPT) 34 06/24/2021 01:19 PM     Lab Results   Component Value Date/Time    Hemoglobin A1c 6.5 (H) 02/23/2022 12:00 AM     EKG  (04/16) Sinus rhythm at 96 bpm    STRESS TEST (2012)   No EKG or scintigraphic evidence of ischemia or infarction. Normal LVEF    ECHO (01/16)  SUMMARY:  Left ventricle: Systolic function was normal. Ejection fraction was estimated in the range of 55 % to 60 %. There were no regional wall motion  abnormalities. Doppler parameters were consistent with abnormal left ventricular relaxation (grade 1 diastolic dysfunction). Right ventricle: Systolic function was normal.  Aortic valve: The valve was trileaflet. Leaflets exhibited calcification and moderate- severely reduced cuspal separation. There was severe aortic  stenosis. There was no significant regurgitation. Valve peak gradient was 85 mmHg. Valve mean gradient was 50 mmHg. HOLTER (05/16)  Interpretation:  1. Rhythm is sinus. 2. PACs (1.3% burden). 3. Rare PVCs. ECHO (03/2021)  Left Ventricle Normal cavity size and systolic function (ejection fraction normal). Mild concentric hypertrophy. Wall motion: normal. The estimated EF is 55 - 60%. Visually measured ejection fraction. There is age-appropriate left ventricular diastolic function. Wall Scoring The left ventricular wall motion is normal.            Left Atrium Normal cavity size. Left Atrium volume index is 27 mL/m2. Right Ventricle Normal cavity size and global systolic function. Right Atrium Normal cavity size.    Interatrial Septum No interatrial shunt visualized on color doppler. Aortic Valve Normal valve structure, no stenosis and no regurgitation. Prosthetic aortic valve. Aortic valve peak gradient is 25 mmHg. Aortic valve mean gradient is 13 mmHg. Aortic valve area is 1.7 cm2. There is a bioprosthetic valve present. The prosthetic valve is normal.   Mitral Valve No stenosis. Mitral valve non-specific thickening. Mild mitral annular calcification. Trace regurgitation. Tricuspid Valve Normal valve structure, no stenosis and no regurgitation. Pulmonic Valve Pulmonic valve not well visualized, but normal doppler findings. No stenosis. CARDIAC CATH: (01/16)  PRESSURE HEMODYNAMICS: Systemic aortic pressure was 131/68 mmHg. Left ventricular pressure was 160/2/12 mmHg. Based on the data, peak-to-peak gradient between LV and aortic was approximately 30 mmHg. Mean RA  7 mmHg,   RV  32/1/8 mmHg,  PA  27/9/16 mmHg,   PCWP 10 mm hg  Cardiac output by Ellis calculation was 4.3 L/min and cardiac index was 2.02 liters L/min/meter squared. Mixed venous saturation was 65%, PA saturation was 65%, and the radial artery saturation was 90%. No evidence of intracardiac shunting. CORONARY ANGIOGRAM  1. LM: No significant obstructive disease. 2. LAD: 10-20% luminal irregularities, otherwise normal. Three diagonal branches angiographically normal.  3. LCX: 20-30% luminal irregularities, otherwise no obstructive disease. 4. OM1: Large bifurcating vessel without any obstructive disease. 5. RCA: Proximal 20%, mid discrete 40%, otherwise angiographically normal. Dominant vessel. IMPRESSION & PLAN:  Ms. Juan Paz is a 67 y.o. female with Aortic stenosis, COPD, obesity, diabetes, fibromyalgia. Aortic stenosis:    Ms. Juan Paz [facility]. Aspirin will be continued lifelong. ECHO in 06/17 with mean gradient across valve ~ 10 mm Hg. Repeat echo in May 2019 with gradient 13 mmHg. Physiologic for prosthetic valve. Echo in 04/2021 with mean gradient approximately 13 mmHg. Continue ASA  Lifelong antibiotic prophylaxis is required before certain procedure. Diabetes:  Goal hemoglobin A1c less than 7 is recommended from a cardiovascular standpoint. Last hemoglobin A1c was 6.5. Continue same management per PCP    Hypertension:  BP today 126/76 mm Hg. HLD: last LDL 49. On atorvastatin. Continue same. Tachycardia:  For last couple weeks patient has been expensing tachycardia with minimal activity. As high as 150 bpm.  Will place event monitor for 30-day to evaluate any arrhythmia  We will check TSH, T3 and T4. Patient is on thyroid replacement at this time. Will consider beta-blocker if documented evidence of tachy arrhythmia  Currently without any presyncope or syncope    This plan was discussed with patient who is in agreement. Thank you for allowing me to participate in patient care. Please feel free to call me if you have any question or concern.

## 2022-04-21 NOTE — PROGRESS NOTES
Identified pt with two pt identifiers(name and ). Reviewed record in preparation for visit and have obtained necessary documentation. Stacie Singh presents today for   Chief Complaint   Patient presents with   [facility] Follow Up       Pt c/o  SOB          Stacie Singh preferred language for health care discussion is english/other. Personal Protective Equipment:   Personal Protective Equipment was used including: mask-surgical and hands-gloves. Patient was placed on no precaution(s). Patient was masked. Precautions:   Patient currently on None  Patient currently roomed with door closed. Is someone accompanying this pt? No      Is the patient using any DME equipment during OV? Yes.[email or web address]     Depression Screening:  3 most recent PHQ Screens 2022   Little interest or pleasure in doing things Not at all   Feeling down, depressed, irritable, or hopeless Not at all   Total Score PHQ 2 0   Trouble falling or staying asleep, or sleeping too much -   Feeling tired or having little energy -   Poor appetite, weight loss, or overeating -   Feeling bad about yourself - or that you are a failure or have let yourself or your family down -   Trouble concentrating on things such as school, work, reading, or watching TV -   Moving or speaking so slowly that other people could have noticed; or the opposite being so fidgety that others notice -   Thoughts of being better off dead, or hurting yourself in some way -   PHQ 9 Score -   How difficult have these problems made it for you to do your work, take care of your home and get along with others -       Learning Assessment:  Learning Assessment 2016   PRIMARY LEARNER Patient   HIGHEST LEVEL OF EDUCATION - PRIMARY LEARNER  -   PRIMARY LANGUAGE ENGLISH   LEARNER PREFERENCE PRIMARY LISTENING   ANSWERED BY patient   RELATIONSHIP SELF       Abuse Screening:  Abuse Screening Questionnaire 2019   Do you ever feel afraid of your partner?  N   Are you in a relationship with someone who physically or mentally threatens you? N   Is it safe for you to go home? Y       Fall Risk  Fall Risk Assessment, last 12 mths 2/25/2022   Able to walk? Yes   Fall in past 12 months? 0   Do you feel unsteady? 0   Are you worried about falling 0   Is the gait abnormal? -   Number of falls in past 12 months -   Fall with injury? -       Pt currently taking Anticoagulant therapy? No   Pt currently taking Antiplatelet therapy? Yes     Coordination of Care:  1. Have you been to the ER, urgent care clinic since your last visit? Hospitalized since your last visit? Yes. 4/10/2022. tachy    2. Have you seen or consulted any other health care providers outside of the [address] [place] [address] since your last visit? Include any pap smears or colon screening. Yes. Please see Red banners under Allergies and Med Rec to remove outside inquires. All correct information has been verified with patient and added to chart.      Medication's patient's would liked removed has been marked not taking to be removed per Verbal order and read back per Kobe Luna MD

## 2022-04-21 NOTE — PATIENT INSTRUCTIONS
Labs  Tsh, T4, T3    No appointment required for lab work  [facility] [place] [address] [facility]  Hours are Mon-Fri 7:00 am-3:30 pm  **closed from 12:30 pm-1pm daily**      Other Testing  [employer]( 30 day) -will be calling you to confirm your address to send your Heart Monitor. Please allow 7-10 business days for monitor to arrive at your home.   Customer Service for [address]:  [phone]

## 2022-04-22 LAB
T4 FREE SERPL-MCNC: 1.16 NG/DL (ref 0.82–1.77)
TSH SERPL DL<=0.005 MIU/L-ACNC: 7.36 UIU/ML (ref 0.45–4.5)

## 2022-04-25 ENCOUNTER — OFFICE VISIT (OUTPATIENT)
Dept: ORTHOPEDIC SURGERY | Age: 72
End: 2022-04-25
Payer: MEDICARE

## 2022-04-25 VITALS — HEART RATE: 87 BPM | WEIGHT: 220 LBS | TEMPERATURE: 96.4 F | OXYGEN SATURATION: 99 % | BODY MASS INDEX: 33.45 KG/M2

## 2022-04-25 DIAGNOSIS — M84.322A: Primary | ICD-10-CM

## 2022-04-25 PROCEDURE — G9717 DOC PT DX DEP/BP F/U NT REQ: HCPCS | Performed by: ORTHOPAEDIC SURGERY

## 2022-04-25 PROCEDURE — 99213 OFFICE O/P EST LOW 20 MIN: CPT | Performed by: ORTHOPAEDIC SURGERY

## 2022-04-25 PROCEDURE — G8417 CALC BMI ABV UP PARAM F/U: HCPCS | Performed by: ORTHOPAEDIC SURGERY

## 2022-04-25 PROCEDURE — 1090F PRES/ABSN URINE INCON ASSESS: CPT | Performed by: ORTHOPAEDIC SURGERY

## 2022-04-25 PROCEDURE — 73010 X-RAY EXAM OF SHOULDER BLADE: CPT | Performed by: ORTHOPAEDIC SURGERY

## 2022-04-25 PROCEDURE — G8756 NO BP MEASURE DOC: HCPCS | Performed by: ORTHOPAEDIC SURGERY

## 2022-04-25 PROCEDURE — 3017F COLORECTAL CA SCREEN DOC REV: CPT | Performed by: ORTHOPAEDIC SURGERY

## 2022-04-25 PROCEDURE — G8399 PT W/DXA RESULTS DOCUMENT: HCPCS | Performed by: ORTHOPAEDIC SURGERY

## 2022-04-25 PROCEDURE — G8536 NO DOC ELDER MAL SCRN: HCPCS | Performed by: ORTHOPAEDIC SURGERY

## 2022-04-25 PROCEDURE — 1101F PT FALLS ASSESS-DOCD LE1/YR: CPT | Performed by: ORTHOPAEDIC SURGERY

## 2022-04-25 PROCEDURE — G8427 DOCREV CUR MEDS BY ELIG CLIN: HCPCS | Performed by: ORTHOPAEDIC SURGERY

## 2022-04-25 PROCEDURE — G9899 SCRN MAM PERF RSLTS DOC: HCPCS | Performed by: ORTHOPAEDIC SURGERY

## 2022-04-29 DIAGNOSIS — E03.9 ACQUIRED HYPOTHYROIDISM: ICD-10-CM

## 2022-04-29 RX ORDER — LEVOTHYROXINE SODIUM 50 UG/1
TABLET ORAL
Qty: 90 TABLET | Refills: 0 | Status: SHIPPED | OUTPATIENT
Start: 2022-04-29 | End: 2022-08-10 | Stop reason: ALTCHOICE

## 2022-05-03 ENCOUNTER — OFFICE VISIT (OUTPATIENT)
Dept: FAMILY MEDICINE CLINIC | Age: 72
End: 2022-05-03
Payer: MEDICARE

## 2022-05-03 VITALS
SYSTOLIC BLOOD PRESSURE: 123 MMHG | WEIGHT: 217.7 LBS | HEART RATE: 97 BPM | BODY MASS INDEX: 32.99 KG/M2 | TEMPERATURE: 97.7 F | OXYGEN SATURATION: 100 % | HEIGHT: 68 IN | DIASTOLIC BLOOD PRESSURE: 73 MMHG | RESPIRATION RATE: 17 BRPM

## 2022-05-03 DIAGNOSIS — E03.9 ACQUIRED HYPOTHYROIDISM: ICD-10-CM

## 2022-05-03 DIAGNOSIS — I27.20 PULMONARY HYPERTENSION (HCC): ICD-10-CM

## 2022-05-03 DIAGNOSIS — M12.812 ROTATOR CUFF ARTHROPATHY OF LEFT SHOULDER: ICD-10-CM

## 2022-05-03 DIAGNOSIS — S42.115A CLOSED NONDISPLACED FRACTURE OF BODY OF LEFT SCAPULA, INITIAL ENCOUNTER: ICD-10-CM

## 2022-05-03 DIAGNOSIS — F41.9 ANXIETY: Primary | ICD-10-CM

## 2022-05-03 PROCEDURE — G8417 CALC BMI ABV UP PARAM F/U: HCPCS | Performed by: FAMILY MEDICINE

## 2022-05-03 PROCEDURE — G9899 SCRN MAM PERF RSLTS DOC: HCPCS | Performed by: FAMILY MEDICINE

## 2022-05-03 PROCEDURE — G8754 DIAS BP LESS 90: HCPCS | Performed by: FAMILY MEDICINE

## 2022-05-03 PROCEDURE — 1101F PT FALLS ASSESS-DOCD LE1/YR: CPT | Performed by: FAMILY MEDICINE

## 2022-05-03 PROCEDURE — G8752 SYS BP LESS 140: HCPCS | Performed by: FAMILY MEDICINE

## 2022-05-03 PROCEDURE — G8399 PT W/DXA RESULTS DOCUMENT: HCPCS | Performed by: FAMILY MEDICINE

## 2022-05-03 PROCEDURE — 99214 OFFICE O/P EST MOD 30 MIN: CPT | Performed by: FAMILY MEDICINE

## 2022-05-03 PROCEDURE — 3017F COLORECTAL CA SCREEN DOC REV: CPT | Performed by: FAMILY MEDICINE

## 2022-05-03 PROCEDURE — G8536 NO DOC ELDER MAL SCRN: HCPCS | Performed by: FAMILY MEDICINE

## 2022-05-03 PROCEDURE — 1090F PRES/ABSN URINE INCON ASSESS: CPT | Performed by: FAMILY MEDICINE

## 2022-05-03 PROCEDURE — G9717 DOC PT DX DEP/BP F/U NT REQ: HCPCS | Performed by: FAMILY MEDICINE

## 2022-05-03 PROCEDURE — G8427 DOCREV CUR MEDS BY ELIG CLIN: HCPCS | Performed by: FAMILY MEDICINE

## 2022-05-03 RX ORDER — BUSPIRONE HYDROCHLORIDE 10 MG/1
10 TABLET ORAL 3 TIMES DAILY
Qty: 90 TABLET | Refills: 1 | Status: SHIPPED | OUTPATIENT
Start: 2022-05-03 | End: 2022-05-18 | Stop reason: SINTOL

## 2022-05-03 RX ORDER — LEVOTHYROXINE SODIUM 25 UG/1
25 TABLET ORAL
Qty: 90 TABLET | Refills: 0 | Status: SHIPPED | OUTPATIENT
Start: 2022-05-03 | End: 2022-08-11

## 2022-05-03 RX ORDER — DULOXETIN HYDROCHLORIDE 30 MG/1
30 CAPSULE, DELAYED RELEASE ORAL 3 TIMES DAILY
Qty: 90 CAPSULE | Refills: 5 | Status: SHIPPED | OUTPATIENT
Start: 2022-05-03 | End: 2022-07-01 | Stop reason: DRUGHIGH

## 2022-05-03 NOTE — PROGRESS NOTES
Flaco Arredondo is a 67 y.o. female (: 1950) presenting to address:    Chief Complaint   Patient presents with   [facility] Follow-up    Medication Refill       There were no vitals filed for this visit. Hearing/Vision:   No exam data present    Learning Assessment:     Learning Assessment 2016   PRIMARY LEARNER Patient   HIGHEST LEVEL OF EDUCATION - PRIMARY LEARNER  -   PRIMARY LANGUAGE ENGLISH   LEARNER PREFERENCE PRIMARY LISTENING   ANSWERED BY patient   RELATIONSHIP SELF     Depression Screening:     3 most recent PHQ Screens 5/3/2022   Little interest or pleasure in doing things Not at all   Feeling down, depressed, irritable, or hopeless Not at all   Total Score PHQ 2 0   Trouble falling or staying asleep, or sleeping too much -   Feeling tired or having little energy -   Poor appetite, weight loss, or overeating -   Feeling bad about yourself - or that you are a failure or have let yourself or your family down -   Trouble concentrating on things such as school, work, reading, or watching TV -   Moving or speaking so slowly that other people could have noticed; or the opposite being so fidgety that others notice -   Thoughts of being better off dead, or hurting yourself in some way -   PHQ 9 Score -   How difficult have these problems made it for you to do your work, take care of your home and get along with others -     Fall Risk Assessment:     Fall Risk Assessment, last 12 mths 5/3/2022   Able to walk? Yes   Fall in past 12 months? 0   Do you feel unsteady? 0   Are you worried about falling 0   Is the gait abnormal? -   Number of falls in past 12 months -   Fall with injury? -     Abuse Screening:     Abuse Screening Questionnaire 2019   Do you ever feel afraid of your partner? N   Are you in a relationship with someone who physically or mentally threatens you? N   Is it safe for you to go home?  Y     ADL Assessment:     ADL Assessment 2019   Feeding yourself No Help Needed   Getting from bed to chair No Help Needed   Getting dressed No Help Needed   Bathing or showering No Help Needed   Walk across the room (includes cane/walker) Help Needed   Using the telphone No Help Needed   Taking your medications No Help Needed   Preparing meals No Help Needed   Managing money (expenses/bills) No Help Needed   Moderately strenuous housework (laundry) No Help Needed   Shopping for personal items (toiletries/medicines) No Help Needed   Shopping for groceries No Help Needed   Driving No Help Needed   Climbing a flight of stairs Help Needed   Getting to places beyond walking distances Help Needed        Coordination of Care Questionaire:     1. \"Have you been to the ER, urgent care clinic since your last visit? Hospitalized since your last visit? \" Yes Where: [facility]  2. \"Have you seen or consulted any other health care providers outside of the [address] [place] [address] [facility] since your last visit? \" Yes Where: cardiologist     3. For patients aged 39-70: Has the patient had a colonoscopy / FIT/ Cologuard? Yes - no Care Gap present      If the patient is female:    4. For patients aged 41-77: Has the patient had a mammogram within the past 2 years? No  Pt can't because of arm    5. For patients aged 21-65: Has the patient had a pap smear?  No

## 2022-05-03 NOTE — PROGRESS NOTES
Laura Dumont is a 67 y.o.  female and presents with    Chief Complaint   Patient presents with    ED Follow-up    Medication Refill     Subjective:  She is here for evaluation after near syncope almost a week ago. She went to the ER and underwent testing; she was sent to her cardiologist who wants her to wear a heart monitor. She has tachycardia. She is restless. She has her left arm in a sling. She had shoulder surgery which is now complicated by fracture. She feels anxious; she lost her dog 4 months ago and grieved over this. She has cried. She wanted to leave town but her symptoms kept her from leaving. She has not seen her grandchildren for the past 5 months. She has been had difficulty swallowing with choking intermittently. She reports that today it feels are to swallow. Thyroid Review:  Patient is seen for followup of hypothyroidism. Thyroid ROS: fatigue, anxiousness, tremulousness and weight loss. Anxiety Review:  Patient is seen for sleep disturbance. Current treatment includes ambien and cymbalta. Ongoing symptoms include: insomnia but improved with ambien. Patient denies: palpitations, sweating, chest pain, shortness of breath, dizziness, paresthesias, racing thoughts, psychomotor agitation, feelings of losing control, difficulty concentrating, suicidal ideation. Reported side effects from the treatment: none.     Diabetes  She has been taking medication as prescribed.  She is following up after laboratory testing.  She denies increased thirst or urination.       Osteoarthritis and Chronic Pain:  Patient has osteoarthritis, primarily affecting the neck and back.  She is going to have left arm done for carpal tunnel.    Symptoms onset: problem is longstanding. Rheumatological ROS: ongoing significant pain in neck and back which is stable and controlled by PRN meds. Response to treatment plan: symptoms have progressed to a point and plateaued. .   ROS   General ROS: negative for - chills, fatigue or fever; + weight loss  Psychological ROS: positive for - depression associated with COVID 19 and being home bound  Ophthalmic ROS: positive for - uses glasses  ENT ROS: positive for - headaches intermittently  Endocrine ROS: negative for - temperature intolerance or unexpected weight changes  Respiratory ROS: no cough, no wheezing;   Cardiovascular ROS: no chest pain or dyspnea on exertion; intermittent tachycardia; she has h/o aortic valve replacement and is followed by Dr. Kyler Hanson. Gastrointestinal ROS: no nausea or vomiting  Genito-Urinary ROS: no dysuria, trouble voiding, or hematuria  Neurological ROS: no TIA or stroke symptoms  Dermatological ROS: no lesions or rashes    All other systems reviewed and are negative. Objective:  Vitals:    05/03/22 1413   BP: 123/73   Pulse: 97   Resp: 17   Temp: 97.7 °F (36.5 °C)   TempSrc: Temporal   SpO2: 100%   Weight: 217 lb 11.2 oz (98.7 kg)   Height: 5' 8\" (1.727 m)   PainSc:   5       alert, well appearing, and in no distress, oriented to person, place, and time and obese  Mental status - anxious  Neck - supple, no significant adenopathy, thyroid exam: thyroid is normal in size without nodules or tenderness  Chest - clear to auscultation, no wheezes, rales or rhonchi, symmetric air entry  Heart - normal rate, regular rhythm, normal S1, S2, no murmurs, rubs, clicks or gallops    LABS   TSH 7.36  TESTS      Assessment/Plan:    1. Anxiety  Increased symptoms; taking duloxetine and prozac  Start buspirone    2. Acquired hypothyroidism  Subtherapeutic dosing; increase levothyroxine dose by 25 mcg  - levothyroxine (Euthyrox) 25 mcg tablet; Take 1 Tablet by mouth Daily (before breakfast). Dispense: 90 Tablet; Refill: 0  - TSH 3RD GENERATION; Future    3. Pulmonary hypertension (HCC)  Goal <130/80    4. Rotator cuff arthropathy of left shoulder  F/u with ortho    5.  Closed nondisplaced fracture of body of left scapula, initial encounter  F/u with ortho    Lab review: labs reviewed, I note that TSH abnormal high, orders written for new lab studies as appropriate; see orders      I have discussed the diagnosis with the patient and the intended plan as seen in the above orders. The patient has received an after-visit summary and questions were answered concerning future plans. I have discussed medication side effects and warnings with the patient as well. I have reviewed the plan of care with the patient, accepted their input and they are in agreement with the treatment goals.

## 2022-05-04 ENCOUNTER — PATIENT MESSAGE (OUTPATIENT)
Dept: CARDIOLOGY CLINIC | Age: 72
End: 2022-05-04

## 2022-05-04 NOTE — TELEPHONE ENCOUNTER
----- Message from Anastasiia Dodge sent at 5/4/2022  4:33 AM EDT -----  Regarding: Thyroid test.  I saw Santa Kelsey today and he increased my synthroid med. to 75 from 50 because of TSH blood test that you ordered. Still waiting on monitor. He also put me on Buspar for my anxiety.

## 2022-05-18 ENCOUNTER — OFFICE VISIT (OUTPATIENT)
Dept: FAMILY MEDICINE CLINIC | Age: 72
End: 2022-05-18
Payer: MEDICARE

## 2022-05-18 VITALS
TEMPERATURE: 96.8 F | OXYGEN SATURATION: 97 % | HEART RATE: 103 BPM | WEIGHT: 216 LBS | RESPIRATION RATE: 18 BRPM | BODY MASS INDEX: 32.74 KG/M2 | DIASTOLIC BLOOD PRESSURE: 68 MMHG | HEIGHT: 68 IN | SYSTOLIC BLOOD PRESSURE: 112 MMHG

## 2022-05-18 DIAGNOSIS — E11.65 UNCONTROLLED TYPE 2 DIABETES MELLITUS WITH HYPERGLYCEMIA (HCC): ICD-10-CM

## 2022-05-18 DIAGNOSIS — E03.9 ACQUIRED HYPOTHYROIDISM: ICD-10-CM

## 2022-05-18 DIAGNOSIS — N28.9 RENAL LESION: ICD-10-CM

## 2022-05-18 DIAGNOSIS — I10 ESSENTIAL HYPERTENSION: ICD-10-CM

## 2022-05-18 DIAGNOSIS — N18.31 STAGE 3A CHRONIC KIDNEY DISEASE (HCC): ICD-10-CM

## 2022-05-18 DIAGNOSIS — F51.04 PSYCHOPHYSIOLOGIC INSOMNIA: ICD-10-CM

## 2022-05-18 DIAGNOSIS — F41.9 ANXIETY: Primary | ICD-10-CM

## 2022-05-18 PROBLEM — N18.30 CHRONIC RENAL DISEASE, STAGE III (HCC): Status: ACTIVE | Noted: 2022-05-18

## 2022-05-18 PROCEDURE — G8427 DOCREV CUR MEDS BY ELIG CLIN: HCPCS | Performed by: FAMILY MEDICINE

## 2022-05-18 PROCEDURE — G9899 SCRN MAM PERF RSLTS DOC: HCPCS | Performed by: FAMILY MEDICINE

## 2022-05-18 PROCEDURE — G8417 CALC BMI ABV UP PARAM F/U: HCPCS | Performed by: FAMILY MEDICINE

## 2022-05-18 PROCEDURE — G9717 DOC PT DX DEP/BP F/U NT REQ: HCPCS | Performed by: FAMILY MEDICINE

## 2022-05-18 PROCEDURE — 1090F PRES/ABSN URINE INCON ASSESS: CPT | Performed by: FAMILY MEDICINE

## 2022-05-18 PROCEDURE — 2022F DILAT RTA XM EVC RTNOPTHY: CPT | Performed by: FAMILY MEDICINE

## 2022-05-18 PROCEDURE — G8536 NO DOC ELDER MAL SCRN: HCPCS | Performed by: FAMILY MEDICINE

## 2022-05-18 PROCEDURE — G8399 PT W/DXA RESULTS DOCUMENT: HCPCS | Performed by: FAMILY MEDICINE

## 2022-05-18 PROCEDURE — 3017F COLORECTAL CA SCREEN DOC REV: CPT | Performed by: FAMILY MEDICINE

## 2022-05-18 PROCEDURE — G8752 SYS BP LESS 140: HCPCS | Performed by: FAMILY MEDICINE

## 2022-05-18 PROCEDURE — G8754 DIAS BP LESS 90: HCPCS | Performed by: FAMILY MEDICINE

## 2022-05-18 PROCEDURE — 1101F PT FALLS ASSESS-DOCD LE1/YR: CPT | Performed by: FAMILY MEDICINE

## 2022-05-18 PROCEDURE — 99214 OFFICE O/P EST MOD 30 MIN: CPT | Performed by: FAMILY MEDICINE

## 2022-05-18 PROCEDURE — 3044F HG A1C LEVEL LT 7.0%: CPT | Performed by: FAMILY MEDICINE

## 2022-05-18 RX ORDER — TEMAZEPAM 7.5 MG/1
7.5 CAPSULE ORAL
Qty: 30 CAPSULE | Refills: 5 | Status: SHIPPED | OUTPATIENT
Start: 2022-05-18 | End: 2022-05-19 | Stop reason: CLARIF

## 2022-05-18 NOTE — PROGRESS NOTES
Isabela Caprio is a 67 y.o.  female and presents with    Chief Complaint   Patient presents with    Medication Evaluation    Anxiety    Depression    Sleep Problem     Subjective:  She is following up for anxiety; she started buspar but this caused nausea. She took this medication at bedtime and also in the morning. She has racing thoughts. She has not been sleeping or eating. She finally slept last night. She has used multiple medications for sleep without improvement. She has been had difficulty swallowing with choking intermittently. this has improved with thyroid dose change.     Thyroid Review:  Patient is seen for followup of hypothyroidism. Thyroid ROS: fatigue, anxiousness, tremulousness and weight loss.     Anxiety Review:  Patient is seen for sleep disturbance. Current treatment includes ambien and cymbalta. Ongoing symptoms include: insomnia but improved with ambien. Patient denies: palpitations, sweating, chest pain, shortness of breath, dizziness, paresthesias, racing thoughts, psychomotor agitation, feelings of losing control, difficulty concentrating, suicidal ideation. Reported side effects from the treatment: none.     Diabetes  She has been taking medication as prescribed.  She is following up after laboratory testing.  She denies increased thirst or urination.       Osteoarthritis and Chronic Pain:  Patient has osteoarthritis, primarily affecting the neck and back.  She is going to have left arm done for carpal tunnel.    Symptoms onset: problem is longstanding. Rheumatological ROS: ongoing significant pain in neck and back which is stable and controlled by PRN meds. Response to treatment plan: symptoms have progressed to a point and plateaued. .   ROS   General ROS: negative for - chills, fatigue or fever; + weight loss  Psychological ROS: positive for - depression associated with COVID 19 and being home bound  Ophthalmic ROS: positive for - uses glasses  ENT ROS: positive for - headaches intermittently  Endocrine ROS: negative for - temperature intolerance or unexpected weight changes  Respiratory ROS: no cough, no wheezing;   Cardiovascular ROS: no chest pain or dyspnea on exertion; intermittent tachycardia; she has h/o aortic valve replacement and is followed by Dr. Kyler Hanson. Gastrointestinal ROS: no nausea or vomiting  Genito-Urinary ROS: no dysuria, trouble voiding, or hematuria  Neurological ROS: no TIA or stroke symptoms  Dermatological ROS: no lesions or rashes     All other systems reviewed and are negative. Objective:  Vitals:    05/18/22 1015 05/18/22 1019   BP: (!) 145/76 112/68   Pulse: (!) 103    Resp: 18    Temp: 96.8 °F (36 °C)    TempSrc: Temporal    SpO2: 97%    Weight: 216 lb (98 kg)    Height: 5' 8\" (1.727 m)      alert, well appearing, and in no distress, oriented to person, place, and time and obese  Mental status - anxious  Neck - supple, no significant adenopathy, thyroid exam: thyroid is normal in size without nodules or tenderness  Chest - clear to auscultation, no wheezes, rales or rhonchi, symmetric air entry  Heart - normal rate, regular rhythm, normal S1, S2, no murmurs, rubs, clicks or gallops  LABS     TESTS      Assessment/Plan:    1. Anxiety  bzd ordered for mood and sleep  - temazepam (RESTORIL) 7.5 mg capsule; Take 1 Capsule by mouth nightly as needed for Sleep. Max Daily Amount: 7.5 mg.  Dispense: 30 Capsule; Refill: 5    2. Psychophysiologic insomnia  Start bzd for sleep  - temazepam (RESTORIL) 7.5 mg capsule; Take 1 Capsule by mouth nightly as needed for Sleep. Max Daily Amount: 7.5 mg.  Dispense: 30 Capsule; Refill: 5    3. Acquired hypothyroidism  Continue therapeutic dosing    4. Essential hypertension  Goal <130/80; at goal    5. Uncontrolled type 2 diabetes mellitus with hyperglycemia (HCC)  Goal hgb a1c <7; encourage healthy diet and exercise    6. Stage 3a chronic kidney disease ([facility] [address]      7.  Renal lesion  Imaging ordered  - CT ABD PELV W WO CONT; Future      Lab review: orders written for new lab studies as appropriate; see orders      I have discussed the diagnosis with the patient and the intended plan as seen in the above orders. The patient has received an after-visit summary and questions were answered concerning future plans. I have discussed medication side effects and warnings with the patient as well. I have reviewed the plan of care with the patient, accepted their input and they are in agreement with the treatment goals.

## 2022-05-18 NOTE — PROGRESS NOTES
Scarlet Ken is a 67 y.o. female (: 1950) presenting to address:    Chief Complaint   Patient presents with    Medication Evaluation    Anxiety    Depression    Sleep Problem       There were no vitals filed for this visit. Hearing/Vision:   No exam data present    Learning Assessment:     Learning Assessment 2016   PRIMARY LEARNER Patient   HIGHEST LEVEL OF EDUCATION - PRIMARY LEARNER  -   PRIMARY LANGUAGE ENGLISH   LEARNER PREFERENCE PRIMARY LISTENING   ANSWERED BY patient   RELATIONSHIP SELF     Depression Screening:     3 most recent PHQ Screens 2022   Little interest or pleasure in doing things Nearly every day   Feeling down, depressed, irritable, or hopeless Nearly every day   Total Score PHQ 2 6   Trouble falling or staying asleep, or sleeping too much Nearly every day   Feeling tired or having little energy Nearly every day   Poor appetite, weight loss, or overeating Nearly every day   Feeling bad about yourself - or that you are a failure or have let yourself or your family down Nearly every day   Trouble concentrating on things such as school, work, reading, or watching TV Nearly every day   Moving or speaking so slowly that other people could have noticed; or the opposite being so fidgety that others notice Nearly every day   Thoughts of being better off dead, or hurting yourself in some way Not at all   PHQ 9 Score 24   How difficult have these problems made it for you to do your work, take care of your home and get along with others Somewhat difficult     Fall Risk Assessment:     Fall Risk Assessment, last 12 mths 2022   Able to walk? Yes   Fall in past 12 months? 0   Do you feel unsteady? 1   Are you worried about falling 1   Is TUG test greater than 12 seconds? 1   Is the gait abnormal? 1   Number of falls in past 12 months 0   Fall with injury? -     Abuse Screening:     Abuse Screening Questionnaire 2019   Do you ever feel afraid of your partner?  N   Are you in a relationship with someone who physically or mentally threatens you? N   Is it safe for you to go home? Y     ADL Assessment:     ADL Assessment 12/12/2019   Feeding yourself No Help Needed   Getting from bed to chair No Help Needed   Getting dressed No Help Needed   Bathing or showering No Help Needed   Walk across the room (includes cane/walker) Help Needed   Using the telphone No Help Needed   Taking your medications No Help Needed   Preparing meals No Help Needed   Managing money (expenses/bills) No Help Needed   Moderately strenuous housework (laundry) No Help Needed   Shopping for personal items (toiletries/medicines) No Help Needed   Shopping for groceries No Help Needed   Driving No Help Needed   Climbing a flight of stairs Help Needed   Getting to places beyond walking distances Help Needed        Coordination of Care Questionaire:     1. \"Have you been to the ER, urgent care clinic since your last visit? Hospitalized since your last visit? \" No    2. \"Have you seen or consulted any other health care providers outside of the [address] [place] [address] Prashant since your last visit? \" No     3. For patients aged 39-70: Has the patient had a colonoscopy / FIT/ Cologuard? No      If the patient is female:    4. For patients aged 41-77: Has the patient had a mammogram within the past 2 years? No     5. For patients aged 21-65: Has the patient had a pap smear?  NA - based on age or sex

## 2022-05-19 RX ORDER — CLONAZEPAM 1 MG/1
1 TABLET ORAL
Qty: 30 TABLET | Refills: 1 | Status: SHIPPED | OUTPATIENT
Start: 2022-05-19 | End: 2022-07-01 | Stop reason: SDUPTHER

## 2022-05-25 ENCOUNTER — TELEPHONE (OUTPATIENT)
Dept: FAMILY MEDICINE CLINIC | Age: 72
End: 2022-05-25

## 2022-05-25 NOTE — TELEPHONE ENCOUNTER
[address] [facility] [place] [phone]    [facility] is requesting a peer to peer. Ref #[id number]  [phone]    Thank you!

## 2022-06-28 ENCOUNTER — PATIENT MESSAGE (OUTPATIENT)
Dept: FAMILY MEDICINE CLINIC | Age: 72
End: 2022-06-28

## 2022-06-28 DIAGNOSIS — N18.31 STAGE 3A CHRONIC KIDNEY DISEASE (HCC): Primary | ICD-10-CM

## 2022-06-28 DIAGNOSIS — N18.31 STAGE 3A CHRONIC KIDNEY DISEASE (HCC): ICD-10-CM

## 2022-06-29 ENCOUNTER — HOSPITAL ENCOUNTER (OUTPATIENT)
Dept: LAB | Age: 72
Discharge: HOME | End: 2022-06-29

## 2022-06-29 ENCOUNTER — HOSPITAL ENCOUNTER (OUTPATIENT)
Dept: CT IMAGING | Age: 72
Discharge: HOME | End: 2022-06-29
Attending: FAMILY MEDICINE
Payer: MEDICARE

## 2022-06-29 DIAGNOSIS — N28.9 RENAL LESION: ICD-10-CM

## 2022-06-29 LAB
CREAT UR-MCNC: 1.1 MG/DL (ref 0.6–1.3)
XX-LABCORP SPECIMEN COL,LCBCF: NORMAL

## 2022-06-29 PROCEDURE — 99001 SPECIMEN HANDLING PT-LAB: CPT

## 2022-06-29 PROCEDURE — 74178 CT ABD&PLV WO CNTR FLWD CNTR: CPT

## 2022-06-29 PROCEDURE — 74011000636 HC RX REV CODE- 636: Performed by: FAMILY MEDICINE

## 2022-06-29 PROCEDURE — 82565 ASSAY OF CREATININE: CPT

## 2022-06-29 RX ADMIN — IOPAMIDOL 70 ML: 755 INJECTION, SOLUTION INTRAVENOUS at 10:55

## 2022-06-30 ENCOUNTER — TELEPHONE (OUTPATIENT)
Dept: ORTHOPEDIC SURGERY | Age: 72
End: 2022-06-30

## 2022-06-30 DIAGNOSIS — M54.16 LUMBAR NEURITIS: ICD-10-CM

## 2022-06-30 DIAGNOSIS — M96.1 LUMBAR POST-LAMINECTOMY SYNDROME: ICD-10-CM

## 2022-06-30 DIAGNOSIS — M54.12 CERVICAL NEURITIS: ICD-10-CM

## 2022-06-30 DIAGNOSIS — M50.00 HNP (HERNIATED NUCLEUS PULPOSUS) WITH MYELOPATHY, CERVICAL: ICD-10-CM

## 2022-06-30 DIAGNOSIS — M48.02 CERVICAL SPINAL STENOSIS: ICD-10-CM

## 2022-06-30 LAB
CALCIUM SERPL-MCNC: 9.9 MG/DL (ref 8.7–10.3)
MAGNESIUM SERPL-MCNC: 2.2 MG/DL (ref 1.6–2.3)
POTASSIUM SERPL-SCNC: 4.8 MMOL/L (ref 3.5–5.2)
TSH SERPL DL<=0.005 MIU/L-ACNC: 1.12 UIU/ML (ref 0.45–4.5)

## 2022-06-30 NOTE — TELEPHONE ENCOUNTER
Pt has to be seen every 3 months for pain medication. Looks like she cancelled her appt for today.   Last seen in march

## 2022-06-30 NOTE — TELEPHONE ENCOUNTER
Patient is requesting a refill of her pain medication. Patient states she uses [facility] DR JUDY GARC[facility]A on [address]. Patient can be reached at [phone].

## 2022-06-30 NOTE — TELEPHONE ENCOUNTER
Patient called and canceled today's appt w/  due to her having bad leg cramps. She says she wanted to let Dr. Rik Swift know that she came off of the Tramadol and have been having leg cramps ever since. She also stated she had blood work done with her PCP yesterday.

## 2022-07-01 ENCOUNTER — HOSPITAL ENCOUNTER (OUTPATIENT)
Dept: LAB | Age: 72
Discharge: HOME | End: 2022-07-01

## 2022-07-01 ENCOUNTER — OFFICE VISIT (OUTPATIENT)
Dept: FAMILY MEDICINE CLINIC | Age: 72
End: 2022-07-01
Payer: MEDICARE

## 2022-07-01 VITALS
HEIGHT: 68 IN | RESPIRATION RATE: 16 BRPM | TEMPERATURE: 97.2 F | SYSTOLIC BLOOD PRESSURE: 125 MMHG | HEART RATE: 95 BPM | WEIGHT: 219 LBS | OXYGEN SATURATION: 99 % | BODY MASS INDEX: 33.19 KG/M2 | DIASTOLIC BLOOD PRESSURE: 82 MMHG

## 2022-07-01 DIAGNOSIS — E11.65 UNCONTROLLED TYPE 2 DIABETES MELLITUS WITH HYPERGLYCEMIA (HCC): ICD-10-CM

## 2022-07-01 DIAGNOSIS — R25.2 LEG CRAMPS: ICD-10-CM

## 2022-07-01 DIAGNOSIS — F41.9 ANXIETY: ICD-10-CM

## 2022-07-01 DIAGNOSIS — R00.0 TACHYCARDIA: ICD-10-CM

## 2022-07-01 DIAGNOSIS — K21.9 GASTROESOPHAGEAL REFLUX DISEASE WITHOUT ESOPHAGITIS: ICD-10-CM

## 2022-07-01 DIAGNOSIS — I10 ESSENTIAL HYPERTENSION: ICD-10-CM

## 2022-07-01 DIAGNOSIS — E03.9 ACQUIRED HYPOTHYROIDISM: ICD-10-CM

## 2022-07-01 DIAGNOSIS — F51.04 PSYCHOPHYSIOLOGIC INSOMNIA: Primary | ICD-10-CM

## 2022-07-01 DIAGNOSIS — N18.31 STAGE 3A CHRONIC KIDNEY DISEASE (HCC): ICD-10-CM

## 2022-07-01 LAB — XX-LABCORP SPECIMEN COL,LCBCF: NORMAL

## 2022-07-01 PROCEDURE — 99214 OFFICE O/P EST MOD 30 MIN: CPT | Performed by: FAMILY MEDICINE

## 2022-07-01 PROCEDURE — 2022F DILAT RTA XM EVC RTNOPTHY: CPT | Performed by: FAMILY MEDICINE

## 2022-07-01 PROCEDURE — 99001 SPECIMEN HANDLING PT-LAB: CPT

## 2022-07-01 PROCEDURE — 1101F PT FALLS ASSESS-DOCD LE1/YR: CPT | Performed by: FAMILY MEDICINE

## 2022-07-01 PROCEDURE — G9899 SCRN MAM PERF RSLTS DOC: HCPCS | Performed by: FAMILY MEDICINE

## 2022-07-01 PROCEDURE — G8428 CUR MEDS NOT DOCUMENT: HCPCS | Performed by: FAMILY MEDICINE

## 2022-07-01 PROCEDURE — G9717 DOC PT DX DEP/BP F/U NT REQ: HCPCS | Performed by: FAMILY MEDICINE

## 2022-07-01 PROCEDURE — 3044F HG A1C LEVEL LT 7.0%: CPT | Performed by: FAMILY MEDICINE

## 2022-07-01 PROCEDURE — 3017F COLORECTAL CA SCREEN DOC REV: CPT | Performed by: FAMILY MEDICINE

## 2022-07-01 PROCEDURE — G8536 NO DOC ELDER MAL SCRN: HCPCS | Performed by: FAMILY MEDICINE

## 2022-07-01 PROCEDURE — G8417 CALC BMI ABV UP PARAM F/U: HCPCS | Performed by: FAMILY MEDICINE

## 2022-07-01 PROCEDURE — 1090F PRES/ABSN URINE INCON ASSESS: CPT | Performed by: FAMILY MEDICINE

## 2022-07-01 PROCEDURE — G8754 DIAS BP LESS 90: HCPCS | Performed by: FAMILY MEDICINE

## 2022-07-01 PROCEDURE — G8752 SYS BP LESS 140: HCPCS | Performed by: FAMILY MEDICINE

## 2022-07-01 PROCEDURE — G8399 PT W/DXA RESULTS DOCUMENT: HCPCS | Performed by: FAMILY MEDICINE

## 2022-07-01 PROCEDURE — 1123F ACP DISCUSS/DSCN MKR DOCD: CPT | Performed by: FAMILY MEDICINE

## 2022-07-01 RX ORDER — CLONAZEPAM 1 MG/1
1 TABLET ORAL
Qty: 30 TABLET | Refills: 2 | Status: SHIPPED | OUTPATIENT
Start: 2022-07-16 | End: 2022-09-28 | Stop reason: SDUPTHER

## 2022-07-01 RX ORDER — PANTOPRAZOLE SODIUM 20 MG/1
20 TABLET, DELAYED RELEASE ORAL DAILY
Qty: 90 TABLET | Refills: 3 | Status: SHIPPED | OUTPATIENT
Start: 2022-07-01

## 2022-07-01 RX ORDER — DULOXETIN HYDROCHLORIDE 60 MG/1
120 CAPSULE, DELAYED RELEASE ORAL DAILY
Qty: 180 CAPSULE | Refills: 3 | Status: SHIPPED | OUTPATIENT
Start: 2022-07-01

## 2022-07-01 RX ORDER — TIAGABINE HYDROCHLORIDE 2 MG/1
2 TABLET, FILM COATED ORAL
Qty: 90 TABLET | Refills: 0 | Status: SHIPPED | OUTPATIENT
Start: 2022-07-01 | End: 2022-09-29 | Stop reason: SDUPTHER

## 2022-07-01 RX ORDER — TRAMADOL HYDROCHLORIDE 50 MG/1
50 TABLET ORAL
Status: CANCELLED | OUTPATIENT
Start: 2022-07-01 | End: 2022-07-04

## 2022-07-01 RX ORDER — METFORMIN HYDROCHLORIDE 500 MG/1
TABLET ORAL
Qty: 90 TABLET | Refills: 3 | Status: SHIPPED | OUTPATIENT
Start: 2022-07-01

## 2022-07-01 NOTE — PROGRESS NOTES
Ac Aguirre is a 67 y.o. female (: 1950) presenting to address:    Chief Complaint   Patient presents with    Results    Medication Refill    Medication Evaluation       There were no vitals filed for this visit. Hearing/Vision:   No exam data present    Learning Assessment:     Learning Assessment 2016   PRIMARY LEARNER Patient   HIGHEST LEVEL OF EDUCATION - PRIMARY LEARNER  -   PRIMARY LANGUAGE ENGLISH   LEARNER PREFERENCE PRIMARY LISTENING   ANSWERED BY patient   RELATIONSHIP SELF     Depression Screening:     3 most recent PHQ Screens 2022   Little interest or pleasure in doing things Not at all   Feeling down, depressed, irritable, or hopeless Not at all   Total Score PHQ 2 0   Trouble falling or staying asleep, or sleeping too much -   Feeling tired or having little energy -   Poor appetite, weight loss, or overeating -   Feeling bad about yourself - or that you are a failure or have let yourself or your family down -   Trouble concentrating on things such as school, work, reading, or watching TV -   Moving or speaking so slowly that other people could have noticed; or the opposite being so fidgety that others notice -   Thoughts of being better off dead, or hurting yourself in some way -   PHQ 9 Score -   How difficult have these problems made it for you to do your work, take care of your home and get along with others -     Fall Risk Assessment:     Fall Risk Assessment, last 12 mths 2022   Able to walk? Yes   Fall in past 12 months? 0   Do you feel unsteady? 1   Are you worried about falling 1   Is TUG test greater than 12 seconds? 1   Is the gait abnormal? 1   Number of falls in past 12 months 0   Fall with injury? -     Abuse Screening:     Abuse Screening Questionnaire 2019   Do you ever feel afraid of your partner? N   Are you in a relationship with someone who physically or mentally threatens you? N   Is it safe for you to go home?  Y     ADL Assessment:     ADL Assessment 2019   Feeding yourself No Help Needed   Getting from bed to chair No Help Needed   Getting dressed No Help Needed   Bathing or showering No Help Needed   Walk across the room (includes cane/walker) Help Needed   Using the telphone No Help Needed   Taking your medications No Help Needed   Preparing meals No Help Needed   Managing money (expenses/bills) No Help Needed   Moderately strenuous housework (laundry) No Help Needed   Shopping for personal items (toiletries/medicines) No Help Needed   Shopping for groceries No Help Needed   Driving No Help Needed   Climbing a flight of stairs Help Needed   Getting to places beyond walking distances Help Needed        Coordination of Care Questionaire:     1. \"Have you been to the ER, urgent care clinic since your last visit? Hospitalized since your last visit? \"   James Patino is a 67 y.o. female (: 1950) presenting to address:    Chief Complaint   Patient presents with    Results    Medication Refill    Medication Evaluation       There were no vitals filed for this visit.     Hearing/Vision:   No exam data present    Learning Assessment:     Learning Assessment 2016   PRIMARY LEARNER Patient   HIGHEST LEVEL OF EDUCATION - PRIMARY LEARNER  -   PRIMARY LANGUAGE ENGLISH   LEARNER PREFERENCE PRIMARY LISTENING   ANSWERED BY patient   RELATIONSHIP SELF     Depression Screening:     3 most recent PHQ Screens 2022   Little interest or pleasure in doing things Not at all   Feeling down, depressed, irritable, or hopeless Not at all   Total Score PHQ 2 0   Trouble falling or staying asleep, or sleeping too much -   Feeling tired or having little energy -   Poor appetite, weight loss, or overeating -   Feeling bad about yourself - or that you are a failure or have let yourself or your family down -   Trouble concentrating on things such as school, work, reading, or watching TV -   Moving or speaking so slowly that other people could have noticed; or the opposite being so fidgety that others notice -   Thoughts of being better off dead, or hurting yourself in some way -   PHQ 9 Score -   How difficult have these problems made it for you to do your work, take care of your home and get along with others -     Fall Risk Assessment:     Fall Risk Assessment, last 12 mths 7/1/2022   Able to walk? Yes   Fall in past 12 months? 0   Do you feel unsteady? 1   Are you worried about falling 1   Is TUG test greater than 12 seconds? 1   Is the gait abnormal? 1   Number of falls in past 12 months 0   Fall with injury? -     Abuse Screening:     Abuse Screening Questionnaire 12/12/2019   Do you ever feel afraid of your partner? N   Are you in a relationship with someone who physically or mentally threatens you? N   Is it safe for you to go home? Y     ADL Assessment:     ADL Assessment 12/12/2019   Feeding yourself No Help Needed   Getting from bed to chair No Help Needed   Getting dressed No Help Needed   Bathing or showering No Help Needed   Walk across the room (includes cane/walker) Help Needed   Using the telphone No Help Needed   Taking your medications No Help Needed   Preparing meals No Help Needed   Managing money (expenses/bills) No Help Needed   Moderately strenuous housework (laundry) No Help Needed   Shopping for personal items (toiletries/medicines) No Help Needed   Shopping for groceries No Help Needed   Driving No Help Needed   Climbing a flight of stairs Help Needed   Getting to places beyond walking distances Help Needed        Coordination of Care Questionaire:     1. \"Have you been to the ER, urgent care clinic since your last visit? Hospitalized since your last visit? \" Yes      2. \"Have you seen or consulted any other health care providers outside of the [address] [place] [address] Prashant since your last visit? \" No     3. For patients aged 39-70: Has the patient had a colonoscopy / FIT/ Cologuard? yes      If the patient is female:    4.  For patients aged 41-77: Has the patient had a mammogram within the past 2 years? No      5. For patients aged 21-65: Has the patient had a pap smear? NA - based on age or sex     2. \"Have you seen or consulted any other health care providers outside of the [address] [place] [address] since your last visit? \" Yes       3. For patients aged 39-70: Has the patient had a colonoscopy / FIT/ Cologuard? Yes - no Care Gap present      If the patient is female:    4. For patients aged 41-77: Has the patient had a mammogram within the past 2 years? Yes - no Care Gap present      5. For patients aged 21-65: Has the patient had a pap smear?  Yes - no Care Gap present

## 2022-07-01 NOTE — TELEPHONE ENCOUNTER
I contacted the patient and she states that she needs a refill on the gabitril. She has quit taking the tramadol due to severe leg cramps. Patient is aware that we are sending the gabitril to the pharmacy.

## 2022-07-01 NOTE — PROGRESS NOTES
Summer Osman is a 67 y.o.  female and presents with    Chief Complaint   Patient presents with    Dizziness    Hand Pain     Subjective:  Ms. Gene Hutton presents with leg cramping, depression, falls, and chronic pain. She describes her pain cramps as rough and like a log. She states her cramping began 6 weeks ago when she stopped tramadol. She has some trouble falling asleep and often experiences vertigo which, combined with her cramping, has caused her to fall and trip. She also experiences cramping of the right hand which causes her to lose dexterity. Thyroid Review:  Patient is seen for followup of hypothyroidism. Thyroid ROS: fatigue, anxiousness, tremulousness and weight loss.     Anxiety Review:  Patient is seen for sleep disturbance. Current treatment includes ambien and cymbalta. Ongoing symptoms include: insomnia but improved with ambien. Patient denies: palpitations, sweating, chest pain, shortness of breath, dizziness, paresthesias, racing thoughts, psychomotor agitation, feelings of losing control, difficulty concentrating, suicidal ideation. Reported side effects from the treatment: none.     Diabetes  She has been taking medication as prescribed.  She is following up after laboratory testing.  She denies increased thirst or urination.       Osteoarthritis and Chronic Pain:  Patient has osteoarthritis, primarily affecting the neck and back.  She is going to have left arm done for carpal tunnel.    Symptoms onset: problem is longstanding. Rheumatological ROS: ongoing significant pain in neck and back which is stable and controlled by PRN meds. Response to treatment plan: symptoms have progressed to a point and plateaued. .     ROS   General ROS: negative for - chills, fatigue or fever; + weight loss  Psychological ROS: positive for - depression associated with COVID 19 and being home bound  Ophthalmic ROS: positive for - uses glasses  ENT ROS: positive for - headaches intermittently  Endocrine ROS: negative for - temperature intolerance or unexpected weight changes  Respiratory ROS: no cough, no wheezing;   Cardiovascular ROS: no chest pain or dyspnea on exertion; intermittent tachycardia; she has h/o aortic valve replacement and is followed by Dr. Carla Mata. Gastrointestinal ROS: no nausea or vomiting  Genito-Urinary ROS: no dysuria, trouble voiding, or hematuria  Neurological ROS: no TIA or stroke symptoms  Dermatological ROS: no lesions or rashes  All other systems reviewed and are negative. Objective:  Vitals:    07/01/22 1037   BP: 125/82   Pulse: 95   Resp: 16   Temp: 97.2 °F (36.2 °C)   TempSrc: Temporal   SpO2: 99%   Weight: 219 lb (99.3 kg)   Height: 5' 8\" (1.727 m)   PainSc:   7     Alert, well appearing, and in no distress. Skin examination revealed bruising on her shins consistent with falls and clumsiness as described by the patient. Labs were reviewed and showed no abnormalities. LABS     TESTS      Assessment/Plan:    1. Psychophysiologic insomnia  bzd ordered for sleep; duloxetine ordered for maintenance  - DULoxetine (CYMBALTA) 60 mg capsule; Take 2 Capsules by mouth daily. Dispense: 180 Capsule; Refill: 3  - clonazePAM (KlonoPIN) 1 mg tablet; Take 1 Tablet by mouth nightly. Max Daily Amount: 1 mg. Dispense: 30 Tablet; Refill: 2    2. Acquired hypothyroidism  Continue therapeutic dosing    3. Essential hypertension  Goal <130/80; well controlled with current treatment  - METABOLIC PANEL, COMPREHENSIVE; Future    4. Leg cramps      5. Uncontrolled type 2 diabetes mellitus with hyperglycemia (HCC)  Goal hgb a1c <7; assess for complications  - LIPID PANEL; Future  - MICROALBUMIN, UR, RAND W/ MICROALB/CREAT RATIO; Future  - metFORMIN (GLUCOPHAGE) 500 mg tablet; TAKE 1 TABLET BY MOUTH ONCE DAILY WITH SUPPER  Dispense: 90 Tablet; Refill: 3    6. Stage 3a chronic kidney disease (HCC)  Avoid nephrotoxins    7.  Anxiety    - DULoxetine (CYMBALTA) 60 mg capsule; Take 2 Capsules by mouth daily. Dispense: 180 Capsule; Refill: 3  - clonazePAM (KlonoPIN) 1 mg tablet; Take 1 Tablet by mouth nightly. Max Daily Amount: 1 mg. Dispense: 30 Tablet; Refill: 2    8. Gastroesophageal reflux disease without esophagitis    - pantoprazole (PROTONIX) 20 mg tablet; Take 1 Tablet by mouth daily. Dispense: 90 Tablet; Refill: 3    Lab review: orders written for new lab studies as appropriate; see orders    I have discussed the diagnosis with the patient and the intended plan as seen in the above orders. The patient has received an after-visit summary and questions were answered concerning future plans. I have discussed medication side effects and warnings with the patient as well. I have reviewed the plan of care with the patient, accepted their input and they are in agreement with the treatment goals.

## 2022-07-02 LAB
ALBUMIN SERPL-MCNC: 4.5 G/DL (ref 3.7–4.7)
ALBUMIN/CREAT UR: 4 MG/G CREAT (ref 0–29)
ALBUMIN/GLOB SERPL: 1.7 {RATIO} (ref 1.2–2.2)
ALP SERPL-CCNC: 87 IU/L (ref 44–121)
ALT SERPL-CCNC: 17 IU/L (ref 0–32)
AST SERPL-CCNC: 24 IU/L (ref 0–40)
BILIRUB SERPL-MCNC: 0.3 MG/DL (ref 0–1.2)
BUN SERPL-MCNC: 16 MG/DL (ref 8–27)
BUN/CREAT SERPL: 16 (ref 12–28)
CALCIUM SERPL-MCNC: 9.7 MG/DL (ref 8.7–10.3)
CHLORIDE SERPL-SCNC: 102 MMOL/L (ref 96–106)
CHOLEST SERPL-MCNC: 117 MG/DL (ref 100–199)
CO2 SERPL-SCNC: 23 MMOL/L (ref 20–29)
CREAT SERPL-MCNC: 0.99 MG/DL (ref 0.57–1)
CREAT UR-MCNC: 106.6 MG/DL
EGFR: 61 ML/MIN/1.73
GLOBULIN SER CALC-MCNC: 2.6 G/DL (ref 1.5–4.5)
GLUCOSE SERPL-MCNC: 103 MG/DL (ref 65–99)
HDLC SERPL-MCNC: 49 MG/DL
IMP & REVIEW OF LAB RESULTS: NORMAL
LDLC SERPL CALC-MCNC: 50 MG/DL (ref 0–99)
MICROALBUMIN UR-MCNC: 4 UG/ML
POTASSIUM SERPL-SCNC: 4.7 MMOL/L (ref 3.5–5.2)
PROT SERPL-MCNC: 7.1 G/DL (ref 6–8.5)
SODIUM SERPL-SCNC: 139 MMOL/L (ref 134–144)
TRIGL SERPL-MCNC: 98 MG/DL (ref 0–149)
VLDLC SERPL CALC-MCNC: 18 MG/DL (ref 5–40)

## 2022-07-06 ENCOUNTER — OFFICE VISIT (OUTPATIENT)
Dept: ORTHOPEDIC SURGERY | Age: 72
End: 2022-07-06
Payer: MEDICARE

## 2022-07-06 VITALS — HEIGHT: 68 IN | TEMPERATURE: 97.3 F | WEIGHT: 219 LBS | BODY MASS INDEX: 33.19 KG/M2

## 2022-07-06 DIAGNOSIS — M25.512 ACUTE PAIN OF LEFT SHOULDER: Primary | ICD-10-CM

## 2022-07-06 DIAGNOSIS — R25.2 LEG CRAMPING: ICD-10-CM

## 2022-07-06 PROCEDURE — G9717 DOC PT DX DEP/BP F/U NT REQ: HCPCS | Performed by: ORTHOPAEDIC SURGERY

## 2022-07-06 PROCEDURE — 1123F ACP DISCUSS/DSCN MKR DOCD: CPT | Performed by: ORTHOPAEDIC SURGERY

## 2022-07-06 PROCEDURE — G9899 SCRN MAM PERF RSLTS DOC: HCPCS | Performed by: ORTHOPAEDIC SURGERY

## 2022-07-06 PROCEDURE — G8756 NO BP MEASURE DOC: HCPCS | Performed by: ORTHOPAEDIC SURGERY

## 2022-07-06 PROCEDURE — 1090F PRES/ABSN URINE INCON ASSESS: CPT | Performed by: ORTHOPAEDIC SURGERY

## 2022-07-06 PROCEDURE — G8399 PT W/DXA RESULTS DOCUMENT: HCPCS | Performed by: ORTHOPAEDIC SURGERY

## 2022-07-06 PROCEDURE — 99212 OFFICE O/P EST SF 10 MIN: CPT | Performed by: ORTHOPAEDIC SURGERY

## 2022-07-06 PROCEDURE — 1101F PT FALLS ASSESS-DOCD LE1/YR: CPT | Performed by: ORTHOPAEDIC SURGERY

## 2022-07-06 PROCEDURE — G8427 DOCREV CUR MEDS BY ELIG CLIN: HCPCS | Performed by: ORTHOPAEDIC SURGERY

## 2022-07-06 PROCEDURE — G8417 CALC BMI ABV UP PARAM F/U: HCPCS | Performed by: ORTHOPAEDIC SURGERY

## 2022-07-06 PROCEDURE — 3017F COLORECTAL CA SCREEN DOC REV: CPT | Performed by: ORTHOPAEDIC SURGERY

## 2022-07-06 PROCEDURE — G8536 NO DOC ELDER MAL SCRN: HCPCS | Performed by: ORTHOPAEDIC SURGERY

## 2022-07-06 PROCEDURE — 73030 X-RAY EXAM OF SHOULDER: CPT | Performed by: ORTHOPAEDIC SURGERY

## 2022-07-06 NOTE — PROGRESS NOTES
James Patino  1950   Chief Complaint   Patient presents with    Shoulder Pain     lt        HISTORY OF PRESENT ILLNESS  James Patino is a 67 y.o. female who presents today for reevaluation of left shoulder. Patient rates pain as 4/10 today. Patient denies any fever, chills, chest pain, shortness of breath or calf pain. The remainder of the review of systems is negative. There are no new illness or injuries to report since last seen in the office. There are no changes to medications, allergies, family or social history. PHYSICAL EXAM:   Visit Vitals  Temp 97.3 °F (36.3 °C) (Temporal)   Ht 5' 8\" (1.727 m)   Wt 219 lb (99.3 kg)   BMI 33.30 kg/m²       The patient is a well-developed, well-nourished female   in no acute distress. The patient is alert and oriented times three. The patient is alert and oriented times three. Mood and affect are normal.  LYMPHATIC: lymph nodes are not enlarged and are within normal limits  SKIN: normal in color and non tender to palpation. There are no bruises or abrasions noted. NEUROLOGICAL: Motor sensory exam is within normal limits. Reflexes are equal bilaterally. There is normal sensation to pinprick and light touch  MUSCULOSKELETAL:  Unchanged      IMAGING: XR of left shoulder with 3 views obtained in office dated 7/6/2022 was reviewed and read by Dr. Nestora Sacks:    IMPRESSION:      ICD-10-CM ICD-9-CM    1. Acute pain of left shoulder  M25.512 719.41 AMB POC XRAY, SHOULDER; COMPLETE, 2+        PLAN:   1. Continue range of motion exercises as tolerated  2. No ultrasound exam indicated today  3. No cortisone injection indicated today   4. No Physical/Occupational Therapy indicated today  5. No diagnostic test indicated today:   6. No durable medical equipment indicated today  7. No referral indicated today   8. No medications indicated today:   9. No Narcotic indicated today for short term acute pain secondary to shoulder.  Patient given pain medication for short term acute pain relief. Goal is to treat patient according to above plan and to ultimately have patient off all pain medications once appropriate. If chronic pain management is required beyond what is expected for current orthopedic problem, will refer patient to pain management.  was reviewed and will be reviewed with every medication refill request.       RTC in 2 months      Scribed by Juan Carlos Maldonado[address]) as dictated by Anette Philippe MD    I, Dr. Anette Philippe, confirm that all documentation is accurate.     Anette Philippe M.D.   [facility]

## 2022-07-21 ENCOUNTER — OFFICE VISIT (OUTPATIENT)
Dept: CARDIOLOGY CLINIC | Age: 72
End: 2022-07-21
Payer: MEDICARE

## 2022-07-21 VITALS
DIASTOLIC BLOOD PRESSURE: 62 MMHG | RESPIRATION RATE: 16 BRPM | OXYGEN SATURATION: 97 % | SYSTOLIC BLOOD PRESSURE: 114 MMHG | WEIGHT: 220 LBS | HEART RATE: 106 BPM | BODY MASS INDEX: 33.45 KG/M2 | TEMPERATURE: 97.7 F

## 2022-07-21 DIAGNOSIS — R00.2 PALPITATIONS: ICD-10-CM

## 2022-07-21 DIAGNOSIS — I35.0 NONRHEUMATIC AORTIC VALVE STENOSIS: Primary | ICD-10-CM

## 2022-07-21 PROCEDURE — G8536 NO DOC ELDER MAL SCRN: HCPCS | Performed by: INTERNAL MEDICINE

## 2022-07-21 PROCEDURE — G8428 CUR MEDS NOT DOCUMENT: HCPCS | Performed by: INTERNAL MEDICINE

## 2022-07-21 PROCEDURE — G8752 SYS BP LESS 140: HCPCS | Performed by: INTERNAL MEDICINE

## 2022-07-21 PROCEDURE — 1123F ACP DISCUSS/DSCN MKR DOCD: CPT | Performed by: INTERNAL MEDICINE

## 2022-07-21 PROCEDURE — G8417 CALC BMI ABV UP PARAM F/U: HCPCS | Performed by: INTERNAL MEDICINE

## 2022-07-21 PROCEDURE — G9717 DOC PT DX DEP/BP F/U NT REQ: HCPCS | Performed by: INTERNAL MEDICINE

## 2022-07-21 PROCEDURE — G8754 DIAS BP LESS 90: HCPCS | Performed by: INTERNAL MEDICINE

## 2022-07-21 PROCEDURE — G8399 PT W/DXA RESULTS DOCUMENT: HCPCS | Performed by: INTERNAL MEDICINE

## 2022-07-21 PROCEDURE — 99214 OFFICE O/P EST MOD 30 MIN: CPT | Performed by: INTERNAL MEDICINE

## 2022-07-21 PROCEDURE — G9899 SCRN MAM PERF RSLTS DOC: HCPCS | Performed by: INTERNAL MEDICINE

## 2022-07-21 PROCEDURE — 1101F PT FALLS ASSESS-DOCD LE1/YR: CPT | Performed by: INTERNAL MEDICINE

## 2022-07-21 PROCEDURE — 1090F PRES/ABSN URINE INCON ASSESS: CPT | Performed by: INTERNAL MEDICINE

## 2022-07-21 PROCEDURE — 3017F COLORECTAL CA SCREEN DOC REV: CPT | Performed by: INTERNAL MEDICINE

## 2022-07-21 RX ORDER — METOPROLOL SUCCINATE 25 MG/1
12.5 TABLET, EXTENDED RELEASE ORAL DAILY
Qty: 45 TABLET | Refills: 3 | Status: SHIPPED | OUTPATIENT
Start: 2022-07-21

## 2022-07-21 NOTE — LETTER
7/21/2022    Patient: Margie Leon   YOB: 1950   Date of Visit: 7/21/2022     Fleisha Ferro MD  [id number] [place] [address]  [address]    Dear Felisha Ferro MD,      Thank you for referring Ms. Elayne Smith to [address] [facility] for evaluation. My notes for this consultation are attached. If you have questions, please do not hesitate to call me. I look forward to following your patient along with you.       Sincerely,    Alejandra Matthew MD

## 2022-07-21 NOTE — PATIENT INSTRUCTIONS
New Medication/Medication Changes    Start Toprol 12.5 mg daily    **please allow 24-48 hrs for medication to be escribed to pharmacy** If you need any refills on medications please contact your pharmacy so that the request can be escribed to the provider for review.

## 2022-07-21 NOTE — PROGRESS NOTES
[facility]    Ms. Simone Magana is a 67 y.o. female with history of Aortic stenosis s/p Aortic valve replacement (03/16), fibromyalgia, diabetes, obesity, hypertension, hypothyroidism and COPD. Mr. Simone Magana is here today for follow up appointment for her palpitation. Dyspnea has remained stable over the years she follows up with Dr. Robel Alvarado for her asthma and COPD. She had event monitor placed since last time. She returned it last month. She denies any presyncope or syncope. She continues to have a lot of anxiety and stressful event in her life causing tachycardia and rapid heartbeat. Denies any anginal symptoms      Past Medical History:   Diagnosis Date    Ankle fracture     Aortic stenosis     S/P AVR in 2016    Asthma     Carpal tunnel syndrome on both sides     Chronic obstructive pulmonary disease (HCC)     Chronic pain     back pain    COVID-19 vaccine series completed 04/29/2021    Diabetes ([address]     Diverticulitis     Fibromyalgia     H/O aortic valve replacement 03/2016    21 mm bovine pericardial bioprosthesis and epiaortic scanning      H/O: HTN (hypertension)     no meds now- sp valve replacement    History of COVID-19 01/2022    Home test. Results not saved    Hypertension     no medication    Hypothyroidism     Lumbar post-laminectomy syndrome     Menopause     Obesity     Osteoarthritis     Psychotic disorder ([address]     Pulmonary HTN ([address]     per pcp note cc- patient denies. Sciatica     Skin cancer 2013    right forearm. Tachycardia        Past Surgical History:   Procedure Laterality Date    COLONOSCOPY      COLONOSCOPY N/A 9/15/2017    COLONOSCOPY performed by Maikol Mason MD at [facility] ENDOSCOPY    HX ARTHROPLASTY Left 07/2021    HX CARPAL TUNNEL RELEASE Left 03/2021    HX CATARACT REMOVAL Right 1995    lens  replaced. HX CATARACT REMOVAL Left 02/2018    HX COLONOSCOPY  09/15/2017    dr. Danet Lazaro  f/u 5 years.      HX HEENT  1990's    sinus surgery     HX HEENT Right 11/02/2017    laser for right eye cataracts. HX LUMBAR FUSION  2004    L3-L4-L5    HX LUMBAR LAMINECTOMY  2002    HX MOHS PROCEDURES Left 1990    HX ORTHOPAEDIC       x3    HX SHOULDER REPLACEMENT Right 03/29/2017    reverse. HX SHOULDER REPLACEMENT Left 07/02/2021    [facility] CARDIAC SURG PROCEDURE UNLIST  03/2016    aortic valve replacement. [facility] CHEST SURGERY PROCEDURE UNLISTED  03/2016    Open Heart Surgery    [facility] COLSC FLX W/RMVL OF TUMOR POLYP LESION SNARE TQ  07/09/2014 repeat in 3 years    Dr. Kike Stroud  2016    Aortic valve replacement(bovine)       Current Outpatient Medications   Medication Sig    tiaGABine (GabitriL) 2 mg tablet Take 1 Tablet by mouth nightly. DULoxetine (CYMBALTA) 60 mg capsule Take 2 Capsules by mouth daily. pantoprazole (PROTONIX) 20 mg tablet Take 1 Tablet by mouth daily. metFORMIN (GLUCOPHAGE) 500 mg tablet TAKE 1 TABLET BY MOUTH ONCE DAILY WITH SUPPER    clonazePAM (KlonoPIN) 1 mg tablet Take 1 Tablet by mouth nightly. Max Daily Amount: 1 mg.    levothyroxine (Euthyrox) 25 mcg tablet Take 1 Tablet by mouth Daily (before breakfast). (Patient taking differently: Take 75 mcg by mouth Daily (before breakfast). )    Euthyrox 50 mcg tablet TAKE 1 TABLET BY MOUTH ONCE DAILY BEFORE BREAKFAST    Narcan 4 mg/actuation nasal spray Give 1 spray into 1 nostril. Give additional doses using a new nasal spray with each dose, if patient does not respond or relapses into respiratory depression. Call 911. Additional doses may be given every 2 to 3 minutes until medical assistance arrives. tiotropium-olodateroL (Stiolto Respimat) 2.5-2.5 mcg/actuation inhaler Take 2 Puffs by inhalation daily.     fluticasone propionate (FLONASE) 50 mcg/actuation nasal spray Use 1 spray(s) in each nostril twice daily    atorvastatin (LIPITOR) 20 mg tablet Take 1 tablet by mouth once daily    acetaminophen (Tylenol Extra Strength) 500 mg tablet Take 500 mg by mouth every six (6) hours as needed for Pain. FLUoxetine (PROzac) 10 mg capsule Take 1 capsule by mouth once daily    meloxicam (MOBIC) 15 mg tablet Take 1 tablet by mouth once daily    pramoxine-hc-chloroxylenol 1-1-0.1 % lotion Apply  to affected area two (2) times a day. desonide (TRIDESILON) 0.05 % cream APPLY   EXTERNALLY TO AFFECTED AREA TWICE DAILY    aspirin delayed-release 81 mg tablet Take 81 mg by mouth daily. meclizine (ANTIVERT) 25 mg tablet Take 1 Tablet by mouth three (3) times daily as needed for Dizziness. glucose blood VI test strips (True Metrix Pro Test Strip) strip Test blood glucose 3 times a day    vitamin E acetate (VITAMIN E PO) Take  by mouth daily. cyanocobalamin (VITAMIN B-12) 500 mcg tablet Take 500 mcg by mouth daily. cetirizine (ZYRTEC) 10 mg tablet TAKE 1 TABLET BY MOUTH EVERY DAY. GENERIC FOR ZYRTEC    VENTOLIN HFA 90 mcg/actuation inhaler 2 Puffs by Aerosolization route every four (4) hours as needed. B.infantis-B.ani-B.long-B.bifi (PROBIOTIC 4X) 10-15 mg TbEC Take  by mouth daily. Cholecalciferol, Vitamin D3, (VITAMIN D3) 1,000 unit cap Take 1,000 Units by mouth daily. Lancets (ACCU-CHEK SOFTCLIX LANCETS) misc Please use one lancet to test blood sugars twice a day. BLOOD-GLUCOSE METER (ACCU-CHEK MARCE MONITORING) by Does Not Apply route. No current facility-administered medications for this visit.        Allergies and Sensitivities:  Allergies   Allergen Reactions    Latex, Natural Rubber Hives    Adhesive Rash     Some bandaids    Ciprofloxacin Rash    Codeine Nausea and Vomiting    Demerol [Meperidine] Nausea and Vomiting    Diclofenac Shortness of Breath and Palpitations    Erythromycin Rash    Gabapentin Vertigo    Levaquin [Levofloxacin] Rash    Loratadine Swelling    Morphine Nausea and Vomiting    Penicillin G Hives and Rash     Does fine with Keflex    Sulfa (Sulfonamide Antibiotics) Itching Vicodin [Hydrocodone-Acetaminophen] Other (comments)     Metallic taste in mouth       Family History:  Family History   Problem Relation Age of Onset    Hypertension Father     Heart Disease Father     Alcohol abuse Father        Social History:  Social History     Tobacco Use    Smoking status: Former     Years: 45.00     Types: Cigarettes     Quit date: 2011     Years since quittin.0    Smokeless tobacco: Never   Vaping Use    Vaping Use: Never used   Substance Use Topics    Alcohol use: No    Drug use: Not Currently     Types: Marijuana, Cocaine     She  reports that she quit smoking about 11 years ago. Her smoking use included cigarettes. She has never used smokeless tobacco.  She  reports no history of alcohol use. Review of Systems:  Cardiac symptoms as noted above in HPI. All others negative. Denies skin rash,  photophobia, neck pain, hemoptysis, chronic cough, nausea, vomiting, hematuria, burning micturition, BRBPR, chronic headaches. Physical Exam:  BP Readings from Last 3 Encounters:   22 114/62   22 125/82   22 112/68         Pulse Readings from Last 3 Encounters:   22 (!) 106   22 95   22 (!) 103          Wt Readings from Last 3 Encounters:   22 99.8 kg (220 lb)   22 99.3 kg (219 lb)   22 99.3 kg (219 lb)       Constitutional: Oriented to person, place, and time. HENT: Head: Normocephalic and atraumatic. Neck: No JVD present. Cardiovascular: Regular rhythm. No gallop or rubs appreciated. No significant murmur  Lung: Breath sounds normal. No respiratory distress. No ronchi or rales appreciated  Abdominal: No tenderness. No rebound and no guarding.    Musculoskeletal: No LE swelling    Review of Data  LABS:   Lab Results   Component Value Date/Time    Sodium 139 2022 12:00 AM    Potassium 4.7 2022 12:00 AM    Chloride 102 2022 12:00 AM    CO2 23 2022 12:00 AM    Glucose 103 (H) 2022 12:00 AM    BUN 16 07/01/2022 12:00 AM    Creatinine 0.99 07/01/2022 12:00 AM     Lipids Latest Ref Rng & Units 7/1/2022 5/25/2021 10/10/2019   Chol, Total 100 - 199 mg/dL 117 113 118   HDL >39 mg/dL 49 52 55   LDL 0 - 99 mg/dL 50 45 49   Trig 0 - 149 mg/dL 98 83 70   Some recent data might be hidden     Lab Results   Component Value Date/Time    ALT (SGPT) 17 07/01/2022 12:00 AM     Lab Results   Component Value Date/Time    Hemoglobin A1c 6.5 (H) 02/23/2022 12:00 AM     EKG  (04/16) Sinus rhythm at 96 bpm    STRESS TEST (2012)   No EKG or scintigraphic evidence of ischemia or infarction. Normal LVEF    ECHO (01/16)  SUMMARY:  Left ventricle: Systolic function was normal. Ejection fraction was estimated in the range of 55 % to 60 %. There were no regional wall motion  abnormalities. Doppler parameters were consistent with abnormal left ventricular relaxation (grade 1 diastolic dysfunction). Right ventricle: Systolic function was normal.  Aortic valve: The valve was trileaflet. Leaflets exhibited calcification and moderate- severely reduced cuspal separation. There was severe aortic  stenosis. There was no significant regurgitation. Valve peak gradient was 85 mmHg. Valve mean gradient was 50 mmHg. HOLTER (05/16)  Interpretation:  1. Rhythm is sinus. 2. PACs (1.3% burden). 3. Rare PVCs. ECHO (03/2021)  Left Ventricle Normal cavity size and systolic function (ejection fraction normal). Mild concentric hypertrophy. Wall motion: normal. The estimated EF is 55 - 60%. Visually measured ejection fraction. There is age-appropriate left ventricular diastolic function. Wall Scoring The left ventricular wall motion is normal.            Left Atrium Normal cavity size. Left Atrium volume index is 27 mL/m2. Right Ventricle Normal cavity size and global systolic function. Right Atrium Normal cavity size. Interatrial Septum No interatrial shunt visualized on color doppler.    Aortic Valve Normal valve structure, no stenosis and no regurgitation. Prosthetic aortic valve. Aortic valve peak gradient is 25 mmHg. Aortic valve mean gradient is 13 mmHg. Aortic valve area is 1.7 cm2. There is a bioprosthetic valve present. The prosthetic valve is normal.   Mitral Valve No stenosis. Mitral valve non-specific thickening. Mild mitral annular calcification. Trace regurgitation. Tricuspid Valve Normal valve structure, no stenosis and no regurgitation. Pulmonic Valve Pulmonic valve not well visualized, but normal doppler findings. No stenosis. CARDIAC CATH: (01/16)  PRESSURE HEMODYNAMICS: Systemic aortic pressure was 131/68 mmHg. Left ventricular pressure was 160/2/12 mmHg. Based on the data, peak-to-peak gradient between LV and aortic was approximately 30 mmHg. Mean RA  7 mmHg,   RV  32/1/8 mmHg,  PA  27/9/16 mmHg,   PCWP 10 mm hg  Cardiac output by Ellis calculation was 4.3 L/min and cardiac index was 2.02 liters L/min/meter squared. Mixed venous saturation was 65%, PA saturation was 65%, and the radial artery saturation was 90%. No evidence of intracardiac shunting. CORONARY ANGIOGRAM  1. LM: No significant obstructive disease. 2. LAD: 10-20% luminal irregularities, otherwise normal. Three diagonal branches angiographically normal.  3. LCX: 20-30% luminal irregularities, otherwise no obstructive disease. 4. OM1: Large bifurcating vessel without any obstructive disease. 5. RCA: Proximal 20%, mid discrete 40%, otherwise angiographically normal. Dominant vessel. IMPRESSION & PLAN:  Ms. Terra Andrade is a 67 y.o. female with Aortic stenosis, COPD, obesity, diabetes, fibromyalgia. Aortic stenosis:    Ms. Terra Andrade [facility]. Aspirin will be continued lifelong. ECHO in 06/17 with mean gradient across valve ~ 10 mm Hg. Repeat echo in May 2019 with gradient 13 mmHg. Physiologic for prosthetic valve. Echo in 04/2021 with mean gradient approximately 13 mmHg.   Continue ASA  Lifelong antibiotic prophylaxis is required before certain procedure. Diabetes:  Goal hemoglobin A1c less than 7 is recommended from a cardiovascular standpoint. Last hemoglobin A1c was 6.5. Continue same management per PCP    Hypertension:  BP today 114/62 mm Hg. HLD: last LDL 49. On atorvastatin. Continue same. Tachycardia:  For last couple weeks patient has been expensing tachycardia with minimal activity. As high as 150 bpm.  Event monitor 5/2022, sinus rhythm heart rate as high as 150 bpm.  1 short run of atrial tachycardia. PVC burden 1%  Start toprol XL 12.5 mg daily   Currently without any presyncope or syncope    This plan was discussed with patient who is in agreement. Thank you for allowing me to participate in patient care. Please feel free to call me if you have any question or concern.

## 2022-07-21 NOTE — PROGRESS NOTES
Identified pt with two pt identifiers(name and ). Reviewed record in preparation for visit and have obtained necessary documentation. Chanda Marsh presents today for   Chief Complaint   Patient presents with    Follow-up     3m       Pt c/o DIZZINESS, SOB, STRESS . Chanda Marsh preferred language for health care discussion is english/other. Personal Protective Equipment:   Personal Protective Equipment was used including: mask-surgical and hands-gloves. Patient was placed on no precaution(s). Patient was masked. Precautions:   Patient currently on None  Patient currently roomed with door closed. Is someone accompanying this pt? no    Is the patient using any DME equipment during [address]? cane    Depression Screening:  3 most recent PHQ Screens 2022   Little interest or pleasure in doing things Not at all   Feeling down, depressed, irritable, or hopeless Not at all   Total Score PHQ 2 0   Trouble falling or staying asleep, or sleeping too much -   Feeling tired or having little energy -   Poor appetite, weight loss, or overeating -   Feeling bad about yourself - or that you are a failure or have let yourself or your family down -   Trouble concentrating on things such as school, work, reading, or watching TV -   Moving or speaking so slowly that other people could have noticed; or the opposite being so fidgety that others notice -   Thoughts of being better off dead, or hurting yourself in some way -   PHQ 9 Score -   How difficult have these problems made it for you to do your work, take care of your home and get along with others -       Learning Assessment:  Learning Assessment 2016   PRIMARY LEARNER Patient   HIGHEST LEVEL OF EDUCATION - PRIMARY LEARNER  -   PRIMARY LANGUAGE ENGLISH   LEARNER PREFERENCE PRIMARY LISTENING   ANSWERED BY patient   RELATIONSHIP SELF       Abuse Screening:  Abuse Screening Questionnaire 2019   Do you ever feel afraid of your partner?  N   Are you in a relationship with someone who physically or mentally threatens you? N   Is it safe for you to go home? Y       Fall Risk  Fall Risk Assessment, last 12 mths 7/1/2022   Able to walk? Yes   Fall in past 12 months? 0   Do you feel unsteady? 1   Are you worried about falling 1   Is TUG test greater than 12 seconds? 1   Is the gait abnormal? 1   Number of falls in past 12 months 0   Fall with injury? -       Pt currently taking Anticoagulant therapy? no  Pt currently taking Antiplatelet therapy? yes    Coordination of Care:  1. Have you been to the ER, urgent care clinic since your last visit? Hospitalized since your last visit? No     2. Have you seen or consulted any other health care providers outside of the [address] [place] [address] since your last visit? Include any pap smears or colon screening. yes      Please see Red banners under Allergies and Med Rec to remove outside inquires. All correct information has been verified with patient and added to chart.      Medication's patient's would liked removed has been marked not taking to be removed per Verbal order and read back per Jeremy Adams MD

## 2022-08-08 ENCOUNTER — PATIENT MESSAGE (OUTPATIENT)
Dept: FAMILY MEDICINE CLINIC | Age: 72
End: 2022-08-08

## 2022-08-10 ENCOUNTER — VIRTUAL VISIT (OUTPATIENT)
Dept: ORTHOPEDIC SURGERY | Age: 72
End: 2022-08-10
Payer: MEDICARE

## 2022-08-10 DIAGNOSIS — M54.12 CERVICAL NEURITIS: ICD-10-CM

## 2022-08-10 DIAGNOSIS — M48.02 CERVICAL SPINAL STENOSIS: ICD-10-CM

## 2022-08-10 DIAGNOSIS — M54.16 LUMBAR NEURITIS: ICD-10-CM

## 2022-08-10 DIAGNOSIS — M50.00 HNP (HERNIATED NUCLEUS PULPOSUS) WITH MYELOPATHY, CERVICAL: ICD-10-CM

## 2022-08-10 DIAGNOSIS — M96.1 LUMBAR POST-LAMINECTOMY SYNDROME: Primary | ICD-10-CM

## 2022-08-10 PROCEDURE — G8417 CALC BMI ABV UP PARAM F/U: HCPCS | Performed by: PHYSICAL MEDICINE & REHABILITATION

## 2022-08-10 PROCEDURE — G8399 PT W/DXA RESULTS DOCUMENT: HCPCS | Performed by: PHYSICAL MEDICINE & REHABILITATION

## 2022-08-10 PROCEDURE — G8427 DOCREV CUR MEDS BY ELIG CLIN: HCPCS | Performed by: PHYSICAL MEDICINE & REHABILITATION

## 2022-08-10 PROCEDURE — G9717 DOC PT DX DEP/BP F/U NT REQ: HCPCS | Performed by: PHYSICAL MEDICINE & REHABILITATION

## 2022-08-10 PROCEDURE — 99213 OFFICE O/P EST LOW 20 MIN: CPT | Performed by: PHYSICAL MEDICINE & REHABILITATION

## 2022-08-10 PROCEDURE — G8756 NO BP MEASURE DOC: HCPCS | Performed by: PHYSICAL MEDICINE & REHABILITATION

## 2022-08-10 PROCEDURE — 1101F PT FALLS ASSESS-DOCD LE1/YR: CPT | Performed by: PHYSICAL MEDICINE & REHABILITATION

## 2022-08-10 PROCEDURE — 1123F ACP DISCUSS/DSCN MKR DOCD: CPT | Performed by: PHYSICAL MEDICINE & REHABILITATION

## 2022-08-10 PROCEDURE — G9899 SCRN MAM PERF RSLTS DOC: HCPCS | Performed by: PHYSICAL MEDICINE & REHABILITATION

## 2022-08-10 PROCEDURE — 1090F PRES/ABSN URINE INCON ASSESS: CPT | Performed by: PHYSICAL MEDICINE & REHABILITATION

## 2022-08-10 PROCEDURE — G8536 NO DOC ELDER MAL SCRN: HCPCS | Performed by: PHYSICAL MEDICINE & REHABILITATION

## 2022-08-10 PROCEDURE — 3017F COLORECTAL CA SCREEN DOC REV: CPT | Performed by: PHYSICAL MEDICINE & REHABILITATION

## 2022-08-10 NOTE — PROGRESS NOTES
[facility]  [address], [address]  Phone: [phone]  Fax: [phone]        PROGRESS NOTE    CONSENT:  Pursuant to the emergency declaration under the [address] and [facility] waiver authority and the [email or web address] and [employer] Act, this Virtual Visit was conducted, with patient's consent, to reduce the patient's risk of exposure to COVID-19 and provide continuity of care for an established patient. Services were provided through a video synchronous discussion virtually to substitute for in-person appointment. ENCOUNTER (minutes): 9 minutes 45 seconds    HISTORY OF PRESENT ILLNESS:  The patient is a 67 y.o. female. Ms. Jareth Tabares is being consulted by me via telehpone at the [facility] office for follow up of low back pain radiating into BLE in a L4 distribution on the right and in a L5 distribution on the left to the greater toe and chronic neck pain which became progressive x 12/2019 radiating into BUE (L>R) to the hands. She additionally c/o BLE cramps x several months. Previously, she was seen for low back pain radiating into BLE in a L4 distribution on the right and in a S1 distribution on the left to the greater toe and chronic neck pain which became progressive x 12/2019 radiating into BUE (L>R) to the hands. Pt was last evaluated by me 7/9/2020 with c/o low back pain radiating into BLE in a L4 distribution on the right and in a S1 distribution on the left to the greater toe and chronic neck pain which became progressive x 12/2019 radiating into BUE (L>R) to the hands. She was seen prior for low back and left hip pain extending into the LLE in a S1 distribution to an area below the knee. Initially she was seen for c/o low back pain into the BLE extending in roughly an L4 distribution in the LLE and in an L5 distribution to the foot in the RLE.  Her pain is exacerbated with standing and walking intolerance. Patient reports dropping objects x couple months. Pt completed MDP with good relief. Pt was intolerant to the increased dose of GABITRIL 2 mg BID secondary to dizziness. Pt reports intolerance to NEURONTIN 100 mg TID secondary to nausea and dizziness. She had a diagnosis of lumbar postlaminectomy syndrome with a previous history of L3 through S1 fusion in 2002 and 2004 by a physician in [place]. Pt denies h/o stomach ulcers, bleeding disorders, or current use of anticoagulants. PmHx of fibromyalgia, DM, aortic valve replacement, bilateral shoulder replacement, left rotator cuff disease. Patient reports previously receiving a shoulder injection with temporary relief. Pt notes intolerance to FOSOMAX, LYRICA, and PREDNISONE combination. Subsequently, d/c Fosomax due to allergy and started Cymbalta 30 mg TID in place of Lyrica. Note dated 6/29/16 indicating patient was seen and underwent porcine aortic valve replacement by Dr. Ed Bueno on 3/18/16. Pt began cardiac rehab in 1/2017. Pt underwent right shoulder surgery on 3/29/17 by Dr. Roni Mendoza. Pt underwent a left shoulder replacement on 7/2/2021 with Dr. Courtney Sanchez. Note from Dr. Isi Pollock, cardiologist dated 5/18/17 indicating patient was seen with h/o aortic stenosis, s/p aortic valve replacement 3/16 and was seen with moderate dysphemia with minimal exertion. CT revealed no evidence of pulmonary embolism, no obvious fluid overload. They were ordering further workup to look for functioning bioprosthetic aortic valve. Per patient, a cause for her SOB has still not been found. She has been scheduled to see a pulmonologist. She states she has been restricted to take her Meloxicam since her right shoulder surgery, which has caused her increase in low back pain. Upon review of our records, it was noted she previously failed TOPAMAX, NEURONTIN, and LYRICA. Pt was switched from Ultram ER back to Ultram immediate release.  Pt underwent left SI joint injection on 3/15/18 with good relief. Note from Dr. Jeronimo Magana dated 11/12/19 indicated they stopped Lyrica and started her on Cymbalta 90 mg per day. Note from Dr. Paulette Wallis dated 12/19/19 indicating patient was seen with c/o history of aortic valve replacement, fibromyalgia, and DM. The patient has a history of DM and reports blood sugars are well controlled, consistently remaining below 200, average of 119. Note from Dr. Caldwell Covert dated 8/7/2020: It would appear that her left shoulder may need replacement in her right shoulder may need revision. At this time with regards to her cervical spine I have nothing to offer her I do not think her pain is emanating from her spine. I would imagine it would be best for Dr. Ryan Rubi to take the lead in the situation and decide what further treatments for her shoulders are required. Given acute Rx of Flexeril 10 mg TID. Patient reports a benefit in sleeping while taking Flexeril and inquired about making it a new medication. Patient understands that it is not a long-term medication. Note from Dr. Kayla Gutierrez dated 10/26/2020 indicating patient was seen with c/o bilateral thumb base pain. Numbness and tingling in her hands. Evidence of CTS and cubital tunnel syndrome on the right side. Indicated she has OA of the first CMC joints bilaterally. Performed injections. Pt reports temporary relief with the injections. She is scheduled to f/u with Dr. Kayla Gutierrez next week to discuss surgery. Note from Myrtle Medina NP dated 12/1/2020 indicating patient has h/o DM, fibromyalgia, and thyroid problems. Note from Dr. Kayla Gutierrez dated 1/13/2021 indicating patient was seen for bilateral thumb CMC arthritis, bilateral CTS and right cubital tunnel syndrome. Indicated injections last time did help but she wanted to discuss surgery. He set her up for surgery for CTS and right CMC arthritis. Note from Dr. Kayla Gutierrez dated 4/28/2021 indicating patient was seen with c/o 7 week's s/p left CTS release.  Indicated she still had some soreness about the incision. Dx with suture granuloma. Note from Dr. Fanny Ivan dated 5/3/2021 indicating patient was seen for reevaluation left shoulder. Pain was 7/10. Previously discussed surgery. Pt has h/o torn left rotator cuff years ago. Pt had surgery to repair it. H/o right shoulder replacement. UDS dated 2/25/2021 was consistent. Preliminary reading of C Spine plain films dated 3/2/2020 revealed: severe disc space narrowing at C4-5, moderate at C5-6, and the space between C6-7 was not well visualized. Small anterior osteophytes noted at C4, C5 and C6. C7 not visualized due to shoulder pathology. No acute pathology identified. Lumbar spine CT w/o contrast dated 7/20/14  per report revealed L3 through L5 laminectomies with L3 through S1 dorsal hardware fusion. There was no evidence for fractures. There was advanced degenerative disc disease at L2-L3 at the junctional level with marked central canal stenosis. C Spine MRI dated 3/15/2020 films independently reviewed. Per report, at C3-4: anterolisthesis. 3.5 mm central disc extrusion extending 4 mm cephalad. Mild central stenosis and subtle ventral cord flattening. Advanced left facet arthropathy. At C4-5: mild posterior spondylotic ridge. Mild central stenosis and subtle ventral cord flattening. At C5-6: central to right posterolateral 2.5 mm broad-based disc protrusion with subtle ventral cord flattening and mild central stenosis. At C6-7: mild posterior spondylotic ridge. Mild to moderate central stenosis. At C7-T1: anterolisthesis. Mild posterior annular bulge. Mild central stenosis. Upper thoracic spondylosis including a 5 mm posterior disc extension at T3-4. At her last clinical appointment, I provided her refills of Gabitril 2 mg qhs and Tramadol 50 mg. She should have continued with her shoulder surgery as planned. At today's telephone consultation, the patient and pt reports pain location and distribution remains unchanged. States she has all over body aches due to COVID. She rates her pain 4-6/10, previously 6/10. The pt had contacted me on 2022 stating she d/c Tramadol with an increase in leg cramps but notes improvement at this time. Pt reports she had to d/c the Tramadol secondary to Anxiety and insomnia and has been prescribed KlonoPIN with benefit. She continues taking Cymbalta 60 mg 2 tabs in the morning. The pt is complaint with her Gabitril 2 mg qhs. The pt continues to c/o left shoulder manipulation done by Dr. Clarisse Moy. Pt denies any signs of weakness. Pt denies change in bowel or bladder habits.  reviewed. There is no height or weight on file to calculate BMI. PCP: Tila Meng MD      Past Medical History:   Diagnosis Date    Ankle fracture     Aortic stenosis     S/P AVR in     Asthma     Carpal tunnel syndrome on both sides     Chronic obstructive pulmonary disease (HCC)     Chronic pain     back pain    COVID-19 vaccine series completed 2021    Diabetes ([facility]     Diverticulitis     Fibromyalgia     H/O aortic valve replacement 2016    21 mm bovine pericardial bioprosthesis and epiaortic scanning      H/O: HTN (hypertension)     no meds now- sp valve replacement    History of COVID-19 2022    Home test. Results not saved    Hypertension     no medication    Hypothyroidism     Lumbar post-laminectomy syndrome     Menopause     Obesity     Osteoarthritis     Psychotic disorder ([address]     Pulmonary HTN ([address]     per pcp note cc- patient denies. Sciatica     Skin cancer 2013    right forearm.      Tachycardia         Social History     Socioeconomic History    Marital status:      Spouse name: Not on file    Number of children: Not on file    Years of education: Not on file    Highest education level: Not on file   Occupational History    Not on file   Tobacco Use    Smoking status: Former     Years: 45.00     Types: Cigarettes     Quit date: 2011     Years since quittin.1 Smokeless tobacco: Never   Vaping Use    Vaping Use: Never used   Substance and Sexual Activity    Alcohol use: No    Drug use: Not Currently     Types: Marijuana, Cocaine    Sexual activity: Never   Other Topics Concern    Not on file   Social History Narrative    Not on file     Social Determinants of Health     Financial Resource Strain: Not on file   Food Insecurity: Not on file   Transportation Needs: Not on file   Physical Activity: Not on file   Stress: Not on file   Social Connections: Not on file   Intimate Partner Violence: Not on file   Housing Stability: Not on file       Current Outpatient Medications   Medication Sig Dispense Refill    metoprolol succinate (TOPROL-XL) 25 mg XL tablet Take 0.5 Tablets by mouth in the morning. 45 Tablet 3    tiaGABine (GabitriL) 2 mg tablet Take 1 Tablet by mouth nightly. 90 Tablet 0    DULoxetine (CYMBALTA) 60 mg capsule Take 2 Capsules by mouth daily. 180 Capsule 3    pantoprazole (PROTONIX) 20 mg tablet Take 1 Tablet by mouth daily. 90 Tablet 3    metFORMIN (GLUCOPHAGE) 500 mg tablet TAKE 1 TABLET BY MOUTH ONCE DAILY WITH SUPPER 90 Tablet 3    clonazePAM (KlonoPIN) 1 mg tablet Take 1 Tablet by mouth nightly. Max Daily Amount: 1 mg. 30 Tablet 2    levothyroxine (Euthyrox) 25 mcg tablet Take 1 Tablet by mouth Daily (before breakfast). (Patient taking differently: Take 75 mcg by mouth Daily (before breakfast). ) 90 Tablet 0    tiotropium-olodateroL (Stiolto Respimat) 2.5-2.5 mcg/actuation inhaler Take 2 Puffs by inhalation daily. fluticasone propionate (FLONASE) 50 mcg/actuation nasal spray Use 1 spray(s) in each nostril twice daily 16 g 3    atorvastatin (LIPITOR) 20 mg tablet Take 1 tablet by mouth once daily 90 Tablet 3    acetaminophen (Tylenol Extra Strength) 500 mg tablet Take 500 mg by mouth every six (6) hours as needed for Pain.       FLUoxetine (PROzac) 10 mg capsule Take 1 capsule by mouth once daily 90 Capsule 3    meloxicam (MOBIC) 15 mg tablet Take 1 tablet by mouth once daily 90 Tablet 3    pramoxine-hc-chloroxylenol 1-1-0.1 % lotion Apply  to affected area two (2) times a day. 60 mL 1    desonide (TRIDESILON) 0.05 % cream APPLY   EXTERNALLY TO AFFECTED AREA TWICE DAILY 15 g 1    aspirin delayed-release 81 mg tablet Take 81 mg by mouth daily. meclizine (ANTIVERT) 25 mg tablet Take 1 Tablet by mouth three (3) times daily as needed for Dizziness. 60 Tablet 3    vitamin E acetate (VITAMIN E PO) Take  by mouth daily. cyanocobalamin (VITAMIN B12) 500 mcg tablet Take 500 mcg by mouth daily. cetirizine (ZYRTEC) 10 mg tablet TAKE 1 TABLET BY MOUTH EVERY DAY. GENERIC FOR ZYRTEC 90 Tab 2    VENTOLIN HFA 90 mcg/actuation inhaler 2 Puffs by Aerosolization route every four (4) hours as needed.  0    B.animalis,bifid,infantis,long 10-15 mg TbEC Take  by mouth daily. cholecalciferol (VITAMIN D3) 25 mcg (1,000 unit) cap Take 1,000 Units by mouth daily. Narcan 4 mg/actuation nasal spray Give 1 spray into 1 nostril. Give additional doses using a new nasal spray with each dose, if patient does not respond or relapses into respiratory depression. Call 911. Additional doses may be given every 2 to 3 minutes until medical assistance arrives. glucose blood VI test strips (True Metrix Pro Test Strip) strip Test blood glucose 3 times a day 200 Strip 3    Lancets (ACCU-CHEK SOFTCLIX LANCETS) misc Please use one lancet to test blood sugars twice a day. 1 Package 20    BLOOD-GLUCOSE METER (ACCU-CHEK MARCE MONITORING) by Does Not Apply route.          Allergies   Allergen Reactions    Latex, Natural Rubber Hives    Adhesive Rash     Some bandaids    Ciprofloxacin Rash    Codeine Nausea and Vomiting    Demerol [Meperidine] Nausea and Vomiting    Diclofenac Shortness of Breath and Palpitations    Erythromycin Rash    Gabapentin Vertigo    Levaquin [Levofloxacin] Rash    Loratadine Swelling    Morphine Nausea and Vomiting    Penicillin G Hives and Rash     Does fine with Keflex    Sulfa (Sulfonamide Antibiotics) Itching    Vicodin [Hydrocodone-Acetaminophen] Other (comments)     Metallic taste in mouth          PHYSICAL EXAMINATION  Unable to perform examination secondary to COVID-19. CONSTITUTIONAL: NAD, A&O x 3    ASSESSMENT   Diagnoses and all orders for this visit:    1. Lumbar post-laminectomy syndrome    2. Lumbar neuritis    3. Cervical neuritis    4. Cervical spinal stenosis    5. HNP (herniated nucleus pulposus) with myelopathy, cervical      IMPRESSION AND PLAN:  The patient consented to the tele health visit and was aware that there would be a charge. During today's telephone consultation patient had c/o low back pain radiating into BLE in a L4 distribution on the right and in a L5 distribution on the left to the greater toe and chronic neck pain . Multiple treatment options were discussed. Patient wished to continue her current treatment. She did not need refills of Gabitril or Cymbalta. Pt appears to be neurologically intact. I will see the patient back in 3 month's time or earlier if needed. Written by Sangeetha Kauffman, as dictated by Efraín Toure MD  I examined the patient, reviewed and agree with the note.

## 2022-08-11 DIAGNOSIS — E03.9 ACQUIRED HYPOTHYROIDISM: ICD-10-CM

## 2022-08-11 RX ORDER — LEVOTHYROXINE SODIUM 75 UG/1
75 TABLET ORAL
Qty: 90 TABLET | Refills: 3 | Status: SHIPPED | OUTPATIENT
Start: 2022-08-11

## 2022-08-16 ENCOUNTER — OFFICE VISIT (OUTPATIENT)
Dept: URBAN - METROPOLITAN AREA CLINIC 60 | Facility: CLINIC | Age: 72
End: 2022-08-16
Payer: MEDICARE

## 2022-08-16 DIAGNOSIS — H40.013 OPEN ANGLE WITH BORDERLINE FINDINGS, LOW RISK, BILATERAL: ICD-10-CM

## 2022-08-16 DIAGNOSIS — H16.223 KERATOCONJUNCTIVITIS SICCA, BILATERAL: ICD-10-CM

## 2022-08-16 DIAGNOSIS — H25.813 COMBINED FORMS OF AGE-RELATED CATARACT, BILATERAL: ICD-10-CM

## 2022-08-16 DIAGNOSIS — H43.813 VITREOUS DEGENERATION, BILATERAL: ICD-10-CM

## 2022-08-16 DIAGNOSIS — E11.9 DIABETES MELLITUS TYPE 2 WITHOUT MENTION OF COMPLICATION: Primary | ICD-10-CM

## 2022-08-16 PROCEDURE — 92014 COMPRE OPH EXAM EST PT 1/>: CPT | Performed by: OPTOMETRIST

## 2022-08-16 PROCEDURE — 92250 FUNDUS PHOTOGRAPHY W/I&R: CPT | Performed by: OPTOMETRIST

## 2022-08-16 RX ORDER — POLYETHYLENE GLYCOL 400 AND PROPYLENE GLYCOL 4; 3 MG/ML; MG/ML
SOLUTION/ DROPS OPHTHALMIC
Qty: 0 | Refills: 0 | Status: ACTIVE
Start: 2022-08-16

## 2022-08-16 ASSESSMENT — INTRAOCULAR PRESSURE
OD: 15
OS: 16

## 2022-08-16 NOTE — IMPRESSION/PLAN
Impression: Open angle with borderline findings, low risk, bilateral; secondary to [address] 74 asymmetry Plan: IOP stable. Disc photos done today. Patient educated on findings. Will continue to monitor patient without treatment.  Patient to RTC in 1 year for complete exam and RNFL OCT. 

disc photos: stable cupping OU

## 2022-08-16 NOTE — IMPRESSION/PLAN
Impression: Diabetes mellitus Type 2 without mention of complication: E14.4. Plan: No diabetic retinopathy. Recommend yearly diabetic eye exam. Discussed with patient importance of good blood sugar control with regular visits with PCP. Photo documentation today. 

Photos: No visible retinopathy OU

## 2022-08-16 NOTE — IMPRESSION/PLAN
Impression: Keratoconjunctivitis sicca, bilateral: L65.304. Plan: Patient to continue artificial tears as needed.

## 2022-09-06 ENCOUNTER — OFFICE VISIT (OUTPATIENT)
Dept: ORTHOPEDIC SURGERY | Age: 72
End: 2022-09-06
Payer: MEDICARE

## 2022-09-06 VITALS
HEART RATE: 83 BPM | HEIGHT: 68 IN | OXYGEN SATURATION: 99 % | BODY MASS INDEX: 32.89 KG/M2 | WEIGHT: 217 LBS | RESPIRATION RATE: 20 BRPM | TEMPERATURE: 97.3 F

## 2022-09-06 DIAGNOSIS — M65.812 SYNOVITIS OF LEFT SHOULDER: ICD-10-CM

## 2022-09-06 DIAGNOSIS — M84.322D STRESS FRACTURE OF LEFT HUMERUS WITH ROUTINE HEALING, SUBSEQUENT ENCOUNTER: Primary | ICD-10-CM

## 2022-09-06 DIAGNOSIS — T84.498A FAILED ORTHOPEDIC IMPLANT, INITIAL ENCOUNTER (HCC): ICD-10-CM

## 2022-09-06 PROCEDURE — 1090F PRES/ABSN URINE INCON ASSESS: CPT | Performed by: ORTHOPAEDIC SURGERY

## 2022-09-06 PROCEDURE — 1123F ACP DISCUSS/DSCN MKR DOCD: CPT | Performed by: ORTHOPAEDIC SURGERY

## 2022-09-06 PROCEDURE — G8756 NO BP MEASURE DOC: HCPCS | Performed by: ORTHOPAEDIC SURGERY

## 2022-09-06 PROCEDURE — G8427 DOCREV CUR MEDS BY ELIG CLIN: HCPCS | Performed by: ORTHOPAEDIC SURGERY

## 2022-09-06 PROCEDURE — 1101F PT FALLS ASSESS-DOCD LE1/YR: CPT | Performed by: ORTHOPAEDIC SURGERY

## 2022-09-06 PROCEDURE — 3017F COLORECTAL CA SCREEN DOC REV: CPT | Performed by: ORTHOPAEDIC SURGERY

## 2022-09-06 PROCEDURE — G8417 CALC BMI ABV UP PARAM F/U: HCPCS | Performed by: ORTHOPAEDIC SURGERY

## 2022-09-06 PROCEDURE — 99212 OFFICE O/P EST SF 10 MIN: CPT | Performed by: ORTHOPAEDIC SURGERY

## 2022-09-06 PROCEDURE — G8399 PT W/DXA RESULTS DOCUMENT: HCPCS | Performed by: ORTHOPAEDIC SURGERY

## 2022-09-06 PROCEDURE — G9899 SCRN MAM PERF RSLTS DOC: HCPCS | Performed by: ORTHOPAEDIC SURGERY

## 2022-09-06 PROCEDURE — G8536 NO DOC ELDER MAL SCRN: HCPCS | Performed by: ORTHOPAEDIC SURGERY

## 2022-09-06 PROCEDURE — G9717 DOC PT DX DEP/BP F/U NT REQ: HCPCS | Performed by: ORTHOPAEDIC SURGERY

## 2022-09-06 NOTE — PROGRESS NOTES
Amaya Robbins  1950   Chief Complaint   Patient presents with    Shoulder Pain     left          HISTORY OF PRESENT ILLNESS  Amaya Robbins is a 67 y.o. female who presents today for reevaluation of left shoulder. Patient rates pain as 5/10 today. She is s/p Left shoulder arthroscopic partial synovectomy on 3/4/2022 following a reverse total shoulder arthroplasty on 7/2/21. Was last seen in the office on 7/06/2022. Still having decreased ROM but this is overall improving. Has been able to do her ADLs without pain. Presents today ambulating with aid of a cane. Patient denies any fever, chills, chest pain, shortness of breath or calf pain. The remainder of the review of systems is negative. There are no new illness or injuries to report since last seen in the office. There are no changes to medications, allergies, family or social history. PHYSICAL EXAM:   Visit Vitals  Pulse 83   Temp 97.3 °F (36.3 °C) (Temporal)   Resp 20   Ht 5' 8\" (1.727 m)   Wt 217 lb (98.4 kg)   SpO2 99%   BMI 32.99 kg/m²       The patient is a well-developed, well-nourished female   in no acute distress. The patient is alert and oriented times three. The patient is alert and oriented times three. Mood and affect are normal.  LYMPHATIC: lymph nodes are not enlarged and are within normal limits  SKIN: normal in color and non tender to palpation. There are no bruises or abrasions noted. NEUROLOGICAL: Motor sensory exam is within normal limits. Reflexes are equal bilaterally.  There is normal sensation to pinprick and light touch  MUSCULOSKELETAL:  ORTHOPEDIC EXAM of left shoulder:  Inspection: swelling not present,  Bruising not present  Incision, clean, dry, intact, sutures in place  Passive glenohumeral abduction 0-50 degrees  Stability: Stable  Strength: n/a  2+ distal pulses      IMAGING: XR of left shoulder with 3 views obtained in office dated 7/06/2022 was reviewed and read by Dr. Jeyson Huerta:     XR of the scapula with 2 views obtained in the office dated 4/25/2022 was reviewed and read by Dr. Jesus Alberto Le: scapula spine fracture with no displacement       CT of the left shoulder dated 3/15/2022 was reviewed and read by Dr. Jesus Alberto Le:   IMPRESSION  1. Reverse total left shoulder arthroplasty. No CT evidence of hardware  loosening. 2.  Acute nondisplaced fracture through the scapular spine. 3.  Chronic inferiorly displaced fracture through the coracoid base. 4.  Ectatic ascending thoracic aorta, measuring approximately 4.0 cm diameter. IMPRESSION:      ICD-10-CM ICD-9-CM    1. Stress fracture of left humerus with routine healing, subsequent encounter  M84.322D V54.89       2. Synovitis of left shoulder  M65.812 726.10       3. Failed orthopedic implant, initial encounter ([facility]  T84.498A 996.49              PLAN:   1. She is s/p Left shoulder arthroscopic partial synovectomy on 3/4/2022 following a reverse total shoulder arthroplasty on 7/2/21. She also has a nondisplaced fracture of the scapular spine. She notes some overall improvement in ROM since last OV and is able to do her ADLs without pain. Can return as needed. Risk factors include: htn, dm   2. No ultrasound exam indicated today  3. No cortisone injection indicated today   4. No Physical/Occupational Therapy indicated today  5. No diagnostic test indicated today:   6. No durable medical equipment indicated today  7. No referral indicated today   8. No medications indicated today:   9. No Narcotic indicated today       RTC prn      Scribed by Lachelle López) as dictated by Ema Carey MD    I, Dr. Ema Carey, confirm that all documentation is accurate.     Ema Carey M.D.   [facility]

## 2022-09-17 DIAGNOSIS — J30.1 SEASONAL ALLERGIC RHINITIS DUE TO POLLEN: ICD-10-CM

## 2022-09-20 RX ORDER — FLUTICASONE PROPIONATE 50 MCG
SPRAY, SUSPENSION (ML) NASAL
Qty: 16 G | Refills: 0 | Status: SHIPPED | OUTPATIENT
Start: 2022-09-20

## 2022-09-26 ENCOUNTER — HOSPITAL ENCOUNTER (OUTPATIENT)
Dept: WOMENS IMAGING | Age: 72
Discharge: HOME | End: 2022-09-26
Attending: FAMILY MEDICINE
Payer: MEDICARE

## 2022-09-26 ENCOUNTER — HOSPITAL ENCOUNTER (OUTPATIENT)
Dept: BONE DENSITY | Age: 72
Discharge: HOME | End: 2022-09-26
Attending: FAMILY MEDICINE
Payer: MEDICARE

## 2022-09-26 DIAGNOSIS — Z12.31 ENCOUNTER FOR SCREENING MAMMOGRAM FOR BREAST CANCER: ICD-10-CM

## 2022-09-26 PROCEDURE — 77063 BREAST TOMOSYNTHESIS BI: CPT

## 2022-09-26 PROCEDURE — 77080 DXA BONE DENSITY AXIAL: CPT

## 2022-09-28 ENCOUNTER — OFFICE VISIT (OUTPATIENT)
Dept: FAMILY MEDICINE CLINIC | Age: 72
End: 2022-09-28
Payer: MEDICARE

## 2022-09-28 VITALS
SYSTOLIC BLOOD PRESSURE: 132 MMHG | TEMPERATURE: 97.3 F | RESPIRATION RATE: 17 BRPM | OXYGEN SATURATION: 99 % | DIASTOLIC BLOOD PRESSURE: 82 MMHG | HEART RATE: 68 BPM | WEIGHT: 218.2 LBS | HEIGHT: 68 IN | BODY MASS INDEX: 33.07 KG/M2

## 2022-09-28 DIAGNOSIS — M19.012 PRIMARY OSTEOARTHRITIS OF LEFT SHOULDER: ICD-10-CM

## 2022-09-28 DIAGNOSIS — M85.89 OSTEOPENIA OF MULTIPLE SITES: ICD-10-CM

## 2022-09-28 DIAGNOSIS — F41.9 ANXIETY: ICD-10-CM

## 2022-09-28 DIAGNOSIS — F42.2 MIXED OBSESSIONAL THOUGHTS AND ACTS: ICD-10-CM

## 2022-09-28 DIAGNOSIS — Z86.16 HISTORY OF 2019 NOVEL CORONAVIRUS DISEASE (COVID-19): ICD-10-CM

## 2022-09-28 DIAGNOSIS — S42.115S CLOSED NONDISPLACED FRACTURE OF BODY OF LEFT SCAPULA, SEQUELA: ICD-10-CM

## 2022-09-28 DIAGNOSIS — I10 ESSENTIAL HYPERTENSION: ICD-10-CM

## 2022-09-28 DIAGNOSIS — E11.65 UNCONTROLLED TYPE 2 DIABETES MELLITUS WITH HYPERGLYCEMIA (HCC): ICD-10-CM

## 2022-09-28 DIAGNOSIS — R26.81 UNSTEADY GAIT: ICD-10-CM

## 2022-09-28 DIAGNOSIS — Z71.89 ADVANCE CARE PLANNING: ICD-10-CM

## 2022-09-28 DIAGNOSIS — Z00.00 MEDICARE ANNUAL WELLNESS VISIT, SUBSEQUENT: Primary | ICD-10-CM

## 2022-09-28 DIAGNOSIS — Z23 NEEDS FLU SHOT: ICD-10-CM

## 2022-09-28 DIAGNOSIS — F51.04 PSYCHOPHYSIOLOGIC INSOMNIA: ICD-10-CM

## 2022-09-28 DIAGNOSIS — E03.9 ACQUIRED HYPOTHYROIDISM: ICD-10-CM

## 2022-09-28 PROCEDURE — G8417 CALC BMI ABV UP PARAM F/U: HCPCS | Performed by: FAMILY MEDICINE

## 2022-09-28 PROCEDURE — G9899 SCRN MAM PERF RSLTS DOC: HCPCS | Performed by: FAMILY MEDICINE

## 2022-09-28 PROCEDURE — 3017F COLORECTAL CA SCREEN DOC REV: CPT | Performed by: FAMILY MEDICINE

## 2022-09-28 PROCEDURE — 2022F DILAT RTA XM EVC RTNOPTHY: CPT | Performed by: FAMILY MEDICINE

## 2022-09-28 PROCEDURE — 1101F PT FALLS ASSESS-DOCD LE1/YR: CPT | Performed by: FAMILY MEDICINE

## 2022-09-28 PROCEDURE — 99215 OFFICE O/P EST HI 40 MIN: CPT | Performed by: FAMILY MEDICINE

## 2022-09-28 PROCEDURE — G8752 SYS BP LESS 140: HCPCS | Performed by: FAMILY MEDICINE

## 2022-09-28 PROCEDURE — G8427 DOCREV CUR MEDS BY ELIG CLIN: HCPCS | Performed by: FAMILY MEDICINE

## 2022-09-28 PROCEDURE — G0439 PPPS, SUBSEQ VISIT: HCPCS | Performed by: FAMILY MEDICINE

## 2022-09-28 PROCEDURE — G8754 DIAS BP LESS 90: HCPCS | Performed by: FAMILY MEDICINE

## 2022-09-28 PROCEDURE — 99497 ADVNCD CARE PLAN 30 MIN: CPT | Performed by: FAMILY MEDICINE

## 2022-09-28 PROCEDURE — 3044F HG A1C LEVEL LT 7.0%: CPT | Performed by: FAMILY MEDICINE

## 2022-09-28 PROCEDURE — G0008 ADMIN INFLUENZA VIRUS VAC: HCPCS | Performed by: FAMILY MEDICINE

## 2022-09-28 PROCEDURE — 90694 VACC AIIV4 NO PRSRV 0.5ML IM: CPT | Performed by: FAMILY MEDICINE

## 2022-09-28 PROCEDURE — G9717 DOC PT DX DEP/BP F/U NT REQ: HCPCS | Performed by: FAMILY MEDICINE

## 2022-09-28 PROCEDURE — 1090F PRES/ABSN URINE INCON ASSESS: CPT | Performed by: FAMILY MEDICINE

## 2022-09-28 PROCEDURE — G8536 NO DOC ELDER MAL SCRN: HCPCS | Performed by: FAMILY MEDICINE

## 2022-09-28 PROCEDURE — G8399 PT W/DXA RESULTS DOCUMENT: HCPCS | Performed by: FAMILY MEDICINE

## 2022-09-28 RX ORDER — CLONAZEPAM 1 MG/1
1 TABLET ORAL
Qty: 30 TABLET | Refills: 2 | Status: SHIPPED | OUTPATIENT
Start: 2022-09-28

## 2022-09-28 RX ORDER — MELOXICAM 15 MG/1
15 TABLET ORAL DAILY
Qty: 90 TABLET | Refills: 3 | Status: SHIPPED | OUTPATIENT
Start: 2022-09-28

## 2022-09-28 RX ORDER — FLUOXETINE 10 MG/1
10 CAPSULE ORAL DAILY
Qty: 90 CAPSULE | Refills: 3 | Status: SHIPPED | OUTPATIENT
Start: 2022-09-28

## 2022-09-28 NOTE — PROGRESS NOTES
(AWV) The Medicare Annual Wellness Exam PROGRESS NOTE    This is a Medicare Annual Wellness Exam (AWV)     I have reviewed the patient's medical history in detail and updated the computerized patient record. Hero Amaro is a 67 y.o.  female and presents for an annual wellness exam     ROS   General ROS: negative for - chills, + fatigue after covid a month ago; + fever; no weight loss  Psychological ROS: positive for - depression associated with COVID 19 and being home bound  Ophthalmic ROS: positive for - uses glasses  ENT ROS: positive for - headaches intermittently  Endocrine ROS: negative for - temperature intolerance or unexpected weight changes  Respiratory ROS: + cough with covid, no wheezing;   Cardiovascular ROS: no chest pain or dyspnea on exertion; intermittent tachycardia; she has h/o aortic valve replacement and is followed by Dr. Jonny Ren. Gastrointestinal ROS: no nausea or vomiting; diarrhea and abdominal pain with covid  Genito-Urinary ROS: no dysuria, trouble voiding, or hematuria  Neurological ROS: no TIA or stroke symptoms  Dermatological ROS: no lesions or rashes  All other systems reviewed and are negative.      History     Past Medical History:   Diagnosis Date    Ankle fracture     Aortic stenosis     S/P AVR in 2016    Asthma     Carpal tunnel syndrome on both sides     Chronic obstructive pulmonary disease (HCC)     Chronic pain     back pain    COVID-19 vaccine series completed 04/29/2021    Diabetes ([address]     Diverticulitis     Fibromyalgia     H/O aortic valve replacement 03/2016    21 mm bovine pericardial bioprosthesis and epiaortic scanning      H/O: HTN (hypertension)     no meds now- sp valve replacement    History of COVID-19 01/2022    Home test. Results not saved    Hypertension     no medication    Hypothyroidism     Lumbar post-laminectomy syndrome     Menopause     Obesity     Osteoarthritis     Psychotic disorder ([address]     Pulmonary HTN ([address]     per pcp note cc- patient denies. Sciatica     Skin cancer 2013    right forearm. Tachycardia       Past Surgical History:   Procedure Laterality Date    COLONOSCOPY      COLONOSCOPY N/A 9/15/2017    COLONOSCOPY performed by Sri Barajas MD at [address] ENDOSCOPY    HX ARTHROPLASTY Left 07/2021    HX CARPAL TUNNEL RELEASE Left 03/2021    HX CATARACT REMOVAL Right 1995    lens  replaced. HX CATARACT REMOVAL Left 02/2018    HX COLONOSCOPY  09/15/2017    dr. Kenny Orellana  f/u 5 years. HX HEENT  1990's    sinus surgery     HX HEENT Right 11/02/2017    laser for right eye cataracts. HX LUMBAR FUSION  2004    L3-L4-L5    HX LUMBAR LAMINECTOMY  2002    HX MOHS PROCEDURES Left 1990    HX ORTHOPAEDIC       x3    HX SHOULDER REPLACEMENT Right 03/29/2017    reverse. HX SHOULDER REPLACEMENT Left 07/02/2021    [place] CARDIAC SURG PROCEDURE UNLIST  03/2016    aortic valve replacement. [place] CHEST SURGERY PROCEDURE UNLISTED  03/2016    Open Heart Surgery    [place] COLSC FLX W/RMVL OF TUMOR POLYP LESION SNARE TQ  07/09/2014 repeat in 3 years    Dr. Marylen Foyer  2016    Aortic valve replacement(bovine)     Current Outpatient Medications   Medication Sig Dispense Refill    fluticasone propionate (FLONASE) 50 mcg/actuation nasal spray Use 1 spray(s) in each nostril twice daily 16 g 0    levothyroxine (Euthyrox) 75 mcg tablet Take 1 Tablet by mouth Daily (before breakfast). 90 Tablet 3    metoprolol succinate (TOPROL-XL) 25 mg XL tablet Take 0.5 Tablets by mouth in the morning. 45 Tablet 3    tiaGABine (GabitriL) 2 mg tablet Take 1 Tablet by mouth nightly. 90 Tablet 0    DULoxetine (CYMBALTA) 60 mg capsule Take 2 Capsules by mouth daily. 180 Capsule 3    pantoprazole (PROTONIX) 20 mg tablet Take 1 Tablet by mouth daily. 90 Tablet 3    metFORMIN (GLUCOPHAGE) 500 mg tablet TAKE 1 TABLET BY MOUTH ONCE DAILY WITH SUPPER 90 Tablet 3    clonazePAM (KlonoPIN) 1 mg tablet Take 1 Tablet by mouth nightly.  Max Daily Amount: 1 mg. 30 Tablet 2    Narcan 4 mg/actuation nasal spray Give 1 spray into 1 nostril. Give additional doses using a new nasal spray with each dose, if patient does not respond or relapses into respiratory depression. Call 911. Additional doses may be given every 2 to 3 minutes until medical assistance arrives. tiotropium-olodateroL (Stiolto Respimat) 2.5-2.5 mcg/actuation inhaler Take 2 Puffs by inhalation daily. atorvastatin (LIPITOR) 20 mg tablet Take 1 tablet by mouth once daily 90 Tablet 3    acetaminophen (Tylenol Extra Strength) 500 mg tablet Take 500 mg by mouth every six (6) hours as needed for Pain. FLUoxetine (PROzac) 10 mg capsule Take 1 capsule by mouth once daily 90 Capsule 3    meloxicam (MOBIC) 15 mg tablet Take 1 tablet by mouth once daily 90 Tablet 3    pramoxine-hc-chloroxylenol 1-1-0.1 % lotion Apply  to affected area two (2) times a day. 60 mL 1    desonide (TRIDESILON) 0.05 % cream APPLY   EXTERNALLY TO AFFECTED AREA TWICE DAILY 15 g 1    aspirin delayed-release 81 mg tablet Take 81 mg by mouth daily. meclizine (ANTIVERT) 25 mg tablet Take 1 Tablet by mouth three (3) times daily as needed for Dizziness. 60 Tablet 3    glucose blood VI test strips (True Metrix Pro Test Strip) strip Test blood glucose 3 times a day 200 Strip 3    vitamin E acetate (VITAMIN E PO) Take  by mouth daily. cyanocobalamin (VITAMIN B12) 500 mcg tablet Take 500 mcg by mouth daily. cetirizine (ZYRTEC) 10 mg tablet TAKE 1 TABLET BY MOUTH EVERY DAY. GENERIC FOR ZYRTEC 90 Tab 2    VENTOLIN HFA 90 mcg/actuation inhaler 2 Puffs by Aerosolization route every four (4) hours as needed.  0    B.animalis,bifid,infantis,long 10-15 mg TbEC Take  by mouth daily. cholecalciferol (VITAMIN D3) 25 mcg (1,000 unit) cap Take 1,000 Units by mouth daily. Lancets (ACCU-CHEK SOFTCLIX LANCETS) misc Please use one lancet to test blood sugars twice a day.  1 Package 20    BLOOD-GLUCOSE METER (ACCU-CHEK MARCE MONITORING) by Does Not Apply route.        Allergies   Allergen Reactions    Latex, Natural Rubber Hives    Adhesive Rash     Some bandaids    Ciprofloxacin Rash    Codeine Nausea and Vomiting    Demerol [Meperidine] Nausea and Vomiting    Diclofenac Shortness of Breath and Palpitations    Erythromycin Rash    Gabapentin Vertigo    Levaquin [Levofloxacin] Rash    Loratadine Swelling    Morphine Nausea and Vomiting    Penicillin G Hives and Rash     Does fine with Keflex    Sulfa (Sulfonamide Antibiotics) Itching    Vicodin [Hydrocodone-Acetaminophen] Other (comments)     Metallic taste in mouth     Family History   Problem Relation Age of Onset    Hypertension Father     Heart Disease Father     Alcohol abuse Father      Social History     Tobacco Use    Smoking status: Former     Years: 45.00     Types: Cigarettes     Quit date: 2011     Years since quittin.2    Smokeless tobacco: Never   Substance Use Topics    Alcohol use: No     Patient Active Problem List   Diagnosis Code    Tachycardia R00.0    Diabetes ([facility]) E11.9    Osteoarthritis M19.90    Fibromyalgia M79.7    PTSD (post-traumatic stress disorder) F43.10    OCD (obsessive compulsive disorder) F42.9    Panic disorder with agoraphobia F40.01    Recurrent major depression ([facility]) F33.9    Aortic stenosis I35.0    Hyperlipidemia LDL goal <100 E78.5    Postlaminectomy syndrome M96.1    Lumbar neuritis M54.16    AS (aortic stenosis) I35.0    Long term (current) use of anticoagulants Z79.01    Ankle impingement syndrome M25.879    Radiculopathy, lumbar region M54.16    Lumbar spinal stenosis M48.061    Lumbar postlaminectomy syndrome M96.1    Type 2 diabetes with nephropathy ([facility]) E11.21    Severe obesity ([facility]) E66.01    Left carpal tunnel syndrome G56.02    Left rotator cuff tear arthropathy M75.102, M12.812    Opioid use, unspecified with unspecified opioid-induced disorder F11.99    Opioid dependence with unspecified opioid-induced disorder F11.29 Opioid dependence, uncomplicated V60.83    Chronic renal disease, stage III N18.30       Health Maintenance History  Immunizations reviewed, dtap up to date, pneumovax up to date, flu due, zoster up to date  colonoscopy:scheduled,   Eye exam: up to date  Mammo up to date  Dexascan up to date    Depression Risk Factor Screening:      Patient Health Questionnaire (PHQ-2)   Over the last 2 weeks, how often have you been bothered by any of the following problems? Little interest or pleasure in doing things? Not at all. [0]  Feeling down, depressed, or hopeless? Not at all. [0]    Total Score: 0/6  PHQ-2 Assessment Scoring:   A score of 2 or more requires further screening with the PHQ-9    Alcohol Risk Factor Screening:     Women: On any occasion during the past 3 months, have you had more than 3 drinks containing alcohol? no   Do you average more than 7 drinks per week? no    Functional Ability and Level of Safety:     Hearing Loss    Hearing is good. Activities of Daily Living   Self-care. Requires assistance with: no ADLs    Fall Risk   History of fall(s) in the past 3 months (25 pts)  Secondary diagnoses (15 pts)  Ambulatory aid device (15 pts)  Score: 55    Abuse Screen   Patient is not abused    Examination   Physical Examination  Vitals:    09/28/22 1446   BP: 132/82   Pulse: 68   Resp: 17   Temp: 97.3 °F (36.3 °C)   TempSrc: Temporal   SpO2: 99%   Weight: 218 lb 3.2 oz (99 kg)   Height: 5' 8\" (1.727 m)      Body mass index is 33.18 kg/m².      Evaluation of Cognitive Function:  Mood/affect:goodmood  Appearance: well kempt  Family member/caregiver input: n/a    alert, well appearing, and in no distress, oriented to person, place, and time, and obese    Patient Care Team:  Lucy Ragsdale MD as PCP - General (Family Medicine)  Lucy Ragsdale MD as PCP - [facility] Empaneled Provider  Mary Anne Brito MD (Inactive) (Colon and Rectal Surgery)  rAturo Mendez MD (Cardiovascular Disease Physician)  Tye Breaux MD LUI (Cardiothoracic Surgery)  Lobo Dukes MD (Pulmonary Disease)    End-of-life planning  Advanced Directive in the case than an injury or illness causes the patient to be unable to make health care decisions    Health Care Directive or Living Will: yes    Advice/Referrals/Counselling/Plan:   Education and counseling provided:  End-of-Life planning (with patient's consent)  Influenza Vaccine  Medical nutrition therapy for individuals with diabetes or renal disease  Include in education list (weight loss, physical activity, smoking cessation, fall prevention, and nutrition)    ICD-10-CM ICD-9-CM    1. Medicare annual wellness visit, subsequent  Z00.00 V70.0       2. Advance care planning  Z71.89 V65.49 ADVANCE CARE PLANNING FIRST 30 MINS      3. Needs flu shot  Z23 V04.81 INFLUENZA, FLUAD, (AGE 65 Y+), IM, PF, 0.5 ML      ADMIN INFLUENZA VIRUS VAC      4. Unsteady gait  R26.81 781.2       5. Uncontrolled type 2 diabetes mellitus with hyperglycemia (HCC)  E11.65 250.02  DIABETES FOOT EXAM      6. Psychophysiologic insomnia  F51.04 307.42 clonazePAM (KlonoPIN) 1 mg tablet      7. Acquired hypothyroidism  E03.9 244.9       8. Essential hypertension  I10 401.9       9. History of 2019 novel coronavirus disease (COVID-19)  Z86.16 V12.09       10. Closed nondisplaced fracture of body of left scapula, sequela  S42.115S 905.2       11. Osteopenia of multiple sites  M85.89 733.90 denosumab (PROLIA) 60 mg/mL injection      12. Anxiety  F41.9 300.00 clonazePAM (KlonoPIN) 1 mg tablet      13. Primary osteoarthritis of left shoulder  M19.012 715.11 meloxicam (MOBIC) 15 mg tablet      14. Mixed obsessional thoughts and acts  F42.2 300.3 FLUoxetine (PROzac) 10 mg capsule      . Brief written plan, checklist    I have discussed the diagnosis with the patient and the intended plan as seen in the above orders. The patient has received an after-visit summary and questions were answered concerning future plans.   I have discussed medication side effects and warnings with the patient as well. I have reviewed the plan of care with the patient, accepted their input and they are in agreement with the treatment goals. ____________________________________________________________    Problem Assessment    for treatment of   Chief Complaint   Patient presents with    Annual Wellness Visit    Medication Evaluation    Medication Refill         SUBJECTIVE    Well Adult Physical   Patient here for a comprehensive physical exam.The patient reports problems - diabetes, PTSD, chronic pain; insomnia, hld  Do you take any herbs or supplements that were not prescribed by a doctor? no Are you taking calcium supplements? yes Are you taking aspirin daily? yes    Thyroid Review:  Patient is seen for followup of hypothyroidism. Thyroid ROS: fatigue, anxiousness, tremulousness and weight loss. Anxiety Review:  Patient is seen for sleep disturbance. Current treatment includes ambien and cymbalta. Ongoing symptoms include: insomnia but improved with ambien. Patient denies: palpitations, sweating, chest pain, shortness of breath, dizziness, paresthesias, racing thoughts, psychomotor agitation, feelings of losing control, difficulty concentrating, suicidal ideation. Reported side effects from the treatment: none. Diabetes  She has been taking medication as prescribed. She is following up after laboratory testing. She denies increased thirst or urination. Osteoarthritis and Chronic Pain:  Patient has osteoarthritis, primarily affecting the neck and back. She is going to have left arm done for carpal tunnel. Symptoms onset: problem is longstanding. Rheumatological ROS: ongoing significant pain in neck and back which is stable and controlled by PRN meds. Response to treatment plan: symptoms have progressed to a point and plateaued. .   Visit Vitals  /82 (BP 1 Location: Left lower arm, BP Patient Position: Sitting, BP Cuff Size: Adult)   Pulse 68   Temp 97.3 °F (36.3 °C) (Temporal)   Resp 17   Ht 5' 8\" (1.727 m)   Wt 218 lb 3.2 oz (99 kg)   SpO2 99%   BMI 33.18 kg/m²     General:  Alert, cooperative, no distress, appears stated age. Head:  Normocephalic, without obvious abnormality, atraumatic. Eyes:  Conjunctivae/corneas clear. PERRL, EOMs intact. Ears:  Normal TMs and external ear canals both ears. Nose: Nares normal. Septum midline. Mucosa normal. No drainage or sinus tenderness. Throat: Lips, mucosa, and tongue normal. Teeth and gums normal.   Neck: Supple, symmetrical, trachea midline, no adenopathy, thyroid: no enlargement/tenderness/nodules   Back:   Symmetric, no curvature. ROM normal. No CVA tenderness. Lungs:   Clear to auscultation bilaterally. Chest wall:  No tenderness or deformity. Heart:  Regular rate and rhythm, S1, S2 normal, no murmur, click, rub or gallop. Breast Exam:  Not examined   Abdomen:   Soft, non-tender. Bowel sounds normal. No masses,  No organomegaly. Genitalia:  deferred   Rectal:  deferred   Extremities: Extremities normal, atraumatic, no cyanosis or edema. Pulses: 2+ and symmetric all extremities. Skin: Skin color, texture, turgor normal. No rashes or lesions. Lymph nodes: Cervical, supraclavicular, and axillary nodes normal.   Neurologic: CNII-XII intact. Normal strength, sensation and reflexes throughout. Diabetic foot exam:     Left Foot:   Visual Exam: normal    Pulse DP: 2+ (normal)   Filament test: normal sensation       Right Foot:   Visual Exam: normal    Pulse DP: 2+ (normal)   Filament test: normal sensation     LABS     TESTS      Assessment/Plan:    1. Medicare annual wellness visit, subsequent  Reviewed preventive recommendations    2. Advance care planning  See ACP  - ADVANCE CARE PLANNING FIRST 30 MINS    3. Needs flu shot    - INFLUENZA, FLUAD, (AGE 65 Y+), IM, PF, 0.5 ML  - ADMIN INFLUENZA VIRUS VAC    4.  Unsteady gait  Use of walker for fall prevention    5. Uncontrolled type 2 diabetes mellitus with hyperglycemia (HCC)  Goal hgb a1c <7; encourage compliance with diet and medications  -  DIABETES FOOT EXAM    6. Psychophysiologic insomnia  Continue bzd as sleep aid with caution  - clonazePAM (KlonoPIN) 1 mg tablet; Take 1 Tablet by mouth nightly. Max Daily Amount: 1 mg. Dispense: 30 Tablet; Refill: 2    7. Acquired hypothyroidism  Continue therapeutic dosing    8. Essential hypertension  Goal <130/80; borderline controlled    9. History of 2019 novel coronavirus disease (COVID-19)  No long term symptoms    10. Closed nondisplaced fracture of body of left scapula, sequela  healing    11. Osteopenia of multiple sites  Continue treatement  - denosumab (PROLIA) 60 mg/mL injection; 1 mL by SubCUTAneous route once for 1 dose. Dispense: 1 mL; Refill: 0    12. Anxiety  Mood improved; continue relaxation techniques  - clonazePAM (KlonoPIN) 1 mg tablet; Take 1 Tablet by mouth nightly. Max Daily Amount: 1 mg. Dispense: 30 Tablet; Refill: 2    13. Primary osteoarthritis of left shoulder  Pain management  - meloxicam (MOBIC) 15 mg tablet; Take 1 Tablet by mouth daily. Dispense: 90 Tablet; Refill: 3    14. Mixed obsessional thoughts and acts  Ssri continued  - FLUoxetine (PROzac) 10 mg capsule; Take 1 Capsule by mouth daily. Dispense: 90 Capsule;  Refill: 3    Lab review: labs reviewed, I note that glycosylated hemoglobin mildly abnormal but acceptable, orders written for new lab studies as appropriate; see orders

## 2022-09-28 NOTE — PROGRESS NOTES
Jose Angel Vicente is a 67 y.o. female (: 1950) presenting to address:    Chief Complaint   Patient presents with    Annual Wellness Visit    Medication Evaluation    Medication Refill       There were no vitals filed for this visit. Hearing/Vision:   No results found. Learning Assessment:     Learning Assessment 2016   PRIMARY LEARNER Patient   HIGHEST LEVEL OF EDUCATION - PRIMARY LEARNER  -   PRIMARY LANGUAGE ENGLISH   LEARNER PREFERENCE PRIMARY LISTENING   ANSWERED BY patient   RELATIONSHIP SELF     Depression Screening:     3 most recent PHQ Screens 2022   Little interest or pleasure in doing things Not at all   Feeling down, depressed, irritable, or hopeless Not at all   Total Score PHQ 2 0   Trouble falling or staying asleep, or sleeping too much -   Feeling tired or having little energy -   Poor appetite, weight loss, or overeating -   Feeling bad about yourself - or that you are a failure or have let yourself or your family down -   Trouble concentrating on things such as school, work, reading, or watching TV -   Moving or speaking so slowly that other people could have noticed; or the opposite being so fidgety that others notice -   Thoughts of being better off dead, or hurting yourself in some way -   PHQ 9 Score -   How difficult have these problems made it for you to do your work, take care of your home and get along with others -     Fall Risk Assessment:     Fall Risk Assessment, last 12 mths 2022   Able to walk? Yes   Fall in past 12 months? 0   Do you feel unsteady? 1   Are you worried about falling 1   Is TUG test greater than 12 seconds? 0   Is the gait abnormal? 1   Number of falls in past 12 months 0   Fall with injury? -     Abuse Screening:     Abuse Screening Questionnaire 2019   Do you ever feel afraid of your partner? N   Are you in a relationship with someone who physically or mentally threatens you? N   Is it safe for you to go home?  Y     ADL Assessment: ADL Assessment 12/12/2019   Feeding yourself No Help Needed   Getting from bed to chair No Help Needed   Getting dressed No Help Needed   Bathing or showering No Help Needed   Walk across the room (includes cane/walker) Help Needed   Using the telphone No Help Needed   Taking your medications No Help Needed   Preparing meals No Help Needed   Managing money (expenses/bills) No Help Needed   Moderately strenuous housework (laundry) No Help Needed   Shopping for personal items (toiletries/medicines) No Help Needed   Shopping for groceries No Help Needed   Driving No Help Needed   Climbing a flight of stairs Help Needed   Getting to places beyond walking distances Help Needed        Coordination of Care Questionaire:     1. \"Have you been to the ER, urgent care clinic since your last visit? Hospitalized since your last visit? \" No    2. \"Have you seen or consulted any other health care providers outside of the [address] [place] [address] Prashant since your last visit? \" No     3. For patients aged 39-70: Has the patient had a colonoscopy / FIT/ Cologuard? No      If the patient is female:    4. For patients aged 41-77: Has the patient had a mammogram within the past 2 years? Yes - no Care Gap present      5. For patients aged 21-65: Has the patient had a pap smear?  NA - based on age or sex

## 2022-09-29 ENCOUNTER — TELEPHONE (OUTPATIENT)
Dept: FAMILY MEDICINE CLINIC | Age: 72
End: 2022-09-29

## 2022-09-29 DIAGNOSIS — M50.00 HNP (HERNIATED NUCLEUS PULPOSUS) WITH MYELOPATHY, CERVICAL: ICD-10-CM

## 2022-09-29 DIAGNOSIS — M48.02 CERVICAL SPINAL STENOSIS: ICD-10-CM

## 2022-09-29 DIAGNOSIS — M54.12 CERVICAL NEURITIS: ICD-10-CM

## 2022-09-29 DIAGNOSIS — M54.16 LUMBAR NEURITIS: ICD-10-CM

## 2022-09-29 DIAGNOSIS — M96.1 LUMBAR POST-LAMINECTOMY SYNDROME: ICD-10-CM

## 2022-09-29 NOTE — TELEPHONE ENCOUNTER
Last Visit: 8/10/22 with MD David Childers  Next Appointment: 11/10/22 with MD David Childers  Previous Refill Encounter(s): 7/1/22 #90    Requested Prescriptions     Pending Prescriptions Disp Refills    tiaGABine (GabitriL) 2 mg tablet 90 Tablet 0     Sig: Take 1 Tablet by mouth nightly. For Orlando Simms in place:   Recommendation Provided To:    Intervention Detail: New Rx: 1, reason: Patient Preference  Gap Closed?: Intervention Accepted By:   Time Spent (min): 5

## 2022-09-30 ENCOUNTER — TELEPHONE (OUTPATIENT)
Dept: FAMILY MEDICINE CLINIC | Age: 72
End: 2022-09-30

## 2022-09-30 RX ORDER — TIAGABINE HYDROCHLORIDE 2 MG/1
2 TABLET, FILM COATED ORAL
Qty: 90 TABLET | Refills: 0 | Status: SHIPPED | OUTPATIENT
Start: 2022-09-30

## 2022-10-13 ENCOUNTER — CLINICAL SUPPORT (OUTPATIENT)
Dept: FAMILY MEDICINE CLINIC | Age: 72
End: 2022-10-13

## 2022-10-13 DIAGNOSIS — M85.89 OSTEOPENIA OF MULTIPLE SITES: Primary | ICD-10-CM

## 2022-10-13 NOTE — PROGRESS NOTES
Patient given injection: Prolia right upper arm SQ  Patient tolerated injection well  No side effects presented

## 2022-10-18 DIAGNOSIS — J01.00 ACUTE NON-RECURRENT MAXILLARY SINUSITIS: ICD-10-CM

## 2022-10-18 NOTE — TELEPHONE ENCOUNTER
This patient contacted the office for the following prescriptions to be refilled:    Medication requested :   Requested Prescriptions     Pending Prescriptions Disp Refills    cephALEXin (Keflex) 500 mg capsule 12 Capsule 0     Sig: Take 1 Capsule by mouth three (3) times daily for 4 days. *Pt states she is having dental work done on Friday, but needs to take this antibiotic on Thursday before her appt. Pt is requesting a quantity of four.     PCP: Zara Baldwin MD  LOV: 10/13/2022  NOV DMA: 12/15/2022  FUTURE APPT:   Future Appointments   Date Time Provider Ada Fischer   11/10/2022 11:15 AM Radha Steiner MD [facility]   12/6/2022  1:20 PM Thomas Arnett MD [facility]   12/7/2022  1:30 PM HR [facility] NURSE [facility]   12/15/2022  1:00 PM Ben Bermudez MD [facility]   4/13/2023  2:00 PM Zara Baldwin MD [facility]   4/20/2023 11:45 AM Bhaskar Marion MD [facility]

## 2022-10-20 ENCOUNTER — TELEPHONE (OUTPATIENT)
Dept: FAMILY MEDICINE CLINIC | Age: 72
End: 2022-10-20

## 2022-10-20 NOTE — TELEPHONE ENCOUNTER
Pt called and wanted to leave another message for Dr. Gilma Tony to remind him that her dentist appt is scheduled for tomorrow 10/21, and she really needs the Rx for Keflex today. Please assist. Thank you.

## 2022-10-20 NOTE — ACP (ADVANCE CARE PLANNING)
Advance Care Planning     Advance Care Planning (ACP) Physician/NP/PA Conversation      Date of Conversation: 9/28/2022  Conducted with: Patient with Decision Making Capacity    Healthcare Decision Maker:     Primary Decision Maker: Jovanna Julien - [phone]    Secondary Decision Maker: ivon almaraz up - Friend - [phone]    Secondary Decision Maker: Alonzo Dodge - Chapo - [phone]    Secondary Decision Maker: Savanna Champion - Maxine - [phone]  Click here to complete [address] including selection of the Healthcare Decision Maker Relationship (ie \"Primary\")      Today we documented Decision Maker(s) consistent with Legal Next of Kin hierarchy. Care Preferences:    Hospitalization: \"If your health worsens and it becomes clear that your chance of recovery is unlikely, what would be your preference regarding hospitalization? \"  The patient would prefer hospitalization. Ventilation: \"If you were unable to breathe on your own and your chance of recovery was unlikely, what would be your preference about the use of a ventilator (breathing machine) if it was available to you? \"   The patient would desire the use of a ventilator. Resuscitation: \"In the event your heart stopped as a result of an underlying serious health condition, would you want attempts to be made to restart your heart, or would you prefer a natural death? \"   Yes, attempt to resuscitate.     Additional topics discussed: treatment goals, benefit/burden of treatment options, ventilation preferences, hospitalization preferences, and resuscitation preferences    Conversation Outcomes / Follow-Up Plan:   ACP complete - no further action today  Reviewed DNR/DNI and patient elects Full Code (Attempt Resuscitation)     Length of Voluntary ACP Conversation in minutes:  16 minutes    Tatiana Dotson MD

## 2022-10-21 DIAGNOSIS — I35.0 NONRHEUMATIC AORTIC VALVE STENOSIS: Primary | ICD-10-CM

## 2022-10-21 RX ORDER — CEPHALEXIN 500 MG/1
2000 CAPSULE ORAL
Qty: 4 CAPSULE | Refills: 0 | Status: SHIPPED | OUTPATIENT
Start: 2022-10-21 | End: 2022-10-21

## 2022-10-21 RX ORDER — CEPHALEXIN 500 MG/1
500 CAPSULE ORAL 3 TIMES DAILY
Qty: 12 CAPSULE | Refills: 0 | OUTPATIENT
Start: 2022-10-21 | End: 2022-10-25

## 2022-11-14 DIAGNOSIS — J30.1 SEASONAL ALLERGIC RHINITIS DUE TO POLLEN: ICD-10-CM

## 2022-11-16 RX ORDER — FLUTICASONE PROPIONATE 50 MCG
SPRAY, SUSPENSION (ML) NASAL
Qty: 16 G | Refills: 0 | Status: SHIPPED | OUTPATIENT
Start: 2022-11-16
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
## 2022-12-07 ENCOUNTER — CLINICAL SUPPORT (OUTPATIENT)
Dept: SURGERY | Age: 72
End: 2022-12-07

## 2022-12-07 ENCOUNTER — OFFICE VISIT (OUTPATIENT)
Dept: ORTHOPEDIC SURGERY | Age: 72
End: 2022-12-07
Payer: MEDICARE

## 2022-12-07 VITALS — RESPIRATION RATE: 17 BRPM | WEIGHT: 218 LBS | BODY MASS INDEX: 33.04 KG/M2 | TEMPERATURE: 97.3 F | HEIGHT: 68 IN

## 2022-12-07 VITALS — HEIGHT: 68 IN | WEIGHT: 218.8 LBS | BODY MASS INDEX: 33.16 KG/M2

## 2022-12-07 DIAGNOSIS — E11.69 TYPE 2 DIABETES MELLITUS WITH OTHER SPECIFIED COMPLICATION, WITHOUT LONG-TERM CURRENT USE OF INSULIN (HCC): ICD-10-CM

## 2022-12-07 DIAGNOSIS — Z12.11 COLON CANCER SCREENING: ICD-10-CM

## 2022-12-07 DIAGNOSIS — Z86.010 HISTORY OF ADENOMATOUS POLYP OF COLON: ICD-10-CM

## 2022-12-07 DIAGNOSIS — E11.69 TYPE 2 DIABETES MELLITUS WITH OTHER SPECIFIED COMPLICATION, WITHOUT LONG-TERM CURRENT USE OF INSULIN (HCC): Primary | ICD-10-CM

## 2022-12-07 DIAGNOSIS — Z96.612 STATUS POST REVERSE TOTAL REPLACEMENT OF LEFT SHOULDER: Primary | ICD-10-CM

## 2022-12-07 RX ORDER — DEXTROMETHORPHAN HYDROBROMIDE, GUAIFENESIN 5; 100 MG/5ML; MG/5ML
650 LIQUID ORAL EVERY 8 HOURS
COMMUNITY

## 2022-12-07 NOTE — PROGRESS NOTES
Colon Screen    Patient: Ovidio Mccloud MRN: [id number]  SSN: [id number]    YOB: 1950  Age: 67 y.o. Sex: female        Subjective:   Ovidio Mccloud is here to schedule her recall colonoscopy. Her PCP is Chantelle Devries MD.  Patient referred for colonoscopy for   Personal history of colon polyps (screening only). Patient denies rectal pain or bleeding. Abdominal surgeries as described below, specifically none. Family history as described below, specifically paternal grandmother with colon cancer and brother with colon polyps. Last colonoscopy was 5 years ago with Dr. Kenny Orellana. Last colonoscopy found a adenomatous polyp and was recalled for 5 years.     Allergies   Allergen Reactions    Latex, Natural Rubber Hives    Adhesive Rash     Some bandaids    Ciprofloxacin Rash    Codeine Nausea and Vomiting    Demerol [Meperidine] Nausea and Vomiting    Diclofenac Shortness of Breath and Palpitations    Erythromycin Rash    Gabapentin Vertigo    Levaquin [Levofloxacin] Rash    Loratadine Swelling    Morphine Nausea and Vomiting    Penicillin G Hives and Rash     Does fine with Keflex    Sulfa (Sulfonamide Antibiotics) Itching    Vicodin [Hydrocodone-Acetaminophen] Other (comments)     Metallic taste in mouth       Past Medical History:   Diagnosis Date    Ankle fracture     Aortic stenosis     S/P AVR in 2016    Asthma     Carpal tunnel syndrome on both sides     Chronic obstructive pulmonary disease (HCC)     Chronic pain     back pain    COVID-19 vaccine series completed 04/29/2021    Diabetes ([facility] [address]     Diverticulitis     Fibromyalgia     H/O aortic valve replacement 03/2016    21 mm bovine pericardial bioprosthesis and epiaortic scanning      H/O: HTN (hypertension)     no meds now- sp valve replacement    History of COVID-19 01/2022    Home test. Results not saved    Hypertension     no medication    Hypothyroidism     Lumbar post-laminectomy syndrome     Menopause     Obesity     Osteoarthritis Psychotic disorder ([facility] [address]     Pulmonary HTN ([facility] [address]     per pcp note cc- patient denies. Sciatica     Skin cancer     right forearm. Tachycardia      Past Surgical History:   Procedure Laterality Date    COLONOSCOPY      COLONOSCOPY N/A 09/15/2017    COLONOSCOPY performed by Betty Shay MD at [facility] ENDOSCOPY    HX ARTHROPLASTY Left 2021    HX CARPAL TUNNEL RELEASE Left 2021    HX CATARACT REMOVAL Right     lens  replaced. HX CATARACT REMOVAL Left 2018    HX COLONOSCOPY  09/15/2017    dr. Soumya Small  f/u 5 years. HX HEENT  's    sinus surgery     HX HEENT Right 2017    laser for right eye cataracts. HX LUMBAR FUSION      L3-L4-L5    HX LUMBAR LAMINECTOMY      HX MOHS PROCEDURES Left     HX ORTHOPAEDIC       x3    HX SHOULDER REPLACEMENT Right 2017    reverse. HX SHOULDER REPLACEMENT Left 2021    HX SHOULDER REPLACEMENT Left     2nd surgery due to complications    ID CARDIAC SURG PROCEDURE UNLIST  2016    aortic valve replacement. ID CHEST SURGERY PROCEDURE UNLISTED  2016    Open Heart Surgery    ID COLSC FLX W/RMVL OF TUMOR POLYP LESION SNARE TQ  2014 repeat in 3 years    Dr. Hutchins Sites  2016    Aortic valve replacement(bovine)      Family History   Problem Relation Age of Onset    Hypertension Father     Heart Disease Father     Alcohol abuse Father     Colon Polyps Brother     Colon Cancer Paternal Grandmother      Social History     Tobacco Use    Smoking status: Former     Years: 45.00     Types: Cigarettes     Quit date: 2011     Years since quittin.4    Smokeless tobacco: Never   Substance Use Topics    Alcohol use: No      Prior to Admission medications    Medication Sig Start Date End Date Taking? Authorizing Provider   acetaminophen (Tylenol Arthritis Pain) 650 mg TbER Take 650 mg by mouth every eight (8) hours.    Yes Provider, Historical   fluticasone propionate (FLONASE) 50 mcg/actuation nasal spray Use 1 spray(s) in each nostril twice daily 11/16/22  Yes Bhumi Ramirez MD   tiaGABine (GabitriL) 2 mg tablet Take 1 Tablet by mouth nightly. 9/30/22  Yes Angelica Rod MD   clonazePAM (KlonoPIN) 1 mg tablet Take 1 Tablet by mouth nightly. Max Daily Amount: 1 mg. 9/28/22  Yes Bhumi Ramirez MD   FLUoxetine (PROzac) 10 mg capsule Take 1 Capsule by mouth daily. 9/28/22  Yes Bhumi Ramirez MD   meloxicam (MOBIC) 15 mg tablet Take 1 Tablet by mouth daily. 9/28/22  Yes Bhumi Ramirez MD   levothyroxine (Euthyrox) 75 mcg tablet Take 1 Tablet by mouth Daily (before breakfast). 8/11/22  Yes Bhumi Ramirez MD   metoprolol succinate (TOPROL-XL) 25 mg XL tablet Take 0.5 Tablets by mouth in the morning. 7/21/22  Yes Yasmani Rosenberg MD   DULoxetine (CYMBALTA) 60 mg capsule Take 2 Capsules by mouth daily. 7/1/22  Yes Bhumi Ramirez MD   pantoprazole (PROTONIX) 20 mg tablet Take 1 Tablet by mouth daily. 7/1/22  Yes Bhumi Ramirez MD   metFORMIN (GLUCOPHAGE) 500 mg tablet TAKE 1 TABLET BY MOUTH ONCE DAILY WITH SUPPER 7/1/22  Yes Karyna Bermudez MD   Narcan 4 mg/actuation nasal spray Give 1 spray into 1 nostril. Give additional doses using a new nasal spray with each dose, if patient does not respond or relapses into respiratory depression. Call 911. Additional doses may be given every 2 to 3 minutes until medical assistance arrives. 3/1/22  Yes Provider, Historical   tiotropium-olodateroL (Stiolto Respimat) 2.5-2.5 mcg/actuation inhaler Take 2 Puffs by inhalation daily. Yes Provider, Historical   atorvastatin (LIPITOR) 20 mg tablet Take 1 tablet by mouth once daily 2/2/22  Yes Bhumi Ramirez MD   pramoxine-hc-chloroxylenol 1-1-0.1 % lotion Apply  to affected area two (2) times a day. 9/9/21  Yes Bhumi Ramirez MD   desonide (TRIDESILON) 0.05 % cream APPLY   EXTERNALLY TO AFFECTED AREA TWICE DAILY 9/1/21  Yes Karyna Bermudez MD   aspirin delayed-release 81 mg tablet Take 81 mg by mouth daily.    Yes Provider, Historical   meclizine (ANTIVERT) 25 mg tablet Take 1 Tablet by mouth three (3) times daily as needed for Dizziness. 6/8/21  Yes Yamilet Zapata MD   glucose blood VI test strips (True Metrix Pro Test Strip) strip Test blood glucose 3 times a day 6/5/20  Yes Brenna Bermudez MD   vitamin E acetate (VITAMIN E PO) Take  by mouth daily. Yes Provider, Historical   cyanocobalamin (VITAMIN B12) 500 mcg tablet Take 500 mcg by mouth daily. Yes Provider, Historical   cetirizine (ZYRTEC) 10 mg tablet TAKE 1 TABLET BY MOUTH EVERY DAY. GENERIC FOR ZYRTEC 2/8/18  Yes Pablo Garza, DO   VENTOLIN HFA 90 mcg/actuation inhaler 2 Puffs by Aerosolization route every four (4) hours as needed. 9/5/17  Yes Provider, Historical   B.animalis,bifid,infantis,long 10-15 mg TbEC Take  by mouth daily. Yes Provider, Historical   cholecalciferol (VITAMIN D3) 25 mcg (1,000 unit) cap Take 1,000 Units by mouth daily. Yes Provider, Historical   Lancets (ACCU-CHEK SOFTCLIX LANCETS) misc Please use one lancet to test blood sugars twice a day. 9/4/14  Yes Pablo Garza, DO   BLOOD-GLUCOSE METER (ACCU-CHEK MARCE MONITORING) by Does Not Apply route. Yes Provider, Historical   acetaminophen (Tylenol Extra Strength) 500 mg tablet Take 500 mg by mouth every six (6) hours as needed for Pain. Patient not taking: Reported on 12/7/2022    Provider, Historical          Review of Systems:  Review of Systems   Constitutional: Negative. HENT: Negative. Eyes: Negative. Respiratory:  Positive for shortness of breath. Negative for cough, hemoptysis, sputum production and wheezing. Cardiovascular: Negative. Gastrointestinal: Negative. Genitourinary: Negative. Musculoskeletal:  Positive for back pain and joint pain. Negative for falls, myalgias and neck pain. Skin: Negative. Neurological:  Positive for dizziness.  Negative for tingling, tremors, sensory change, speech change, focal weakness, seizures, loss of consciousness, weakness and headaches. Due to standing too fast   Endo/Heme/Allergies:  Negative for environmental allergies and polydipsia. Bruises/bleeds easily. Psychiatric/Behavioral: Negative. Risks colonoscopy described- colon injury, missed lesion, anesthesia problems, bleeding       Milagro Dickey, THANG  December 7, 1214  1:40 PM

## 2022-12-15 ENCOUNTER — OFFICE VISIT (OUTPATIENT)
Dept: FAMILY MEDICINE CLINIC | Age: 72
End: 2022-12-15
Payer: MEDICARE

## 2022-12-15 VITALS
WEIGHT: 218 LBS | HEART RATE: 84 BPM | DIASTOLIC BLOOD PRESSURE: 62 MMHG | OXYGEN SATURATION: 100 % | BODY MASS INDEX: 33.04 KG/M2 | RESPIRATION RATE: 16 BRPM | TEMPERATURE: 97.3 F | SYSTOLIC BLOOD PRESSURE: 123 MMHG | HEIGHT: 68 IN

## 2022-12-15 DIAGNOSIS — E11.65 TYPE 2 DIABETES MELLITUS WITH HYPERGLYCEMIA, WITHOUT LONG-TERM CURRENT USE OF INSULIN (HCC): ICD-10-CM

## 2022-12-15 DIAGNOSIS — N30.00 ACUTE CYSTITIS WITHOUT HEMATURIA: ICD-10-CM

## 2022-12-15 DIAGNOSIS — F41.9 ANXIETY: ICD-10-CM

## 2022-12-15 DIAGNOSIS — F51.04 PSYCHOPHYSIOLOGIC INSOMNIA: Primary | ICD-10-CM

## 2022-12-15 LAB
BILIRUB UR QL STRIP: NEGATIVE
GLUCOSE UR-MCNC: NEGATIVE MG/DL
KETONES P FAST UR STRIP-MCNC: NEGATIVE MG/DL
PH UR STRIP: 5.5 [PH] (ref 4.6–8)
PROT UR QL STRIP: NEGATIVE
SP GR UR STRIP: 1.01 (ref 1–1.03)
UA UROBILINOGEN AMB POC: NORMAL (ref 0.2–1)
URINALYSIS CLARITY POC: CLEAR
URINALYSIS COLOR POC: YELLOW
URINE BLOOD POC: NEGATIVE
URINE LEUKOCYTES POC: NORMAL
URINE NITRITES POC: NEGATIVE

## 2022-12-15 PROCEDURE — G9717 DOC PT DX DEP/BP F/U NT REQ: HCPCS | Performed by: FAMILY MEDICINE

## 2022-12-15 PROCEDURE — G8399 PT W/DXA RESULTS DOCUMENT: HCPCS | Performed by: FAMILY MEDICINE

## 2022-12-15 PROCEDURE — G8427 DOCREV CUR MEDS BY ELIG CLIN: HCPCS | Performed by: FAMILY MEDICINE

## 2022-12-15 PROCEDURE — 2022F DILAT RTA XM EVC RTNOPTHY: CPT | Performed by: FAMILY MEDICINE

## 2022-12-15 PROCEDURE — 1101F PT FALLS ASSESS-DOCD LE1/YR: CPT | Performed by: FAMILY MEDICINE

## 2022-12-15 PROCEDURE — 99214 OFFICE O/P EST MOD 30 MIN: CPT | Performed by: FAMILY MEDICINE

## 2022-12-15 PROCEDURE — 1123F ACP DISCUSS/DSCN MKR DOCD: CPT | Performed by: FAMILY MEDICINE

## 2022-12-15 PROCEDURE — G8536 NO DOC ELDER MAL SCRN: HCPCS | Performed by: FAMILY MEDICINE

## 2022-12-15 PROCEDURE — 1090F PRES/ABSN URINE INCON ASSESS: CPT | Performed by: FAMILY MEDICINE

## 2022-12-15 PROCEDURE — G8752 SYS BP LESS 140: HCPCS | Performed by: FAMILY MEDICINE

## 2022-12-15 PROCEDURE — 81001 URINALYSIS AUTO W/SCOPE: CPT | Performed by: FAMILY MEDICINE

## 2022-12-15 PROCEDURE — 3044F HG A1C LEVEL LT 7.0%: CPT | Performed by: FAMILY MEDICINE

## 2022-12-15 PROCEDURE — G9899 SCRN MAM PERF RSLTS DOC: HCPCS | Performed by: FAMILY MEDICINE

## 2022-12-15 PROCEDURE — G8417 CALC BMI ABV UP PARAM F/U: HCPCS | Performed by: FAMILY MEDICINE

## 2022-12-15 PROCEDURE — G8754 DIAS BP LESS 90: HCPCS | Performed by: FAMILY MEDICINE

## 2022-12-15 PROCEDURE — 3017F COLORECTAL CA SCREEN DOC REV: CPT | Performed by: FAMILY MEDICINE

## 2022-12-15 RX ORDER — CEPHALEXIN 500 MG/1
500 CAPSULE ORAL 4 TIMES DAILY
Qty: 40 CAPSULE | Refills: 0 | Status: SHIPPED | OUTPATIENT
Start: 2022-12-15 | End: 2022-12-25

## 2022-12-15 RX ORDER — PHENAZOPYRIDINE HYDROCHLORIDE 200 MG/1
200 TABLET, FILM COATED ORAL
Qty: 6 TABLET | Refills: 0 | Status: SHIPPED | OUTPATIENT
Start: 2022-12-15 | End: 2022-12-17

## 2022-12-15 RX ORDER — CLONAZEPAM 1 MG/1
1 TABLET ORAL
Qty: 30 TABLET | Refills: 2 | Status: SHIPPED | OUTPATIENT
Start: 2022-12-15

## 2022-12-15 RX ORDER — TRAZODONE HYDROCHLORIDE 150 MG/1
150 TABLET ORAL
Qty: 30 TABLET | Refills: 5 | Status: SHIPPED | OUTPATIENT
Start: 2022-12-15

## 2022-12-15 NOTE — PROGRESS NOTES
Chris Malhotra presents today for   Chief Complaint   Patient presents with    Diabetes    Insomnia     Med eval       Is someone accompanying this pt? no    Is the patient using any DME equipment during OV? no    Depression Screening:  3 most recent PHQ Screens 12/15/2022   Little interest or pleasure in doing things Not at all   Feeling down, depressed, irritable, or hopeless Not at all   Total Score PHQ 2 0   Trouble falling or staying asleep, or sleeping too much -   Feeling tired or having little energy -   Poor appetite, weight loss, or overeating -   Feeling bad about yourself - or that you are a failure or have let yourself or your family down -   Trouble concentrating on things such as school, work, reading, or watching TV -   Moving or speaking so slowly that other people could have noticed; or the opposite being so fidgety that others notice -   Thoughts of being better off dead, or hurting yourself in some way -   PHQ 9 Score -   How difficult have these problems made it for you to do your work, take care of your home and get along with others -       Learning Assessment:  Learning Assessment 4/22/2016   PRIMARY LEARNER Patient   HIGHEST LEVEL OF EDUCATION - PRIMARY LEARNER  -   PRIMARY LANGUAGE ENGLISH   LEARNER PREFERENCE PRIMARY LISTENING   ANSWERED BY patient   RELATIONSHIP SELF       Abuse Screening:  Abuse Screening Questionnaire 12/12/2019   Do you ever feel afraid of your partner? N   Are you in a relationship with someone who physically or mentally threatens you? N   Is it safe for you to go home? Y       Fall Risk  Fall Risk Assessment, last 12 mths 12/15/2022   Able to walk? Yes   Fall in past 12 months? 0   Do you feel unsteady? 0   Are you worried about falling 0   Is TUG test greater than 12 seconds? -   Is the gait abnormal? -   Number of falls in past 12 months -   Fall with injury? -       Health Maintenance reviewed and discussed and ordered per Provider.     Health Maintenance Due   Topic Date Due    Eye Exam Retinal or Dilated  04/27/2020    Colorectal Cancer Screening Combo  09/15/2022    COVID-19 Vaccine (5 - Booster for Pardeep Solis series) 11/15/2022   .        1. \"Have you been to the ER, urgent care clinic since your last visit? Hospitalized since your last visit? \" No    2. \"Have you seen or consulted any other health care providers outside of the [address] [place] [address] since your last visit? \" No     3. For patients over 45: Has the patient had a colonoscopy? Yes - no Care Gap present     If the patient is female:    4. For patients over 40: Has the patient had a mammogram? Yes - no Care Gap present    5. For patients over 21: Has the patient had a pap smear?  Yes - no Care Gap present
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
## 2022-12-21 DIAGNOSIS — M50.00 HNP (HERNIATED NUCLEUS PULPOSUS) WITH MYELOPATHY, CERVICAL: ICD-10-CM

## 2022-12-21 DIAGNOSIS — M54.12 CERVICAL NEURITIS: ICD-10-CM

## 2022-12-21 DIAGNOSIS — M48.02 CERVICAL SPINAL STENOSIS: ICD-10-CM

## 2022-12-21 DIAGNOSIS — M96.1 LUMBAR POST-LAMINECTOMY SYNDROME: ICD-10-CM

## 2022-12-21 DIAGNOSIS — M54.16 LUMBAR NEURITIS: ICD-10-CM

## 2022-12-21 RX ORDER — TIAGABINE HYDROCHLORIDE 2 MG/1
2 TABLET, FILM COATED ORAL
Qty: 90 TABLET | Refills: 0 | Status: SHIPPED | OUTPATIENT
Start: 2022-12-21

## 2023-02-10 DIAGNOSIS — J30.1 SEASONAL ALLERGIC RHINITIS DUE TO POLLEN: ICD-10-CM

## 2023-02-10 RX ORDER — FLUTICASONE PROPIONATE 50 MCG
SPRAY, SUSPENSION (ML) NASAL
Qty: 16 G | Refills: 0 | Status: SHIPPED | OUTPATIENT
Start: 2023-02-10

## 2023-02-14 DIAGNOSIS — M65.812 SYNOVITIS OF LEFT SHOULDER: Primary | ICD-10-CM

## 2023-03-07 DIAGNOSIS — Z12.11 COLON CANCER SCREENING: Primary | ICD-10-CM

## 2023-03-15 ENCOUNTER — OFFICE VISIT (OUTPATIENT)
Facility: CLINIC | Age: 73
End: 2023-03-15
Payer: MEDICARE

## 2023-03-15 VITALS
DIASTOLIC BLOOD PRESSURE: 68 MMHG | HEIGHT: 68 IN | OXYGEN SATURATION: 97 % | TEMPERATURE: 98.2 F | RESPIRATION RATE: 16 BRPM | SYSTOLIC BLOOD PRESSURE: 110 MMHG | HEART RATE: 88 BPM | WEIGHT: 222.4 LBS | BODY MASS INDEX: 33.71 KG/M2

## 2023-03-15 DIAGNOSIS — Z91.81 AT HIGH RISK FOR FALLS: ICD-10-CM

## 2023-03-15 DIAGNOSIS — F51.04 PSYCHOPHYSIOLOGIC INSOMNIA: Primary | ICD-10-CM

## 2023-03-15 DIAGNOSIS — E11.65 TYPE 2 DIABETES MELLITUS WITH HYPERGLYCEMIA, WITHOUT LONG-TERM CURRENT USE OF INSULIN (HCC): ICD-10-CM

## 2023-03-15 DIAGNOSIS — N18.2 CHRONIC RENAL DISEASE, STAGE 2, MILDLY DECREASED GLOMERULAR FILTRATION RATE (GFR) BETWEEN 60-89 ML/MIN/1.73 SQUARE METER: ICD-10-CM

## 2023-03-15 DIAGNOSIS — F41.1 GENERALIZED ANXIETY DISORDER: ICD-10-CM

## 2023-03-15 PROCEDURE — 1123F ACP DISCUSS/DSCN MKR DOCD: CPT | Performed by: FAMILY MEDICINE

## 2023-03-15 PROCEDURE — 99214 OFFICE O/P EST MOD 30 MIN: CPT | Performed by: FAMILY MEDICINE

## 2023-03-15 PROCEDURE — 3044F HG A1C LEVEL LT 7.0%: CPT | Performed by: FAMILY MEDICINE

## 2023-03-15 RX ORDER — BUPROPION HYDROCHLORIDE 150 MG/1
150 TABLET ORAL EVERY MORNING
Qty: 90 TABLET | Refills: 4 | Status: SHIPPED | OUTPATIENT
Start: 2023-03-15 | End: 2023-04-21 | Stop reason: SINTOL

## 2023-03-15 RX ORDER — CLONAZEPAM 1 MG/1
1 TABLET ORAL NIGHTLY PRN
Qty: 30 TABLET | Refills: 5 | Status: SHIPPED | OUTPATIENT
Start: 2023-03-15 | End: 2023-04-06 | Stop reason: ALTCHOICE

## 2023-03-15 SDOH — ECONOMIC STABILITY: HOUSING INSECURITY
IN THE LAST 12 MONTHS, WAS THERE A TIME WHEN YOU DID NOT HAVE A STEADY PLACE TO SLEEP OR SLEPT IN A SHELTER (INCLUDING NOW)?: NO

## 2023-03-15 SDOH — ECONOMIC STABILITY: INCOME INSECURITY: HOW HARD IS IT FOR YOU TO PAY FOR THE VERY BASICS LIKE FOOD, HOUSING, MEDICAL CARE, AND HEATING?: NOT HARD AT ALL

## 2023-03-15 SDOH — ECONOMIC STABILITY: FOOD INSECURITY: WITHIN THE PAST 12 MONTHS, THE FOOD YOU BOUGHT JUST DIDN'T LAST AND YOU DIDN'T HAVE MONEY TO GET MORE.: NEVER TRUE

## 2023-03-15 SDOH — ECONOMIC STABILITY: FOOD INSECURITY: WITHIN THE PAST 12 MONTHS, YOU WORRIED THAT YOUR FOOD WOULD RUN OUT BEFORE YOU GOT MONEY TO BUY MORE.: NEVER TRUE

## 2023-03-15 ASSESSMENT — PATIENT HEALTH QUESTIONNAIRE - PHQ9
SUM OF ALL RESPONSES TO PHQ9 QUESTIONS 1 & 2: 0
1. LITTLE INTEREST OR PLEASURE IN DOING THINGS: 0
4. FEELING TIRED OR HAVING LITTLE ENERGY: 0
SUM OF ALL RESPONSES TO PHQ QUESTIONS 1-9: 0
8. MOVING OR SPEAKING SO SLOWLY THAT OTHER PEOPLE COULD HAVE NOTICED. OR THE OPPOSITE, BEING SO FIGETY OR RESTLESS THAT YOU HAVE BEEN MOVING AROUND A LOT MORE THAN USUAL: 0
10. IF YOU CHECKED OFF ANY PROBLEMS, HOW DIFFICULT HAVE THESE PROBLEMS MADE IT FOR YOU TO DO YOUR WORK, TAKE CARE OF THINGS AT HOME, OR GET ALONG WITH OTHER PEOPLE: 0
SUM OF ALL RESPONSES TO PHQ QUESTIONS 1-9: 0
9. THOUGHTS THAT YOU WOULD BE BETTER OFF DEAD, OR OF HURTING YOURSELF: 0
3. TROUBLE FALLING OR STAYING ASLEEP: 0
6. FEELING BAD ABOUT YOURSELF - OR THAT YOU ARE A FAILURE OR HAVE LET YOURSELF OR YOUR FAMILY DOWN: 0
7. TROUBLE CONCENTRATING ON THINGS, SUCH AS READING THE NEWSPAPER OR WATCHING TELEVISION: 0
2. FEELING DOWN, DEPRESSED OR HOPELESS: 0
SUM OF ALL RESPONSES TO PHQ QUESTIONS 1-9: 0
SUM OF ALL RESPONSES TO PHQ QUESTIONS 1-9: 0
5. POOR APPETITE OR OVEREATING: 0

## 2023-03-15 ASSESSMENT — ANXIETY QUESTIONNAIRES
5. BEING SO RESTLESS THAT IT IS HARD TO SIT STILL: 0
6. BECOMING EASILY ANNOYED OR IRRITABLE: 0
GAD7 TOTAL SCORE: 0
2. NOT BEING ABLE TO STOP OR CONTROL WORRYING: 0
4. TROUBLE RELAXING: 0
3. WORRYING TOO MUCH ABOUT DIFFERENT THINGS: 0
IF YOU CHECKED OFF ANY PROBLEMS ON THIS QUESTIONNAIRE, HOW DIFFICULT HAVE THESE PROBLEMS MADE IT FOR YOU TO DO YOUR WORK, TAKE CARE OF THINGS AT HOME, OR GET ALONG WITH OTHER PEOPLE: NOT DIFFICULT AT ALL
1. FEELING NERVOUS, ANXIOUS, OR ON EDGE: 0
7. FEELING AFRAID AS IF SOMETHING AWFUL MIGHT HAPPEN: 0

## 2023-03-15 ASSESSMENT — ENCOUNTER SYMPTOMS
DIARRHEA: 0
VOMITING: 0
BLOOD IN STOOL: 0
CONSTIPATION: 0
EYE PAIN: 0
NAUSEA: 0
SHORTNESS OF BREATH: 1

## 2023-03-30 ENCOUNTER — OFFICE VISIT (OUTPATIENT)
Age: 73
End: 2023-03-30

## 2023-03-30 ENCOUNTER — OFFICE VISIT (OUTPATIENT)
Age: 73
End: 2023-03-30
Payer: COMMERCIAL

## 2023-03-30 VITALS
OXYGEN SATURATION: 99 % | HEART RATE: 85 BPM | HEIGHT: 68 IN | BODY MASS INDEX: 33.65 KG/M2 | TEMPERATURE: 97.2 F | WEIGHT: 222 LBS

## 2023-03-30 DIAGNOSIS — M96.1 LUMBAR POST-LAMINECTOMY SYNDROME: Primary | ICD-10-CM

## 2023-03-30 DIAGNOSIS — M54.12 CERVICAL NEURITIS: ICD-10-CM

## 2023-03-30 DIAGNOSIS — Z96.612 STATUS POST REVERSE TOTAL SHOULDER REPLACEMENT, LEFT: Primary | ICD-10-CM

## 2023-03-30 DIAGNOSIS — M54.16 LUMBAR NEURITIS: ICD-10-CM

## 2023-03-30 DIAGNOSIS — M48.02 SPINAL STENOSIS OF CERVICAL REGION: ICD-10-CM

## 2023-03-30 DIAGNOSIS — M50.20 HNP (HERNIATED NUCLEUS PULPOSUS), CERVICAL: ICD-10-CM

## 2023-03-30 PROCEDURE — 99213 OFFICE O/P EST LOW 20 MIN: CPT | Performed by: PHYSICAL MEDICINE & REHABILITATION

## 2023-03-30 PROCEDURE — 1123F ACP DISCUSS/DSCN MKR DOCD: CPT | Performed by: PHYSICAL MEDICINE & REHABILITATION

## 2023-03-30 ASSESSMENT — PATIENT HEALTH QUESTIONNAIRE - PHQ9
2. FEELING DOWN, DEPRESSED OR HOPELESS: 1
SUM OF ALL RESPONSES TO PHQ9 QUESTIONS 1 & 2: 1
SUM OF ALL RESPONSES TO PHQ QUESTIONS 1-9: 1
SUM OF ALL RESPONSES TO PHQ QUESTIONS 1-9: 1
1. LITTLE INTEREST OR PLEASURE IN DOING THINGS: 0
SUM OF ALL RESPONSES TO PHQ QUESTIONS 1-9: 1
SUM OF ALL RESPONSES TO PHQ QUESTIONS 1-9: 1
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
## 2023-04-04 LAB
ALBUMIN SERPL-MCNC: 4.2 G/DL (ref 3.7–4.7)
BUN SERPL-MCNC: 11 MG/DL (ref 8–27)
BUN/CREAT SERPL: 8 (ref 12–28)
CALCIUM SERPL-MCNC: 9.4 MG/DL (ref 8.7–10.3)
CHLORIDE SERPL-SCNC: 107 MMOL/L (ref 96–106)
CO2 SERPL-SCNC: 19 MMOL/L (ref 20–29)
CREAT SERPL-MCNC: 1.3 MG/DL (ref 0.57–1)
EGFRCR SERPLBLD CKD-EPI 2021: 43 ML/MIN/1.73
GLUCOSE SERPL-MCNC: 130 MG/DL (ref 70–99)
HBA1C MFR BLD: 6.3 % (ref 4.8–5.6)
PHOSPHATE SERPL-MCNC: 3.5 MG/DL (ref 3–4.3)
POTASSIUM SERPL-SCNC: 4.7 MMOL/L (ref 3.5–5.2)
SODIUM SERPL-SCNC: 144 MMOL/L (ref 134–144)
SPECIMEN STATUS REPORT: NORMAL

## 2023-04-06 ENCOUNTER — OFFICE VISIT (OUTPATIENT)
Facility: CLINIC | Age: 73
End: 2023-04-06
Payer: COMMERCIAL

## 2023-04-06 VITALS
HEIGHT: 68 IN | SYSTOLIC BLOOD PRESSURE: 97 MMHG | WEIGHT: 221.6 LBS | OXYGEN SATURATION: 97 % | BODY MASS INDEX: 33.59 KG/M2 | RESPIRATION RATE: 16 BRPM | TEMPERATURE: 98.1 F | DIASTOLIC BLOOD PRESSURE: 65 MMHG | HEART RATE: 84 BPM

## 2023-04-06 DIAGNOSIS — F41.1 GENERALIZED ANXIETY DISORDER: ICD-10-CM

## 2023-04-06 DIAGNOSIS — E11.65 TYPE 2 DIABETES MELLITUS WITH HYPERGLYCEMIA, WITHOUT LONG-TERM CURRENT USE OF INSULIN (HCC): ICD-10-CM

## 2023-04-06 DIAGNOSIS — M19.012 PRIMARY OSTEOARTHRITIS, LEFT SHOULDER: ICD-10-CM

## 2023-04-06 DIAGNOSIS — K21.9 GASTROESOPHAGEAL REFLUX DISEASE WITHOUT ESOPHAGITIS: ICD-10-CM

## 2023-04-06 DIAGNOSIS — F51.04 PSYCHOPHYSIOLOGIC INSOMNIA: Primary | ICD-10-CM

## 2023-04-06 DIAGNOSIS — M81.0 AGE-RELATED OSTEOPOROSIS WITHOUT CURRENT PATHOLOGICAL FRACTURE: ICD-10-CM

## 2023-04-06 DIAGNOSIS — N18.2 CHRONIC RENAL DISEASE, STAGE 2, MILDLY DECREASED GLOMERULAR FILTRATION RATE (GFR) BETWEEN 60-89 ML/MIN/1.73 SQUARE METER: ICD-10-CM

## 2023-04-06 PROCEDURE — 96372 THER/PROPH/DIAG INJ SC/IM: CPT | Performed by: FAMILY MEDICINE

## 2023-04-06 PROCEDURE — 99214 OFFICE O/P EST MOD 30 MIN: CPT | Performed by: FAMILY MEDICINE

## 2023-04-06 PROCEDURE — 1123F ACP DISCUSS/DSCN MKR DOCD: CPT | Performed by: FAMILY MEDICINE

## 2023-04-06 PROCEDURE — 3044F HG A1C LEVEL LT 7.0%: CPT | Performed by: FAMILY MEDICINE

## 2023-04-06 RX ORDER — PANTOPRAZOLE SODIUM 20 MG/1
20 TABLET, DELAYED RELEASE ORAL DAILY
Qty: 90 TABLET | Refills: 3 | Status: SHIPPED | OUTPATIENT
Start: 2023-04-06

## 2023-04-06 RX ORDER — TEMAZEPAM 15 MG/1
15 CAPSULE ORAL NIGHTLY PRN
Qty: 30 CAPSULE | Refills: 2 | Status: SHIPPED | OUTPATIENT
Start: 2023-04-06 | End: 2023-07-05

## 2023-04-06 NOTE — PROGRESS NOTES
Rachel Cage is a 68 y.o. presents today for   Chief Complaint   Patient presents with    Medication Refill    Results         Depression Screening:   PHQ-9 Questionaire 3/30/2023 3/15/2023 12/15/2022 9/28/2022 9/6/2022 7/21/2022 7/1/2022   Little interest or pleasure in doing things 0 0 0 0 0 0 0   Feeling down, depressed, or hopeless 1 0 0 0 0 0 0   Trouble falling or staying asleep, or sleeping too much - 0 - - - - -   Feeling tired or having little energy - 0 - - - - -   Poor appetite or overeating - 0 - - - - -   Feeling bad about yourself - or that you are a failure or have let yourself or your family down - 0 - - - - -   Trouble concentrating on things, such as reading the newspaper or watching television - 0 - - - - -   Moving or speaking so slowly that other people could have noticed. Or the opposite - being so fidgety or restless that you have been moving around a lot more than usual - 0 - - - - -   Thoughts that you would be better off dead, or of hurting yourself in some way - 0 - - - - -   PHQ-9 Total Score 1 0 0 0 0 0 0   If you checked off any problems, how difficult have these problems made it for you to do your work, take care of things at home, or get along with other people? - 0 - - - - -       Abuse Screening: AMB Abuse Screening 4/6/2023   Do you ever feel afraid of your partner? N   Are you in a relationship with someone who physically or mentally threatens you? N   Is it safe for you to go home? Y       Learning Assessment:  No question data found. Fall Risk:  Fall Risk 3/15/2023   2 or more falls in past year? yes   Fall with injury in past year? no           Coordination of Care:   1. \"Have you been to the ER, urgent care clinic since your last visit? Hospitalized since your last visit? \" no    2. \"Have you seen or consulted any other health care providers outside of the [address] [place] [address] since your last visit? \" no    3.  For patients aged 39-70: Has the patient had a colonoscopy
place, and time, and obese  Mental status - normal mood  Chest - clear to auscultation, no wheezes, rales or rhonchi, symmetric air entry  Heart - normal rate, regular rhythm, normal S1, S2, no murmurs, rubs, clicks or gallops  Neurological - cranial nerves II through XII intact  Skin - no rashes    LABS   Hgb a1c 6.3  TESTS      Assessment/Plan:    1. Psychophysiologic insomnia  Change bzd for better control of symptoms  - temazepam (RESTORIL) 15 MG capsule; Take 1 capsule by mouth nightly as needed for Sleep for up to 90 days. Max Daily Amount: 15 mg  Dispense: 30 capsule; Refill: 2    2. Chronic renal disease, stage 2, mildly decreased glomerular filtration rate (GFR) between 60-89 mL/min/1.73 square meter  Avoid nephrotoxins    3. Type 2 diabetes mellitus with hyperglycemia, without long-term current use of insulin (HCC)  Goal hgb a1c <7; improved    4. Generalized anxiety disorder  Bzd changed    5. Primary osteoarthritis, left shoulder  Pain management    6. Age-related osteoporosis without current pathological fracture  Given in office today  - denosumab (PROLIA) SC injection 60 mg    7. Gastroesophageal reflux disease without esophagitis  Continue ppi as prescribed  - pantoprazole (PROTONIX) 20 MG tablet; Take 1 tablet by mouth daily  Dispense: 90 tablet; Refill: 3       Lab review: labs reviewed, I note that glycosylated hemoglobin mildly abnormal but acceptable      I have discussed the diagnosis with the patient and the intended plan as seen in the above orders. The patient has received an after-visit summary and questions were answered concerning future plans. I have discussed medication side effects and warnings with the patient as well. I have reviewed the plan of care with the patient, accepted their input and they are in agreement with the treatment goals.

## 2023-04-19 NOTE — PROGRESS NOTES
[facility]  Respiratory Therapy Flowsheet      Martir Waddell  1950       Patient's carry-over of treatment delivered by: has started walking the dog again. Objective Daily Findings    Comments:    Respiratory Muscle Functioning/Exercise Conditioning/Strengthening Session:    Time In: 2:30  Time Out::4:25  Session Number: 19  O2 with Therapy: 0 L/min    Pre SPO2 98  Pre HR:83  Pre BP: 123/74    RR: 18    Wt: 227   Temp: 98.0   Post SPO2: 97 Post HR: 86  Post BP: 124/78    Self Care Home Management Instruction/Education    Patient Self Monitored Activity Time In:NA Time Out:NA     Self Care Home Management Instruction Education: Breathing Retaining and Exercise Concepts. Patient's Response to Education: Patient actively participated in education. Comments: Individual Therapy         Goal    Actual                      During Therapy                 Post-Therapy     % SpO2 HR RPD 1 - 4 RPE 6-20 Pain  1 - 10 % SpO2 HR RPD 1 - 4 RPE 6-20 Pain 0 - 10   Treadmill                 Bike               Stepper 30 min x L5 30 min x L4 97 85 2 8 6 96 88 2 8 6   Arm Ergometer 30 min x 25 hawkins 30 min x 20 hawkins 97 82 2 7 6 98 87 2 10 6   Rower               Weight Training               Therabands               Weighted DB 25 min x 7 lb 25 min x 4 lb 98 80 1 7 1 96 83 1 7 1   IMT               IS 30 min x 2500  30 min x 2250 96 81 1 6 5 98 80 1 7 5                    Patient's response to therapy: Good effort and Good motivation  Comments  : has a headache today. Assessment    Patient's response: Patient progressing towardsd LTGs evident by: Increased PLB and Increased Pacing. Plan: Continue as written    TAZB:    Billin Minutes of Individualized Treatment ()        Physician supervision provided this date of service by: Dr. Roman Brower       Therapist Signature: JOSE Lang    Therapist PRINTED Name and Credential: JOSE Lang    2018  3:45 PM No heavy lifting

## 2023-04-21 ENCOUNTER — TELEPHONE (OUTPATIENT)
Facility: CLINIC | Age: 73
End: 2023-04-21

## 2023-04-21 DIAGNOSIS — F51.04 PSYCHOPHYSIOLOGIC INSOMNIA: Primary | ICD-10-CM

## 2023-04-21 DIAGNOSIS — F41.1 GENERALIZED ANXIETY DISORDER: Primary | ICD-10-CM

## 2023-04-21 RX ORDER — CLONAZEPAM 1 MG/1
1 TABLET ORAL NIGHTLY PRN
Qty: 30 TABLET | Refills: 0 | Status: SHIPPED | OUTPATIENT
Start: 2023-04-21 | End: 2023-05-21

## 2023-04-21 NOTE — TELEPHONE ENCOUNTER
Patient called and needs a prescription refill on clonazePAM (KLONOPIN) 1 MG tablet. Please advise. Patient's pharmacy is [address]

## 2023-05-08 RX ORDER — FLUTICASONE PROPIONATE 50 MCG
SPRAY, SUSPENSION (ML) NASAL
Qty: 16 G | Refills: 0 | Status: SHIPPED | OUTPATIENT
Start: 2023-05-08

## 2023-05-09 RX ORDER — PANTOPRAZOLE SODIUM 40 MG/1
TABLET, DELAYED RELEASE ORAL
COMMUNITY
Start: 2023-04-17

## 2023-05-15 ENCOUNTER — TELEPHONE (OUTPATIENT)
Age: 73
End: 2023-05-15

## 2023-05-15 NOTE — TELEPHONE ENCOUNTER
Returned pt calls to cancel colonoscopy 7/12/2023 due to sickness.  Patient to call office back when ready to R/S

## 2023-05-19 DIAGNOSIS — J30.1 SEASONAL ALLERGIC RHINITIS DUE TO POLLEN: Primary | ICD-10-CM

## 2023-05-19 DIAGNOSIS — F51.04 PSYCHOPHYSIOLOGIC INSOMNIA: ICD-10-CM

## 2023-05-20 RX ORDER — CLONAZEPAM 1 MG/1
1 TABLET ORAL NIGHTLY PRN
Qty: 30 TABLET | Refills: 5 | Status: SHIPPED | OUTPATIENT
Start: 2023-05-20 | End: 2023-11-16

## 2023-05-20 RX ORDER — FLUTICASONE PROPIONATE 50 MCG
SPRAY, SUSPENSION (ML) NASAL
Qty: 16 G | Refills: 5 | Status: SHIPPED | OUTPATIENT
Start: 2023-05-20

## 2023-06-10 RX ORDER — DULOXETIN HYDROCHLORIDE 60 MG/1
CAPSULE, DELAYED RELEASE ORAL
Qty: 180 CAPSULE | Refills: 3 | Status: SHIPPED | OUTPATIENT
Start: 2023-06-10

## 2023-06-21 RX ORDER — METOPROLOL SUCCINATE 25 MG/1
TABLET, EXTENDED RELEASE ORAL
Qty: 45 TABLET | Refills: 3 | Status: SHIPPED | OUTPATIENT
Start: 2023-06-21

## 2023-06-21 NOTE — TELEPHONE ENCOUNTER
PCP: Brayden Pace MD    Last appt:  6/15/2023   Future Appointments   Date Time Provider Rome Soriano   6/29/2023 10:00 AM [facility]CHO [facility] BS AMB   7/14/2023 11:15 AM Brayden Pace MD [facility]   6/20/2024 11:00 AM Ziggy Hines MD [facility] BS AMB       Requested Prescriptions     Pending Prescriptions Disp Refills    metoprolol succinate (TOPROL XL) 25 MG extended release tablet [Pharmacy Med Name: Metoprolol Succinate ER 25 MG Oral Tablet Extended Release 24 Hour] 45 tablet 3     Sig: TAKE 1/2 (ONE-HALF) TABLET BY MOUTH IN THE MORNING

## 2023-07-05 ENCOUNTER — TELEPHONE (OUTPATIENT)
Age: 73
End: 2023-07-05

## 2023-07-14 ENCOUNTER — OFFICE VISIT (OUTPATIENT)
Facility: CLINIC | Age: 73
End: 2023-07-14
Payer: MEDICARE

## 2023-07-14 ENCOUNTER — HOSPITAL ENCOUNTER (OUTPATIENT)
Facility: HOSPITAL | Age: 73
Setting detail: SPECIMEN
Discharge: HOME | End: 2023-07-17

## 2023-07-14 VITALS
RESPIRATION RATE: 17 BRPM | BODY MASS INDEX: 33.43 KG/M2 | WEIGHT: 220.6 LBS | OXYGEN SATURATION: 98 % | HEART RATE: 89 BPM | SYSTOLIC BLOOD PRESSURE: 96 MMHG | HEIGHT: 68 IN | TEMPERATURE: 96.9 F | DIASTOLIC BLOOD PRESSURE: 62 MMHG

## 2023-07-14 DIAGNOSIS — F41.1 GENERALIZED ANXIETY DISORDER: ICD-10-CM

## 2023-07-14 DIAGNOSIS — D50.8 IRON DEFICIENCY ANEMIA SECONDARY TO INADEQUATE DIETARY IRON INTAKE: Primary | ICD-10-CM

## 2023-07-14 DIAGNOSIS — M54.16 LUMBAR NEURITIS: ICD-10-CM

## 2023-07-14 DIAGNOSIS — E03.9 ACQUIRED HYPOTHYROIDISM: ICD-10-CM

## 2023-07-14 DIAGNOSIS — F51.04 PSYCHOPHYSIOLOGIC INSOMNIA: ICD-10-CM

## 2023-07-14 DIAGNOSIS — E11.65 TYPE 2 DIABETES MELLITUS WITH HYPERGLYCEMIA, WITHOUT LONG-TERM CURRENT USE OF INSULIN (HCC): ICD-10-CM

## 2023-07-14 LAB — LABCORP SPECIMEN COLLECTION: NORMAL

## 2023-07-14 PROCEDURE — 3044F HG A1C LEVEL LT 7.0%: CPT | Performed by: FAMILY MEDICINE

## 2023-07-14 PROCEDURE — 99214 OFFICE O/P EST MOD 30 MIN: CPT | Performed by: FAMILY MEDICINE

## 2023-07-14 PROCEDURE — 1123F ACP DISCUSS/DSCN MKR DOCD: CPT | Performed by: FAMILY MEDICINE

## 2023-07-14 RX ORDER — FLUOXETINE 10 MG/1
10 CAPSULE ORAL DAILY
Qty: 90 CAPSULE | Refills: 3 | Status: SHIPPED | OUTPATIENT
Start: 2023-07-14

## 2023-07-14 RX ORDER — MELOXICAM 15 MG/1
7.5 TABLET ORAL DAILY
Qty: 45 TABLET | Refills: 3 | Status: SHIPPED | OUTPATIENT
Start: 2023-07-14

## 2023-07-14 RX ORDER — LEVOTHYROXINE SODIUM 0.07 MG/1
75 TABLET ORAL
Qty: 90 TABLET | Refills: 3 | Status: SHIPPED | OUTPATIENT
Start: 2023-07-14

## 2023-07-14 NOTE — PROGRESS NOTES
Rebecca Howard is a 68 y.o. presents today for No chief complaint on file. Depression Screening:   PHQ-9 Questionaire 3/30/2023 3/15/2023 12/15/2022 9/28/2022 9/6/2022 7/21/2022 7/1/2022   Little interest or pleasure in doing things 0 0 0 0 0 0 0   Feeling down, depressed, or hopeless 1 0 0 0 0 0 0   Trouble falling or staying asleep, or sleeping too much - 0 - - - - -   Feeling tired or having little energy - 0 - - - - -   Poor appetite or overeating - 0 - - - - -   Feeling bad about yourself - or that you are a failure or have let yourself or your family down - 0 - - - - -   Trouble concentrating on things, such as reading the newspaper or watching television - 0 - - - - -   Moving or speaking so slowly that other people could have noticed. Or the opposite - being so fidgety or restless that you have been moving around a lot more than usual - 0 - - - - -   Thoughts that you would be better off dead, or of hurting yourself in some way - 0 - - - - -   PHQ-9 Total Score 1 0 0 0 0 0 0   If you checked off any problems, how difficult have these problems made it for you to do your work, take care of things at home, or get along with other people? - 0 - - - - -       Abuse Screening: AMB Abuse Screening 4/6/2023   Do you ever feel afraid of your partner? N   Are you in a relationship with someone who physically or mentally threatens you? N   Is it safe for you to go home? Y       Learning Assessment:  No question data found. Fall Risk:  Fall Risk 4/6/2023 3/15/2023   2 or more falls in past year? no yes   Fall with injury in past year? no no           Coordination of Care:   1. \"Have you been to the ER, urgent care clinic since your last visit? Hospitalized since your last visit? \" NO    2. \"Have you seen or consulted any other health care providers outside of the [address] [place] [address] since your last visit? \" NO    3.  For patients aged 43-73: Has the patient had a colonoscopy / FIT/ Cologuard? yes    If the
current use of insulin (HCC)  Goal hgb a1c <7; well controlled with current treatment  - metFORMIN (GLUCOPHAGE) 500 MG tablet; Take 1 tablet by mouth daily (with breakfast)  Dispense: 180 tablet; Refill: 3    3. Psychophysiologic insomnia  Continue clonazepam as needed    4. Generalized anxiety disorder  Continue ssri as prescribed  - FLUoxetine (PROZAC) 10 MG capsule; Take 1 capsule by mouth daily  Dispense: 90 capsule; Refill: 3    5. Acquired hypothyroidism    - levothyroxine (SYNTHROID) 75 MCG tablet; Take 1 tablet by mouth every morning (before breakfast)  Dispense: 90 tablet; Refill: 3    6. Lumbar neuritis  Continue treatment with caution  - meloxicam (MOBIC) 15 MG tablet; Take 0.5 tablets by mouth daily  Dispense: 45 tablet; Refill: 3       Lab review: labs reviewed, I note that glycosylated hemoglobin mildly abnormal but acceptable      I have discussed the diagnosis with the patient and the intended plan as seen in the above orders. The patient has received an after-visit summary and questions were answered concerning future plans. I have discussed medication side effects and warnings with the patient as well. I have reviewed the plan of care with the patient, accepted their input and they are in agreement with the treatment goals.

## 2023-07-15 LAB
ALBUMIN/CREAT UR: 38 MG/G CREAT (ref 0–29)
CHOLEST SERPL-MCNC: 128 MG/DL (ref 100–199)
CREAT UR-MCNC: 92.2 MG/DL
HDLC SERPL-MCNC: 56 MG/DL
LDLC SERPL CALC-MCNC: 53 MG/DL (ref 0–99)
MICROALBUMIN UR-MCNC: 34.6 UG/ML
SPECIMEN STATUS REPORT: NORMAL
TRIGL SERPL-MCNC: 100 MG/DL (ref 0–149)
VLDLC SERPL CALC-MCNC: 19 MG/DL (ref 5–40)

## 2023-07-17 ENCOUNTER — TELEPHONE (OUTPATIENT)
Age: 73
End: 2023-07-17

## 2023-07-17 NOTE — TELEPHONE ENCOUNTER
Appeal Case Number:[id number]  Contact Number:[phone] 8am-8 pm   Fax Number : [phone]  Patient enrollment #: [id number]

## 2023-07-20 NOTE — TELEPHONE ENCOUNTER
Contacted pt at [facility] number. Two patient Identifiers confirmed. Informed pt of message with [employer] rep. Pt verbalized understanding.

## 2023-07-20 NOTE — TELEPHONE ENCOUNTER
Contacted [facility] spoke with representative. Two patient Identifiers confirmed. Per rep appeal for coverage of echo under Dr Balwinder Jansen  was dismissed on 7/13/2023. He stated patient needed to wait til the claim finished processing and pt could file a post service appeal through claims. H stated the claim may not be denied they may just transfer the approval. Per rep he will fax denial to office for review. Will advise patient.

## 2023-09-25 ENCOUNTER — OFFICE VISIT (OUTPATIENT)
Dept: URBAN - METROPOLITAN AREA CLINIC 59 | Facility: CLINIC | Age: 73
End: 2023-09-25
Payer: MEDICARE

## 2023-09-25 DIAGNOSIS — H40.013 OPEN ANGLE WITH BORDERLINE FINDINGS, LOW RISK, BILATERAL: Primary | ICD-10-CM

## 2023-09-25 DIAGNOSIS — E11.9 DIABETES MELLITUS TYPE 2 WITHOUT MENTION OF COMPLICATION: ICD-10-CM

## 2023-09-25 DIAGNOSIS — H25.813 COMBINED FORMS OF AGE-RELATED CATARACT, BILATERAL: ICD-10-CM

## 2023-09-25 DIAGNOSIS — H16.223 KERATOCONJUNCTIVITIS SICCA, BILATERAL: ICD-10-CM

## 2023-09-25 DIAGNOSIS — H43.813 VITREOUS DEGENERATION, BILATERAL: ICD-10-CM

## 2023-09-25 PROCEDURE — 92133 CPTRZD OPH DX IMG PST SGM ON: CPT | Performed by: OPTOMETRIST

## 2023-09-25 PROCEDURE — 92014 COMPRE OPH EXAM EST PT 1/>: CPT | Performed by: OPTOMETRIST

## 2023-09-25 ASSESSMENT — INTRAOCULAR PRESSURE
OD: 16
OS: 16

## 2023-10-04 NOTE — PROGRESS NOTES
Gregory Weir presents today for   Chief Complaint   Patient presents with    Pre-op Exam       Is someone accompanying this pt? no    Is the patient using any DME equipment during OV? Yes a cane    Depression Screening:  3 most recent PHQ Screens 5/6/2021   Little interest or pleasure in doing things Nearly every day   Feeling down, depressed, irritable, or hopeless Not at all   Total Score PHQ 2 3   Trouble falling or staying asleep, or sleeping too much Nearly every day   Feeling tired or having little energy More than half the days   Poor appetite, weight loss, or overeating Not at all   Feeling bad about yourself - or that you are a failure or have let yourself or your family down Several days   Trouble concentrating on things such as school, work, reading, or watching TV Not at all   Moving or speaking so slowly that other people could have noticed; or the opposite being so fidgety that others notice Not at all   Thoughts of being better off dead, or hurting yourself in some way Not at all   PHQ 9 Score 9   How difficult have these problems made it for you to do your work, take care of your home and get along with others Not difficult at all       Learning Assessment:  Learning Assessment 4/22/2016   PRIMARY LEARNER Patient   HIGHEST LEVEL OF EDUCATION - PRIMARY LEARNER  -   PRIMARY LANGUAGE ENGLISH   LEARNER PREFERENCE PRIMARY LISTENING   ANSWERED BY patient   RELATIONSHIP SELF       Abuse Screening:  Abuse Screening Questionnaire 12/12/2019   Do you ever feel afraid of your partner? N   Are you in a relationship with someone who physically or mentally threatens you? N   Is it safe for you to go home? Y       Fall Risk  Fall Risk Assessment, last 12 mths 6/30/2021   Able to walk? Yes   Fall in past 12 months? 0   Do you feel unsteady?  0   Are you worried about falling 0   Number of falls in past 12 months -   Fall with injury? -       Health Maintenance reviewed and discussed and ordered per Provider. Health Maintenance Due   Topic Date Due    Eye Exam Retinal or Dilated  04/27/2020    Medicare Yearly Exam  07/14/2021   . Coordination of Care:  1. Have you been to the ER, urgent care clinic since your last visit? Hospitalized since your last visit? no    2. Have you seen or consulted any other health care providers outside of the [address] [place] [address] since your last visit? Include any pap smears or colon screening.  no      Last  Checked na  Last UDS Checked na  Last Pain contract signed: na    Pre-op exam:  Left shoulder 1 Principal Discharge DX:	Abdominal pain  Secondary Diagnosis:	Back pain

## 2023-10-19 ENCOUNTER — HOSPITAL ENCOUNTER (OUTPATIENT)
Dept: WOMENS IMAGING | Facility: HOSPITAL | Age: 73
Discharge: HOME | End: 2023-10-19
Payer: MEDICARE

## 2023-10-19 DIAGNOSIS — Z12.31 VISIT FOR SCREENING MAMMOGRAM: ICD-10-CM

## 2023-10-19 PROCEDURE — 77063 BREAST TOMOSYNTHESIS BI: CPT

## 2023-11-24 DIAGNOSIS — F51.04 PSYCHOPHYSIOLOGIC INSOMNIA: ICD-10-CM

## 2023-11-27 RX ORDER — CLONAZEPAM 1 MG/1
TABLET ORAL
Qty: 30 TABLET | Refills: 0 | Status: SHIPPED | OUTPATIENT
Start: 2023-11-27 | End: 2023-12-27

## 2024-01-12 RX ORDER — ATORVASTATIN CALCIUM 20 MG/1
TABLET, FILM COATED ORAL
Qty: 90 TABLET | Refills: 3 | Status: SHIPPED | OUTPATIENT
Start: 2024-01-12

## 2024-01-30 NOTE — TELEPHONE ENCOUNTER
Patient was found to have thrombocytopenia, monitor .  Recent Labs   Lab 01/29/24  1836   *        Will sign Monday at [facility]

## 2024-05-28 NOTE — TELEPHONE ENCOUNTER
Medication(s) requesting:   Requested Prescriptions     Pending Prescriptions Disp Refills    DULoxetine (CYMBALTA) 60 MG extended release capsule [Pharmacy Med Name: DULoxetine HCl 60 MG Oral Capsule Delayed Release Particles] 180 capsule 0     Sig: Take 2 capsules by mouth once daily       Last office visit:  7/14/23  Next office visit DMA: Visit date not found

## 2024-05-31 RX ORDER — DULOXETIN HYDROCHLORIDE 60 MG/1
CAPSULE, DELAYED RELEASE ORAL
Qty: 180 CAPSULE | Refills: 0 | Status: SHIPPED | OUTPATIENT
Start: 2024-05-31

## 2024-07-01 ENCOUNTER — TELEPHONE (OUTPATIENT)
Age: 74
End: 2024-07-01

## 2024-07-01 RX ORDER — METOPROLOL SUCCINATE 25 MG/1
TABLET, EXTENDED RELEASE ORAL
Qty: 45 TABLET | Refills: 3 | Status: SHIPPED | OUTPATIENT
Start: 2024-07-01

## 2024-07-01 NOTE — TELEPHONE ENCOUNTER
PCP: Shannon Roy NP-C    Last appt:  6/15/2023   Future Appointments   Date Time Provider Department Center   8/15/2024  2:15 PM Ziggy Osman MD [facility] BS AMB   10/30/2024  2:30 PM [facility] YVETTE STEREO BX RM 1 [facility]       Requested Prescriptions     Pending Prescriptions Disp Refills    metoprolol succinate (TOPROL XL) 25 MG extended release tablet 45 tablet 3     Sig: TAKE 1/2 (ONE-HALF) TABLET BY MOUTH IN THE MORNING       Request for a 30 or 90 day supply? Provider Discretion    Pharmacy: confirmed    Other Comments: N/A

## 2024-07-01 NOTE — TELEPHONE ENCOUNTER
.Incoming from [facility]. Two patient identifiers confirmed. Patient requesting medication refill.    PCP: Shannon Roy NP-C  Last appt:  6/15/2023   Future Appointments   Date Time Provider Department Center   8/15/2024  2:15 PM Ziggy Osman MD [facility] BS AMB   10/30/2024  2:30 PM [facility] Y[facility]ETTE FRANKIE BX RM 1 [facility]       Medication requested: metoprolol 25 mg    Pharmacy: [place] [id number]

## 2024-08-01 ENCOUNTER — OFFICE VISIT (OUTPATIENT)
Age: 74
End: 2024-08-01

## 2024-08-01 VITALS
OXYGEN SATURATION: 98 % | WEIGHT: 227.2 LBS | BODY MASS INDEX: 33.65 KG/M2 | HEART RATE: 92 BPM | DIASTOLIC BLOOD PRESSURE: 85 MMHG | HEIGHT: 69 IN | SYSTOLIC BLOOD PRESSURE: 127 MMHG

## 2024-08-01 DIAGNOSIS — G46.4 CEREBELLAR STROKE SYNDROME: ICD-10-CM

## 2024-08-01 DIAGNOSIS — R06.09 DOE (DYSPNEA ON EXERTION): Primary | ICD-10-CM

## 2024-08-01 RX ORDER — SENNOSIDES 8.6 MG
650 CAPSULE ORAL
COMMUNITY

## 2024-08-01 RX ORDER — BUPROPION HYDROCHLORIDE 150 MG/1
1 TABLET ORAL EVERY MORNING
COMMUNITY
Start: 2023-03-15

## 2024-08-01 RX ORDER — TEMAZEPAM 15 MG/1
CAPSULE ORAL
COMMUNITY
Start: 2023-04-06

## 2024-08-01 RX ORDER — CLOPIDOGREL BISULFATE 75 MG/1
75 TABLET ORAL DAILY
COMMUNITY
Start: 2024-07-24

## 2024-08-01 RX ORDER — DENOSUMAB 60 MG/ML
60 INJECTION SUBCUTANEOUS
COMMUNITY

## 2024-08-01 RX ORDER — AMMONIUM LACTATE 12 G/100G
1 LOTION TOPICAL PRN
COMMUNITY

## 2024-08-01 RX ORDER — CHLORHEXIDINE GLUCONATE ORAL RINSE 1.2 MG/ML
SOLUTION DENTAL
COMMUNITY
Start: 2024-07-25

## 2024-08-01 RX ORDER — ACETAMINOPHEN 500 MG
500 TABLET ORAL
COMMUNITY

## 2024-08-01 ASSESSMENT — PATIENT HEALTH QUESTIONNAIRE - PHQ9
SUM OF ALL RESPONSES TO PHQ QUESTIONS 1-9: 0
8. MOVING OR SPEAKING SO SLOWLY THAT OTHER PEOPLE COULD HAVE NOTICED. OR THE OPPOSITE, BEING SO FIGETY OR RESTLESS THAT YOU HAVE BEEN MOVING AROUND A LOT MORE THAN USUAL: NOT AT ALL
7. TROUBLE CONCENTRATING ON THINGS, SUCH AS READING THE NEWSPAPER OR WATCHING TELEVISION: NOT AT ALL
3. TROUBLE FALLING OR STAYING ASLEEP: NOT AT ALL
5. POOR APPETITE OR OVEREATING: NOT AT ALL
SUM OF ALL RESPONSES TO PHQ QUESTIONS 1-9: 0
SUM OF ALL RESPONSES TO PHQ QUESTIONS 1-9: 0
1. LITTLE INTEREST OR PLEASURE IN DOING THINGS: NOT AT ALL
4. FEELING TIRED OR HAVING LITTLE ENERGY: NOT AT ALL
9. THOUGHTS THAT YOU WOULD BE BETTER OFF DEAD, OR OF HURTING YOURSELF: NOT AT ALL
6. FEELING BAD ABOUT YOURSELF - OR THAT YOU ARE A FAILURE OR HAVE LET YOURSELF OR YOUR FAMILY DOWN: NOT AT ALL
SUM OF ALL RESPONSES TO PHQ QUESTIONS 1-9: 0
SUM OF ALL RESPONSES TO PHQ9 QUESTIONS 1 & 2: 0

## 2024-08-01 NOTE — PATIENT INSTRUCTIONS
[employer]-(30 days)may be calling you to confirm your address to send your Heart Monitor.   Please allow 7-10 business days for monitor to arrive at your home.  Customer Service for [employer]:  [phone]

## 2024-08-01 NOTE — PROGRESS NOTES
learner? Patient    What is the preferred language for health care of the primary learner? ENGLISH    How does the primary learner prefer to learn new concepts? DEMONSTRATION    Answered By self    Relationship to Learner SELF        Abuse Screenin/1/2024     2:00 PM 2023     2:00 PM   AMB Abuse Screening   Do you ever feel afraid of your partner? N N   Are you in a relationship with someone who physically or mentally threatens you? N N   Is it safe for you to go home? Y Y          Fall Risk      2024     2:08 PM 2023     2:50 PM 3/15/2023     1:25 PM   Fall Risk   Do you feel unsteady or are you worried about falling?  no yes yes   2 or more falls in past year? no no yes   Fall with injury in past year? no no no         Pt currently taking Anticoagulant /Antiplatelet therapy? Plavix    Coordination of Care:  1. Have you been to the ER, urgent care clinic since your last visit? Hospitalized since your last visit? No    2. Have you seen or consulted any other health care providers outside of the [facility] since your last visit? Include any pap smears or colon screening. No      Please see Red banners under Allergies and Med Rec to remove outside inquires. All correct information has been verified with patient and added to chart.     Medication's patient's would liked removed has been marked not taking to be removed per Verbal order and read back per Ziggy Osman MD   
09/09/2021 12:00 AM     09/09/2021 12:00 AM    MCV 86 09/09/2021 12:00 AM     Lab Results   Component Value Date/Time     04/03/2023 12:00 AM    K 4.7 04/03/2023 12:00 AM     04/03/2023 12:00 AM    CO2 19 04/03/2023 12:00 AM    BUN 11 04/03/2023 12:00 AM    CREATININE 1.30 04/03/2023 12:00 AM    GLUCOSE 130 04/03/2023 12:00 AM    CALCIUM 9.4 04/03/2023 12:00 AM           Latest Ref Rng & Units 7/14/2023    12:00 AM 7/1/2022    12:00 AM 6/24/2021     1:19 PM 5/25/2021    12:00 AM 3/3/2021     1:58 PM   Lipids   Chol 100 - 199 mg/dL 128  117   113     HDL >39 mg/dL 56  49   52     LDL Calc 0 - 99 mg/dL 53  50   45     VLDL Calc 5 - 40 mg/dL 19  18   16     Trig 0 - 149 mg/dL 100  98   83     ALT 0 - 32 IU/L  17  34   26    AST 0 - 40 IU/L  24  25   19    LDL 0 - 99 mg/dL 53  50   45       Lab Results   Component Value Date/Time    ALT 17 07/01/2022 12:00 AM     Hemoglobin A1C   Date Value Ref Range Status   04/03/2023 6.3 (H) 4.8 - 5.6 % Final     Comment:              Prediabetes: 5.7 - 6.4           Diabetes: >6.4           Glycemic control for adults with diabetes: <7.0       Lab Results   Component Value Date    TSH 1.120 06/29/2022     ECHO 906/2024)   * For the indication of determine need for anticoagulation cryptogenic stroke, findings are no obvious source of acrdioembolism seen.     * Bubble study was performed and was negative for shunt.     * Left ventricular systolic function is normal with an ejection fraction of 65 % by visual estimation.     * Left ventricular diastolic function: normal left atrial pressure with grade I diastolic dysfunction.     * Right ventricular chamber dimension is mildly enlarged.     * Right ventricular systolic function is normal.     * Well-seated bioprosthetic aortic valve replacement with a peak velocity of 2.6 m/s, mean gradient of 15 mmHg, and aortic valve area of 1.12 cm2.     * Unable to estimate pulmonary arterial pressure due to inadequate tricuspid

## 2024-09-26 ENCOUNTER — OFFICE VISIT (OUTPATIENT)
Dept: URBAN - METROPOLITAN AREA CLINIC 59 | Facility: CLINIC | Age: 74
End: 2024-09-26
Payer: MEDICARE

## 2024-09-26 DIAGNOSIS — H43.813 VITREOUS DEGENERATION, BILATERAL: ICD-10-CM

## 2024-09-26 DIAGNOSIS — H25.13 AGE-RELATED NUCLEAR CATARACT, BILATERAL: ICD-10-CM

## 2024-09-26 DIAGNOSIS — H40.013 OPEN ANGLE WITH BORDERLINE FINDINGS, LOW RISK, BILATERAL: ICD-10-CM

## 2024-09-26 DIAGNOSIS — E11.9 DIABETES MELLITUS TYPE 2 WITHOUT MENTION OF COMPLICATION: Primary | ICD-10-CM

## 2024-09-26 DIAGNOSIS — H11.153 PINGUECULA, BILATERAL: ICD-10-CM

## 2024-09-26 PROCEDURE — 92133 CPTRZD OPH DX IMG PST SGM ON: CPT | Performed by: OPTOMETRIST

## 2024-09-26 PROCEDURE — 92014 COMPRE OPH EXAM EST PT 1/>: CPT | Performed by: OPTOMETRIST

## 2024-09-26 ASSESSMENT — VISUAL ACUITY
OD: 20/25
OS: 20/20

## 2024-09-26 ASSESSMENT — INTRAOCULAR PRESSURE
OD: 16
OS: 16

## 2024-11-29 DIAGNOSIS — L30.9 DERMATITIS: ICD-10-CM

## 2024-12-02 RX ORDER — DESONIDE 0.5 MG/G
CREAM TOPICAL
Qty: 60 G | Refills: 0 | OUTPATIENT
Start: 2024-12-02

## 2024-12-05 DIAGNOSIS — L30.9 DERMATITIS: ICD-10-CM

## 2024-12-05 RX ORDER — DESONIDE 0.5 MG/G
CREAM TOPICAL
Qty: 60 G | Refills: 0 | Status: SHIPPED | OUTPATIENT
Start: 2024-12-05

## 2025-02-06 ENCOUNTER — OFFICE VISIT (OUTPATIENT)
Age: 75
End: 2025-02-06
Payer: MEDICARE

## 2025-02-06 VITALS
BODY MASS INDEX: 34.25 KG/M2 | HEIGHT: 68 IN | WEIGHT: 226 LBS | SYSTOLIC BLOOD PRESSURE: 116 MMHG | DIASTOLIC BLOOD PRESSURE: 70 MMHG | OXYGEN SATURATION: 97 % | HEART RATE: 86 BPM

## 2025-02-06 DIAGNOSIS — E78.00 PURE HYPERCHOLESTEROLEMIA: ICD-10-CM

## 2025-02-06 DIAGNOSIS — I35.0 NONRHEUMATIC AORTIC VALVE STENOSIS: ICD-10-CM

## 2025-02-06 DIAGNOSIS — I10 ESSENTIAL HYPERTENSION WITH GOAL BLOOD PRESSURE LESS THAN 140/90: Primary | ICD-10-CM

## 2025-02-06 PROCEDURE — 1123F ACP DISCUSS/DSCN MKR DOCD: CPT | Performed by: INTERNAL MEDICINE

## 2025-02-06 PROCEDURE — 3078F DIAST BP <80 MM HG: CPT | Performed by: INTERNAL MEDICINE

## 2025-02-06 PROCEDURE — 1126F AMNT PAIN NOTED NONE PRSNT: CPT | Performed by: INTERNAL MEDICINE

## 2025-02-06 PROCEDURE — 3074F SYST BP LT 130 MM HG: CPT | Performed by: INTERNAL MEDICINE

## 2025-02-06 PROCEDURE — 99214 OFFICE O/P EST MOD 30 MIN: CPT | Performed by: INTERNAL MEDICINE

## 2025-02-06 ASSESSMENT — PATIENT HEALTH QUESTIONNAIRE - PHQ9
8. MOVING OR SPEAKING SO SLOWLY THAT OTHER PEOPLE COULD HAVE NOTICED. OR THE OPPOSITE, BEING SO FIGETY OR RESTLESS THAT YOU HAVE BEEN MOVING AROUND A LOT MORE THAN USUAL: NOT AT ALL
4. FEELING TIRED OR HAVING LITTLE ENERGY: NOT AT ALL
SUM OF ALL RESPONSES TO PHQ QUESTIONS 1-9: 0
5. POOR APPETITE OR OVEREATING: NOT AT ALL
SUM OF ALL RESPONSES TO PHQ9 QUESTIONS 1 & 2: 0
7. TROUBLE CONCENTRATING ON THINGS, SUCH AS READING THE NEWSPAPER OR WATCHING TELEVISION: NOT AT ALL
SUM OF ALL RESPONSES TO PHQ QUESTIONS 1-9: 0
10. IF YOU CHECKED OFF ANY PROBLEMS, HOW DIFFICULT HAVE THESE PROBLEMS MADE IT FOR YOU TO DO YOUR WORK, TAKE CARE OF THINGS AT HOME, OR GET ALONG WITH OTHER PEOPLE: NOT DIFFICULT AT ALL
9. THOUGHTS THAT YOU WOULD BE BETTER OFF DEAD, OR OF HURTING YOURSELF: NOT AT ALL
3. TROUBLE FALLING OR STAYING ASLEEP: NOT AT ALL
6. FEELING BAD ABOUT YOURSELF - OR THAT YOU ARE A FAILURE OR HAVE LET YOURSELF OR YOUR FAMILY DOWN: NOT AT ALL
2. FEELING DOWN, DEPRESSED OR HOPELESS: NOT AT ALL
SUM OF ALL RESPONSES TO PHQ QUESTIONS 1-9: 0
1. LITTLE INTEREST OR PLEASURE IN DOING THINGS: NOT AT ALL
SUM OF ALL RESPONSES TO PHQ QUESTIONS 1-9: 0

## 2025-02-06 NOTE — PROGRESS NOTES
Identified pt with two pt identifiers(name and ). Reviewed record in preparation for visit and have obtained necessary documentation.    Mony Ramsey presents today for   Chief Complaint   Patient presents with    Follow-up     6m       Pt denies DIZZINESS, SOB, CHEST PAIN/ PRESSURE, FATIGUE/WEAKNESS, HEADACHES, SWELLING.             Mony Ramsey preferred language for health care discussion is english/other.    Personal Protective Equipment:   Personal Protective Equipment was used including: mask-surgical and hands-gloves. Patient was placed on no precaution(s). Patient was masked.    Precautions:   Patient currently on None  Patient currently roomed with door closed.    Is someone accompanying this pt? no    Is the patient using any DME equipment during OV? cane    Depression Screenin/6/2025     1:54 PM 2024     2:08 PM 3/30/2023    10:56 AM 3/15/2023     1:25 PM 12/15/2022     1:16 PM 2022     2:44 PM 2022     1:12 PM   PHQ-9 Questionaire   Little interest or pleasure in doing things 0 0 0 0 0 0 0   Feeling down, depressed, or hopeless 0 0 1 0 0 0 0   Trouble falling or staying asleep, or sleeping too much 0 0  0      Feeling tired or having little energy 0 0  0      Poor appetite or overeating 0 0  0      Feeling bad about yourself - or that you are a failure or have let yourself or your family down 0 0  0      Trouble concentrating on things, such as reading the newspaper or watching television 0 0  0      Moving or speaking so slowly that other people could have noticed. Or the opposite - being so fidgety or restless that you have been moving around a lot more than usual 0 0  0      Thoughts that you would be better off dead, or of hurting yourself in some way 0 0  0      PHQ-9 Total Score 0 0 1 0 0 0 0   If you checked off any problems, how difficult have these problems made it for you to do your work, take care of things at home, or get along with other people? 0   0
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
## 2025-02-07 ENCOUNTER — TRANSCRIBE ORDERS (OUTPATIENT)
Facility: HOSPITAL | Age: 75
End: 2025-02-07

## 2025-02-07 DIAGNOSIS — M81.0 POSTMENOPAUSAL OSTEOPOROSIS: Primary | ICD-10-CM

## 2025-06-27 RX ORDER — METOPROLOL SUCCINATE 25 MG/1
TABLET, EXTENDED RELEASE ORAL
Qty: 45 TABLET | Refills: 3 | Status: SHIPPED | OUTPATIENT
Start: 2025-06-27

## 2025-06-27 NOTE — TELEPHONE ENCOUNTER
PCP: Shannon Roy NP-C    Last appt:  2/6/2025   Future Appointments   Date Time Provider Department Center   7/21/2025  1:00 PM [facility]   7/21/2025  1:30 PM [facility] BONE DENSITY RM 1 [facility]   11/13/2025  1:00 PM Nano Petty PA-C [facility] BS AMB       Requested Prescriptions     Pending Prescriptions Disp Refills    metoprolol succinate (TOPROL XL) 25 MG extended release tablet 45 tablet 3     Sig: TAKE 1/2 (ONE-HALF) TABLET BY MOUTH IN THE MORNING       Request for a 30 or 90 day supply? Provider Discretion    Pharmacy: CONFIRMED    Other Comments: N/A

## 2025-07-21 ENCOUNTER — HOSPITAL ENCOUNTER (OUTPATIENT)
Dept: WOMENS IMAGING | Facility: HOSPITAL | Age: 75
Discharge: HOME | End: 2025-07-24
Payer: MEDICARE

## 2025-07-21 ENCOUNTER — HOSPITAL ENCOUNTER (OUTPATIENT)
Facility: HOSPITAL | Age: 75
Discharge: HOME | End: 2025-07-24
Payer: MEDICARE

## 2025-07-21 DIAGNOSIS — M81.0 POSTMENOPAUSAL OSTEOPOROSIS: ICD-10-CM

## 2025-07-21 DIAGNOSIS — Z12.31 SCREENING MAMMOGRAM FOR BREAST CANCER: ICD-10-CM

## 2025-07-21 PROCEDURE — 77063 BREAST TOMOSYNTHESIS BI: CPT

## 2025-07-21 PROCEDURE — 77080 DXA BONE DENSITY AXIAL: CPT

## (undated) DEVICE — PREP SKN CHLRAPRP APL 26ML STR --

## (undated) DEVICE — IMMOBILIZER SHLDR L L18IN D9IN UNIV POLY COT W/ FOAM STRP

## (undated) DEVICE — REM POLYHESIVE ADULT PATIENT RETURN ELECTRODE: Brand: VALLEYLAB

## (undated) DEVICE — 3M™ MEDIPORE™ H SOFT CLOTH SURGICAL TAPE 2862, 2 INCH X 10 YARD (5CM X 9,1M), 12 ROLLS/CASE: Brand: 3M™ MEDIPORE™

## (undated) DEVICE — GAUZE,SPONGE,4"X4",16PLY,STRL,LF,10/TRAY: Brand: MEDLINE

## (undated) DEVICE — KENDALL 500 SERIES DIAPHORETIC FOAM MONITORING ELECTRODE - TEAR DROP SHAPE ( 30/PK): Brand: KENDALL

## (undated) DEVICE — BLADE SHV DIA4MM RED RESECT FOR GEN DEB REM OF PERIOST FR

## (undated) DEVICE — PACK PROCEDURE SURG ORTH SHLDR CUST

## (undated) DEVICE — CURITY NON-ADHERENT STRIPS: Brand: CURITY

## (undated) DEVICE — PAD,NON-ADHERENT,3X8,STERILE,LF,1/PK: Brand: MEDLINE

## (undated) DEVICE — BLANKET WRM AD W50XL85.8IN PACU FULL BODY FORC AIR

## (undated) DEVICE — INFLOW CASSETTE TUBING, DO NOT USE IF PACKAGE IS DAMAGED: Brand: CROSSFLOW

## (undated) DEVICE — BLANKET WRM W40.2XL55.9IN IORT LO BODY + MISTRAL AIR

## (undated) DEVICE — STERILE POLYISOPRENE POWDER-FREE SURGICAL GLOVES: Brand: PROTEXIS

## (undated) DEVICE — Device

## (undated) DEVICE — 4.7MM ROUND BUR

## (undated) DEVICE — SKIN MARKER,REGULAR TIP WITH RULER AND LABELS: Brand: DEVON

## (undated) DEVICE — KIT SUT SZ 2 L38IN FIBERWIRE W/ TAPR NDL STR NIT LOOP MIC

## (undated) DEVICE — SUTURE FIBERWIRE SZ 2 W/ TAPERED NEEDLE BLUE L38IN NONABSORB BLU L26.5MM 1/2 CIRCLE AR7200

## (undated) DEVICE — BIPOLAR FORCEPS CORD: Brand: VALLEYLAB

## (undated) DEVICE — LIGHT HANDLE: Brand: DEVON

## (undated) DEVICE — SUTURE FIBERLINK SZ 2-0 L26IN NONABSORBABLE BLU CLS LOOP AR7235

## (undated) DEVICE — BLADE ASSEMB CLP HAIR FINE --

## (undated) DEVICE — SHOULDER SUSPENSION KIT 6 PER BOX

## (undated) DEVICE — DRAPE,REIN 53X77,STERILE: Brand: MEDLINE

## (undated) DEVICE — GAUZE,SPONGE,4"X4",12PLY,STERILE,LF,2'S: Brand: MEDLINE

## (undated) DEVICE — KIT CLN UP BON SECOURS MARYV

## (undated) DEVICE — SPONGE LAP 18X18IN STRL -- 5/PK

## (undated) DEVICE — CANNULA THREADED FLEX 8.0 X 72MM: Brand: CLEAR-TRAC

## (undated) DEVICE — TRAY MYEL SFTY +

## (undated) DEVICE — NDL SPNE MANAN 22GX6IN --

## (undated) DEVICE — BUR SHV CUT HLLW 6 FLUT 5.5MM --

## (undated) DEVICE — SUT ETHBND 2 30IN OS4 GRN --

## (undated) DEVICE — NEEDLE SUT PASS FOR ROT CUF LABRAL REP MULTFI SCORPION

## (undated) DEVICE — MEDIA CONTRAST 10ML 200MG/ML 41%

## (undated) DEVICE — BASIN EMESIS 500CC ROSE 250/CS 60/PLT: Brand: MEDEGEN MEDICAL PRODUCTS, LLC

## (undated) DEVICE — BNDG CMPR ELAS KNT VEL STD 3IN -- MEDICHOICE

## (undated) DEVICE — GARMENT,MEDLINE,DVT,INT,CALF,LG, GEN2: Brand: MEDLINE

## (undated) DEVICE — TAPE,ELASTIC,FOAM,CURAD,3"X5.5YD,LF: Brand: CURAD

## (undated) DEVICE — APPLICATOR BNDG 1MM ADH PREMIERPRO EXOFIN

## (undated) DEVICE — SUTURE VCRL SZ 3-0 L27IN ABSRB UD L36MM CT-1 1/2 CIR J258H

## (undated) DEVICE — TRAP SPEC COLL POLYP POLYSTYR --

## (undated) DEVICE — INTENDED FOR TISSUE SEPARATION, AND OTHER PROCEDURES THAT REQUIRE A SHARP SURGICAL BLADE TO PUNCTURE OR CUT.: Brand: BARD-PARKER ®  SAFETY SCALPED

## (undated) DEVICE — SUTURE MCRYL SZ 4-0 L27IN ABSRB UD L24MM PS-1 3/8 CIR PRIM Y935H

## (undated) DEVICE — NEEDLE SUT SZ 2 S STL MAYO CATGUT 1/2 CIR TAPR PT

## (undated) DEVICE — PADDING CAST COHESIVE 4 YDX3 IN HND TEARABLE COTTON SPEC 100

## (undated) DEVICE — MEDI-VAC NON-CONDUCTIVE SUCTION TUBING: Brand: CARDINAL HEALTH

## (undated) DEVICE — BANDAGE COMPR EXSANGUATION SGL LAYERED NO CLSR 9FT LEN 4IN W

## (undated) DEVICE — DRAPE,U/SHT,SPLIT,FILM,60X84,STERILE: Brand: MEDLINE

## (undated) DEVICE — NEEDLE SPNL 22GA L3.5IN BLK HUB S STL REG WALL FIT STYL W/

## (undated) DEVICE — (D)SYR 10ML 1/5ML GRAD NSAF -- PKGING CHANGE USE ITEM 338027

## (undated) DEVICE — SOLUTION IV 1000ML 0.9% SOD CHL

## (undated) DEVICE — SYR 50ML SLIP TIP NSAF LF STRL --

## (undated) DEVICE — COVER LT HNDL FLX

## (undated) DEVICE — INTENDED FOR TISSUE SEPARATION, AND OTHER PROCEDURES THAT REQUIRE A SHARP SURGICAL BLADE TO PUNCTURE OR CUT.: Brand: BARD-PARKER SAFETY BLADES SIZE 10, STERILE

## (undated) DEVICE — DRAIN SURG L0.75IN TRCR

## (undated) DEVICE — BLADE OPHTH MINI BEAV SHRP --

## (undated) DEVICE — GARMENT,MEDLINE,DVT,INT,CALF,MED, GEN2: Brand: MEDLINE

## (undated) DEVICE — 90-S MAX, SUCTION PROBE, NON-BENDABLE, MAX CUT LEVEL 11: Brand: SERFAS ENERGY

## (undated) DEVICE — 4-PORT MANIFOLD: Brand: NEPTUNE 2

## (undated) DEVICE — BANDAGE,GAUZE,BULKEE II,4.5"X4.1YD,STRL: Brand: MEDLINE

## (undated) DEVICE — SUTURE ABSORBABLE BRAIDED 0 CTXB 36 IN VIO PDS II ZB370

## (undated) DEVICE — KIT COLON W/ 1.1OZ LUB AND 2 END

## (undated) DEVICE — DEVICE INFL 60ML 12ATM CONVENIENT LOK REL HNDL HI PRSS FLX

## (undated) DEVICE — CONTAINER PREFIL FRMLN 15ML --

## (undated) DEVICE — SYR LR LCK 1ML GRAD NSAF 30ML --

## (undated) DEVICE — GOWN,REINFORCED,POLY,AURORA,XLARGE,STRL: Brand: MEDLINE

## (undated) DEVICE — SOLUTION IRRIG 3000ML 0.9% SOD CHL FLX CONT 0797208] ICU MEDICAL INC]

## (undated) DEVICE — SUCTION COBB

## (undated) DEVICE — IMMOBILIZER SHLDR M L15IN D8IN UNIV POLY COT W/ FOAM STRP

## (undated) DEVICE — SOLUTION IRRIG 1000ML H2O STRL BLT

## (undated) DEVICE — SYR 10ML LUER LOK 1/5ML GRAD --

## (undated) DEVICE — 2108 SERIES SAGITTAL BLADE (24.8 X 1.24 X 80.1MM)

## (undated) DEVICE — DRAPE,HAND,STERILE: Brand: MEDLINE

## (undated) DEVICE — NEEDLE NRV BLK 21GA L4IN 30DEG INSUL BVL EXTN SET STIMUPLEX

## (undated) DEVICE — GOWN CHEMO 2XL BLU POLY MULTILAYER COAT ISOLATN W HK LOOP

## (undated) DEVICE — (D)BNDG ADHESIVE FABRIC 3/4X3 -- DISC BY MFR USE ITEM 357960

## (undated) DEVICE — SUTURE VCRL SZ 2-0 L27IN ABSRB VLT L26MM CT-2 1/2 CIR J333H

## (undated) DEVICE — PRECISION THIN (9.0 X 0.38 X 25.0MM)

## (undated) DEVICE — HANDPIECE SET WITH HIGH FLOW TIP AND SUCTION TUBE: Brand: INTERPULSE

## (undated) DEVICE — SUTURE VCRL SZ 2-0 L27IN ABSRB VLT L36MM CT-1 1/2 CIR J339H

## (undated) DEVICE — HEX-LOCKING BLADE ELECTRODE: Brand: EDGE

## (undated) DEVICE — SUTURE VCRL SZ 0 L27IN ABSRB UD L36MM CT-1 1/2 CIR J260H

## (undated) DEVICE — Z CONVERTED USE 2273640 DEPRESSOR TNGE SMOOTH 0.69X6 IN WHT BIRCH BLADE NS PURITAN

## (undated) DEVICE — PACK PROCEDURE SURG TOT HIP BSHR LF

## (undated) DEVICE — WATERPROOF, BACTERIA PROOF DRESSING WITH ABSORBENT SEE THROUGH PAD: Brand: OPSITE POST-OP VISIBLE 25X10CM CTN 20

## (undated) DEVICE — CANNULA PERF L2IN BLNT TIP 2MM VES CLR RADPQ BODY FEM LUER

## (undated) DEVICE — DERMABOND SKIN ADH 0.7ML -- DERMABOND ADVANCED 12/BX

## (undated) DEVICE — BRACE SHLDR M FA L135 145IN UNIV AIRMESH BRTH SLNG 15DEG

## (undated) DEVICE — SUTURE MCRYL SZ 4-0 L18IN ABSRB UD L19MM PS-2 3/8 CIR PRIM Y496G

## (undated) DEVICE — INTENDED FOR TISSUE SEPARATION, AND OTHER PROCEDURES THAT REQUIRE A SHARP SURGICAL BLADE TO PUNCTURE OR CUT.: Brand: BARD-PARKER SAFETY BLADES SIZE 15, STERILE

## (undated) DEVICE — PACK PROCEDURE SURG MAJ W/ BASIN LF

## (undated) DEVICE — FLEX ADVANTAGE 1500CC: Brand: FLEX ADVANTAGE

## (undated) DEVICE — SNARE ENDOSCP POLYP MED STD AD 2.4X27X240 CM 2.8 MM OVL SENS

## (undated) DEVICE — PAD,ABDOMINAL,8"X10",ST,LF: Brand: MEDLINE

## (undated) DEVICE — SOFT SILICONE HYDROCELLULAR SACRUM DRESSING WITH LOCK AWAY LAYER: Brand: ALLEVYN LIFE SACRUM (LARGE) PACK OF 10